# Patient Record
Sex: FEMALE | Race: WHITE | Employment: OTHER | URBAN - METROPOLITAN AREA
[De-identification: names, ages, dates, MRNs, and addresses within clinical notes are randomized per-mention and may not be internally consistent; named-entity substitution may affect disease eponyms.]

---

## 2018-10-12 ENCOUNTER — OFFICE VISIT (OUTPATIENT)
Dept: CARDIOLOGY CLINIC | Age: 68
End: 2018-10-12
Payer: MEDICARE

## 2018-10-12 VITALS
SYSTOLIC BLOOD PRESSURE: 126 MMHG | BODY MASS INDEX: 35.9 KG/M2 | WEIGHT: 202.6 LBS | HEART RATE: 68 BPM | HEIGHT: 63 IN | DIASTOLIC BLOOD PRESSURE: 78 MMHG

## 2018-10-12 DIAGNOSIS — Z95.2 S/P MITRAL VALVE REPLACEMENT: ICD-10-CM

## 2018-10-12 DIAGNOSIS — I73.9 PVD (PERIPHERAL VASCULAR DISEASE) (HCC): ICD-10-CM

## 2018-10-12 DIAGNOSIS — I05.0 RHEUMATIC MITRAL STENOSIS: ICD-10-CM

## 2018-10-12 DIAGNOSIS — E78.2 MIXED HYPERLIPIDEMIA: ICD-10-CM

## 2018-10-12 DIAGNOSIS — Z72.0 TOBACCO ABUSE: ICD-10-CM

## 2018-10-12 DIAGNOSIS — I10 ESSENTIAL HYPERTENSION: ICD-10-CM

## 2018-10-12 DIAGNOSIS — I25.119 CORONARY ARTERY DISEASE INVOLVING NATIVE CORONARY ARTERY OF NATIVE HEART WITH ANGINA PECTORIS (HCC): ICD-10-CM

## 2018-10-12 DIAGNOSIS — Z76.89 ENCOUNTER TO ESTABLISH CARE WITH NEW DOCTOR: Primary | ICD-10-CM

## 2018-10-12 DIAGNOSIS — Z95.5 S/P RIGHT CORONARY ARTERY (RCA) STENT PLACEMENT: ICD-10-CM

## 2018-10-12 DIAGNOSIS — I42.9 CARDIOMYOPATHY, PRIMARY (HCC): ICD-10-CM

## 2018-10-12 PROCEDURE — 93000 ELECTROCARDIOGRAM COMPLETE: CPT | Performed by: INTERNAL MEDICINE

## 2018-10-12 PROCEDURE — 99204 OFFICE O/P NEW MOD 45 MIN: CPT | Performed by: INTERNAL MEDICINE

## 2018-10-12 RX ORDER — PANTOPRAZOLE SODIUM 40 MG/1
40 TABLET, DELAYED RELEASE ORAL DAILY
Qty: 30 TABLET | Refills: 3 | Status: SHIPPED | OUTPATIENT
Start: 2018-10-12 | End: 2018-11-06 | Stop reason: SDUPTHER

## 2018-10-12 RX ORDER — VARENICLINE TARTRATE 25 MG
KIT ORAL
Qty: 1 EACH | Refills: 3 | Status: SHIPPED | OUTPATIENT
Start: 2018-10-12 | End: 2018-12-31 | Stop reason: SDUPTHER

## 2018-10-12 RX ORDER — WARFARIN SODIUM 5 MG/1
TABLET ORAL
Qty: 30 TABLET | Refills: 3 | Status: SHIPPED | OUTPATIENT
Start: 2018-10-12 | End: 2019-05-20 | Stop reason: SDUPTHER

## 2018-10-12 RX ORDER — LEVOTHYROXINE SODIUM 0.1 MG/1
50 TABLET ORAL DAILY
Qty: 30 TABLET | Refills: 3 | Status: SHIPPED | OUTPATIENT
Start: 2018-10-12 | End: 2018-10-23 | Stop reason: SDUPTHER

## 2018-10-12 RX ORDER — NITROGLYCERIN 0.4 MG/1
0.4 TABLET SUBLINGUAL EVERY 5 MIN PRN
Qty: 25 TABLET | Refills: 3 | Status: SHIPPED | OUTPATIENT
Start: 2018-10-12 | End: 2020-01-03 | Stop reason: SDUPTHER

## 2018-10-12 RX ORDER — FUROSEMIDE 20 MG/1
10 TABLET ORAL DAILY
Qty: 30 TABLET | Refills: 5 | Status: SHIPPED | OUTPATIENT
Start: 2018-10-12 | End: 2019-10-03 | Stop reason: SDUPTHER

## 2018-10-12 RX ORDER — ATORVASTATIN CALCIUM 20 MG/1
20 TABLET, FILM COATED ORAL DAILY
Qty: 30 TABLET | Refills: 5 | Status: SHIPPED | OUTPATIENT
Start: 2018-10-12 | End: 2018-11-06 | Stop reason: SDUPTHER

## 2018-10-12 RX ORDER — ATENOLOL 50 MG/1
50 TABLET ORAL DAILY
Qty: 30 TABLET | Refills: 5 | Status: SHIPPED | OUTPATIENT
Start: 2018-10-12 | End: 2018-11-06 | Stop reason: SDUPTHER

## 2018-10-12 RX ORDER — VITAMIN E 268 MG
400 CAPSULE ORAL DAILY
COMMUNITY
End: 2019-04-10

## 2018-10-12 RX ORDER — CILOSTAZOL 100 MG/1
100 TABLET ORAL 2 TIMES DAILY
Qty: 60 TABLET | Refills: 5 | Status: SHIPPED | OUTPATIENT
Start: 2018-10-12 | End: 2018-11-06 | Stop reason: SDUPTHER

## 2018-10-12 NOTE — PROGRESS NOTES
facility-administered medications for this visit. Allergies:   Patient has no known allergies. Patient History:  Past Medical History:   Diagnosis Date    CAD (coronary artery disease)     H/O echocardiogram 03/22/2017    EF 35-40%    Heart attack ([address] 1/2014    Hyperlipidemia     Hypertension     S/P right coronary artery (RCA) stent placement      Past Surgical History:   Procedure Laterality Date    CARDIAC SURGERY      CORONARY ANGIOPLASTY WITH STENT PLACEMENT      CORONARY ARTERY BYPASS GRAFT  06/21/2016    CABG X1 SVG to RCA with Mitral Valve Replacement 27mm Mosaic     MITRAL VALVE REPLACEMENT  06/21/2016    27mm mosaic     Family History   Problem Relation Age of Onset    Stroke Mother     Heart Attack Father     Coronary Art Dis Father     Pacemaker Father     Arrhythmia Father     Diabetes Sister     Rheum Arthritis Sister     Heart Attack Sister     Arrhythmia Sister      Social History   Substance Use Topics    Smoking status: Current Some Day Smoker     Types: Cigarettes     Last attempt to quit: 5/28/2016    Smokeless tobacco: Never Used    Alcohol use No        Review of Systems:   · Constitutional: No Fever or Weight Loss   · Eyes: No Decreased Vision  · ENT: No Headaches, Hearing Loss or Vertigo  · Cardiovascular: as per note above   · Respiratory: No cough or wheezing and as per note above.    · Gastrointestinal: No abdominal pain, appetite loss, blood in stools, constipation, diarrhea or heartburn  · Genitourinary: No dysuria, trouble voiding, or hematuria  · Musculoskeletal:  None  · Integumentary: No rash or pruritis  · Neurological: No TIA or stroke symptoms  · Psychiatric: No anxiety or depression  · Endocrine: No malaise, fatigue or temperature intolerance  · Hematologic/Lymphatic: No bleeding problems, blood clots or swollen lymph nodes  · Allergic/Immunologic: No nasal congestion or hives    Objective:      Physical Exam:  /78 (Site: Left Upper Arm,

## 2018-10-15 ENCOUNTER — TELEPHONE (OUTPATIENT)
Dept: CARDIOLOGY CLINIC | Age: 68
End: 2018-10-15

## 2018-10-23 ENCOUNTER — OFFICE VISIT (OUTPATIENT)
Dept: FAMILY MEDICINE CLINIC | Age: 68
End: 2018-10-23
Payer: MEDICARE

## 2018-10-23 VITALS
BODY MASS INDEX: 38.48 KG/M2 | SYSTOLIC BLOOD PRESSURE: 122 MMHG | DIASTOLIC BLOOD PRESSURE: 72 MMHG | OXYGEN SATURATION: 95 % | WEIGHT: 203.8 LBS | HEART RATE: 62 BPM | HEIGHT: 61 IN | TEMPERATURE: 97.5 F

## 2018-10-23 DIAGNOSIS — Z91.81 AT HIGH RISK FOR FALLS: ICD-10-CM

## 2018-10-23 DIAGNOSIS — Z12.31 ENCOUNTER FOR SCREENING MAMMOGRAM FOR BREAST CANCER: ICD-10-CM

## 2018-10-23 DIAGNOSIS — I25.10 CORONARY ARTERY DISEASE INVOLVING NATIVE CORONARY ARTERY OF NATIVE HEART WITHOUT ANGINA PECTORIS: ICD-10-CM

## 2018-10-23 DIAGNOSIS — R06.02 SHORTNESS OF BREATH: ICD-10-CM

## 2018-10-23 DIAGNOSIS — Z79.01 ANTICOAGULATED ON COUMADIN: ICD-10-CM

## 2018-10-23 DIAGNOSIS — Z72.0 TOBACCO ABUSE: Primary | ICD-10-CM

## 2018-10-23 DIAGNOSIS — E03.9 ACQUIRED HYPOTHYROIDISM: ICD-10-CM

## 2018-10-23 DIAGNOSIS — Z13.31 POSITIVE DEPRESSION SCREENING: ICD-10-CM

## 2018-10-23 DIAGNOSIS — Z99.81 ON HOME O2: ICD-10-CM

## 2018-10-23 DIAGNOSIS — E78.2 MIXED HYPERLIPIDEMIA: ICD-10-CM

## 2018-10-23 DIAGNOSIS — Z78.0 POST-MENOPAUSAL: ICD-10-CM

## 2018-10-23 DIAGNOSIS — I73.9 PAD (PERIPHERAL ARTERY DISEASE) (HCC): ICD-10-CM

## 2018-10-23 DIAGNOSIS — Z95.2 S/P MITRAL VALVE REPLACEMENT: ICD-10-CM

## 2018-10-23 DIAGNOSIS — Z76.89 ESTABLISHING CARE WITH NEW DOCTOR, ENCOUNTER FOR: ICD-10-CM

## 2018-10-23 DIAGNOSIS — I10 ESSENTIAL HYPERTENSION: ICD-10-CM

## 2018-10-23 LAB
A/G RATIO: 1.2 (ref 1.1–2.2)
ALBUMIN SERPL-MCNC: 3.8 G/DL (ref 3.4–5)
ALP BLD-CCNC: 88 U/L (ref 40–129)
ALT SERPL-CCNC: 18 U/L (ref 10–40)
ANION GAP SERPL CALCULATED.3IONS-SCNC: 15 MMOL/L (ref 3–16)
AST SERPL-CCNC: 16 U/L (ref 15–37)
BASOPHILS ABSOLUTE: 0.1 K/UL (ref 0–0.2)
BASOPHILS RELATIVE PERCENT: 0.8 %
BILIRUB SERPL-MCNC: 0.4 MG/DL (ref 0–1)
BUN BLDV-MCNC: 14 MG/DL (ref 7–20)
CALCIUM SERPL-MCNC: 9.4 MG/DL (ref 8.3–10.6)
CHLORIDE BLD-SCNC: 97 MMOL/L (ref 99–110)
CO2: 27 MMOL/L (ref 21–32)
CREAT SERPL-MCNC: 1.3 MG/DL (ref 0.6–1.2)
EOSINOPHILS ABSOLUTE: 0.2 K/UL (ref 0–0.6)
EOSINOPHILS RELATIVE PERCENT: 3 %
GFR AFRICAN AMERICAN: 49
GFR NON-AFRICAN AMERICAN: 41
GLOBULIN: 3.2 G/DL
GLUCOSE BLD-MCNC: 118 MG/DL (ref 70–99)
HCT VFR BLD CALC: 41.7 % (ref 36–48)
HEMOGLOBIN: 13.7 G/DL (ref 12–16)
INTERNATIONAL NORMALIZATION RATIO, POC: 3.1
LYMPHOCYTES ABSOLUTE: 1.7 K/UL (ref 1–5.1)
LYMPHOCYTES RELATIVE PERCENT: 21.4 %
MCH RBC QN AUTO: 31.7 PG (ref 26–34)
MCHC RBC AUTO-ENTMCNC: 32.8 G/DL (ref 31–36)
MCV RBC AUTO: 96.7 FL (ref 80–100)
MONOCYTES ABSOLUTE: 0.7 K/UL (ref 0–1.3)
MONOCYTES RELATIVE PERCENT: 8.8 %
NEUTROPHILS ABSOLUTE: 5.1 K/UL (ref 1.7–7.7)
NEUTROPHILS RELATIVE PERCENT: 66 %
PDW BLD-RTO: 14.1 % (ref 12.4–15.4)
PLATELET # BLD: 208 K/UL (ref 135–450)
PMV BLD AUTO: 9.8 FL (ref 5–10.5)
POTASSIUM SERPL-SCNC: 4.2 MMOL/L (ref 3.5–5.1)
PROTHROMBIN TIME, POC: NORMAL
RBC # BLD: 4.32 M/UL (ref 4–5.2)
SODIUM BLD-SCNC: 139 MMOL/L (ref 136–145)
TOTAL PROTEIN: 7 G/DL (ref 6.4–8.2)
WBC # BLD: 7.8 K/UL (ref 4–11)

## 2018-10-23 PROCEDURE — G0444 DEPRESSION SCREEN ANNUAL: HCPCS | Performed by: FAMILY MEDICINE

## 2018-10-23 PROCEDURE — 85610 PROTHROMBIN TIME: CPT | Performed by: FAMILY MEDICINE

## 2018-10-23 PROCEDURE — 99205 OFFICE O/P NEW HI 60 MIN: CPT | Performed by: FAMILY MEDICINE

## 2018-10-23 PROCEDURE — 36415 COLL VENOUS BLD VENIPUNCTURE: CPT | Performed by: FAMILY MEDICINE

## 2018-10-23 PROCEDURE — G8431 POS CLIN DEPRES SCRN F/U DOC: HCPCS | Performed by: FAMILY MEDICINE

## 2018-10-23 RX ORDER — LEVOTHYROXINE SODIUM 0.1 MG/1
100 TABLET ORAL DAILY
Qty: 30 TABLET | Refills: 0
Start: 2018-10-23 | End: 2018-11-06 | Stop reason: SDUPTHER

## 2018-10-23 ASSESSMENT — PATIENT HEALTH QUESTIONNAIRE - PHQ9
SUM OF ALL RESPONSES TO PHQ9 QUESTIONS 1 & 2: 3
2. FEELING DOWN, DEPRESSED OR HOPELESS: 1
1. LITTLE INTEREST OR PLEASURE IN DOING THINGS: 2
SUM OF ALL RESPONSES TO PHQ QUESTIONS 1-9: 12
SUM OF ALL RESPONSES TO PHQ QUESTIONS 1-9: 12
4. FEELING TIRED OR HAVING LITTLE ENERGY: 2
6. FEELING BAD ABOUT YOURSELF - OR THAT YOU ARE A FAILURE OR HAVE LET YOURSELF OR YOUR FAMILY DOWN: 2
5. POOR APPETITE OR OVEREATING: 2
9. THOUGHTS THAT YOU WOULD BE BETTER OFF DEAD, OR OF HURTING YOURSELF: 0
10. IF YOU CHECKED OFF ANY PROBLEMS, HOW DIFFICULT HAVE THESE PROBLEMS MADE IT FOR YOU TO DO YOUR WORK, TAKE CARE OF THINGS AT HOME, OR GET ALONG WITH OTHER PEOPLE: 2
8. MOVING OR SPEAKING SO SLOWLY THAT OTHER PEOPLE COULD HAVE NOTICED. OR THE OPPOSITE, BEING SO FIGETY OR RESTLESS THAT YOU HAVE BEEN MOVING AROUND A LOT MORE THAN USUAL: 0
3. TROUBLE FALLING OR STAYING ASLEEP: 2
7. TROUBLE CONCENTRATING ON THINGS, SUCH AS READING THE NEWSPAPER OR WATCHING TELEVISION: 1

## 2018-10-23 ASSESSMENT — ENCOUNTER SYMPTOMS
NAUSEA: 0
DIARRHEA: 0
SHORTNESS OF BREATH: 1
ABDOMINAL PAIN: 0
VOMITING: 0
CONSTIPATION: 0
COUGH: 0

## 2018-10-23 NOTE — PROGRESS NOTES
minutes as needed for Chest pain up to max of 3 total doses. If no relief after 1 dose, call 911., Disp: 25 tablet, Rfl: 3    furosemide (LASIX) 20 MG tablet, Take 0.5 tablets by mouth daily, Disp: 30 tablet, Rfl: 5    pantoprazole (PROTONIX) 40 MG tablet, Take 1 tablet by mouth daily, Disp: 30 tablet, Rfl: 3    warfarin (COUMADIN) 5 MG tablet, Take one and half tabs, Disp: 30 tablet, Rfl: 3    varenicline (CHANTIX STARTING MONTH PAK) 0.5 MG X 11 & 1 MG X 42 tablet, Take by mouth., Disp: 1 each, Rfl: 3    escitalopram (LEXAPRO) 20 MG tablet, Take 20 mg by mouth daily, Disp: , Rfl:     Ascorbic Acid (VITAMIN C) 1000 MG tablet, Take 1,000 mg by mouth daily, Disp: , Rfl:     Cholecalciferol (VITAMIN D-3) 1000 UNITS CAPS, Take 1 tablet by mouth daily, Disp: , Rfl:     Calcium Carb-Cholecalciferol (CALCIUM 600+D) 600-800 MG-UNIT TABS, Take 1 tablet by mouth daily, Disp: , Rfl:     aspirin 81 MG EC tablet, Take 1 tablet by mouth daily, Disp: 30 tablet, Rfl: 3    Multiple Vitamins-Minerals (THERAPEUTIC MULTIVITAMIN-MINERALS) tablet, Take 1 tablet by mouth daily, Disp: , Rfl:       Objective:   Physical Exam   Constitutional: She is oriented to person, place, and time. She appears well-developed and well-nourished. No distress. HENT:   Head: Normocephalic and atraumatic. Right Ear: External ear normal.   Left Ear: External ear normal.   Mouth/Throat: Oropharynx is clear and moist.   Eyes: Pupils are equal, round, and reactive to light. Conjunctivae and EOM are normal.   Neck: Neck supple. No thyromegaly present. Cardiovascular: Normal rate and regular rhythm. Murmur heard. Pulmonary/Chest: Effort normal. She has decreased breath sounds. She has wheezes. Abdominal: Soft. Bowel sounds are normal. She exhibits no mass. There is no tenderness. Musculoskeletal: She exhibits no edema. Lymphadenopathy:     She has no cervical adenopathy.    Neurological: She is alert and oriented to person, place, and

## 2018-11-02 RX ORDER — PANTOPRAZOLE SODIUM 40 MG/1
40 TABLET, DELAYED RELEASE ORAL DAILY
Qty: 30 TABLET | Refills: 3 | OUTPATIENT
Start: 2018-11-02

## 2018-11-02 RX ORDER — ESCITALOPRAM OXALATE 20 MG/1
20 TABLET ORAL DAILY
Qty: 30 TABLET | Refills: 5 | Status: SHIPPED | OUTPATIENT
Start: 2018-11-02 | End: 2019-05-31 | Stop reason: SDUPTHER

## 2018-11-06 RX ORDER — PANTOPRAZOLE SODIUM 40 MG/1
40 TABLET, DELAYED RELEASE ORAL DAILY
Qty: 90 TABLET | Refills: 3 | Status: SHIPPED | OUTPATIENT
Start: 2018-11-06 | End: 2020-01-03 | Stop reason: SDUPTHER

## 2018-11-06 RX ORDER — ATORVASTATIN CALCIUM 20 MG/1
20 TABLET, FILM COATED ORAL DAILY
Qty: 90 TABLET | Refills: 3 | Status: SHIPPED | OUTPATIENT
Start: 2018-11-06 | End: 2020-01-03 | Stop reason: SDUPTHER

## 2018-11-06 RX ORDER — LEVOTHYROXINE SODIUM 0.1 MG/1
100 TABLET ORAL DAILY
Qty: 30 TABLET | Refills: 5 | Status: SHIPPED | OUTPATIENT
Start: 2018-11-06 | End: 2019-03-26 | Stop reason: SDUPTHER

## 2018-11-06 RX ORDER — ATENOLOL 50 MG/1
50 TABLET ORAL DAILY
Qty: 90 TABLET | Refills: 3 | Status: SHIPPED | OUTPATIENT
Start: 2018-11-06 | End: 2020-01-24

## 2018-11-06 RX ORDER — CILOSTAZOL 100 MG/1
100 TABLET ORAL 2 TIMES DAILY
Qty: 180 TABLET | Refills: 3 | Status: SHIPPED | OUTPATIENT
Start: 2018-11-06 | End: 2018-12-14 | Stop reason: SDUPTHER

## 2018-12-14 RX ORDER — CILOSTAZOL 100 MG/1
100 TABLET ORAL 2 TIMES DAILY
Qty: 180 TABLET | Refills: 3 | Status: SHIPPED | OUTPATIENT
Start: 2018-12-14 | End: 2019-12-06 | Stop reason: SDUPTHER

## 2018-12-31 ENCOUNTER — OFFICE VISIT (OUTPATIENT)
Dept: FAMILY MEDICINE CLINIC | Age: 68
End: 2018-12-31
Payer: MEDICARE

## 2018-12-31 VITALS
BODY MASS INDEX: 39.19 KG/M2 | SYSTOLIC BLOOD PRESSURE: 146 MMHG | HEART RATE: 65 BPM | WEIGHT: 204 LBS | RESPIRATION RATE: 14 BRPM | OXYGEN SATURATION: 95 % | DIASTOLIC BLOOD PRESSURE: 82 MMHG

## 2018-12-31 DIAGNOSIS — R06.02 SHORTNESS OF BREATH: ICD-10-CM

## 2018-12-31 DIAGNOSIS — R32 URINARY INCONTINENCE, UNSPECIFIED TYPE: Primary | ICD-10-CM

## 2018-12-31 DIAGNOSIS — Z23 NEED FOR INFLUENZA VACCINATION: ICD-10-CM

## 2018-12-31 DIAGNOSIS — Z72.0 TOBACCO ABUSE: ICD-10-CM

## 2018-12-31 LAB
BILIRUBIN URINE: NEGATIVE
BLOOD, URINE: NEGATIVE
CLARITY: CLEAR
COLOR: YELLOW
EPITHELIAL CELLS, UA: 1 /HPF (ref 0–5)
GLUCOSE URINE: NEGATIVE MG/DL
HYALINE CASTS: 0 /LPF (ref 0–8)
KETONES, URINE: NEGATIVE MG/DL
LEUKOCYTE ESTERASE, URINE: NEGATIVE
MICROSCOPIC EXAMINATION: YES
NITRITE, URINE: NEGATIVE
PH UA: 6.5
PROTEIN UA: 30 MG/DL
RBC UA: 1 /HPF (ref 0–4)
SPECIFIC GRAVITY UA: 1.01
URINE REFLEX TO CULTURE: ABNORMAL
URINE TYPE: ABNORMAL
UROBILINOGEN, URINE: 0.2 E.U./DL
WBC UA: 1 /HPF (ref 0–5)

## 2018-12-31 PROCEDURE — 90662 IIV NO PRSV INCREASED AG IM: CPT | Performed by: FAMILY MEDICINE

## 2018-12-31 PROCEDURE — 99214 OFFICE O/P EST MOD 30 MIN: CPT | Performed by: FAMILY MEDICINE

## 2018-12-31 PROCEDURE — 81003 URINALYSIS AUTO W/O SCOPE: CPT | Performed by: FAMILY MEDICINE

## 2018-12-31 PROCEDURE — G0008 ADMIN INFLUENZA VIRUS VAC: HCPCS | Performed by: FAMILY MEDICINE

## 2018-12-31 RX ORDER — VARENICLINE TARTRATE 25 MG
KIT ORAL
Qty: 1 EACH | Refills: 3 | Status: SHIPPED | OUTPATIENT
Start: 2018-12-31 | End: 2019-02-04 | Stop reason: DRUGHIGH

## 2018-12-31 NOTE — PROGRESS NOTES
Subjective:      Patient ID: Charmaine Ee is a 76 y.o. female. Tere Pérez is here with urinary leakage. She wants to restart chantix, and meds for COPD. Urinary leakage   She reports this is been going on for 3 years. She has stress incontinence but also leaks with movements as well as leaking when just sitting watching TV. She reports leaking at night while she is sleeping. She wears undergarments to help with leakage. She denies ever seeing a urologist or having workup for this in the past.  She denies any medications for this. She is on a diuretic which contributes to her increased urination. His any pain or burning with urination. No urgency or residual.  No blood when she peas. A UA of 2016 did show some moderate blood and protein in her urine. No follow[facility]up [facility]. Smoking  She took Chantix for 1 month but never called for follow-up pack. She would like to restart Chantix. Breathing  She is asking for meds for COPD, she did not get her pulmonary function test done to evaluate her breathing. Since her records are from Dr. Joana Garcia and are not easily obtainable right now we'll repeat her PFTs as planned. Review of Systems   Respiratory: Positive for shortness of breath. Cardiovascular: Negative for chest pain. Genitourinary: Positive for frequency. Negative for dysuria, hematuria, pelvic pain, urgency and vaginal pain. Margarita Meyer Count includes the [facility]  Past Surgical History:   Procedure Laterality Date    CARDIAC SURGERY      CORONARY ANGIOPLASTY WITH STENT PLACEMENT      CORONARY ARTERY BYPASS GRAFT  06/21/2016    CABG X1 SVG to RCA with Mitral Valve Replacement 27mm Mosaic     MITRAL VALVE REPLACEMENT  06/21/2016    27mm mosaic     Social History     Social History    Marital status:      Spouse name: N/A    Number of children: N/A    Years of education: N/A     Occupational History    Not on file.      Social History Main Topics    Smoking status: Former Smoker     Types:

## 2019-01-02 ENCOUNTER — TELEPHONE (OUTPATIENT)
Dept: FAMILY MEDICINE CLINIC | Age: 69
End: 2019-01-02

## 2019-01-02 DIAGNOSIS — R32 URINARY INCONTINENCE, UNSPECIFIED TYPE: Primary | ICD-10-CM

## 2019-01-02 RX ORDER — OXYBUTYNIN CHLORIDE 10 MG/1
10 TABLET, EXTENDED RELEASE ORAL DAILY
Qty: 30 TABLET | Refills: 2 | Status: SHIPPED | OUTPATIENT
Start: 2019-01-02 | End: 2019-07-10 | Stop reason: SDUPTHER

## 2019-02-04 ENCOUNTER — TELEPHONE (OUTPATIENT)
Dept: FAMILY MEDICINE CLINIC | Age: 69
End: 2019-02-04

## 2019-02-04 RX ORDER — VARENICLINE TARTRATE 1 MG/1
1 TABLET, FILM COATED ORAL 2 TIMES DAILY
Qty: 60 TABLET | Refills: 1 | Status: SHIPPED | OUTPATIENT
Start: 2019-02-04 | End: 2019-04-10

## 2019-04-10 ENCOUNTER — OFFICE VISIT (OUTPATIENT)
Dept: FAMILY MEDICINE CLINIC | Age: 69
End: 2019-04-10
Payer: MEDICARE

## 2019-04-10 VITALS
BODY MASS INDEX: 40.03 KG/M2 | HEART RATE: 66 BPM | OXYGEN SATURATION: 98 % | WEIGHT: 208.4 LBS | TEMPERATURE: 96.6 F | SYSTOLIC BLOOD PRESSURE: 130 MMHG | DIASTOLIC BLOOD PRESSURE: 72 MMHG

## 2019-04-10 DIAGNOSIS — Z72.0 TOBACCO ABUSE: ICD-10-CM

## 2019-04-10 DIAGNOSIS — R09.81 NASAL CONGESTION: ICD-10-CM

## 2019-04-10 DIAGNOSIS — Z79.01 ANTICOAGULATED ON COUMADIN: Primary | ICD-10-CM

## 2019-04-10 LAB
INTERNATIONAL NORMALIZATION RATIO, POC: 6.4
PROTHROMBIN TIME, POC: ABNORMAL

## 2019-04-10 PROCEDURE — 99214 OFFICE O/P EST MOD 30 MIN: CPT | Performed by: FAMILY MEDICINE

## 2019-04-10 PROCEDURE — 85610 PROTHROMBIN TIME: CPT | Performed by: FAMILY MEDICINE

## 2019-04-10 ASSESSMENT — ENCOUNTER SYMPTOMS
SHORTNESS OF BREATH: 1
COUGH: 0

## 2019-04-10 ASSESSMENT — PATIENT HEALTH QUESTIONNAIRE - PHQ9
2. FEELING DOWN, DEPRESSED OR HOPELESS: 0
1. LITTLE INTEREST OR PLEASURE IN DOING THINGS: 0
SUM OF ALL RESPONSES TO PHQ9 QUESTIONS 1 & 2: 0
SUM OF ALL RESPONSES TO PHQ QUESTIONS 1-9: 0
SUM OF ALL RESPONSES TO PHQ QUESTIONS 1-9: 0

## 2019-04-10 NOTE — PROGRESS NOTES
Subjective:      Patient ID: Leon Yip is a 71 y.o. female. Tomeka Castellanos is here to follow up on smoking. Smoking  Currently she is smoking up to 1/2 pack per day. She stopped Chantix about 2 weeks ago. She stopped while she was taking it. She also has developed some allergy problems over the last couple months. Review of Systems   Respiratory: Positive for shortness of breath. Negative for cough. Cardiovascular: Negative for chest pain and palpitations.      Past Medical History:   Diagnosis Date    CAD (coronary artery disease)     [facility] [address]     H/O echocardiogram 2017    EF 35-40%    Heart attack ([facility] [address] 2014    Hyperlipidemia     Hypertension     [facility]     S/P right coronary artery (RCA) stent placement     T2 vertebral fracture ([facility] [address] 2017    TIA (transient ischemic attack)     Tobacco dependence      Past Surgical History:   Procedure Laterality Date    CARDIAC SURGERY      CORONARY ANGIOPLASTY WITH STENT PLACEMENT      CORONARY ARTERY BYPASS GRAFT  2016    CABG X1 SVG to RCA with Mitral Valve Replacement 27mm Mosaic     MITRAL VALVE REPLACEMENT  2016    27mm mosaic     Social History     Socioeconomic History    Marital status:      Spouse name: Not on file    Number of children: Not on file    Years of education: Not on file    Highest education level: Not on file   Occupational History    Not on file   Social Needs    Financial resource strain: Not on file    Food insecurity:     Worry: Not on file     Inability: Not on file    Transportation needs:     Medical: Not on file     Non-medical: Not on file   Tobacco Use    Smoking status: Former Smoker     Types: Cigarettes     Last attempt to quit: 10/18/2017     Years since quittin.4    Smokeless tobacco: Never Used   Substance and Sexual Activity    Alcohol use: No    Drug use: No    Sexual activity: Yes     Partners: Male   Lifestyle    Physical activity:     Days per week: Not on file     Minutes per session: Not on file    Stress: Not on file   Relationships    Social connections:     Talks on phone: Not on file     Gets together: Not on file     Attends Mandaeism service: Not on file     Active member of club or organization: Not on file     Attends meetings of clubs or organizations: Not on file     Relationship status: Not on file    Intimate partner violence:     Fear of current or ex partner: Not on file     Emotionally abused: Not on file     Physically abused: Not on file     Forced sexual activity: Not on file   Other Topics Concern    Not on file   Social History Narrative    ** Merged History Encounter **          Family History   Problem Relation Age of Onset    Stroke Mother     Heart Attack Father     Coronary Art Dis Father     Pacemaker Father     Arrhythmia Father     Diabetes Sister     Rheum Arthritis Sister     Heart Attack Sister     Arrhythmia Sister          Objective:   Physical Exam   Constitutional: She appears well-developed and well-nourished. No distress. HENT:   Head: Normocephalic and atraumatic. Right Ear: External ear normal.   Left Ear: External ear normal.   Mouth/Throat: Oropharynx is clear and moist and mucous membranes are normal. No oropharyngeal exudate or posterior oropharyngeal erythema. No tonsillar exudate. Nasal congestion but dry   Nursing note and vitals reviewed. Assessment:       Diagnosis Orders   1. Anticoagulated on Coumadin  POCT INR   2. Tobacco abuse     3. Nasal congestion             Plan:      1. INR in the office today was 6.4. We'll have her hold the next 2 Coumadin doses and repeat her INR on Friday. 2.  We'll have her use nicotine replacement lozenges to take place of her cigarettes. We discussed that the nicotine isn't a problem but what's in the cigarettes is.   We'll get her switched over to the lozenges and then try to titrate the dose down. She verbalized understanding. 3.  Nasal congestion is from dryness and swelling. Moisturize with Vaseline at least once a day and nasal saline as needed. She can take the over-the-counter antihistamine decongestant combination for next week to help with swelling as well. Follow-up one month: Smoking. Nurse visit on Friday for INR. Current Outpatient Medications:     Cyanocobalamin (VITAMIN B 12 PO), Take by mouth, Disp: , Rfl:     levothyroxine (SYNTHROID) 100 MCG tablet, TAKE 1 TABLET BY MOUTH ONCE DAILY, Disp: 90 tablet, Rfl: 0    oxybutynin (DITROPAN XL) 10 MG extended release tablet, Take 1 tablet by mouth daily, Disp: 30 tablet, Rfl: 2    cilostazol (PLETAL) 100 MG tablet, Take 1 tablet by mouth 2 times daily, Disp: 180 tablet, Rfl: 3    atorvastatin (LIPITOR) 20 MG tablet, Take 1 tablet by mouth daily, Disp: 90 tablet, Rfl: 3    atenolol (TENORMIN) 50 MG tablet, Take 1 tablet by mouth daily, Disp: 90 tablet, Rfl: 3    pantoprazole (PROTONIX) 40 MG tablet, Take 1 tablet by mouth daily, Disp: 90 tablet, Rfl: 3    escitalopram (LEXAPRO) 20 MG tablet, Take 1 tablet by mouth daily, Disp: 30 tablet, Rfl: 5    nitroGLYCERIN (NITROSTAT) 0.4 MG SL tablet, Place 1 tablet under the tongue every 5 minutes as needed for Chest pain up to max of 3 total doses.  If no relief after 1 dose, call 911., Disp: 25 tablet, Rfl: 3    furosemide (LASIX) 20 MG tablet, Take 0.5 tablets by mouth daily, Disp: 30 tablet, Rfl: 5    warfarin (COUMADIN) 5 MG tablet, Take one and half tabs, Disp: 30 tablet, Rfl: 3    Ascorbic Acid (VITAMIN C) 1000 MG tablet, Take 1,000 mg by mouth daily, Disp: , Rfl:     aspirin 81 MG EC tablet, Take 1 tablet by mouth daily, Disp: 30 tablet, Rfl: 3    Multiple Vitamins-Minerals (THERAPEUTIC MULTIVITAMIN-MINERALS) tablet, Take 1 tablet by mouth daily, Disp: , Rfl:     Calcium Carb-Cholecalciferol (CALCIUM 600+D) 600-800 MG-UNIT TABS, Take 1 tablet by mouth daily, Disp: , Rfl:           Bethany Matthews MD

## 2019-04-10 NOTE — PATIENT INSTRUCTIONS
Take the allergy medication as directed for 1 week. Then stop it. At least once a day, apply vaseline to the opening of your nose to help moisturize your nose. This will decrease your nasal congestion and allergy symptoms.

## 2019-04-12 ENCOUNTER — NURSE ONLY (OUTPATIENT)
Dept: FAMILY MEDICINE CLINIC | Age: 69
End: 2019-04-12
Payer: MEDICARE

## 2019-04-12 DIAGNOSIS — Z79.01 ANTICOAGULATED ON COUMADIN: Primary | ICD-10-CM

## 2019-04-12 LAB
INTERNATIONAL NORMALIZATION RATIO, POC: 3.3
PROTHROMBIN TIME, POC: ABNORMAL

## 2019-04-12 PROCEDURE — 85610 PROTHROMBIN TIME: CPT | Performed by: FAMILY MEDICINE

## 2019-05-02 ENCOUNTER — NURSE ONLY (OUTPATIENT)
Dept: FAMILY MEDICINE CLINIC | Age: 69
End: 2019-05-02
Payer: MEDICARE

## 2019-05-02 ENCOUNTER — TELEPHONE (OUTPATIENT)
Dept: FAMILY MEDICINE CLINIC | Age: 69
End: 2019-05-02

## 2019-05-02 DIAGNOSIS — Z79.01 ANTICOAGULATED ON COUMADIN: Primary | ICD-10-CM

## 2019-05-02 LAB
INTERNATIONAL NORMALIZATION RATIO, POC: 4.6
PROTHROMBIN TIME, POC: ABNORMAL

## 2019-05-02 PROCEDURE — 85610 PROTHROMBIN TIME: CPT | Performed by: FAMILY MEDICINE

## 2019-05-06 ENCOUNTER — NURSE ONLY (OUTPATIENT)
Dept: FAMILY MEDICINE CLINIC | Age: 69
End: 2019-05-06
Payer: MEDICARE

## 2019-05-06 DIAGNOSIS — Z79.01 ANTICOAGULATED ON COUMADIN: Primary | ICD-10-CM

## 2019-05-06 LAB
INTERNATIONAL NORMALIZATION RATIO, POC: 2
PROTHROMBIN TIME, POC: NORMAL

## 2019-05-06 PROCEDURE — 85610 PROTHROMBIN TIME: CPT | Performed by: FAMILY MEDICINE

## 2019-05-13 ENCOUNTER — NURSE ONLY (OUTPATIENT)
Dept: FAMILY MEDICINE CLINIC | Age: 69
End: 2019-05-13
Payer: MEDICARE

## 2019-05-13 DIAGNOSIS — Z79.01 ANTICOAGULATED ON COUMADIN: Primary | ICD-10-CM

## 2019-05-13 LAB
INTERNATIONAL NORMALIZATION RATIO, POC: 2.7
PROTHROMBIN TIME, POC: NORMAL

## 2019-05-13 PROCEDURE — 85610 PROTHROMBIN TIME: CPT | Performed by: FAMILY MEDICINE

## 2019-05-31 RX ORDER — ESCITALOPRAM OXALATE 20 MG/1
20 TABLET ORAL DAILY
Qty: 30 TABLET | Refills: 0 | Status: SHIPPED | OUTPATIENT
Start: 2019-05-31 | End: 2019-07-02 | Stop reason: SDUPTHER

## 2019-06-13 ENCOUNTER — TELEPHONE (OUTPATIENT)
Dept: CARDIOLOGY CLINIC | Age: 69
End: 2019-06-13

## 2019-06-13 NOTE — TELEPHONE ENCOUNTER
[facility] called for date of services and 10/2018 was the pt last visit and they needed elisabeth NPI number

## 2019-06-19 ENCOUNTER — TELEPHONE (OUTPATIENT)
Dept: CARDIOLOGY CLINIC | Age: 69
End: 2019-06-19

## 2019-06-25 ENCOUNTER — OFFICE VISIT (OUTPATIENT)
Dept: FAMILY MEDICINE CLINIC | Age: 69
End: 2019-06-25
Payer: MEDICARE

## 2019-06-25 VITALS
SYSTOLIC BLOOD PRESSURE: 122 MMHG | OXYGEN SATURATION: 97 % | WEIGHT: 205.8 LBS | TEMPERATURE: 96.9 F | HEART RATE: 86 BPM | BODY MASS INDEX: 39.53 KG/M2 | DIASTOLIC BLOOD PRESSURE: 80 MMHG

## 2019-06-25 DIAGNOSIS — Z79.01 ANTICOAGULATED ON COUMADIN: ICD-10-CM

## 2019-06-25 DIAGNOSIS — H11.31 SUBCONJUNCTIVAL HEMORRHAGE OF RIGHT EYE: Primary | ICD-10-CM

## 2019-06-25 PROCEDURE — 99213 OFFICE O/P EST LOW 20 MIN: CPT | Performed by: FAMILY MEDICINE

## 2019-06-25 ASSESSMENT — ENCOUNTER SYMPTOMS
EYE REDNESS: 1
EYE DISCHARGE: 0
EYE PAIN: 0
EYE ITCHING: 0
PHOTOPHOBIA: 0

## 2019-06-25 NOTE — PROGRESS NOTES
Subjective:      Patient ID: Betzaida Gupta is a 71 y.o. female. Anatoliy Pratt is here to follow up on her subconjunctival hemorrhage. Subconjunctival hemorrhage  Woke up  with it. No known injury. No pain. No vision changes. Seen at the ER - pressure was normal. No abrasions. INR therapeutic at 2.7. Review of Systems   Constitutional: Negative for fever. Eyes: Positive for redness. Negative for photophobia, pain, discharge, itching and visual disturbance.      Past Medical History:   Diagnosis Date    CAD (coronary artery disease)     [facility]) [facility] [address]     H/O echocardiogram 2017    EF 35-40%    Heart attack ([facility] [address] 2014    Hyperlipidemia     Hypertension     [facility]per[facility]     S/P right coronary artery (RCA) stent placement     T2 vertebral fracture ([facility] [address] 2017    TIA (transient ischemic attack)     Tobacco dependence      Past Surgical History:   Procedure Laterality Date    CARDIAC SURGERY      CORONARY ANGIOPLASTY WITH STENT PLACEMENT      CORONARY ARTERY BYPASS GRAFT  2016    CABG X1 SVG to RCA with Mitral Valve Replacement 27mm Mosaic     MITRAL VALVE REPLACEMENT  2016    27mm mosaic     Social History     Socioeconomic History    Marital status:      Spouse name: Not on file    Number of children: Not on file    Years of education: Not on file    Highest education level: Not on file   Occupational History    Not on file   Social Needs    Financial resource strain: Not on file    Food insecurity:     Worry: Not on file     Inability: Not on file    Transportation needs:     Medical: Not on file     Non-medical: Not on file   Tobacco Use    Smoking status: Former Smoker     Types: Cigarettes     Last attempt to quit: 10/18/2017     Years since quittin.6    Smokeless tobacco: Never Used   Substance and Sexual Activity    Alcohol use: No    Drug use: No    Sexual activity: Yes     Partners: Male   Lifestyle    Physical activity:     Days per week: Not on file     Minutes per session: Not on file    Stress: Not on file   Relationships    Social connections:     Talks on phone: Not on file     Gets together: Not on file     Attends Latter day service: Not on file     Active member of club or organization: Not on file     Attends meetings of clubs or organizations: Not on file     Relationship status: Not on file    Intimate partner violence:     Fear of current or ex partner: Not on file     Emotionally abused: Not on file     Physically abused: Not on file     Forced sexual activity: Not on file   Other Topics Concern    Not on file   Social History Narrative    ** Merged History Encounter **            Family History   Problem Relation Age of Onset    Stroke Mother     Heart Attack Father     Coronary Art Dis Father     Pacemaker Father     Arrhythmia Father     Diabetes Sister     Rheum Arthritis Sister     Heart Attack Sister     Arrhythmia Sister          Objective:   Physical Exam   Constitutional: She appears well-developed and well-nourished. No distress. HENT:   Head: Normocephalic and atraumatic. Right Ear: External ear normal.   Left Ear: External ear normal.   Mouth/Throat: Oropharynx is clear and moist.   Eyes: Right eye exhibits no exudate and no hordeolum. No foreign body present in the right eye. Right conjunctiva has a hemorrhage. Nursing note and vitals reviewed. Assessment:       Diagnosis Orders   1. Subconjunctival hemorrhage of right eye     2. Anticoagulated on Coumadin             Plan:      1. Allow time to resolve. Call if symptoms change/worsen. 2. Therapeutic - continue current coumadin dose and follow up as planned.      Follow up prn      Current Outpatient Medications:     escitalopram (LEXAPRO) 20 MG tablet, Take 1 tablet by mouth daily, Disp: 30 tablet, Rfl: 0    warfarin (COUMADIN) 5 MG tablet, TAKE 1  TABLETS BY MOUTH ONCE DAILY, Disp: 30

## 2019-07-01 ENCOUNTER — TELEPHONE (OUTPATIENT)
Dept: FAMILY MEDICINE CLINIC | Age: 69
End: 2019-07-01

## 2019-07-10 ENCOUNTER — OFFICE VISIT (OUTPATIENT)
Dept: FAMILY MEDICINE CLINIC | Age: 69
End: 2019-07-10
Payer: MEDICARE

## 2019-07-10 VITALS
SYSTOLIC BLOOD PRESSURE: 138 MMHG | BODY MASS INDEX: 39.88 KG/M2 | HEART RATE: 59 BPM | OXYGEN SATURATION: 95 % | WEIGHT: 207.6 LBS | DIASTOLIC BLOOD PRESSURE: 80 MMHG | TEMPERATURE: 97 F

## 2019-07-10 DIAGNOSIS — R32 URINARY INCONTINENCE, UNSPECIFIED TYPE: ICD-10-CM

## 2019-07-10 DIAGNOSIS — E03.9 ACQUIRED HYPOTHYROIDISM: Primary | ICD-10-CM

## 2019-07-10 DIAGNOSIS — F41.8 DEPRESSION WITH ANXIETY: ICD-10-CM

## 2019-07-10 LAB
T4 FREE: 1.8 NG/DL (ref 0.9–1.8)
TSH SERPL DL<=0.05 MIU/L-ACNC: 0.4 UIU/ML (ref 0.27–4.2)

## 2019-07-10 PROCEDURE — 99214 OFFICE O/P EST MOD 30 MIN: CPT | Performed by: FAMILY MEDICINE

## 2019-07-10 PROCEDURE — 36415 COLL VENOUS BLD VENIPUNCTURE: CPT | Performed by: FAMILY MEDICINE

## 2019-07-10 RX ORDER — LEVOTHYROXINE SODIUM 0.1 MG/1
100 TABLET ORAL DAILY
Qty: 90 TABLET | Refills: 3 | Status: SHIPPED | OUTPATIENT
Start: 2019-07-10 | End: 2020-07-24 | Stop reason: SDUPTHER

## 2019-07-10 RX ORDER — ESCITALOPRAM OXALATE 20 MG/1
20 TABLET ORAL DAILY
Qty: 90 TABLET | Refills: 1 | Status: SHIPPED | OUTPATIENT
Start: 2019-07-10 | End: 2020-02-04

## 2019-07-10 RX ORDER — OXYBUTYNIN CHLORIDE 10 MG/1
10 TABLET, EXTENDED RELEASE ORAL DAILY
Qty: 90 TABLET | Refills: 1 | Status: SHIPPED | OUTPATIENT
Start: 2019-07-10 | End: 2020-01-24 | Stop reason: SINTOL

## 2019-07-10 ASSESSMENT — ENCOUNTER SYMPTOMS: SHORTNESS OF BREATH: 0

## 2019-07-29 ENCOUNTER — HOSPITAL ENCOUNTER (OUTPATIENT)
Dept: WOMENS IMAGING | Age: 69
Discharge: HOME | End: 2019-07-29
Payer: MEDICARE

## 2019-07-29 DIAGNOSIS — Z78.0 POST-MENOPAUSAL: ICD-10-CM

## 2019-07-29 PROCEDURE — 77080 DXA BONE DENSITY AXIAL: CPT

## 2019-07-31 ENCOUNTER — OFFICE VISIT (OUTPATIENT)
Dept: FAMILY MEDICINE CLINIC | Age: 69
End: 2019-07-31
Payer: MEDICARE

## 2019-07-31 VITALS
SYSTOLIC BLOOD PRESSURE: 134 MMHG | DIASTOLIC BLOOD PRESSURE: 78 MMHG | OXYGEN SATURATION: 95 % | WEIGHT: 204.6 LBS | BODY MASS INDEX: 39.3 KG/M2 | TEMPERATURE: 97.4 F | HEART RATE: 73 BPM

## 2019-07-31 DIAGNOSIS — M81.0 AGE-RELATED OSTEOPOROSIS WITHOUT CURRENT PATHOLOGICAL FRACTURE: Primary | ICD-10-CM

## 2019-07-31 PROCEDURE — 99213 OFFICE O/P EST LOW 20 MIN: CPT | Performed by: FAMILY MEDICINE

## 2019-07-31 RX ORDER — ALENDRONATE SODIUM 70 MG/1
70 TABLET ORAL
Qty: 12 TABLET | Refills: 3 | Status: SHIPPED | OUTPATIENT
Start: 2019-07-31 | End: 2020-06-05

## 2019-07-31 ASSESSMENT — ENCOUNTER SYMPTOMS: SHORTNESS OF BREATH: 0

## 2019-07-31 NOTE — PROGRESS NOTES
Subjective:      Patient ID: Mitch Cabrera is a 71 y.o. female. Arnulfo Hines is here to discuss her DEXA scan. DEXA  Showed mild osteoporosis of only the left femoral neck. Her FRAX score was 4.1 for hip and 19.1 for major osteoporotic fracture. She has had multiple fractures after a fall a couple of years ago, and does fall /trip frequently. She has never been on meds for osteoporosis. Review of Systems   Respiratory: Negative for shortness of breath. Cardiovascular: Negative for chest pain.      Past Medical History:   Diagnosis Date    CAD (coronary artery disease)     [facility] [address]     H/O echocardiogram 2017    EF 35-40%    Heart attack ([facility] [address] 2014    Hyperlipidemia     Hypertension     [facility]     S/P right coronary artery (RCA) stent placement     T2 vertebral fracture ([facility] [address] 2017    TIA (transient ischemic attack)     Tobacco dependence      Past Surgical History:   Procedure Laterality Date    CARDIAC SURGERY      CORONARY ANGIOPLASTY WITH STENT PLACEMENT      CORONARY ARTERY BYPASS GRAFT  2016    CABG X1 SVG to RCA with Mitral Valve Replacement 27mm Mosaic     MITRAL VALVE REPLACEMENT  2016    27mm mosaic     Social History     Socioeconomic History    Marital status:      Spouse name: Not on file    Number of children: Not on file    Years of education: Not on file    Highest education level: Not on file   Occupational History    Not on file   Social Needs    Financial resource strain: Not on file    Food insecurity:     Worry: Not on file     Inability: Not on file    Transportation needs:     Medical: Not on file     Non-medical: Not on file   Tobacco Use    Smoking status: Former Smoker     Types: Cigarettes     Last attempt to quit: 10/18/2017     Years since quittin.7    Smokeless tobacco: Never Used   Substance and Sexual Activity    Alcohol use: No    Drug use: No    (FOSAMAX) 70 MG tablet, Take 1 tablet by mouth every 7 days, Disp: 12 tablet, Rfl: 3    escitalopram (LEXAPRO) 20 MG tablet, Take 1 tablet by mouth daily, Disp: 90 tablet, Rfl: 1    levothyroxine (SYNTHROID) 100 MCG tablet, Take 1 tablet by mouth Daily, Disp: 90 tablet, Rfl: 3    oxybutynin (DITROPAN XL) 10 MG extended release tablet, Take 1 tablet by mouth daily, Disp: 90 tablet, Rfl: 1    warfarin (COUMADIN) 5 MG tablet, TAKE 1  TABLETS BY MOUTH ONCE DAILY, Disp: 30 tablet, Rfl: 5    Cyanocobalamin (VITAMIN B 12 PO), Take by mouth, Disp: , Rfl:     cilostazol (PLETAL) 100 MG tablet, Take 1 tablet by mouth 2 times daily, Disp: 180 tablet, Rfl: 3    atorvastatin (LIPITOR) 20 MG tablet, Take 1 tablet by mouth daily, Disp: 90 tablet, Rfl: 3    atenolol (TENORMIN) 50 MG tablet, Take 1 tablet by mouth daily, Disp: 90 tablet, Rfl: 3    pantoprazole (PROTONIX) 40 MG tablet, Take 1 tablet by mouth daily, Disp: 90 tablet, Rfl: 3    nitroGLYCERIN (NITROSTAT) 0.4 MG SL tablet, Place 1 tablet under the tongue every 5 minutes as needed for Chest pain up to max of 3 total doses.  If no relief after 1 dose, call 911., Disp: 25 tablet, Rfl: 3    furosemide (LASIX) 20 MG tablet, Take 0.5 tablets by mouth daily, Disp: 30 tablet, Rfl: 5    Ascorbic Acid (VITAMIN C) 1000 MG tablet, Take 1,000 mg by mouth daily, Disp: , Rfl:     Calcium Carb-Cholecalciferol (CALCIUM 600+D) 600-800 MG-UNIT TABS, Take 1 tablet by mouth daily, Disp: , Rfl:     aspirin 81 MG EC tablet, Take 1 tablet by mouth daily, Disp: 30 tablet, Rfl: 3    Multiple Vitamins-Minerals (THERAPEUTIC MULTIVITAMIN-MINERALS) tablet, Take 1 tablet by mouth daily, Disp: , Rfl:           Dianne Hughes MD

## 2019-10-08 RX ORDER — FUROSEMIDE 20 MG/1
10 TABLET ORAL DAILY
Qty: 15 TABLET | Refills: 0 | Status: SHIPPED | OUTPATIENT
Start: 2019-10-08 | End: 2019-12-06 | Stop reason: SDUPTHER

## 2019-11-07 RX ORDER — ATORVASTATIN CALCIUM 20 MG/1
20 TABLET, FILM COATED ORAL DAILY
Qty: 30 TABLET | Refills: 5 | Status: SHIPPED | OUTPATIENT
Start: 2019-11-07 | End: 2020-01-24

## 2019-11-07 RX ORDER — PANTOPRAZOLE SODIUM 40 MG/1
40 TABLET, DELAYED RELEASE ORAL DAILY
Qty: 30 TABLET | Refills: 5 | Status: SHIPPED | OUTPATIENT
Start: 2019-11-07 | End: 2020-01-24

## 2019-11-07 RX ORDER — ATENOLOL 50 MG/1
50 TABLET ORAL DAILY
Qty: 30 TABLET | Refills: 5 | Status: SHIPPED | OUTPATIENT
Start: 2019-11-07 | End: 2019-12-06 | Stop reason: SDUPTHER

## 2019-12-06 ENCOUNTER — OFFICE VISIT (OUTPATIENT)
Dept: CARDIOLOGY CLINIC | Age: 69
End: 2019-12-06
Payer: MEDICARE

## 2019-12-06 VITALS
DIASTOLIC BLOOD PRESSURE: 66 MMHG | WEIGHT: 190 LBS | HEIGHT: 63 IN | HEART RATE: 68 BPM | BODY MASS INDEX: 33.66 KG/M2 | SYSTOLIC BLOOD PRESSURE: 124 MMHG

## 2019-12-06 DIAGNOSIS — Z72.0 TOBACCO ABUSE: ICD-10-CM

## 2019-12-06 DIAGNOSIS — J96.01 ACUTE RESPIRATORY FAILURE WITH HYPOXIA AND HYPERCAPNIA (HCC): ICD-10-CM

## 2019-12-06 DIAGNOSIS — E78.2 MIXED HYPERLIPIDEMIA: ICD-10-CM

## 2019-12-06 DIAGNOSIS — Z79.01 ANTICOAGULATED ON COUMADIN: ICD-10-CM

## 2019-12-06 DIAGNOSIS — Z95.5 S/P RIGHT CORONARY ARTERY (RCA) STENT PLACEMENT: ICD-10-CM

## 2019-12-06 DIAGNOSIS — I25.10 CORONARY ARTERY DISEASE INVOLVING NATIVE CORONARY ARTERY OF NATIVE HEART WITHOUT ANGINA PECTORIS: ICD-10-CM

## 2019-12-06 DIAGNOSIS — Z95.2 S/P MITRAL VALVE REPLACEMENT: ICD-10-CM

## 2019-12-06 DIAGNOSIS — I42.9 CARDIOMYOPATHY, PRIMARY (HCC): ICD-10-CM

## 2019-12-06 DIAGNOSIS — I10 ESSENTIAL HYPERTENSION: ICD-10-CM

## 2019-12-06 DIAGNOSIS — I05.0 RHEUMATIC MITRAL STENOSIS: ICD-10-CM

## 2019-12-06 DIAGNOSIS — I73.9 PAD (PERIPHERAL ARTERY DISEASE) (HCC): ICD-10-CM

## 2019-12-06 DIAGNOSIS — R00.2 PALPITATIONS: Primary | ICD-10-CM

## 2019-12-06 DIAGNOSIS — J96.02 ACUTE RESPIRATORY FAILURE WITH HYPOXIA AND HYPERCAPNIA (HCC): ICD-10-CM

## 2019-12-06 PROCEDURE — 99214 OFFICE O/P EST MOD 30 MIN: CPT | Performed by: INTERNAL MEDICINE

## 2019-12-06 PROCEDURE — 93000 ELECTROCARDIOGRAM COMPLETE: CPT | Performed by: INTERNAL MEDICINE

## 2019-12-06 RX ORDER — FUROSEMIDE 20 MG/1
10 TABLET ORAL DAILY
Qty: 15 TABLET | Refills: 0 | Status: SHIPPED | OUTPATIENT
Start: 2019-12-06 | End: 2019-12-06 | Stop reason: SDUPTHER

## 2019-12-06 RX ORDER — WARFARIN SODIUM 5 MG/1
TABLET ORAL
Qty: 90 TABLET | Refills: 3 | Status: SHIPPED | OUTPATIENT
Start: 2019-12-06 | End: 2020-08-13 | Stop reason: DRUGHIGH

## 2019-12-06 RX ORDER — FUROSEMIDE 20 MG/1
10 TABLET ORAL DAILY
Qty: 90 TABLET | Refills: 3 | Status: SHIPPED | OUTPATIENT
Start: 2019-12-06 | End: 2020-01-03 | Stop reason: SDUPTHER

## 2019-12-06 RX ORDER — CILOSTAZOL 100 MG/1
100 TABLET ORAL 2 TIMES DAILY
Qty: 180 TABLET | Refills: 3 | Status: SHIPPED | OUTPATIENT
Start: 2019-12-06 | End: 2021-01-15

## 2019-12-06 RX ORDER — ATENOLOL 50 MG/1
50 TABLET ORAL DAILY
Qty: 90 TABLET | Refills: 3 | Status: SHIPPED | OUTPATIENT
Start: 2019-12-06 | End: 2020-06-05 | Stop reason: ALTCHOICE

## 2019-12-30 RX ORDER — FUROSEMIDE 20 MG/1
10 TABLET ORAL DAILY
Qty: 90 TABLET | Refills: 3 | OUTPATIENT
Start: 2019-12-30

## 2019-12-30 RX ORDER — NITROGLYCERIN 0.4 MG/1
0.4 TABLET SUBLINGUAL EVERY 5 MIN PRN
Qty: 25 TABLET | Refills: 3 | OUTPATIENT
Start: 2019-12-30

## 2020-01-03 RX ORDER — ATORVASTATIN CALCIUM 20 MG/1
20 TABLET, FILM COATED ORAL DAILY
Qty: 90 TABLET | Refills: 3 | Status: SHIPPED | OUTPATIENT
Start: 2020-01-03 | End: 2020-11-16

## 2020-01-03 RX ORDER — PANTOPRAZOLE SODIUM 40 MG/1
40 TABLET, DELAYED RELEASE ORAL DAILY
Qty: 90 TABLET | Refills: 3 | Status: SHIPPED | OUTPATIENT
Start: 2020-01-03 | End: 2021-02-23

## 2020-01-03 RX ORDER — NITROGLYCERIN 0.4 MG/1
0.4 TABLET SUBLINGUAL EVERY 5 MIN PRN
Qty: 25 TABLET | Refills: 3 | Status: SHIPPED | OUTPATIENT
Start: 2020-01-03 | End: 2020-12-14 | Stop reason: SDUPTHER

## 2020-01-03 RX ORDER — FUROSEMIDE 20 MG/1
10 TABLET ORAL DAILY
Qty: 90 TABLET | Refills: 3 | Status: SHIPPED | OUTPATIENT
Start: 2020-01-03 | End: 2021-01-15

## 2020-01-24 ENCOUNTER — OFFICE VISIT (OUTPATIENT)
Dept: FAMILY MEDICINE CLINIC | Age: 70
End: 2020-01-24
Payer: MEDICARE

## 2020-01-24 VITALS
TEMPERATURE: 97.9 F | OXYGEN SATURATION: 96 % | BODY MASS INDEX: 33.73 KG/M2 | HEART RATE: 53 BPM | WEIGHT: 190.4 LBS | SYSTOLIC BLOOD PRESSURE: 128 MMHG | DIASTOLIC BLOOD PRESSURE: 84 MMHG

## 2020-01-24 PROCEDURE — G0008 ADMIN INFLUENZA VIRUS VAC: HCPCS | Performed by: FAMILY MEDICINE

## 2020-01-24 PROCEDURE — 99214 OFFICE O/P EST MOD 30 MIN: CPT | Performed by: FAMILY MEDICINE

## 2020-01-24 PROCEDURE — 90653 IIV ADJUVANT VACCINE IM: CPT | Performed by: FAMILY MEDICINE

## 2020-01-24 PROCEDURE — G0009 ADMIN PNEUMOCOCCAL VACCINE: HCPCS | Performed by: FAMILY MEDICINE

## 2020-01-24 PROCEDURE — 90732 PPSV23 VACC 2 YRS+ SUBQ/IM: CPT | Performed by: FAMILY MEDICINE

## 2020-01-24 ASSESSMENT — ENCOUNTER SYMPTOMS: SHORTNESS OF BREATH: 0

## 2020-01-24 NOTE — PROGRESS NOTES
ischemic attack)     Tobacco dependence      Past Surgical History:   Procedure Laterality Date    CARDIAC SURGERY      CORONARY ANGIOPLASTY WITH STENT PLACEMENT      CORONARY ARTERY BYPASS GRAFT  2016    CABG X1 SVG to RCA with Mitral Valve Replacement 27mm Mosaic     MITRAL VALVE REPLACEMENT  2016    27mm mosaic     Social History     Socioeconomic History    Marital status:      Spouse name: Not on file    Number of children: Not on file    Years of education: Not on file    Highest education level: Not on file   Occupational History    Not on file   Social Needs    Financial resource strain: Not on file    Food insecurity:     Worry: Not on file     Inability: Not on file    Transportation needs:     Medical: Not on file     Non-medical: Not on file   Tobacco Use    Smoking status: Current Some Day Smoker     Types: Cigarettes     Last attempt to quit: 10/18/2017     Years since quittin.2    Smokeless tobacco: Never Used   Substance and Sexual Activity    Alcohol use: No     Comment: 2 cups of coffee in the a.m.     Drug use: No    Sexual activity: Yes     Partners: Male   Lifestyle    Physical activity:     Days per week: Not on file     Minutes per session: Not on file    Stress: Not on file   Relationships    Social connections:     Talks on phone: Not on file     Gets together: Not on file     Attends Holiness service: Not on file     Active member of club or organization: Not on file     Attends meetings of clubs or organizations: Not on file     Relationship status: Not on file    Intimate partner violence:     Fear of current or ex partner: Not on file     Emotionally abused: Not on file     Physically abused: Not on file     Forced sexual activity: Not on file   Other Topics Concern    Not on file   Social History Narrative    ** Merged History Encounter **          Family History   Problem Relation Age of Onset    Stroke Mother     Heart Attack Father    Darío Lyleson lexapro, conitnue. 2. Conitnue fosamax. Check DEXA in 2 years. 3. & 4. Secondary prevention. Continue statins. Labs ordered by cardio.     5-7. Care per cardio. Continue meds. Stable. 8.Controlled on current meds. Continue. Labs per cardio. 9. Controlled on current meds. Labs in 6 months. 10. Encouraged smoking cessation. 11. Flu shot provided. 12. Pneumovax provided. Follow up 6 months: Chronic conditions      Current Outpatient Medications:     pantoprazole (PROTONIX) 40 MG tablet, Take 1 tablet by mouth daily, Disp: 90 tablet, Rfl: 3    atorvastatin (LIPITOR) 20 MG tablet, Take 1 tablet by mouth daily, Disp: 90 tablet, Rfl: 3    nitroGLYCERIN (NITROSTAT) 0.4 MG SL tablet, Place 1 tablet under the tongue every 5 minutes as needed for Chest pain up to max of 3 total doses.  If no relief after 1 dose, call 911., Disp: 25 tablet, Rfl: 3    furosemide (LASIX) 20 MG tablet, Take 0.5 tablets by mouth daily, Disp: 90 tablet, Rfl: 3    atenolol (TENORMIN) 50 MG tablet, Take 1 tablet by mouth daily, Disp: 90 tablet, Rfl: 3    warfarin (COUMADIN) 5 MG tablet, TAKE 1  TABLETS BY MOUTH ONCE DAILY, Disp: 90 tablet, Rfl: 3    cilostazol (PLETAL) 100 MG tablet, Take 1 tablet by mouth 2 times daily, Disp: 180 tablet, Rfl: 3    alendronate (FOSAMAX) 70 MG tablet, Take 1 tablet by mouth every 7 days, Disp: 12 tablet, Rfl: 3    escitalopram (LEXAPRO) 20 MG tablet, Take 1 tablet by mouth daily, Disp: 90 tablet, Rfl: 1    levothyroxine (SYNTHROID) 100 MCG tablet, Take 1 tablet by mouth Daily, Disp: 90 tablet, Rfl: 3    Cyanocobalamin (VITAMIN B 12 PO), Take by mouth, Disp: , Rfl:     Ascorbic Acid (VITAMIN C) 1000 MG tablet, Take 1,000 mg by mouth daily, Disp: , Rfl:     Calcium Carb-Cholecalciferol (CALCIUM 600+D) 600-800 MG-UNIT TABS, Take 1 tablet by mouth daily, Disp: , Rfl:     aspirin 81 MG EC tablet, Take 1 tablet by mouth daily, Disp: 30 tablet, Rfl: 3    Multiple Vitamins-Minerals (THERAPEUTIC MULTIVITAMIN-MINERALS) tablet, Take 1 tablet by mouth daily, Disp: , Rfl:          Marylynn Goltz, MD

## 2020-02-04 RX ORDER — ESCITALOPRAM OXALATE 20 MG/1
TABLET ORAL
Qty: 90 TABLET | Refills: 1 | OUTPATIENT
Start: 2020-02-04

## 2020-03-02 ENCOUNTER — TELEPHONE (OUTPATIENT)
Dept: FAMILY MEDICINE CLINIC | Age: 70
End: 2020-03-02

## 2020-04-08 ENCOUNTER — TELEPHONE (OUTPATIENT)
Dept: FAMILY MEDICINE CLINIC | Age: 70
End: 2020-04-08

## 2020-04-20 ENCOUNTER — TELEPHONE (OUTPATIENT)
Dept: CARDIOLOGY CLINIC | Age: 70
End: 2020-04-20

## 2020-05-08 ENCOUNTER — TELEPHONE (OUTPATIENT)
Dept: CARDIOLOGY CLINIC | Age: 70
End: 2020-05-08

## 2020-05-12 ENCOUNTER — TELEPHONE (OUTPATIENT)
Dept: CARDIOLOGY CLINIC | Age: 70
End: 2020-05-12

## 2020-05-15 ENCOUNTER — APPOINTMENT (OUTPATIENT)
Dept: CT IMAGING | Age: 70
End: 2020-05-15
Payer: MEDICARE

## 2020-05-15 ENCOUNTER — APPOINTMENT (OUTPATIENT)
Dept: GENERAL RADIOLOGY | Age: 70
End: 2020-05-15
Payer: MEDICARE

## 2020-05-15 ENCOUNTER — HOSPITAL ENCOUNTER (EMERGENCY)
Age: 70
Discharge: HOME | End: 2020-05-16
Attending: EMERGENCY MEDICINE
Payer: MEDICARE

## 2020-05-15 LAB
ANION GAP SERPL CALCULATED.3IONS-SCNC: 11 MMOL/L (ref 4–16)
BACTERIA: ABNORMAL /HPF
BASOPHILS ABSOLUTE: 0.1 K/CU MM
BASOPHILS RELATIVE PERCENT: 0.7 % (ref 0–1)
BILIRUBIN URINE: NEGATIVE MG/DL
BLOOD, URINE: NEGATIVE
BUN BLDV-MCNC: 15 MG/DL (ref 6–23)
CALCIUM SERPL-MCNC: 9.7 MG/DL (ref 8.3–10.6)
CHLORIDE BLD-SCNC: 98 MMOL/L (ref 99–110)
CLARITY: ABNORMAL
CO2: 29 MMOL/L (ref 21–32)
COLOR: YELLOW
CREAT SERPL-MCNC: 1.2 MG/DL (ref 0.6–1.1)
DIFFERENTIAL TYPE: ABNORMAL
EOSINOPHILS ABSOLUTE: 0.2 K/CU MM
EOSINOPHILS RELATIVE PERCENT: 3.1 % (ref 0–3)
GFR AFRICAN AMERICAN: 54 ML/MIN/1.73M2
GFR NON-AFRICAN AMERICAN: 44 ML/MIN/1.73M2
GLUCOSE BLD-MCNC: 116 MG/DL (ref 70–99)
GLUCOSE, URINE: NEGATIVE MG/DL
HCT VFR BLD CALC: 40.7 % (ref 37–47)
HEMOGLOBIN: 13.1 GM/DL (ref 12.5–16)
IMMATURE NEUTROPHIL %: 0.4 % (ref 0–0.43)
KETONES, URINE: NEGATIVE MG/DL
LEUKOCYTE ESTERASE, URINE: ABNORMAL
LYMPHOCYTES ABSOLUTE: 1.6 K/CU MM
LYMPHOCYTES RELATIVE PERCENT: 20.9 % (ref 24–44)
MCH RBC QN AUTO: 31.7 PG (ref 27–31)
MCHC RBC AUTO-ENTMCNC: 32.2 % (ref 32–36)
MCV RBC AUTO: 98.5 FL (ref 78–100)
MONOCYTES ABSOLUTE: 0.7 K/CU MM
MONOCYTES RELATIVE PERCENT: 9.4 % (ref 0–4)
NITRITE URINE, QUANTITATIVE: NEGATIVE
NUCLEATED RBC %: 0 %
PDW BLD-RTO: 13.4 % (ref 11.7–14.9)
PH, URINE: 7 (ref 5–8)
PLATELET # BLD: 177 K/CU MM (ref 140–440)
PMV BLD AUTO: 11.4 FL (ref 7.5–11.1)
POTASSIUM SERPL-SCNC: 3.4 MMOL/L (ref 3.5–5.1)
PROTEIN UA: NEGATIVE MG/DL
RBC # BLD: 4.13 M/CU MM (ref 4.2–5.4)
RBC URINE: ABNORMAL /HPF (ref 0–6)
SEGMENTED NEUTROPHILS ABSOLUTE COUNT: 4.9 K/CU MM
SEGMENTED NEUTROPHILS RELATIVE PERCENT: 65.5 % (ref 36–66)
SODIUM BLD-SCNC: 138 MMOL/L (ref 135–145)
SPECIFIC GRAVITY UA: 1.01 (ref 1–1.03)
SQUAMOUS EPITHELIAL: 6 /HPF
TOTAL IMMATURE NEUTOROPHIL: 0.03 K/CU MM
TOTAL NUCLEATED RBC: 0 K/CU MM
TRICHOMONAS: ABNORMAL /HPF
TROPONIN T: <0.01 NG/ML
UROBILINOGEN, URINE: NORMAL MG/DL (ref 0.2–1)
WBC # BLD: 7.5 K/CU MM (ref 4–10.5)
WBC UA: 2 /HPF (ref 0–5)

## 2020-05-15 PROCEDURE — 93005 ELECTROCARDIOGRAM TRACING: CPT | Performed by: EMERGENCY MEDICINE

## 2020-05-15 PROCEDURE — 84484 ASSAY OF TROPONIN QUANT: CPT

## 2020-05-15 PROCEDURE — 81001 URINALYSIS AUTO W/SCOPE: CPT

## 2020-05-15 PROCEDURE — 99284 EMERGENCY DEPT VISIT MOD MDM: CPT

## 2020-05-15 PROCEDURE — 80048 BASIC METABOLIC PNL TOTAL CA: CPT

## 2020-05-15 PROCEDURE — 83735 ASSAY OF MAGNESIUM: CPT

## 2020-05-15 PROCEDURE — 85025 COMPLETE CBC W/AUTO DIFF WBC: CPT

## 2020-05-15 PROCEDURE — 71046 X-RAY EXAM CHEST 2 VIEWS: CPT

## 2020-05-16 ENCOUNTER — APPOINTMENT (OUTPATIENT)
Dept: CT IMAGING | Age: 70
End: 2020-05-16
Payer: MEDICARE

## 2020-05-16 VITALS
BODY MASS INDEX: 31.58 KG/M2 | DIASTOLIC BLOOD PRESSURE: 88 MMHG | HEIGHT: 64 IN | WEIGHT: 185 LBS | HEART RATE: 72 BPM | OXYGEN SATURATION: 98 % | TEMPERATURE: 98.3 F | RESPIRATION RATE: 18 BRPM | SYSTOLIC BLOOD PRESSURE: 180 MMHG

## 2020-05-16 LAB — MAGNESIUM: 1.9 MG/DL (ref 1.8–2.4)

## 2020-05-16 PROCEDURE — 70450 CT HEAD/BRAIN W/O DYE: CPT

## 2020-05-16 PROCEDURE — 93010 ELECTROCARDIOGRAM REPORT: CPT | Performed by: INTERNAL MEDICINE

## 2020-05-16 ASSESSMENT — ENCOUNTER SYMPTOMS
COUGH: 0
NAUSEA: 0
RHINORRHEA: 0
ABDOMINAL PAIN: 0
EYE DISCHARGE: 0
BACK PAIN: 0
SORE THROAT: 0
EYE PAIN: 0
SHORTNESS OF BREATH: 0

## 2020-05-16 NOTE — ED PROVIDER NOTES
palpitations. Gastrointestinal: Negative for abdominal pain and nausea. Endocrine: Negative for polydipsia and polyuria. Genitourinary: Negative for dysuria and flank pain. Musculoskeletal: Negative for back pain and neck pain. Skin: Negative for pallor and wound. Neurological: Positive for headaches. Negative for dizziness, facial asymmetry, light-headedness and numbness. Psychiatric/Behavioral: Negative for confusion. Except as noted above the remainder of the review of systems was reviewed and negative. PAST MEDICAL HISTORY     Past Medical History:   Diagnosis Date    CAD (coronary artery disease)     [facility]ch[facility] obstructive [facility]) ([facility]     H/O echocardiogram 03/22/2017    EF 35-40%    Heart attack ([facility] 1/2014    Hyperlipidemia     Hypertension     [facility]     S/P right coronary artery (RCA) stent placement     T2 vertebral fracture ([facility] 12/2017    TIA (transient ischemic attack)     Tobacco dependence        Prior to Admission medications    Medication Sig Start Date End Date Taking? Authorizing Provider   escitalopram (LEXAPRO) 20 MG tablet TAKE 1 TABLET BY MOUTH ONCE DAILY 2/4/20   Duran Chaparro MD   pantoprazole (PROTONIX) 40 MG tablet Take 1 tablet by mouth daily 1/3/20   Karolina Marroquin MD   atorvastatin (LIPITOR) 20 MG tablet Take 1 tablet by mouth daily 1/3/20   Karolina Marroquin MD   nitroGLYCERIN (NITROSTAT) 0.4 MG SL tablet Place 1 tablet under the tongue every 5 minutes as needed for Chest pain up to max of 3 total doses.  If no relief after 1 dose, call 911. 1/3/20   Karolina Marroquin MD   furosemide (LASIX) 20 MG tablet Take 0.5 tablets by mouth daily 1/3/20   Karolina Marroquin MD   atenolol (TENORMIN) 50 MG tablet Take 1 tablet by mouth daily 12/6/19   Karolina Marroquin MD   warfarin (COUMADIN) 5 MG tablet TAKE 1  TABLETS BY MOUTH ONCE DAILY 12/6/19   Karolina Marroquin MD   cilostazol (PLETAL) 100 MG Pupils: Pupils are equal, round, and reactive to light. Neck:      Musculoskeletal: Neck supple. No muscular tenderness. Cardiovascular:      Rate and Rhythm: Normal rate. Heart sounds: No friction rub. No gallop. Pulmonary:      Comments: Respirations unlabored  No retractions, no increased work of breathing  Abdominal:      Palpations: Abdomen is soft. Tenderness: There is no abdominal tenderness. There is no guarding. Comments: Abdomen soft, non-tender, non-peritoneal  No guarding on abdominal exam   Musculoskeletal:         General: No signs of injury. Right lower leg: No edema. Left lower leg: No edema. Lymphadenopathy:      Cervical: No cervical adenopathy. Skin:     Capillary Refill: Capillary refill takes less than 2 seconds. Findings: No erythema, lesion or rash. Neurological:      General: No focal deficit present. Mental Status: She is alert.          DIAGNOSTIC RESULTS     Labs Reviewed   CBC WITH AUTO DIFFERENTIAL - Abnormal; Notable for the following components:       Result Value    RBC 4.13 (*)     MCH 31.7 (*)     MPV 11.4 (*)     Lymphocytes % 20.9 (*)     Monocytes % 9.4 (*)     Eosinophils % 3.1 (*)     All other components within normal limits   BASIC METABOLIC PANEL W/ REFLEX TO MG FOR LOW K - Abnormal; Notable for the following components:    Potassium 3.4 (*)     Chloride 98 (*)     CREATININE 1.2 (*)     Glucose 116 (*)     GFR Non- 44 (*)     GFR  54 (*)     All other components within normal limits   URINALYSIS - Abnormal; Notable for the following components:    Clarity, UA HAZY (*)     Leukocyte Esterase, Urine TRACE (*)     Bacteria, UA RARE (*)     All other components within normal limits   TROPONIN   MAGNESIUM          EKG: All EKG's are interpreted by the Emergency Department Physician who either signs or Co-signs this chart in the absence of a cardiologist.       EKG Interpretation    Interpreted by Ke Chavis 37  [phone]    Schedule an appointment as soon as possible for a visit in 2 days        DISCHARGE MEDICATIONS:  New Prescriptions    No medications on file       ED Provider Disposition Time  DISPOSITION Decision To Discharge 05/16/2020 01:39:36 AM      Appropriate personal protective equipment was worn during the patient's evaluation. These included surgical, eye protection, surgical mask or in 95 respirator and gloves. The patient was also placed in a surgical mask for the prevention of possible spread of respiratory viral illnesses. The Patient was instructed to read the package inserts with any medication that was prescribed. Major potential reactions and medication interactions were discussed. The Patient understands that there are numerous possible adverse reactions not covered. The patient was also instructed to arrange follow-up with his or her primary care provider for review of any pending labwork or incidental findings on any radiology results that were obtained. All efforts were made to discuss any incidental findings that require further monitoring. Controlled Substances Monitoring:     No flowsheet data found.     (Please note that portions of this note were completed with a voice recognition program.  Efforts were made to edit the dictations but occasionally words are mis-transcribed.)    Meg Chan MD (electronically signed)  Attending Emergency Physician           Meg Chan MD  05/16/20 4713

## 2020-05-16 NOTE — ED PROVIDER NOTES
Physician Triage Note        Location: [address] [place] [address] EMERGENCY DEPARTMENT  5/15/2020      I evaluated Korin Grubbs as the tele-triage physician to initiate their ED workup in an expeditious manner. Please see notes from other ED providers regarding comprehensive evaluation including full history, physical exam, interpretation of results, and medical decision making/disposition      Briefly, this is a 79 y.o. female here for HTN. Patient states her blood pressure is elevated today. She complained of some dizziness associated with this. She states earlier today she also had a brief episode of right-sided headache but that was transient and completely resolved. She is currently pain-free. She also specifically denies chest pain or shortness of breath. States due to the persistent dizziness and elevated blood pressure, her  got concerned and insisted that she come and get evaluated. She denies any recent medication changes. No recent diet changes either. Triage exam showed awake, alert female  No slurred speech  No facial droop  Unlabored breathing  Speak in full sentences      Tests ordered:  CBC, BMP, urinalysis, Trop, EKG and CXR      I did not personally review any of the results of these tests, which will be reviewed and interpreted later by ED providers at the time of the patient's more comprehensive evaluation.          Vicenta Crabtree MD  05/15/20 0296

## 2020-05-18 ENCOUNTER — TELEPHONE (OUTPATIENT)
Dept: OTHER | Facility: CLINIC | Age: 70
End: 2020-05-18

## 2020-05-18 NOTE — TELEPHONE ENCOUNTER
Patient contacted regarding recent visit for viral symptoms. This Villafuerte Gris contacted the patient by telephone to perform post discharge call. Verified name and  with patient as identifiers. Provided introduction to self, and reason for call due to viral symptoms of infection and/or exposure to COVID-19. Patient presented to emergency department/flu clinic with complaints of viral symptoms/exposure to COVID. Patient reports symptoms are improving. Due to no new or worsening symptoms the RN CTN/ACTATO was not notified for escalation. Discussed exposure protocols and quarantine with CDC Guidelines What To Do If You Are Sick    Patient was given an opportunity for questions and concerns. Stay home except to get medical care    Separate yourself from other people and animals in your home    Call ahead before visiting your doctor    Wear a facemask    Cover your coughs and sneezes    Clean your hands often    Avoid sharing personal household items    Clean all high-touch surfaces everyday    Monitor your symptoms  Seek prompt medical attention if your illness is worsening (e.g., difficulty breathing). Before seeking care, call your healthcare provider and tell them that you have, or are being evaluated for, COVID-19. Put on a facemask before you enter the facility. These steps will help the healthcare provider's office to keep other people in the office or waiting room from getting infected or exposed. Ask your healthcare provider to call the local or state health department. Persons who are placed under If you have a medical emergency and need to call 911, notify the dispatch personnel that you have, or are being evaluated for COVID-19. If possible, put on a facemask before emergency medical services arrive. The patient agrees to contact the [employer] exposure line [phone], local health department  and PCP office for questions related to their healthcare.  Author provided contact information for future reference. Patient/family/caregiver given information for [employer] and agrees to enroll yes  Patient's preferred e-mail: [email or web address]   Patient's preferred phone number: [phone]  Based on Loop alert triggers, patient will be contacted by nurse care manager for worsening symptoms.

## 2020-05-19 LAB
EKG ATRIAL RATE: 67 BPM
EKG DIAGNOSIS: NORMAL
EKG P-R INTERVAL: 152 MS
EKG Q-T INTERVAL: 470 MS
EKG QRS DURATION: 74 MS
EKG QTC CALCULATION (BAZETT): 496 MS
EKG R AXIS: 80 DEGREES
EKG T AXIS: 80 DEGREES
EKG VENTRICULAR RATE: 67 BPM

## 2020-05-29 ENCOUNTER — INITIAL CONSULT (OUTPATIENT)
Dept: PULMONOLOGY | Age: 70
End: 2020-05-29
Payer: MEDICARE

## 2020-05-29 VITALS
SYSTOLIC BLOOD PRESSURE: 126 MMHG | HEART RATE: 60 BPM | HEIGHT: 64 IN | BODY MASS INDEX: 31.76 KG/M2 | TEMPERATURE: 97.9 F | OXYGEN SATURATION: 89 % | DIASTOLIC BLOOD PRESSURE: 90 MMHG

## 2020-05-29 PROBLEM — G47.33 OSA (OBSTRUCTIVE SLEEP APNEA): Status: ACTIVE | Noted: 2020-05-29

## 2020-05-29 PROBLEM — R06.02 SOB (SHORTNESS OF BREATH) ON EXERTION: Status: ACTIVE | Noted: 2020-05-29

## 2020-05-29 PROBLEM — G47.10 HYPERSOMNIA: Status: ACTIVE | Noted: 2020-05-29

## 2020-05-29 PROCEDURE — 99204 OFFICE O/P NEW MOD 45 MIN: CPT | Performed by: INTERNAL MEDICINE

## 2020-05-29 RX ORDER — ALBUTEROL SULFATE 90 UG/1
2 AEROSOL, METERED RESPIRATORY (INHALATION) EVERY 6 HOURS PRN
Qty: 1 INHALER | Refills: 3 | Status: SHIPPED | OUTPATIENT
Start: 2020-05-29 | End: 2020-07-28

## 2020-05-29 ASSESSMENT — ENCOUNTER SYMPTOMS
EYE DISCHARGE: 0
BACK PAIN: 0
EYE ITCHING: 0
SHORTNESS OF BREATH: 0
COUGH: 0
ABDOMINAL PAIN: 0
ABDOMINAL DISTENTION: 0

## 2020-05-29 ASSESSMENT — SLEEP AND FATIGUE QUESTIONNAIRES
HOW LIKELY ARE YOU TO NOD OFF OR FALL ASLEEP WHILE WATCHING TV: 0
HOW LIKELY ARE YOU TO NOD OFF OR FALL ASLEEP WHILE SITTING AND READING: 0
HOW LIKELY ARE YOU TO NOD OFF OR FALL ASLEEP WHILE SITTING QUIETLY AFTER LUNCH WITHOUT ALCOHOL: 0
HOW LIKELY ARE YOU TO NOD OFF OR FALL ASLEEP WHEN YOU ARE A PASSENGER IN A CAR FOR AN HOUR WITHOUT A BREAK: 2
HOW LIKELY ARE YOU TO NOD OFF OR FALL ASLEEP WHILE SITTING AND TALKING TO SOMEONE: 1
HOW LIKELY ARE YOU TO NOD OFF OR FALL ASLEEP WHILE LYING DOWN TO REST IN THE AFTERNOON WHEN CIRCUMSTANCES PERMIT: 3
NECK CIRCUMFERENCE (INCHES): 15
ESS TOTAL SCORE: 6
HOW LIKELY ARE YOU TO NOD OFF OR FALL ASLEEP WHILE SITTING INACTIVE IN A PUBLIC PLACE: 0
HOW LIKELY ARE YOU TO NOD OFF OR FALL ASLEEP IN A CAR, WHILE STOPPED FOR A FEW MINUTES IN TRAFFIC: 0

## 2020-05-29 NOTE — PROGRESS NOTES
(e.g. a theatre or a meeting) 0   As a passenger in a car for an hour without a break 2   Lying down to rest in the afternoon when circumstances permit 3   Sitting and talking to someone 1   Sitting quietly after a lunch without alcohol 0   In a car, while stopped for a few minutes in traffic 0   Total score 6   Neck circumference 15     {MALLAMPATI:3    Physical Exam  Vitals signs reviewed. Constitutional:       Appearance: Normal appearance. Comments: Obesity   HENT:      Head: Normocephalic and atraumatic. Nose: Nose normal.      Mouth/Throat:      Mouth: Mucous membranes are moist.   Eyes:      Extraocular Movements: Extraocular movements intact. Pupils: Pupils are equal, round, and reactive to light. Neck:      Musculoskeletal: Normal range of motion. Cardiovascular:      Rate and Rhythm: Normal rate and regular rhythm. Pulses: Normal pulses. Heart sounds: Murmur present. Pulmonary:      Effort: Pulmonary effort is normal.      Breath sounds: Normal breath sounds. Abdominal:      General: Abdomen is flat. Palpations: Abdomen is soft. Musculoskeletal: Normal range of motion. Skin:     General: Skin is warm and dry. Neurological:      General: No focal deficit present. Mental Status: She is alert and oriented to person, place, and time.    Psychiatric:         Mood and Affect: Mood normal.         Behavior: Behavior normal.         Radiology: None    Assessment and Plan     Problem List        Pulmonary Problems    EDITA (obstructive sleep apnea)     She has all the symptoms that are consistent with EDITA  Advised to go for the sleep study  Loose weight         Relevant Orders    Baseline Diagnostic Sleep Study    SOB (shortness of breath) on exertion     It appears to be sec to the Systolic and diastolic dysfunction, Obesity, Possible COPD  I'll do the PFT  6 MWT           Relevant Orders    Full PFT Study With Bronchodilator    6 Minute Walk Test    CT LUNG

## 2020-06-05 ENCOUNTER — VIRTUAL VISIT (OUTPATIENT)
Dept: CARDIOLOGY CLINIC | Age: 70
End: 2020-06-05
Payer: MEDICARE

## 2020-06-05 VITALS
DIASTOLIC BLOOD PRESSURE: 89 MMHG | SYSTOLIC BLOOD PRESSURE: 188 MMHG | WEIGHT: 184 LBS | BODY MASS INDEX: 32.6 KG/M2 | HEIGHT: 63 IN

## 2020-06-05 PROCEDURE — 99443 PR PHYS/QHP TELEPHONE EVALUATION 21-30 MIN: CPT | Performed by: INTERNAL MEDICINE

## 2020-06-05 RX ORDER — LISINOPRIL 5 MG/1
5 TABLET ORAL DAILY
Qty: 30 TABLET | Refills: 0 | Status: SHIPPED | OUTPATIENT
Start: 2020-06-05 | End: 2020-07-07 | Stop reason: SDUPTHER

## 2020-06-05 RX ORDER — CARVEDILOL 12.5 MG/1
12.5 TABLET ORAL DAILY
Qty: 90 TABLET | Refills: 1 | Status: SHIPPED | OUTPATIENT
Start: 2020-06-05 | End: 2020-10-05 | Stop reason: SDUPTHER

## 2020-06-05 NOTE — PROGRESS NOTES
lungs every 6 hours as needed for Wheezing 1 Inhaler 3    escitalopram (LEXAPRO) 20 MG tablet TAKE 1 TABLET BY MOUTH ONCE DAILY 90 tablet 1    pantoprazole (PROTONIX) 40 MG tablet Take 1 tablet by mouth daily 90 tablet 3    atorvastatin (LIPITOR) 20 MG tablet Take 1 tablet by mouth daily 90 tablet 3    nitroGLYCERIN (NITROSTAT) 0.4 MG SL tablet Place 1 tablet under the tongue every 5 minutes as needed for Chest pain up to max of 3 total doses. If no relief after 1 dose, call 911. 25 tablet 3    furosemide (LASIX) 20 MG tablet Take 0.5 tablets by mouth daily 90 tablet 3    warfarin (COUMADIN) 5 MG tablet TAKE 1  TABLETS BY MOUTH ONCE DAILY 90 tablet 3    cilostazol (PLETAL) 100 MG tablet Take 1 tablet by mouth 2 times daily 180 tablet 3    levothyroxine (SYNTHROID) 100 MCG tablet Take 1 tablet by mouth Daily 90 tablet 3    Ascorbic Acid (VITAMIN C) 1000 MG tablet Take 1,000 mg by mouth daily      Calcium Carb-Cholecalciferol (CALCIUM 600+D) 600-800 MG-UNIT TABS Take 1 tablet by mouth daily      aspirin 81 MG EC tablet Take 1 tablet by mouth daily 30 tablet 3    Multiple Vitamins-Minerals (THERAPEUTIC MULTIVITAMIN-MINERALS) tablet Take 1 tablet by mouth daily       No current facility-administered medications for this visit. Allergies:   Patient has no known allergies.     Patient History:  Past Medical History:   Diagnosis Date    CAD (coronary artery disease)     [facility]     H/O echocardiogram 03/22/2017    EF 35-40%    Heart attack ([facility] 1/2014    Hyperlipidemia     Hypertension     [facility]     S/P right coronary artery (RCA) stent placement     [facility] 12/2017    TIA (transient ischemic attack)     Tobacco dependence      Past Surgical History:   Procedure Laterality Date    CARDIAC SURGERY      CORONARY ANGIOPLASTY WITH STENT PLACEMENT      CORONARY ARTERY BYPASS GRAFT  06/21/2016    CABG X1 SVG to RCA with Mitral Valve Replacement 27mm Mosaic     MITRAL VALVE REPLACEMENT  2016    27mm mosaic     Family History   Problem Relation Age of Onset    Stroke Mother     Heart Attack Father     Coronary Art Dis Father    Lesvan Salglenna Pacemaker Father    Lesvan Salglenna Arrhythmia Father     Diabetes Sister     Rheum Arthritis Sister     Heart Attack Sister     Arrhythmia Sister      Social History     Tobacco Use    Smoking status: Current Every Day Smoker     Packs/day: 0.25     Types: Cigarettes     Last attempt to quit: 10/18/2017     Years since quittin.6    Smokeless tobacco: Never Used   Substance Use Topics    Alcohol use: No     Comment: 2 cups of coffee in the a.m. Review of Systems:   · Constitutional: No Fever or Weight Loss   · Eyes: No Decreased Vision  · ENT: No Headaches, Hearing Loss or Vertigo  · Cardiovascular: as per note above   · Respiratory: No cough or wheezing and as per note above. · Gastrointestinal: No abdominal pain, appetite loss, blood in stools, constipation, diarrhea or heartburn  · Genitourinary: No dysuria, trouble voiding, or hematuria  · Musculoskeletal:  None  · Integumentary: No rash or pruritis  · Neurological: No TIA or stroke symptoms  · Psychiatric: No anxiety or depression  · Endocrine: No malaise, fatigue or temperature intolerance  · Hematologic/Lymphatic: No bleeding problems, blood clots or swollen lymph nodes  · Allergic/Immunologic: No nasal congestion or hives    Objective:      Physical Exam:  BP (!) 188/89   Ht 5' 3\" (1.6 m)   Wt 184 lb (83.5 kg)   BMI 32.59 kg/m²   Wt Readings from Last 3 Encounters:   20 184 lb (83.5 kg)   05/15/20 185 lb (83.9 kg)   20 190 lb 6.4 oz (86.4 kg)     Body mass index is 32.59 kg/m².   Vitals:    20 0943   BP: (!) 188/89        telephone call , no exam       Medical decision making and Data review:  DATA:  Lab Results   Component Value Date    TROPONINT <0.010 05/15/2020     BNP:    Lab Results Component Value Date    PROBNP 4,988 (H) 03/20/2017     PT/INR:  No results found for: [facility]  Lab Results   Component Value Date    LABA1C 5.0 06/18/2016     Lab Results   Component Value Date    CHOL 124 03/21/2017    TRIG 107 03/21/2017    HDL 36 (L) 03/21/2017    LDLDIRECT 70 03/21/2017     Lab Results   Component Value Date    ALT 18 10/23/2018    AST 16 10/23/2018     TSH:   Lab Results   Component Value Date    TSH 0.40 07/10/2019     Lab Results   Component Value Date    AST 16 10/23/2018    ALT 18 10/23/2018    BILITOT 0.4 10/23/2018    ALKPHOS 88 10/23/2018     Lab Results   Component Value Date    PROBNP 4,988 (H) 03/20/2017    PROBNP 1,458 (H) 06/12/2016     Lab Results   Component Value Date    LABA1C 5.0 06/18/2016     Lab Results   Component Value Date    WBC 7.5 05/15/2020    HGB 13.1 05/15/2020    HCT 40.7 05/15/2020     05/15/2020     Echo 3/20/19  Summary   Echocardiogram is s/p CABG and MVR 6/21/2016. LV systolic function is abnormal.   Visually estimated ejection fraction is 35-40 %. Grade III diastolic dysfunction. Mild left atrium enlargement. Bioprostethic mitral valve is seated well and functioning properly. No pericardial effusion. All labs, medications and tests reviewed by myself including data and history from outside source , patient and available family . Assessment & Plan:      1. Coronary artery disease involving native coronary artery of native heart without angina pectoris    2. Cardiomyopathy, primary ([address]    3. Mixed hyperlipidemia    4. Essential hypertension    5. PAD (peripheral artery disease) ([facility]    6. Rheumatic mitral stenosis    7. S/P mitral valve replacement    8. S/P right coronary artery (RCA) stent placement    9. SOB (shortness of breath) on exertion    10. Tobacco abuse    11.  Anticoagulated on Coumadin       Hypertension  Blood pressures running very high advised her to stop using atenolol start carvedilol 12.5 mg twice daily also start lisinopril 5 mg daily keep a log of blood pressure may need higher doses      Coronary artery disease   S/p  bypass surgery X 1 vessel to RCA at [place] eof her valve replacemcent. She denies any angina . Continue aspirin she denies any angina but once marlen while gest chest pressure when she is running after her her dogs     Cardiomyopathy, primary ([address]  EF of 35 to 40 % probably related to valve disease / ischemic? She had MI in 2014 . She used to see Dr Ty Jaimes will repeat echo     S/P mitral valve replacement  Mosaic Mitral valve valve replacement 27 mm  In June 2016  post heavily calcified annulus carefully debrided as needed and the posterior annulus reinforced and constructed with gail patch used as a buttress  A # 27 [employer] tissue. She has been on coumadin since before her surgery due to TIA? I am note sure why? But I have not stopped itwill refer to coumadin clinic and check INR . Denies any bleeding history or hospitalizations    Peripheral arterial disease   Post aortobifem bypass. She is on Pletal, she denies any leg pain or claudication no signs or complaints of heart failure no admissions for heart failure       Dyslipidemia :  All available lab work was reviewed. Patient was advised to repeat lab work before next visit      Counseled extensively and medication compliance urged. We discussed that for the  prevention of ASCVD our  goal is aggressive risk modification. Patient is encouraged to exercise even a brisk walk for 30 minutes  at least 3 to 4 times a week   Various goals were discussed and questions answered. Continue current medications. Appropriate prescriptions are addressed and refills ordered. Questions answered and patient verbalizes understanding. Call for any problems, questions, or concerns. Continue all other medications of all above medical condition listed as is. Return in about 1 month (around 7/5/2020).     Please note this report has been partially produced using speech

## 2020-06-17 ENCOUNTER — HOSPITAL ENCOUNTER (OUTPATIENT)
Age: 70
Discharge: HOME | End: 2020-06-17
Payer: MEDICARE

## 2020-06-17 LAB
INR BLD: 1.3 INDEX
PRO-BNP: 1075 PG/ML
PROTHROMBIN TIME: 15.8 SECONDS (ref 11.7–14.5)

## 2020-06-17 PROCEDURE — 85610 PROTHROMBIN TIME: CPT

## 2020-06-17 PROCEDURE — 36415 COLL VENOUS BLD VENIPUNCTURE: CPT

## 2020-06-17 PROCEDURE — 83880 ASSAY OF NATRIURETIC PEPTIDE: CPT

## 2020-06-19 ENCOUNTER — TELEPHONE (OUTPATIENT)
Dept: PHARMACY | Age: 70
End: 2020-06-19

## 2020-06-19 NOTE — TELEPHONE ENCOUNTER
New patient referral.   6/19 Called patient. Someone answered and hung up. Called back 6/25. Scheduled for 6/29. Patient states she has been n warfarin since around 2005 and used to see Dr. Usha Lantigua. Patient still seeing Dr. Sukh Ferreira, but he has not been monitoring her INR. Patient states Dr. Felton Nicole provided refill of warfarin. Provided directions and phone number and advised for patient to call when she arrives to [facility].      CLINICAL PHARMACY CONSULT: MED RECONCILIATION/REVIEW ADDENDUM    For Pharmacy Admin Tracking Only    PHSO: Yes  Total # of Interventions Recommended: 0  Total Interventions Accepted: 0  Time Spent (min): 10    Anna Cardoso, SarahD

## 2020-06-22 ENCOUNTER — TELEPHONE (OUTPATIENT)
Dept: PULMONOLOGY | Age: 70
End: 2020-06-22

## 2020-06-23 ENCOUNTER — TELEPHONE (OUTPATIENT)
Dept: PULMONOLOGY | Age: 70
End: 2020-06-23

## 2020-06-29 ENCOUNTER — ANTI-COAG VISIT (OUTPATIENT)
Dept: PHARMACY | Age: 70
End: 2020-06-29
Payer: MEDICARE

## 2020-06-29 VITALS — TEMPERATURE: 98.5 F

## 2020-06-29 LAB
INTERNATIONAL NORMALIZATION RATIO, POC: 1.4
POC INR: 1.4 INDEX
PROTHROMBIN TIME, POC: 17.2 SECONDS (ref 10–14.3)

## 2020-06-29 PROCEDURE — 99213 OFFICE O/P EST LOW 20 MIN: CPT

## 2020-06-29 PROCEDURE — 85610 PROTHROMBIN TIME: CPT

## 2020-06-29 PROCEDURE — 36416 COLLJ CAPILLARY BLOOD SPEC: CPT

## 2020-06-30 ENCOUNTER — HOSPITAL ENCOUNTER (OUTPATIENT)
Dept: SLEEP CENTER | Age: 70
Discharge: HOME | End: 2020-06-30
Payer: MEDICARE

## 2020-06-30 PROCEDURE — 95810 POLYSOM 6/> YRS 4/> PARAM: CPT | Performed by: INTERNAL MEDICINE

## 2020-06-30 PROCEDURE — 95810 POLYSOM 6/> YRS 4/> PARAM: CPT

## 2020-06-30 ASSESSMENT — SLEEP AND FATIGUE QUESTIONNAIRES
HOW LIKELY ARE YOU TO NOD OFF OR FALL ASLEEP WHILE SITTING AND TALKING TO SOMEONE: 0
HOW LIKELY ARE YOU TO NOD OFF OR FALL ASLEEP IN A CAR, WHILE STOPPED FOR A FEW MINUTES IN TRAFFIC: 0
HOW LIKELY ARE YOU TO NOD OFF OR FALL ASLEEP WHILE SITTING AND READING: 0
HOW LIKELY ARE YOU TO NOD OFF OR FALL ASLEEP WHILE WATCHING TV: 1
HOW LIKELY ARE YOU TO NOD OFF OR FALL ASLEEP WHILE SITTING QUIETLY AFTER LUNCH WITHOUT ALCOHOL: 0
HOW LIKELY ARE YOU TO NOD OFF OR FALL ASLEEP WHEN YOU ARE A PASSENGER IN A CAR FOR AN HOUR WITHOUT A BREAK: 3
ESS TOTAL SCORE: 6
HOW LIKELY ARE YOU TO NOD OFF OR FALL ASLEEP WHILE SITTING INACTIVE IN A PUBLIC PLACE: 0
NECK CIRCUMFERENCE (INCHES): 15
HOW LIKELY ARE YOU TO NOD OFF OR FALL ASLEEP WHILE LYING DOWN TO REST IN THE AFTERNOON WHEN CIRCUMSTANCES PERMIT: 2

## 2020-06-30 NOTE — PROGRESS NOTES
Pt contacted to inform them that COVID-19 testing must be completed prior to Pulmonary Function Testing appointment. Patient must be able to respond no to all the pre-testing questions for testing to be able to be completed. Patient response:    Have you had any known exposure to someone with positive COVID 19? no    Have you traveled out-of-the-state in the past 14 days? no    Do you have any signs or symptoms of a respiratory infections such as fever, cough, shortness of breath, or sore throat? no    Have you had a loss of smell or taste recently? no    Based on patient's answers to these questions we will continue with the planned testing on the basis that patient has completed COVID-19 testing. COVID-19 test has been ordered and faxed to diagnostic testing center and patient has been given instructions on how to get the required testing. Patient has also been made aware and expressed understanding that the testing must be completed 72-96 hours prior to scheduled appointment. Patient also made aware that if this testing is not completed, then the PFT testing will have to be cancelled and rescheduled until testing has been completed. Pt instructed to self-isolate after testing. Pt instructed no smoking, respiratory medications, or caffeine on day of testing.

## 2020-07-01 LAB — STATUS: NORMAL

## 2020-07-01 NOTE — PROGRESS NOTES
6/30/2020  sleep study  for Jayne Garza  1950 is complete. Results are pending physician review.     Electronically signed by Latoya Dos Santos on 6/30/2020 at 9:22 PM

## 2020-07-06 ENCOUNTER — HOSPITAL ENCOUNTER (OUTPATIENT)
Age: 70
Discharge: HOME | End: 2020-07-06
Payer: MEDICARE

## 2020-07-06 ENCOUNTER — ANTI-COAG VISIT (OUTPATIENT)
Dept: PHARMACY | Age: 70
End: 2020-07-06
Payer: MEDICARE

## 2020-07-06 VITALS — TEMPERATURE: 97.5 F

## 2020-07-06 LAB
INTERNATIONAL NORMALIZATION RATIO, POC: 2.1
POC INR: 2.1 INDEX
PROTHROMBIN TIME, POC: 24.8 SECONDS (ref 10–14.3)

## 2020-07-06 PROCEDURE — 99211 OFF/OP EST MAY X REQ PHY/QHP: CPT

## 2020-07-06 PROCEDURE — 36416 COLLJ CAPILLARY BLOOD SPEC: CPT

## 2020-07-06 PROCEDURE — U0002 COVID-19 LAB TEST NON-CDC: HCPCS

## 2020-07-07 LAB
SARS-COV-2: NOT DETECTED
SOURCE: NORMAL

## 2020-07-07 RX ORDER — LISINOPRIL 5 MG/1
5 TABLET ORAL DAILY
Qty: 30 TABLET | Refills: 0 | Status: SHIPPED | OUTPATIENT
Start: 2020-07-07 | End: 2020-07-21 | Stop reason: SDUPTHER

## 2020-07-07 NOTE — TELEPHONE ENCOUNTER
Patient is out of this medication as of today   Lisinopril 5 mg
Sent to Dr. Jeny Reynoso to sign the RX
coffee

## 2020-07-08 ENCOUNTER — TELEPHONE (OUTPATIENT)
Dept: PULMONOLOGY | Age: 70
End: 2020-07-08

## 2020-07-08 NOTE — TELEPHONE ENCOUNTER
I called pt to [facility] pt did not have Return in about 6 weeks (around 7/10/2020) for PFT, Sleep Study, 6 MWT, Low dose CT chest. Re-testing for O2 - Medicare required.  Pt [facility] until aug and pt also needs covid19 test

## 2020-07-09 ENCOUNTER — HOSPITAL ENCOUNTER (OUTPATIENT)
Dept: PULMONOLOGY | Age: 70
Discharge: HOME | End: 2020-07-09
Payer: MEDICARE

## 2020-07-10 ENCOUNTER — OFFICE VISIT (OUTPATIENT)
Dept: PULMONOLOGY | Age: 70
End: 2020-07-10
Payer: MEDICARE

## 2020-07-10 PROBLEM — E66.9 OBESITY (BMI 30-39.9): Status: ACTIVE | Noted: 2020-07-10

## 2020-07-10 PROCEDURE — 99213 OFFICE O/P EST LOW 20 MIN: CPT | Performed by: INTERNAL MEDICINE

## 2020-07-10 RX ORDER — BUDESONIDE AND FORMOTEROL FUMARATE DIHYDRATE 160; 4.5 UG/1; UG/1
2 AEROSOL RESPIRATORY (INHALATION) 2 TIMES DAILY
Qty: 1 INHALER | Refills: 5 | Status: SHIPPED | OUTPATIENT
Start: 2020-07-10 | End: 2020-10-09 | Stop reason: SDUPTHER

## 2020-07-10 RX ORDER — LISINOPRIL 5 MG/1
TABLET ORAL
Qty: 30 TABLET | Refills: 0 | OUTPATIENT
Start: 2020-07-10

## 2020-07-10 ASSESSMENT — ENCOUNTER SYMPTOMS
COUGH: 0
EYE ITCHING: 0
BACK PAIN: 0
EYE DISCHARGE: 0
ABDOMINAL DISTENTION: 0
ABDOMINAL PAIN: 0

## 2020-07-10 NOTE — PROGRESS NOTES
Gloria Ordoñez  1950  Referring Provider: Dr. Zenaida Bazzi:     Chief Complaint   Patient presents with    Shortness of Breath       HPI  Alex Jones is a 79 y.o. female has come back as a follow up. She has a 30 pk yr smoking and she is still smoking 1/2 pk per day. She has SOBOE requiring 2 L/min of oxygen. She is on Albuterol PRN but it helps for a short period of time. She had no PFT's or Low dose CT chest yet. She had a PSG done on 06/30/2020 which showed that she has an AHI of 7.6 and desat to 78%. She is still sleepy and tired during the day time. Current Outpatient Medications   Medication Sig Dispense Refill    budesonide-formoterol (SYMBICORT) 160-4.5 MCG/ACT AERO Inhale 2 puffs into the lungs 2 times daily 1 Inhaler 5    lisinopril (PRINIVIL;ZESTRIL) 5 MG tablet Take 1 tablet by mouth daily 30 tablet 0    carvedilol (COREG) 12.5 MG tablet Take 1 tablet by mouth daily 90 tablet 1    albuterol sulfate HFA (VENTOLIN HFA) 108 (90 Base) MCG/ACT inhaler Inhale 2 puffs into the lungs every 6 hours as needed for Wheezing 1 Inhaler 3    escitalopram (LEXAPRO) 20 MG tablet TAKE 1 TABLET BY MOUTH ONCE DAILY 90 tablet 1    pantoprazole (PROTONIX) 40 MG tablet Take 1 tablet by mouth daily 90 tablet 3    atorvastatin (LIPITOR) 20 MG tablet Take 1 tablet by mouth daily 90 tablet 3    nitroGLYCERIN (NITROSTAT) 0.4 MG SL tablet Place 1 tablet under the tongue every 5 minutes as needed for Chest pain up to max of 3 total doses.  If no relief after 1 dose, call 911. 25 tablet 3    furosemide (LASIX) 20 MG tablet Take 0.5 tablets by mouth daily 90 tablet 3    warfarin (COUMADIN) 5 MG tablet TAKE 1  TABLETS BY MOUTH ONCE DAILY 90 tablet 3    cilostazol (PLETAL) 100 MG tablet Take 1 tablet by mouth 2 times daily 180 tablet 3    levothyroxine (SYNTHROID) 100 MCG tablet Take 1 tablet by mouth Daily 90 tablet 3    Ascorbic Acid (VITAMIN C) 1000 MG tablet Take 1,000 mg by mouth daily      Calcium Carb-Cholecalciferol (CALCIUM 600+D) 600-800 MG-UNIT TABS Take 1 tablet by mouth daily      aspirin 81 MG EC tablet Take 1 tablet by mouth daily 30 tablet 3    Multiple Vitamins-Minerals (THERAPEUTIC MULTIVITAMIN-MINERALS) tablet Take 1 tablet by mouth daily       No current facility-administered medications for this visit. No Known Allergies    Past Medical History:   Diagnosis Date    CAD (coronary artery disease)     [facility]     H/O echocardiogram 2017    EF 35-40%    [facility]    Hyperlipidemia     Hypertension     [facility]     S/P right coronary artery (RCA) stent placement     [facility] 2017    TIA (transient ischemic attack)     Tobacco dependence        Past Surgical History:   Procedure Laterality Date    CARDIAC SURGERY      CORONARY ANGIOPLASTY WITH STENT PLACEMENT      CORONARY ARTERY BYPASS GRAFT  2016    CABG X1 SVG to RCA with Mitral Valve Replacement 27mm Mosaic     MITRAL VALVE REPLACEMENT  2016    27mm mosaic       Social History     Socioeconomic History    Marital status:      Spouse name: Not on file    Number of children: Not on file    Years of education: Not on file    Highest education level: Not on file   Occupational History    Not on file   Social Needs    Financial resource strain: Not on file    Food insecurity     Worry: Not on file     Inability: Not on file    Transportation needs     Medical: Not on file     Non-medical: Not on file   Tobacco Use    Smoking status: Current Every Day Smoker     Packs/day: 0.25     Types: Cigarettes     Last attempt to quit: 10/18/2017     Years since quittin.7    Smokeless tobacco: Never Used   Substance and Sexual Activity    Alcohol use: No     Comment: 2 cups of coffee in the a.m.     Drug use: No    Sexual activity: Yes     Partners: Male   Lifestyle    Physical activity Days per week: Not on file     Minutes per session: Not on file    Stress: Not on file   Relationships    Social connections     Talks on phone: Not on file     Gets together: Not on file     Attends Gnosticism service: Not on file     Active member of club or organization: Not on file     Attends meetings of clubs or organizations: Not on file     Relationship status: Not on file    Intimate partner violence     Fear of current or ex partner: Not on file     Emotionally abused: Not on file     Physically abused: Not on file     Forced sexual activity: Not on file   Other Topics Concern    Not on file   Social History Narrative    ** Merged History Encounter **            Review of Systems   Constitutional: Positive for fatigue. HENT: Negative for congestion and postnasal drip. Eyes: Negative for discharge and itching. Respiratory: Negative for cough. Cardiovascular: Negative for chest pain and leg swelling. Gastrointestinal: Negative for abdominal distention and abdominal pain. Endocrine: Negative for cold intolerance and heat intolerance. Genitourinary: Negative for dysuria and enuresis. Musculoskeletal: Negative for arthralgias and back pain. Allergic/Immunologic: Negative for environmental allergies and food allergies. Neurological: Negative for light-headedness and headaches. Hematological: Negative for adenopathy. Psychiatric/Behavioral: Negative for agitation and behavioral problems. Objective: There were no vitals taken for this visit. There is no height or weight on file to calculate BMI.   Sleep Medicine 6/30/2020 5/29/2020   Sitting and reading 0 0   Watching TV 1 0   Sitting, inactive in a public place (e.g. a theatre or a meeting) 0 0   As a passenger in a car for an hour without a break 3 2   Lying down to rest in the afternoon when circumstances permit 2 3   Sitting and talking to someone 0 1   Sitting quietly after a lunch without alcohol 0 0   In a car, while titration.      Progress notes sent to the referring Provider    Jeanie Flores MD  7/10/2020  12:09 PM

## 2020-07-13 ENCOUNTER — HOSPITAL ENCOUNTER (OUTPATIENT)
Age: 70
Discharge: HOME | End: 2020-07-13
Payer: MEDICARE

## 2020-07-13 ENCOUNTER — TELEPHONE (OUTPATIENT)
Dept: PHARMACY | Age: 70
End: 2020-07-13

## 2020-07-13 ENCOUNTER — ANTI-COAG VISIT (OUTPATIENT)
Dept: PHARMACY | Age: 70
End: 2020-07-13
Payer: MEDICARE

## 2020-07-13 LAB
INR BLD: 1.99 INDEX
PROTHROMBIN TIME: 24.3 SECONDS (ref 11.7–14.5)

## 2020-07-13 PROCEDURE — 36415 COLL VENOUS BLD VENIPUNCTURE: CPT

## 2020-07-13 PROCEDURE — 85610 PROTHROMBIN TIME: CPT

## 2020-07-13 PROCEDURE — 99211 OFF/OP EST MAY X REQ PHY/QHP: CPT

## 2020-07-13 NOTE — PROGRESS NOTES
Medication Management Service  [address] [phone]  Mary herman [phone]    Visit Date: 7/13/2020   Subjective: All appointments have been changed to telephone visits at this time due to COVID-19. Rajni Aaron is a 79 y.o. female who is having INR checked by [facility]. INR results were sent to the clinic for anticoagulation monitoring and adjustment. Patient results called in to clinic for warfarin management due to  Indication:   bio prosthetic valve replacement. INR goal: of 2.0-3.0. Duration of therapy: indefinite. Patient reports the following:   NOT adherent with regiment  Missed or extra doses:  Missed x3 days then took 7.5mg daily for 3 days  Bleeding or thromboembolic side effects:  None  Significant medication changes:  None  Significant dietary changes: None  Significant alcohol or tobacco changes: None  Significant recent illness, disease state changes, or hospitalization:  None  Upcoming surgeries or procedures:  None  Falls: None           Assessment and Plan     PT/INR performed by Lab. INR today is 1.99, therapeutic, despite missing warfarin for 3 days then taking 7.5mg daily for 3 days. However, patient slightly unsure of exact days of week        Plan: Will resume maintenance regimen of warfarin 5mg daily except 7.5mg on Mondays and Thursdays. Recheck INR in 1 week(s). Will call patient later to schedule time. Patient unable to schedule right now. Patient has standing lab orders for PT/INR. Patient verbalized understanding of dosing directions and information discussed. Progress note sent to referring office. Patient acknowledges working in consult agreement with pharmacist as referred by his/her physician. Patient accepted that for purposes of billing, this is a virtual visit with your provider for which we will submit a claim for reimbursement with your insurance company.  You may be responsible for any copays, coinsurance amounts or other amounts not covered by your insurance company.      Electronically signed by Sanchez Don, [facility] on 7/13/20 at 4:16 PM EDT    CLINICAL PHARMACY CONSULT: MED RECONCILIATION/REVIEW ADDENDUM    For Pharmacy Admin Tracking Only    PHSO: Yes  Total # of Interventions Recommended: 0    - Maintenance Safety Lab Monitoring #: 1  Total Interventions Accepted: 0  Time Spent (min): Candace Hansen, SarahD

## 2020-07-13 NOTE — TELEPHONE ENCOUNTER
Called to change in person visit to lab draw. Advised patient to check in before 2:30pm and I will call with results in afternoon. Recent Travel Screening and Travel History documentation:     Travel Screening     Question   Response    In the last month, have you been in contact with someone who was confirmed or suspected to have Coronavirus / COVID-19? No / Unsure    Do you have any of the following symptoms? None of these (laryngitis)    Have you traveled internationally in the last month? No      Travel History   Travel since 06/13/20     No documented travel since 06/13/20         Patient had COVID-19 screen last week for procedure that was negative. Patient reports \"some laryngitis\" today, but no other symptoms.    CLINICAL PHARMACY CONSULT: MED RECONCILIATION/REVIEW ADDENDUM    For Pharmacy Admin Tracking Only    PHSO: yes  Total # of Interventions Recommended: 0    Total Interventions Accepted: 0  Time Spent (min): 5    Rica Ta, SarahD

## 2020-07-20 ENCOUNTER — ANTI-COAG VISIT (OUTPATIENT)
Dept: PHARMACY | Age: 70
End: 2020-07-20
Payer: MEDICARE

## 2020-07-20 ENCOUNTER — TELEPHONE (OUTPATIENT)
Dept: PHARMACY | Age: 70
End: 2020-07-20

## 2020-07-20 LAB
INTERNATIONAL NORMALIZATION RATIO, POC: 2
POC INR: 2 INDEX
PROTHROMBIN TIME, POC: 24.6 SECONDS (ref 10–14.3)

## 2020-07-20 PROCEDURE — 85610 PROTHROMBIN TIME: CPT

## 2020-07-20 PROCEDURE — 36416 COLLJ CAPILLARY BLOOD SPEC: CPT

## 2020-07-20 PROCEDURE — 99211 OFF/OP EST MAY X REQ PHY/QHP: CPT

## 2020-07-20 NOTE — PROGRESS NOTES
Medication Management Service  [facility]  [phone]    Visit Date: 7/20/2020   Subjective:       Wm Garcia is a 79 y.o. female who presents to clinic today for anticoagulation monitoring and adjustment. Patient seen in clinic for warfarin management due to  Indication:   bio prosthetic valve replacement. INR goal: of 2.0-3.0. Duration of therapy: indefinite. Patient reports the following:   Adherent with regimen  Missed or extra doses:  None   Bleeding or thromboembolic side effects:  None  Significant medication changes:  None  Significant dietary changes: None  Significant alcohol or tobacco changes: None  Significant recent illness, disease state changes, or hospitalization:  None  Upcoming surgeries or procedures:  None  Falls: fall x1           Assessment and Plan     PT/INR done in office per protocol. INR today is 2.0, therapeutic. Reviewed patients upcoming appointments within [facility] with patient. Patient fell over some items on porch and reports back pain. Advised to contact PCP. Plan: Will continue current regimen of warfarin 5mg daily except 7.5mg on Mondays and Thursdays. Recheck INR in 2 week(s). Patient verbalized understanding of dosing directions and information discussed. Dosing schedule given to patient including phone number, appointment date, and time. Progress note sent to referring office. Patient acknowledges working in consult agreement with pharmacist as referred by his/her physician.       Electronically signed by Constance Montiel [address] [place] [address] on 7/20/20 at 3:13 PM EDT    CLINICAL PHARMACY CONSULT: MED RECONCILIATION/REVIEW ADDENDUM    For Pharmacy Admin Tracking Only    PHSO: Yes  Total # of Interventions Recommended: 0    - Maintenance Safety Lab Monitoring #: 1     Total Interventions Accepted: 0  Time Spent (min): 2021 Ben Xavier PharmD

## 2020-07-20 NOTE — TELEPHONE ENCOUNTER
Scheduled for today at 3:00pm.   Recent Travel Screening and Travel History documentation:     Travel Screening     Question   Response    In the last month, have you been in contact with someone who was confirmed or suspected to have Coronavirus / COVID-19? No / Unsure    Do you have any of the following symptoms? None of these    Have you traveled internationally in the last month?   No      Travel History   Travel since 06/20/20     No documented travel since 06/20/20           CLINICAL PHARMACY CONSULT: JAVIER Castanon Tracking Only    PHSO: Yes  Total # of Interventions Recommended: 0    Total Interventions Accepted: 0  Time Spent (min): 5    Carola Larson, PharmD

## 2020-07-21 RX ORDER — LISINOPRIL 5 MG/1
5 TABLET ORAL DAILY
Qty: 30 TABLET | Refills: 0 | Status: SHIPPED | OUTPATIENT
Start: 2020-07-21 | End: 2020-08-03 | Stop reason: SDUPTHER

## 2020-07-24 ENCOUNTER — VIRTUAL VISIT (OUTPATIENT)
Dept: FAMILY MEDICINE CLINIC | Age: 70
End: 2020-07-24
Payer: MEDICARE

## 2020-07-24 PROCEDURE — 99447 NTRPROF PH1/NTRNET/EHR 11-20: CPT | Performed by: FAMILY MEDICINE

## 2020-07-24 RX ORDER — ESCITALOPRAM OXALATE 20 MG/1
20 TABLET ORAL DAILY
Qty: 90 TABLET | Refills: 1 | Status: ON HOLD | OUTPATIENT
Start: 2020-07-24 | End: 2021-06-28

## 2020-07-24 RX ORDER — LEVOTHYROXINE SODIUM 0.1 MG/1
100 TABLET ORAL DAILY
Qty: 90 TABLET | Refills: 3 | Status: ON HOLD | OUTPATIENT
Start: 2020-07-24 | End: 2021-06-30 | Stop reason: SDUPTHER

## 2020-07-24 NOTE — PROGRESS NOTES
Jomar Montoya is a 79 y.o. female evaluated via telephone on 7/24/2020. Consent:  She and/or health care decision maker is aware that that she may receive a bill for this telephone service, depending on her insurance coverage, and has provided verbal consent to proceed: Yes      Documentation:  I communicated with the patient and/or health care decision maker about her chronic conditions. Details of this discussion including any medical advice provided:     HTN:  Patient here for follow-up of elevated blood pressure. She is not exercising. Blood pressure is monitored at home. Previous records of  blood pressure in the normal range. Cardiac symptoms: dyspnea. Patient denies: chest pain and lower extremity edema. Cardiovascular risk factors: advanced age (older than 54 for men, 72 for women), dyslipidemia and hypertension. Use of agents associated with hypertension: thyroid hormones. History of target organ damage: prior coronary revascularization. Current Medications for for blood pressure include: furosemide (Lasix), lisinopril (Prinivil) and Coreg    She reports compliant most of the time with taking her medications as directed to control blood pressure and reports no medication side effects noted but denies dry cough. LIPIDS:  No new myalgias or GI upset on atorvastatin (Lipitor). Medication compliance:  compliant most of the time  Diet compliance:  compliant most of the time  Current exercise: no regular exercise  Barriers: none      Lab Results   Component Value Date    ALT 18 10/23/2018    AST 16 10/23/2018     Lab Results   Component Value Date    CHOL 124 03/21/2017    TRIG 107 03/21/2017    HDL 36 (L) 03/21/2017    LDLDIRECT 70 03/21/2017        Hypothyroid  Has been on current dose of  levothyroxine (Synthroid) for several years. Pt reports symptoms have been stable. Reports residual symptoms to include none. Pt denies change in energy level and weight changes.     History of high blood pressure? yes  History of diabetes? no    Lab Results   Component Value Date    TSH 0.40 07/10/2019     Lab Results   Component Value Date    TSH 0.40 07/10/2019       No components found for: CHLPL  Lab Results   Component Value Date    TRIG 107 03/21/2017    TRIG 84 06/12/2016    TRIG 100 01/12/2014     Lab Results   Component Value Date    HDL 36 (L) 03/21/2017    HDL 43 06/12/2016    HDL 52 (L) 01/12/2014     No results found for: LDLCALC  No results found for: LABVLDL  CAD/Cardiopmyopathy  Chronic. Sees cardiology. On betablocker, plavix, statin, and coumadin. No bleeding. Tolerates meds well. D&A  Chronic. On Lexapro. Takes daily as prescribed. No side effects. Works well. EDITA  Diagnosed by pulm. Has not had her CPAP titration yet. She was also started on an inhaler. Taste is bad, but her breathing is better. ROS: No CP. + Shortness of breath, but at baseline.      Past Medical History:   Diagnosis Date    CAD (coronary artery disease)     [facility]     H/O echocardiogram 03/22/2017    EF 35-40%    Heart attack ([facility] 1/2014    Hyperlipidemia     Hypertension     [facility]     S/P right coronary artery (RCA) stent placement     T2 vertebral fracture ([facility] 12/2017    TIA (transient ischemic attack)     Tobacco dependence      Past Surgical History:   Procedure Laterality Date    CARDIAC SURGERY      CORONARY ANGIOPLASTY WITH STENT PLACEMENT      CORONARY ARTERY BYPASS GRAFT  06/21/2016    CABG X1 SVG to RCA with Mitral Valve Replacement 27mm Mosaic     MITRAL VALVE REPLACEMENT  06/21/2016    27mm mosaic     Social History     Socioeconomic History    Marital status:      Spouse name: Not on file    Number of children: Not on file    Years of education: Not on file    Highest education level: Not on file   Occupational History    Not on file   Social Needs    Financial resource strain: Not on file  Food insecurity     Worry: Not on file     Inability: Not on file    Transportation needs     Medical: Not on file     Non-medical: Not on file   Tobacco Use    Smoking status: Current Every Day Smoker     Packs/day: 0.25     Types: Cigarettes     Last attempt to quit: 10/18/2017     Years since quittin.7    Smokeless tobacco: Never Used   Substance and Sexual Activity    Alcohol use: No     Comment: 2 cups of coffee in the a.m.  Drug use: No    Sexual activity: Yes     Partners: Male   Lifestyle    Physical activity     Days per week: Not on file     Minutes per session: Not on file    Stress: Not on file   Relationships    Social connections     Talks on phone: Not on file     Gets together: Not on file     Attends Episcopalian service: Not on file     Active member of club or organization: Not on file     Attends meetings of clubs or organizations: Not on file     Relationship status: Not on file    Intimate partner violence     Fear of current or ex partner: Not on file     Emotionally abused: Not on file     Physically abused: Not on file     Forced sexual activity: Not on file   Other Topics Concern    Not on file   Social History Narrative    ** Merged History Encounter **          Family History   Problem Relation Age of Onset    Stroke Mother     Heart Attack Father     Coronary Art Dis Father     Pacemaker Father     Arrhythmia Father     Diabetes Sister     Rheum Arthritis Sister     Heart Attack Sister     Arrhythmia Sister       O:  Alert and Oriented x 3. Increased work of breathing noted, but pts baseline. A/P:  1. Hypothyroid  Controlled on current med. Labs ordered for stability. Continue care. 2. D&A  Controlled on Lexapro, continue. 3. CAD  4. Lipids  Continue secondary prevention with statin, betatblocker and anti coagulation therapy. Continue care with cardio. Labs ordered. 5. Cardiomyopathy  6. Anticoagulated  Continue care with cardio.  Continue meds. Stable. 7. HTN  Controlled with the addition of lisinopril. Continue care and meds. 8. EDITA  Care per pulm. Uncontrolled. Awaiting CPAP titration. Follow up 6 months: Chronic conditions. Current Outpatient Medications:     levothyroxine (SYNTHROID) 100 MCG tablet, Take 1 tablet by mouth Daily, Disp: 90 tablet, Rfl: 3    escitalopram (LEXAPRO) 20 MG tablet, Take 1 tablet by mouth daily, Disp: 90 tablet, Rfl: 1    lisinopril (PRINIVIL;ZESTRIL) 5 MG tablet, Take 1 tablet by mouth daily, Disp: 30 tablet, Rfl: 0    budesonide-formoterol (SYMBICORT) 160-4.5 MCG/ACT AERO, Inhale 2 puffs into the lungs 2 times daily, Disp: 1 Inhaler, Rfl: 5    carvedilol (COREG) 12.5 MG tablet, Take 1 tablet by mouth daily, Disp: 90 tablet, Rfl: 1    albuterol sulfate HFA (VENTOLIN HFA) 108 (90 Base) MCG/ACT inhaler, Inhale 2 puffs into the lungs every 6 hours as needed for Wheezing, Disp: 1 Inhaler, Rfl: 3    pantoprazole (PROTONIX) 40 MG tablet, Take 1 tablet by mouth daily, Disp: 90 tablet, Rfl: 3    atorvastatin (LIPITOR) 20 MG tablet, Take 1 tablet by mouth daily, Disp: 90 tablet, Rfl: 3    nitroGLYCERIN (NITROSTAT) 0.4 MG SL tablet, Place 1 tablet under the tongue every 5 minutes as needed for Chest pain up to max of 3 total doses.  If no relief after 1 dose, call 911., Disp: 25 tablet, Rfl: 3    furosemide (LASIX) 20 MG tablet, Take 0.5 tablets by mouth daily, Disp: 90 tablet, Rfl: 3    warfarin (COUMADIN) 5 MG tablet, TAKE 1  TABLETS BY MOUTH ONCE DAILY, Disp: 90 tablet, Rfl: 3    cilostazol (PLETAL) 100 MG tablet, Take 1 tablet by mouth 2 times daily, Disp: 180 tablet, Rfl: 3    Ascorbic Acid (VITAMIN C) 1000 MG tablet, Take 1,000 mg by mouth daily, Disp: , Rfl:     Calcium Carb-Cholecalciferol (CALCIUM 600+D) 600-800 MG-UNIT TABS, Take 1 tablet by mouth daily, Disp: , Rfl:     aspirin 81 MG EC tablet, Take 1 tablet by mouth daily, Disp: 30 tablet, Rfl: 3    Multiple Vitamins-Minerals

## 2020-07-28 ENCOUNTER — VIRTUAL VISIT (OUTPATIENT)
Dept: CARDIOLOGY CLINIC | Age: 70
End: 2020-07-28
Payer: MEDICARE

## 2020-07-28 PROCEDURE — 99442 PR PHYS/QHP TELEPHONE EVALUATION 11-20 MIN: CPT | Performed by: INTERNAL MEDICINE

## 2020-07-28 NOTE — PROGRESS NOTES
CARDIOLOGY NOTE         Daryl Renee is a 79 y.o. female evaluated via telephone on 7/28/2020. Documentation:  I communicated with the patient and/or health care decision maker about MVR, HTN   Details of this discussion including any medical advice provided: as below      I Confirm this is a Patient Initiated Episode with an Established Patient who has not had a related appointment within my department in the past 7 days or scheduled within the next 24 hours. The call was not tied to face to face office visit or procedure that has occurred in in prior 7 days. Based on our conversation /evaluation , a subsequent office visit for the patient's problem is not indicated for  24 hrs     Total Time: minutes: 21-30 minutes    Note: not billable if this call serves to triage the patient into an appointment for the relevant concern      Sonia Elena      Chief Complaint: Coronary artery disease, mitral valve disease    HPI:   Elvira Pastrana is a 79y.o. year old who has history as noted below. she says she does not check her blood pressure at home her  has been out of town. Denies any complaints since we changed her blood pressure medication about a month ago since then she seen pulmonology as well as Dr. Tricia Tilley she notices ankle swelling once in a while has some shortness of breath on exertion. She has history of mitral valve replacement with a bioprosthetic valve. She used to see Dr. Cathi Givens. She denies  She notices some chest pressure when she is running after  Her dog.  Her puppy is Estelita which keeps her busy , her DIL Denise Jaimes works in scheduling )      Current Outpatient Medications   Medication Sig Dispense Refill    levothyroxine (SYNTHROID) 100 MCG tablet Take 1 tablet by mouth Daily 90 tablet 3    escitalopram (LEXAPRO) 20 MG tablet Take 1 tablet by mouth daily 90 tablet 1    lisinopril (PRINIVIL;ZESTRIL) 5 MG tablet Take 1 tablet by mouth daily 30 tablet 0    budesonide-formoterol (SYMBICORT) 160-4.5 MCG/ACT AERO Inhale 2 puffs into the lungs 2 times daily 1 Inhaler 5    carvedilol (COREG) 12.5 MG tablet Take 1 tablet by mouth daily 90 tablet 1    pantoprazole (PROTONIX) 40 MG tablet Take 1 tablet by mouth daily 90 tablet 3    atorvastatin (LIPITOR) 20 MG tablet Take 1 tablet by mouth daily 90 tablet 3    nitroGLYCERIN (NITROSTAT) 0.4 MG SL tablet Place 1 tablet under the tongue every 5 minutes as needed for Chest pain up to max of 3 total doses. If no relief after 1 dose, call 911. 25 tablet 3    furosemide (LASIX) 20 MG tablet Take 0.5 tablets by mouth daily 90 tablet 3    warfarin (COUMADIN) 5 MG tablet TAKE 1  TABLETS BY MOUTH ONCE DAILY 90 tablet 3    cilostazol (PLETAL) 100 MG tablet Take 1 tablet by mouth 2 times daily 180 tablet 3    Calcium Carb-Cholecalciferol (CALCIUM 600+D) 600-800 MG-UNIT TABS Take 1 tablet by mouth daily      aspirin 81 MG EC tablet Take 1 tablet by mouth daily 30 tablet 3    Multiple Vitamins-Minerals (THERAPEUTIC MULTIVITAMIN-MINERALS) tablet Take 1 tablet by mouth daily       No current facility-administered medications for this visit. Allergies:   Patient has no known allergies.     Patient History:  Past Medical History:   Diagnosis Date    CAD (coronary artery disease)     COPD [facility]chronic obstructive pulmonary disease) [facility] [address]     H/O echocardiogram 03/22/2017    EF 35-40%    Heart attack ([facility] [address] 1/2014    Hyperlipidemia     Hypertension     [facility]     S/P right coronary artery (RCA) stent placement     T2 vertebral fracture ([facility] [address] 12/2017    TIA (transient ischemic attack)     Tobacco dependence      Past Surgical History:   Procedure Laterality Date    CARDIAC SURGERY      CORONARY ANGIOPLASTY WITH STENT PLACEMENT      CORONARY ARTERY BYPASS GRAFT  06/21/2016    CABG X1 SVG to RCA with Mitral Valve Replacement 27mm Mosaic     MITRAL VALVE REPLACEMENT  06/21/2016 27mm mosaic     Family History   Problem Relation Age of Onset   [facility] Stroke Mother     Heart Attack Father     Coronary Art Dis Father    [facility] Pacemaker Father    [facility] Arrhythmia Father     Diabetes Sister     Rheum Arthritis Sister     Heart Attack Sister     Arrhythmia Sister      Social History     Tobacco Use    Smoking status: Current Every Day Smoker     Packs/day: 0.50     Types: Cigarettes     Last attempt to quit: 10/18/2017     Years since quittin.7    Smokeless tobacco: Never Used   Substance Use Topics    Alcohol use: No     Comment: 2 cups of coffee in the a.m. Review of Systems:   · Constitutional: No Fever or Weight Loss   · Eyes: No Decreased Vision  · ENT: No Headaches, Hearing Loss or Vertigo  · Cardiovascular: as per note above   · Respiratory: No cough or wheezing and as per note above. · Gastrointestinal: No abdominal pain, appetite loss, blood in stools, constipation, diarrhea or heartburn  · Genitourinary: No dysuria, trouble voiding, or hematuria  · Musculoskeletal:  None  · Integumentary: No rash or pruritis  · Neurological: No TIA or stroke symptoms  · Psychiatric: No anxiety or depression  · Endocrine: No malaise, fatigue or temperature intolerance  · Hematologic/Lymphatic: No bleeding problems, blood clots or swollen lymph nodes  · Allergic/Immunologic: No nasal congestion or hives    Objective:      Physical Exam:  There were no vitals taken for this visit. Wt Readings from Last 3 Encounters:   20 184 lb (83.5 kg)   05/15/20 185 lb (83.9 kg)   20 190 lb 6.4 oz (86.4 kg)     There is no height or weight on file to calculate BMI. There were no vitals filed for this visit.      telephone call , no exam       Medical decision making and Data review:  DATA:  Lab Results   Component Value Date    TROPONINT <0.010 05/15/2020     BNP:    Lab Results   Component Value Date    PROBNP 1,075 (H) 2020     PT/INR:  No results found for: Elance  Lab Results   Component Value Date    LABA1C 5.0 06/18/2016     Lab Results   Component Value Date    CHOL 124 03/21/2017    TRIG 107 03/21/2017    HDL 36 (L) 03/21/2017    LDLDIRECT 70 03/21/2017     Lab Results   Component Value Date    ALT 18 10/23/2018    AST 16 10/23/2018     TSH:   Lab Results   Component Value Date    TSH 0.40 07/10/2019     Lab Results   Component Value Date    AST 16 10/23/2018    ALT 18 10/23/2018    BILITOT 0.4 10/23/2018    ALKPHOS 88 10/23/2018     Lab Results   Component Value Date    PROBNP 1,075 (H) 06/17/2020    PROBNP 4,988 (H) 03/20/2017    PROBNP 1,458 (H) 06/12/2016     Lab Results   Component Value Date    LABA1C 5.0 06/18/2016     Lab Results   Component Value Date    WBC 7.5 05/15/2020    HGB 13.1 05/15/2020    HCT 40.7 05/15/2020     05/15/2020     Echo 3/20/19  Summary   Echocardiogram is s/p CABG and MVR 6/21/2016. LV systolic function is abnormal.   Visually estimated ejection fraction is 35-40 %. Grade III diastolic dysfunction. Mild left atrium enlargement. Bioprostethic mitral valve is seated well and functioning properly. No pericardial effusion. All labs, medications and tests reviewed by myself including data and history from outside source , patient and available family . Assessment & Plan:      1. Essential hypertension    2. S/P right coronary artery (RCA) stent placement    3. PAD (peripheral artery disease) ([facility] [address]    4. S/P mitral valve replacement    5. Cardiomyopathy, primary ([facility] [address]    6. EDITA (obstructive sleep apnea)    7. SOB (shortness of breath) on exertion    8. Mixed hyperlipidemia       Hypertension  Blood pressures meds were adjusted about a month ago currently on carvedilol 12.5 mg twice daily and  lisinopril 5 mg daily keep a log of blood pressure may need higher doses    Coronary artery disease   S/p  bypass surgery X 1 vessel to RCA at time of her valve replacemcent. She denies any angina .  Continue aspirin she denies any angina but once marlen while gest chest pressure when she is running after her her dogs     Cardiomyopathy, primary ([address]  EF of 35 to 40 % probably related to valve disease / ischemic? She had MI in 2014 . She used to see Dr Cathi Givens. Her echo is still pending    S/P mitral valve replacement  Mosaic Mitral valve valve replacement 27 mm  In June 2016  heavily calcified annulus carefully debrided as needed and the posterior annulus reinforced and constructed with gail patch used as a buttress  A # 27 [employer] tissue. She has been on coumadin since before her surgery due to TIA? I am note sure why? But I have not stopped it. Patient cannot tell me why she was on it I cannot get records from Dr. Cathi Givens office and presuming she had A. Fib? denies any bleeding history or hospitalizations    Peripheral arterial disease   Post aortobifem bypass. She is on Pletal, she denies any leg pain or claudication no signs or complaints of heart failure no admissions for heart failure  No signs of heart failure for now continue       Dyslipidemia :  All available lab work was reviewed. Patient was advised to repeat lab work before next visit      Counseled extensively and medication compliance urged. We discussed that for the  prevention of ASCVD our  goal is aggressive risk modification. Patient is encouraged to exercise even a brisk walk for 30 minutes  at least 3 to 4 times a week   Various goals were discussed and questions answered. Continue current medications. Appropriate prescriptions are addressed and refills ordered. Questions answered and patient verbalizes understanding. Call for any problems, questions, or concerns. Continue all other medications of all above medical condition listed as is. Return in about 6 months (around 1/28/2021).     Please note this report has been partially produced using speech recognition software and may contain errors related to that system including errors in grammar, punctuation, and spelling, as well as

## 2020-07-29 ENCOUNTER — HOSPITAL ENCOUNTER (OUTPATIENT)
Age: 70
Discharge: HOME | End: 2020-07-29
Payer: MEDICARE

## 2020-07-29 PROCEDURE — U0002 COVID-19 LAB TEST NON-CDC: HCPCS

## 2020-07-29 PROCEDURE — 36415 COLL VENOUS BLD VENIPUNCTURE: CPT

## 2020-07-30 LAB
SARS-COV-2: NOT DETECTED
SOURCE: NORMAL

## 2020-08-03 ENCOUNTER — HOSPITAL ENCOUNTER (EMERGENCY)
Age: 70
Discharge: HOME | End: 2020-08-03
Payer: MEDICARE

## 2020-08-03 ENCOUNTER — HOSPITAL ENCOUNTER (OUTPATIENT)
Dept: PULMONOLOGY | Age: 70
Discharge: HOME | End: 2020-08-03
Payer: MEDICARE

## 2020-08-03 ENCOUNTER — TELEPHONE (OUTPATIENT)
Dept: PHARMACY | Age: 70
End: 2020-08-03

## 2020-08-03 VITALS
SYSTOLIC BLOOD PRESSURE: 106 MMHG | HEART RATE: 73 BPM | WEIGHT: 183 LBS | TEMPERATURE: 98.1 F | DIASTOLIC BLOOD PRESSURE: 95 MMHG | RESPIRATION RATE: 18 BRPM | BODY MASS INDEX: 31.24 KG/M2 | HEIGHT: 64 IN | OXYGEN SATURATION: 95 %

## 2020-08-03 LAB
ALBUMIN SERPL-MCNC: 4.3 GM/DL (ref 3.4–5)
ALP BLD-CCNC: 106 IU/L (ref 40–129)
ALT SERPL-CCNC: 15 U/L (ref 10–40)
ANION GAP SERPL CALCULATED.3IONS-SCNC: 10 MMOL/L (ref 4–16)
AST SERPL-CCNC: 19 IU/L (ref 15–37)
BASOPHILS ABSOLUTE: 0.1 K/CU MM
BASOPHILS RELATIVE PERCENT: 1 % (ref 0–1)
BILIRUB SERPL-MCNC: 0.5 MG/DL (ref 0–1)
BUN BLDV-MCNC: 13 MG/DL (ref 6–23)
CALCIUM SERPL-MCNC: 9.6 MG/DL (ref 8.3–10.6)
CHLORIDE BLD-SCNC: 97 MMOL/L (ref 99–110)
CO2: 29 MMOL/L (ref 21–32)
CREAT SERPL-MCNC: 1.2 MG/DL (ref 0.6–1.1)
DIFFERENTIAL TYPE: ABNORMAL
EOSINOPHILS ABSOLUTE: 0.4 K/CU MM
EOSINOPHILS RELATIVE PERCENT: 4.2 % (ref 0–3)
GFR AFRICAN AMERICAN: 54 ML/MIN/1.73M2
GFR NON-AFRICAN AMERICAN: 44 ML/MIN/1.73M2
GLUCOSE BLD-MCNC: 99 MG/DL (ref 70–99)
HCT VFR BLD CALC: 41.4 % (ref 37–47)
HEMOGLOBIN: 13.9 GM/DL (ref 12.5–16)
IMMATURE NEUTROPHIL %: 0.2 % (ref 0–0.43)
LYMPHOCYTES ABSOLUTE: 1.5 K/CU MM
LYMPHOCYTES RELATIVE PERCENT: 17.5 % (ref 24–44)
MCH RBC QN AUTO: 32.4 PG (ref 27–31)
MCHC RBC AUTO-ENTMCNC: 33.6 % (ref 32–36)
MCV RBC AUTO: 96.5 FL (ref 78–100)
MONOCYTES ABSOLUTE: 0.7 K/CU MM
MONOCYTES RELATIVE PERCENT: 7.5 % (ref 0–4)
NUCLEATED RBC %: 0 %
PDW BLD-RTO: 12.8 % (ref 11.7–14.9)
PLATELET # BLD: 209 K/CU MM (ref 140–440)
PMV BLD AUTO: 10.6 FL (ref 7.5–11.1)
POTASSIUM SERPL-SCNC: 4.3 MMOL/L (ref 3.5–5.1)
RBC # BLD: 4.29 M/CU MM (ref 4.2–5.4)
SEGMENTED NEUTROPHILS ABSOLUTE COUNT: 6 K/CU MM
SEGMENTED NEUTROPHILS RELATIVE PERCENT: 69.6 % (ref 36–66)
SODIUM BLD-SCNC: 136 MMOL/L (ref 135–145)
TOTAL IMMATURE NEUTOROPHIL: 0.02 K/CU MM
TOTAL NUCLEATED RBC: 0 K/CU MM
TOTAL PROTEIN: 7.7 GM/DL (ref 6.4–8.2)
WBC # BLD: 8.6 K/CU MM (ref 4–10.5)

## 2020-08-03 PROCEDURE — 85025 COMPLETE CBC W/AUTO DIFF WBC: CPT

## 2020-08-03 PROCEDURE — 99283 EMERGENCY DEPT VISIT LOW MDM: CPT

## 2020-08-03 PROCEDURE — 93010 ELECTROCARDIOGRAM REPORT: CPT | Performed by: INTERNAL MEDICINE

## 2020-08-03 PROCEDURE — 93005 ELECTROCARDIOGRAM TRACING: CPT | Performed by: PHYSICIAN ASSISTANT

## 2020-08-03 PROCEDURE — 80053 COMPREHEN METABOLIC PANEL: CPT

## 2020-08-03 PROCEDURE — 6370000000 HC RX 637 (ALT 250 FOR IP): Performed by: PHYSICIAN ASSISTANT

## 2020-08-03 RX ORDER — LISINOPRIL 10 MG/1
5 TABLET ORAL ONCE
Status: COMPLETED | OUTPATIENT
Start: 2020-08-03 | End: 2020-08-03

## 2020-08-03 RX ORDER — CARVEDILOL 12.5 MG/1
12.5 TABLET ORAL ONCE
Status: COMPLETED | OUTPATIENT
Start: 2020-08-03 | End: 2020-08-03

## 2020-08-03 RX ORDER — LISINOPRIL 5 MG/1
5 TABLET ORAL DAILY
Qty: 30 TABLET | Refills: 3 | Status: SHIPPED | OUTPATIENT
Start: 2020-08-03 | End: 2020-08-06

## 2020-08-03 RX ADMIN — LISINOPRIL 5 MG: 10 TABLET ORAL at 12:13

## 2020-08-03 RX ADMIN — CARVEDILOL 12.5 MG: 12.5 TABLET, FILM COATED ORAL at 12:29

## 2020-08-03 NOTE — PROGRESS NOTES
Unable to do PFT and 6 Minute Walk because pt's BP was too high. Took BP with two different sized cuffs, on both arms. Took pt to ER for evaluation.

## 2020-08-03 NOTE — ED PROVIDER NOTES
Date    CARDIAC SURGERY      CORONARY ANGIOPLASTY WITH STENT PLACEMENT      CORONARY ARTERY BYPASS GRAFT  06/21/2016    CABG X1 SVG to RCA with Mitral Valve Replacement 27mm Mosaic     MITRAL VALVE REPLACEMENT  06/21/2016    27mm mosaic       CURRENT MEDICATIONS    Current Outpatient Rx   Medication Sig Dispense Refill    lisinopril (PRINIVIL;ZESTRIL) 5 MG tablet Take 1 tablet by mouth daily 30 tablet 3    levothyroxine (SYNTHROID) 100 MCG tablet Take 1 tablet by mouth Daily 90 tablet 3    escitalopram (LEXAPRO) 20 MG tablet Take 1 tablet by mouth daily 90 tablet 1    budesonide-formoterol (SYMBICORT) 160-4.5 MCG/ACT AERO Inhale 2 puffs into the lungs 2 times daily 1 Inhaler 5    carvedilol (COREG) 12.5 MG tablet Take 1 tablet by mouth daily 90 tablet 1    pantoprazole (PROTONIX) 40 MG tablet Take 1 tablet by mouth daily 90 tablet 3    atorvastatin (LIPITOR) 20 MG tablet Take 1 tablet by mouth daily 90 tablet 3    nitroGLYCERIN (NITROSTAT) 0.4 MG SL tablet Place 1 tablet under the tongue every 5 minutes as needed for Chest pain up to max of 3 total doses.  If no relief after 1 dose, call 911. 25 tablet 3    furosemide (LASIX) 20 MG tablet Take 0.5 tablets by mouth daily 90 tablet 3    warfarin (COUMADIN) 5 MG tablet TAKE 1  TABLETS BY MOUTH ONCE DAILY 90 tablet 3    cilostazol (PLETAL) 100 MG tablet Take 1 tablet by mouth 2 times daily 180 tablet 3    Calcium Carb-Cholecalciferol (CALCIUM 600+D) 600-800 MG-UNIT TABS Take 1 tablet by mouth daily      aspirin 81 MG EC tablet Take 1 tablet by mouth daily 30 tablet 3    Multiple Vitamins-Minerals (THERAPEUTIC MULTIVITAMIN-MINERALS) tablet Take 1 tablet by mouth daily         ALLERGIES    No Known Allergies    SOCIAL AND FAMILY HISTORY    Social History     Socioeconomic History    Marital status:      Spouse name: None    Number of children: None    Years of education: None    Highest education level: None   Occupational History    None Social Needs    Financial resource strain: None    Food insecurity     Worry: None     Inability: None    Transportation needs     Medical: None     Non-medical: None   Tobacco Use    Smoking status: Current Every Day Smoker     Packs/day: 0.50     Types: Cigarettes     Last attempt to quit: 10/18/2017     Years since quittin.7    Smokeless tobacco: Never Used   Substance and Sexual Activity    Alcohol use: No     Comment: 2 cups of coffee in the a.m.  Drug use: No    Sexual activity: Yes     Partners: Male   Lifestyle    Physical activity     Days per week: None     Minutes per session: None    Stress: None   Relationships    Social connections     Talks on phone: None     Gets together: None     Attends Taoism service: None     Active member of club or organization: None     Attends meetings of clubs or organizations: None     Relationship status: None    Intimate partner violence     Fear of current or ex partner: None     Emotionally abused: None     Physically abused: None     Forced sexual activity: None   Other Topics Concern    None   Social History Narrative    ** Merged History Encounter **          Family History   Problem Relation Age of Onset    Stroke Mother     Heart Attack Father     Coronary Art Dis Father     Pacemaker Father     Arrhythmia Father     Diabetes Sister     Rheum Arthritis Sister     Heart Attack Sister     Arrhythmia Sister          PHYSICAL EXAM    VITAL SIGNS: BP (!) 106/95   Pulse 73   Temp 98.1 °F (36.7 °C) (Oral)   Resp 18   Ht 5' 4\" (1.626 m)   Wt 183 lb (83 kg)   SpO2 95%   BMI 31.41 kg/m²    Constitutional:  Well developed, Well nourished  HENT:  Normocephalic, Atraumatic, PERRL. EOMI. Sclera clear. Conjunctiva normal, No discharge. Neck/Lymphatics: supple, no JVD, no swollen nodes  Cardiovascular:  Normal heart rate, Normal rhythm, No murmurs  Respiratory:  Nonlabored breathing. Normal breath sounds, No wheezing  Abdomen:   Bowel sounds normal, Soft, No tenderness, no masses. Musculoskeletal: No edema, No tenderness, No cyanosis  Integument:  Warm, Dry  Neurologic: Alert & oriented , No focal deficits noted. Cranial nerves II through XII grossly intact. Normal gross motor coordination & motor strength bilateral upper and lower extremities  Sensation intact.   Psychiatric:  Affect normal, Mood normal.       Labs:  Results for orders placed or performed during the hospital encounter of 08/03/20   CBC auto diff   Result Value Ref Range    WBC 8.6 4.0 - 10.5 K/CU MM    RBC 4.29 4.2 - 5.4 M/CU MM    Hemoglobin 13.9 12.5 - 16.0 GM/DL    Hematocrit 41.4 37 - 47 %    MCV 96.5 78 - 100 FL    MCH 32.4 (H) 27 - 31 PG    MCHC 33.6 32.0 - 36.0 %    RDW 12.8 11.7 - 14.9 %    Platelets 061 022 - 967 K/CU MM    MPV 10.6 7.5 - 11.1 FL    Differential Type AUTOMATED DIFFERENTIAL     Segs Relative 69.6 (H) 36 - 66 %    Lymphocytes % 17.5 (L) 24 - 44 %    Monocytes % 7.5 (H) 0 - 4 %    Eosinophils % 4.2 (H) 0 - 3 %    Basophils % 1.0 0 - 1 %    Segs Absolute 6.0 K/CU MM    Lymphocytes Absolute 1.5 K/CU MM    Monocytes Absolute 0.7 K/CU MM    Eosinophils Absolute 0.4 K/CU MM    Basophils Absolute 0.1 K/CU MM    Nucleated RBC % 0.0 %    Total Nucleated RBC 0.0 K/CU MM    Total Immature Neutrophil 0.02 K/CU MM    Immature Neutrophil % 0.2 0 - 0.43 %   CMP   Result Value Ref Range    Sodium 136 135 - 145 MMOL/L    Potassium 4.3 3.5 - 5.1 MMOL/L    Chloride 97 (L) 99 - 110 mMol/L    CO2 29 21 - 32 MMOL/L    BUN 13 6 - 23 MG/DL    CREATININE 1.2 (H) 0.6 - 1.1 MG/DL    Glucose 99 70 - 99 MG/DL    Calcium 9.6 8.3 - 10.6 MG/DL    Alb 4.3 3.4 - 5.0 GM/DL    Total Protein 7.7 6.4 - 8.2 GM/DL    Total Bilirubin 0.5 0.0 - 1.0 MG/DL    ALT 15 10 - 40 U/L    AST 19 15 - 37 IU/L    Alkaline Phosphatase 106 40 - 129 IU/L    GFR Non- 44 (L) >60 mL/min/1.73m2    GFR  54 (L) >60 mL/min/1.73m2    Anion Gap 10 4 - 16   EKG 12 Lead   Result Value Ref Range    Ventricular Rate 71 BPM    Atrial Rate 71 BPM    P-R Interval 154 ms    QRS Duration 74 ms    Q-T Interval 446 ms    QTc Calculation (Bazett) 484 ms    P Axis 87 degrees    R Axis 85 degrees    T Axis 72 degrees    Diagnosis       Normal sinus rhythm  Normal ECG  When compared with ECG of 15-MAY-2020 22:21,  Nonspecific T wave abnormality no longer evident in Lateral leads           EKG Interpretation  Please see ED physician's note for EKG interpretation        ED COURSE & MEDICAL DECISION MAKING       Vital signs and nursing notes reviewed during ED course. I have independently evaluated this patient . Supervising MD present in the Emergency Department, available for consultation, throughout entirety of  patient care. Patient presents with back pain symptomatic hypertension. She was hypertensive on arrival with systolic blood pressure over 200. She is given her home blood pressure medications here in the ED. She is denying any symptoms including chest pain, shortness of breath, headache or visual changes. EKG is normal.  Labs without emergent changes. Creatinine of 1.2, at baseline for patient. Blood pressure lowers here in the emergency department following above medications. She remains asymptomatic. At this time, with that she is appropriate for further outpatient evaluation and treatment options of her hypertension. She does have an appointment scheduled with her cardiologist, Dr. Jimena Martinez, tomorrow. She and daughter are agreeable with returning with any onset of symptoms and are comfortable and requesting to be discharged. The patient and/or the family were informed of the results of any tests/labs/imaging, the treatment plan, and time was allotted to answer questions. Clinical  IMPRESSION    1.  Elevated blood pressure reading            Comment: Please note this report has been produced using speech recognition software and may contain errors related to that system including errors in grammar, punctuation, and spelling, as well as words and phrases that may be inappropriate. If there are any questions or concerns please feel free to contact the dictating provider for clarification.           SUNITA Calhoun  08/03/20 [id number]

## 2020-08-03 NOTE — ED NOTES
Discussed discharge instructions with patient. All questions answered. Patient verbalized understanding.           Marina Mohamud RN  08/03/20 1850

## 2020-08-03 NOTE — TELEPHONE ENCOUNTER
Patient was sent to ED while at PFT testing at [facility]. Called patient. Rescheduled for 8/13 due to other appointments.    Verified current dose of warfarin 5mg daily except 7.5mg on Mondays and Thursdays  CLINICAL PHARMACY CONSULT: MED RECONCILIATION/REVIEW ADDENDUM    For Pharmacy Admin Tracking Only    PHSO: Yes  Total # of Interventions Recommended: 0    Total Interventions Accepted: 0  Time Spent (min): 5    Jeannie Esquivel, SarahD

## 2020-08-03 NOTE — PROGRESS NOTES
[facility] Pulmonary Function Lab - Six Minute Walk  Test Performed on: Room Air_____ Oxygen at _____ lpm via N/C  Assist Device Used During Test:    None___ Cane___ Walker__  Shortness of Breath - Sriram's Scale  0 Nothing at all 5 Severe    0.5 Very very slight 6    1 Very slight 7 Very severe   2 Slight 8     3 Moderate 9 Very very severe   4 Somewhat severe 10 Maximal      Time SpO2 Heart Rate Resp. Rate  Srirams   Scale Symptoms        Baseline                     %       PRE                  1 minute                     %           ---------------                  2 minutes                     %           ---------------                      3 minutes                     %           ----------------                      4 minutes                     %           ----------------                      5 minutes                     %           ----------------                      6 minutes                     %           ---------------                   Recovery   x 1 Min                     %        POST                      Recovery   x 2 Min                        %           ---------------                     Number of Laps__ X 170 feet ___+ ___ additional feet = Total Distance__feet  Stopped or paused before 6 minutes?  No____ Yes _____   Pre Blood Pressure: _____ / ______    Post Blood Pressure:_____/_____  Interpretation:

## 2020-08-04 ENCOUNTER — PROCEDURE VISIT (OUTPATIENT)
Dept: CARDIOLOGY CLINIC | Age: 70
End: 2020-08-04
Payer: MEDICARE

## 2020-08-04 LAB
LV EF: 48 %
LVEF MODALITY: NORMAL

## 2020-08-04 PROCEDURE — 93306 TTE W/DOPPLER COMPLETE: CPT | Performed by: INTERNAL MEDICINE

## 2020-08-06 ENCOUNTER — OFFICE VISIT (OUTPATIENT)
Dept: CARDIOLOGY CLINIC | Age: 70
End: 2020-08-06
Payer: MEDICARE

## 2020-08-06 VITALS
DIASTOLIC BLOOD PRESSURE: 90 MMHG | WEIGHT: 182 LBS | HEIGHT: 63 IN | SYSTOLIC BLOOD PRESSURE: 130 MMHG | BODY MASS INDEX: 32.25 KG/M2 | HEART RATE: 78 BPM

## 2020-08-06 LAB
EKG ATRIAL RATE: 71 BPM
EKG DIAGNOSIS: NORMAL
EKG P AXIS: 87 DEGREES
EKG P-R INTERVAL: 154 MS
EKG Q-T INTERVAL: 446 MS
EKG QRS DURATION: 74 MS
EKG QTC CALCULATION (BAZETT): 484 MS
EKG R AXIS: 85 DEGREES
EKG T AXIS: 72 DEGREES
EKG VENTRICULAR RATE: 71 BPM

## 2020-08-06 PROCEDURE — 99214 OFFICE O/P EST MOD 30 MIN: CPT | Performed by: NURSE PRACTITIONER

## 2020-08-06 RX ORDER — LISINOPRIL 5 MG/1
7.5 TABLET ORAL DAILY
Qty: 45 TABLET | Refills: 3 | Status: SHIPPED | OUTPATIENT
Start: 2020-08-06 | End: 2020-09-15 | Stop reason: DRUGHIGH

## 2020-08-06 ASSESSMENT — ENCOUNTER SYMPTOMS
EYE PAIN: 0
BACK PAIN: 1
HOARSE VOICE: 0
CONSTIPATION: 1
BLOATING: 1
ABDOMINAL PAIN: 0
POOR WOUND HEALING: 0
COLOR CHANGE: 0
SHORTNESS OF BREATH: 0

## 2020-08-06 NOTE — LETTER
Rosa Maria Elkins  1950  [id number]    Have you had any Chest Pain - No      Have you had any Shortness of Breath - yes  If Yes - When at rest and on exertion    Have you had any dizziness - No    Have you had any palpitations - No      Is the patient on any of the following medications - none  If Yes DO EKG    Do you have any edema no    Do you have a surgery or procedure scheduled in the near future - No      Ask patient if [facility] if [facility] are not already signed [facility]    Check to see if we have an E-MAIL on file for the patient    Check medication list thoroughly!!!  BE SURE TO ASK PATIENT IF THEY NEED MEDICATION REFILLS

## 2020-08-06 NOTE — PROGRESS NOTES
[facility] [place]  [address], [address] [facility],Third Floor  Phone: [phone]    Fax [phone]                  Celestine Funez MD, Makayla Vaca MD, Master Barrios MD, MD Swati Fleming MD Suzi Abo, MD Trinna Fast, APRN               Michael Cuba, APRN    Va Valdovinos, APRN              Kg Morris, APRN            8/6/2020    RE: Nery Noonan  (1950)                               TO:  Dr. Nani Escalante MD  The primary cardiologist is Dr. Meaghan Brown     CC:   1. ASCVD (arteriosclerotic cardiovascular disease)    2. Essential hypertension    3. Mixed hyperlipidemia    4. PAD (peripheral artery disease) (HCC)         HPI:    Thank you for involving me in taking care of your patient Nery Noonan. Nery Noonan is a 79y.o. year old female with a history as listed above and is being seen in the office today. She was recently seen in the emergency department for hypertension. She did present scheduled for pulmonary function test however was noted to be hypertensive with systolic blood pressure in the 200s and she was sent to the emergency room. In emergency room blood pressure again was noted to be in the 200s. She was asymptomatic with a high blood pressure. Her blood pressure did trend down in the emergency department  She does note occasional shortness of breath. She does have supplemental oxygen on with her at this time. She denies any chest pain or palpitations. There is no dizziness reported. She has a history of having a mitral valve replacement. She is a previous patient for Dr. Rubina Alan.       Current Outpatient Medications   Medication Sig Dispense Refill    lisinopril (PRINIVIL;ZESTRIL) 5 MG tablet Take 1.5 tablets by mouth daily 45 tablet 3    levothyroxine (SYNTHROID) 100 MCG tablet Take 1 tablet by mouth Daily 90 tablet 3    escitalopram (LEXAPRO) 20 MG tablet Take 1 tablet by mouth daily 90 tablet 1    budesonide-formoterol (SYMBICORT) 160-4.5 MCG/ACT AERO Inhale 2 puffs into the lungs 2 times daily 1 Inhaler 5    carvedilol (COREG) 12.5 MG tablet Take 1 tablet by mouth daily 90 tablet 1    pantoprazole (PROTONIX) 40 MG tablet Take 1 tablet by mouth daily 90 tablet 3    atorvastatin (LIPITOR) 20 MG tablet Take 1 tablet by mouth daily 90 tablet 3    nitroGLYCERIN (NITROSTAT) 0.4 MG SL tablet Place 1 tablet under the tongue every 5 minutes as needed for Chest pain up to max of 3 total doses. If no relief after 1 dose, call 911. 25 tablet 3    furosemide (LASIX) 20 MG tablet Take 0.5 tablets by mouth daily 90 tablet 3    warfarin (COUMADIN) 5 MG tablet TAKE 1  TABLETS BY MOUTH ONCE DAILY 90 tablet 3    cilostazol (PLETAL) 100 MG tablet Take 1 tablet by mouth 2 times daily 180 tablet 3    Calcium Carb-Cholecalciferol (CALCIUM 600+D) 600-800 MG-UNIT TABS Take 1 tablet by mouth daily      aspirin 81 MG EC tablet Take 1 tablet by mouth daily 30 tablet 3    Multiple Vitamins-Minerals (THERAPEUTIC MULTIVITAMIN-MINERALS) tablet Take 1 tablet by mouth daily       No current facility-administered medications for this visit. Allergies: Patient has no known allergies.   Past Medical History:   Diagnosis Date    CAD (coronary artery disease)     [facility] obstructive [facility]) [facility]     H/O echocardiogram 03/22/2017    EF 35-40%    Heart attack ([facility] 1/2014    Hyperlipidemia     Hypertension     [facility]     S/P right coronary artery (RCA) stent placement     T2 vertebral fracture ([facility] 12/2017    TIA (transient ischemic attack)     Tobacco dependence      Past Surgical History:   Procedure Laterality Date    CARDIAC SURGERY      CORONARY ANGIOPLASTY WITH STENT PLACEMENT      CORONARY ARTERY BYPASS GRAFT  06/21/2016    CABG X1 SVG to RCA with Mitral Valve Replacement 27mm Mosaic     MITRAL VALVE REPLACEMENT  06/21/2016    27mm mosaic Family History   Problem Relation Age of Onset   [facility] Stroke Mother     Heart Attack Father     Coronary Art Dis Father    [facility] Pacemaker Father    [facility] Arrhythmia Father     Diabetes Sister     Rheum Arthritis Sister     Heart Attack Sister     Arrhythmia Sister      Social History     Tobacco Use    Smoking status: Current Every Day Smoker     Packs/day: 0.50     Types: Cigarettes     Last attempt to quit: 10/18/2017     Years since quittin.8    Smokeless tobacco: Never Used   Substance Use Topics    Alcohol use: No     Comment: 2 cups of coffee in the a.m. Review of Systems   Constitution: Negative for diaphoresis and malaise/fatigue. HENT: Negative for congestion and hoarse voice. Eyes: Negative for pain and visual disturbance. Cardiovascular: Positive for dyspnea on exertion. Negative for chest pain, irregular heartbeat, near-syncope, palpitations and paroxysmal nocturnal dyspnea. Respiratory: Negative for shortness of breath. Endocrine: Negative for cold intolerance and heat intolerance. Hematologic/Lymphatic: Negative for adenopathy and bleeding problem. Skin: Positive for rash. Negative for color change and poor wound healing. Musculoskeletal: Positive for arthritis and back pain. Negative for muscle weakness. Gastrointestinal: Positive for bloating and constipation. Negative for abdominal pain and melena. Genitourinary: Negative for flank pain and hematuria. Neurological: Negative for dizziness and light-headedness. Psychiatric/Behavioral: Negative for depression. The patient is not nervous/anxious. Objective:      Physical Exam:  BP (!) 130/90   Pulse 78   Ht 5' 3\" (1.6 m)   Wt 182 lb (82.6 kg)   BMI 32.24 kg/m²   Wt Readings from Last 3 Encounters:   20 182 lb (82.6 kg)   20 183 lb (83 kg)   20 184 lb (83.5 kg)     Body mass index is 32.24 kg/m². Physical Exam  Constitutional:       Appearance: Normal appearance.    HENT: Head: Normocephalic and atraumatic. Right Ear: External ear normal.      Left Ear: External ear normal.      Nose: Nose normal.      Mouth/Throat:      Mouth: Mucous membranes are moist.   Eyes:      Conjunctiva/sclera: Conjunctivae normal.      Pupils: Pupils are equal, round, and reactive to light. Neck:      Musculoskeletal: Normal range of motion and neck supple. Cardiovascular:      Rate and Rhythm: Normal rate and regular rhythm. Pulses: Normal pulses. Heart sounds: Murmur present. Pulmonary:      Effort: Pulmonary effort is normal.      Breath sounds: Decreased breath sounds present. No rales. Comments: Supplemental  oxygen   Chest:      Chest wall: No tenderness. Abdominal:      General: There is no distension. Palpations: Abdomen is soft. Tenderness: There is no abdominal tenderness. Musculoskeletal:         General: No tenderness. Right lower leg: No edema. Left lower leg: No edema. Skin:     General: Skin is warm and dry. Capillary Refill: Capillary refill takes less than 2 seconds. Neurological:      General: No focal deficit present. Mental Status: She is alert and oriented to person, place, and time.    Psychiatric:         Mood and Affect: Mood normal.         Behavior: Behavior normal.              DATA  BNP:   Lab Results   Component Value Date     (H) 01/11/2014    PROBNP 1,075 (H) 06/17/2020     CBC:   Lab Results   Component Value Date    WBC 8.6 08/03/2020    RBC 4.29 08/03/2020    HGB 13.9 08/03/2020    HCT 41.4 08/03/2020     08/03/2020     CMP:    Lab Results   Component Value Date     08/03/2020    K 4.3 08/03/2020    CL 97 08/03/2020    CO2 29 08/03/2020    BUN 13 08/03/2020    CREATININE 1.2 08/03/2020    GFRAA 54 08/03/2020    AGRATIO 1.2 10/23/2018    LABGLOM 44 08/03/2020    GLUCOSE 99 08/03/2020    CALCIUM 9.6 08/03/2020     Hepatic Function Panel:    Lab Results   Component Value Date    ALKPHOS 106 08/03/2020    ALT 15 08/03/2020    AST 19 08/03/2020    PROT 7.7 08/03/2020    PROT 7.9 01/01/2011    BILITOT 0.5 08/03/2020    LABALBU 4.3 08/03/2020     Magnesium:    Lab Results   Component Value Date    MG 1.9 05/15/2020     PT/INR:    Lab Results   Component Value Date    PROTIME 24.6 07/20/2020    INR 2.0 07/20/2020    INR 2.0 07/20/2020    INR 1.99 07/13/2020     Lipids:    Lab Results   Component Value Date    TRIG 107 03/21/2017    HDL 36 03/21/2017    LDLDIRECT 70 03/21/2017     Lab Results   Component Value Date    LABA1C 5.0 06/18/2016     TSH:    Lab Results   Component Value Date    TSH 0.40 07/10/2019         Assessment/ Plan:    Patient seen, interviewed and examined. Testing was reviewed. 1. ASCVD (arteriosclerotic cardiovascular disease)  History of coronary bypass x1 with RCA graft. Clinically she is stable she is asymptomatic from her CAD  Pressure medications  - BASIC METABOLIC PANEL; Future    2. Essential hypertension  Had episode of recent hypertension. Systolic blood pressure was in the 200s. I have increased her lisinopril to 7.5 mg once daily recommend having a BMP in 2 weeks. Advised patient to continue keeping a log of her blood pressure at home  - [address]; Future    3. Mixed hyperlipidemia  There are no recent lipids available will request copy from primary care physician  Continue statin  - [address]; Future    4. PAD (peripheral artery disease) ([address]  She denies any any claudication symptoms    - BASIC METABOLIC PANEL; Future    Cardiomyopathy. Previous EF 35 to 40%  Repeat echo cardiogram August 2020 shows EF 45 to 50% she does have severely dilated left atrium  Continue with BB and ACE     S/p mitral valve replacement  Echocardiogram August 2020  There is a porcine bioprosthetic valve in the mitral position which appears to be functioning normally- mean gradient of 3mmHg          Lifestyle and risk factor modificatons discussed.  Various goals are discussed and questions answered. Continue current medications. Appropriate prescriptions are addressed. Questions answered and patient verbalizes understanding. Call for any problems, questions, or concerns.   OV in 6 months

## 2020-08-10 ENCOUNTER — TELEPHONE (OUTPATIENT)
Dept: CARDIOLOGY CLINIC | Age: 70
End: 2020-08-10

## 2020-08-10 NOTE — TELEPHONE ENCOUNTER
Advised patient of results. Patient voiced understanding. Left ventricular function is low normal, EF is estimated at 45-50%. Severely dilated left atrium. Evidence of a porcine bio-prosthetic valve in the mitral position,   functionally normally with a mean gradient of 3mmHg. Sclerotic, but non-stenotic aortic valve. Mild tricuspid and pulmonic regurgitation noted. No evidence of pericardial effusion.

## 2020-08-11 ENCOUNTER — HOSPITAL ENCOUNTER (OUTPATIENT)
Dept: SLEEP CENTER | Age: 70
Discharge: HOME | End: 2020-08-11
Payer: MEDICARE

## 2020-08-11 PROCEDURE — 95811 POLYSOM 6/>YRS CPAP 4/> PARM: CPT

## 2020-08-12 LAB — STATUS: NORMAL

## 2020-08-12 PROCEDURE — 95811 POLYSOM 6/>YRS CPAP 4/> PARM: CPT | Performed by: INTERNAL MEDICINE

## 2020-08-12 NOTE — PROGRESS NOTES
8/11/2020  sleep study  for Daryl Renee  1950 is complete. Results are pending physician review.     Electronically signed by Slick Bajwa on 8/11/2020 at 8:52 PM

## 2020-08-13 ENCOUNTER — ANTI-COAG VISIT (OUTPATIENT)
Dept: PHARMACY | Age: 70
End: 2020-08-13
Payer: MEDICARE

## 2020-08-13 VITALS — TEMPERATURE: 97.3 F

## 2020-08-13 LAB
INTERNATIONAL NORMALIZATION RATIO, POC: 1.8
POC INR: 1.8 INDEX
PROTHROMBIN TIME, POC: 22 SECONDS (ref 10–14.3)

## 2020-08-13 PROCEDURE — 99211 OFF/OP EST MAY X REQ PHY/QHP: CPT

## 2020-08-13 PROCEDURE — 85610 PROTHROMBIN TIME: CPT

## 2020-08-13 PROCEDURE — 36416 COLLJ CAPILLARY BLOOD SPEC: CPT

## 2020-08-13 RX ORDER — WARFARIN SODIUM 5 MG/1
TABLET ORAL
Qty: 90 TABLET | Refills: 3 | Status: SHIPPED
Start: 2020-08-13 | End: 2020-09-21 | Stop reason: SDUPTHER

## 2020-08-13 NOTE — PROGRESS NOTES
Medication Management Service  [facility]  [phone]    Visit Date: 8/13/2020   Subjective:       Niraj Swan is a 79 y.o. female who presents to clinic today for anticoagulation monitoring and adjustment. Patient seen in clinic for warfarin management due to  Indication:   bio prosthetic valve replacement. INR goal: of 2.0-3.0. Duration of therapy: indefinite. Patient reports the following:   Adherent with regimen  Missed or extra doses:  None   Bleeding or thromboembolic side effects:  None  Significant medication changes:  None  Significant dietary changes: decreased appetite  Significant alcohol or tobacco changes: None  Significant recent illness, disease state changes, or hospitalization:  None  Upcoming surgeries or procedures:  None  Falls: None           Assessment and Plan     PT/INR done in office per protocol. INR today is 1.8, therapeutic, but below goal range. Patient reports decreased appeite- will monitor. Plan: Will continue current regimen of warfarin 5mg daily except 7.5mg on Mondays and Thursdays. Recheck INR in 2.5 week(s). Patient verbalized understanding of dosing directions and information discussed. Dosing schedule given to patient including phone number, appointment date, and time. Progress note sent to referring office. Patient acknowledges working in consult agreement with pharmacist as referred by his/her physician.       Electronically signed by Evaristo Nava, [facility] on 8/13/20 at 9:24 AM EDT    CLINICAL PHARMACY CONSULT: MED RECONCILIATION/REVIEW ADDENDUM    For Pharmacy Admin Tracking Only    PHSO: Yes  Total # of Interventions Recommended: 0    - Maintenance Safety Lab Monitoring #: 1    Total Interventions Accepted: 0  Time Spent (min): Amber Norris PharmD

## 2020-08-14 ENCOUNTER — HOSPITAL ENCOUNTER (OUTPATIENT)
Dept: CT IMAGING | Age: 70
Discharge: HOME | End: 2020-08-14
Payer: MEDICARE

## 2020-08-14 PROCEDURE — G0297 LDCT FOR LUNG CA SCREEN: HCPCS

## 2020-08-31 ENCOUNTER — ANTI-COAG VISIT (OUTPATIENT)
Dept: PHARMACY | Age: 70
End: 2020-08-31
Payer: MEDICARE

## 2020-08-31 VITALS — TEMPERATURE: 97.9 F

## 2020-08-31 LAB
INTERNATIONAL NORMALIZATION RATIO, POC: 1.6
POC INR: 1.6 INDEX
PROTHROMBIN TIME, POC: 19 SECONDS (ref 10–14.3)

## 2020-08-31 PROCEDURE — 36416 COLLJ CAPILLARY BLOOD SPEC: CPT

## 2020-08-31 PROCEDURE — 99212 OFFICE O/P EST SF 10 MIN: CPT

## 2020-08-31 PROCEDURE — 85610 PROTHROMBIN TIME: CPT

## 2020-09-14 ENCOUNTER — TELEPHONE (OUTPATIENT)
Dept: PHARMACY | Age: 70
End: 2020-09-14

## 2020-09-15 ENCOUNTER — TELEPHONE (OUTPATIENT)
Dept: CARDIOLOGY CLINIC | Age: 70
End: 2020-09-15

## 2020-09-15 RX ORDER — LISINOPRIL 20 MG/1
20 TABLET ORAL DAILY
Qty: 30 TABLET | Refills: 5 | Status: SHIPPED | OUTPATIENT
Start: 2020-09-15 | End: 2020-10-05 | Stop reason: SDUPTHER

## 2020-09-15 NOTE — TELEPHONE ENCOUNTER
Patient states she has had some high readings lately and would like to know if she can change her medication. She states her /98 and when she took two of her BP medications her BP dropped to 141/82. She would like to know what Dr. Luis Eduardo Thurston thinks about this.

## 2020-09-21 ENCOUNTER — ANTI-COAG VISIT (OUTPATIENT)
Dept: PHARMACY | Age: 70
End: 2020-09-21
Payer: MEDICARE

## 2020-09-21 LAB
INR BLD: 2.1
POC INR: 2.1 INDEX
PROTHROMBIN TIME, POC: 25.5 SECONDS (ref 10–14.3)
PROTIME: 25.5 SECONDS

## 2020-09-21 PROCEDURE — 85610 PROTHROMBIN TIME: CPT

## 2020-09-21 PROCEDURE — 99211 OFF/OP EST MAY X REQ PHY/QHP: CPT

## 2020-09-21 PROCEDURE — 36416 COLLJ CAPILLARY BLOOD SPEC: CPT

## 2020-09-21 RX ORDER — WARFARIN SODIUM 5 MG/1
TABLET ORAL
Qty: 90 TABLET | Refills: 1 | Status: SHIPPED | OUTPATIENT
Start: 2020-09-21 | End: 2021-02-08 | Stop reason: DRUGHIGH

## 2020-09-21 NOTE — PROGRESS NOTES
Medication Management Service  [facility]  [phone]    Visit Date: 9/21/2020   Subjective:       Clint Marie is a 79 y.o. female who presents to clinic today for anticoagulation monitoring and adjustment. Patient seen in clinic for warfarin management due to  Indication:   atrial fibrillation. INR goal: of 2.0-3.0. Duration of therapy: indefinite. Patient reports the following:   Adherent with regimen  Missed or extra doses:  None   Bleeding or thromboembolic side effects:  None  Significant medication changes:  None  Significant dietary changes: None  Significant alcohol or tobacco changes: None  Significant recent illness, disease state changes, or hospitalization:  None  Upcoming surgeries or procedures:  None  Falls: None           Assessment and Plan     PT/INR done in office per protocol. INR today is 2.1, therapeutic. Plan: Will continue current regimen of warfarin 5mg daily except 7.5mg on MWF. Recheck INR in 4 week(s). Patient verbalized understanding of dosing directions and information discussed. Dosing schedule given to patient including phone number, appointment date, and time. Progress note sent to referring office. Patient acknowledges working in consult agreement with pharmacist as referred by his/her physician.       Electronically signed by Darrick [facility] on 9/21/20 at 2:06 PM EDT    CLINICAL PHARMACY CONSULT: MED RECONCILIATION/REVIEW ADDENDUM    For Pharmacy Admin Tracking Only    PHSO: Yes  Total # of Interventions Recommended: 0  - Refills Provided #: 1  - Maintenance Safety Lab Monitoring #: 1    Total Interventions Accepted: 0  Time Spent (min): Kendra Almanza PharmD

## 2020-10-05 ENCOUNTER — OFFICE VISIT (OUTPATIENT)
Dept: PULMONOLOGY | Age: 70
End: 2020-10-05
Payer: MEDICARE

## 2020-10-05 ENCOUNTER — TELEPHONE (OUTPATIENT)
Dept: CARDIOLOGY CLINIC | Age: 70
End: 2020-10-05

## 2020-10-05 VITALS
BODY MASS INDEX: 32.25 KG/M2 | HEIGHT: 63 IN | DIASTOLIC BLOOD PRESSURE: 78 MMHG | HEART RATE: 90 BPM | WEIGHT: 182 LBS | SYSTOLIC BLOOD PRESSURE: 168 MMHG | OXYGEN SATURATION: 98 %

## 2020-10-05 PROCEDURE — 99213 OFFICE O/P EST LOW 20 MIN: CPT | Performed by: INTERNAL MEDICINE

## 2020-10-05 RX ORDER — CARVEDILOL 25 MG/1
25 TABLET ORAL 2 TIMES DAILY
Qty: 180 TABLET | Refills: 1 | Status: SHIPPED | OUTPATIENT
Start: 2020-10-05 | End: 2021-04-26 | Stop reason: SDUPTHER

## 2020-10-05 RX ORDER — LISINOPRIL 40 MG/1
40 TABLET ORAL DAILY
Qty: 90 TABLET | Refills: 1 | Status: SHIPPED | OUTPATIENT
Start: 2020-10-05 | End: 2021-04-26 | Stop reason: SDUPTHER

## 2020-10-05 ASSESSMENT — ENCOUNTER SYMPTOMS
EYE DISCHARGE: 0
COUGH: 0
SHORTNESS OF BREATH: 1
ABDOMINAL PAIN: 0
ABDOMINAL DISTENTION: 0
EYE ITCHING: 0
BACK PAIN: 0

## 2020-10-05 NOTE — PROGRESS NOTES
[facility]  1950  Referring Provider: Yessica Avelar    Subjective:     Chief Complaint   Patient presents with    Follow-up     dyspnea, EDITA       HPI  Sally White is a 79 y.o. female has come back as a follow up. She has mild EDITA. She had a CPAP titration study done on 08/11/2020 and it showed that she required a CPAP OF 7 CM H20. sHE IS STILL SLEEPY DURING THE DAY TIME. She has a 30 pk yr smoking and she is still smoking 1/2 pk per day. She as occasional cough, little phlegm, no hemoptysis, SOBOE. She is on 2 L.min of oxygen. She had no flu vaccine yet. She had a  Low dose CT chest done on 08/14/2020 showed that she has no nodules, mild emphysema    Current Outpatient Medications   Medication Sig Dispense Refill    warfarin (COUMADIN) 5 MG tablet TAKE 1  TABLETS BY MOUTH ONCE DAILY except 1 and 1/2 tablets on Mondays, Wednesdays, and Fridays or as directed 90 tablet 1    lisinopril (PRINIVIL;ZESTRIL) 20 MG tablet Take 1 tablet by mouth daily 30 tablet 5    levothyroxine (SYNTHROID) 100 MCG tablet Take 1 tablet by mouth Daily 90 tablet 3    escitalopram (LEXAPRO) 20 MG tablet Take 1 tablet by mouth daily 90 tablet 1    budesonide-formoterol (SYMBICORT) 160-4.5 MCG/ACT AERO Inhale 2 puffs into the lungs 2 times daily 1 Inhaler 5    carvedilol (COREG) 12.5 MG tablet Take 1 tablet by mouth daily 90 tablet 1    pantoprazole (PROTONIX) 40 MG tablet Take 1 tablet by mouth daily 90 tablet 3    atorvastatin (LIPITOR) 20 MG tablet Take 1 tablet by mouth daily 90 tablet 3    nitroGLYCERIN (NITROSTAT) 0.4 MG SL tablet Place 1 tablet under the tongue every 5 minutes as needed for Chest pain up to max of 3 total doses.  If no relief after 1 dose, call 911. 25 tablet 3    furosemide (LASIX) 20 MG tablet Take 0.5 tablets by mouth daily 90 tablet 3    cilostazol (PLETAL) 100 MG tablet Take 1 tablet by mouth 2 times daily 180 tablet 3    Calcium Carb-Cholecalciferol (CALCIUM 600+D) 600-800 MG-UNIT TABS Take 1 tablet by mouth daily      aspirin 81 MG EC tablet Take 1 tablet by mouth daily 30 tablet 3    Multiple Vitamins-Minerals (THERAPEUTIC MULTIVITAMIN-MINERALS) tablet Take 1 tablet by mouth daily       No current facility-administered medications for this visit. No Known Allergies    Past Medical History:   Diagnosis Date    CAD (coronary artery disease)     [facility]     H/O echocardiogram 2017    EF 35-40%    H/O echocardiogram 2020    EF 45-50, Mild TR & HI, Severely dilated left atrium. 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, functionally normally     [facility]    Hyperlipidemia     Hypertension     [facility]     S/P right coronary artery (RCA) stent placement     T2 vertebral fracture ([facility] 2017    TIA (transient ischemic attack)     Tobacco dependence        Past Surgical History:   Procedure Laterality Date    CARDIAC SURGERY      CORONARY ANGIOPLASTY WITH STENT PLACEMENT      CORONARY ARTERY BYPASS GRAFT  2016    CABG X1 SVG to RCA with Mitral Valve Replacement 27mm Mosaic     MITRAL VALVE REPLACEMENT  2016    27mm mosaic       Social History     Socioeconomic History    Marital status:      Spouse name: None    Number of children: None    Years of education: None    Highest education level: None   Occupational History    None   Social Needs    Financial resource strain: None    Food insecurity     Worry: None     Inability: None    Transportation needs     Medical: None     Non-medical: None   Tobacco Use    Smoking status: Current Every Day Smoker     Packs/day: 0.50     Types: Cigarettes     Last attempt to quit: 10/18/2017     Years since quittin.9    Smokeless tobacco: Never Used   Substance and Sexual Activity    Alcohol use: No     Comment: 2 cups of coffee in the a.m.     Drug use: No    Sexual activity: Yes     Partners: Male   Lifestyle  Physical activity     Days per week: None     Minutes per session: None    Stress: None   Relationships    Social connections     Talks on phone: None     Gets together: None     Attends Tenriism service: None     Active member of club or organization: None     Attends meetings of clubs or organizations: None     Relationship status: None    Intimate partner violence     Fear of current or ex partner: None     Emotionally abused: None     Physically abused: None     Forced sexual activity: None   Other Topics Concern    None   Social History Narrative    ** Merged History Encounter **            Review of Systems   Constitutional: Positive for fatigue. HENT: Negative for congestion and postnasal drip. Eyes: Negative for discharge and itching. Respiratory: Positive for shortness of breath. Negative for cough. Cardiovascular: Negative for chest pain and leg swelling. Gastrointestinal: Negative for abdominal distention and abdominal pain. Endocrine: Negative for cold intolerance and heat intolerance. Genitourinary: Negative for enuresis and frequency. Musculoskeletal: Negative for arthralgias and back pain. Allergic/Immunologic: Negative for environmental allergies and food allergies. Neurological: Negative for light-headedness and headaches. Hematological: Negative for adenopathy. Psychiatric/Behavioral: Negative for agitation and behavioral problems. Objective:   BP (!) 168/78   Pulse 90   Ht 5' 3\" (1.6 m)   Wt 182 lb (82.6 kg)   SpO2 98%   BMI 32.24 kg/m²   Body mass index is 32.24 kg/m².   Sleep Medicine 6/30/2020 5/29/2020   Sitting and reading 0 0   Watching TV 1 0   Sitting, inactive in a public place (e.g. a theatre or a meeting) 0 0   As a passenger in a car for an hour without a break 3 2   Lying down to rest in the afternoon when circumstances permit 2 3   Sitting and talking to someone 0 1   Sitting quietly after a lunch without alcohol 0 0   In a car, while stopped for a few minutes in traffic 0 0   Total score 6 6   Neck circumference 15 15     {MALLAMPATI:3    Physical Exam  Vitals signs reviewed. Constitutional:       Appearance: Normal appearance. Comments: Obesity   HENT:      Head: Normocephalic and atraumatic. Nose: Nose normal.      Mouth/Throat:      Mouth: Mucous membranes are moist.   Eyes:      Extraocular Movements: Extraocular movements intact. Pupils: Pupils are equal, round, and reactive to light. Neck:      Musculoskeletal: Normal range of motion and neck supple. Cardiovascular:      Rate and Rhythm: Normal rate and regular rhythm. Pulses: Normal pulses. Heart sounds: Normal heart sounds. Pulmonary:      Effort: Pulmonary effort is normal.      Breath sounds: Normal breath sounds. Abdominal:      General: Abdomen is flat. Palpations: Abdomen is soft. Musculoskeletal: Normal range of motion. Skin:     General: Skin is warm and dry. Neurological:      General: No focal deficit present. Mental Status: She is alert and oriented to person, place, and time. Psychiatric:         Mood and Affect: Mood normal.         Behavior: Behavior normal.         Radiology: None    Assessment and Plan     Problem List        Pulmonary Problems    EDITA (obstructive sleep apnea)     She has mild EDITA requiring a CPAP of 7 cmh20  Advised to be compliant with the CPAP  Loose weight  I need a 2 week download data         SOB (shortness of breath) on exertion - Primary    Relevant Orders    Full PFT Study With Bronchodilator       Other    Tobacco abuse     Advised to quit smoking         Hypersomnia     Advised to be compliant with the CPAP  Loose weight         Obesity (BMI 30-39. 9)     Advised to loose weight with diet and exercise                      Return in about 2 months (around 12/5/2020) for PFT, 2 week download data.      Progress notes sent to the referring Provider    Yair Suggs MD  10/5/2020  12:37 PM

## 2020-10-05 NOTE — TELEPHONE ENCOUNTER
Patient called stated that the change to her Lisinopril   Doesn't seem to be helping her blood pressure is   Still elevated .  Today @ Dr Chris Zavala it was 168/78

## 2020-10-05 NOTE — ASSESSMENT & PLAN NOTE
She has mild EDITA requiring a CPAP of 7 cmh20  Advised to be compliant with the CPAP  Loose weight  I need a 2 week download data

## 2020-10-09 RX ORDER — BUDESONIDE AND FORMOTEROL FUMARATE DIHYDRATE 160; 4.5 UG/1; UG/1
2 AEROSOL RESPIRATORY (INHALATION) 2 TIMES DAILY
Qty: 1 INHALER | Refills: 5 | Status: SHIPPED | OUTPATIENT
Start: 2020-10-09 | End: 2020-12-14 | Stop reason: SDUPTHER

## 2020-10-19 ENCOUNTER — TELEPHONE (OUTPATIENT)
Dept: CARDIOLOGY CLINIC | Age: 70
End: 2020-10-19

## 2020-10-19 ENCOUNTER — ANTI-COAG VISIT (OUTPATIENT)
Dept: PHARMACY | Age: 70
End: 2020-10-19
Payer: MEDICARE

## 2020-10-19 VITALS — TEMPERATURE: 97.1 F

## 2020-10-19 LAB
INTERNATIONAL NORMALIZATION RATIO, POC: 2.8
POC INR: 2.8 INDEX
PROTHROMBIN TIME, POC: 33.3 SECONDS (ref 10–14.3)

## 2020-10-19 PROCEDURE — 85610 PROTHROMBIN TIME: CPT

## 2020-10-19 PROCEDURE — 36416 COLLJ CAPILLARY BLOOD SPEC: CPT

## 2020-10-19 PROCEDURE — 99211 OFF/OP EST MAY X REQ PHY/QHP: CPT

## 2020-10-19 NOTE — TELEPHONE ENCOUNTER
Patient increased Coreg to 25mg BID and Lisinopril to 40mg on 10/5. Patient states she did not check her B/P til last [id number].  This morning 201/98 and later morning 179/99

## 2020-10-19 NOTE — PROGRESS NOTES
Medication Management Service  [facility]  [phone]    Visit Date: 10/19/2020   Subjective:       Najma Gilliland is a 79 y.o. female who presents to clinic today for anticoagulation monitoring and adjustment. Patient seen in clinic for warfarin management due to  Indication:   bio prosthetic valve replacement. INR goal: of 2.0-3.0. Duration of therapy: indefinite. Patient reports the following:   Adherent with regimen  Missed or extra doses:  None   Bleeding or thromboembolic side effects:  None  Significant medication changes:  None  Significant dietary changes: None  Significant alcohol or tobacco changes: None  Significant recent illness, disease state changes, or hospitalization:  None  Upcoming surgeries or procedures:  None  Falls: None           Assessment and Plan     PT/INR done in office per protocol. INR today is 2.8, therapeutic. Patient's BP has been elevated. Confirmed appointment with [facility] tomorrow with [facility] and patient. Plan: Will continue current regimen of warfarin 5mg daily except 7.5mg on MWF. Recheck INR in 4 week(s). Patient verbalized understanding of dosing directions and information discussed. Dosing schedule given to patient including phone number, appointment date, and time. Progress note sent to referring office. Patient acknowledges working in consult agreement with pharmacist as referred by his/her physician.       Electronically signed by Caden Castillo on 10/19/20 at 2:13 PM EDT    CLINICAL PHARMACY CONSULT: MED RECONCILIATION/REVIEW ADDENDUM    For Pharmacy Admin Tracking Only    PHSO: Yes  Total # of Interventions Recommended: 0    - Maintenance Safety Lab Monitoring #: 1  Total Interventions Accepted: 0  Time Spent (min): Dominique Trotter, SarahD

## 2020-10-19 NOTE — TELEPHONE ENCOUNTER
Patient called to let office know that her blood pressure   Is still elevated last night 180/101 today right now 201/98

## 2020-10-20 ENCOUNTER — NURSE ONLY (OUTPATIENT)
Dept: CARDIOLOGY CLINIC | Age: 70
End: 2020-10-20
Payer: MEDICARE

## 2020-10-20 VITALS
BODY MASS INDEX: 32.07 KG/M2 | DIASTOLIC BLOOD PRESSURE: 64 MMHG | WEIGHT: 181 LBS | SYSTOLIC BLOOD PRESSURE: 122 MMHG | HEIGHT: 63 IN | HEART RATE: 54 BPM

## 2020-10-20 PROCEDURE — 99211 OFF/OP EST MAY X REQ PHY/QHP: CPT | Performed by: NURSE PRACTITIONER

## 2020-10-20 NOTE — LETTER
CLINICAL STAFF DOCUMENTATION    Ibeth Jt  1950  [id number]    Have you had any Chest Pain - No    Have you had any Shortness of Breath - Yes  If Yes - When on exertion    Have you had any dizziness - No    Have you had any palpitations - No     Do you have any edema - swelling - No     Check Venous \"LEG PROBLEM Questionnaire\"    Do you have a surgery or procedure scheduled in the near future - No    Ask patient if they want to sign up for MyChart if they are not already signed up    Check to see if we have an E-MAIL on file for the patient    Check medication list thoroughly!!!  BE SURE TO ASK PATIENT IF THEY NEED MEDICATION REFILLS    At check out add to every patient's \"wrap up\" the following dot phrase AFTERHOURSEDUCATION and ensure we explain this to our patients

## 2020-10-20 NOTE — PROGRESS NOTES
[employer]  [facility] [address]  Phone: [phone]    Fax [phone]                  Omar Kraft MD, Jess Urrutia MD, [address] [facility], MD, MD Najma Medrano MD Alejo Box, MD Sammye Berkeley, SATURNINO Wilson, SATURNINO Rashid, SATURNINO Fajardo, SATURNINO      Aayush Peacokc is here for a BP check  She has noted that her home bp has been elevated    BP Readings from Last 3 Encounters:   10/20/20 122/64   10/05/20 (!) 168/78   08/06/20 (!) 130/90     Pulse Readings from Last 3 Encounters:   10/20/20 54   10/05/20 90   08/06/20 78     Wt Readings from Last 3 Encounters:   10/20/20 181 lb (82.1 kg)   10/05/20 182 lb (82.6 kg)   08/06/20 182 lb (82.6 kg)     Will give new RX for blood pressure kit  No change in medications       Plan:   Aayush Peacock is to keep apt

## 2020-11-16 ENCOUNTER — TELEPHONE (OUTPATIENT)
Dept: CARDIOLOGY CLINIC | Age: 70
End: 2020-11-16

## 2020-11-16 ENCOUNTER — HOSPITAL ENCOUNTER (OUTPATIENT)
Age: 70
Discharge: HOME | End: 2020-11-16
Payer: MEDICARE

## 2020-11-16 ENCOUNTER — OFFICE VISIT (OUTPATIENT)
Dept: CARDIOLOGY CLINIC | Age: 70
End: 2020-11-16
Payer: MEDICARE

## 2020-11-16 ENCOUNTER — ANTI-COAG VISIT (OUTPATIENT)
Dept: PHARMACY | Age: 70
End: 2020-11-16
Payer: MEDICARE

## 2020-11-16 VITALS
DIASTOLIC BLOOD PRESSURE: 74 MMHG | SYSTOLIC BLOOD PRESSURE: 150 MMHG | WEIGHT: 181.6 LBS | HEIGHT: 64 IN | HEART RATE: 60 BPM | BODY MASS INDEX: 31 KG/M2

## 2020-11-16 VITALS — TEMPERATURE: 96.9 F

## 2020-11-16 PROBLEM — J96.10 CHRONIC RESPIRATORY FAILURE (HCC): Status: ACTIVE | Noted: 2020-11-16

## 2020-11-16 LAB
CHOLESTEROL: 139 MG/DL
HDLC SERPL-MCNC: 52 MG/DL
INTERNATIONAL NORMALIZATION RATIO, POC: 2.3
LDL CHOLESTEROL DIRECT: 81 MG/DL
POC INR: 2.3 INDEX
PROTHROMBIN TIME, POC: 27.3 SECONDS (ref 10–14.3)
TRIGL SERPL-MCNC: 88 MG/DL

## 2020-11-16 PROCEDURE — 85610 PROTHROMBIN TIME: CPT

## 2020-11-16 PROCEDURE — 99214 OFFICE O/P EST MOD 30 MIN: CPT | Performed by: INTERNAL MEDICINE

## 2020-11-16 PROCEDURE — 36416 COLLJ CAPILLARY BLOOD SPEC: CPT

## 2020-11-16 PROCEDURE — 99211 OFF/OP EST MAY X REQ PHY/QHP: CPT

## 2020-11-16 PROCEDURE — 36415 COLL VENOUS BLD VENIPUNCTURE: CPT

## 2020-11-16 PROCEDURE — 80061 LIPID PANEL: CPT

## 2020-11-16 PROCEDURE — 83721 ASSAY OF BLOOD LIPOPROTEIN: CPT

## 2020-11-16 RX ORDER — VARENICLINE TARTRATE
KIT
Qty: 1 BOX | Refills: 0 | Status: SHIPPED | OUTPATIENT
Start: 2020-11-16 | End: 2021-04-16 | Stop reason: SDUPTHER

## 2020-11-16 RX ORDER — VARENICLINE TARTRATE 1 MG/1
1 TABLET, FILM COATED ORAL 2 TIMES DAILY
Qty: 180 TABLET | Refills: 3 | Status: SHIPPED | OUTPATIENT
Start: 2020-11-16 | End: 2021-04-16 | Stop reason: SDUPTHER

## 2020-11-16 RX ORDER — ATORVASTATIN CALCIUM 40 MG/1
40 TABLET, FILM COATED ORAL DAILY
Qty: 90 TABLET | Refills: 3 | Status: SHIPPED | OUTPATIENT
Start: 2020-11-16 | End: 2021-12-30

## 2020-11-16 RX ORDER — AMLODIPINE BESYLATE 2.5 MG/1
2.5 TABLET ORAL DAILY
Qty: 30 TABLET | Refills: 3 | Status: SHIPPED | OUTPATIENT
Start: 2020-11-16 | End: 2021-01-06 | Stop reason: SDUPTHER

## 2020-11-16 NOTE — LETTER
Maru Manzano\",Dr. Miki Foote  1950  [id number]    Have you had any Chest Pain - No    Have you had any Shortness of Breath - No  If Yes - When on exertion    Have you had any dizziness - No    Have you had any palpitations - Yes  If Yes DO EKG - Do you feel your heart racing not often  How long does it last - seconds     Is the patient on any of the following medications -     Do you have any edema - swelling in no        Check Venous \"LEG PROBLEM Questionnaire\"    Do you have a surgery or procedure scheduled in the near future - No    Ask patient if they want to sign up for MyChart if they are not already signed up    Check to see if we have an E-MAIL on file for the patient    Check medication list thoroughly!!!  BE SURE TO ASK PATIENT IF THEY NEED MEDICATION REFILLS    At check out add to every patient's \"wrap up\" the following dot phrase AFTERHOURSEDUCATION and ensure we explain this to our patients

## 2020-11-16 NOTE — PROGRESS NOTES
CARDIOLOGY NOTE      Chief Complaint: Coronary artery disease, mitral valve disease    HPI:   Torsten Menjivar is a 79y.o. year old who has history as noted below. She is on 3.5 L of oxygen around-the-clock unfortunately still smoking half a pack a day. Blood pressure has been running high we have titrated her meds over the last several months she did not take carvedilol this morning because she was feeling queasy. Blood pressure has been running up to 150s at home. Sees Dr. Meeta Hoang for pulmonology she notices ankle swelling once in a while has some shortness of breath on exertion. She has history of mitral valve replacement with a bioprosthetic valve. She used to see Dr. Ashu Huff. Torsten Menjivar has not had a stress test for several years now but she denies chest pressure or pain except when she is in a hurry chasing after her dogs   she notices some chest pressure when she is running after  Her dog. Her puppy is Estelita and she has 3 other chuwawa  which keeps her busy , her ARELY Kumar Jamal works in scheduling )      Current Outpatient Medications   Medication Sig Dispense Refill    amLODIPine (NORVASC) 2.5 MG tablet Take 1 tablet by mouth daily 30 tablet 3    varenicline (CHANTIX STARTING MONTH MARGIE) 0.5 MG X 11 & 1 MG X 42 tablet Take by mouth.  1 box 0    varenicline (CHANTIX) 1 MG tablet Take 1 tablet by mouth 2 times daily 180 tablet 3    atorvastatin (LIPITOR) 40 MG tablet Take 1 tablet by mouth daily 90 tablet 3    Blood Pressure Monitoring KIT 1 Units by Does not apply route 2 times daily 1 kit 0    budesonide-formoterol (SYMBICORT) 160-4.5 MCG/ACT AERO Inhale 2 puffs into the lungs 2 times daily 1 Inhaler 5    carvedilol (COREG) 25 MG tablet Take 1 tablet by mouth 2 times daily 180 tablet 1    lisinopril (PRINIVIL;ZESTRIL) 40 MG tablet Take 1 tablet by mouth daily 90 tablet 1    warfarin (COUMADIN) 5 MG tablet TAKE 1  TABLETS BY MOUTH ONCE DAILY except 1 and 1/2 tablets on Mondays, Wednesdays, and Fridays or as directed 90 tablet 1    levothyroxine (SYNTHROID) 100 MCG tablet Take 1 tablet by mouth Daily 90 tablet 3    escitalopram (LEXAPRO) 20 MG tablet Take 1 tablet by mouth daily 90 tablet 1    pantoprazole (PROTONIX) 40 MG tablet Take 1 tablet by mouth daily 90 tablet 3    nitroGLYCERIN (NITROSTAT) 0.4 MG SL tablet Place 1 tablet under the tongue every 5 minutes as needed for Chest pain up to max of 3 total doses. If no relief after 1 dose, call 911. 25 tablet 3    furosemide (LASIX) 20 MG tablet Take 0.5 tablets by mouth daily 90 tablet 3    cilostazol (PLETAL) 100 MG tablet Take 1 tablet by mouth 2 times daily 180 tablet 3    Calcium Carb-Cholecalciferol (CALCIUM 600+D) 600-800 MG-UNIT TABS Take 1 tablet by mouth daily      aspirin 81 MG EC tablet Take 1 tablet by mouth daily 30 tablet 3    Multiple Vitamins-Minerals (THERAPEUTIC MULTIVITAMIN-MINERALS) tablet Take 1 tablet by mouth daily       No current facility-administered medications for this visit. Allergies:   Patient has no known allergies. Patient History:  Past Medical History:   Diagnosis Date    CAD (coronary artery disease)     [facility]     H/O echocardiogram 03/22/2017    EF 35-40%    H/O echocardiogram 08/04/2020    EF 45-50, Mild TR & VT, Severely dilated left atrium.   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, functionally normally     Heart attack [facility]) 1/2014    Hyperlipidemia     Hypertension     PAD (peripheral artery disease) [facility]     S/P right coronary artery (RCA) stent placement     T2 vertebral fracture ([facility] [address] 12/2017    TIA (transient ischemic attack)     Tobacco dependence      Past Surgical History:   Procedure Laterality Date    CARDIAC SURGERY      CORONARY ANGIOPLASTY WITH STENT PLACEMENT      CORONARY ARTERY BYPASS GRAFT  06/21/2016    CABG X1 SVG to RCA with Mitral Valve Replacement 27mm Mosaic     MITRAL VALVE REPLACEMENT  06/21/2016    27mm mosaic     Family History   Problem Relation Age of Onset   [facility] Stroke Mother     Heart Attack Father     Coronary Art Dis Father    [facility] Pacemaker Father    [facility] Arrhythmia Father     Diabetes Sister     Rheum Arthritis Sister     Heart Attack Sister     Arrhythmia Sister      Social History     Tobacco Use    Smoking status: Current Every Day Smoker     Packs/day: 0.50     Types: Cigarettes     Last attempt to quit: 10/18/2017     Years since quitting: 3.0    Smokeless tobacco: Never Used   Substance Use Topics    Alcohol use: No     Comment: 2 cups of coffee in the a.m. Review of Systems:   · Constitutional: No Fever or Weight Loss   · Eyes: No Decreased Vision  · ENT: No Headaches, Hearing Loss or Vertigo  · Cardiovascular: as per note above   · Respiratory: No cough or wheezing and as per note above. · Gastrointestinal: No abdominal pain, appetite loss, blood in stools, constipation, diarrhea or heartburn  · Genitourinary: No dysuria, trouble voiding, or hematuria  · Musculoskeletal:  None  · Integumentary: No rash or pruritis  · Neurological: No TIA or stroke symptoms  · Psychiatric: No anxiety or depression  · Endocrine: No malaise, fatigue or temperature intolerance  · Hematologic/Lymphatic: No bleeding problems, blood clots or swollen lymph nodes  · Allergic/Immunologic: No nasal congestion or hives    Objective:      Physical Exam:  BP (!) 150/74   Pulse 60   Ht 5' 4\" (1.626 m)   Wt 181 lb 9.6 oz (82.4 kg)   BMI 31.17 kg/m²   Wt Readings from Last 3 Encounters:   11/16/20 181 lb 9.6 oz (82.4 kg)   10/20/20 181 lb (82.1 kg)   10/05/20 182 lb (82.6 kg)     Body mass index is 31.17 kg/m². Vitals:    11/16/20 0820   BP: (!) 150/74   Pulse:       General Appearance:  No distress, conversant  Constitutional:  Well developed, Well nourished, No acute distress, Non-toxic appearance.    HENT:  Normocephalic, Atraumatic, Bilateral external ears normal, Oropharynx moist, No oral exudates, Nose normal. Neck- Normal range of motion, No tenderness, Supple, No stridor,no apical-carotid delay  Eyes:  PERRL, EOMI, Conjunctiva normal, No discharge. Respiratory:  Normal breath sounds, No respiratory distress, No wheezing, No chest tenderness. ,no use of accessory muscles, NO crackles  Cardiovascular: (PMI) apex non displaced,no lifts no thrills,S1 and S2 audible, systolic murmur 2/6, No signs of ankle edema, or volume overload, No evidence of JVD, No crackles  GI:  Bowel sounds normal, Soft, No tenderness, No masses, No gross visceromegaly   :  No costovertebral angle tenderness   Musculoskeletal:  No edema, no tenderness, no deformities.  Back- no tenderness  Integument:  Well hydrated, no rash   Lymphatic:  No lymphadenopathy noted   Neurologic:  Alert & oriented x 3, CN 2-12 normal, normal motor function, normal sensory function, no focal deficits noted   Psychiatric:  Speech and behavior appropriate            Medical decision making and Data review:  DATA:  Lab Results   Component Value Date    TROPONINT <0.010 05/15/2020     BNP:    Lab Results   Component Value Date    PROBNP 1,075 (H) 06/17/2020     PT/INR:  No results found for: PTINR  Lab Results   Component Value Date    LABA1C 5.0 06/18/2016     Lab Results   Component Value Date    CHOL 124 03/21/2017    TRIG 107 03/21/2017    HDL 36 (L) 03/21/2017    LDLDIRECT 70 03/21/2017     Lab Results   Component Value Date    ALT 15 08/03/2020    AST 19 08/03/2020     TSH:   Lab Results   Component Value Date    TSH 0.40 07/10/2019     Lab Results   Component Value Date    AST 19 08/03/2020    ALT 15 08/03/2020    BILITOT 0.5 08/03/2020    ALKPHOS 106 08/03/2020     Lab Results   Component Value Date    PROBNP 1,075 (H) 06/17/2020    PROBNP 4,988 (H) 03/20/2017    PROBNP 1,458 (H) 06/12/2016     Lab Results   Component Value Date    LABA1C 5.0 06/18/2016     Lab Results   Component Value Date    WBC 8.6 08/03/2020    HGB 13.9 08/03/2020    HCT 41.4 08/03/2020     08/03/2020     Echo 3/20/19  Summary   Echocardiogram is s/p CABG and MVR 6/21/2016. LV systolic function is abnormal.   Visually estimated ejection fraction is 35-40 %. Grade III diastolic dysfunction. Mild left atrium enlargement. Bioprostethic mitral valve is seated well and functioning properly. No pericardial effusion. Echo 8/4/2020  Summary   Left ventricular function is low normal, EF is estimated at 45-50%. Severely dilated left atrium. Evidence of a porcine bio-prosthetic valve in the mitral position,   functionally normally with a mean gradient of 3mmHg. Sclerotic, but non-stenotic aortic valve. Mild tricuspid and pulmonic regurgitation noted. No evidence of pericardial effusion. All labs, medications and tests reviewed by myself including data and history from outside source , patient and available family . Assessment & Plan:      1. ASCVD (arteriosclerotic cardiovascular disease)    2. Cardiomyopathy, primary ([address]    3. Mixed hyperlipidemia    4. Essential hypertension    5. PAD (peripheral artery disease) ([address]    6. S/P mitral valve replacement    7. S/P right coronary artery (RCA) stent placement    8. Tobacco abuse    9. SOB (shortness of breath) on exertion    10. Chronic respiratory failure with hypoxia and hypercapnia (HCC)       Hypertension  Blood pressures meds were adjusted about a month ago currently on carvedilol 25mg twice daily and  lisinopril 40 mg daily keep a log of blood pressure, add Norvasc 2.5 mg daily    Coronary artery disease   S/p  bypass surgery X 1 vessel to RCA at time of her valve replacemcent. She denies any angina .  Continue aspirin she denies any angina but once marlen while gest chest pressure when she is running after her her dogs at some point we may get a Shauna Veliz she has not had one for few years now    Cardiomyopathy, primary ([address]  EF of  40 %- 45 % probably related to valve disease / ischemic? She had MI in 2014 . She used to see Dr Robert Quigley. Her echo shows EF of 45-50 % , is not appear to be volume overloaded uses Lasix few times a week    S/P mitral valve replacement  Mosaic Mitral valve valve replacement 27 mm  In June 2016  heavily calcified annulus carefully debrided as needed and the posterior annulus reinforced and constructed with gail patch used as a buttress. She knows and always takes dental prophylaxis  A # 27 [employer] tissue. She has been on coumadin since before her surgery due to TIA? I am note sure why? But I have not stopped it. Patient cannot tell me why she was on it I cannot get records from Dr. Robert Quigley office and presuming she had A. Fib? denies any bleeding history or hospitalizations    Peripheral arterial disease   Post aortobifem bypass. She is on Pletal, she denies any leg pain or claudication no signs or complaints of heart failure no admissions for heart failure  No signs of heart failure for now continue  Tobacco abuse  I have asked her to consider stopping we will add Chantix with a starter pack and then continuation dose. He is smoking in spite of being on 3.5 L a day     Dyslipidemia :  All available lab work was reviewed. Patient was advised to repeat lab work before next visit increase lisinopril to 40 mg daily      Counseled extensively and medication compliance urged. We discussed that for the  prevention of ASCVD our  goal is aggressive risk modification. Patient is encouraged to exercise even a brisk walk for 30 minutes  at least 3 to 4 times a week   Various goals were discussed and questions answered. Continue current medications. Appropriate prescriptions are addressed and refills ordered. Questions answered and patient verbalizes understanding. Call for any problems, questions, or concerns. Continue all other medications of all above medical condition listed as is. Return in about 3 months (around 2/16/2021).     Please note this report has been partially produced using speech recognition software and may contain errors related to that system including errors in grammar, punctuation, and spelling, as well as words and phrases that may be inappropriate.  If there are any questions or concerns please feel free to contact the dictating provider for clarification.

## 2020-11-30 ENCOUNTER — HOSPITAL ENCOUNTER (OUTPATIENT)
Dept: LAB | Age: 70
Discharge: HOME | End: 2020-11-30
Payer: MEDICARE

## 2020-11-30 PROCEDURE — U0002 COVID-19 LAB TEST NON-CDC: HCPCS

## 2020-11-30 PROCEDURE — C9803 HOPD COVID-19 SPEC COLLECT: HCPCS

## 2020-12-01 LAB
SARS-COV-2: NOT DETECTED
SOURCE: NORMAL

## 2020-12-04 ENCOUNTER — HOSPITAL ENCOUNTER (OUTPATIENT)
Dept: PULMONOLOGY | Age: 70
Discharge: HOME | End: 2020-12-04
Payer: MEDICARE

## 2020-12-04 LAB
DLCO %PRED: 40 %
DLCO PRED: NORMAL
DLCO/VA %PRED: NORMAL
DLCO/VA PRED: NORMAL
DLCO/VA: NORMAL
DLCO: NORMAL
EXPIRATORY TIME-POST: NORMAL
EXPIRATORY TIME: NORMAL
FEF 25-75% %CHNG: NORMAL
FEF 25-75% %PRED-POST: NORMAL
FEF 25-75% %PRED-PRE: NORMAL
FEF 25-75% PRED: NORMAL
FEF 25-75%-POST: NORMAL
FEF 25-75%-PRE: NORMAL
FEV1 %PRED-POST: 54 %
FEV1 %PRED-PRE: 52 %
FEV1 PRED: NORMAL
FEV1-POST: NORMAL
FEV1-PRE: NORMAL
FEV1/FVC %PRED-POST: NORMAL
FEV1/FVC %PRED-PRE: NORMAL
FEV1/FVC PRED: NORMAL
FEV1/FVC-POST: 68 %
FEV1/FVC-PRE: 69 %
FVC %PRED-POST: NORMAL
FVC %PRED-PRE: NORMAL
FVC PRED: NORMAL
FVC-POST: NORMAL
FVC-PRE: NORMAL
GAW %PRED: NORMAL
GAW PRED: NORMAL
GAW: NORMAL
IC %PRED: NORMAL
IC PRED: NORMAL
IC: NORMAL
MEP: NORMAL
MIP: NORMAL
MVV %PRED-PRE: NORMAL
MVV PRED: NORMAL
MVV-PRE: NORMAL
PEF %PRED-POST: NORMAL
PEF %PRED-PRE: NORMAL
PEF PRED: NORMAL
PEF%CHNG: NORMAL
PEF-POST: NORMAL
PEF-PRE: NORMAL
RAW %PRED: NORMAL
RAW PRED: NORMAL
RAW: NORMAL
RV %PRED: NORMAL
RV PRED: NORMAL
RV: NORMAL
SVC %PRED: NORMAL
SVC PRED: NORMAL
SVC: NORMAL
TLC %PRED: 89 %
TLC PRED: NORMAL
TLC: NORMAL
VA %PRED: NORMAL
VA PRED: NORMAL
VA: NORMAL
VTG %PRED: NORMAL
VTG PRED: NORMAL
VTG: NORMAL

## 2020-12-04 PROCEDURE — 94726 PLETHYSMOGRAPHY LUNG VOLUMES: CPT

## 2020-12-04 PROCEDURE — 94060 EVALUATION OF WHEEZING: CPT

## 2020-12-04 PROCEDURE — 94729 DIFFUSING CAPACITY: CPT

## 2020-12-04 ASSESSMENT — PULMONARY FUNCTION TESTS
FEV1/FVC_PRE: 69
FEV1_PERCENT_PREDICTED_POST: 54
FEV1_PERCENT_PREDICTED_PRE: 52
FEV1/FVC_POST: 68

## 2020-12-14 ENCOUNTER — VIRTUAL VISIT (OUTPATIENT)
Dept: PULMONOLOGY | Age: 70
End: 2020-12-14
Payer: MEDICARE

## 2020-12-14 PROBLEM — G47.11 IDIOPATHIC HYPERSOMNIA: Status: ACTIVE | Noted: 2020-12-14

## 2020-12-14 PROCEDURE — 99443 PR PHYS/QHP TELEPHONE EVALUATION 21-30 MIN: CPT | Performed by: INTERNAL MEDICINE

## 2020-12-14 RX ORDER — NITROGLYCERIN 0.4 MG/1
0.4 TABLET SUBLINGUAL EVERY 5 MIN PRN
Qty: 25 TABLET | Refills: 3 | Status: SHIPPED | OUTPATIENT
Start: 2020-12-14 | End: 2022-03-09

## 2020-12-14 RX ORDER — BUDESONIDE AND FORMOTEROL FUMARATE DIHYDRATE 160; 4.5 UG/1; UG/1
2 AEROSOL RESPIRATORY (INHALATION) 2 TIMES DAILY
Qty: 1 INHALER | Refills: 5 | Status: SHIPPED | OUTPATIENT
Start: 2020-12-14

## 2020-12-14 RX ORDER — MODAFINIL 200 MG/1
200 TABLET ORAL DAILY
Qty: 30 TABLET | Refills: 0 | Status: SHIPPED | OUTPATIENT
Start: 2020-12-14 | End: 2021-01-13

## 2020-12-14 ASSESSMENT — ENCOUNTER SYMPTOMS
EYE ITCHING: 0
ABDOMINAL DISTENTION: 0
COUGH: 0
SHORTNESS OF BREATH: 0
BACK PAIN: 0
EYE DISCHARGE: 0
ABDOMINAL PAIN: 0

## 2020-12-14 NOTE — PROGRESS NOTES
Rajat Tesfaye  1950  Referring Provider: SATURNINO Rayo    Subjective:     Chief Complaint   Patient presents with    1 Month Follow-Up     Patient is following up after PFTs. She is also needing a compliance visit for Ronny Runner is a 79 y.o. female is doing a telephone follow up visit. She has mild EDITA with EDS. She is on CPPA of 7 cmh20 which she is using it every night about 7 to 8 hours. She says that it is helping her. She is still sleepy and tired during the day time. She has no loss of weight. She has a FFM. Her 2 week download data showed that her residual AHI is 2.3 and leak is 9.5      She has 30 pk yr smoking which she quit about 1 week ago. She has no symptoms. She is on 2 l/min of oxygen off and on. She had no flu vaccine yet. She is using her inhalers regularly. Current Outpatient Medications   Medication Sig Dispense Refill    budesonide-formoterol (SYMBICORT) 160-4.5 MCG/ACT AERO Inhale 2 puffs into the lungs 2 times daily 1 Inhaler 5    modafinil (PROVIGIL) 200 MG tablet Take 1 tablet by mouth daily for 30 days. 30 tablet 0    amLODIPine (NORVASC) 2.5 MG tablet Take 1 tablet by mouth daily 30 tablet 3    varenicline (CHANTIX STARTING MONTH MARGIE) 0.5 MG X 11 & 1 MG X 42 tablet Take by mouth.  1 box 0    varenicline (CHANTIX) 1 MG tablet Take 1 tablet by mouth 2 times daily 180 tablet 3    atorvastatin (LIPITOR) 40 MG tablet Take 1 tablet by mouth daily 90 tablet 3    Blood Pressure Monitoring KIT 1 Units by Does not apply route 2 times daily 1 kit 0    carvedilol (COREG) 25 MG tablet Take 1 tablet by mouth 2 times daily 180 tablet 1    lisinopril (PRINIVIL;ZESTRIL) 40 MG tablet Take 1 tablet by mouth daily 90 tablet 1    warfarin (COUMADIN) 5 MG tablet TAKE 1  TABLETS BY MOUTH ONCE DAILY except 1 and 1/2 tablets on Mondays, Wednesdays, and Fridays or as directed 90 tablet 1    levothyroxine (SYNTHROID) 100 MCG tablet Take 1 tablet by mouth Daily 90 tablet 3  escitalopram (LEXAPRO) 20 MG tablet Take 1 tablet by mouth daily 90 tablet 1    pantoprazole (PROTONIX) 40 MG tablet Take 1 tablet by mouth daily 90 tablet 3    nitroGLYCERIN (NITROSTAT) 0.4 MG SL tablet Place 1 tablet under the tongue every 5 minutes as needed for Chest pain up to max of 3 total doses. If no relief after 1 dose, call 911. 25 tablet 3    furosemide (LASIX) 20 MG tablet Take 0.5 tablets by mouth daily 90 tablet 3    cilostazol (PLETAL) 100 MG tablet Take 1 tablet by mouth 2 times daily 180 tablet 3    Calcium Carb-Cholecalciferol (CALCIUM 600+D) 600-800 MG-UNIT TABS Take 1 tablet by mouth daily      aspirin 81 MG EC tablet Take 1 tablet by mouth daily 30 tablet 3    Multiple Vitamins-Minerals (THERAPEUTIC MULTIVITAMIN-MINERALS) tablet Take 1 tablet by mouth daily       No current facility-administered medications for this visit. No Known Allergies    Past Medical History:   Diagnosis Date    CAD (coronary artery disease)     [facility] [address]     H/O echocardiogram 03/22/2017    EF 35-40%    H/O echocardiogram 08/04/2020    EF 45-50, Mild TR & IA, Severely dilated left atrium.   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, functionally normally     Heart attack ([facility] [address] 1/2014    Hyperlipidemia     Hypertension     [facility]     S/P right coronary artery (RCA) stent placement     T2 vertebral fracture ([facility] [address] 12/2017    TIA (transient ischemic attack)     Tobacco dependence        Past Surgical History:   Procedure Laterality Date    CARDIAC SURGERY      CORONARY ANGIOPLASTY WITH STENT PLACEMENT      CORONARY ARTERY BYPASS GRAFT  06/21/2016    CABG X1 SVG to RCA with Mitral Valve Replacement 27mm Mosaic     MITRAL VALVE REPLACEMENT  06/21/2016    27mm mosaic       Social History     Socioeconomic History    Marital status:      Spouse name: Not on file    Number of children: Not on file  Years of education: Not on file    Highest education level: Not on file   Occupational History    Not on file   Social Needs    Financial resource strain: Not on file    Food insecurity     Worry: Not on file     Inability: Not on file    Transportation needs     Medical: Not on file     Non-medical: Not on file   Tobacco Use    Smoking status: Current Every Day Smoker     Packs/day: 0.50     Types: Cigarettes     Last attempt to quit: 10/18/2017     Years since quitting: 3.1    Smokeless tobacco: Never Used   Substance and Sexual Activity    Alcohol use: No     Comment: 2 cups of coffee in the a.m.  Drug use: No    Sexual activity: Yes     Partners: Male   Lifestyle    Physical activity     Days per week: Not on file     Minutes per session: Not on file    Stress: Not on file   Relationships    Social connections     Talks on phone: Not on file     Gets together: Not on file     Attends Voodoo service: Not on file     Active member of club or organization: Not on file     Attends meetings of clubs or organizations: Not on file     Relationship status: Not on file    Intimate partner violence     Fear of current or ex partner: Not on file     Emotionally abused: Not on file     Physically abused: Not on file     Forced sexual activity: Not on file   Other Topics Concern    Not on file   Social History Narrative    ** Merged History Encounter **            Review of Systems   Constitutional: Positive for fatigue. HENT: Negative for congestion and postnasal drip. Eyes: Negative for discharge and itching. Respiratory: Negative for cough and shortness of breath. Cardiovascular: Negative for chest pain and leg swelling. Gastrointestinal: Negative for abdominal distention and abdominal pain. Endocrine: Negative for cold intolerance and heat intolerance. Genitourinary: Negative for enuresis and frequency. Musculoskeletal: Negative for arthralgias and back pain. Allergic/Immunologic: Negative for environmental allergies and food allergies. Neurological: Negative for light-headedness and headaches. Hematological: Negative for adenopathy. Psychiatric/Behavioral: Negative for agitation and behavioral problems. Objective: There were no vitals taken for this visit. There is no height or weight on file to calculate BMI. Sleep Medicine 6/30/2020 5/29/2020   Sitting and reading 0 0   Watching TV 1 0   Sitting, inactive in a public place (e.g. a theatre or a meeting) 0 0   As a passenger in a car for an hour without a break 3 2   Lying down to rest in the afternoon when circumstances permit 2 3   Sitting and talking to someone 0 1   Sitting quietly after a lunch without alcohol 0 0   In a car, while stopped for a few minutes in traffic 0 0   Total score 6 6   Neck circumference 15 15             Radiology: None    Assessment and Plan     Problem List        Pulmonary Problems    EDITA (obstructive sleep apnea)     Advised to be compliant with the CPAP  Loose weight            Other    Tobacco abuse     Advised to quit smoking  PFT in 1 year  Low dose CT chest in 1 year         Hypersomnia     Advised to be compliant with the CPAP  Loose weight         Obesity (BMI 30-39. 9)     Advised to loose weight with diet and exercise           Relevant Medications    modafinil (PROVIGIL) 200 MG tablet    Idiopathic hypersomnia     Patient has been compliant with the CPAP but still sleepy and tired during the day time  I'll start her on Modafinil  F/u in 1 month         Relevant Medications    modafinil (PROVIGIL) 200 MG tablet               Return in about 4 weeks (around 1/11/2021) for Medication effect, Medication Effect.      Progress notes sent to the referring Provider    Ricardo Lewis MD  12/14/2020  10:37 AM Christy Mejia is a 79 y.o. female being evaluated by a Virtual Visit (video visit) encounter to address concerns as mentioned above. A caregiver was present when appropriate. Due to this being a TeleHealth encounter (During NTZBP-49 public health emergency), evaluation of the following organ systems was limited: Vitals/Constitutional/EENT/Resp/CV/GI//MS/Neuro/Skin/Heme-Lymph-Imm. Pursuant to the emergency declaration under the [address] [place] [address] and the [employer] and [employer] Act, this Virtual Visit was conducted with patient's (and/or legal guardian's) consent, to reduce the patient's risk of exposure to COVID-19 and provide necessary medical care. The patient (and/or legal guardian) has also been advised to contact this office for worsening conditions or problems, and seek emergency medical treatment and/or call 911 if deemed necessary. Patient identification was verified at the start of the visit: Yes    Total time spent for this encounter: 21 minutes    Services were provided through a video synchronous discussion virtually to substitute for in-person clinic visit. Patient and provider were located at their individual homes. --Cody Moy MD on 12/14/2020 at 10:37 AM    An electronic signature was used to authenticate this note.

## 2020-12-14 NOTE — ASSESSMENT & PLAN NOTE
Patient has been compliant with the CPAP but still sleepy and tired during the day time  I'll start her on Modafinil  F/u in 1 month No

## 2020-12-15 ENCOUNTER — TELEPHONE (OUTPATIENT)
Dept: PULMONOLOGY | Age: 70
End: 2020-12-15

## 2020-12-21 ENCOUNTER — ANTI-COAG VISIT (OUTPATIENT)
Dept: PHARMACY | Age: 70
End: 2020-12-21
Payer: MEDICARE

## 2020-12-21 VITALS — TEMPERATURE: 97.5 F

## 2020-12-21 LAB
INTERNATIONAL NORMALIZATION RATIO, POC: 2.9
POC INR: 2.9 INDEX
PROTHROMBIN TIME, POC: 35.1 SECONDS (ref 10–14.3)

## 2020-12-21 PROCEDURE — 36416 COLLJ CAPILLARY BLOOD SPEC: CPT

## 2020-12-21 PROCEDURE — 85610 PROTHROMBIN TIME: CPT

## 2020-12-21 PROCEDURE — 99211 OFF/OP EST MAY X REQ PHY/QHP: CPT

## 2020-12-21 NOTE — PROGRESS NOTES
Medication Management Service  [facility]  [phone]    Visit Date: 12/21/2020   Subjective:       Lianna Garcia is a 79 y.o. female who presents to clinic today for anticoagulation monitoring and adjustment. Patient seen in clinic for warfarin management due to  Indication:   bio prosthetic valve replacement. INR goal: of 2.0-3.0. Duration of therapy: indefinite. Patient reports the following:   Adherent with regimen  Missed or extra doses:  None   Bleeding or thromboembolic side effects:  None  Significant medication changes:  None  Significant dietary changes: None  Significant alcohol or tobacco changes: None  Significant recent illness, disease state changes, or hospitalization:  None  Upcoming surgeries or procedures:  None  Falls: None           Assessment and Plan     PT/INR done in office per protocol. INR today is 2.9, therapeutic. Plan: Will continue current regimen of warfarin 5mg daily except 7.5mg onMWF. Recheck INR in 4 week(s). Patient verbalized understanding of dosing directions and information discussed. Dosing schedule given to patient including phone number, appointment date, and time. Progress note sent to referring office. Patient acknowledges working in consult agreement with pharmacist as referred by his/her physician.       Electronically signed by ROSITA Ayala [facility] on 12/21/20 at 8:56 AM EST    CLINICAL PHARMACY CONSULT: MED RECONCILIATION/REVIEW ADDENDUM    For Pharmacy Admin Tracking Only    PHSO: Yes  Total # of Interventions Recommended: 0    - Maintenance Safety Lab Monitoring #: 1  Total Interventions Accepted: 0  Time Spent (min): Aj Britton, SarahD

## 2020-12-29 ENCOUNTER — TELEPHONE (OUTPATIENT)
Dept: CARDIOLOGY CLINIC | Age: 70
End: 2020-12-29

## 2020-12-29 NOTE — TELEPHONE ENCOUNTER
I talked ot Sera Ochoa and she said if BP is less then 110/ to hold her lisinopril. I tried to talk to the patient but phone drop the call.

## 2020-12-29 NOTE — TELEPHONE ENCOUNTER
Patient called complaining of an elevated blood pressure (188/98) and a thumping headache. Please call her back at ph# [phone].

## 2021-01-06 ENCOUNTER — TELEPHONE (OUTPATIENT)
Dept: CARDIOLOGY CLINIC | Age: 71
End: 2021-01-06

## 2021-01-06 RX ORDER — AMLODIPINE BESYLATE 5 MG/1
5 TABLET ORAL DAILY
Qty: 30 TABLET | Refills: 2 | Status: SHIPPED | OUTPATIENT
Start: 2021-01-06 | End: 2021-04-05 | Stop reason: SDUPTHER

## 2021-01-06 NOTE — TELEPHONE ENCOUNTER
Pt is calling due to Blood pressure being up. Pt called stating that 1/1/21 her bp was 161/63 in the am and 193/88 in the pm. 1/2/21 her bp was 139/77 in the am and 179/81 in the pm. 1/3/21 bp was 157/83 am and 160/68 pm. 1/4/21 bp was 176/79am and 200/91 pm. 1/5/21 bp was 157/82 am and 184/81 pm. Pt wants to know if she should adjust how she is taking her medications.

## 2021-01-06 NOTE — TELEPHONE ENCOUNTER
Increase norvasc to 5 mg daily. Have her come in next week for BP check. IF her SBP < 110 she needs to hold her norvasc.

## 2021-01-15 ENCOUNTER — NURSE ONLY (OUTPATIENT)
Dept: CARDIOLOGY CLINIC | Age: 71
End: 2021-01-15

## 2021-01-15 VITALS
HEART RATE: 68 BPM | WEIGHT: 181.6 LBS | HEIGHT: 63 IN | SYSTOLIC BLOOD PRESSURE: 120 MMHG | DIASTOLIC BLOOD PRESSURE: 80 MMHG | BODY MASS INDEX: 32.18 KG/M2

## 2021-01-15 DIAGNOSIS — I10 ESSENTIAL HYPERTENSION: Primary | ICD-10-CM

## 2021-01-15 PROCEDURE — 99999 PR OFFICE/OUTPT VISIT,PROCEDURE ONLY: CPT | Performed by: NURSE PRACTITIONER

## 2021-01-15 RX ORDER — CILOSTAZOL 100 MG/1
TABLET ORAL
Qty: 180 TABLET | Refills: 0 | Status: SHIPPED | OUTPATIENT
Start: 2021-01-15 | End: 2021-04-26 | Stop reason: SDUPTHER

## 2021-01-15 RX ORDER — FUROSEMIDE 20 MG/1
TABLET ORAL
Qty: 45 TABLET | Refills: 0 | Status: SHIPPED | OUTPATIENT
Start: 2021-01-15 | End: 2021-04-05 | Stop reason: SDUPTHER

## 2021-01-15 NOTE — PROGRESS NOTES
[address] [facility],[address]  Phone: [phone]    Fax [phone]                  Danilo Maria MD, Jazmin Jeronimo MD, Jonathon Ibarra MD, MD Gavin France MD Atanacio Capers, MD Dave Porta, SATURNINO Lynne, SATURNINO Guajardo, SATURNINO Zavala, SATURNINO    BP Check Visit    Pt is here for a BP check. She has been having high blood pressures at home. Patient has had her Norvasc increased to 5 mg daily. She did not bring her blood pressure cuff with her. Vitals:    01/15/21 0947   BP: 120/80   Pulse: 68       Plan for pt is to continue Norvasc 5 mg daily Coreg 12.5 mg twice daily and lisinopril 40 mg daily. Asked patient to come in to the office with blood pressure cuff for verification of accuracy of cuff. Follow up as scheduled or earlier if needed.     Pt is to report any dizziness or syncope to the office    Electronically signed by SATURNINO Rawls CNP on 1/15/2021 at 9:56 AM

## 2021-01-18 ENCOUNTER — TELEPHONE (OUTPATIENT)
Dept: PHARMACY | Age: 71
End: 2021-01-18

## 2021-01-18 NOTE — TELEPHONE ENCOUNTER
Rescheduled for 1/25.     CLINICAL PHARMACY CONSULT: MED RECONCILIATION/REVIEW ADDENDUM    For Pharmacy Admin Tracking Only    PHSO: YesTime Spent (min): 5    Eduardo Arreola PharmD

## 2021-01-25 ENCOUNTER — TELEPHONE (OUTPATIENT)
Dept: PHARMACY | Age: 71
End: 2021-01-25

## 2021-01-25 NOTE — TELEPHONE ENCOUNTER
Rescheduled for 2/1/21 due to building closure.    CLINICAL PHARMACY CONSULT: MED RECONCILIATION/REVIEW ADDENDUM    For Pharmacy Admin Tracking Only    PHSO: NoTime Spent (min): 5    Beatrice Frost PharmD

## 2021-01-27 DIAGNOSIS — F41.8 DEPRESSION WITH ANXIETY: ICD-10-CM

## 2021-01-27 RX ORDER — ESCITALOPRAM OXALATE 20 MG/1
20 TABLET ORAL DAILY
Qty: 90 TABLET | Refills: 1 | Status: CANCELLED | OUTPATIENT
Start: 2021-01-27

## 2021-01-28 ENCOUNTER — TELEPHONE (OUTPATIENT)
Dept: FAMILY MEDICINE CLINIC | Age: 71
End: 2021-01-28

## 2021-01-28 NOTE — TELEPHONE ENCOUNTER
Patient left message on Rx line requesting refill refill on escitalopram.  Called patient to advise her the NP is not at this office anymore and we would need to get her scheduled with a different provider. Patient was offered Alexandre cruz. Patient state she does not need an appointment she just needs a refill on her medication. Advised her she would need to see a provider in order to get her refill. Patient state she will just call Dr. Adela Guevara then.

## 2021-01-29 NOTE — TELEPHONE ENCOUNTER
I spoke with manager at the [place] [address] to let her know that Sreedhar Carin is no longer in [place] and she will notify her staff.

## 2021-02-01 ENCOUNTER — TELEPHONE (OUTPATIENT)
Dept: PHARMACY | Age: 71
End: 2021-02-01

## 2021-02-01 NOTE — TELEPHONE ENCOUNTER
Rescheduled for 2/8 due to weather.      CLINICAL PHARMACY CONSULT: MED RECONCILIATION/REVIEW ADDENDUM    For Pharmacy Admin Tracking Only    PHSO: Yes  Total # of Interventions Recommended: 0    Total Interventions Accepted: 0  Time Spent (min): 5    Sanjuanita Quintero, SarahD

## 2021-02-08 ENCOUNTER — ANTI-COAG VISIT (OUTPATIENT)
Dept: PHARMACY | Age: 71
End: 2021-02-08
Payer: MEDICARE

## 2021-02-08 VITALS — TEMPERATURE: 97.8 F

## 2021-02-08 DIAGNOSIS — Z95.2 S/P MITRAL VALVE REPLACEMENT: ICD-10-CM

## 2021-02-08 DIAGNOSIS — Z79.01 ANTICOAGULATED ON COUMADIN: ICD-10-CM

## 2021-02-08 LAB
INTERNATIONAL NORMALIZATION RATIO, POC: 3.6
POC INR: 3.6 INDEX
PROTHROMBIN TIME, POC: 43.4 SECONDS (ref 10–14.3)

## 2021-02-08 PROCEDURE — 36416 COLLJ CAPILLARY BLOOD SPEC: CPT

## 2021-02-08 PROCEDURE — 99212 OFFICE O/P EST SF 10 MIN: CPT

## 2021-02-08 PROCEDURE — 85610 PROTHROMBIN TIME: CPT

## 2021-02-08 RX ORDER — WARFARIN SODIUM 5 MG/1
5 TABLET ORAL DAILY
COMMUNITY
End: 2021-04-05 | Stop reason: SDUPTHER

## 2021-02-08 NOTE — PROGRESS NOTES
Medication Management Service  [facility]  [phone]    Visit Date: 2/8/2021   Subjective:       Basia Staples is a 79 y.o. female who presents to clinic today for anticoagulation monitoring and adjustment. Patient seen in clinic for warfarin management due to  Indication:   bio prosthetic valve replacement. INR goal: of 2.0-3.0. Duration of therapy: indefinite. Patient reports the following:   Adherent with regimen  Missed or extra doses:  None   Bleeding or thromboembolic side effects:  None  Significant medication changes:  None  Significant dietary changes: None  Significant alcohol or tobacco changes: None  Significant recent illness, disease state changes, or hospitalization:  None  Upcoming surgeries or procedures:  None  Falls: Fell into table about 3 weeks ago. Bruising from wrist to elbow  Assessment and Plan     PT/INR done in office per protocol. INR today is 3.6, supratherapeutic. Patient has extensive bruising from wrist to elbow after fall into table after pant bottom got caught on rocking chair about 3 weeks ago. Encouraged patient to have arm evaluated further as she reports she is waking up at night from the pain still. Plan: Take 2.5mg of warfarin today then decrease weekly dose ~12% to warfarin 5mg daily except 7.5mg on Wednesdays. Recheck INR in 2 week(s). Patient verbalized understanding of dosing directions and information discussed. Dosing schedule given to patient including phone number, appointment date, and time. Progress note sent to referring office. Patient acknowledges working in consult agreement with pharmacist as referred by his/her physician.       Electronically signed by ROSITA Byrd [facility] on 2/8/21 at 10:21 AM EST    CLINICAL PHARMACY CONSULT: MED RECONCILIATION/REVIEW ADDENDUM    For Pharmacy Admin Tracking Only    PHSO: Yes  Total # of Interventions Recommended: 1  - Decreased Dose #: 1  - Maintenance Safety Lab Monitoring #: 1  Total Interventions Accepted: 1  Time Spent (min): 30    Aliya Menchaca, SarahD

## 2021-02-12 ENCOUNTER — OFFICE VISIT (OUTPATIENT)
Dept: CARDIOLOGY CLINIC | Age: 71
End: 2021-02-12
Payer: MEDICARE

## 2021-02-12 VITALS
WEIGHT: 182 LBS | SYSTOLIC BLOOD PRESSURE: 126 MMHG | DIASTOLIC BLOOD PRESSURE: 72 MMHG | HEART RATE: 68 BPM | HEIGHT: 63 IN | BODY MASS INDEX: 32.25 KG/M2

## 2021-02-12 DIAGNOSIS — I73.9 PAD (PERIPHERAL ARTERY DISEASE) (HCC): ICD-10-CM

## 2021-02-12 DIAGNOSIS — I05.0 RHEUMATIC MITRAL STENOSIS: ICD-10-CM

## 2021-02-12 DIAGNOSIS — R06.02 SOB (SHORTNESS OF BREATH) ON EXERTION: ICD-10-CM

## 2021-02-12 DIAGNOSIS — Z95.2 S/P MITRAL VALVE REPLACEMENT: ICD-10-CM

## 2021-02-12 DIAGNOSIS — I42.9 CARDIOMYOPATHY, PRIMARY (HCC): Primary | ICD-10-CM

## 2021-02-12 DIAGNOSIS — J96.12 CHRONIC RESPIRATORY FAILURE WITH HYPOXIA AND HYPERCAPNIA (HCC): ICD-10-CM

## 2021-02-12 DIAGNOSIS — J96.11 CHRONIC RESPIRATORY FAILURE WITH HYPOXIA AND HYPERCAPNIA (HCC): ICD-10-CM

## 2021-02-12 DIAGNOSIS — Z95.5 S/P RIGHT CORONARY ARTERY (RCA) STENT PLACEMENT: ICD-10-CM

## 2021-02-12 DIAGNOSIS — I10 ESSENTIAL HYPERTENSION: ICD-10-CM

## 2021-02-12 PROCEDURE — 99214 OFFICE O/P EST MOD 30 MIN: CPT | Performed by: INTERNAL MEDICINE

## 2021-02-12 NOTE — PROGRESS NOTES
CARDIOLOGY NOTE      Chief Complaint: Coronary artery disease, mitral valve disease    HPI:   Stevie Mark is a 79y.o. year old who has history as noted below. She is not using oxygen today although she uses up to 3 L at home. She did fall down recently resulting in right hand laceration she thinks she tripped on a rocking chair. INR is being followed by Coumadin clinic. Denies any other falls  Sees Dr. Cristian Aleman for pulmonology she notices ankle swelling once in a while has some shortness of breath on exertion. She has history of mitral valve replacement with a bioprosthetic valve. She used to see Dr. Dong Donald. Stevie Mark is on Coumadin due to history of A. fib although I do not have any recent documentation of A. fib t she denies chest pressure or pain except when she is in a hurry chasing after her dogs   she notices some chest pressure when she is running after  Her dog. Her puppy is Ginger and she has 3 other chuwawa  which keeps her busy , her DIL Gurwinder Fernandez works in scheduling )      Current Outpatient Medications   Medication Sig Dispense Refill    warfarin (COUMADIN) 5 MG tablet Take 5 mg by mouth daily Except take 7.5mg (1 and 1/2 tablets) on Wednesdays      furosemide (LASIX) 20 MG tablet Take 1/2 (one-half) tablet by mouth once daily 45 tablet 0    cilostazol (PLETAL) 100 MG tablet Take 1 tablet by mouth twice daily 180 tablet 0    amLODIPine (NORVASC) 5 MG tablet Take 1 tablet by mouth daily 30 tablet 2    budesonide-formoterol (SYMBICORT) 160-4.5 MCG/ACT AERO Inhale 2 puffs into the lungs 2 times daily 1 Inhaler 5    nitroGLYCERIN (NITROSTAT) 0.4 MG SL tablet Place 1 tablet under the tongue every 5 minutes as needed for Chest pain up to max of 3 total doses.  If no relief after 1 dose, call 911. 25 tablet 3    atorvastatin (LIPITOR) 40 MG tablet Take 1 tablet by mouth daily 90 tablet 3    Blood Pressure Monitoring KIT 1 Units by Does not apply route 2 times daily 1 kit 0    carvedilol (COREG) 25 MG tablet Take 1 tablet by mouth 2 times daily 180 tablet 1    lisinopril (PRINIVIL;ZESTRIL) 40 MG tablet Take 1 tablet by mouth daily 90 tablet 1    levothyroxine (SYNTHROID) 100 MCG tablet Take 1 tablet by mouth Daily 90 tablet 3    escitalopram (LEXAPRO) 20 MG tablet Take 1 tablet by mouth daily 90 tablet 1    pantoprazole (PROTONIX) 40 MG tablet Take 1 tablet by mouth daily 90 tablet 3    Calcium Carb-Cholecalciferol (CALCIUM 600+D) 600-800 MG-UNIT TABS Take 1 tablet by mouth daily      aspirin 81 MG EC tablet Take 1 tablet by mouth daily 30 tablet 3    Multiple Vitamins-Minerals (THERAPEUTIC MULTIVITAMIN-MINERALS) tablet Take 1 tablet by mouth daily      varenicline (CHANTIX STARTING MONTH PAK) 0.5 MG X 11 & 1 MG X 42 tablet Take by mouth. (Patient not taking: Reported on 2/12/2021) 1 box 0    varenicline (CHANTIX) 1 MG tablet Take 1 tablet by mouth 2 times daily (Patient not taking: Reported on 2/12/2021) 180 tablet 3     No current facility-administered medications for this visit. Allergies:   Patient has no known allergies. Patient History:  Past Medical History:   Diagnosis Date    CAD (coronary artery disease)     [facility] obstructive [facility]) [facility] [facility]     H/O echocardiogram 03/22/2017    EF 35-40%    H/O echocardiogram 08/04/2020    EF 45-50, Mild TR & ME, Severely dilated left atrium.   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, functionally normally     [facility] attack [facility]) 1/2014    Hyperlipidemia     Hypertension     PAD (peripheral artery disease) ([facility] [facility]     S/P right coronary artery (RCA) stent placement     T2 vertebral fracture ([facility] [address] [facility] 12/2017    TIA (transient ischemic attack)     Tobacco dependence      Past Surgical History:   Procedure Laterality Date    CARDIAC SURGERY      CORONARY ANGIOPLASTY WITH STENT PLACEMENT      CORONARY ARTERY BYPASS GRAFT  06/21/2016    CABG X1 SVG to RCA with Mitral Valve Replacement 27mm Mosaic     MITRAL VALVE REPLACEMENT  06/21/2016    27mm mosaic     Family History   Problem Relation Age of Onset   Bethel Bars Stroke Mother     Heart Attack Father     Coronary Art Dis Father    Bethel Bars Pacemaker Father    Bethel Bars Arrhythmia Father     Diabetes Sister     Rheum Arthritis Sister     Heart Attack Sister     Arrhythmia Sister      Social History     Tobacco Use    Smoking status: Current Every Day Smoker     Packs/day: 0.50     Types: Cigarettes     Last attempt to quit: 10/18/2017     Years since quitting: 3.3    Smokeless tobacco: Never Used   Substance Use Topics    Alcohol use: No     Comment: 2 cups of coffee in the a.m. Review of Systems:   · Constitutional: No Fever or Weight Loss   · Eyes: No Decreased Vision  · ENT: No Headaches, Hearing Loss or Vertigo  · Cardiovascular: as per note above   · Respiratory: No cough or wheezing and as per note above. · Gastrointestinal: No abdominal pain, appetite loss, blood in stools, constipation, diarrhea or heartburn  · Genitourinary: No dysuria, trouble voiding, or hematuria  · Musculoskeletal:  None  · Integumentary: No rash or pruritis  · Neurological: No TIA or stroke symptoms  · Psychiatric: No anxiety or depression  · Endocrine: No malaise, fatigue or temperature intolerance  · Hematologic/Lymphatic: No bleeding problems, blood clots or swollen lymph nodes  · Allergic/Immunologic: No nasal congestion or hives    Objective:      Physical Exam:  /72   Pulse 68   Ht 5' 3\" (1.6 m)   Wt 182 lb (82.6 kg)   BMI 32.24 kg/m²   Wt Readings from Last 3 Encounters:   02/12/21 182 lb (82.6 kg)   01/15/21 181 lb 9.6 oz (82.4 kg)   11/16/20 181 lb 9.6 oz (82.4 kg)     Body mass index is 32.24 kg/m². Vitals:    02/12/21 0848   BP: 126/72   Pulse: 68      General Appearance:  No distress, conversant  Constitutional:  Well developed, Well nourished, No acute distress, Non-toxic appearance.    HENT:  Normocephalic, Atraumatic, Bilateral external ears normal, 13.9 08/03/2020    HCT 41.4 08/03/2020     08/03/2020     Echo 3/20/19  Summary   Echocardiogram is s/p CABG and MVR 6/21/2016. LV systolic function is abnormal.   Visually estimated ejection fraction is 35-40 %. Grade III diastolic dysfunction. Mild left atrium enlargement. Bioprostethic mitral valve is seated well and functioning properly. No pericardial effusion. Echo 8/4/2020  Summary   Left ventricular function is low normal, EF is estimated at 45-50%. Severely dilated left atrium. Evidence of a porcine bio-prosthetic valve in the mitral position,   functionally normally with a mean gradient of 3mmHg. Sclerotic, but non-stenotic aortic valve. Mild tricuspid and pulmonic regurgitation noted. No evidence of pericardial effusion. All labs, medications and tests reviewed by myself including data and history from outside source , patient and available family . Assessment & Plan:      1. Cardiomyopathy, primary ([facility] [address] [facility]    2. Chronic respiratory failure with hypoxia and hypercapnia (HCC)    3. PAD (peripheral artery disease) ([facility] [address] [facility]    4. Rheumatic mitral stenosis    5. S/P mitral valve replacement    6. S/P right coronary artery (RCA) stent placement    7. SOB (shortness of breath) on exertion    8. Essential hypertension       Hypertension  Blood pressures is well controlled on carvedilol 25mg twice daily and  lisinopril 40 mg daily keep a log of blood pressure,  Norvasc 2.5 mg daily    Coronary artery disease   S/p  bypass surgery X 1 vessel to RCA at time of her valve replacemcent. She denies any angina . Continue aspirin she denies any angina but once marlen while gest chest pressure when she is running after her her dogs at some point we may get a Cesilia [place] she has not had one for few years now    Cardiomyopathy, primary ([facility] [address]  EF of  40 %- 45 % probably related to valve disease / ischemic? She had MI in 2014 . She used to see Dr Naty Newberry.   Her echo shows EF of 45-50 % , is not appear to be volume overloaded uses Lasix few times a week    S/P mitral valve replacement  Mosaic Mitral valve valve replacement 27 mm  In June 2016  heavily calcified annulus with  posterior annulus reinforced and constructed with gail patch used as a buttress. She knows and always takes dental prophylaxis  A # 27 [employer] tissue. She has been on coumadin since before her surgery due to TIA? I am note sure why? But I have not stopped it. Patient cannot tell me why she was on it I cannot get records from Dr. Katheryn Colón office and presuming she had A. Fib? .  We debated about switching to NOACs or stopping anticoagulation when putting in a loop recorder? For surveillance and stopping anticoagulation  We will get 30-day monitor first to look for A. fib    Peripheral arterial disease   Post aortobifem bypass. She is on Pletal, she denies any leg pain or claudication no signs or complaints of heart failure no admissions for heart failure  No signs of heart failure for now continue    Tobacco abuse  I have asked her to to quit even try Chantix. He is smoking in spite of being on 3.5 L a day     Dyslipidemia :  All available lab work was reviewed. Patient was advised to repeat lab work before next visit       Counseled extensively and medication compliance urged. We discussed that for the  prevention of ASCVD our  goal is aggressive risk modification. Patient is encouraged to exercise even a brisk walk for 30 minutes  at least 3 to 4 times a week   Various goals were discussed and questions answered. Continue current medications. Appropriate prescriptions are addressed and refills ordered. Questions answered and patient verbalizes understanding. Call for any problems, questions, or concerns. Continue all other medications of all above medical condition listed as is. Return in about 3 months (around 5/12/2021).     Please note this report has been partially produced using speech recognition software and may contain errors related to that system including errors in grammar, punctuation, and spelling, as well as words and phrases that may be inappropriate.  If there are any questions or concerns please feel free to contact the dictating provider for clarification.

## 2021-02-19 ENCOUNTER — NURSE ONLY (OUTPATIENT)
Dept: CARDIOLOGY CLINIC | Age: 71
End: 2021-02-19
Payer: MEDICARE

## 2021-02-19 ENCOUNTER — OFFICE VISIT (OUTPATIENT)
Dept: CARDIOLOGY CLINIC | Age: 71
End: 2021-02-19

## 2021-02-19 DIAGNOSIS — I42.9 CARDIOMYOPATHY, PRIMARY (HCC): ICD-10-CM

## 2021-02-19 DIAGNOSIS — Z95.5 S/P RIGHT CORONARY ARTERY (RCA) STENT PLACEMENT: ICD-10-CM

## 2021-02-19 DIAGNOSIS — I48.91 ATRIAL FIBRILLATION, UNSPECIFIED TYPE (HCC): Primary | ICD-10-CM

## 2021-02-19 DIAGNOSIS — Z95.2 S/P MITRAL VALVE REPLACEMENT: ICD-10-CM

## 2021-02-19 DIAGNOSIS — I10 ESSENTIAL HYPERTENSION: ICD-10-CM

## 2021-02-19 DIAGNOSIS — I10 ESSENTIAL HYPERTENSION: Primary | ICD-10-CM

## 2021-02-19 DIAGNOSIS — I05.0 RHEUMATIC MITRAL STENOSIS: ICD-10-CM

## 2021-02-19 PROCEDURE — 93228 REMOTE 30 DAY ECG REV/REPORT: CPT | Performed by: INTERNAL MEDICINE

## 2021-02-19 PROCEDURE — 99999 PR OFFICE/OUTPT VISIT,PROCEDURE ONLY: CPT | Performed by: INTERNAL MEDICINE

## 2021-02-19 NOTE — PROGRESS NOTES
30 days e-cardio monitor placed.  # [id number]. Instructed patient on how to record the event and to call monitoring center at [phone] if any problems arise. Instructed patient to disconnect the lead wires from the electrodes before bathing or showering and reattach them afterwards. Instructed patient that the electrodes should be changed every 3 days or if they no longer adhere to the skin. Patient to mail package after the monitor has ended. Patient verbalized understanding.

## 2021-02-22 ENCOUNTER — ANTI-COAG VISIT (OUTPATIENT)
Dept: PHARMACY | Age: 71
End: 2021-02-22
Payer: MEDICARE

## 2021-02-22 VITALS — TEMPERATURE: 97.3 F

## 2021-02-22 DIAGNOSIS — Z95.2 S/P MITRAL VALVE REPLACEMENT: ICD-10-CM

## 2021-02-22 DIAGNOSIS — Z79.01 ANTICOAGULATED ON COUMADIN: ICD-10-CM

## 2021-02-22 LAB
INTERNATIONAL NORMALIZATION RATIO, POC: 2.6
POC INR: 2.6 INDEX
PROTHROMBIN TIME, POC: 31 SECONDS (ref 10–14.3)

## 2021-02-22 PROCEDURE — 36416 COLLJ CAPILLARY BLOOD SPEC: CPT

## 2021-02-22 PROCEDURE — 99211 OFF/OP EST MAY X REQ PHY/QHP: CPT

## 2021-02-22 PROCEDURE — 85610 PROTHROMBIN TIME: CPT

## 2021-02-22 NOTE — PROGRESS NOTES
Medication Management Service  [facility]  [phone]    Visit Date: 2/22/2021   Subjective:       Claudy Olivares is a 79 y.o. female who presents to clinic today for anticoagulation monitoring and adjustment. Patient seen in clinic for warfarin management due to  Indication:   bio prosthetic valve replacement. INR goal: of 2.0-3.0. Duration of therapy: indefinite. Patient reports the following:   Adherent with regimen  Missed or extra doses:  None   Bleeding or thromboembolic side effects:  None  Significant medication changes:  None  Significant dietary changes: None  Significant alcohol or tobacco changes: None  Significant recent illness, disease state changes, or hospitalization:  None  Upcoming surgeries or procedures:  None  Falls: None           Assessment and Plan     PT/INR done in office per protocol. INR today is 2.6, therapeutic. Patient currently wearing 30 day heart monitor. Patient states she dose not want to stop warfarin even if no evidence of atrial fibrillation due to past history of a TIA in 1996 and MI. Advised patient to discuss further with Dr. Kenji Draper. Explained that risks of a medication can sometimes outweigh a benefit and that a MI can still happen even if on warfarin. Plan: Will continue current regimen of warfarin 5mg daily except 7.5mg on Wednesdays. Recheck INR in 3 week(s). Patient verbalized understanding of dosing directions and information discussed. Dosing schedule given to patient including phone number, appointment date, and time. Progress note sent to referring office. Patient acknowledges working in consult agreement with pharmacist as referred by his/her physician.       Electronically signed by Eduardo Arreola, [address] [place] [address] on 2/22/21 at 10:52 AM EST    CLINICAL PHARMACY CONSULT: MED RECONCILIATION/REVIEW ADDENDUM    For Pharmacy Admin Tracking Only    PHSO: Yes  Total # of Interventions Recommended: 0    - Maintenance Safety Lab Monitoring #: 1  Total Interventions Accepted: 0  Time Spent (min): 15    Sarah HarkinsD

## 2021-02-23 RX ORDER — PANTOPRAZOLE SODIUM 40 MG/1
40 TABLET, DELAYED RELEASE ORAL DAILY
Qty: 90 TABLET | Refills: 3 | Status: SHIPPED | OUTPATIENT
Start: 2021-02-23 | End: 2022-02-16

## 2021-02-26 ENCOUNTER — TELEPHONE (OUTPATIENT)
Dept: CARDIOLOGY CLINIC | Age: 71
End: 2021-02-26

## 2021-02-26 NOTE — TELEPHONE ENCOUNTER
Spoke with patient,  had called about concern for PCP meds. Reminded patient that we had discussed walk in clinic for refills.  She will look into this

## 2021-03-15 ENCOUNTER — HOSPITAL ENCOUNTER (OUTPATIENT)
Age: 71
Discharge: HOME | End: 2021-03-15
Payer: MEDICARE

## 2021-03-15 ENCOUNTER — TELEPHONE (OUTPATIENT)
Dept: CARDIOLOGY CLINIC | Age: 71
End: 2021-03-15

## 2021-03-15 ENCOUNTER — ANTI-COAG VISIT (OUTPATIENT)
Dept: PHARMACY | Age: 71
End: 2021-03-15
Payer: MEDICARE

## 2021-03-15 VITALS — TEMPERATURE: 97.8 F

## 2021-03-15 DIAGNOSIS — E78.2 MIXED HYPERLIPIDEMIA: Primary | ICD-10-CM

## 2021-03-15 DIAGNOSIS — Z95.2 S/P MITRAL VALVE REPLACEMENT: ICD-10-CM

## 2021-03-15 DIAGNOSIS — Z79.01 ANTICOAGULATED ON COUMADIN: ICD-10-CM

## 2021-03-15 LAB
ALBUMIN SERPL-MCNC: 4.2 GM/DL (ref 3.4–5)
ALP BLD-CCNC: 77 IU/L (ref 40–128)
ALT SERPL-CCNC: 20 U/L (ref 10–40)
ANION GAP SERPL CALCULATED.3IONS-SCNC: 9 MMOL/L (ref 4–16)
AST SERPL-CCNC: 23 IU/L (ref 15–37)
BILIRUB SERPL-MCNC: 0.5 MG/DL (ref 0–1)
BUN BLDV-MCNC: 16 MG/DL (ref 6–23)
CALCIUM SERPL-MCNC: 9.4 MG/DL (ref 8.3–10.6)
CHLORIDE BLD-SCNC: 103 MMOL/L (ref 99–110)
CHOLESTEROL: 121 MG/DL
CO2: 29 MMOL/L (ref 21–32)
CREAT SERPL-MCNC: 1.3 MG/DL (ref 0.6–1.1)
GFR AFRICAN AMERICAN: 49 ML/MIN/1.73M2
GFR NON-AFRICAN AMERICAN: 40 ML/MIN/1.73M2
GLUCOSE BLD-MCNC: 105 MG/DL (ref 70–99)
HCT VFR BLD CALC: 42.9 % (ref 37–47)
HDLC SERPL-MCNC: 45 MG/DL
HEMOGLOBIN: 13.9 GM/DL (ref 12.5–16)
INTERNATIONAL NORMALIZATION RATIO, POC: 2.2
LDL CHOLESTEROL DIRECT: 64 MG/DL
MCH RBC QN AUTO: 32.2 PG (ref 27–31)
MCHC RBC AUTO-ENTMCNC: 32.4 % (ref 32–36)
MCV RBC AUTO: 99.3 FL (ref 78–100)
PDW BLD-RTO: 12.6 % (ref 11.7–14.9)
PLATELET # BLD: 195 K/CU MM (ref 140–440)
PMV BLD AUTO: 11.1 FL (ref 7.5–11.1)
POC INR: 2.2 INDEX
POTASSIUM SERPL-SCNC: 4.7 MMOL/L (ref 3.5–5.1)
PROTHROMBIN TIME, POC: 26.7 SECONDS (ref 10–14.3)
RBC # BLD: 4.32 M/CU MM (ref 4.2–5.4)
SODIUM BLD-SCNC: 141 MMOL/L (ref 135–145)
TOTAL PROTEIN: 7.1 GM/DL (ref 6.4–8.2)
TRIGL SERPL-MCNC: 86 MG/DL
WBC # BLD: 8 K/CU MM (ref 4–10.5)

## 2021-03-15 PROCEDURE — 83721 ASSAY OF BLOOD LIPOPROTEIN: CPT

## 2021-03-15 PROCEDURE — 80053 COMPREHEN METABOLIC PANEL: CPT

## 2021-03-15 PROCEDURE — 85027 COMPLETE CBC AUTOMATED: CPT

## 2021-03-15 PROCEDURE — 36416 COLLJ CAPILLARY BLOOD SPEC: CPT

## 2021-03-15 PROCEDURE — 85610 PROTHROMBIN TIME: CPT

## 2021-03-15 PROCEDURE — 36415 COLL VENOUS BLD VENIPUNCTURE: CPT

## 2021-03-15 PROCEDURE — 80061 LIPID PANEL: CPT

## 2021-03-15 PROCEDURE — 99211 OFF/OP EST MAY X REQ PHY/QHP: CPT

## 2021-03-15 NOTE — TELEPHONE ENCOUNTER
Lab called for a new code for Lipids  The patient was there today and the  Codes on her order would not pass

## 2021-04-05 ENCOUNTER — TELEPHONE (OUTPATIENT)
Dept: PHARMACY | Age: 71
End: 2021-04-05

## 2021-04-05 RX ORDER — FUROSEMIDE 20 MG/1
TABLET ORAL
Qty: 45 TABLET | Refills: 0 | Status: SHIPPED | OUTPATIENT
Start: 2021-04-05 | End: 2021-07-21

## 2021-04-05 RX ORDER — AMLODIPINE BESYLATE 5 MG/1
5 TABLET ORAL DAILY
Qty: 90 TABLET | Refills: 1 | Status: ON HOLD | OUTPATIENT
Start: 2021-04-05 | End: 2021-06-26 | Stop reason: SDUPTHER

## 2021-04-05 RX ORDER — WARFARIN SODIUM 5 MG/1
5 TABLET ORAL DAILY
Qty: 33 TABLET | Refills: 2 | Status: ON HOLD | OUTPATIENT
Start: 2021-04-05 | End: 2021-06-26 | Stop reason: HOSPADM

## 2021-04-05 NOTE — TELEPHONE ENCOUNTER
Patient left voicemail requesting refill. ERx sent to [facility].    CLINICAL PHARMACY CONSULT: MED RECONCILIATION/REVIEW ADDENDUM    For Pharmacy Admin Tracking Only    PHSO: Yes  Total # of Interventions Recommended: 0  - Refills Provided #: 1Total Interventions Accepted: 0  Time Spent (min): 5    Love Loza, PharmD

## 2021-04-09 ENCOUNTER — TELEPHONE (OUTPATIENT)
Dept: CARDIOLOGY CLINIC | Age: 71
End: 2021-04-09

## 2021-04-09 NOTE — TELEPHONE ENCOUNTER
Patient given results of event monitor and advised that she needs appt to discuss results. ----- Message from Candelaria Greenwood MD sent at 4/7/2021  6:14 PM EDT -----  30-day monitor worn from 2/19/2021 to 3/20/2021 there were for patient triggers 6 auto triggers. Lowest heart rate was 51 at 9:47 PM average heart rate was 75 highest heart rate 117 patient was primarily in sinus rhythm no episodes of A. fib or SVT noted.   One episode of wide-complex tachycardia noted with possible fusion beats at 10:33 PM on day 16 which could be PVCs or aberrant conduction uptitrate beta-blockers clinical correlation needed

## 2021-04-12 ENCOUNTER — ANTI-COAG VISIT (OUTPATIENT)
Dept: PHARMACY | Age: 71
End: 2021-04-12
Payer: MEDICARE

## 2021-04-12 DIAGNOSIS — Z79.01 ANTICOAGULATED ON COUMADIN: ICD-10-CM

## 2021-04-12 DIAGNOSIS — Z95.2 S/P MITRAL VALVE REPLACEMENT: ICD-10-CM

## 2021-04-12 PROCEDURE — 36416 COLLJ CAPILLARY BLOOD SPEC: CPT

## 2021-04-12 PROCEDURE — 85610 PROTHROMBIN TIME: CPT

## 2021-04-12 PROCEDURE — 99211 OFF/OP EST MAY X REQ PHY/QHP: CPT

## 2021-04-12 NOTE — PROGRESS NOTES
uzair  Medication Management Service  [place] [facility]  [phone]    Visit Date: 4/12/2021   Subjective:       Kurtis Opitz is a 70 y.o. female who presents to clinic today for anticoagulation monitoring and adjustment. Patient seen in clinic for warfarin management due to  Indication:   bio prosthetic valve replacement. INR goal: of 2.0-3.0. Duration of therapy: indefinite. Patient reports the following:   Adherent with regimen  Missed or extra doses:  None   Bleeding or thromboembolic side effects:  None  Significant medication changes:  None  Significant dietary changes: None  Significant alcohol or tobacco changes: None  Significant recent illness, disease state changes, or hospitalization:  None  Upcoming surgeries or procedures:  None  Falls: None           Assessment and Plan     PT/INR done in office per protocol. INR today is 2.1, therapeutic. Patient says she is to see Dr. Nader Georges this Friday for results of 30 day heart monitor. Plan: Will continue current regimen of warfarin 5mg daily except 7.5mg on Wednesdays. Recheck INR in 4 week(s). Patient verbalized understanding of dosing directions and information discussed. Dosing schedule given to patient including phone number, appointment date, and time. Progress note sent to referring office. Patient acknowledges working in consult agreement with pharmacist as referred by his/her physician.       Electronically signed by ROSITA Sanders [facility] on 4/12/21 at 8:32 AM EDT    CLINICAL PHARMACY CONSULT: MED RECONCILIATION/REVIEW ADDENDUM    For Pharmacy Admin Tracking Only    PHSO: Yes  Total # of Interventions Recommended: 0    - Maintenance Safety Lab Monitoring #: 1  Total Interventions Accepted: 0  Time Spent (min): Judge Nava, PharmD

## 2021-04-16 ENCOUNTER — OFFICE VISIT (OUTPATIENT)
Dept: CARDIOLOGY CLINIC | Age: 71
End: 2021-04-16
Payer: MEDICARE

## 2021-04-16 VITALS
SYSTOLIC BLOOD PRESSURE: 132 MMHG | BODY MASS INDEX: 31.57 KG/M2 | WEIGHT: 178.2 LBS | HEART RATE: 68 BPM | DIASTOLIC BLOOD PRESSURE: 70 MMHG | HEIGHT: 63 IN

## 2021-04-16 DIAGNOSIS — I10 ESSENTIAL HYPERTENSION: ICD-10-CM

## 2021-04-16 DIAGNOSIS — J96.12 CHRONIC RESPIRATORY FAILURE WITH HYPOXIA AND HYPERCAPNIA (HCC): ICD-10-CM

## 2021-04-16 DIAGNOSIS — J96.11 CHRONIC RESPIRATORY FAILURE WITH HYPOXIA AND HYPERCAPNIA (HCC): ICD-10-CM

## 2021-04-16 DIAGNOSIS — Z95.2 S/P MITRAL VALVE REPLACEMENT: ICD-10-CM

## 2021-04-16 DIAGNOSIS — I05.0 RHEUMATIC MITRAL STENOSIS: ICD-10-CM

## 2021-04-16 DIAGNOSIS — K43.9 HERNIA OF ANTERIOR ABDOMINAL WALL: Primary | ICD-10-CM

## 2021-04-16 DIAGNOSIS — G47.33 OSA (OBSTRUCTIVE SLEEP APNEA): ICD-10-CM

## 2021-04-16 DIAGNOSIS — I42.9 CARDIOMYOPATHY, PRIMARY (HCC): ICD-10-CM

## 2021-04-16 DIAGNOSIS — I25.10 ASCVD (ARTERIOSCLEROTIC CARDIOVASCULAR DISEASE): ICD-10-CM

## 2021-04-16 DIAGNOSIS — E78.2 MIXED HYPERLIPIDEMIA: ICD-10-CM

## 2021-04-16 DIAGNOSIS — G47.10 HYPERSOMNIA: ICD-10-CM

## 2021-04-16 DIAGNOSIS — R06.02 SOB (SHORTNESS OF BREATH) ON EXERTION: ICD-10-CM

## 2021-04-16 DIAGNOSIS — Z72.0 TOBACCO ABUSE: ICD-10-CM

## 2021-04-16 DIAGNOSIS — I25.10 CAD IN NATIVE ARTERY: ICD-10-CM

## 2021-04-16 DIAGNOSIS — I73.9 PAD (PERIPHERAL ARTERY DISEASE) (HCC): ICD-10-CM

## 2021-04-16 DIAGNOSIS — Z95.5 S/P RIGHT CORONARY ARTERY (RCA) STENT PLACEMENT: ICD-10-CM

## 2021-04-16 PROCEDURE — 99214 OFFICE O/P EST MOD 30 MIN: CPT | Performed by: INTERNAL MEDICINE

## 2021-04-16 PROCEDURE — 93000 ELECTROCARDIOGRAM COMPLETE: CPT | Performed by: INTERNAL MEDICINE

## 2021-04-16 RX ORDER — VARENICLINE TARTRATE 1 MG/1
1 TABLET, FILM COATED ORAL 2 TIMES DAILY
Qty: 180 TABLET | Refills: 3 | Status: SHIPPED | OUTPATIENT
Start: 2021-04-16 | End: 2021-06-18 | Stop reason: ALTCHOICE

## 2021-04-16 RX ORDER — VARENICLINE TARTRATE
KIT
Qty: 1 BOX | Refills: 0 | Status: SHIPPED | OUTPATIENT
Start: 2021-04-16 | End: 2021-06-18 | Stop reason: ALTCHOICE

## 2021-04-16 NOTE — PROGRESS NOTES
CARDIOLOGY NOTE      Chief Complaint: Coronary artery disease, mitral valve disease    HPI:   Rajeev Lantigua is a 70y.o. year old who has history as noted below. She is not using oxygen today although she uses up to 3 L at home. She did fall down recently , likely did not injure herself her 30-day event monitor did not show any A. fib but she tells me that in the past she has had strokes and has history of \"thick blood\" and she was put on Coumadin by Dr. Mio Alcantar. He lives with her  has been  for 50 years  She Is complaining of abdominal swelling  INR is being followed by Coumadin clinic. Sees Dr. Latonya Buitrago for pulmonology she notices ankle swelling once in a while has some shortness of breath on exertion. She has history of mitral valve replacement with a bioprosthetic valve. She used to see Dr. Mio Alcantar.   she denies chest pressure or pain except when she is in a hurry chasing after her dogs   she notices some chest pressure when she is running after  Her dog. Her puppy is Estelita and she has 3 other chuwawa  which keeps her busy , her DIL Lise Swenson works in scheduling )      Current Outpatient Medications   Medication Sig Dispense Refill    varenicline (CHANTIX STARTING MONTH PAK) 0.5 MG X 11 & 1 MG X 42 tablet Take by mouth.  1 box 0    varenicline (CHANTIX) 1 MG tablet Take 1 tablet by mouth 2 times daily 180 tablet 3    furosemide (LASIX) 20 MG tablet Take 1/2 (one-half) tablet by mouth once daily 45 tablet 0    amLODIPine (NORVASC) 5 MG tablet Take 1 tablet by mouth daily 90 tablet 1    warfarin (COUMADIN) 5 MG tablet Take 1 tablet by mouth daily Except take 7.5mg (1 and 1/2 tablets) on Wednesdays 33 tablet 2    pantoprazole (PROTONIX) 40 MG tablet Take 1 tablet by mouth daily 90 tablet 3    cilostazol (PLETAL) 100 MG tablet Take 1 tablet by mouth twice daily 180 tablet 0    budesonide-formoterol (SYMBICORT) 160-4.5 MCG/ACT AERO Inhale 2 puffs into the lungs 2 times daily 1 Inhaler 5    atorvastatin (LIPITOR) 40 MG tablet Take 1 tablet by mouth daily 90 tablet 3    carvedilol (COREG) 25 MG tablet Take 1 tablet by mouth 2 times daily 180 tablet 1    lisinopril (PRINIVIL;ZESTRIL) 40 MG tablet Take 1 tablet by mouth daily 90 tablet 1    levothyroxine (SYNTHROID) 100 MCG tablet Take 1 tablet by mouth Daily 90 tablet 3    Calcium Carb-Cholecalciferol (CALCIUM 600+D) 600-800 MG-UNIT TABS Take 1 tablet by mouth daily      aspirin 81 MG EC tablet Take 1 tablet by mouth daily 30 tablet 3    Multiple Vitamins-Minerals (THERAPEUTIC MULTIVITAMIN-MINERALS) tablet Take 1 tablet by mouth daily      nitroGLYCERIN (NITROSTAT) 0.4 MG SL tablet Place 1 tablet under the tongue every 5 minutes as needed for Chest pain up to max of 3 total doses. If no relief after 1 dose, call 911. (Patient not taking: Reported on 4/16/2021) 25 tablet 3    Blood Pressure Monitoring KIT 1 Units by Does not apply route 2 times daily 1 kit 0    escitalopram (LEXAPRO) 20 MG tablet Take 1 tablet by mouth daily (Patient not taking: Reported on 4/16/2021) 90 tablet 1     No current facility-administered medications for this visit. Allergies:   Patient has no known allergies. Patient History:  Past Medical History:   Diagnosis Date    CAD (coronary artery disease)     COPD (chronic obstructive pulmonary disease) ([facility]     H/O echocardiogram 03/22/2017    EF 35-40%    H/O echocardiogram 08/04/2020    EF 45-50, Mild TR & KS, Severely dilated left atrium.   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, functionally normally     Heart attack [facility]) 1/2014    Hyperlipidemia     Hypertension     [facility]per[facility]     S/P right coronary artery (RCA) stent placement     T[facility] 12/2017    TIA (transient ischemic attack)     Tobacco dependence      Past Surgical History:   Procedure Laterality Date    CARDIAC SURGERY      CORONARY ANGIOPLASTY WITH STENT PLACEMENT      CORONARY ARTERY BYPASS GRAFT  06/21/2016    CABG X1 SVG to RCA with Mitral Valve Replacement 27mm Mosaic     MITRAL VALVE REPLACEMENT  06/21/2016    27mm mosaic     Family History   Problem Relation Age of Onset    Stroke Mother     Heart Attack Father     Coronary Art Dis Father    Mariaelena Carson Pacemaker Father    Mariaelena Carson Arrhythmia Father     Diabetes Sister     Rheum Arthritis Sister     Heart Attack Sister     Arrhythmia Sister      Social History     Tobacco Use    Smoking status: Current Every Day Smoker     Packs/day: 0.50     Types: Cigarettes     Last attempt to quit: 10/18/2017     Years since quitting: 3.4    Smokeless tobacco: Never Used   Substance Use Topics    Alcohol use: No     Comment: 2 cups of coffee in the a.m. Review of Systems:   · Constitutional: No Fever or Weight Loss   · Eyes: No Decreased Vision  · ENT: No Headaches, Hearing Loss or Vertigo  · Cardiovascular: as per note above   · Respiratory: No cough or wheezing and as per note above. · Gastrointestinal: No abdominal pain, appetite loss, blood in stools, constipation, diarrhea or heartburn  · Genitourinary: No dysuria, trouble voiding, or hematuria  · Musculoskeletal:  None  · Integumentary: No rash or pruritis  · Neurological: No TIA or stroke symptoms  · Psychiatric: No anxiety or depression  · Endocrine: No malaise, fatigue or temperature intolerance  · Hematologic/Lymphatic: No bleeding problems, blood clots or swollen lymph nodes  · Allergic/Immunologic: No nasal congestion or hives    Objective:      Physical Exam:  /70 (Site: Left Upper Arm, Position: Sitting, Cuff Size: Large Adult)   Pulse 68   Ht 5' 3\" (1.6 m)   Wt 178 lb 3.2 oz (80.8 kg)   BMI 31.57 kg/m²   Wt Readings from Last 3 Encounters:   04/16/21 178 lb 3.2 oz (80.8 kg)   02/12/21 182 lb (82.6 kg)   01/15/21 181 lb 9.6 oz (82.4 kg)     Body mass index is 31.57 kg/m².   Vitals:    04/16/21 0942   BP: 132/70   Pulse: 68      General Appearance:  No distress, conversant  Constitutional:  Well developed, Well nourished, No acute distress, Non-toxic appearance. HENT:  Normocephalic, Atraumatic, Bilateral external ears normal, Oropharynx moist, No oral exudates, Nose normal. Neck- Normal range of motion, No tenderness, Supple, No stridor,no apical-carotid delay  Eyes:  PERRL, EOMI, Conjunctiva normal, No discharge. Respiratory:  Normal breath sounds, No respiratory distress, No wheezing, No chest tenderness. ,no use of accessory muscles, NO crackles  Cardiovascular: (PMI) apex non displaced,no lifts no thrills,S1 and S2 audible, systolic murmur 2/6, No signs of ankle edema, or volume overload, No evidence of JVD, No crackles  GI:  Bowel sounds normal, Soft, No tenderness, No masses, No gross visceromegaly   :  No costovertebral angle tenderness   Musculoskeletal:  No edema, no tenderness, no deformities.  Back- no tenderness  Integument:  Well hydrated, no rash   Lymphatic:  No lymphadenopathy noted   Neurologic:  Alert & oriented x 3, CN 2-12 normal, normal motor function, normal sensory function, no focal deficits noted   Psychiatric:  Speech and behavior appropriate            Medical decision making and Data review:  DATA:  Lab Results   Component Value Date    TROPONINT <0.010 05/15/2020     BNP:    Lab Results   Component Value Date    PROBNP 1,075 (H) 06/17/2020     PT/INR:  No results found for: [facility]  Lab Results   Component Value Date    LABA1C 5.0 06/18/2016     Lab Results   Component Value Date    CHOL 121 03/15/2021    TRIG 86 03/15/2021    HDL 45 03/15/2021    LDLDIRECT 64 03/15/2021     Lab Results   Component Value Date    ALT 20 03/15/2021    AST 23 03/15/2021     TSH:   Lab Results   Component Value Date    TSH 0.40 07/10/2019     Lab Results   Component Value Date    AST 23 03/15/2021    ALT 20 03/15/2021    BILITOT 0.5 03/15/2021    ALKPHOS 77 03/15/2021     Lab Results   Component Value Date    PROBNP 1,075 (H) 06/17/2020 PROBNP 4,988 (H) 03/20/2017    PROBNP 1,458 (H) 06/12/2016     Lab Results   Component Value Date    LABA1C 5.0 06/18/2016     Lab Results   Component Value Date    WBC 8.0 03/15/2021    HGB 13.9 03/15/2021    HCT 42.9 03/15/2021     03/15/2021     Echo 3/20/19  Summary   Echocardiogram is s/p CABG and MVR 6/21/2016. LV systolic function is abnormal.   Visually estimated ejection fraction is 35-40 %. Grade III diastolic dysfunction. Mild left atrium enlargement. Bioprostethic mitral valve is seated well and functioning properly. No pericardial effusion. Echo 8/4/2020  Summary   Left ventricular function is low normal, EF is estimated at 45-50%. Severely dilated left atrium. Evidence of a porcine bio-prosthetic valve in the mitral position,   functionally normally with a mean gradient of 3mmHg. Sclerotic, but non-stenotic aortic valve. Mild tricuspid and pulmonic regurgitation noted. No evidence of pericardial effusion. 30 day monitor 2/19/21    30-day monitor worn from 2/19/2021 to 3/20/2021 there were for patient triggers 6 auto triggers. [facility] heart rate was 51 at 9:47 PM average heart rate was 75 highest heart rate 117 patient was primarily in sinus rhythm no episodes of A. fib or SVT noted.  One episode of wide-complex tachycardia noted with possible fusion beats at 10:33 PM on day 16 which could be PVCs or aberrant conduction uptitrate beta-blockers clinical correlation needed      All labs, medications and tests reviewed by myself including data and history from outside source , patient and available family . Assessment & Plan:      1. Hernia of anterior abdominal wall    2. CAD in native artery    3. ASCVD (arteriosclerotic cardiovascular disease)    4. Cardiomyopathy, primary ([address]    5. Chronic respiratory failure with hypoxia and hypercapnia (HCC)    6. Mixed hyperlipidemia    7. Hypersomnia    8. Essential hypertension    9. EDITA (obstructive sleep apnea)    10.  PAD (peripheral artery disease) ([facility]    11. Rheumatic mitral stenosis    12. S/P mitral valve replacement    13. S/P right coronary artery (RCA) stent placement    14. SOB (shortness of breath) on exertion    15. Tobacco abuse       Hypertension  Blood pressures is well controlled on carvedilol 25mg twice daily and  lisinopril 40 mg daily keep a log of blood pressure,  Norvasc 2.5 mg daily    Coronary artery disease   S/p  bypass surgery X 1 vessel to RCA at time of her valve surgery. She may need abdominal surgery has not had any noninvasive studies done recently we will get the Lexiscan   Continue aspirin  once marlen while gest chest pressure when she is running after her her dogs at some point we may get a Herbert Felt she has not had one for few years now    Cardiomy[facility] ([facility]  EF of  40 %- 45 % probably related to valve disease / ischemic? She had MI in 2014 . She used to see Dr Perla Fatima. Her echo shows EF of 45-50 % , is not appear to be volume overloaded uses Lasix few times a week    S/P mitral valve replacement  Mosaic Mitral valve valve replacement 27 mm  In June 2016  heavily calcified annulus with  posterior annulus reinforced and constructed with gail patch used as a buttress. She knows and always takes dental prophylaxis  A # 27 [employer] tissue. She has been on coumadin since before her surgery due to TIA? I am note sure why? But I have not stopped it. Patient believes it was because of hypercoagulable status? .  We debated about switching to NOACs or stopping anticoagulation . If she has more falls we may have to switch her or stop anticoagulation      Peripheral arterial disease   Post aortobifem bypass. She is on Pletal, she denies any leg pain or claudication no signs or complaints of heart failure no admissions for heart failure  No signs of heart failure for now continue    Tobacco abuse  I have asked her to to quit even try Chantix.   sHe is smoking in spite of being on 3.5 L a day Dyslipidemia :  All available lab work was reviewed. Patient was advised to repeat lab work before next visit       Counseled extensively and medication compliance urged. We discussed that for the  prevention of ASCVD our  goal is aggressive risk modification. Patient is encouraged to exercise even a brisk walk for 30 minutes  at least 3 to 4 times a week   Various goals were discussed and questions answered. Continue current medications. Appropriate prescriptions are addressed and refills ordered. Questions answered and patient verbalizes understanding. Call for any problems, questions, or concerns. Continue all other medications of all above medical condition listed as is. Return in about 6 months (around 10/16/2021). Please note this report has been partially produced using speech recognition software and may contain errors related to that system including errors in grammar, punctuation, and spelling, as well as words and phrases that may be inappropriate.  If there are any questions or concerns please feel free to contact the dictating provider for clarification.

## 2021-04-16 NOTE — LETTER
Devota Frankel Dr. Tiny Can  1950  [id number]    Have you had any Chest Pain that is not new? - Yes  If Yes DO EKG - How does it feel - pinching   How long does the pain last - 30 seconds    How long have you been having the pain - Months   Did you take a NONE   And did it relieve the pain - it goes awayont it's own   DO EKG IF: Patient has a Heart Rate above 100 or below 40     CAD (Coronary Artery Disease) patient should have one on file every 6 months      Have you had any Shortness of Breath - Yes  If Yes - When at rest and on exertion    Have you had any dizziness - Yes  If Yes DO ORTHOSTATIC BP - when do you feel dizzy with position change   How long does it last .5  seconds      Sitting wait 5 minutes do supine (laying down) wait 5 minutes then do standing - log each in \"vitals\" area in Epic   Be sure to ask what symptoms they are having if they get dizzy while completing ortho stats such as: room spinning, nausea, etc.    Have you had any palpitations that are not new? - Yes  If Yes DO EKG - Do you feel your heart racing  How long does it last - .5  seconds     Is the patient on any of the following medications - NONE  If Yes DO EKG - Needs done every 6 months    Do you have any edema - swelling in No    If Yes - CHECK TO SEE IF THE EDEMA IS   How long have they been having edema -   If Yes - Have they worn compression stockings   If they have worn compression stockings     Vein \"LEG PROBLEM Questionnaire\"  1. Do you have prominent leg veins? 2. Do you have any skin discoloration? 3. Do you have any healed or active sores? 4. Do you have swelling of the legs? 5. Do you have a family history of varicose veins? 6. Does your profession involve pro-longed        standing or heavy lifting? 7. Have you been fighting overweight problems? 8. Do you have restless legs? 9. Do you have any night time cramps?     10. Do you have any of the following in your legs:        tiredness and aching     When did you have your last labs drawn 3/15/2021 in  Epic  Where did you have them done     What doctor ordered       If we do not have these labs you are retrieve these labs for these providers! Do you have a surgery or procedure scheduled in the near future - No  If Yes- DO EKG  If Yes - Who is the surgery with?    Phone number of physician   Fax number of physician   Type of surgery   GIVE THIS INFORMATION TO MIKI MCKINNEY     Ask patient if they want to sign [facility] for [email or web address] if they are not already signed up     Check to see if we have an E-MAIL on file for the patient     Check medication list thoroughly!!! AND RECONCILE OUTSIDE MEDICATIONS  If dose has changed change the entire order not just the [place] At check out add to every patient's \"wrap up\" the following dot phrase AFTERHOURSEDUCATION and ensure we explain this to our patients

## 2021-04-16 NOTE — PATIENT INSTRUCTIONS
**It is YOUR responsibilty to bring medication bottles and/or updated medication list to [address] [place] [address]. This will allow us to better serve you and all your healthcare needs**        Please be informed that if you contact our office outside of normal business hours the physician on call cannot help with any scheduling or rescheduling issues, procedure instruction questions or any type of medication issue. We advise you for any urgent/emergency that you go to the nearest emergency room!     PLEASE CALL OUR OFFICE DURING NORMAL BUSINESS HOURS    Monday - Friday   8 am to 5 pm    [place]: [address]: [phone]    [place]:  [phone]

## 2021-04-26 ENCOUNTER — PROCEDURE VISIT (OUTPATIENT)
Dept: CARDIOLOGY CLINIC | Age: 71
End: 2021-04-26
Payer: MEDICARE

## 2021-04-26 DIAGNOSIS — R06.02 SOB (SHORTNESS OF BREATH): Primary | ICD-10-CM

## 2021-04-26 DIAGNOSIS — I25.10 CAD IN NATIVE ARTERY: ICD-10-CM

## 2021-04-26 DIAGNOSIS — J96.11 CHRONIC RESPIRATORY FAILURE WITH HYPOXIA AND HYPERCAPNIA (HCC): ICD-10-CM

## 2021-04-26 DIAGNOSIS — J96.12 CHRONIC RESPIRATORY FAILURE WITH HYPOXIA AND HYPERCAPNIA (HCC): ICD-10-CM

## 2021-04-26 DIAGNOSIS — I25.10 ASCVD (ARTERIOSCLEROTIC CARDIOVASCULAR DISEASE): ICD-10-CM

## 2021-04-26 DIAGNOSIS — I42.9 CARDIOMYOPATHY, PRIMARY (HCC): ICD-10-CM

## 2021-04-26 LAB
LV EF: 63 %
LVEF MODALITY: NORMAL

## 2021-04-26 PROCEDURE — A9500 TC99M SESTAMIBI: HCPCS | Performed by: INTERNAL MEDICINE

## 2021-04-26 PROCEDURE — 78452 HT MUSCLE IMAGE SPECT MULT: CPT | Performed by: INTERNAL MEDICINE

## 2021-04-26 PROCEDURE — 93015 CV STRESS TEST SUPVJ I&R: CPT | Performed by: INTERNAL MEDICINE

## 2021-04-26 RX ORDER — CARVEDILOL 25 MG/1
25 TABLET ORAL 2 TIMES DAILY
Qty: 180 TABLET | Refills: 3 | Status: SHIPPED | OUTPATIENT
Start: 2021-04-26 | End: 2022-03-21

## 2021-04-26 RX ORDER — CILOSTAZOL 100 MG/1
TABLET ORAL
Qty: 180 TABLET | Refills: 3 | Status: SHIPPED | OUTPATIENT
Start: 2021-04-26 | End: 2022-02-07

## 2021-04-26 RX ORDER — LISINOPRIL 40 MG/1
40 TABLET ORAL DAILY
Qty: 90 TABLET | Refills: 3 | Status: SHIPPED | OUTPATIENT
Start: 2021-04-26 | End: 2022-03-21

## 2021-04-27 ENCOUNTER — TELEPHONE (OUTPATIENT)
Dept: CARDIOLOGY CLINIC | Age: 71
End: 2021-04-27

## 2021-05-03 ENCOUNTER — OFFICE VISIT (OUTPATIENT)
Dept: SURGERY | Age: 71
End: 2021-05-03
Payer: MEDICARE

## 2021-05-03 VITALS
DIASTOLIC BLOOD PRESSURE: 78 MMHG | OXYGEN SATURATION: 96 % | BODY MASS INDEX: 31.7 KG/M2 | SYSTOLIC BLOOD PRESSURE: 120 MMHG | HEART RATE: 57 BPM | HEIGHT: 63 IN | TEMPERATURE: 97.6 F | WEIGHT: 178.9 LBS

## 2021-05-03 DIAGNOSIS — K43.2 INCISIONAL HERNIA, WITHOUT OBSTRUCTION OR GANGRENE: Primary | ICD-10-CM

## 2021-05-03 DIAGNOSIS — Z01.818 PREOPERATIVE CLEARANCE: ICD-10-CM

## 2021-05-03 PROCEDURE — 99204 OFFICE O/P NEW MOD 45 MIN: CPT | Performed by: SURGERY

## 2021-05-03 ASSESSMENT — ENCOUNTER SYMPTOMS
EYE ITCHING: 0
STRIDOR: 0
ABDOMINAL PAIN: 1
RECTAL PAIN: 0
APNEA: 0
CHOKING: 0
CONSTIPATION: 0
COLOR CHANGE: 0
BACK PAIN: 0
ANAL BLEEDING: 0
SORE THROAT: 0
EYE REDNESS: 0
PHOTOPHOBIA: 0

## 2021-05-03 NOTE — PROGRESS NOTES
Socioeconomic History    Marital status:      Spouse name: Not on file    Number of children: Not on file    Years of education: Not on file    Highest education level: Not on file   Occupational History    Not on file   Social Needs    Financial resource strain: Not on file    Food insecurity     Worry: Not on file     Inability: Not on file    Transportation needs     Medical: Not on file     Non-medical: Not on file   Tobacco Use    Smoking status: Current Every Day Smoker     Packs/day: 0.50     Types: Cigarettes     Last attempt to quit: 10/18/2017     Years since quitting: 3.5    Smokeless tobacco: Former User     Quit date: 4/30/2021   Substance and Sexual Activity    Alcohol use: No     Comment: 2 cups of coffee in the a.m.     Drug use: No    Sexual activity: Yes     Partners: Male   Lifestyle    Physical activity     Days per week: Not on file     Minutes per session: Not on file    Stress: Not on file   Relationships    Social connections     Talks on phone: Not on file     Gets together: Not on file     Attends [employer] service: Not on file     Active member of club or organization: Not on file     Attends meetings of clubs or organizations: Not on file     Relationship status: Not on file    Intimate partner violence     Fear of current or ex partner: Not on file     Emotionally abused: Not on file     Physically abused: Not on file     Forced sexual activity: Not on file   Other Topics Concern    Not on file   Social History Narrative    ** Merged History Encounter **            Current Outpatient Medications   Medication Sig Dispense Refill    lisinopril (PRINIVIL;ZESTRIL) 40 MG tablet Take 1 tablet by mouth daily 90 tablet 3    cilostazol (PLETAL) 100 MG tablet Take 1 tablet by mouth twice daily 180 tablet 3    carvedilol (COREG) 25 MG tablet Take 1 tablet by mouth 2 times daily 180 tablet 3    varenicline (CHANTIX STARTING MONTH PAK) 0.5 MG X 11 & 1 MG X 42 tablet Take by mouth. 1 box 0    varenicline (CHANTIX) 1 MG tablet Take 1 tablet by mouth 2 times daily 180 tablet 3    furosemide (LASIX) 20 MG tablet Take 1/2 (one-half) tablet by mouth once daily 45 tablet 0    amLODIPine (NORVASC) 5 MG tablet Take 1 tablet by mouth daily 90 tablet 1    warfarin (COUMADIN) 5 MG tablet Take 1 tablet by mouth daily Except take 7.5mg (1 and 1/2 tablets) on Wednesdays 33 tablet 2    pantoprazole (PROTONIX) 40 MG tablet Take 1 tablet by mouth daily 90 tablet 3    budesonide-formoterol (SYMBICORT) 160-4.5 MCG/ACT AERO Inhale 2 puffs into the lungs 2 times daily 1 Inhaler 5    nitroGLYCERIN (NITROSTAT) 0.4 MG SL tablet Place 1 tablet under the tongue every 5 minutes as needed for Chest pain up to max of 3 total doses. If no relief after 1 dose, call 911. 25 tablet 3    atorvastatin (LIPITOR) 40 MG tablet Take 1 tablet by mouth daily 90 tablet 3    Blood Pressure Monitoring KIT 1 Units by Does not apply route 2 times daily 1 kit 0    levothyroxine (SYNTHROID) 100 MCG tablet Take 1 tablet by mouth Daily 90 tablet 3    escitalopram (LEXAPRO) 20 MG tablet Take 1 tablet by mouth daily 90 tablet 1    Calcium Carb-Cholecalciferol (CALCIUM 600+D) 600-800 MG-UNIT TABS Take 1 tablet by mouth daily      aspirin 81 MG EC tablet Take 1 tablet by mouth daily 30 tablet 3    Multiple Vitamins-Minerals (THERAPEUTIC MULTIVITAMIN-MINERALS) tablet Take 1 tablet by mouth daily       No current facility-administered medications for this visit. No Known Allergies    Review of Systems:         Review of Systems   Constitutional: Negative for chills and fever. HENT: Negative for ear pain, mouth sores, sore throat and tinnitus. Eyes: Negative for photophobia, redness and itching. Respiratory: Negative for apnea, choking and stridor. Cardiovascular: Negative for chest pain and palpitations. Gastrointestinal: Positive for abdominal pain.  Negative for anal bleeding, constipation and rectal pain.   Endocrine: Negative for polydipsia. Genitourinary: Negative for enuresis, flank pain and hematuria. Musculoskeletal: Negative for back pain, joint swelling and myalgias. Skin: Negative for color change and pallor. Allergic/Immunologic: Negative for environmental allergies. Neurological: Negative for syncope and speech difficulty. Psychiatric/Behavioral: Negative for confusion and hallucinations. OBJECTIVE:  Physical Exam:    /78   Pulse 57   Temp 97.6 °F (36.4 °C)   Ht 5' 3\" (1.6 m)   Wt 178 lb 14.4 oz (81.1 kg)   SpO2 96%   BMI 31.69 kg/m²      Physical Exam  Constitutional:       Appearance: She is well-developed. HENT:      Head: Normocephalic. Eyes:      Pupils: Pupils are equal, round, and reactive to light. Neck:      Musculoskeletal: Normal range of motion and neck supple. Cardiovascular:      Rate and Rhythm: Normal rate. Pulmonary:      Effort: Pulmonary effort is normal.   Abdominal:      General: There is no distension. Palpations: Abdomen is soft. There is no mass. Tenderness: There is abdominal tenderness. There is no guarding or rebound. Hernia: A hernia is present. Musculoskeletal: Normal range of motion. Skin:     General: Skin is warm. Neurological:      Mental Status: She is alert and oriented to person, place, and time. ASSESSMENT:  1. Incisional hernia, without obstruction or gangrene          PLAN:  Treatment: Will obtain ct abd/pel to further evaluate incisional hernia. Patient counseled on risks, benefits, and alternatives of treatment plan at length. Patient states anunderstanding and willingness to proceed with plan. No orders of the defined types were placed in this encounter. No orders of the defined types were placed in this encounter. Follow Up:  No follow-ups on file.       Poncho Mo MD

## 2021-05-10 ENCOUNTER — TELEPHONE (OUTPATIENT)
Dept: PHARMACY | Age: 71
End: 2021-05-10

## 2021-05-10 NOTE — TELEPHONE ENCOUNTER
Patient left voicemail to cancel appointment. Called patient- no answer and no voicemail. Called patient and scheduled for 5/17.        For Pharmacy Admin Tracking Only      Time Spent (min): 15

## 2021-05-11 ENCOUNTER — HOSPITAL ENCOUNTER (OUTPATIENT)
Dept: CT IMAGING | Age: 71
Discharge: HOME | End: 2021-05-11
Payer: MEDICARE

## 2021-05-11 DIAGNOSIS — K43.2 INCISIONAL HERNIA, WITHOUT OBSTRUCTION OR GANGRENE: ICD-10-CM

## 2021-05-11 PROCEDURE — 74176 CT ABD & PELVIS W/O CONTRAST: CPT

## 2021-05-17 ENCOUNTER — ANTI-COAG VISIT (OUTPATIENT)
Dept: PHARMACY | Age: 71
End: 2021-05-17
Payer: MEDICARE

## 2021-05-17 ENCOUNTER — OFFICE VISIT (OUTPATIENT)
Dept: SURGERY | Age: 71
End: 2021-05-17
Payer: MEDICARE

## 2021-05-17 VITALS
HEIGHT: 63 IN | DIASTOLIC BLOOD PRESSURE: 62 MMHG | HEART RATE: 56 BPM | SYSTOLIC BLOOD PRESSURE: 118 MMHG | WEIGHT: 181.8 LBS | OXYGEN SATURATION: 97 % | BODY MASS INDEX: 32.21 KG/M2 | TEMPERATURE: 97.5 F

## 2021-05-17 DIAGNOSIS — Z01.818 PREOPERATIVE CLEARANCE: ICD-10-CM

## 2021-05-17 DIAGNOSIS — K43.2 INCISIONAL HERNIA, WITHOUT OBSTRUCTION OR GANGRENE: Primary | ICD-10-CM

## 2021-05-17 DIAGNOSIS — Z95.2 S/P MITRAL VALVE REPLACEMENT: Primary | ICD-10-CM

## 2021-05-17 DIAGNOSIS — Z79.01 ANTICOAGULATED ON COUMADIN: ICD-10-CM

## 2021-05-17 LAB
INTERNATIONAL NORMALIZATION RATIO, POC: 2.3
POC INR: 2.3 INDEX
PROTHROMBIN TIME, POC: 28 SECONDS (ref 10–14.3)

## 2021-05-17 PROCEDURE — 99211 OFF/OP EST MAY X REQ PHY/QHP: CPT

## 2021-05-17 PROCEDURE — 99213 OFFICE O/P EST LOW 20 MIN: CPT | Performed by: PHYSICIAN ASSISTANT

## 2021-05-17 PROCEDURE — 85610 PROTHROMBIN TIME: CPT

## 2021-05-17 PROCEDURE — 36416 COLLJ CAPILLARY BLOOD SPEC: CPT

## 2021-05-27 ENCOUNTER — TELEPHONE (OUTPATIENT)
Dept: SURGERY | Age: 71
End: 2021-05-27

## 2021-05-27 NOTE — TELEPHONE ENCOUNTER
SPOKE TO / LEFT MESSAGE FOR Maxine Sires REGARDING SURGERY (inc hernia repair w/ tar) SCHEDULED @ [facility].  NOTIFIED OF DATES, TIMES AND LOCATION    PHONE ASSESSMENT /ROSALIND -  COVID -fully vacc- in media   SURGERY - 6/25/21 730  P/O - call after d/c     NPO AFTER MIDNIGHT    HOLD BLOOD THINNERS -hold coumadin 5 days

## 2021-06-07 ASSESSMENT — ENCOUNTER SYMPTOMS
PHOTOPHOBIA: 0
ANAL BLEEDING: 0
VOMITING: 0
NAUSEA: 0
COLOR CHANGE: 0
DIARRHEA: 0
EYE REDNESS: 0
CHOKING: 0
BACK PAIN: 0
RECTAL PAIN: 0
ABDOMINAL PAIN: 1
EYE ITCHING: 0
APNEA: 0
SORE THROAT: 0
CONSTIPATION: 0
STRIDOR: 0

## 2021-06-07 NOTE — PROGRESS NOTES
Chief Complaint   Patient presents with    Follow-up     f/u CT 5/11/21       SUBJECTIVE:  HPI: Patient presents for evaluation of ventral hernia. Symptoms were first noted 3 years ago. Pain is progressive and worsening . Lump is not reducible. Pt denies N/V or bowel changes. . Pt with previous history of abdominal surgery including abdominal aorto  Bi-fem bypass. CT reviewed with the pt: Impression   1. Increase in size of the transverse colon containing ventral hernia with no   evidence for obstruction. 2. No significant change adjacent fat containing ventral hernia. 3. Interval atrophy of the kidneys left greater than right. 4. Stable adrenal adenomas. 5. Diverticulosis without obvious inflammation. I have reviewed the patient's(pertinent information to this visit) medical history, family history(scanned in  theMedia tab under \"patient questioner\"), social history and review of systems with the patient today in the office. Past Surgical History:   Procedure Laterality Date    CARDIAC SURGERY      CORONARY ANGIOPLASTY WITH STENT PLACEMENT      CORONARY ARTERY BYPASS GRAFT  06/21/2016    CABG X1 SVG to RCA with Mitral Valve Replacement 27mm Mosaic     MITRAL VALVE REPLACEMENT  06/21/2016    27mm mosaic     Past Medical History:   Diagnosis Date    CAD (coronary artery disease)     COPD (chronic obstructive pulmonary disease) ([address]     H/O cardiovascular stress test 04/26/2021    normal study 63% EF no ischemia    H/O echocardiogram 03/22/2017    EF 35-40%    H/O echocardiogram 08/04/2020    EF 45-50, Mild TR & W[place], Severely dilated left atrium.   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, functionally normally     Heart attack ([address] 01/2014    Hyperlipidemia     Hypertension     PAD (peripheral artery disease) ([address]     S/P right coronary artery (RCA) stent placement     T2 vertebral fracture ([address] 12/2017    TIA (transient ischemic attack)     Tobacco dependence Family History   Problem Relation Age of Onset   [employer] Stroke Mother     Heart Attack Father     Coronary Art Dis Father     Pacemaker Father     Arrhythmia Father     Diabetes Sister     Rheum Arthritis Sister     Heart Attack Sister     Arrhythmia Sister      Social History     Socioeconomic History    Marital status:      Spouse name: Not on file    Number of children: Not on file    Years of education: Not on file    Highest education level: Not on file   Occupational History    Not on file   Tobacco Use    Smoking status: Current Every Day Smoker     Packs/day: 0.50     Types: Cigarettes     Last attempt to quit: 10/18/2017     Years since quitting: 3.6    Smokeless tobacco: Former User     Quit date: 4/30/2021   Vaping Use    Vaping Use: Never used   Substance and Sexual Activity    Alcohol use: No     Comment: 2 cups of coffee in the a.m.  Drug use: No    Sexual activity: Yes     Partners: Male   Other Topics Concern    Not on file   Social History Narrative    ** Merged History Encounter **          Social Determinants of Health     Financial Resource Strain:     Difficulty of Paying Living Expenses:    Food Insecurity:     Worried About Running Out of Food in the Last Year:     Ran Out of Food in the Last Year:    Transportation Needs:     Lack of Transportation (Medical):      Lack of Transportation (Non-Medical):    Physical Activity:     Days of Exercise per Week:     Minutes of Exercise per Session:    Stress:     Feeling of Stress :    Social Connections:     Frequency of Communication with Friends and Family:     Frequency of Social Gatherings with Friends and Family:     Attends [employer] Services:     Active Member of Clubs or Organizations:     Attends Club or Organization Meetings:     Marital Status:    Intimate Partner Violence:     Fear of Current or Ex-Partner:     Emotionally Abused:     Physically Abused:     Sexually Abused:        Current Outpatient Medications   Medication Sig Dispense Refill    lisinopril (PRINIVIL;ZESTRIL) 40 MG tablet Take 1 tablet by mouth daily 90 tablet 3    cilostazol (PLETAL) 100 MG tablet Take 1 tablet by mouth twice daily 180 tablet 3    carvedilol (COREG) 25 MG tablet Take 1 tablet by mouth 2 times daily 180 tablet 3    varenicline (CHANTIX STARTING MONTH PAK) 0.5 MG X 11 & 1 MG X 42 tablet Take by mouth. 1 box 0    varenicline (CHANTIX) 1 MG tablet Take 1 tablet by mouth 2 times daily 180 tablet 3    furosemide (LASIX) 20 MG tablet Take 1/2 (one-half) tablet by mouth once daily 45 tablet 0    amLODIPine (NORVASC) 5 MG tablet Take 1 tablet by mouth daily 90 tablet 1    warfarin (COUMADIN) 5 MG tablet Take 1 tablet by mouth daily Except take 7.5mg (1 and 1/2 tablets) on Wednesdays 33 tablet 2    pantoprazole (PROTONIX) 40 MG tablet Take 1 tablet by mouth daily 90 tablet 3    budesonide-formoterol (SYMBICORT) 160-4.5 MCG/ACT AERO Inhale 2 puffs into the lungs 2 times daily 1 Inhaler 5    nitroGLYCERIN (NITROSTAT) 0.4 MG SL tablet Place 1 tablet under the tongue every 5 minutes as needed for Chest pain up to max of 3 total doses. If no relief after 1 dose, call 911. 25 tablet 3    atorvastatin (LIPITOR) 40 MG tablet Take 1 tablet by mouth daily 90 tablet 3    Blood Pressure Monitoring KIT 1 Units by Does not apply route 2 times daily 1 kit 0    levothyroxine (SYNTHROID) 100 MCG tablet Take 1 tablet by mouth Daily 90 tablet 3    escitalopram (LEXAPRO) 20 MG tablet Take 1 tablet by mouth daily 90 tablet 1    Calcium Carb-Cholecalciferol (CALCIUM 600+D) 600-800 MG-UNIT TABS Take 1 tablet by mouth daily      aspirin 81 MG EC tablet Take 1 tablet by mouth daily 30 tablet 3    Multiple Vitamins-Minerals (THERAPEUTIC MULTIVITAMIN-MINERALS) tablet Take 1 tablet by mouth daily       No current facility-administered medications for this visit.       No Known Allergies    Review of Systems:         Review of Systems Constitutional: Negative for chills and fever. HENT: Negative for ear pain, mouth sores, sore throat and tinnitus. Eyes: Negative for photophobia, redness and itching. Respiratory: Negative for apnea, choking and stridor. Cardiovascular: Negative for chest pain and palpitations. Gastrointestinal: Positive for abdominal pain. Negative for anal bleeding, constipation, diarrhea, nausea, rectal pain and vomiting. Endocrine: Negative for polydipsia. Genitourinary: Negative for enuresis, flank pain and hematuria. Musculoskeletal: Negative for back pain, joint swelling and myalgias. Skin: Negative for color change and pallor. Allergic/Immunologic: Negative for environmental allergies. Neurological: Negative for syncope and speech difficulty. Psychiatric/Behavioral: Negative for confusion and hallucinations. OBJECTIVE:  Physical Exam:    /62   Pulse 56   Temp 97.5 °F (36.4 °C)   Ht 5' 3\" (1.6 m)   Wt 181 lb 12.8 oz (82.5 kg)   SpO2 97%   BMI 32.20 kg/m²      Physical Exam  Constitutional:       Appearance: She is well-developed. HENT:      Head: Normocephalic. Eyes:      Pupils: Pupils are equal, round, and reactive to light. Cardiovascular:      Rate and Rhythm: Normal rate. Pulmonary:      Effort: Pulmonary effort is normal.   Abdominal:      General: There is no distension. Palpations: Abdomen is soft. There is no mass. Tenderness: There is abdominal tenderness. There is no guarding or rebound. Hernia: A hernia is present. Musculoskeletal:         General: Normal range of motion. Cervical back: Normal range of motion and neck supple. Skin:     General: Skin is warm. Neurological:      Mental Status: She is alert and oriented to person, place, and time. ASSESSMENT:  1. Incisional hernia, without obstruction or gangrene    2.  Preoperative clearance          PLAN:  Treatment: Will proceed with robotic assisted incisional hernia repair with Possible TAR. Pt on coumadin - Will need cardiac clearance and to hold coumadin prior to surgery. Pt has not been vaccinated for COVID, so will obtain pre-op covid test. Patient counseled on risks, benefits, and alternatives of treatment plan at length. Patient states anunderstanding and willingness to proceed with plan. Orders Placed This Encounter   Procedures   Max Hammond MD, Cardiology, [facility]        No orders of the defined types were placed in this encounter. Follow Up:  No follow-ups on file.       Franck Lora PA-C

## 2021-06-14 ENCOUNTER — TELEPHONE (OUTPATIENT)
Dept: PHARMACY | Age: 71
End: 2021-06-14

## 2021-06-14 DIAGNOSIS — Z95.2 S/P MITRAL VALVE REPLACEMENT: Primary | ICD-10-CM

## 2021-06-14 DIAGNOSIS — Z79.01 ANTICOAGULATED ON COUMADIN: ICD-10-CM

## 2021-06-14 NOTE — TELEPHONE ENCOUNTER
Patient called to cancel today's appointment. Rescheduled for 7/5 due to surgery scheduled for 6/25. Patient was instructed by surgery to hold warfarin for 5 days. Advised patient to restart warfarin at direction of surgeon and take warfarin 7.5mg x1 on day restarting then resume maintenance dose of warfarin 5mg daily except 7.5mg on Wednesdays.      For Pharmacy Admin Tracking Only     Intervention Detail:    Total # of Interventions Recommended:    Total # of Interventions Accepted:    Time Spent (min): 10

## 2021-06-15 ENCOUNTER — TELEPHONE (OUTPATIENT)
Dept: SURGERY | Age: 71
End: 2021-06-15

## 2021-06-15 NOTE — TELEPHONE ENCOUNTER
Raymundo Judd [id number]    CPT [id number], [id number]    ICD K43.2    [facility] INPATIENT 6/25/21

## 2021-06-18 ENCOUNTER — HOSPITAL ENCOUNTER (OUTPATIENT)
Age: 71
Discharge: HOME | End: 2021-06-18
Payer: MEDICARE

## 2021-06-18 ENCOUNTER — HOSPITAL ENCOUNTER (OUTPATIENT)
Dept: GENERAL RADIOLOGY | Age: 71
Discharge: HOME | End: 2021-06-18
Payer: MEDICARE

## 2021-06-18 ENCOUNTER — HOSPITAL ENCOUNTER (OUTPATIENT)
Dept: PREADMISSION TESTING | Age: 71
Discharge: HOME | End: 2021-06-22
Payer: MEDICARE

## 2021-06-18 VITALS
OXYGEN SATURATION: 97 % | BODY MASS INDEX: 31.43 KG/M2 | HEART RATE: 79 BPM | WEIGHT: 177.4 LBS | RESPIRATION RATE: 16 BRPM | HEIGHT: 63 IN | TEMPERATURE: 98.3 F | DIASTOLIC BLOOD PRESSURE: 75 MMHG | SYSTOLIC BLOOD PRESSURE: 155 MMHG

## 2021-06-18 DIAGNOSIS — Z01.818 PREOPERATIVE TESTING: ICD-10-CM

## 2021-06-18 DIAGNOSIS — R93.89 ABNORMAL CHEST X-RAY: Primary | ICD-10-CM

## 2021-06-18 LAB
ANION GAP SERPL CALCULATED.3IONS-SCNC: 7 MMOL/L (ref 4–16)
BUN BLDV-MCNC: 17 MG/DL (ref 6–23)
CALCIUM SERPL-MCNC: 9.4 MG/DL (ref 8.3–10.6)
CHLORIDE BLD-SCNC: 101 MMOL/L (ref 99–110)
CO2: 29 MMOL/L (ref 21–32)
CREAT SERPL-MCNC: 1.2 MG/DL (ref 0.6–1.1)
EKG ATRIAL RATE: 66 BPM
EKG DIAGNOSIS: NORMAL
EKG P AXIS: 52 DEGREES
EKG P-R INTERVAL: 144 MS
EKG Q-T INTERVAL: 434 MS
EKG QRS DURATION: 84 MS
EKG QTC CALCULATION (BAZETT): 454 MS
EKG R AXIS: 83 DEGREES
EKG T AXIS: 72 DEGREES
EKG VENTRICULAR RATE: 66 BPM
GFR AFRICAN AMERICAN: 54 ML/MIN/1.73M2
GFR NON-AFRICAN AMERICAN: 44 ML/MIN/1.73M2
GLUCOSE BLD-MCNC: 100 MG/DL (ref 70–99)
HCT VFR BLD CALC: 41.1 % (ref 37–47)
HEMOGLOBIN: 13.7 GM/DL (ref 12.5–16)
MCH RBC QN AUTO: 32.9 PG (ref 27–31)
MCHC RBC AUTO-ENTMCNC: 33.3 % (ref 32–36)
MCV RBC AUTO: 98.6 FL (ref 78–100)
PDW BLD-RTO: 12.5 % (ref 11.7–14.9)
PLATELET # BLD: 187 K/CU MM (ref 140–440)
PMV BLD AUTO: 10.9 FL (ref 7.5–11.1)
POTASSIUM SERPL-SCNC: 4.3 MMOL/L (ref 3.5–5.1)
RBC # BLD: 4.17 M/CU MM (ref 4.2–5.4)
SODIUM BLD-SCNC: 137 MMOL/L (ref 135–145)
WBC # BLD: 8 K/CU MM (ref 4–10.5)

## 2021-06-18 PROCEDURE — 36415 COLL VENOUS BLD VENIPUNCTURE: CPT

## 2021-06-18 PROCEDURE — 80048 BASIC METABOLIC PNL TOTAL CA: CPT

## 2021-06-18 PROCEDURE — 93010 ELECTROCARDIOGRAM REPORT: CPT | Performed by: INTERNAL MEDICINE

## 2021-06-18 PROCEDURE — 71046 X-RAY EXAM CHEST 2 VIEWS: CPT

## 2021-06-18 PROCEDURE — 93005 ELECTROCARDIOGRAM TRACING: CPT | Performed by: ANESTHESIOLOGY

## 2021-06-18 PROCEDURE — 85027 COMPLETE CBC AUTOMATED: CPT

## 2021-06-21 NOTE — PROGRESS NOTES
Called results of abnormal chest and ekg done with pretesting 6/18/2021 to Dr Svetlana Martel- orders recieved

## 2021-06-24 ENCOUNTER — ANESTHESIA EVENT (OUTPATIENT)
Dept: OPERATING ROOM | Age: 71
DRG: 354 | End: 2021-06-24
Payer: MEDICARE

## 2021-06-24 ASSESSMENT — LIFESTYLE VARIABLES: SMOKING_STATUS: 1

## 2021-06-24 ASSESSMENT — ENCOUNTER SYMPTOMS: SHORTNESS OF BREATH: 1

## 2021-06-24 NOTE — H&P
General Surgery - H&P  Dr. Chloe Pierce, PA-C      SUBJECTIVE:  HPI: Patient presents with ventral hernia. Symptoms were first noted 3 years ago. Pain is progressive and worsening . Lump is not reducible. Pt denies N/V or bowel changes. . Pt with previous history of abdominal surgery including abdominal aorto Bi-fem bypass.     CT reviewed with the pt: Impression   1. Increase in size of the transverse colon containing ventral hernia with no   evidence for obstruction. 2. No significant change adjacent fat containing ventral hernia. 3. Interval atrophy of the kidneys left greater than right. 4. Stable adrenal adenomas. 5. Diverticulosis without obvious inflammation.            I have reviewed the patient's(pertinent information to this visit) medical history, family history(scanned in  theMedia tab under \"patient questioner\"), social history and review of systems with the patient today in the office.        Past Surgical History         Past Surgical History:   Procedure Laterality Date    CARDIAC SURGERY        CORONARY ANGIOPLASTY WITH STENT PLACEMENT        CORONARY ARTERY BYPASS GRAFT   06/21/2016     CABG X1 SVG to RCA with Mitral Valve Replacement 27mm Mosaic     MITRAL VALVE REPLACEMENT   06/21/2016     27mm mosaic         Past Medical History        Past Medical History:   Diagnosis Date    CAD (coronary artery disease)      COPD (chronic obstructive pulmonary disease) ([address]      H/O cardiovascular stress test 04/26/2021     normal study 63% EF no ischemia    H/O echocardiogram 03/22/2017     EF 35-40%    H/O echocardiogram 08/04/2020     EF 45-50, Mild TR & ND, Severely dilated left atrium.   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, functionally normally     Heart attack ([address] 01/2014    Hyperlipidemia      Hypertension      PAD (peripheral artery disease) ([address]      S/P right coronary artery (RCA) stent placement      T2 vertebral fracture ([address] 12/2017    TIA (transient ischemic attack)      Tobacco dependence              Family History         Family History   Problem Relation Age of Onset    Stroke Mother      Heart Attack Father      Coronary Art Dis Father      Pacemaker Father      Arrhythmia Father      Diabetes Sister      Rheum Arthritis Sister      Heart Attack Sister      Arrhythmia Sister           Social History               Socioeconomic History    Marital status:        Spouse name: Not on file    Number of children: Not on file    Years of education: Not on file    Highest education level: Not on file   Occupational History    Not on file   Tobacco Use    Smoking status: Current Every Day Smoker       Packs/day: 0.50       Types: Cigarettes       Last attempt to quit: 10/18/2017       Years since quitting: 3.6    Smokeless tobacco: Former User       Quit date: 4/30/2021   Vaping Use    Vaping Use: Never used   Substance and Sexual Activity    Alcohol use: No       Comment: 2 cups of coffee in the a.m.  Drug use: No    Sexual activity: Yes       Partners: Male   Other Topics Concern    Not on file   Social History Narrative     ** Merged History Encounter **            Social Determinants of Health          Financial Resource Strain:     Difficulty of Paying Living Expenses:    Food Insecurity:     Worried About Running Out of Food in the Last Year:     Ran Out of Food in the Last Year:    Transportation Needs:     Lack of Transportation (Medical):      Lack of Transportation (Non-Medical):    Physical Activity:     Days of Exercise per Week:     Minutes of Exercise per Session:    Stress:     Feeling of Stress :    Social Connections:     Frequency of Communication with Friends and Family:     Frequency of Social Gatherings with Friends and Family:     Attends [employer] Services:     Active Member of Clubs or Organizations:     Attends Club or Organization Meetings:     Marital Status:    Intimate Partner Violence:     Fear of Current or Ex-Partner:     Emotionally Abused:     Physically Abused:     Sexually Abused:             Current Facility-Administered Medications          Current Outpatient Medications   Medication Sig Dispense Refill    lisinopril (PRINIVIL;ZESTRIL) 40 MG tablet Take 1 tablet by mouth daily 90 tablet 3    cilostazol (PLETAL) 100 MG tablet Take 1 tablet by mouth twice daily 180 tablet 3    carvedilol (COREG) 25 MG tablet Take 1 tablet by mouth 2 times daily 180 tablet 3    varenicline (CHANTIX STARTING MONTH PAK) 0.5 MG X 11 & 1 MG X 42 tablet Take by mouth. 1 box 0    varenicline (CHANTIX) 1 MG tablet Take 1 tablet by mouth 2 times daily 180 tablet 3    furosemide (LASIX) 20 MG tablet Take 1/2 (one-half) tablet by mouth once daily 45 tablet 0    amLODIPine (NORVASC) 5 MG tablet Take 1 tablet by mouth daily 90 tablet 1    warfarin (COUMADIN) 5 MG tablet Take 1 tablet by mouth daily Except take 7.5mg (1 and 1/2 tablets) on Wednesdays 33 tablet 2    pantoprazole (PROTONIX) 40 MG tablet Take 1 tablet by mouth daily 90 tablet 3    budesonide-formoterol (SYMBICORT) 160-4.5 MCG/ACT AERO Inhale 2 puffs into the lungs 2 times daily 1 Inhaler 5    nitroGLYCERIN (NITROSTAT) 0.4 MG SL tablet Place 1 tablet under the tongue every 5 minutes as needed for Chest pain up to max of 3 total doses.  If no relief after 1 dose, call 911. 25 tablet 3    atorvastatin (LIPITOR) 40 MG tablet Take 1 tablet by mouth daily 90 tablet 3    Blood Pressure Monitoring KIT 1 Units by Does not apply route 2 times daily 1 kit 0    levothyroxine (SYNTHROID) 100 MCG tablet Take 1 tablet by mouth Daily 90 tablet 3    escitalopram (LEXAPRO) 20 MG tablet Take 1 tablet by mouth daily 90 tablet 1    Calcium Carb-Cholecalciferol (CALCIUM 600+D) 600-800 MG-UNIT TABS Take 1 tablet by mouth daily        aspirin 81 MG EC tablet Take 1 tablet by mouth daily 30 tablet 3    Multiple Vitamins-Minerals (THERAPEUTIC MULTIVITAMIN-MINERALS) tablet Take 1 tablet by mouth daily          No current facility-administered medications for this visit. No Known Allergies     Review of Systems:          Review of Systems   Constitutional: Negative for chills and fever. HENT: Negative for ear pain, mouth sores, sore throat and tinnitus. Eyes: Negative for photophobia, redness and itching. Respiratory: Negative for apnea, choking and stridor. Cardiovascular: Negative for chest pain and palpitations. Gastrointestinal: Positive for abdominal pain. Negative for anal bleeding, constipation, diarrhea, nausea, rectal pain and vomiting. Endocrine: Negative for polydipsia. Genitourinary: Negative for enuresis, flank pain and hematuria. Musculoskeletal: Negative for back pain, joint swelling and myalgias. Skin: Negative for color change and pallor. Allergic/Immunologic: Negative for environmental allergies. Neurological: Negative for syncope and speech difficulty. Psychiatric/Behavioral: Negative for confusion and hallucinations.            OBJECTIVE:  Physical Exam:    /62   Pulse 56   Temp 97.5 °F (36.4 °C)   Ht 5' 3\" (1.6 m)   Wt 181 lb 12.8 oz (82.5 kg)   SpO2 97%   BMI 32.20 kg/m²       Physical Exam  Constitutional:       Appearance: She is well-developed. HENT:      Head: Normocephalic. Eyes:      Pupils: Pupils are equal, round, and reactive to light. Cardiovascular:      Rate and Rhythm: Normal rate. Pulmonary:      Effort: Pulmonary effort is normal.   Abdominal:      General: There is no distension. Palpations: Abdomen is soft. There is no mass. Tenderness: There is abdominal tenderness. There is no guarding or rebound. Hernia: A hernia is present. Musculoskeletal:         General: Normal range of motion. Cervical back: Normal range of motion and neck supple. Skin:     General: Skin is warm.    Neurological:      Mental Status: She is alert and oriented to person, place, and time.             ASSESSMENT:  1. Incisional hernia, without obstruction or gangrene                 PLAN:  Treatment: Will proceed with robotic assisted incisional hernia repair with Possible TAR. Patient counseled on risks, benefits, and alternatives of treatment plan at length. Patient states anunderstanding and willingness to proceed with plan        The patient was counseled at length about the risks of paul Covid-19 during their perioperative period and any recovery window from their procedure. The patient was made aware that paul Covid-19  may worsen their prognosis for recovering from their procedure  and lend to a higher morbidity and/or mortality risk. All material risks, benefits, and reasonable alternatives including postponing the procedure were discussed. The patient does wish to proceed with the procedure at this time.       Deidra Kiran PA-C

## 2021-06-24 NOTE — ANESTHESIA PRE PROCEDURE
Department of Anesthesiology  Preprocedure Note       Name:  Demarcus Garza   Age:  70 y.o.  :  1950                                          MRN:  [id number]         Date:  2021      Surgeon: Ellen Ross):  Mario Neff MD    Procedure: Procedure(s):  ROBOTIC HERNIA INCISIONAL REPAIR LAPAROSCOPIC WITH COMPONENT SEPARATION    Medications prior to admission:   Prior to Admission medications    Medication Sig Start Date End Date Taking? Authorizing Provider   lisinopril (PRINIVIL;ZESTRIL) 40 MG tablet Take 1 tablet by mouth daily 21   SATURNINO Salazar CNP   cilostazol (PLETAL) 100 MG tablet Take 1 tablet by mouth twice daily 21   SATURNINO Salazar CNP   carvedilol (COREG) 25 MG tablet Take 1 tablet by mouth 2 times daily 21   SATURNINO Salazar CNP   furosemide (LASIX) 20 MG tablet Take 1/2 (one-half) tablet by mouth once daily 21   SATURNINO Salazar CNP   amLODIPine (NORVASC) 5 MG tablet Take 1 tablet by mouth daily 21   SATURNINO Salazar CNP   warfarin (COUMADIN) 5 MG tablet Take 1 tablet by mouth daily Except take 7.5mg (1 and 1/2 tablets) on    Mary Alice Weeks MD   pantoprazole (PROTONIX) 40 MG tablet Take 1 tablet by mouth daily 21   Mary Alice Weeks MD   budesonide-formoterol [facility]) 160-4.5 MCG/ACT AERO Inhale 2 puffs into the lungs 2 times daily 20   Florecita Begum MD   nitroGLYCERIN (NITROSTAT) 0.4 MG SL tablet Place 1 tablet under the tongue every 5 minutes as needed for Chest pain up to max of 3 total doses.  If no relief after 1 dose, call 911. 20   Mary Alice Weeks MD   atorvastatin (LIPITOR) 40 MG tablet Take 1 tablet by mouth daily 20   Mary Alice Weeks MD   Blood Pressure Monitoring KIT 1 Units by Does not apply route 2 times daily 10/20/20   SATURNINO Flanagan CNP   levothyroxine (SYNTHROID) 100 MCG tablet Take 1 tablet by mouth Daily 20   Tammi Thibodeaux MD escitalopram (LEXAPRO) 20 MG tablet Take 1 tablet by mouth daily 7/24/20   Brandon Dupont MD   Calcium Carb-Cholecalciferol (CALCIUM 600+D) 600-800 MG-UNIT TABS Take 1 tablet by mouth daily    Historical Provider, MD   aspirin 81 MG EC tablet Take 1 tablet by mouth daily 7/7/16   C Dong Underwood MD   Multiple Vitamins-Minerals (THERAPEUTIC MULTIVITAMIN-MINERALS) tablet Take 1 tablet by mouth daily    Historical Provider, MD       Current medications:    No current facility-administered medications for this encounter. Current Outpatient Medications   Medication Sig Dispense Refill    lisinopril (PRINIVIL;ZESTRIL) 40 MG tablet Take 1 tablet by mouth daily 90 tablet 3    cilostazol (PLETAL) 100 MG tablet Take 1 tablet by mouth twice daily 180 tablet 3    carvedilol (COREG) 25 MG tablet Take 1 tablet by mouth 2 times daily 180 tablet 3    furosemide (LASIX) 20 MG tablet Take 1/2 (one-half) tablet by mouth once daily 45 tablet 0    amLODIPine (NORVASC) 5 MG tablet Take 1 tablet by mouth daily 90 tablet 1    warfarin (COUMADIN) 5 MG tablet Take 1 tablet by mouth daily Except take 7.5mg (1 and 1/2 tablets) on Wednesdays 33 tablet 2    pantoprazole (PROTONIX) 40 MG tablet Take 1 tablet by mouth daily 90 tablet 3    budesonide-formoterol (SYMBICORT) 160-4.5 MCG/ACT AERO Inhale 2 puffs into the lungs 2 times daily 1 Inhaler 5    nitroGLYCERIN (NITROSTAT) 0.4 MG SL tablet Place 1 tablet under the tongue every 5 minutes as needed for Chest pain up to max of 3 total doses.  If no relief after 1 dose, call 911. 25 tablet 3    atorvastatin (LIPITOR) 40 MG tablet Take 1 tablet by mouth daily 90 tablet 3    Blood Pressure Monitoring KIT 1 Units by Does not apply route 2 times daily 1 kit 0    levothyroxine (SYNTHROID) 100 MCG tablet Take 1 tablet by mouth Daily 90 tablet 3    escitalopram (LEXAPRO) 20 MG tablet Take 1 tablet by mouth daily 90 tablet 1    Calcium Carb-Cholecalciferol (CALCIUM 600+D) 600-800 MG-UNIT TABS Take 1 tablet by mouth daily      aspirin 81 MG EC tablet Take 1 tablet by mouth daily 30 tablet 3    Multiple Vitamins-Minerals (THERAPEUTIC MULTIVITAMIN-MINERALS) tablet Take 1 tablet by mouth daily         Allergies:  No Known Allergies    Problem List:    Patient Active Problem List   Diagnosis Code    ASCVD (arteriosclerotic cardiovascular disease) I25.10    Hypertension I10    Hyperlipidemia E78.5    Hypothyroid E03.9    PAD (peripheral artery disease) ([facility]) I73.9    S/P right coronary artery (RCA) stent placement Z95.5    Acute respiratory failure with hypoxia and hypercapnia ([facility]) J96.01, J96.02    Tobacco abuse Z72.0    MI (myocardial infarction) ([facility] I21.9    CHF following cardiac surgery, postop I97.130    Gait disturbance R26.9    ST elevation myocardial infarction (STEMI) ([facility]) I21.3    Rheumatic mitral stenosis I05.0    S/P mitral valve replacement Z95.2    Cardiomyopathy, primary ([facility] I42.8    Anticoagulated on Coumadin Z79.01    On home O2 Z99.81    Depression with anxiety F41.8    Urinary incontinence R32    Age-related osteoporosis without current pathological fracture M81.0    EDITA (obstructive sleep apnea) G47.33    Hypersomnia G47.10    SOB (shortness of breath) on exertion R06.02    Obesity (BMI 30-39. 9) E66.9    Chronic respiratory failure ([facility]) J96.10    Idiopathic hypersomnia G47.11    Hernia of anterior abdominal wall K43.9       Past Medical History:        Diagnosis Date    CAD (coronary artery disease)     COPD (chronic obstructive pulmonary disease) ([facility])     GERD (gastroesophageal reflux disease)     H/O cardiovascular stress test 04/26/2021    normal study 63% EF no ischemia    H/O echocardiogram 03/22/2017    EF 35-40%    H/O echocardiogram 08/04/2020    EF 45-50, Mild TR & MO, Severely dilated left atrium.   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, functionally normally     Heart attack ([facility] 01/2014    Hyperlipidemia anticoagulation therapy, arthritis: OA., .                  ROS comment: On home O2 Abdominal:   (+) obese,         Vascular:   + PVD, aortic or cerebral, . ROS comment: PAD. Anesthesia Plan      general     ASA 4       Induction: intravenous. MIPS: Postoperative opioids intended and Prophylactic antiemetics administered. Anesthetic plan and risks discussed with patient. Plan discussed with CRNA. Attending anesthesiologist reviewed and agrees with Pre Eval content         Pre Anesthesia Assessment complete. Chart reviewed on 6/24/2021    SATURNINO Couch - CRNA   6/24/2021      Pre Anesthesia Evaluation complete. Anesthesia plan, risks, benefits, alternatives, and personnel discussed with patient and/or legal guardian. Patient and/or legal guardian verbalized an understanding and agreed to proceed. Anesthesia plan discussed with care team members and agreed upon.   SATURNINO Penn - CRNA  6/25/2021

## 2021-06-25 ENCOUNTER — ANESTHESIA (OUTPATIENT)
Dept: OPERATING ROOM | Age: 71
DRG: 354 | End: 2021-06-25
Payer: MEDICARE

## 2021-06-25 ENCOUNTER — HOSPITAL ENCOUNTER (INPATIENT)
Age: 71
LOS: 1 days | Discharge: HOME | DRG: 354 | End: 2021-06-26
Attending: SURGERY | Admitting: SURGERY
Payer: MEDICARE

## 2021-06-25 VITALS
TEMPERATURE: 96.3 F | SYSTOLIC BLOOD PRESSURE: 122 MMHG | OXYGEN SATURATION: 100 % | DIASTOLIC BLOOD PRESSURE: 59 MMHG | RESPIRATION RATE: 10 BRPM

## 2021-06-25 DIAGNOSIS — K43.2 INCISIONAL HERNIA OF ANTERIOR ABDOMINAL WALL WITHOUT OBSTRUCTION OR GANGRENE: Primary | ICD-10-CM

## 2021-06-25 PROCEDURE — 1200000000 HC SEMI PRIVATE

## 2021-06-25 PROCEDURE — 6370000000 HC RX 637 (ALT 250 FOR IP): Performed by: PHYSICIAN ASSISTANT

## 2021-06-25 PROCEDURE — 49654 PR LAP, INCISIONAL HERNIA REPAIR,REDUCIBLE: CPT | Performed by: SURGERY

## 2021-06-25 PROCEDURE — 0WUF4JZ SUPPLEMENT ABDOMINAL WALL WITH SYNTHETIC SUBSTITUTE, PERCUTANEOUS ENDOSCOPIC APPROACH: ICD-10-PCS | Performed by: SURGERY

## 2021-06-25 PROCEDURE — 6360000002 HC RX W HCPCS: Performed by: PHYSICIAN ASSISTANT

## 2021-06-25 PROCEDURE — 6360000002 HC RX W HCPCS: Performed by: NURSE ANESTHETIST, CERTIFIED REGISTERED

## 2021-06-25 PROCEDURE — 2580000003 HC RX 258: Performed by: PHYSICIAN ASSISTANT

## 2021-06-25 PROCEDURE — 3700000000 HC ANESTHESIA ATTENDED CARE: Performed by: SURGERY

## 2021-06-25 PROCEDURE — 3600000009 HC SURGERY ROBOT BASE: Performed by: SURGERY

## 2021-06-25 PROCEDURE — S2900 ROBOTIC SURGICAL SYSTEM: HCPCS | Performed by: SURGERY

## 2021-06-25 PROCEDURE — 2500000003 HC RX 250 WO HCPCS: Performed by: ANESTHESIOLOGY

## 2021-06-25 PROCEDURE — 3700000001 HC ADD 15 MINUTES (ANESTHESIA): Performed by: SURGERY

## 2021-06-25 PROCEDURE — 2580000003 HC RX 258: Performed by: NURSE ANESTHETIST, CERTIFIED REGISTERED

## 2021-06-25 PROCEDURE — 2500000003 HC RX 250 WO HCPCS: Performed by: NURSE ANESTHETIST, CERTIFIED REGISTERED

## 2021-06-25 PROCEDURE — 2500000003 HC RX 250 WO HCPCS: Performed by: SURGERY

## 2021-06-25 PROCEDURE — APPNB180 APP NON BILLABLE TIME > 60 MINS: Performed by: PHYSICIAN ASSISTANT

## 2021-06-25 PROCEDURE — 7100000001 HC PACU RECOVERY - ADDTL 15 MIN: Performed by: SURGERY

## 2021-06-25 PROCEDURE — 8E0W4CZ ROBOTIC ASSISTED PROCEDURE OF TRUNK REGION, PERCUTANEOUS ENDOSCOPIC APPROACH: ICD-10-PCS | Performed by: SURGERY

## 2021-06-25 PROCEDURE — 3600000019 HC SURGERY ROBOT ADDTL 15MIN: Performed by: SURGERY

## 2021-06-25 PROCEDURE — 49654 PR LAP, INCISIONAL HERNIA REPAIR,REDUCIBLE: CPT | Performed by: PHYSICIAN ASSISTANT

## 2021-06-25 PROCEDURE — 15734 MUSCLE-SKIN GRAFT TRUNK: CPT | Performed by: SURGERY

## 2021-06-25 PROCEDURE — 2580000003 HC RX 258: Performed by: ANESTHESIOLOGY

## 2021-06-25 PROCEDURE — 6360000002 HC RX W HCPCS: Performed by: ANESTHESIOLOGY

## 2021-06-25 PROCEDURE — 7100000000 HC PACU RECOVERY - FIRST 15 MIN: Performed by: SURGERY

## 2021-06-25 PROCEDURE — 15734 MUSCLE-SKIN GRAFT TRUNK: CPT | Performed by: PHYSICIAN ASSISTANT

## 2021-06-25 PROCEDURE — C1781 MESH (IMPLANTABLE): HCPCS | Performed by: SURGERY

## 2021-06-25 PROCEDURE — 2709999900 HC NON-CHARGEABLE SUPPLY: Performed by: SURGERY

## 2021-06-25 PROCEDURE — 94640 AIRWAY INHALATION TREATMENT: CPT

## 2021-06-25 DEVICE — MESH SURG W30XL30CM DIA5MM POLYPR SQ FLAT FOR SFT TISS REP: Type: IMPLANTABLE DEVICE | Site: ABDOMEN | Status: FUNCTIONAL

## 2021-06-25 RX ORDER — ONDANSETRON 2 MG/ML
4 INJECTION INTRAMUSCULAR; INTRAVENOUS
Status: DISCONTINUED | OUTPATIENT
Start: 2021-06-25 | End: 2021-06-25 | Stop reason: HOSPADM

## 2021-06-25 RX ORDER — SODIUM CHLORIDE 0.9 % (FLUSH) 0.9 %
10 SYRINGE (ML) INJECTION PRN
Status: DISCONTINUED | OUTPATIENT
Start: 2021-06-25 | End: 2021-06-26 | Stop reason: HOSPADM

## 2021-06-25 RX ORDER — ONDANSETRON 4 MG/1
4 TABLET, ORALLY DISINTEGRATING ORAL EVERY 8 HOURS PRN
Status: DISCONTINUED | OUTPATIENT
Start: 2021-06-25 | End: 2021-06-26 | Stop reason: HOSPADM

## 2021-06-25 RX ORDER — ESCITALOPRAM OXALATE 10 MG/1
20 TABLET ORAL DAILY
Status: DISCONTINUED | OUTPATIENT
Start: 2021-06-25 | End: 2021-06-26 | Stop reason: HOSPADM

## 2021-06-25 RX ORDER — LABETALOL HYDROCHLORIDE 5 MG/ML
5 INJECTION, SOLUTION INTRAVENOUS EVERY 10 MIN PRN
Status: DISCONTINUED | OUTPATIENT
Start: 2021-06-25 | End: 2021-06-25 | Stop reason: HOSPADM

## 2021-06-25 RX ORDER — FENTANYL CITRATE 50 UG/ML
INJECTION, SOLUTION INTRAMUSCULAR; INTRAVENOUS PRN
Status: DISCONTINUED | OUTPATIENT
Start: 2021-06-25 | End: 2021-06-25 | Stop reason: SDUPTHER

## 2021-06-25 RX ORDER — SODIUM CHLORIDE 9 MG/ML
INJECTION, SOLUTION INTRAVENOUS CONTINUOUS
Status: DISCONTINUED | OUTPATIENT
Start: 2021-06-25 | End: 2021-06-26 | Stop reason: HOSPADM

## 2021-06-25 RX ORDER — CARVEDILOL 25 MG/1
25 TABLET ORAL 2 TIMES DAILY
Status: DISCONTINUED | OUTPATIENT
Start: 2021-06-25 | End: 2021-06-26 | Stop reason: HOSPADM

## 2021-06-25 RX ORDER — BUDESONIDE AND FORMOTEROL FUMARATE DIHYDRATE 160; 4.5 UG/1; UG/1
2 AEROSOL RESPIRATORY (INHALATION) 2 TIMES DAILY
Status: DISCONTINUED | OUTPATIENT
Start: 2021-06-25 | End: 2021-06-26 | Stop reason: HOSPADM

## 2021-06-25 RX ORDER — FENTANYL CITRATE 50 UG/ML
50 INJECTION, SOLUTION INTRAMUSCULAR; INTRAVENOUS EVERY 5 MIN PRN
Status: DISCONTINUED | OUTPATIENT
Start: 2021-06-25 | End: 2021-06-25 | Stop reason: HOSPADM

## 2021-06-25 RX ORDER — FUROSEMIDE 20 MG/1
10 TABLET ORAL DAILY
Status: DISCONTINUED | OUTPATIENT
Start: 2021-06-25 | End: 2021-06-26 | Stop reason: HOSPADM

## 2021-06-25 RX ORDER — DEXAMETHASONE SODIUM PHOSPHATE 4 MG/ML
INJECTION, SOLUTION INTRA-ARTICULAR; INTRALESIONAL; INTRAMUSCULAR; INTRAVENOUS; SOFT TISSUE PRN
Status: DISCONTINUED | OUTPATIENT
Start: 2021-06-25 | End: 2021-06-25 | Stop reason: SDUPTHER

## 2021-06-25 RX ORDER — LIDOCAINE HYDROCHLORIDE 20 MG/ML
INJECTION, SOLUTION INTRAVENOUS PRN
Status: DISCONTINUED | OUTPATIENT
Start: 2021-06-25 | End: 2021-06-25 | Stop reason: SDUPTHER

## 2021-06-25 RX ORDER — PHENYLEPHRINE HCL IN 0.9% NACL 1 MG/10 ML
SYRINGE (ML) INTRAVENOUS PRN
Status: DISCONTINUED | OUTPATIENT
Start: 2021-06-25 | End: 2021-06-25 | Stop reason: SDUPTHER

## 2021-06-25 RX ORDER — SODIUM CHLORIDE, SODIUM LACTATE, POTASSIUM CHLORIDE, CALCIUM CHLORIDE 600; 310; 30; 20 MG/100ML; MG/100ML; MG/100ML; MG/100ML
INJECTION, SOLUTION INTRAVENOUS CONTINUOUS
Status: DISCONTINUED | OUTPATIENT
Start: 2021-06-25 | End: 2021-06-25

## 2021-06-25 RX ORDER — FENTANYL CITRATE 50 UG/ML
25 INJECTION, SOLUTION INTRAMUSCULAR; INTRAVENOUS EVERY 5 MIN PRN
Status: DISCONTINUED | OUTPATIENT
Start: 2021-06-25 | End: 2021-06-25 | Stop reason: HOSPADM

## 2021-06-25 RX ORDER — ROCURONIUM BROMIDE 10 MG/ML
INJECTION, SOLUTION INTRAVENOUS PRN
Status: DISCONTINUED | OUTPATIENT
Start: 2021-06-25 | End: 2021-06-25 | Stop reason: SDUPTHER

## 2021-06-25 RX ORDER — ONDANSETRON 2 MG/ML
4 INJECTION INTRAMUSCULAR; INTRAVENOUS EVERY 6 HOURS PRN
Status: DISCONTINUED | OUTPATIENT
Start: 2021-06-25 | End: 2021-06-26 | Stop reason: HOSPADM

## 2021-06-25 RX ORDER — SODIUM CHLORIDE 9 MG/ML
25 INJECTION, SOLUTION INTRAVENOUS PRN
Status: DISCONTINUED | OUTPATIENT
Start: 2021-06-25 | End: 2021-06-26 | Stop reason: HOSPADM

## 2021-06-25 RX ORDER — BUPIVACAINE HYDROCHLORIDE 5 MG/ML
INJECTION, SOLUTION EPIDURAL; INTRACAUDAL
Status: COMPLETED | OUTPATIENT
Start: 2021-06-25 | End: 2021-06-25

## 2021-06-25 RX ORDER — LEVOTHYROXINE SODIUM 0.1 MG/1
100 TABLET ORAL DAILY
Status: DISCONTINUED | OUTPATIENT
Start: 2021-06-25 | End: 2021-06-26 | Stop reason: HOSPADM

## 2021-06-25 RX ORDER — OXYCODONE HYDROCHLORIDE 5 MG/1
5 TABLET ORAL EVERY 4 HOURS PRN
Status: DISCONTINUED | OUTPATIENT
Start: 2021-06-25 | End: 2021-06-26 | Stop reason: HOSPADM

## 2021-06-25 RX ORDER — LISINOPRIL 40 MG/1
40 TABLET ORAL DAILY
Status: DISCONTINUED | OUTPATIENT
Start: 2021-06-25 | End: 2021-06-26 | Stop reason: HOSPADM

## 2021-06-25 RX ORDER — SODIUM CHLORIDE, SODIUM LACTATE, POTASSIUM CHLORIDE, CALCIUM CHLORIDE 600; 310; 30; 20 MG/100ML; MG/100ML; MG/100ML; MG/100ML
INJECTION, SOLUTION INTRAVENOUS CONTINUOUS PRN
Status: DISCONTINUED | OUTPATIENT
Start: 2021-06-25 | End: 2021-06-25 | Stop reason: SDUPTHER

## 2021-06-25 RX ORDER — PROPOFOL 10 MG/ML
INJECTION, EMULSION INTRAVENOUS PRN
Status: DISCONTINUED | OUTPATIENT
Start: 2021-06-25 | End: 2021-06-25 | Stop reason: SDUPTHER

## 2021-06-25 RX ORDER — AMLODIPINE BESYLATE 5 MG/1
5 TABLET ORAL DAILY
Status: DISCONTINUED | OUTPATIENT
Start: 2021-06-25 | End: 2021-06-26

## 2021-06-25 RX ORDER — MORPHINE SULFATE 2 MG/ML
2 INJECTION, SOLUTION INTRAMUSCULAR; INTRAVENOUS
Status: DISCONTINUED | OUTPATIENT
Start: 2021-06-25 | End: 2021-06-26 | Stop reason: HOSPADM

## 2021-06-25 RX ORDER — HYDRALAZINE HYDROCHLORIDE 20 MG/ML
5 INJECTION INTRAMUSCULAR; INTRAVENOUS EVERY 10 MIN PRN
Status: DISCONTINUED | OUTPATIENT
Start: 2021-06-25 | End: 2021-06-25 | Stop reason: HOSPADM

## 2021-06-25 RX ORDER — ONDANSETRON 2 MG/ML
INJECTION INTRAMUSCULAR; INTRAVENOUS PRN
Status: DISCONTINUED | OUTPATIENT
Start: 2021-06-25 | End: 2021-06-25 | Stop reason: SDUPTHER

## 2021-06-25 RX ORDER — SODIUM CHLORIDE 0.9 % (FLUSH) 0.9 %
10 SYRINGE (ML) INJECTION EVERY 12 HOURS SCHEDULED
Status: DISCONTINUED | OUTPATIENT
Start: 2021-06-25 | End: 2021-06-26 | Stop reason: HOSPADM

## 2021-06-25 RX ADMIN — SODIUM CHLORIDE, POTASSIUM CHLORIDE, SODIUM LACTATE AND CALCIUM CHLORIDE: 600; 310; 30; 20 INJECTION, SOLUTION INTRAVENOUS at 06:57

## 2021-06-25 RX ADMIN — SODIUM CHLORIDE, POTASSIUM CHLORIDE, SODIUM LACTATE AND CALCIUM CHLORIDE: 600; 310; 30; 20 INJECTION, SOLUTION INTRAVENOUS at 07:30

## 2021-06-25 RX ADMIN — ROCURONIUM BROMIDE 20 MG: 10 INJECTION INTRAVENOUS at 08:40

## 2021-06-25 RX ADMIN — PROPOFOL 100 MG: 10 INJECTION, EMULSION INTRAVENOUS at 07:37

## 2021-06-25 RX ADMIN — SODIUM CHLORIDE, POTASSIUM CHLORIDE, SODIUM LACTATE AND CALCIUM CHLORIDE: 600; 310; 30; 20 INJECTION, SOLUTION INTRAVENOUS at 09:40

## 2021-06-25 RX ADMIN — MORPHINE SULFATE 2 MG: 2 INJECTION, SOLUTION INTRAMUSCULAR; INTRAVENOUS at 12:23

## 2021-06-25 RX ADMIN — MORPHINE SULFATE 2 MG: 2 INJECTION, SOLUTION INTRAMUSCULAR; INTRAVENOUS at 15:23

## 2021-06-25 RX ADMIN — MORPHINE SULFATE 2 MG: 2 INJECTION, SOLUTION INTRAMUSCULAR; INTRAVENOUS at 18:32

## 2021-06-25 RX ADMIN — BUDESONIDE AND FORMOTEROL FUMARATE DIHYDRATE 2 PUFF: 160; 4.5 AEROSOL RESPIRATORY (INHALATION) at 20:57

## 2021-06-25 RX ADMIN — FENTANYL CITRATE 50 MCG: 50 INJECTION, SOLUTION INTRAMUSCULAR; INTRAVENOUS at 10:42

## 2021-06-25 RX ADMIN — AMLODIPINE BESYLATE 5 MG: 5 TABLET ORAL at 18:31

## 2021-06-25 RX ADMIN — SODIUM CHLORIDE: 9 INJECTION, SOLUTION INTRAVENOUS at 22:22

## 2021-06-25 RX ADMIN — LABETALOL HYDROCHLORIDE 5 MG: 5 INJECTION, SOLUTION INTRAVENOUS at 12:22

## 2021-06-25 RX ADMIN — ROCURONIUM BROMIDE 50 MG: 10 INJECTION INTRAVENOUS at 07:38

## 2021-06-25 RX ADMIN — FENTANYL CITRATE 50 MCG: 50 INJECTION, SOLUTION INTRAMUSCULAR; INTRAVENOUS at 11:23

## 2021-06-25 RX ADMIN — LABETALOL HYDROCHLORIDE 5 MG: 5 INJECTION, SOLUTION INTRAVENOUS at 11:39

## 2021-06-25 RX ADMIN — FENTANYL CITRATE 100 MCG: 50 INJECTION, SOLUTION INTRAMUSCULAR; INTRAVENOUS at 07:37

## 2021-06-25 RX ADMIN — SODIUM CHLORIDE: 9 INJECTION, SOLUTION INTRAVENOUS at 10:51

## 2021-06-25 RX ADMIN — DEXAMETHASONE SODIUM PHOSPHATE 8 MG: 4 INJECTION, SOLUTION INTRAMUSCULAR; INTRAVENOUS at 07:53

## 2021-06-25 RX ADMIN — Medication 100 MCG: at 07:52

## 2021-06-25 RX ADMIN — HYDRALAZINE HYDROCHLORIDE 5 MG: 20 INJECTION INTRAMUSCULAR; INTRAVENOUS at 13:20

## 2021-06-25 RX ADMIN — Medication 100 MCG: at 08:20

## 2021-06-25 RX ADMIN — LIDOCAINE HYDROCHLORIDE 50 MG: 20 INJECTION, SOLUTION INTRAVENOUS at 07:37

## 2021-06-25 RX ADMIN — LABETALOL HYDROCHLORIDE 5 MG: 5 INJECTION, SOLUTION INTRAVENOUS at 11:02

## 2021-06-25 RX ADMIN — LABETALOL HYDROCHLORIDE 5 MG: 5 INJECTION, SOLUTION INTRAVENOUS at 12:02

## 2021-06-25 RX ADMIN — ONDANSETRON 4 MG: 2 INJECTION INTRAMUSCULAR; INTRAVENOUS at 10:16

## 2021-06-25 RX ADMIN — CEFAZOLIN 2000 MG: 10 INJECTION, POWDER, FOR SOLUTION INTRAVENOUS at 07:45

## 2021-06-25 RX ADMIN — ROCURONIUM BROMIDE 10 MG: 10 INJECTION INTRAVENOUS at 09:41

## 2021-06-25 RX ADMIN — LISINOPRIL 40 MG: 40 TABLET ORAL at 18:31

## 2021-06-25 RX ADMIN — CARVEDILOL 25 MG: 25 TABLET, FILM COATED ORAL at 21:33

## 2021-06-25 RX ADMIN — SUGAMMADEX 200 MG: 100 INJECTION, SOLUTION INTRAVENOUS at 10:18

## 2021-06-25 ASSESSMENT — PULMONARY FUNCTION TESTS
PIF_VALUE: 28
PIF_VALUE: 0
PIF_VALUE: 30
PIF_VALUE: 23
PIF_VALUE: 27
PIF_VALUE: 27
PIF_VALUE: 20
PIF_VALUE: 31
PIF_VALUE: 27
PIF_VALUE: 27
PIF_VALUE: 21
PIF_VALUE: 28
PIF_VALUE: 30
PIF_VALUE: 26
PIF_VALUE: 27
PIF_VALUE: 28
PIF_VALUE: 7
PIF_VALUE: 29
PIF_VALUE: 27
PIF_VALUE: 27
PIF_VALUE: 18
PIF_VALUE: 23
PIF_VALUE: 22
PIF_VALUE: 20
PIF_VALUE: 23
PIF_VALUE: 27
PIF_VALUE: 28
PIF_VALUE: 27
PIF_VALUE: 30
PIF_VALUE: 21
PIF_VALUE: 30
PIF_VALUE: 27
PIF_VALUE: 27
PIF_VALUE: 11
PIF_VALUE: 27
PIF_VALUE: 27
PIF_VALUE: 19
PIF_VALUE: 27
PIF_VALUE: 27
PIF_VALUE: 30
PIF_VALUE: 21
PIF_VALUE: 28
PIF_VALUE: 27
PIF_VALUE: 23
PIF_VALUE: 0
PIF_VALUE: 23
PIF_VALUE: 19
PIF_VALUE: 27
PIF_VALUE: 1
PIF_VALUE: 28
PIF_VALUE: 30
PIF_VALUE: 19
PIF_VALUE: 27
PIF_VALUE: 27
PIF_VALUE: 20
PIF_VALUE: 26
PIF_VALUE: 27
PIF_VALUE: 27
PIF_VALUE: 29
PIF_VALUE: 30
PIF_VALUE: 28
PIF_VALUE: 24
PIF_VALUE: 19
PIF_VALUE: 14
PIF_VALUE: 27
PIF_VALUE: 22
PIF_VALUE: 27
PIF_VALUE: 23
PIF_VALUE: 3
PIF_VALUE: 23
PIF_VALUE: 30
PIF_VALUE: 27
PIF_VALUE: 27
PIF_VALUE: 29
PIF_VALUE: 20
PIF_VALUE: 19
PIF_VALUE: 3
PIF_VALUE: 27
PIF_VALUE: 0
PIF_VALUE: 22
PIF_VALUE: 23
PIF_VALUE: 28
PIF_VALUE: 27
PIF_VALUE: 28
PIF_VALUE: 21
PIF_VALUE: 0
PIF_VALUE: 30
PIF_VALUE: 4
PIF_VALUE: 19
PIF_VALUE: 33
PIF_VALUE: 28
PIF_VALUE: 27
PIF_VALUE: 28
PIF_VALUE: 28
PIF_VALUE: 27
PIF_VALUE: 23
PIF_VALUE: 28
PIF_VALUE: 21
PIF_VALUE: 23
PIF_VALUE: 28
PIF_VALUE: 20
PIF_VALUE: 27
PIF_VALUE: 23
PIF_VALUE: 27
PIF_VALUE: 23
PIF_VALUE: 27
PIF_VALUE: 30
PIF_VALUE: 27
PIF_VALUE: 28
PIF_VALUE: 20
PIF_VALUE: 23
PIF_VALUE: 27
PIF_VALUE: 1
PIF_VALUE: 27
PIF_VALUE: 19
PIF_VALUE: 29
PIF_VALUE: 20
PIF_VALUE: 20
PIF_VALUE: 27
PIF_VALUE: 1
PIF_VALUE: 19
PIF_VALUE: 19
PIF_VALUE: 1
PIF_VALUE: 23
PIF_VALUE: 27
PIF_VALUE: 28
PIF_VALUE: 28
PIF_VALUE: 29
PIF_VALUE: 28
PIF_VALUE: 28
PIF_VALUE: 11
PIF_VALUE: 23
PIF_VALUE: 27
PIF_VALUE: 0
PIF_VALUE: 27
PIF_VALUE: 20
PIF_VALUE: 22
PIF_VALUE: 27
PIF_VALUE: 28
PIF_VALUE: 23
PIF_VALUE: 28
PIF_VALUE: 0
PIF_VALUE: 28
PIF_VALUE: 28
PIF_VALUE: 27
PIF_VALUE: 23
PIF_VALUE: 27
PIF_VALUE: 28
PIF_VALUE: 19
PIF_VALUE: 0
PIF_VALUE: 14
PIF_VALUE: 30
PIF_VALUE: 19
PIF_VALUE: 29
PIF_VALUE: 27
PIF_VALUE: 28
PIF_VALUE: 31
PIF_VALUE: 27
PIF_VALUE: 30
PIF_VALUE: 23
PIF_VALUE: 29
PIF_VALUE: 3
PIF_VALUE: 0
PIF_VALUE: 28
PIF_VALUE: 29
PIF_VALUE: 34
PIF_VALUE: 29
PIF_VALUE: 28
PIF_VALUE: 27
PIF_VALUE: 21

## 2021-06-25 ASSESSMENT — PAIN SCALES - GENERAL
PAINLEVEL_OUTOF10: 7
PAINLEVEL_OUTOF10: 7
PAINLEVEL_OUTOF10: 8
PAINLEVEL_OUTOF10: 10
PAINLEVEL_OUTOF10: 0
PAINLEVEL_OUTOF10: 7
PAINLEVEL_OUTOF10: 5

## 2021-06-25 ASSESSMENT — PAIN DESCRIPTION - LOCATION
LOCATION: ABDOMEN

## 2021-06-25 ASSESSMENT — PAIN - FUNCTIONAL ASSESSMENT
PAIN_FUNCTIONAL_ASSESSMENT: PREVENTS OR INTERFERES SOME ACTIVE ACTIVITIES AND ADLS
PAIN_FUNCTIONAL_ASSESSMENT: PREVENTS OR INTERFERES SOME ACTIVE ACTIVITIES AND ADLS
PAIN_FUNCTIONAL_ASSESSMENT: 0-10

## 2021-06-25 ASSESSMENT — PAIN DESCRIPTION - PAIN TYPE
TYPE: SURGICAL PAIN

## 2021-06-25 ASSESSMENT — PAIN DESCRIPTION - PROGRESSION
CLINICAL_PROGRESSION: GRADUALLY IMPROVING

## 2021-06-25 NOTE — BRIEF OP NOTE
Brief Postoperative Note      Patient: Radha Hsu  YOB: 1950  MRN: [id number]    Date of Procedure: 6/25/2021    Pre-Op Diagnosis: INCISIONAL HERNIA    Post-Op Diagnosis: Same       Procedure(s):  ROBOTIC HERNIA INCISIONAL REPAIR LAPAROSCOPIC WITH COMPONENT SEPARATION    Surgeon(s):  Edita Black MD    Assistant:  Physician Assistant: Leander Neal PA-C    Anesthesia: General    Estimated Blood Loss (mL): Minimal    Complications: None    Specimens:   * No specimens in log *    Implants:  Implant Name Type Inv.  Item Serial No.  Lot No. LRB No. Used Action   MESH SURG [id number] DIA5MM POLYPR SQ FLAT FOR SFT TISS REP  MESH SURG [id number] DIA5MM POLYPR SQ FLAT FOR SFT TISS REP  [facility] [employer]- [id number] N/A 1 Implanted         Drains: * No LDAs found *    Findings: As Above    Electronically signed by Leander Neal PA-C on 6/25/2021 at 10:27 AM

## 2021-06-26 VITALS
TEMPERATURE: 98.4 F | RESPIRATION RATE: 18 BRPM | WEIGHT: 185 LBS | HEIGHT: 63 IN | HEART RATE: 80 BPM | OXYGEN SATURATION: 90 % | DIASTOLIC BLOOD PRESSURE: 67 MMHG | BODY MASS INDEX: 32.78 KG/M2 | SYSTOLIC BLOOD PRESSURE: 148 MMHG

## 2021-06-26 LAB
ALBUMIN SERPL-MCNC: 3.4 GM/DL (ref 3.4–5)
ALP BLD-CCNC: 61 IU/L (ref 40–128)
ALT SERPL-CCNC: 16 U/L (ref 10–40)
ANION GAP SERPL CALCULATED.3IONS-SCNC: 8 MMOL/L (ref 4–16)
AST SERPL-CCNC: 36 IU/L (ref 15–37)
BASOPHILS ABSOLUTE: 0 K/CU MM
BASOPHILS RELATIVE PERCENT: 0.2 % (ref 0–1)
BILIRUB SERPL-MCNC: 0.7 MG/DL (ref 0–1)
BUN BLDV-MCNC: 20 MG/DL (ref 6–23)
CALCIUM SERPL-MCNC: 8.7 MG/DL (ref 8.3–10.6)
CHLORIDE BLD-SCNC: 103 MMOL/L (ref 99–110)
CO2: 26 MMOL/L (ref 21–32)
CREAT SERPL-MCNC: 1.1 MG/DL (ref 0.6–1.1)
DIFFERENTIAL TYPE: ABNORMAL
EOSINOPHILS ABSOLUTE: 0 K/CU MM
EOSINOPHILS RELATIVE PERCENT: 0.1 % (ref 0–3)
GFR AFRICAN AMERICAN: 59 ML/MIN/1.73M2
GFR NON-AFRICAN AMERICAN: 49 ML/MIN/1.73M2
GLUCOSE BLD-MCNC: 113 MG/DL (ref 70–99)
HCT VFR BLD CALC: 35.6 % (ref 37–47)
HEMOGLOBIN: 12.1 GM/DL (ref 12.5–16)
IMMATURE NEUTROPHIL %: 0.4 % (ref 0–0.43)
INR BLD: 1.04 INDEX
LYMPHOCYTES ABSOLUTE: 1.2 K/CU MM
LYMPHOCYTES RELATIVE PERCENT: 9.7 % (ref 24–44)
MCH RBC QN AUTO: 33.2 PG (ref 27–31)
MCHC RBC AUTO-ENTMCNC: 34 % (ref 32–36)
MCV RBC AUTO: 97.5 FL (ref 78–100)
MONOCYTES ABSOLUTE: 1.1 K/CU MM
MONOCYTES RELATIVE PERCENT: 8.4 % (ref 0–4)
NUCLEATED RBC %: 0 %
PDW BLD-RTO: 12.7 % (ref 11.7–14.9)
PLATELET # BLD: 160 K/CU MM (ref 140–440)
PMV BLD AUTO: 10.2 FL (ref 7.5–11.1)
POTASSIUM SERPL-SCNC: 4.4 MMOL/L (ref 3.5–5.1)
PROTHROMBIN TIME: 12.6 SECONDS (ref 11.7–14.5)
RBC # BLD: 3.65 M/CU MM (ref 4.2–5.4)
SEGMENTED NEUTROPHILS ABSOLUTE COUNT: 10.2 K/CU MM
SEGMENTED NEUTROPHILS RELATIVE PERCENT: 81.2 % (ref 36–66)
SODIUM BLD-SCNC: 137 MMOL/L (ref 135–145)
TOTAL IMMATURE NEUTOROPHIL: 0.05 K/CU MM
TOTAL NUCLEATED RBC: 0 K/CU MM
TOTAL PROTEIN: 6 GM/DL (ref 6.4–8.2)
WBC # BLD: 12.6 K/CU MM (ref 4–10.5)

## 2021-06-26 PROCEDURE — 2580000003 HC RX 258: Performed by: SURGERY

## 2021-06-26 PROCEDURE — 6370000000 HC RX 637 (ALT 250 FOR IP): Performed by: PHYSICIAN ASSISTANT

## 2021-06-26 PROCEDURE — 99024 POSTOP FOLLOW-UP VISIT: CPT | Performed by: SURGERY

## 2021-06-26 PROCEDURE — 85610 PROTHROMBIN TIME: CPT

## 2021-06-26 PROCEDURE — 99222 1ST HOSP IP/OBS MODERATE 55: CPT | Performed by: INTERNAL MEDICINE

## 2021-06-26 PROCEDURE — 36415 COLL VENOUS BLD VENIPUNCTURE: CPT

## 2021-06-26 PROCEDURE — 94761 N-INVAS EAR/PLS OXIMETRY MLT: CPT

## 2021-06-26 PROCEDURE — 6370000000 HC RX 637 (ALT 250 FOR IP): Performed by: INTERNAL MEDICINE

## 2021-06-26 PROCEDURE — 6360000002 HC RX W HCPCS: Performed by: SURGERY

## 2021-06-26 PROCEDURE — 99024 POSTOP FOLLOW-UP VISIT: CPT | Performed by: NURSE PRACTITIONER

## 2021-06-26 PROCEDURE — 2700000000 HC OXYGEN THERAPY PER DAY

## 2021-06-26 PROCEDURE — 85025 COMPLETE CBC W/AUTO DIFF WBC: CPT

## 2021-06-26 PROCEDURE — 6360000002 HC RX W HCPCS: Performed by: PHYSICIAN ASSISTANT

## 2021-06-26 PROCEDURE — 2500000003 HC RX 250 WO HCPCS: Performed by: INTERNAL MEDICINE

## 2021-06-26 PROCEDURE — 80053 COMPREHEN METABOLIC PANEL: CPT

## 2021-06-26 PROCEDURE — 94640 AIRWAY INHALATION TREATMENT: CPT

## 2021-06-26 RX ORDER — AMLODIPINE BESYLATE 5 MG/1
10 TABLET ORAL DAILY
Qty: 90 TABLET | Refills: 1 | Status: SHIPPED | OUTPATIENT
Start: 2021-06-26 | End: 2021-10-05 | Stop reason: SDUPTHER

## 2021-06-26 RX ORDER — AMLODIPINE BESYLATE 10 MG/1
10 TABLET ORAL DAILY
Status: DISCONTINUED | OUTPATIENT
Start: 2021-06-27 | End: 2021-06-26 | Stop reason: HOSPADM

## 2021-06-26 RX ORDER — AMLODIPINE BESYLATE 5 MG/1
5 TABLET ORAL ONCE
Status: COMPLETED | OUTPATIENT
Start: 2021-06-26 | End: 2021-06-26

## 2021-06-26 RX ORDER — ONDANSETRON 4 MG/1
4 TABLET, ORALLY DISINTEGRATING ORAL EVERY 8 HOURS PRN
Qty: 20 TABLET | Refills: 1 | Status: SHIPPED | OUTPATIENT
Start: 2021-06-26

## 2021-06-26 RX ORDER — LABETALOL HYDROCHLORIDE 5 MG/ML
10 INJECTION, SOLUTION INTRAVENOUS EVERY 4 HOURS PRN
Status: DISCONTINUED | OUTPATIENT
Start: 2021-06-26 | End: 2021-06-26 | Stop reason: HOSPADM

## 2021-06-26 RX ORDER — SENNA PLUS 8.6 MG/1
1 TABLET ORAL 2 TIMES DAILY
Qty: 28 TABLET | Refills: 1 | Status: SHIPPED | OUTPATIENT
Start: 2021-06-26 | End: 2021-07-24

## 2021-06-26 RX ORDER — HYDROCODONE BITARTRATE AND ACETAMINOPHEN 5; 325 MG/1; MG/1
1 TABLET ORAL EVERY 4 HOURS PRN
Qty: 20 TABLET | Refills: 0 | Status: SHIPPED | OUTPATIENT
Start: 2021-06-26 | End: 2021-07-01

## 2021-06-26 RX ADMIN — AMLODIPINE BESYLATE 5 MG: 5 TABLET ORAL at 13:28

## 2021-06-26 RX ADMIN — ESCITALOPRAM OXALATE 20 MG: 10 TABLET ORAL at 09:29

## 2021-06-26 RX ADMIN — ENOXAPARIN SODIUM 40 MG: 40 INJECTION SUBCUTANEOUS at 09:30

## 2021-06-26 RX ADMIN — LEVOTHYROXINE SODIUM 100 MCG: 0.1 TABLET ORAL at 06:18

## 2021-06-26 RX ADMIN — LISINOPRIL 40 MG: 40 TABLET ORAL at 09:29

## 2021-06-26 RX ADMIN — HYDRALAZINE HYDROCHLORIDE 10 MG: 20 INJECTION, SOLUTION INTRAMUSCULAR; INTRAVENOUS at 05:15

## 2021-06-26 RX ADMIN — AMLODIPINE BESYLATE 5 MG: 5 TABLET ORAL at 09:29

## 2021-06-26 RX ADMIN — OXYCODONE HYDROCHLORIDE 5 MG: 5 TABLET ORAL at 14:43

## 2021-06-26 RX ADMIN — FUROSEMIDE 10 MG: 20 TABLET ORAL at 09:29

## 2021-06-26 RX ADMIN — BUDESONIDE AND FORMOTEROL FUMARATE DIHYDRATE 2 PUFF: 160; 4.5 AEROSOL RESPIRATORY (INHALATION) at 12:54

## 2021-06-26 RX ADMIN — LABETALOL HYDROCHLORIDE 10 MG: 5 INJECTION, SOLUTION INTRAVENOUS at 09:37

## 2021-06-26 RX ADMIN — CARVEDILOL 25 MG: 25 TABLET, FILM COATED ORAL at 09:29

## 2021-06-26 ASSESSMENT — PAIN DESCRIPTION - FREQUENCY: FREQUENCY: CONTINUOUS

## 2021-06-26 ASSESSMENT — PAIN SCALES - GENERAL
PAINLEVEL_OUTOF10: 0
PAINLEVEL_OUTOF10: 5
PAINLEVEL_OUTOF10: 0

## 2021-06-26 ASSESSMENT — PAIN DESCRIPTION - DESCRIPTORS: DESCRIPTORS: ACHING

## 2021-06-26 ASSESSMENT — PAIN DESCRIPTION - ORIENTATION: ORIENTATION: MID

## 2021-06-26 ASSESSMENT — PAIN DESCRIPTION - LOCATION: LOCATION: ABDOMEN

## 2021-06-26 ASSESSMENT — PAIN DESCRIPTION - PAIN TYPE: TYPE: ACUTE PAIN

## 2021-06-26 ASSESSMENT — PAIN DESCRIPTION - PROGRESSION: CLINICAL_PROGRESSION: GRADUALLY IMPROVING

## 2021-06-26 NOTE — PROGRESS NOTES
General Surgery-Dr. Jitendra Griffin Day: 2    Chief Complaint on Admission: elective hernia repair      Subjective:     No complaints. Tolerated PO. No N/V. Denies CP/SOB. Denies F/C. Did have some elevated BP secondary to pain. Asking about going home. ROS:  Review of Systems  Negative except as above. Allergies  Patient has no known allergies. Diagnosis Date    CAD (coronary artery disease)     [facility]) [facility])     GERD (gastroesophageal reflux disease)     H/O cardiovascular stress test 2021    normal study 63% EF no ischemia    H/O echocardiogram 2017    EF 35-40%    H/O echocardiogram 2020    EF 45-50, Mild TR & NE, Severely dilated left atrium. 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, functionally normally     Heart attack ([address] 2014    Hyperlipidemia     Hypertension     On home oxygen therapy     nightly    PAD (peripheral artery disease) ([address]     S/P right coronary artery (RCA) stent placement     Sleep apnea     uses CPAP    T2 vertebral fracture ([address] 2017    TIA (transient ischemic attack)     Tobacco dependence     Wears glasses     Wears partial dentures     Upper       Objective:     Vitals:    21 0915   BP: (!) 189/81   Pulse: 83   Resp: 18   Temp: 98.4 °F (36.9 °C)   SpO2: 91%       TEMPERATURE:  Current -Temp: 98.4 °F (36.9 °C); Max - Temp  Av.9 °F (36.1 °C)  Min: 96.3 °F (35.7 °C)  Max: 98.4 °F (36.9 °C)    I/O this shift:  In: -   Out: 400 [Urine:400]I/O last 3 completed shifts: In: 4776 [I.V.:2211; IV Piggyback:50]  Out: 1300 [Urine:1290; Blood:10]      Physical Exam:  Physical Exam  A&Ox3, NAD at rest.  AT. NC. Breathing unlabored. RRR. S, obese, ND, no PS, min and appropriately TTP. Incisions C/D/I. EDWARDS  Warm, dry.      Scheduled Meds:   amLODIPine  5 mg Oral Daily    budesonide-formoterol  2 puff Inhalation BID    carvedilol  25 mg Oral BID    escitalopram  20 mg Oral Daily    furosemide  10 mg Oral Daily    levothyroxine  100 mcg Oral Daily    lisinopril  40 mg Oral Daily    sodium chloride flush  10 mL Intravenous 2 times per day    enoxaparin  40 mg Subcutaneous Daily     ContinuousInfusions:   sodium chloride 75 mL/hr at 06/25/21 2222    sodium chloride       PRN Meds:labetalol, oxyCODONE, morphine, sodium chloride flush, sodium chloride, ondansetron **OR** ondansetron, magnesium hydroxide      Labs/Imaging Results:   Lab Results   Component Value Date    WBC 8.0 06/18/2021    HGB 13.7 06/18/2021    HCT 41.1 06/18/2021    MCV 98.6 06/18/2021     06/18/2021     Lab Results   Component Value Date     06/18/2021    K 4.3 06/18/2021     06/18/2021    CO2 29 06/18/2021    BUN 17 06/18/2021    CREATININE 1.2 (H) 06/18/2021    GLUCOSE 100 (H) 06/18/2021    CALCIUM 9.4 06/18/2021    PROT 7.1 03/15/2021    LABALBU 4.2 03/15/2021    BILITOT 0.5 03/15/2021    ALKPHOS 77 03/15/2021    AST 23 03/15/2021    ALT 20 03/15/2021    LABGLOM 44 (L) 06/18/2021    GFRAA 54 (L) 06/18/2021    AGRATIO 1.2 10/23/2018    GLOB 3.2 10/23/2018       Assessment:     69 y/o F POD 1 s/p robotic-assisted laparoscopic TAR     Plan:     -Regular diet. -pain, nausea control. -DVT prophylaxis.  -Planned d/c later today after lunch. F/u with Dr. Maru Lucas as scheduled. Call with any questions, concerns, or issues whatsoever.   -Pt agreeable to above plan and all of her questions were addressed.        Electronically signed by [address] [place] [address] STEPHEN Cerna MD on 6/26/2021 at 10:02 AM

## 2021-06-26 NOTE — CONSULTS
Consult Note (Hospitalist, Internal Medicine)  IDENTIFYING INFORMATION   PATIENT:  Yamil Timmons  MRN:  [id number]  ADMIT DATE: 6/25/2021  TIME OF EVALUATION: 6/26/2021 6:27 AM    REASON FOR CONSULT   Medical management    HISTORY OF PRESENT ILLNESS   Yamil Timmons is a 70 y.o. female with abdominal pain status post hernia surgery, has no acute complaints at this time. Pt otherwise has no complaints of CP, SOB, dizziness, N/V/C/D, dysuria, joint pains, rash/boils, or fevers. PAST MEDICAL, SURGICAL, FAMILY, and SOCIAL HISTORY     Past Medical History:   Diagnosis Date    CAD (coronary artery disease)     COPD (chronic obstructive pulmonary disease) ([address]     GERD (gastroesophageal reflux disease)     H/O cardiovascular stress test 04/26/2021    normal study 63% EF no ischemia    H/O echocardiogram 03/22/2017    EF 35-40%    H/O echocardiogram 08/04/2020    EF 45-50, Mild TR & MA, Severely dilated left atrium.   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, functionally normally     Heart attack ([address] 01/2014    Hyperlipidemia     Hypertension     On home oxygen therapy     nightly    PAD (peripheral artery disease) ([address]     S/P right coronary artery (RCA) stent placement     Sleep apnea     uses CPAP    T2 vertebral fracture ([address] 12/2017    TIA (transient ischemic attack) 1996    Tobacco dependence     Wears glasses     Wears partial dentures     Upper     Past Surgical History:   Procedure Laterality Date    APPENDECTOMY      age 6    CARDIAC SURGERY      CORONARY ANGIOPLASTY WITH STENT PLACEMENT      CORONARY ARTERY BYPASS GRAFT  06/21/2016    CABG X1 SVG to RCA with Mitral Valve Replacement 27mm Mosaic     MITRAL VALVE REPLACEMENT  06/21/2016    27mm mosaic     Family History   Problem Relation Age of Onset    Stroke Mother     Heart Attack Father     Coronary Art Dis Father     Pacemaker Father     Arrhythmia Father     Diabetes Sister     Rheum Arthritis Sister  Heart Attack Sister     Arrhythmia Sister      Social History     Socioeconomic History    Marital status:      Spouse name: None    Number of children: None    Years of education: None    Highest education level: None   Occupational History    None   Tobacco Use    Smoking status: Current Every Day Smoker     Packs/day: 0.50     Types: Cigarettes    Smokeless tobacco: Former User     Quit date: 4/30/2021   Vaping Use    Vaping Use: Never used   Substance and Sexual Activity    Alcohol use: No     Comment: 2 cups of coffee in the a.m.  Drug use: No    Sexual activity: Yes     Partners: Male   Other Topics Concern    None   Social History Narrative    ** Merged History Encounter **          Social Determinants of Health     Financial Resource Strain:     Difficulty of Paying Living Expenses:    Food Insecurity:     Worried About Running Out of Food in the Last Year:     Ran Out of Food in the Last Year:    Transportation Needs:     Lack of Transportation (Medical):      Lack of Transportation (Non-Medical):    Physical Activity:     Days of Exercise per Week:     Minutes of Exercise per Session:    Stress:     Feeling of Stress :    Social Connections:     Frequency of Communication with Friends and Family:     Frequency of Social Gatherings with Friends and Family:     Attends Jew Services:     Active Member of Clubs or Organizations:     Attends Club or Organization Meetings:     Marital Status:    Intimate Partner Violence:     Fear of Current or Ex-Partner:     Emotionally Abused:     Physically Abused:     Sexually Abused:        MEDICATIONS   Medications Prior to Admission  Medications Prior to Admission: lisinopril (PRINIVIL;ZESTRIL) 40 MG tablet, Take 1 tablet by mouth daily  cilostazol (PLETAL) 100 MG tablet, Take 1 tablet by mouth twice daily  carvedilol (COREG) 25 MG tablet, Take 1 tablet by mouth 2 times daily  furosemide (LASIX) 20 MG tablet, Take 1/2 (one-half) tablet by mouth once daily  amLODIPine (NORVASC) 5 MG tablet, Take 1 tablet by mouth daily  pantoprazole (PROTONIX) 40 MG tablet, Take 1 tablet by mouth daily  budesonide-formoterol (SYMBICORT) 160-4.5 MCG/ACT AERO, Inhale 2 puffs into the lungs 2 times daily  atorvastatin (LIPITOR) 40 MG tablet, Take 1 tablet by mouth daily  Blood Pressure Monitoring KIT, 1 Units by Does not apply route 2 times daily  levothyroxine (SYNTHROID) 100 MCG tablet, Take 1 tablet by mouth Daily  escitalopram (LEXAPRO) 20 MG tablet, Take 1 tablet by mouth daily  Calcium Carb-Cholecalciferol (CALCIUM 600+D) 600-800 MG-UNIT TABS, Take 1 tablet by mouth daily  aspirin 81 MG EC tablet, Take 1 tablet by mouth daily  Multiple Vitamins-Minerals (THERAPEUTIC MULTIVITAMIN-MINERALS) tablet, Take 1 tablet by mouth daily  warfarin (COUMADIN) 5 MG tablet, Take 1 tablet by mouth daily Except take 7.5mg (1 and 1/2 tablets) on Wednesdays  nitroGLYCERIN (NITROSTAT) 0.4 MG SL tablet, Place 1 tablet under the tongue every 5 minutes as needed for Chest pain up to max of 3 total doses. If no relief after 1 dose, call 911.     Current Medications  Current Facility-Administered Medications   Medication Dose Route Frequency Provider Last Rate Last Admin    labetalol (NORMODYNE;TRANDATE) injection 10 mg  10 mg Intravenous Q4H PRN Sai Baltazar Willams MD        amLODIPine (NORVASC) tablet 5 mg  5 mg Oral Daily Veronica De Oliveira PA-C   5 mg at 06/25/21 1831    budesonide-formoterol (SYMBICORT) 160-4.5 MCG/ACT inhaler 2 puff  2 puff Inhalation BID Sisto Flicker, PA-C   2 puff at 06/25/21 2057    carvedilol (COREG) tablet 25 mg  25 mg Oral BID Sisto Flicker, PA-C   25 mg at 06/25/21 2133    escitalopram (LEXAPRO) tablet 20 mg  20 mg Oral Daily Sisto Flicker, PA-C        furosemide (LASIX) tablet 10 mg  10 mg Oral Daily Sisto Flicker, PA-C        levothyroxine (SYNTHROID) tablet 100 mcg  100 mcg Oral Daily Sisto Flicker, PA-C   100 mcg at 06/26/21 0618    lisinopril (PRINIVIL;ZESTRIL) tablet 40 mg  40 mg Oral Daily Nanda Benton PA-C   40 mg at 06/25/21 1831    oxyCODONE (ROXICODONE) immediate release tablet 5 mg  5 mg Oral Q4H PRN Nanda Benton PA-C        morphine (PF) injection 2 mg  2 mg Intravenous Q3H PRN Nanda Benton PA-C   2 mg at 06/25/21 1832    0.9 % sodium chloride infusion   Intravenous Continuous Nanda Benton PA-C 75 mL/hr at 06/25/21 2222 New Bag at 06/25/21 2222    sodium chloride flush 0.9 % injection 10 mL  10 mL Intravenous 2 times per day Nanda Benton PA-C        sodium chloride flush 0.9 % injection 10 mL  10 mL Intravenous PRN Nanda Benton PA-C        0.9 % sodium chloride infusion  25 mL Intravenous PRN Nanda Benton PA-C        ondansetron (ZOFRAN-ODT) disintegrating tablet 4 mg  4 mg Oral Q8H PRN Nanda Benton PA-C        Or    ondansetron (ZOFRAN) injection 4 mg  4 mg Intravenous Q6H PRN Nanda Benton PA-C        magnesium hydroxide (MILK OF MAGNESIA) 400 MG/5ML suspension 30 mL  30 mL Oral Daily PRN Nanda Benton PA-C        enoxaparin (LOVENOX) injection 40 mg  40 mg Subcutaneous Daily Veronica De Oliveira PA-C             Allergies  No Known Allergies    REVIEW OF SYSTEMS     ROS  14 point review of systems reviewed. Pertinent positive or negative as per HPI or otherwise reviewed as negative    PHYSICAL EXAM     Wt Readings from Last 3 Encounters:   06/26/21 185 lb (83.9 kg)   06/18/21 177 lb 6.4 oz (80.5 kg)   05/17/21 181 lb 12.8 oz (82.5 kg)       Blood pressure (!) 159/74, pulse 86, temperature 98.1 °F (36.7 °C), temperature source Oral, resp. rate 16, height 5' 3\" (1.6 m), weight 185 lb (83.9 kg), SpO2 93 %. General - AAO x 3  Psych - Appropriate affect/speech. No agitation  Eyes - Eye lids intact. No scleral icterus  ENT - Lips wnl. External ear clear/dry/intact. No thyromegaly on inspection  Neuro - No gross peripheral or central neuro deficits on inspection  Heart - Sinus. RRR. S1 and S2 present. No added HS/murmurs appreciated.  No elevated JVD appreciated  Lung - Adequate air entry b/l, No crackes/wheezes appreciated  GI - Soft, nondistended no hepatosplenomegaly/ascites.  - No CVA/suprapubic tenderness or palpable bladder distension  Skin - Intact. No rash/petechiae/ecchymosis. Warm extremities  MSK - Joints with normal ROM.  No joint swellings      Lines/Drains/Airways/Wounds:  [unfilled]    LABS AND IMAGING   CBC  [unfilled]    Last 3 Hemoglobin  Lab Results   Component Value Date    HGB 13.7 06/18/2021    HGB 13.9 03/15/2021    HGB 13.9 08/03/2020     Last 3 WBC/ANC  Lab Results   Component Value Date    WBC 8.0 06/18/2021    WBC 8.0 03/15/2021    WBC 8.6 08/03/2020     No components found for: GRNLOCTYABS  Last 3 Platelets  No results found for: PLATELET  Chemistry  [unfilled]  [unfilled]  No results found for: LDH  Coagulation Studies  Lab Results   Component Value Date    INR 2.3 05/17/2021    INR 2.3 05/17/2021    INR 2.1 09/21/2020     Liver Function Studies  Lab Results   Component Value Date    ALT 20 03/15/2021    AST 23 03/15/2021    ALKPHOS 77 03/15/2021       Recent Imaging        Relevant labs and imaging reviewed    ASSESSMENT AND PLAN     Essential hypertension with uncontrolled blood pressure, possibly in setting of pain  Continue home medications, including lisinopril and carvedilol  As needed IV labetalol if systolic greater than 353 systolic  Cardiology consult to help with blood pressure management    Mitral valve regurg, denies any active shortness of breath or chest pain  On Coumadin, has been off of it for surgery, restart if cleared by primary surgical team  Cardiology consulted for recommendations of bridging and when to restart    Hernia of anterior abdominal wall s/p Sx  - mgmt per Sx team    CAD in native artery   S/P right coronary artery (RCA) stent placement     ASCVD (arteriosclerotic cardiovascular disease)   Cardiomyopathy, primary   Chronic respiratory failure with hypoxia and hypercapnia   Mixed hyperlipidemia   Hypersomnia     EDITA (obstructive sleep apnea), consider APAP if hypoxic overnight    PAD (peripheral artery disease)   Rheumatic mitral stenosis   S/P mitral valve replacement       [address], Internal Medicine  6/26/2021 at 6:27 AM

## 2021-06-26 NOTE — CONSULTS
INPATIENT CARDIOLOGY CONSULT NOTE       Reason for consultation:  HTN, AFIB, ? Anticoagulation     Referring physician:  Chase Mendoza MD     Dear Chase Mendoza MD Thank you for the consult    History of present Hannah Ma is a 70 y. o.year old who  presents with abdominal pain. Patient has ventral hernia status post hernia repair. Cardiology is consulted to evaluate patient for hypertensive urgency  Also consulted for need for anticoagulation in the context of mitral valve replacement    Patient is history of paroxysmal atrial fibrillation, a recent 30-day event monitor did not show any atrial fibrillation. She has been maintained on warfarin as outpatient. She also has history of bioprosthetic mitral valve replacement. Patient denies any chest pain shortness of breath or palpitation    Compliant with home medication    EKG and telemetry shows sinus rhythm        Past medical history:    has a past medical history of CAD (coronary artery disease), COPD (chronic obstructive pulmonary disease) ([address] GERD (gastroesophageal reflux disease), H/O cardiovascular stress test, H/O echocardiogram, H/O echocardiogram, Heart attack ([address] Hyperlipidemia, Hypertension, On home oxygen therapy, PAD (peripheral artery disease) ([address] S/P right coronary artery (RCA) stent placement, Sleep apnea, T2 vertebral fracture ([address] TIA (transient ischemic attack), Tobacco dependence, Wears glasses, and Wears partial dentures. Past surgical history:   has a past surgical history that includes Coronary angioplasty with stent; Coronary artery bypass graft (06/21/2016); Mitral valve replacement (06/21/2016); Cardiac surgery; and Appendectomy. Social History:   reports that she has been smoking cigarettes. She has been smoking about 0.50 packs per day. She quit smokeless tobacco use about 8 weeks ago. She reports that she does not drink alcohol and does not use drugs.   Family history:   no family history of CAD, STROKE of DM    No Known Allergies    labetalol (NORMODYNE;TRANDATE) injection 10 mg, Q4H PRN  [START ON 6/27/2021] amLODIPine (NORVASC) tablet 10 mg, Daily  amLODIPine (NORVASC) tablet 5 mg, Once  budesonide-formoterol (SYMBICORT) 160-4.5 MCG/ACT inhaler 2 puff, BID  carvedilol (COREG) tablet 25 mg, BID  escitalopram (LEXAPRO) tablet 20 mg, Daily  furosemide (LASIX) tablet 10 mg, Daily  levothyroxine (SYNTHROID) tablet 100 mcg, Daily  lisinopril (PRINIVIL;ZESTRIL) tablet 40 mg, Daily  oxyCODONE (ROXICODONE) immediate release tablet 5 mg, Q4H PRN  morphine (PF) injection 2 mg, Q3H PRN  0.9 % sodium chloride infusion, Continuous  sodium chloride flush 0.9 % injection 10 mL, 2 times per day  sodium chloride flush 0.9 % injection 10 mL, PRN  0.9 % sodium chloride infusion, PRN  ondansetron (ZOFRAN-ODT) disintegrating tablet 4 mg, Q8H PRN   Or  ondansetron (ZOFRAN) injection 4 mg, Q6H PRN  magnesium hydroxide (MILK OF MAGNESIA) 400 MG/5ML suspension 30 mL, Daily PRN  enoxaparin (LOVENOX) injection 40 mg, Daily      Current Facility-Administered Medications   Medication Dose Route Frequency Provider Last Rate Last Admin    labetalol (NORMODYNE;TRANDATE) injection 10 mg  10 mg Intravenous Q4H PRN Sai MAX MD   10 mg at 06/26/21 0937    [START ON 6/27/2021] amLODIPine (NORVASC) tablet 10 mg  10 mg Oral Daily Josefa Penaloza MD        amLODIPine (NORVASC) tablet 5 mg  5 mg Oral Once Jose Juan Quan MD        budesonide-formoterol (SYMBICORT) 160-4.5 MCG/ACT inhaler 2 puff  2 puff Inhalation BID Mary Mon PA-C   2 puff at 06/25/21 2057    carvedilol (COREG) tablet 25 mg  25 mg Oral BID Mary Mon PA-C   25 mg at 06/26/21 0929    escitalopram (LEXAPRO) tablet 20 mg  20 mg Oral Daily Mary Mon PA-C   20 mg at 06/26/21 6213    furosemide (LASIX) tablet 10 mg  10 mg Oral Daily Mary Mon PA-C   10 mg at 06/26/21 3889    levothyroxine (SYNTHROID) tablet 100 mcg  100 mcg Oral Daily Veronica De Oliveira PA-C   100 mcg at 06/26/21 0618    lisinopril (PRINIVIL;ZESTRIL) tablet 40 mg  40 mg Oral Daily Larinda Lundborg, PA-C   40 mg at 06/26/21 1490    oxyCODONE (ROXICODONE) immediate release tablet 5 mg  5 mg Oral Q4H PRN Larinda Lundborg, PA-C        morphine (PF) injection 2 mg  2 mg Intravenous Q3H PRN Larinda Lundborg, PA-C   2 mg at 06/25/21 1832    0.9 % sodium chloride infusion   Intravenous Continuous Larinda Lundborg, PA-C 75 mL/hr at 06/25/21 2222 New Bag at 06/25/21 2222    sodium chloride flush 0.9 % injection 10 mL  10 mL Intravenous 2 times per day Larinda Lundborg, PA-NIELS        sodium chloride flush 0.9 % injection 10 mL  10 mL Intravenous PRN Larinda Lundborg, PA-C        0.9 % sodium chloride infusion  25 mL Intravenous PRN Larinda Lundborg, PA-C        ondansetron (ZOFRAN-ODT) disintegrating tablet 4 mg  4 mg Oral Q8H PRN Larinda Lundborg, PA-C        Or    ondansetron (ZOFRAN) injection 4 mg  4 mg Intravenous Q6H PRN Larinda Lundborg, PA-C        magnesium hydroxide (MILK OF MAGNESIA) 400 MG/5ML suspension 30 mL  30 mL Oral Daily PRN Larinda Lundborg, PA-NIELS        enoxaparin (LOVENOX) injection 40 mg  40 mg Subcutaneous Daily Larinda Lundborg, PA-C   40 mg at 06/26/21 0930         Review of Systems:     · Constitutional: No Fever or Weight Loss   · Eyes: No Decreased Vision  · ENT: No Headaches, Hearing Loss or Vertigo  · Cardiovascular: No chest pain, dyspnea on exertion, palpitations or loss of consciousness  · Respiratory: No cough or wheezing    · Gastrointestinal: No abdominal pain, appetite loss, blood in stools, constipation, diarrhea or heartburn  · Genitourinary: No dysuria, trouble voiding, or hematuria  · Musculoskeletal:  No gait disturbance, weakness or joint complaints  · Integumentary: No rash or pruritis  · Neurological: No TIA or stroke symptoms  · Psychiatric: No anxiety or depression  · Endocrine: No malaise, fatigue or temperature intolerance  · Hematologic/Lymphatic: No bleeding problems, blood clots or swollen lymph nodes  · Allergic/Immunologic: No nasal congestion or hives    All other systems were reviewed and were negative otherwise. Physical Examination:      Vitals:    06/26/21 0915   BP: (!) 189/81   Pulse: 83   Resp: 18   Temp: 98.4 °F (36.9 °C)   SpO2: 91%      Wt Readings from Last 3 Encounters:   06/26/21 185 lb (83.9 kg)   06/18/21 177 lb 6.4 oz (80.5 kg)   05/17/21 181 lb 12.8 oz (82.5 kg)     Body mass index is 32.77 kg/m². General Appearance:  No distress, conversant  Constitutional:  Well developed, Well nourished  HEENT:  Normocephalic, Atraumatic, Oropharynx moist, No oral exudates,   Nose normal. Neck Supple Carotid: no carotid bruit  Eyes:  Conjunctiva normal, No discharge. Respiratory:    Normal breath sounds, No respiratory distress, No wheezing, no use of accessory muscles, diaphragm movement is normal  No chest Tenderness  Cardiovascular: S1-S2 No murmurs auscultated. No rubs, thrills or gallops. Normal  rhythm. Pedal pulses are normal. No pedal edema  GI:  Soft Non tender, non distended. :  No CVA tenderness. Musculoskeletal:   No tenderness, No cyanosis, No clubbing. Integument:  Warm, Dry, No erythema, No rash. Lymphatic:  No lymphadenopathy noted. Neurologic:  Alert & oriented x 3  No focal deficits noted. Psychiatric:  Affect normal, Judgment normal, Mood normal.       Lab Review     No results for input(s): WBC, HGB, HCT, PLT in the last 72 hours. No results for input(s): NA, K, CL, CO2, PHOS, BUN, CREATININE in the last 72 hours. Invalid input(s): CA  No results for input(s): AST, ALT, ALB, BILIDIR, BILITOT, ALKPHOS in the last 72 hours. No results for input(s): TROPONINI in the last 72 hours.   Lab Results   Component Value Date     (H) 01/11/2014     (H) 01/10/2014     Lab Results   Component Value Date    INR 2.3 05/17/2021    INR 2.3 05/17/2021    PROTIME 28.0 (H) 05/17/2021         All labs, images, EKGs were personally reviewed      Assessment: 70y.o.year old with PMH of  has a past medical history of CAD (coronary artery disease), COPD (chronic obstructive pulmonary disease) ([facility]), GERD (gastroesophageal reflux disease), H/O cardiovascular stress test, H/O echocardiogram, H/O echocardiogram, Heart attack ([facility] Hyperlipidemia, Hypertension, On home oxygen therapy, PAD (peripheral artery disease) ([facility] [address] [facility] S/P right coronary artery (RCA) stent placement, Sleep apnea, T2 vertebral fracture ([facility] [address] TIA (transient ischemic attack), Tobacco dependence, Wears glasses, and Wears partial dentures. Recommendations:      1. History of mitral valve replacement/bioprosthetic. 27 mm [employer] tissue mitral valve 2016. No oral anticoagulation required for valve, however patient is on anticoagulation for paroxysmal A. fib. 2. Paroxysmal atrial fibrillation, currently in sinus rhythm. Recent 30-day event monitor did not reveal any recurrent A. fib. Resume warfarin when okay with surgery, will need Lovenox bridging to achieve therapeutic INR of 2-3. Alternatively, patient can be started on NOAC such as Eliquis upon discharge, which does not require any bridging. 3. Hypertensive urgency: Labile blood pressure. Continue with Norvasc 10 mg, Coreg 25 twice daily, lisinopril 40 mg. Also continue with Lasix. 4. Ventral hernia repair. Surgery following. 5. Coronary disease s/p CABG x1. Stable. Continue home meds. 6. History of cardiomyopathy with EF of 45 to 50%. Continue with Coreg and lisinopril. Stable. No sign of congestive heart failure  7. History of PAD with aortobifem bypass. Stable.           Thank you for the consult    Dr. Jaspreet Guillaume  6/26/2021 10:32 AM

## 2021-06-26 NOTE — ANESTHESIA POSTPROCEDURE EVALUATION
Department of Anesthesiology  Postprocedure Note    Patient: Kurtis Opitz  MRN: [id number]  YOB: 1950  Date of evaluation: 6/26/2021  Time:  8:59 AM     Procedure Summary     Date: 06/25/21 Room / Location: [address] [place] [address]    Anesthesia Start: 0730 Anesthesia Stop: 9231    Procedure: ROBOTIC HERNIA INCISIONAL REPAIR LAPAROSCOPIC WITH COMPONENT SEPARATION (N/A Abdomen) Diagnosis: (INCISIONAL HERNIA)    Surgeons: Clyde Morse MD Responsible Provider: Paz Rincon MD    Anesthesia Type: general ASA Status: 4          Anesthesia Type: general    Jyothi Phase I: Jyothi Score: 8    Jyothi Phase II:      Last vitals: Reviewed and per EMR flowsheets.        Anesthesia Post Evaluation    Patient location during evaluation: PACU  Patient participation: complete - patient participated  Level of consciousness: sleepy but conscious  Pain score: 2  Airway patency: patent  Nausea & Vomiting: no nausea and no vomiting  Complications: no  Cardiovascular status: hemodynamically stable  Respiratory status: acceptable  Hydration status: euvolemic

## 2021-06-26 NOTE — PROGRESS NOTES
Hospitalist Progress Note      PCP: No primary care provider on file. Date of Admission: 6/25/2021    Hospital Course: \"76 y.o. female presents with ventral hernia for 3 years ago. Lump was not reducible. Patient had Robotic hernia incisional repair laparoscopic on 6/25/21. Subjective:   Pt states she feels warm. She didn't get much sleep last night. She is passing flatus      Medications:  Reviewed    Infusion Medications    sodium chloride 75 mL/hr at 06/25/21 2222    sodium chloride       Scheduled Medications    [START ON 6/27/2021] amLODIPine  10 mg Oral Daily    amLODIPine  5 mg Oral Once    budesonide-formoterol  2 puff Inhalation BID    carvedilol  25 mg Oral BID    escitalopram  20 mg Oral Daily    furosemide  10 mg Oral Daily    levothyroxine  100 mcg Oral Daily    lisinopril  40 mg Oral Daily    sodium chloride flush  10 mL Intravenous 2 times per day    enoxaparin  40 mg Subcutaneous Daily     PRN Meds: labetalol, oxyCODONE, morphine, sodium chloride flush, sodium chloride, ondansetron **OR** ondansetron, magnesium hydroxide      Intake/Output Summary (Last 24 hours) at 6/26/2021 1246  Last data filed at 6/26/2021 9636  Gross per 24 hour   Intake 811 ml   Output 1500 ml   Net -689 ml       Physical Exam Performed:    BP (!) 189/81   Pulse 83   Temp 98.4 °F (36.9 °C) (Oral)   Resp 18   Ht 5' 3\" (1.6 m)   Wt 185 lb (83.9 kg)   SpO2 91%   BMI 32.77 kg/m²     General appearance: No apparent distress, appears stated age and cooperative. on nasal 02  HEENT: Pupils equal, round, and reactive to light. Conjunctivae/corneas clear. Neck: Supple, with full range of motion. No jugular venous distention. Trachea midline. Respiratory:  Normal respiratory effort. Clear to auscultation, bilaterally without Rales/Wheezes/Rhonchi. Cardiovascular: Regular rate and rhythm with normal S1/S2,++ murmurs, rubs or gallops. Abdomen: Soft, non-tender, non-distended with hypoactive bowel sounds. Wound hold.     EDITA (obstructive sleep apnea)  -CPAP at night     Other medical issues:  PAD (peripheral artery disease)   Rheumatic mitral stenosis       DVT Prophylaxis: SCDs until ok for anticoagulation with Surgery  Diet: ADULT DIET;  Regular; Low Fat/Low Chol/High Fiber/BENY  Code Status: Full Code    PT/OT Eval Status: as needed    Dispo - Per Primary    Lenny Lester MD

## 2021-06-26 NOTE — DISCHARGE SUMMARY
[facility] - General Surgery    Patient ID:  Peru Acra  Date: 6/26/2021 11:14 AM   MRN#: [id number] GXG:[id number]   Admission Date:6/25/2021 Age/Sex:71 y.o. female       Discharge date and time: 06/26/21    Admitting Physician: Rachell Pena MD     Discharge Physician: same    Admission Diagnoses: Incisional hernia of anterior abdominal wall without obstruction or gangrene [K43.2]    Discharge Diagnoses: Active Problems:    Incisional hernia of anterior abdominal wall without obstruction or gangrene  Resolved Problems:    * No resolved hospital problems. *       Admission Condition: stable     Discharged Condition: stable    Indication for Admission: Active Problems:    Incisional hernia of anterior abdominal wall without obstruction or gangrene  Resolved Problems:    * No resolved hospital problems. *       Hospital Course:   Jaime Trujillo is a 70 y.o. female presented with clinical signs and symptoms consistent with ventral hernia. She underwent robotic incisional hernia repair with component separation. Postoperatively she well; progressed to tolerating a diet, had adequate pain control, and felt ready for discharge home.     Consults: Hospitalist - medical management; Cardiology - HTN, Afib, anticoagulation    Significant Diagnostic Studies: see chart    Treatments: surgery/procedure: robotic incisional hernia repair with component separation    Disposition: Home or Self Care    Patient Instructions:    Avila Lindsey Medication Instructions [facility]:[id number]    Printed on:06/26/21 1114   Medication Information                      amLODIPine (NORVASC) 5 MG tablet  Take 1 tablet by mouth daily             aspirin 81 MG EC tablet  Take 1 tablet by mouth daily             atorvastatin (LIPITOR) 40 MG tablet  Take 1 tablet by mouth daily             Blood Pressure Monitoring KIT  1 Units by Does not apply route 2 times daily             budesonide-formoterol (SYMBICORT) 160-4.5 MCG/ACT AERO  Inhale 2 puffs into the lungs 2 times daily             Calcium Carb-Cholecalciferol (CALCIUM 600+D) 600-800 MG-UNIT TABS  Take 1 tablet by mouth daily             carvedilol (COREG) 25 MG tablet  Take 1 tablet by mouth 2 times daily             cilostazol (PLETAL) 100 MG tablet  Take 1 tablet by mouth twice daily             escitalopram (LEXAPRO) 20 MG tablet  Take 1 tablet by mouth daily             furosemide (LASIX) 20 MG tablet  Take 1/2 (one-half) tablet by mouth once daily             HYDROcodone-acetaminophen (NORCO) 5-325 MG per tablet  Take 1 tablet by mouth every 4 hours as needed for Pain for up to 5 days. Intended supply: 7 days. Take lowest dose possible to manage pain             levothyroxine (SYNTHROID) 100 MCG tablet  Take 1 tablet by mouth Daily             lisinopril (PRINIVIL;ZESTRIL) 40 MG tablet  Take 1 tablet by mouth daily             Multiple Vitamins-Minerals (THERAPEUTIC MULTIVITAMIN-MINERALS) tablet  Take 1 tablet by mouth daily             nitroGLYCERIN (NITROSTAT) 0.4 MG SL tablet  Place 1 tablet under the tongue every 5 minutes as needed for Chest pain up to max of 3 total doses. If no relief after 1 dose, call 911. ondansetron (ZOFRAN-ODT) 4 MG disintegrating tablet  Take 1 tablet by mouth every 8 hours as needed for Nausea or Vomiting             pantoprazole (PROTONIX) 40 MG tablet  Take 1 tablet by mouth daily             senna (SENOKOT) 8.6 MG tablet  Take 1 tablet by mouth 2 times daily for 28 days             warfarin (COUMADIN) 5 MG tablet  Take 1 tablet by mouth daily Except take 7.5mg (1 and 1/2 tablets) on Wednesdays               No discharge procedures on file. No discharge procedures on file. For further information regarding this hospitalization, please refer to the patient's medical record.      Electronically signed: SATURNINO Rowland CNP 6/26/2021 11:14 AM

## 2021-06-28 ENCOUNTER — HOSPITAL ENCOUNTER (INPATIENT)
Age: 71
LOS: 2 days | Discharge: HOME | DRG: 291 | End: 2021-06-30
Attending: EMERGENCY MEDICINE | Admitting: INTERNAL MEDICINE
Payer: MEDICARE

## 2021-06-28 ENCOUNTER — APPOINTMENT (OUTPATIENT)
Dept: CT IMAGING | Age: 71
DRG: 291 | End: 2021-06-28
Payer: MEDICARE

## 2021-06-28 DIAGNOSIS — Z79.899 POLYPHARMACY: ICD-10-CM

## 2021-06-28 DIAGNOSIS — R40.1 STUPOR: Primary | ICD-10-CM

## 2021-06-28 DIAGNOSIS — I50.9 ACUTE ON CHRONIC CONGESTIVE HEART FAILURE, UNSPECIFIED HEART FAILURE TYPE (HCC): ICD-10-CM

## 2021-06-28 DIAGNOSIS — E03.9 ACQUIRED HYPOTHYROIDISM: ICD-10-CM

## 2021-06-28 PROBLEM — R41.82 ALTERED MENTAL STATUS: Status: ACTIVE | Noted: 2021-06-28

## 2021-06-28 LAB
ALBUMIN SERPL-MCNC: 3.7 GM/DL (ref 3.4–5)
ALP BLD-CCNC: 71 IU/L (ref 40–128)
ALT SERPL-CCNC: 18 U/L (ref 10–40)
ANION GAP SERPL CALCULATED.3IONS-SCNC: 9 MMOL/L (ref 4–16)
AST SERPL-CCNC: 36 IU/L (ref 15–37)
BASE EXCESS MIXED: 2.4 (ref 0–2.3)
BASOPHILS ABSOLUTE: 0 K/CU MM
BASOPHILS RELATIVE PERCENT: 0.1 % (ref 0–1)
BILIRUB SERPL-MCNC: 1.1 MG/DL (ref 0–1)
BUN BLDV-MCNC: 24 MG/DL (ref 6–23)
CALCIUM SERPL-MCNC: 9.9 MG/DL (ref 8.3–10.6)
CHLORIDE BLD-SCNC: 97 MMOL/L (ref 99–110)
CO2: 28 MMOL/L (ref 21–32)
COMMENT: ABNORMAL
CREAT SERPL-MCNC: 1 MG/DL (ref 0.6–1.1)
DIFFERENTIAL TYPE: ABNORMAL
EKG ATRIAL RATE: 76 BPM
EKG DIAGNOSIS: NORMAL
EKG P AXIS: 77 DEGREES
EKG P-R INTERVAL: 140 MS
EKG Q-T INTERVAL: 418 MS
EKG QRS DURATION: 84 MS
EKG QTC CALCULATION (BAZETT): 470 MS
EKG R AXIS: 81 DEGREES
EKG T AXIS: 77 DEGREES
EKG VENTRICULAR RATE: 76 BPM
EOSINOPHILS ABSOLUTE: 0 K/CU MM
EOSINOPHILS RELATIVE PERCENT: 0 % (ref 0–3)
GFR AFRICAN AMERICAN: >60 ML/MIN/1.73M2
GFR NON-AFRICAN AMERICAN: 55 ML/MIN/1.73M2
GLUCOSE BLD-MCNC: 165 MG/DL (ref 70–99)
GLUCOSE BLD-MCNC: 166 MG/DL (ref 70–99)
HCO3 VENOUS: 30.5 MMOL/L (ref 19–25)
HCT VFR BLD CALC: 39.3 % (ref 37–47)
HEMOGLOBIN: 13.2 GM/DL (ref 12.5–16)
HIGH SENSITIVE C-REACTIVE PROTEIN: 113 MG/L
IMMATURE NEUTROPHIL %: 0.7 % (ref 0–0.43)
INR BLD: 0.99 INDEX
LACTIC ACID, SEPSIS: 0.9 MMOL/L (ref 0.5–1.9)
LIPASE: 8 IU/L (ref 13–60)
LYMPHOCYTES ABSOLUTE: 0.5 K/CU MM
LYMPHOCYTES RELATIVE PERCENT: 4 % (ref 24–44)
MCH RBC QN AUTO: 33.4 PG (ref 27–31)
MCHC RBC AUTO-ENTMCNC: 33.6 % (ref 32–36)
MCV RBC AUTO: 99.5 FL (ref 78–100)
MONOCYTES ABSOLUTE: 0.6 K/CU MM
MONOCYTES RELATIVE PERCENT: 4.4 % (ref 0–4)
NUCLEATED RBC %: 0 %
O2 SAT, VEN: 90.1 % (ref 50–70)
PCO2, VEN: 62 MMHG (ref 38–52)
PDW BLD-RTO: 12.3 % (ref 11.7–14.9)
PH VENOUS: 7.3 (ref 7.32–7.42)
PLATELET # BLD: 167 K/CU MM (ref 140–440)
PMV BLD AUTO: 10.6 FL (ref 7.5–11.1)
PO2, VEN: 69 MMHG (ref 28–48)
POTASSIUM SERPL-SCNC: 4.6 MMOL/L (ref 3.5–5.1)
PRO-BNP: 7518 PG/ML
PROCALCITONIN: 0.19
PROTHROMBIN TIME: 12 SECONDS (ref 11.7–14.5)
RBC # BLD: 3.95 M/CU MM (ref 4.2–5.4)
SEGMENTED NEUTROPHILS ABSOLUTE COUNT: 11.4 K/CU MM
SEGMENTED NEUTROPHILS RELATIVE PERCENT: 90.8 % (ref 36–66)
SODIUM BLD-SCNC: 134 MMOL/L (ref 135–145)
TOTAL IMMATURE NEUTOROPHIL: 0.09 K/CU MM
TOTAL NUCLEATED RBC: 0 K/CU MM
TOTAL PROTEIN: 7 GM/DL (ref 6.4–8.2)
TROPONIN T: <0.01 NG/ML
TSH HIGH SENSITIVITY: 0.05 UIU/ML (ref 0.27–4.2)
WBC # BLD: 12.6 K/CU MM (ref 4–10.5)

## 2021-06-28 PROCEDURE — 6360000002 HC RX W HCPCS: Performed by: EMERGENCY MEDICINE

## 2021-06-28 PROCEDURE — 80053 COMPREHEN METABOLIC PANEL: CPT

## 2021-06-28 PROCEDURE — 86141 C-REACTIVE PROTEIN HS: CPT

## 2021-06-28 PROCEDURE — 84145 PROCALCITONIN (PCT): CPT

## 2021-06-28 PROCEDURE — 6370000000 HC RX 637 (ALT 250 FOR IP): Performed by: EMERGENCY MEDICINE

## 2021-06-28 PROCEDURE — 2060000000 HC ICU INTERMEDIATE R&B

## 2021-06-28 PROCEDURE — 6370000000 HC RX 637 (ALT 250 FOR IP): Performed by: NURSE PRACTITIONER

## 2021-06-28 PROCEDURE — 82962 GLUCOSE BLOOD TEST: CPT

## 2021-06-28 PROCEDURE — 94640 AIRWAY INHALATION TREATMENT: CPT

## 2021-06-28 PROCEDURE — 84443 ASSAY THYROID STIM HORMONE: CPT

## 2021-06-28 PROCEDURE — 85025 COMPLETE CBC W/AUTO DIFF WBC: CPT

## 2021-06-28 PROCEDURE — 99285 EMERGENCY DEPT VISIT HI MDM: CPT

## 2021-06-28 PROCEDURE — 93010 ELECTROCARDIOGRAM REPORT: CPT | Performed by: INTERNAL MEDICINE

## 2021-06-28 PROCEDURE — 6360000004 HC RX CONTRAST MEDICATION: Performed by: EMERGENCY MEDICINE

## 2021-06-28 PROCEDURE — 6360000002 HC RX W HCPCS: Performed by: INTERNAL MEDICINE

## 2021-06-28 PROCEDURE — 82805 BLOOD GASES W/O2 SATURATION: CPT

## 2021-06-28 PROCEDURE — 83880 ASSAY OF NATRIURETIC PEPTIDE: CPT

## 2021-06-28 PROCEDURE — 96374 THER/PROPH/DIAG INJ IV PUSH: CPT

## 2021-06-28 PROCEDURE — 83690 ASSAY OF LIPASE: CPT

## 2021-06-28 PROCEDURE — 2580000003 HC RX 258: Performed by: EMERGENCY MEDICINE

## 2021-06-28 PROCEDURE — 85610 PROTHROMBIN TIME: CPT

## 2021-06-28 PROCEDURE — 2700000000 HC OXYGEN THERAPY PER DAY

## 2021-06-28 PROCEDURE — 96361 HYDRATE IV INFUSION ADD-ON: CPT

## 2021-06-28 PROCEDURE — 70450 CT HEAD/BRAIN W/O DYE: CPT

## 2021-06-28 PROCEDURE — 6360000002 HC RX W HCPCS: Performed by: NURSE PRACTITIONER

## 2021-06-28 PROCEDURE — 2580000003 HC RX 258: Performed by: NURSE PRACTITIONER

## 2021-06-28 PROCEDURE — 93005 ELECTROCARDIOGRAM TRACING: CPT | Performed by: EMERGENCY MEDICINE

## 2021-06-28 PROCEDURE — 36415 COLL VENOUS BLD VENIPUNCTURE: CPT

## 2021-06-28 PROCEDURE — 74177 CT ABD & PELVIS W/CONTRAST: CPT

## 2021-06-28 PROCEDURE — 71275 CT ANGIOGRAPHY CHEST: CPT

## 2021-06-28 PROCEDURE — 94660 CPAP INITIATION&MGMT: CPT

## 2021-06-28 PROCEDURE — 83605 ASSAY OF LACTIC ACID: CPT

## 2021-06-28 PROCEDURE — 84484 ASSAY OF TROPONIN QUANT: CPT

## 2021-06-28 RX ORDER — NALOXONE HYDROCHLORIDE 1 MG/ML
2 INJECTION INTRAMUSCULAR; INTRAVENOUS; SUBCUTANEOUS ONCE
Status: COMPLETED | OUTPATIENT
Start: 2021-06-28 | End: 2021-06-28

## 2021-06-28 RX ORDER — IPRATROPIUM BROMIDE AND ALBUTEROL SULFATE 2.5; .5 MG/3ML; MG/3ML
1 SOLUTION RESPIRATORY (INHALATION)
Status: DISCONTINUED | OUTPATIENT
Start: 2021-06-28 | End: 2021-06-28

## 2021-06-28 RX ORDER — PANTOPRAZOLE SODIUM 40 MG/1
40 TABLET, DELAYED RELEASE ORAL DAILY
Status: DISCONTINUED | OUTPATIENT
Start: 2021-06-28 | End: 2021-06-30 | Stop reason: HOSPADM

## 2021-06-28 RX ORDER — FUROSEMIDE 10 MG/ML
40 INJECTION INTRAMUSCULAR; INTRAVENOUS 2 TIMES DAILY
Status: DISCONTINUED | OUTPATIENT
Start: 2021-06-28 | End: 2021-06-29

## 2021-06-28 RX ORDER — AMLODIPINE BESYLATE 10 MG/1
10 TABLET ORAL DAILY
Status: DISCONTINUED | OUTPATIENT
Start: 2021-06-29 | End: 2021-06-30 | Stop reason: HOSPADM

## 2021-06-28 RX ORDER — HYDROCODONE BITARTRATE AND ACETAMINOPHEN 5; 325 MG/1; MG/1
1 TABLET ORAL EVERY 4 HOURS PRN
Status: DISCONTINUED | OUTPATIENT
Start: 2021-06-28 | End: 2021-06-30 | Stop reason: HOSPADM

## 2021-06-28 RX ORDER — SODIUM CHLORIDE 9 MG/ML
25 INJECTION, SOLUTION INTRAVENOUS PRN
Status: DISCONTINUED | OUTPATIENT
Start: 2021-06-28 | End: 2021-06-30 | Stop reason: HOSPADM

## 2021-06-28 RX ORDER — CILOSTAZOL 100 MG/1
50 TABLET ORAL
Status: DISCONTINUED | OUTPATIENT
Start: 2021-06-28 | End: 2021-06-30 | Stop reason: HOSPADM

## 2021-06-28 RX ORDER — POLYETHYLENE GLYCOL 3350 17 G/17G
17 POWDER, FOR SOLUTION ORAL DAILY PRN
Status: DISCONTINUED | OUTPATIENT
Start: 2021-06-28 | End: 2021-06-30 | Stop reason: HOSPADM

## 2021-06-28 RX ORDER — SODIUM CHLORIDE, SODIUM LACTATE, POTASSIUM CHLORIDE, CALCIUM CHLORIDE 600; 310; 30; 20 MG/100ML; MG/100ML; MG/100ML; MG/100ML
1000 INJECTION, SOLUTION INTRAVENOUS ONCE
Status: COMPLETED | OUTPATIENT
Start: 2021-06-28 | End: 2021-06-28

## 2021-06-28 RX ORDER — SODIUM CHLORIDE 0.9 % (FLUSH) 0.9 %
5-40 SYRINGE (ML) INJECTION EVERY 12 HOURS SCHEDULED
Status: DISCONTINUED | OUTPATIENT
Start: 2021-06-28 | End: 2021-06-30 | Stop reason: HOSPADM

## 2021-06-28 RX ORDER — LEVOTHYROXINE SODIUM 0.1 MG/1
100 TABLET ORAL DAILY
Status: DISCONTINUED | OUTPATIENT
Start: 2021-06-28 | End: 2021-06-30 | Stop reason: HOSPADM

## 2021-06-28 RX ORDER — IPRATROPIUM BROMIDE AND ALBUTEROL SULFATE 2.5; .5 MG/3ML; MG/3ML
1 SOLUTION RESPIRATORY (INHALATION) ONCE
Status: COMPLETED | OUTPATIENT
Start: 2021-06-28 | End: 2021-06-28

## 2021-06-28 RX ORDER — M-VIT,TX,IRON,MINS/CALC/FOLIC 27MG-0.4MG
1 TABLET ORAL DAILY
Status: DISCONTINUED | OUTPATIENT
Start: 2021-06-28 | End: 2021-06-30 | Stop reason: HOSPADM

## 2021-06-28 RX ORDER — SODIUM CHLORIDE 0.9 % (FLUSH) 0.9 %
5-40 SYRINGE (ML) INJECTION 2 TIMES DAILY
Status: DISCONTINUED | OUTPATIENT
Start: 2021-06-28 | End: 2021-06-30 | Stop reason: HOSPADM

## 2021-06-28 RX ORDER — FUROSEMIDE 10 MG/ML
40 INJECTION INTRAMUSCULAR; INTRAVENOUS 2 TIMES DAILY
Status: DISCONTINUED | OUTPATIENT
Start: 2021-06-28 | End: 2021-06-28

## 2021-06-28 RX ORDER — AMLODIPINE BESYLATE 5 MG/1
5 TABLET ORAL DAILY
Status: DISCONTINUED | OUTPATIENT
Start: 2021-06-28 | End: 2021-06-28

## 2021-06-28 RX ORDER — LORAZEPAM 2 MG/ML
0.5 INJECTION INTRAMUSCULAR ONCE
Status: COMPLETED | OUTPATIENT
Start: 2021-06-28 | End: 2021-06-28

## 2021-06-28 RX ORDER — ONDANSETRON 4 MG/1
4 TABLET, ORALLY DISINTEGRATING ORAL EVERY 8 HOURS PRN
Status: DISCONTINUED | OUTPATIENT
Start: 2021-06-28 | End: 2021-06-30 | Stop reason: HOSPADM

## 2021-06-28 RX ORDER — SENNA PLUS 8.6 MG/1
1 TABLET ORAL 2 TIMES DAILY
Status: DISCONTINUED | OUTPATIENT
Start: 2021-06-28 | End: 2021-06-30 | Stop reason: HOSPADM

## 2021-06-28 RX ORDER — SODIUM CHLORIDE 0.9 % (FLUSH) 0.9 %
5-40 SYRINGE (ML) INJECTION PRN
Status: DISCONTINUED | OUTPATIENT
Start: 2021-06-28 | End: 2021-06-30 | Stop reason: HOSPADM

## 2021-06-28 RX ORDER — NITROGLYCERIN 0.4 MG/1
0.4 TABLET SUBLINGUAL 3 TIMES DAILY PRN
Status: DISCONTINUED | OUTPATIENT
Start: 2021-06-28 | End: 2021-06-30 | Stop reason: HOSPADM

## 2021-06-28 RX ORDER — ACETAMINOPHEN 325 MG/1
650 TABLET ORAL EVERY 6 HOURS PRN
Status: DISCONTINUED | OUTPATIENT
Start: 2021-06-28 | End: 2021-06-30 | Stop reason: HOSPADM

## 2021-06-28 RX ORDER — ACETAMINOPHEN 650 MG/1
650 SUPPOSITORY RECTAL EVERY 6 HOURS PRN
Status: DISCONTINUED | OUTPATIENT
Start: 2021-06-28 | End: 2021-06-30 | Stop reason: HOSPADM

## 2021-06-28 RX ORDER — ATORVASTATIN CALCIUM 40 MG/1
40 TABLET, FILM COATED ORAL NIGHTLY
Status: DISCONTINUED | OUTPATIENT
Start: 2021-06-28 | End: 2021-06-30 | Stop reason: HOSPADM

## 2021-06-28 RX ORDER — CARVEDILOL 25 MG/1
25 TABLET ORAL 2 TIMES DAILY
Status: DISCONTINUED | OUTPATIENT
Start: 2021-06-28 | End: 2021-06-30 | Stop reason: HOSPADM

## 2021-06-28 RX ORDER — NICOTINE 21 MG/24HR
1 PATCH, TRANSDERMAL 24 HOURS TRANSDERMAL DAILY
Status: DISCONTINUED | OUTPATIENT
Start: 2021-06-28 | End: 2021-06-30 | Stop reason: HOSPADM

## 2021-06-28 RX ORDER — SODIUM CHLORIDE 9 MG/ML
INJECTION, SOLUTION INTRAVENOUS CONTINUOUS
Status: DISCONTINUED | OUTPATIENT
Start: 2021-06-28 | End: 2021-06-28

## 2021-06-28 RX ORDER — IPRATROPIUM BROMIDE AND ALBUTEROL SULFATE 2.5; .5 MG/3ML; MG/3ML
SOLUTION RESPIRATORY (INHALATION)
Status: DISPENSED
Start: 2021-06-28 | End: 2021-06-28

## 2021-06-28 RX ORDER — ESCITALOPRAM OXALATE 10 MG/1
20 TABLET ORAL DAILY
Status: DISCONTINUED | OUTPATIENT
Start: 2021-06-28 | End: 2021-06-30 | Stop reason: HOSPADM

## 2021-06-28 RX ORDER — BUDESONIDE AND FORMOTEROL FUMARATE DIHYDRATE 160; 4.5 UG/1; UG/1
2 AEROSOL RESPIRATORY (INHALATION) 2 TIMES DAILY
Status: DISCONTINUED | OUTPATIENT
Start: 2021-06-28 | End: 2021-06-30 | Stop reason: HOSPADM

## 2021-06-28 RX ORDER — LISINOPRIL 40 MG/1
40 TABLET ORAL DAILY
Status: DISCONTINUED | OUTPATIENT
Start: 2021-06-28 | End: 2021-06-30 | Stop reason: HOSPADM

## 2021-06-28 RX ADMIN — Medication 1 TABLET: at 11:09

## 2021-06-28 RX ADMIN — FUROSEMIDE 40 MG: 10 INJECTION, SOLUTION INTRAVENOUS at 17:39

## 2021-06-28 RX ADMIN — IPRATROPIUM BROMIDE AND ALBUTEROL SULFATE 1 AMPULE: .5; 3 SOLUTION RESPIRATORY (INHALATION) at 03:49

## 2021-06-28 RX ADMIN — FUROSEMIDE 40 MG: 10 INJECTION, SOLUTION INTRAVENOUS at 11:13

## 2021-06-28 RX ADMIN — CARVEDILOL 25 MG: 25 TABLET, FILM COATED ORAL at 21:14

## 2021-06-28 RX ADMIN — APIXABAN 5 MG: 5 TABLET, FILM COATED ORAL at 21:17

## 2021-06-28 RX ADMIN — PANTOPRAZOLE SODIUM 40 MG: 40 TABLET, DELAYED RELEASE ORAL at 11:08

## 2021-06-28 RX ADMIN — CARVEDILOL 25 MG: 25 TABLET, FILM COATED ORAL at 11:08

## 2021-06-28 RX ADMIN — BUDESONIDE AND FORMOTEROL FUMARATE DIHYDRATE 2 PUFF: 160; 4.5 AEROSOL RESPIRATORY (INHALATION) at 21:12

## 2021-06-28 RX ADMIN — LORAZEPAM 0.5 MG: 2 INJECTION INTRAMUSCULAR; INTRAVENOUS at 21:13

## 2021-06-28 RX ADMIN — CILOSTAZOL 50 MG: 100 TABLET ORAL at 17:39

## 2021-06-28 RX ADMIN — STANDARDIZED SENNA CONCENTRATE 8.6 MG: 8.6 TABLET ORAL at 11:09

## 2021-06-28 RX ADMIN — SODIUM CHLORIDE, PRESERVATIVE FREE 10 ML: 5 INJECTION INTRAVENOUS at 21:14

## 2021-06-28 RX ADMIN — Medication 1 TABLET: at 11:08

## 2021-06-28 RX ADMIN — ATORVASTATIN CALCIUM 40 MG: 40 TABLET, FILM COATED ORAL at 21:14

## 2021-06-28 RX ADMIN — LEVOTHYROXINE SODIUM 100 MCG: 0.1 TABLET ORAL at 11:09

## 2021-06-28 RX ADMIN — SODIUM CHLORIDE, PRESERVATIVE FREE 10 ML: 5 INJECTION INTRAVENOUS at 11:10

## 2021-06-28 RX ADMIN — LISINOPRIL 40 MG: 40 TABLET ORAL at 11:08

## 2021-06-28 RX ADMIN — NALOXONE HYDROCHLORIDE 2 MG: 1 INJECTION PARENTERAL at 04:07

## 2021-06-28 RX ADMIN — ESCITALOPRAM OXALATE 20 MG: 10 TABLET ORAL at 11:08

## 2021-06-28 RX ADMIN — SODIUM CHLORIDE, PRESERVATIVE FREE 10 ML: 5 INJECTION INTRAVENOUS at 11:09

## 2021-06-28 RX ADMIN — SODIUM CHLORIDE, POTASSIUM CHLORIDE, SODIUM LACTATE AND CALCIUM CHLORIDE 1000 ML: 600; 310; 30; 20 INJECTION, SOLUTION INTRAVENOUS at 04:09

## 2021-06-28 RX ADMIN — APIXABAN 5 MG: 5 TABLET, FILM COATED ORAL at 11:08

## 2021-06-28 RX ADMIN — STANDARDIZED SENNA CONCENTRATE 8.6 MG: 8.6 TABLET ORAL at 21:14

## 2021-06-28 RX ADMIN — CILOSTAZOL 50 MG: 100 TABLET ORAL at 11:08

## 2021-06-28 RX ADMIN — IOPAMIDOL 75 ML: 755 INJECTION, SOLUTION INTRAVENOUS at 05:02

## 2021-06-28 ASSESSMENT — PAIN DESCRIPTION - ORIENTATION: ORIENTATION: MID

## 2021-06-28 ASSESSMENT — PAIN DESCRIPTION - PROGRESSION

## 2021-06-28 ASSESSMENT — ENCOUNTER SYMPTOMS
CONSTIPATION: 0
SINUS PAIN: 0
RHINORRHEA: 0
SINUS PRESSURE: 0
APNEA: 1
SHORTNESS OF BREATH: 1
SORE THROAT: 0
CHEST TIGHTNESS: 0
EYE PAIN: 0
COLOR CHANGE: 1
BACK PAIN: 0
DIFFICULTY BREATHING: 1
ABDOMINAL PAIN: 1
DIARRHEA: 0
COUGH: 0
NAUSEA: 0
EYES NEGATIVE: 1
VOMITING: 0
WHEEZING: 0
EYE REDNESS: 0
EYE DISCHARGE: 0
FACIAL SWELLING: 0

## 2021-06-28 ASSESSMENT — PAIN DESCRIPTION - PAIN TYPE
TYPE: SURGICAL PAIN
TYPE: SURGICAL PAIN

## 2021-06-28 ASSESSMENT — PAIN SCALES - GENERAL
PAINLEVEL_OUTOF10: 0
PAINLEVEL_OUTOF10: 1

## 2021-06-28 ASSESSMENT — PAIN DESCRIPTION - LOCATION: LOCATION: ABDOMEN

## 2021-06-28 NOTE — CARE COORDINATION
Attempted to meet with patient ; she appears asleep. No family present. Charity Marx RN     Chart reviewed. Patient is a readmission (discharged 6/26) She is from home with spouse; spouse assists with medication needs, etc. She has home O2. Patient has a PCP and insurance that assists with Rx when needed.  CM will follow for needs  Charity Marx RN

## 2021-06-28 NOTE — ED NOTES
Pt 85% on home level of O2 at 3-4L NC. Pt placed on NRB at 15L for SpO2 of 94%.   Pt to 300 John Enciso, RN  06/28/21 MARIANA Roberson  06/28/21 2008

## 2021-06-28 NOTE — ED PROVIDER NOTES
[address] ENCOUNTER      Pt Name: Grace Timmons  MRN: [id number]  [place] 1950  Date of evaluation: 6/28/2021  Provider: Eitan Bardales DO    CHIEF COMPLAINT       Chief Complaint   Patient presents with    Altered Mental Status     pt had surgery on Friday. Family called 911 with pt unresponsive. EMS state pt had central cyanosis and SpO2 of 70% upon arrival. pt placed on NRB and breathing tx administered. Neg stroke scale. Labile BP         HISTORY OF PRESENT ILLNESS      Grace Timmons is a 70 y.o. female who presents to the emergency department  for   Chief Complaint   Patient presents with    Altered Mental Status     pt had surgery on Friday. Family called 911 with pt unresponsive. EMS state pt had central cyanosis and SpO2 of 70% upon arrival. pt placed on NRB and breathing tx administered. Neg stroke scale. Labile BP       The history is provided by the EMS personnel and medical records. No  was used. Altered Mental Status  Presenting symptoms: confusion and unresponsiveness    Severity:  Severe  Most recent episode: Today  Episode history:  Single  Timing:  Rare  Progression:  Unchanged  Chronicity:  New  Context: recent illness    Context: not head injury and not recent infection    Associated symptoms: abdominal pain and difficulty breathing    Associated symptoms: no agitation, no fever, no headaches, no light-headedness, no nausea, no palpitations, no rash and no vomiting          Nursing Notes, Triage Notes & Vital Signs were reviewed. REVIEW OF SYSTEMS    (2-9 systems for level 4, 10 or more for level 5)     Review of Systems   Constitutional: Negative. Negative for chills, fatigue and fever. HENT: Negative. Negative for congestion, dental problem, facial swelling, nosebleeds, postnasal drip, rhinorrhea, sinus pressure, sinus pain and sore throat. Eyes: Negative. Negative for pain, discharge, redness and visual disturbance. Respiratory: Positive for apnea and shortness of breath. Negative for cough, chest tightness and wheezing. Cardiovascular: Negative. Negative for chest pain and palpitations. Gastrointestinal: Positive for abdominal pain. Negative for constipation, diarrhea, nausea and vomiting. Genitourinary: Negative. Negative for dysuria, flank pain, frequency, hematuria and urgency. Musculoskeletal: Negative. Negative for arthralgias, back pain, gait problem, myalgias, neck pain and neck stiffness. Skin: Positive for color change and pallor. Negative for rash. Neurological: Negative. Negative for dizziness, speech difficulty, light-headedness, numbness and headaches. Psychiatric/Behavioral: Positive for confusion and decreased concentration. Negative for agitation, self-injury, sleep disturbance and suicidal ideas. The patient is not nervous/anxious. All other systems reviewed and are negative. Except as noted above the remainder of the review of systems was reviewed and negative. PAST MEDICAL HISTORY     Past Medical History:   Diagnosis Date    CAD (coronary artery disease)     [facility] obstructive [facility] [facility]     GERD (gastroesophageal reflux disease)     H/O cardiovascular stress test 04/26/2021    normal study 63% EF no ischemia    H/O echocardiogram 03/22/2017    EF 35-40%    H/O echocardiogram 08/04/2020    EF 45-50, Mild TR & IA, Severely dilated left atrium.   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, functionally normally     Heart attack ([address] 01/2014    Hyperlipidemia     Hypertension     On home oxygen therapy     nightly    PAD (peripheral artery disease) ([facility] [address]     S/P right coronary artery (RCA) stent placement     Sleep apnea     uses CPAP    T2 vertebral fracture ([address] 12/2017    TIA (transient ischemic attack) 1996    Tobacco dependence     Wears glasses     Wears partial dentures     Upper       Prior to Admission medications    Medication Sig Start Date End Date Taking? Authorizing Provider   ondansetron (ZOFRAN-ODT) 4 MG disintegrating tablet Take 1 tablet by mouth every 8 hours as needed for Nausea or Vomiting 6/26/21   Murl Cogan, APRN - CNP   HYDROcodone-acetaminophen (NORCO) 5-325 MG per tablet Take 1 tablet by mouth every 4 hours as needed for Pain for up to 5 days. Intended supply: 7 days. Take lowest dose possible to manage pain 6/26/21 7/1/21  Murl Cogan, APRN - CNP   senna (SENOKOT) 8.6 MG tablet Take 1 tablet by mouth 2 times daily for 28 days 6/26/21 7/24/21  Murl Cogan, APRN - CNP   amLODIPine (NORVASC) 5 MG tablet Take 2 tablets by mouth daily 6/26/21   Murl Cogan, APRN - CNP   apixaban (ELIQUIS) 5 MG TABS tablet Take 1 tablet by mouth 2 times daily 6/26/21 7/26/21  Murl Cogan, APRN - CNP   lisinopril (PRINIVIL;ZESTRIL) 40 MG tablet Take 1 tablet by mouth daily 4/26/21   SATURNINO Atkins CNP   cilostazol (PLETAL) 100 MG tablet Take 1 tablet by mouth twice daily 4/26/21   SATURNINO Atkins CNP   carvedilol (COREG) 25 MG tablet Take 1 tablet by mouth 2 times daily 4/26/21   SATURNINO Atkins CNP   furosemide (LASIX) 20 MG tablet Take 1/2 (one-half) tablet by mouth once daily 4/5/21   SATURNINO Atkins CNP   pantoprazole (PROTONIX) 40 MG tablet Take 1 tablet by mouth daily 2/23/21   Leonardo Varghese MD   budesonide-formoterol [facility]) 160-4.5 MCG/ACT AERO Inhale 2 puffs into the lungs 2 times daily 12/14/20   Kadie Barbour MD   nitroGLYCERIN (NITROSTAT) 0.4 MG SL tablet Place 1 tablet under the tongue every 5 minutes as needed for Chest pain up to max of 3 total doses.  If no relief after 1 dose, call 911. 12/14/20   Leonardo Varghese MD   atorvastatin (LIPITOR) 40 MG tablet Take 1 tablet by mouth daily 11/16/20   Leonardo Varghese MD   Blood Pressure Monitoring KIT 1 Units by Does not apply route 2 times daily 10/20/20   Sarah Akers, APRN - CNP   levothyroxine (SYNTHROID) 100 MCG tablet Take 1 tablet by mouth Daily 7/24/20   Piper Olmedo MD   escitalopram (LEXAPRO) 20 MG tablet Take 1 tablet by mouth daily 7/24/20   Piper Olmedo MD   Calcium Carb-Cholecalciferol (CALCIUM 600+D) 600-800 MG-UNIT TABS Take 1 tablet by mouth daily    Historical Provider, MD   aspirin 81 MG EC tablet Take 1 tablet by mouth daily 7/7/16   C Vero Melara MD   Multiple Vitamins-Minerals (THERAPEUTIC MULTIVITAMIN-MINERALS) tablet Take 1 tablet by mouth daily    Historical Provider, MD        Patient Active Problem List   Diagnosis    ASCVD (arteriosclerotic cardiovascular disease)    Hypertension    Hyperlipidemia    Hypothyroid    [facility]) [facility] [facility]    S/P right coronary artery (RCA) stent placement    Acute respiratory failure with hypoxia and hypercapnia (HCC)    Tobacco abuse    MI (myocardial infarction) ([facility] [address] [facility]    CHF following cardiac surgery, postop    Gait disturbance    ST elevation myocardial infarction (STEMI) ([facility] [address] [facility]    Rheumatic mitral stenosis    S/P mitral valve replacement    Cardiomyopathy, primary ([address]    Anticoagulated on Coumadin    On home O2    Depression with anxiety    Urinary incontinence    Age-related osteoporosis without current pathological fracture    EDITA (obstructive sleep apnea)    Hypersomnia    SOB (shortness of breath) on exertion    Obesity (BMI 30-39. 9)    Chronic respiratory failure (HCC)    Idiopathic hypersomnia    Hernia of anterior abdominal wall    Incisional hernia of anterior abdominal wall without obstruction or gangrene    Altered mental status         SURGICAL HISTORY       Past Surgical History:   Procedure Laterality Date    APPENDECTOMY      age 6    CARDIAC SURGERY      CORONARY ANGIOPLASTY WITH STENT PLACEMENT      CORONARY ARTERY BYPASS GRAFT  06/21/2016    CABG X1 SVG to RCA with Mitral Valve Replacement 27mm Mosaic     HERNIA REPAIR N/A 6/25/2021    ROBOTIC HERNIA INCISIONAL REPAIR LAPAROSCOPIC WITH COMPONENT SEPARATION performed by Carlito Ramos MD at [place] [address] MITRAL VALVE REPLACEMENT  06/21/2016    27mm mosaic         CURRENT MEDICATIONS       Previous Medications    AMLODIPINE (NORVASC) 5 MG TABLET    Take 2 tablets by mouth daily    APIXABAN (ELIQUIS) 5 MG TABS TABLET    Take 1 tablet by mouth 2 times daily    ASPIRIN 81 MG EC TABLET    Take 1 tablet by mouth daily    ATORVASTATIN (LIPITOR) 40 MG TABLET    Take 1 tablet by mouth daily    BLOOD PRESSURE MONITORING KIT    1 Units by Does not apply route 2 times daily    BUDESONIDE-FORMOTEROL (SYMBICORT) 160-4.5 MCG/ACT AERO    Inhale 2 puffs into the lungs 2 times daily    CALCIUM CARB-CHOLECALCIFEROL (CALCIUM 600+D) 600-800 MG-UNIT TABS    Take 1 tablet by mouth daily    CARVEDILOL (COREG) 25 MG TABLET    Take 1 tablet by mouth 2 times daily    CILOSTAZOL (PLETAL) 100 MG TABLET    Take 1 tablet by mouth twice daily    ESCITALOPRAM (LEXAPRO) 20 MG TABLET    Take 1 tablet by mouth daily    FUROSEMIDE (LASIX) 20 MG TABLET    Take 1/2 (one-half) tablet by mouth once daily    HYDROCODONE-ACETAMINOPHEN (NORCO) 5-325 MG PER TABLET    Take 1 tablet by mouth every 4 hours as needed for Pain for up to 5 days. Intended supply: 7 days. Take lowest dose possible to manage pain    LEVOTHYROXINE (SYNTHROID) 100 MCG TABLET    Take 1 tablet by mouth Daily    LISINOPRIL (PRINIVIL;ZESTRIL) 40 MG TABLET    Take 1 tablet by mouth daily    MULTIPLE VITAMINS-MINERALS (THERAPEUTIC MULTIVITAMIN-MINERALS) TABLET    Take 1 tablet by mouth daily    NITROGLYCERIN (NITROSTAT) 0.4 MG SL TABLET    Place 1 tablet under the tongue every 5 minutes as needed for Chest pain up to max of 3 total doses. If no relief after 1 dose, call 911.     ONDANSETRON (ZOFRAN-ODT) 4 MG DISINTEGRATING TABLET    Take 1 tablet by mouth every 8 hours as needed for Nausea or Vomiting    PANTOPRAZOLE (PROTONIX) 40 MG Intimate Partner Violence:     Fear of Current or Ex-Partner:     Emotionally Abused:     Physically Abused:     Sexually Abused:        SCREENINGS    Karlene Coma Scale  Eye Opening: Spontaneous  Best Verbal Response: Oriented  Best Motor Response: Obeys commands  Karlene Coma Scale Score: 15          PHYSICAL EXAM    (up to 7 for level 4, 8 or more for level 5)     ED Triage Vitals   BP Temp Temp Source Pulse Resp SpO2 Height Weight   06/28/21 0400 06/28/21 0345 06/28/21 0345 06/28/21 0345 06/28/21 0345 06/28/21 0345 06/28/21 0345 06/28/21 0345   (!) 154/75 97.9 °F (36.6 °C) Oral 82 18 93 % 5' 3\" (1.6 m) 185 lb (83.9 kg)       Physical Exam  Vitals and nursing note reviewed. Constitutional:       General: She is in acute distress. Appearance: She is well-developed. She is ill-appearing. She is not diaphoretic. HENT:      Head: Normocephalic and atraumatic. Right Ear: External ear normal.      Left Ear: External ear normal.      Nose: Nose normal.      Mouth/Throat:      Pharynx: No oropharyngeal exudate. Eyes:      General: No scleral icterus. Right eye: No discharge. Left eye: No discharge. Pupils: Pupils are equal, round, and reactive to light. Neck:      Thyroid: No thyromegaly. Vascular: No JVD. Trachea: No tracheal deviation. Cardiovascular:      Rate and Rhythm: Regular rhythm. Tachycardia present. Heart sounds: Normal heart sounds. No murmur heard. No friction rub. No gallop. Pulmonary:      Effort: Respiratory distress present. Breath sounds: Normal breath sounds. No stridor. No wheezing or rales. Chest:      Chest wall: No tenderness. Abdominal:      General: Bowel sounds are normal. There is distension. Palpations: Abdomen is soft. There is no mass. Tenderness: There is abdominal tenderness. There is no guarding or rebound. Musculoskeletal:         General: No tenderness or deformity. Normal range of motion. Cervical back: Normal range of motion. Lymphadenopathy:      Cervical: No cervical adenopathy. Skin:     General: Skin is warm. Capillary Refill: Capillary refill takes 2 to 3 seconds. Coloration: Skin is cyanotic and pale. Findings: No erythema or rash. Neurological:      Mental Status: She is alert and oriented to person, place, and time. GCS: GCS eye subscore is 3. GCS verbal subscore is 3. GCS motor subscore is 4. Cranial Nerves: No cranial nerve deficit. Sensory: No sensory deficit. Motor: No abnormal muscle tone. Coordination: Coordination normal.      Deep Tendon Reflexes: Reflexes normal.   Psychiatric:         Thought Content: Thought content normal.         Cognition and Memory: Cognition is impaired. Memory is impaired.          Judgment: Judgment normal.         DIAGNOSTIC RESULTS     Labs Reviewed   CBC WITH AUTO DIFFERENTIAL - Abnormal; Notable for the following components:       Result Value    WBC 12.6 (*)     RBC 3.95 (*)     MCH 33.4 (*)     Segs Relative 90.8 (*)     Lymphocytes % 4.0 (*)     Monocytes % 4.4 (*)     Immature Neutrophil % 0.7 (*)     All other components within normal limits   COMPREHENSIVE METABOLIC PANEL W/ REFLEX TO MG FOR LOW K - Abnormal; Notable for the following components:    Sodium 134 (*)     Chloride 97 (*)     BUN 24 (*)     Glucose 166 (*)     Total Bilirubin 1.1 (*)     GFR Non- 55 (*)     All other components within normal limits   LIPASE - Abnormal; Notable for the following components:    Lipase 8 (*)     All other components within normal limits   BRAIN NATRIURETIC PEPTIDE - Abnormal; Notable for the following components:    Pro-BNP 7,518 (*)     All other components within normal limits   BLOOD GAS, VENOUS - Abnormal; Notable for the following components:    pH, Pasquale 7.30 (*)     pCO2, Pasquale 62 (*)     pO2, Pasquale 69 (*)     Base Exc, Mixed 2.4 (*)     HCO3, Venous 30.5 (*)     O2 Sat, Pasquale 90.1 (*)     All other components within normal limits   POCT GLUCOSE - Abnormal; Notable for the following components:    POC Glucose 165 (*)     All other components within normal limits   CULTURE, URINE   TROPONIN   LACTATE, SEPSIS   URINALYSIS          EKG: All EKG's are interpreted by the Emergency Department Physician who either signs or Co-signs this chart in the absence of a cardiologist.       EKG Interpretation    Interpreted by emergency department physician    EKG Interpretation    Interpreted by emergency department physician    Rhythm: normal sinus   Rate: normal  Axis: normal  Ectopy: none  Conduction: normal  ST Segments: no acute change  T Waves: no acute change  Q Waves: none    Clinical Impression: no acute changes    Karely Linda, DO      Karely Slack, DO     RADIOLOGY:     Non-plain film images such as CT, Ultrasound and MRI are read by the radiologist. Plain radiographic images are visualized and preliminarily interpreted by the emergency physician. Interpretation per the Radiologist below, if available at the time of this note:    CTA PULMONARY W CONTRAST   Preliminary Result   1. Evaluation is limited due to motion artifact. No large or central   pulmonary emboli are identified. Smaller more peripheral pulmonary emboli   cannot be entirely excluded due to motion artifact. 2. Cardiomegaly with diffuse septal thickening and small bilateral pleural   effusions with bibasilar atelectasis. Findings are likely related to mild   interstitial pulmonary edema. 3. Cutaneous of 7 is changes are seen along the left chest wall. CT Head WO Contrast   Final Result   No acute intracranial abnormality. Minimal right mastoid effusion. No significant change. CT ABDOMEN PELVIS W IV CONTRAST Additional Contrast? None   Preliminary Result   Postsurgical changes are seen from recent ventral hernia repair.  There has   been interval development of a 5.7 x 9.0 cm well-defined fluid collection within the subcutaneous tissues of the anterior abdominal wall at the   surgical site extending to the midline with collections of gas. Findings   could be related to a postoperative seroma or chronic hematoma. Infected   fluid collection cannot be excluded. ED BEDSIDE ULTRASOUND:   Performed by ED Physician Maite Krueger DO       LABS:  Labs Reviewed   CBC WITH AUTO DIFFERENTIAL - Abnormal; Notable for the following components:       Result Value    WBC 12.6 (*)     RBC 3.95 (*)     MCH 33.4 (*)     Segs Relative 90.8 (*)     Lymphocytes % 4.0 (*)     Monocytes % 4.4 (*)     Immature Neutrophil % 0.7 (*)     All other components within normal limits   COMPREHENSIVE METABOLIC PANEL W/ REFLEX TO MG FOR LOW K - Abnormal; Notable for the following components:    Sodium 134 (*)     Chloride 97 (*)     BUN 24 (*)     Glucose 166 (*)     Total Bilirubin 1.1 (*)     GFR Non- 55 (*)     All other components within normal limits   LIPASE - Abnormal; Notable for the following components:    Lipase 8 (*)     All other components within normal limits   BRAIN NATRIURETIC PEPTIDE - Abnormal; Notable for the following components:    Pro-BNP 7,518 (*)     All other components within normal limits   BLOOD GAS, VENOUS - Abnormal; Notable for the following components:    pH, Pasquale 7.30 (*)     pCO2, Pasquale 62 (*)     pO2, Pasquale 69 (*)     Base Exc, Mixed 2.4 (*)     HCO3, Venous 30.5 (*)     O2 Sat, Pasquale 90.1 (*)     All other components within normal limits   POCT GLUCOSE - Abnormal; Notable for the following components:    POC Glucose 165 (*)     All other components within normal limits   CULTURE, URINE   TROPONIN   LACTATE, SEPSIS   URINALYSIS       All other labs were within normal range or not returned as of this dictation.     EMERGENCY DEPARTMENT COURSE and DIFFERENTIAL DIAGNOSIS/MDM:   Vitals:    Vitals:    06/28/21 0600 06/28/21 0630 06/28/21 0645 06/28/21 0656   BP: (!) 164/92 (!) 168/106 Pulse: 92 97 94 87   Resp: 14 18 16 27   Temp:       TempSrc:       SpO2: 94% 92% 93% 95%   Weight:       Height:               MDM  Number of Diagnoses or Management Options  Diagnosis management comments: 41-year-old female presents emergency department via EMS after episode of altered mental status, cyanosis and apnea. Patient had a recent hernia repair. Family reports that patient was found unresponsive. Upon arrival, patient was given Narcan with some improvement of her mental status. Altered mental status work-up was otherwise negative. CT of the head, chest, abdomen, pelvis were negative for surgical infection, pulmonary embolism or pneumonia. Patient was initially placed on BiPAP. Will admit to hospitalist service for further observation and care. Patient's mental status has improved significantly at this time. Favor polypharmacy as cause of patient's symptoms. Patient also has mild bilateral lower extremity edema and BNP is significantly elevated. Amount and/or Complexity of Data Reviewed  Clinical lab tests: ordered and reviewed  Tests in the radiology section of CPT®: ordered and reviewed  Tests in the medicine section of CPT®: ordered and reviewed    Risk of Complications, Morbidity, and/or Mortality  Presenting problems: high  Diagnostic procedures: high  Management options: high    Critical Care  Total time providing critical care: 30-74 minutes    Patient Progress  Patient progress: improved      -  Patient seen and evaluated in the emergency department. -  Triage and nursing notes reviewed and incorporated. -  Old chart records reviewed and incorporated. -  Work-up included:  See above  -  Results discussed with patient. REASSESSMENT          CRITICAL CARE TIME     This excludes seperately billable procedures and family discussion time.  Critical care time provided for obtaining history, conducting a physical exam, performing and monitoring interventions, ordering, collecting and interpreting tests, and establishing medical decision-making. There was a potential for life/limb threatening pathology requiring close evaluation and intervention with concern for patient decompensation. CONSULTS:  IP CONSULT TO HOSPITALIST    PROCEDURES:  None performed unless otherwise noted below     Procedures        FINAL IMPRESSION      1. Stupor    2. Polypharmacy    3. Acute on chronic congestive heart failure, unspecified heart failure type [facility])          DISPOSITION/PLAN   DISPOSITION Admitted 06/28/2021 07:17:43 AM      PATIENT REFERRED TO:  No follow-up provider specified. DISCHARGE MEDICATIONS:  New Prescriptions    No medications on file       ED Provider Disposition Time  DISPOSITION Admitted 06/28/2021 07:17:43 AM      Appropriate personal protective equipment was worn during the patient's evaluation. These included surgical, eye protection, surgical mask or in 95 respirator and gloves. The patient was also placed in a surgical mask for the prevention of possible spread of respiratory viral illnesses. The Patient was instructed to read the package inserts with any medication that was prescribed. Major potential reactions and medication interactions were discussed. The Patient understands that there are numerous possible adverse reactions not covered. The patient was also instructed to arrange follow-up with his or her primary care provider for review of any pending labwork or incidental findings on any radiology results that were obtained. All efforts were made to discuss any incidental findings that require further monitoring. Controlled Substances Monitoring:     No flowsheet data found.     (Please note that portions of this note were completed with a voice recognition program.  Efforts were made to edit the dictations but occasionally words are mis-transcribed.)    Maite Krueger DO (electronically signed)  Attending Emergency Physician            Maite Krueger DO  06/28/21 1628

## 2021-06-28 NOTE — ED NOTES
Pt now off bipap and 3-4L NC, which is what she chronically wears at home.       Clair Funes RN  06/28/21 [id number]

## 2021-06-28 NOTE — ED NOTES
Bed: ED-28  Expected date:   Expected time:   Means of arrival:   Comments:  Mesfin/Ceferino Lassiter [facility]  06/28/21 [id number]

## 2021-06-28 NOTE — ED NOTES
Pt to bedpan. Unable to void. Periwick applied. Also trialed NC at 4Lpm, but pt SpO2 dropped to 78%. Pt returned to NRB at 10L.       Clinton Juarez RN  06/28/21 5892

## 2021-06-28 NOTE — ED NOTES
Pt's  at bedside. States pt woke up and he assisted her to BR and that she barely had enough energy to stand up off toilet. Pt was discharged from hospital 36 hours ago and has been declining over the last couple of hours.       Colten Valle RN  06/28/21 0942

## 2021-06-28 NOTE — OP NOTE
Operative Note    Patient ID:  Shoshana Razo  [id number]  70 y.o.  1950      Pre-operative Diagnosis: Ventral Hernia    Post-operative Diagnosis: same    Procedure: 1. Robotic/laparoscopic assisted Ventral hernia repair with mesh 30cm  x 30 cm with extensive adhesiolysis (65 min)    2. Bilateral posterior rectus sheath release (PSR)    3. Bilateral Transversus Abdominis Muscle Release (TAR)    Surgeon: Chase Mendoza MD    First Assistant: Ambika Reyes PA-C  The use of a first assistant was necessary for the proper positioning, prepping, and draping of the patient, intraoperative retraction, passing sutures and implants(like mesh), stapling bowel and vessels using  devises when necessary, and suction using laparoscopic instruments, exchanging [employer] robotic instruments, passing sutures and closure of skin and subcutaneous tissues. Findings:  same    Estimated Blood Loss:  Minimal           Total IV Fluids: 1000 ml            Complications:  None; patient tolerated the procedure well. Disposition: PACU - hemodynamically stable. Condition: stable    Indication:  HPI: Patient presents with large incisional hernia. Symptoms were first noted 3 years ago. Pain is progressive and worsening . Lump is not reducible. Pt denies N/V or bowel changes. . Pt with previous history of abdominal surgery including abdominal aorto Bi-fem bypass. Procedure Details: The patient was seen again in the Holding Room. The risks, benefits, complications, treatment options, and expected outcomes were discussed with the patient. The possibilities of reaction to medication, pulmonary aspiration, perforation of viscus, bleeding, recurrent infection, the need for additional procedures, and development of a complication requiring transfusion or further operation were discussed with the patient and/or family. There was concurrence with the proposed plan, and informed consent was obtained.   The site of surgery was properly noted/marked. The patient was taken to the Operating Room, identified as Ofelia Cortez, and the procedure verified. A Time Out was held and the above information confirmed. The patient was brought into the operating room and placed supine. The bilateral arm was tucked in, addressing pressure points. The abdomen was prepared and draped in the usual sterile fashion. For extra precaution, an ioban dressing was applied to the skin. Using a 5-mm optical trocar, the right upper quadrant was entered without incidence. CO2 insufflation was tolerated. Additional 8-mm trocar in the right lower quadrant and a 12-mm trocar in the midabdomen to the right on the most lateral part of the abdomen were placed. The robot patient cart was docked on the left side of the patient. Using robotic scissors and atraumatic graspers, adhesiolysis was performed. On general inspection, there were extensive adhesions in the abdominal cavity. The adhesiolysis took additional 65  min. There was no enterotomies. The hernia defect measured 13 cm. I started by mobilizing the posterior rectus sheath on the left side. The semilunaris and perforators were identified. Incision of PSR medial to linea semilunaris was made  to expose underlying transversus muscle. This was followed by the division of transversus muscle to expose underlying transversalis fascia. The lateral space was dissected caudal to arcuate line toward space of Retzius. This release was plenty to approximate the posterior fascial closure with minimal tension. At this point the contralateral ports were inserted and 30 x 30 cm prolene mesh was inserted and secured with interrupted vicryl sutures laterally to the transversus abdominus muscle. The robot cart was undocked and re-docked from the right side. The right side was also approached in similar manor to release the posterior rectus sheath and the Transversus Abdominis Muscle Release (TAR).  The posterior sheath was then closed with 2-0 barbed suture and the anterior rectus muscle was closed with 0 PDS barbed suture. The prolene was unrolled and secured in similar mannor. There was minimal bleeding. as closed using 0 vicryl under direct vision. Then the port sites were approximated using 4-0 Vicryl in subcuticular fashion. Steri-Strips and a Band-Aid were applied at the port sites. An abdominal binder was applied loosely. The patient remained stable for the entire operation and was transferred to the recovery room in a stable condition. Instrument and lap counts were correct at the end of the case.        Bolivar Carrasco MD

## 2021-06-28 NOTE — H&P
History and Physical      Name:  Misael Fuller /Age/Sex: 1950  (70 y.o. female)   MRN & CSN:  [id number] & [id number] Admission Date/Time: 2021  3:42 AM   Location:  ED28/ED-28 PCP: No primary care provider on file. Chief Complaint:  Altered Mental status    History Of Present Illness   Misael Fuller is a 70 y.o.  female  who presents with Altered Mental Status (pt had surgery on Friday. Family called 911 with pt unresponsive. EMS state pt had central cyanosis and SpO2 of 70% upon arrival. pt placed on NRB and breathing tx administered. Neg stroke scale. Labile BP)    who was with patient during evaluation provided additional information. According to him, patient has been on bed most of the time during discharge, not eating well ,not drinking well , has not been using CPAP at night. This may have affected her mental status. She has been taking her pain medication . She seem not to be taking her other medications prior to ED arrival.Patient was administered 2mg Narcan in ED,placed on BiPAP. Her oxygen saturation improved and patient was more responsive . Assessment and Plan:     # Altered Mental Status    This may be related to patient not using home CPAP     Taking pain medication,and breathing shallow   CT head W/O contrast  Impression:  No acute intracranial abnormality. Minimal right mastoid effusion. No significant change     CTA  Pulmonary W/contrast   No acute abnormality or PE observed    CT abdomen /pelvis with contrast   Impression:  Postsurgical changes are seen from recent ventral hernia repair. There has   been interval development of a 5.7 x 9.0 cm well-defined fluid collection   within the subcutaneous tissues of the anterior abdominal wall at the   surgical site extending to the midline with collections of gas. Findings   could be related to a postoperative seroma or chronic hematoma. Infected   fluid collection cannot be excluded.     Continue patient on BiPAP,monitor and wean as appropriate  Need to use home oxygen and CPAP and night when discharged. GI and DVT prophylaxis      # Hypertension    /83   Patient did not take her BP medication    yesterday . Restart Amlodipine 10mg PO    Lisinopril 40mg, and lasix,      #  Anticoagulation      Patient was on Coumadin prior to Surgery      This was stopped, Eliquis was recommended   and prescribed by cardiology   Patient did not take neither Coumadin or Eliquis   Following discharge. Check INR restart Eliquis      #   Incisional hernia of anterior abdominal wall. No bleeding,or sign of infection on the sites       CT abdomen/Pelvis  Did show postoperative seroma or chronic hematoma      Continue to keep site clean and dry. #    Hyponatremia        [id number] MMOL/L        May be related to patient reduced oral intake         Replace sodium  With  NS, also assist      In clearing contrast used during Imaging      # leucocytosis   [id number] K/CU MM   Patient denies fever, this may   Be post operative reactive  Check AM lab for trending  Currently not on any antibiotics. # Hypothyroid   Continue Synthroid 100mcg   Check TSH      # Elevated Pro BNP  Pro-BNP7,518High PG/ML  Patient was seen by cardiology  On 06/26/2021,  Has history ofcardiomyopthy  No sign of fluid overload   last EF 45-50%,cardiology recommends continue  Coreg and lisinopril stable.         Medications:   Medications:    ipratropium-albuterol        sodium chloride flush  5-40 mL Intravenous BID    LORazepam  0.5 mg Intravenous Once      Infusions:    sodium chloride       PRN Meds:      Current Facility-Administered Medications:     ipratropium-albuterol (DUONEB) 0.5-2.5 (3) MG/3ML nebulizer solution, , , ,     sodium chloride flush 0.9 % injection 5-40 mL, 5-40 mL, Intravenous, BID, Yoakum Givens, DO    0.9 % sodium chloride infusion, , Intravenous, Continuous, Ayaz Iverson APRN - CNP    LORazepam (ATIVAN) injection 0.5 mg, 0.5 mg, Intravenous, Once, Ayaz Iverson, APRN - CNP    Current Outpatient Medications:     ondansetron (ZOFRAN-ODT) 4 MG disintegrating tablet, Take 1 tablet by mouth every 8 hours as needed for Nausea or Vomiting, Disp: 20 tablet, Rfl: 1    HYDROcodone-acetaminophen (NORCO) 5-325 MG per tablet, Take 1 tablet by mouth every 4 hours as needed for Pain for up to 5 days. Intended supply: 7 days. Take lowest dose possible to manage pain, Disp: 20 tablet, Rfl: 0    senna (SENOKOT) 8.6 MG tablet, Take 1 tablet by mouth 2 times daily for 28 days, Disp: 28 tablet, Rfl: 1    amLODIPine (NORVASC) 5 MG tablet, Take 2 tablets by mouth daily, Disp: 90 tablet, Rfl: 1    apixaban (ELIQUIS) 5 MG TABS tablet, Take 1 tablet by mouth 2 times daily, Disp: 60 tablet, Rfl: 2    lisinopril (PRINIVIL;ZESTRIL) 40 MG tablet, Take 1 tablet by mouth daily, Disp: 90 tablet, Rfl: 3    cilostazol (PLETAL) 100 MG tablet, Take 1 tablet by mouth twice daily, Disp: 180 tablet, Rfl: 3    carvedilol (COREG) 25 MG tablet, Take 1 tablet by mouth 2 times daily, Disp: 180 tablet, Rfl: 3    furosemide (LASIX) 20 MG tablet, Take 1/2 (one-half) tablet by mouth once daily, Disp: 45 tablet, Rfl: 0    pantoprazole (PROTONIX) 40 MG tablet, Take 1 tablet by mouth daily, Disp: 90 tablet, Rfl: 3    budesonide-formoterol (SYMBICORT) 160-4.5 MCG/ACT AERO, Inhale 2 puffs into the lungs 2 times daily, Disp: 1 Inhaler, Rfl: 5    nitroGLYCERIN (NITROSTAT) 0.4 MG SL tablet, Place 1 tablet under the tongue every 5 minutes as needed for Chest pain up to max of 3 total doses.  If no relief after 1 dose, call 911., Disp: 25 tablet, Rfl: 3    atorvastatin (LIPITOR) 40 MG tablet, Take 1 tablet by mouth daily, Disp: 90 tablet, Rfl: 3    Blood Pressure Monitoring KIT, 1 Units by Does not apply route 2 times daily, Disp: 1 kit, Rfl: 0    levothyroxine (SYNTHROID) 100 MCG tablet, Take 1 tablet by mouth Daily, Disp: 90 tablet, Rfl: 3    escitalopram (LEXAPRO) 20 MG tablet, Take 1 tablet by mouth daily, Disp: 90 tablet, Rfl: 1    Calcium Carb-Cholecalciferol (CALCIUM 600+D) 600-800 MG-UNIT TABS, Take 1 tablet by mouth daily, Disp: , Rfl:     aspirin 81 MG EC tablet, Take 1 tablet by mouth daily, Disp: 30 tablet, Rfl: 3    Multiple Vitamins-Minerals (THERAPEUTIC MULTIVITAMIN-MINERALS) tablet, Take 1 tablet by mouth daily, Disp: , Rfl:      Review of Systems     GENERAL:  Denies fever, or changes in weight. EYES:  Denies recent visual changes. ENT:  Denies ear pain, hearing loss or tinnitus  RESP:  Denies any cough, or wheezing. CV:  Denies any chest pain with exertion or at rest,  syncope,   GI:  Denies any dysphagia, nausea, vomiting,  changes in bowel habit, MUSCULOSKELETAL:  Denies any joint swelling, joint pain, or loss of range of motion. NEURO:  Denies any headaches, tremors,  numbness or tingling. HEME/LYMPHATIC/IMMUNO:  Some, bruising, on abdomen on anticoagulant  ENDO:  Denies any heat or cold intolerance,      Objective:   No intake or output data in the 24 hours ending 06/28/21 0809   Vitals:   Vitals:    06/28/21 0656   BP:    Pulse: 87   Resp: 27   Temp:    SpO2: 95%     Physical Exam:     GEN: Awake female, sitting upright in Bed  in no  Acute  distress. EYES: Pupils are equally round. No scleral erythema, discharge,   HENT: Mucous membranes are moist. External ears and nose appear normal.   NECK: Supple, no apparent thyromegaly or masses. RESP:  Patient on BiPAP Symmetric chest movement   CARDIO/VASC: NSR on monitor,NO JVD,SOB, lower extremity edema appreciated    GI:Abdomen soft round, active bowl sound. Tender at incisional sites Rectal exam deferred. : Voids  Urine Shine catheter is not present. HEME/LYMPH:  No lymphadenopathy appreciated   MSK: No gross joint deformities. SKIN: Normal coloration, warm, dry. NEURO: Cranial nerves appear grossly intact, normal speech, no lateralizing weakness. PSYCH: Awake, alert, oriented x 4. Affect appropriate. Past Medical History:      Past Medical History:   Diagnosis Date    CAD (coronary artery disease)     [facility] obstructive [facility] disease) [facility] [address]     GERD (gastroesophageal reflux disease)     H/O cardiovascular stress test 04/26/2021    normal study 63% EF no ischemia    H/O echocardiogram 03/22/2017    EF 35-40%    H/O echocardiogram 08/04/2020    EF 45-50, Mild TR & CT, Severely dilated left atrium. 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, functionally normally     Heart attack ([facility] [address] 01/2014    Hyperlipidemia     Hypertension     On home oxygen therapy     nightly    PAD (peripheral artery disease) ([facility] [address]     S/P right coronary artery (RCA) stent placement     Sleep apnea     uses CPAP    T2 vertebral fracture ([facility] [address] 12/2017    TIA (transient ischemic attack) 1996    Tobacco dependence     Wears glasses     Wears partial dentures     Upper     PSHX:  has a past surgical history that includes Coronary angioplasty with stent; Coronary artery bypass graft (06/21/2016); Mitral valve replacement (06/21/2016); Cardiac surgery; Appendectomy; and hernia repair (N/A, 6/25/2021). Allergies: No Known Allergies    FAM HX: family history includes Arrhythmia in her father and sister; Coronary Art Dis in her father; Diabetes in her sister; Heart Attack in her father and sister; Pacemaker in her father; Rheum Arthritis in her sister; Stroke in her mother. Soc HX:   Social History     Socioeconomic History    Marital status:      Spouse name: Not on file    Number of children: Not on file    Years of education: Not on file    Highest education level: Not on file   Occupational History    Not on file   Tobacco Use    Smoking status: Current Every Day Smoker     Packs/day: 0.50     Types: Cigarettes    Smokeless tobacco: Former User     Quit date: 4/30/2021   Vaping Use    Vaping Use: Never used   Substance and Sexual Activity    Alcohol use:  No Comment: 2 cups of coffee in the a.m.  Drug use: No    Sexual activity: Yes     Partners: Male   Other Topics Concern    Not on file   Social History Narrative    ** Merged History Encounter **          Social Determinants of Health     Financial Resource Strain:     Difficulty of Paying Living Expenses:    Food Insecurity:     Worried About Running Out of Food in the Last Year:     Ran Out of Food in the Last Year:    Transportation Needs:     Lack of Transportation (Medical):      Lack of Transportation (Non-Medical):    Physical Activity:     Days of Exercise per Week:     Minutes of Exercise per Session:    Stress:     Feeling of Stress :    Social Connections:     Frequency of Communication with Friends and Family:     Frequency of Social Gatherings with Friends and Family:     Attends Denominational Services:     Active Member of Clubs or Organizations:     Attends Club or Organization Meetings:     Marital Status:    Intimate Partner Violence:     Fear of Current or Ex-Partner:     Emotionally Abused:     Physically Abused:     Sexually Abused:        Electronically signed by SATURNINO Worrell CNP on 6/28/2021 at 8:09 AM

## 2021-06-28 NOTE — PROGRESS NOTES
Medication History  [facility]    Patient Name: Ibeth Waters 1950     Medication history has been completed by: Aroldo Ann CPhT    Source(s) of information: patient's  supplied medications from home and insurance claims     Primary Care Physician: No primary care provider on file. Pharmacy: [facility]    Allergies as of 06/28/2021    (No Known Allergies)        Prior to Admission medications    Medication Sig Start Date End Date Taking? Authorizing Provider   budesonide-formoterol (SYMBICORT) 160-4.5 MCG/ACT AERO Inhale 2 puffs into the lungs 2 times daily 12/14/20  Yes Cj Cespedes MD   ondansetron (ZOFRAN-ODT) 4 MG disintegrating tablet Take 1 tablet by mouth every 8 hours as needed for Nausea or Vomiting 6/26/21   SATURNINO Regan CNP   HYDROcodone-acetaminophen (NORCO) 5-325 MG per tablet Take 1 tablet by mouth every 4 hours as needed for Pain for up to 5 days. Intended supply: 7 days.  Take lowest dose possible to manage pain 6/26/21 7/1/21  SATURNINO Regan CNP   senna (SENOKOT) 8.6 MG tablet Take 1 tablet by mouth 2 times daily for 28 days 6/26/21 7/24/21  SATURNINO Regan CNP   amLODIPine (NORVASC) 5 MG tablet Take 2 tablets by mouth daily 6/26/21   SATURNINO Regan CNP   apixaban (ELIQUIS) 5 MG TABS tablet Take 1 tablet by mouth 2 times daily 6/26/21 7/26/21  SATURNINO Regan CNP   lisinopril (PRINIVIL;ZESTRIL) 40 MG tablet Take 1 tablet by mouth daily 4/26/21   SATURNINO Mejia CNP   cilostazol (PLETAL) 100 MG tablet Take 1 tablet by mouth twice daily 4/26/21   SATURNINO Mejia CNP   carvedilol (COREG) 25 MG tablet Take 1 tablet by mouth 2 times daily 4/26/21   SATURNINO Mejia CNP   furosemide (LASIX) 20 MG tablet Take 1/2 (one-half) tablet by mouth once daily  Patient taking differently: Take 10 mg by mouth daily Take 1/2 (one-half) tablet by mouth once daily 4/5/21   Minda Li, APRN - CNP pantoprazole (PROTONIX) 40 MG tablet Take 1 tablet by mouth daily 2/23/21   Marita Lane MD   nitroGLYCERIN (NITROSTAT) 0.4 MG SL tablet Place 1 tablet under the tongue every 5 minutes as needed for Chest pain up to max of 3 total doses. If no relief after 1 dose, call 911. 12/14/20   Marita Lane MD   atorvastatin (LIPITOR) 40 MG tablet Take 1 tablet by mouth daily 11/16/20   Marita Lane MD   Blood Pressure Monitoring KIT 1 Units by Does not apply route 2 times daily 10/20/20   SATURNINO Patton - CNP   levothyroxine (SYNTHROID) 100 MCG tablet Take 1 tablet by mouth Daily 7/24/20   Ebony Gamez MD   Calcium Carb-Cholecalciferol (CALCIUM 600+D) 600-800 MG-UNIT TABS Take 1 tablet by mouth daily    Historical Provider, MD   aspirin 81 MG EC tablet Take 1 tablet by mouth daily 7/7/16   NIELS Hannon MD   Multiple Vitamins-Minerals (THERAPEUTIC MULTIVITAMIN-MINERALS) tablet Take 1 tablet by mouth daily    Historical Provider, MD     Medications added or changed (ex. new medication, dosage change, interval change, formulation change):  Melatonin 5 mg (added)  Vitamin C 1000 mg daily (ordered)  Amlodipine dosage clarified 10 mg daily (5 mg ordered)    Medications removed from list (include reason, ex. noncompliance, medication cost, therapy complete etc.):   Escitalopram medication not provided in medication bag provided    Medications requiring reconciliation with provider:   Melatonin 5 mg (added)  Vitamin C 1000 mg daily (ordered)  Amlodipine dosage clarified 10 mg daily (5 mg ordered)   Escitalopram medication not provided in medication bag provided (ordered)    Comments:  Medications reviewed with patient and insurance claims verified. Before hospital admission patient was taking warfarin, this changed to Elquis 5 mg BID. Per patient and  patient has not been taking either, states eliquis was not given to them per the pharmacy when he picked up her other medications. Admitting physician aware and labs ordered.     To my knowledge the above medication history is accurate as of 6/28/2021 8:52 AM.   Cheryl Steve CPhT   6/28/2021 8:52 AM

## 2021-06-28 NOTE — ED NOTES
Pt asleep. Does wake up to voice and answers questions appropriately.       Shaina Maxwell RN  06/28/21 3535

## 2021-06-28 NOTE — ED NOTES
Pt now awake and alert.  states he manages her pain medication at home after discharge from surgery.   Pt takes one narcotic pill every 4-6 hours when they are ordered every 4 hours prn.      Humza Harrison RN  06/28/21 4319

## 2021-06-29 ENCOUNTER — APPOINTMENT (OUTPATIENT)
Dept: ULTRASOUND IMAGING | Age: 71
DRG: 291 | End: 2021-06-29
Payer: MEDICARE

## 2021-06-29 LAB
ANION GAP SERPL CALCULATED.3IONS-SCNC: 7 MMOL/L (ref 4–16)
APTT: 46 SECONDS (ref 25.1–37.1)
BASOPHILS ABSOLUTE: 0 K/CU MM
BASOPHILS RELATIVE PERCENT: 0.3 % (ref 0–1)
BUN BLDV-MCNC: 28 MG/DL (ref 6–23)
CALCIUM SERPL-MCNC: 9.2 MG/DL (ref 8.3–10.6)
CHLORIDE BLD-SCNC: 99 MMOL/L (ref 99–110)
CO2: 32 MMOL/L (ref 21–32)
CREAT SERPL-MCNC: 1.3 MG/DL (ref 0.6–1.1)
DIFFERENTIAL TYPE: ABNORMAL
EOSINOPHILS ABSOLUTE: 0.2 K/CU MM
EOSINOPHILS RELATIVE PERCENT: 1.8 % (ref 0–3)
GFR AFRICAN AMERICAN: 49 ML/MIN/1.73M2
GFR NON-AFRICAN AMERICAN: 40 ML/MIN/1.73M2
GLUCOSE BLD-MCNC: 92 MG/DL (ref 70–99)
HCT VFR BLD CALC: 35.6 % (ref 37–47)
HEMOGLOBIN: 11.8 GM/DL (ref 12.5–16)
IMMATURE NEUTROPHIL %: 0.5 % (ref 0–0.43)
INR BLD: 1.51 INDEX
LV EF: 60 %
LVEF MODALITY: NORMAL
LYMPHOCYTES ABSOLUTE: 1.2 K/CU MM
LYMPHOCYTES RELATIVE PERCENT: 11.5 % (ref 24–44)
MCH RBC QN AUTO: 33.5 PG (ref 27–31)
MCHC RBC AUTO-ENTMCNC: 33.1 % (ref 32–36)
MCV RBC AUTO: 101.1 FL (ref 78–100)
MONOCYTES ABSOLUTE: 1.1 K/CU MM
MONOCYTES RELATIVE PERCENT: 10.7 % (ref 0–4)
NUCLEATED RBC %: 0 %
PDW BLD-RTO: 12.4 % (ref 11.7–14.9)
PLATELET # BLD: 149 K/CU MM (ref 140–440)
PMV BLD AUTO: 11.1 FL (ref 7.5–11.1)
POTASSIUM SERPL-SCNC: 3.7 MMOL/L (ref 3.5–5.1)
PROTHROMBIN TIME: 18.4 SECONDS (ref 11.7–14.5)
RBC # BLD: 3.52 M/CU MM (ref 4.2–5.4)
SEGMENTED NEUTROPHILS ABSOLUTE COUNT: 8 K/CU MM
SEGMENTED NEUTROPHILS RELATIVE PERCENT: 75.2 % (ref 36–66)
SODIUM BLD-SCNC: 138 MMOL/L (ref 135–145)
T4 FREE: 1.93 NG/DL (ref 0.9–1.8)
TOTAL IMMATURE NEUTOROPHIL: 0.05 K/CU MM
TOTAL NUCLEATED RBC: 0 K/CU MM
WBC # BLD: 10.6 K/CU MM (ref 4–10.5)

## 2021-06-29 PROCEDURE — 2700000000 HC OXYGEN THERAPY PER DAY

## 2021-06-29 PROCEDURE — 36415 COLL VENOUS BLD VENIPUNCTURE: CPT

## 2021-06-29 PROCEDURE — 2580000003 HC RX 258: Performed by: EMERGENCY MEDICINE

## 2021-06-29 PROCEDURE — 85730 THROMBOPLASTIN TIME PARTIAL: CPT

## 2021-06-29 PROCEDURE — 99024 POSTOP FOLLOW-UP VISIT: CPT | Performed by: SURGERY

## 2021-06-29 PROCEDURE — 80048 BASIC METABOLIC PNL TOTAL CA: CPT

## 2021-06-29 PROCEDURE — 2580000003 HC RX 258: Performed by: NURSE PRACTITIONER

## 2021-06-29 PROCEDURE — 84439 ASSAY OF FREE THYROXINE: CPT

## 2021-06-29 PROCEDURE — 94761 N-INVAS EAR/PLS OXIMETRY MLT: CPT

## 2021-06-29 PROCEDURE — 6370000000 HC RX 637 (ALT 250 FOR IP): Performed by: NURSE PRACTITIONER

## 2021-06-29 PROCEDURE — 2060000000 HC ICU INTERMEDIATE R&B

## 2021-06-29 PROCEDURE — 85025 COMPLETE CBC W/AUTO DIFF WBC: CPT

## 2021-06-29 PROCEDURE — 94640 AIRWAY INHALATION TREATMENT: CPT

## 2021-06-29 PROCEDURE — 85610 PROTHROMBIN TIME: CPT

## 2021-06-29 PROCEDURE — 94660 CPAP INITIATION&MGMT: CPT

## 2021-06-29 PROCEDURE — 93306 TTE W/DOPPLER COMPLETE: CPT

## 2021-06-29 PROCEDURE — 76536 US EXAM OF HEAD AND NECK: CPT

## 2021-06-29 RX ORDER — FUROSEMIDE 20 MG/1
10 TABLET ORAL DAILY
Status: DISCONTINUED | OUTPATIENT
Start: 2021-06-30 | End: 2021-06-30 | Stop reason: HOSPADM

## 2021-06-29 RX ORDER — FUROSEMIDE 10 MG/ML
20 INJECTION INTRAMUSCULAR; INTRAVENOUS 2 TIMES DAILY
Status: DISCONTINUED | OUTPATIENT
Start: 2021-06-29 | End: 2021-06-29

## 2021-06-29 RX ADMIN — PANTOPRAZOLE SODIUM 40 MG: 40 TABLET, DELAYED RELEASE ORAL at 05:57

## 2021-06-29 RX ADMIN — STANDARDIZED SENNA CONCENTRATE 8.6 MG: 8.6 TABLET ORAL at 22:11

## 2021-06-29 RX ADMIN — CARVEDILOL 25 MG: 25 TABLET, FILM COATED ORAL at 22:11

## 2021-06-29 RX ADMIN — SODIUM CHLORIDE, PRESERVATIVE FREE 10 ML: 5 INJECTION INTRAVENOUS at 10:19

## 2021-06-29 RX ADMIN — ESCITALOPRAM OXALATE 20 MG: 10 TABLET ORAL at 10:18

## 2021-06-29 RX ADMIN — STANDARDIZED SENNA CONCENTRATE 8.6 MG: 8.6 TABLET ORAL at 10:18

## 2021-06-29 RX ADMIN — AMLODIPINE BESYLATE 10 MG: 10 TABLET ORAL at 10:19

## 2021-06-29 RX ADMIN — Medication 1 TABLET: at 10:19

## 2021-06-29 RX ADMIN — LISINOPRIL 40 MG: 40 TABLET ORAL at 10:19

## 2021-06-29 RX ADMIN — CILOSTAZOL 50 MG: 100 TABLET ORAL at 05:57

## 2021-06-29 RX ADMIN — ATORVASTATIN CALCIUM 40 MG: 40 TABLET, FILM COATED ORAL at 22:11

## 2021-06-29 RX ADMIN — LEVOTHYROXINE SODIUM 100 MCG: 0.1 TABLET ORAL at 05:57

## 2021-06-29 RX ADMIN — APIXABAN 5 MG: 5 TABLET, FILM COATED ORAL at 11:14

## 2021-06-29 RX ADMIN — BUDESONIDE AND FORMOTEROL FUMARATE DIHYDRATE 2 PUFF: 160; 4.5 AEROSOL RESPIRATORY (INHALATION) at 20:24

## 2021-06-29 RX ADMIN — SODIUM CHLORIDE, PRESERVATIVE FREE 10 ML: 5 INJECTION INTRAVENOUS at 10:20

## 2021-06-29 RX ADMIN — CARVEDILOL 25 MG: 25 TABLET, FILM COATED ORAL at 10:18

## 2021-06-29 RX ADMIN — BUDESONIDE AND FORMOTEROL FUMARATE DIHYDRATE 2 PUFF: 160; 4.5 AEROSOL RESPIRATORY (INHALATION) at 07:15

## 2021-06-29 RX ADMIN — SODIUM CHLORIDE, PRESERVATIVE FREE 10 ML: 5 INJECTION INTRAVENOUS at 22:11

## 2021-06-29 ASSESSMENT — PAIN SCALES - GENERAL
PAINLEVEL_OUTOF10: 0

## 2021-06-29 ASSESSMENT — ENCOUNTER SYMPTOMS
EYE ITCHING: 0
SHORTNESS OF BREATH: 1
ANAL BLEEDING: 0
PHOTOPHOBIA: 0
APNEA: 0
ABDOMINAL PAIN: 1
RECTAL PAIN: 0
SORE THROAT: 0
EYE REDNESS: 0
STRIDOR: 0
COLOR CHANGE: 0
CONSTIPATION: 0
CHOKING: 0
BACK PAIN: 0

## 2021-06-29 NOTE — PROGRESS NOTES
Hospitalist Progress Note      Name:  [facility] /Age/Sex: 1950  (70 y.o. female)   MRN & CSN:  [id number] & [id number] Admission Date/Time: 2021  3:42 AM   Location:  -A PCP: No primary care provider on file. Hospital Day: 2    Assessment and Plan:   71 y/o F that presented with acute on chronic hypoxemic respiratory failure and altered mental status    Acute on Chronic Hypoxemic Respiratory Failure  Hx of combined systolic and diastolic Heart Failure with Exacerbation  SubQ Emphysematous Changes  - Off of Bipap this am and on 7L NC; improving; is on 3-4 L NC chronically  - Given 40 IV BID lasix yesterday and another dose this AM; will change to PO today  - SubQ Emphysematous changes noted on admission; likely related to Bipap; monitor  - Strict I/O  - Echo ordered; last in August of last year  - Coreg, Lisinopril  - Will monitor    Encephalopathy  - Likely anoxic given presentation; currently resolved  - Will monitor and wean oxygen as able  - UA and Culture pending    MARK on CKD?  - No recent renal US; b/l Cr around 1.1-1.3; currently 1.3  - Decrease Lasix as per above  - Repeat BMP in AM  - Will need f/u with PCP on d/c for routine following    Abdominal Fluid Collection  S/p Recent robotic incisional hernia repair on   - Incisions c/d/i  - CT revealed a fluid collection measuring 5.7 x 9.0cm; new; does show some collections of gas  - ? Seroma or possible hematoma; Hgb stable; will hold Eliquis for now; if spikes fevers will begin abx and obtain blood cultures  - General Surgery consulted, appreciate recs     Hx of CAD  S/p MV replacement in past  - s/p bypass surgery x1 to RCA at time of her valve surgery in 2016  - Continue Eliquis  - Not on anti-platelet therapy pre med review    Hx of PAD  - Continue Pletal     Hypertension  - Continue Amlodipine, Coreg     Hyponatremia  - Resolved     Leukocytosis  - Slightly elevated at 10.6; monitor daily  - No fevers and denies chills     Hx Hypothroidism  - TSH low and Free T4 elevated  - Will c/s Endocrine for ?synthroid adjustments, appreciate recs       Diet ADULT DIET; Regular; Low Fat/Low Chol/High Fiber/2 gm Na   DVT Prophylaxis [] Lovenox, []  Heparin, [] SCDs, [] Ambulation   GI Prophylaxis [] PPI,  [] H2 Blocker,  [] Carafate,  [] Diet/Tube Feeds   Code Status Full Code   Disposition Patient requires continued admission due to    MDM [] Low, [] Moderate,[]  High  Patient's risk as above due to      History of Present Illness:     Off of bipap this morning and on 7L NC; states her SOB is improved from yesterday; no chest pain or fevers or chills overnight    Objective: Intake/Output Summary (Last 24 hours) at 6/29/2021 1011  Last data filed at 6/28/2021 1833  Gross per 24 hour   Intake 60 ml   Output 1100 ml   Net -1040 ml      Vitals:   Vitals:    06/29/21 0849   BP: (!) 156/73   Pulse: 85   Resp: 17   Temp: 98.3 °F (36.8 °C)   SpO2: 97%     Physical Exam:   GEN Awake female, sitting upright in bed in no apparent distress. Appears given age. EYES Pupils are equally round. No scleral erythema, discharge, or conjunctivitis. NECK Supple, no apparent thyromegaly or masses. RESP Clear to auscultation, no wheezes, rales or rhonchi. Symmetric chest movement while on 7L NC  CARDIO/VASC S1/S2 auscultated. Regular rate with systolic ejection murmur noted   GI Abdomen is soft without significant tenderness, masses, or guarding, surgical incisions c/d/i    No costovertebral angle tenderness  HEME/LYMPH No petechiae or ecchymoses. MSK No gross joint deformities. SKIN Normal coloration, warm, dry. NEURO Cranial nerves appear grossly intact, normal speech  PSYCH Awake, alert, oriented x 4. Affect appropriate.     Medications:   Medications:    furosemide  20 mg Intravenous BID    sodium chloride flush  5-40 mL Intravenous BID    apixaban  5 mg Oral BID    atorvastatin  40 mg Oral Nightly    budesonide-formoterol 2 puff Inhalation BID    calcium-cholecalciferol  1 tablet Oral Daily    carvedilol  25 mg Oral BID    cilostazol  50 mg Oral BID AC    escitalopram  20 mg Oral Daily    levothyroxine  100 mcg Oral Daily    lisinopril  40 mg Oral Daily    therapeutic multivitamin-minerals  1 tablet Oral Daily    pantoprazole  40 mg Oral Daily    senna  1 tablet Oral BID    sodium chloride flush  5-40 mL Intravenous 2 times per day    nicotine  1 patch Transdermal Daily    amLODIPine  10 mg Oral Daily      Infusions:    sodium chloride       PRN Meds: HYDROcodone-acetaminophen, 1 tablet, Q4H PRN  nitroGLYCERIN, 0.4 mg, TID PRN  ondansetron, 4 mg, Q8H PRN  sodium chloride flush, 5-40 mL, PRN  sodium chloride, 25 mL, PRN  polyethylene glycol, 17 g, Daily PRN  acetaminophen, 650 mg, Q6H PRN   Or  acetaminophen, 650 mg, Q6H PRN          Electronically signed by Tae Yoon MD on 6/29/2021 at 10:11 AM

## 2021-06-29 NOTE — CARE COORDINATION
Attempted to meet with patient; she appears asleep. No family present. Will contact spouse as patient may be discharged soon. Sydnee Juarez RN     1030  Attempted to reach spouse Odalis Rebollar at [phone]; [facility] for return call. Sydnee Juarez RN     2040 Attempted to revisit; no family present.  Sydnee Juarez RN

## 2021-06-29 NOTE — PROGRESS NOTES
Physician Progress Note      PATIENT:               Dawna Oliver  CSN #:                  [id number]  :                       1950  ADMIT DATE:       2021 3:42 AM  DISCH DATE:  RESPONDING  PROVIDER #:        Roque Gustafson MD          QUERY TEXT:    Hospitalists,    Pt admitted with acute on chronic respiratory failure w/ hypoxia due to fluid   overload and has CHF documented. If possible, please document in progress   notes and discharge summary further specificity regarding the type and acuity   of CHF:    The medical record reflects the following:  Risk Factors: post op  Clinical Indicators: documentation of fluid overload & CHF, CT   Chest-interstitial pulmonary edema, BLE edema, O2 sat in 70's, BNP 7518  Treatment: labs, imaging, BIPAP, IV Lasic, Cardiology consult;  H&P documents   \"acute of chronic resp failure due to fluid overload\" AND \"Elevated Pro BNP   Pro-BNP7,518High PG/ML. Patient was seen by cardiology  on 2021, Has   history of cardiomyopathy, no sign of fluid overload  last EF 45-50%\"  Treatment: labs, imaging, Cardiology consult, IV lasix, Coreg, Lisinopril    Thank you,  Gabriela Ramires RN CDS  [phone]-  Options provided:  -- Acute on Chronic Systolic CHF/HFrEF  -- Acute on Chronic Diastolic CHF/HFpEF  -- Acute on Chronic Systolic and Diastolic CHF  -- Acute Systolic CHF/HFrEF  -- Acute Diastolic CHF/HFpEF  -- Acute Systolic and Diastolic CHF  -- Chronic Systolic CHF/HFrEF  -- Chronic Diastolic CHF/HFpEF  -- Chronic Systolic and Diastolic CHF  -- Other - I will add my own diagnosis  -- Disagree - Not applicable / Not valid  -- Disagree - Clinically unable to determine / Unknown  -- Refer to Clinical Documentation Reviewer    PROVIDER RESPONSE TEXT:    This patient is in acute on chronic diastolic CHF/HFpEF.     Query created by: Ariel Wood on 2021 6:40 PM      QUERY TEXT:    Hospitalists,    Pt admitted with acute on chronic respiratory failure due to fluid overload and AMS. If possible, please document in the progress notes and discharge   summary  further specificity regarding the AMS. The medical record reflects the following:  Risk Factors: noncompliance w/ CPAP, use of pain meds,  Clinical Indicators: AMS, confusion, decreased concentration documented, and   stupor documented, PCO2 62, HCO# 35, central cyanosis & O2 sat in 70's for   EMS, O2 sat % on 4-15 L O2 on hospital arrival. Family reports   unresponsiveness, hydrocodone prescribed  Treatment: labs imaging, O2, BIPAP, neuro checks    Thank you,  Kalyn Cabezas RN [facility]  [phone]  Options provided:  -- Anoxic encephalopathy  -- Drug-induced encephalopathy due to pain meds  -- Drug-induced encephalopathy due to, Please specify substance. -- Drug-induced encephalopathy, substance(s) unknown  -- Encephalopathy due to  ##please specify, please specify. -- Metabolic encephalopathy  -- Other - I will add my own diagnosis  -- Disagree - Not applicable / Not valid  -- Disagree - Clinically unable to determine / Unknown  -- Refer to Clinical Documentation Reviewer    PROVIDER RESPONSE TEXT:    This patient has anoxic encephalopathy.     Query created by: Omar Mosqueda on 6/28/2021 6:54 PM      Electronically signed by:  Albetr Hernandez MD 6/29/2021 10:11 AM

## 2021-06-29 NOTE — CONSULTS
Endocrinology   Consult Note  Dear Doctor    Thank You for the Consult     Pt. Was Admitted for :Altered mental state mental state      Reason for Consult: Hypothyroidism thyroxine dose  History Obtained From:  Patient/ EMR       HISTORY OF PRESENT ILLNESS:                The patient is a 70 y.o. female with significant past medical history of CAD, PTCA, CABG, mitral valve replacement surgical COPD, GERD, hypertension, hyperlipidemia I has had ventral hernia repair surgery last week with robotic technique. Patient went home but on day of admission patient was confused altered mental state, hypoxic O2 saturation around 70 history is admitted to the hospital.  He noted to have high level of serum free T4 and low TSH suggesting patient has been taking too much of Synthroid for hypothyroidism. I was  consulted for better elevation of her thyroid to    Peripheral arterial disease, sleep apnea    ROS:   Pt's ROS done in detail. Abnormal ROS are noted in Medical and Surgical History Section below: Other Medical History:        Diagnosis Date    CAD (coronary artery disease)     COPD (chronic obstructive pulmonary disease) (HCC)     GERD (gastroesophageal reflux disease)     H/O cardiovascular stress test 04/26/2021    normal study 63% EF no ischemia    H/O echocardiogram 03/22/2017    EF 35-40%    H/O echocardiogram 08/04/2020    EF 45-50, Mild TR & AL, Severely dilated left atrium.   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, functionally normally     Heart attack ([address] 01/2014    Hyperlipidemia     Hypertension     On home oxygen therapy     nightly    PAD (peripheral artery disease) ([address]     S/P right coronary artery (RCA) stent placement     Sleep apnea     uses CPAP    T2 vertebral fracture ([address] 12/2017    TIA (transient ischemic attack) 1996    Tobacco dependence     Wears glasses     Wears partial dentures     Upper     Surgical History:        Procedure Laterality Date    APPENDECTOMY      age 6    CARDIAC SURGERY      CORONARY ANGIOPLASTY WITH STENT PLACEMENT      CORONARY ARTERY BYPASS GRAFT  06/21/2016    CABG X1 SVG to RCA with Mitral Valve Replacement 27mm Mosaic     HERNIA REPAIR N/A 6/25/2021    ROBOTIC HERNIA INCISIONAL REPAIR LAPAROSCOPIC WITH COMPONENT SEPARATION performed by Robert Cervantes MD at [address] [place] [address]  06/21/2016    27mm mosaic       Allergies:  Patient has no known allergies. Family History:       Problem Relation Age of Onset    Stroke Mother     Heart Attack Father     Coronary Art Dis Father     Pacemaker Father     Arrhythmia Father     Diabetes Sister     Rheum Arthritis Sister     Heart Attack Sister     Arrhythmia Sister      REVIEW OF SYSTEMS:  Review of System Done as noted above     PHYSICAL EXAM:      Vitals:    BP (!) 145/68   Pulse 86   Temp 98.1 °F (36.7 °C) (Oral)   Resp 23   Ht 5' 3\" (1.6 m)   Wt 189 lb 14.4 oz (86.1 kg)   SpO2 95%   BMI 33.64 kg/m²     CONSTITUTIONAL:  awake, alert, cooperative, appears stated age   EYES:  vision intact Fundoscopic Exam not performed   ENT:Normal  NECK:  Supple, No JVD. Thyroid Exam:Normal   LUNGS:  Has Vesicular Breath Sounds,   CARDIOVASCULAR:  Normal apical impulse, regular rate and rhythm, normal S1 and S2, no S3 or S4, and has no  murmur   ABDOMEN: Punctured scars , normal bowel sounds, soft, non-distended, yes genia, no masses palpated, no hepatolienomegaly  Musculoskeletal: Normal  Extremities: Normal, peripheral pulses normal, , has no edema   NEUROLOGIC:  Awake, alert, oriented to name, place and time. Cranial nerves II-XII are grossly intact. Motor is  intact. Sensory is intact.  ,  and gait is normal.    DATA:    CBC:   Recent Labs     06/28/21  0400 06/29/21  0343   WBC 12.6* 10.6*   HGB 13.2 11.8*    149    CMP:  Recent Labs     06/28/21  0400 06/29/21  0343   * 138   K 4.6 3.7   CL 97* 99   CO2 28 32   BUN 24* 28*   CREATININE 1.0 1.3* CALCIUM 9.9 9.2   PROT 7.0  --    LABALBU 3.7  --    BILITOT 1.1*  --    ALKPHOS 71  --    AST 36  --    ALT 18  --      Lipids:   Lab Results   Component Value Date    CHOL 121 03/15/2021    HDL 45 03/15/2021    TRIG 86 03/15/2021     Glucose:   Recent Labs     06/28/21  0346   POCGLU 165*     Hemoglobin A1C:   Lab Results   Component Value Date    LABA1C 5.0 06/18/2016     Free T4:   Lab Results   Component Value Date    T4FREE 1.93 06/29/2021     Free T3: No results found for: FT3  TSH High Sensitivity:   Lab Results   Component Value Date    TSHHS 0.052 06/28/2021       Echocardiogram complete 2D with doppler with color    Result Date: 6/29/2021  Transthoracic Echocardiography Report (TTE)  Demographics   Patient Name       Jitendra Hardin  Date of Study       06/29/2021   Date of Birth      1950        Gender              Female   Age                70 year(s)        Race                   Patient Number     [id number]        Room Number         2027   Visit Number       [id number]   Corporate ID       [id number]   Accession Number   [id number]        Sonographer         Haleigh Mitchell, RVT   Ordering Physician Filemon Rothman MD Interpreting        [facility]                                       Physician           Letty Hopkins MD  Procedure Type of Study   TTE procedure:ECHOCARDIOGRAM COMPLETE 2D W DOPPLER W COLOR. Procedure Date Date: 06/29/2021 Start: 12:11 PM Study Location: Portable Technical Quality: Fair visualization Indications:Congestive heart failure. Patient Status: Routine Height: 63 inches Weight: 189 pounds BSA: 1.89 m2 BMI: 33.48 kg/m2 HR: 82 bpm BP: 156/73 mmHg  Conclusions   Summary  Left ventricular systolic function is hyperdynamic. Ejection fraction is visually estimated at 60%. Grade III diastolic dysfunction. Moderately dilated left atrium.   Moderate aortic stenosis; mean gradient: 24 mmHg, JENNA: 1.2 cm sq.  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 No evidence of any pericardial effusion. Signature   ------------------------------------------------------------------  Electronically signed by Cristina Srinivasan MD  (Interpreting physician) on 2021 at 02:48 PM        CT Head WO Contrast    Result Date: 2021    No acute intracranial abnormality. Minimal right mastoid effusion. No significant change. CT ABDOMEN PELVIS W IV CONTRAST Additional Contrast? None    Result Date: 2021  EXAMINATION: CT OF THE ABDOMEN AND PELVIS WITH CONTRAST 2021 4:35 am     Postsurgical changes are seen from recent ventral hernia repair. There has been interval development of a 5.7 x 9.0 cm well-defined fluid collection within the subcutaneous tissues of the anterior abdominal wall at the surgical site extending to the midline with collections of gas. Findings could be related to a postoperative seroma or chronic hematoma. Infected fluid collection cannot be excluded. CTA PULMONARY W CONTRAST    Result Date: 2021  EXAMINATION: CTA OF THE CHEST 2021 4:36 am   all. 1. Evaluation is limited due to motion artifact. No large or central pulmonary emboli are identified. Smaller more peripheral pulmonary emboli cannot be entirely excluded due to motion artifact. 2. Cardiomegaly with diffuse septal thickening and small bilateral pleural effusions with bibasilar atelectasis. Findings are likely related to mild interstitial pulmonary edema. 3. Subcutaneous emphysematous changes are seen along the left chest wall.        Scheduled Medicines   Medications:    [START ON 2021] furosemide  10 mg Oral Daily    sodium chloride flush  5-40 mL Intravenous BID    [Held by provider] apixaban  5 mg Oral BID    atorvastatin  40 mg Oral Nightly    budesonide-formoterol  2 puff Inhalation BID    calcium-cholecalciferol  1 tablet Oral Daily    carvedilol 25 mg Oral BID    [Held by provider] cilostazol  50 mg Oral BID AC    escitalopram  20 mg Oral Daily    [Held by provider] levothyroxine  100 mcg Oral Daily    lisinopril  40 mg Oral Daily    therapeutic multivitamin-minerals  1 tablet Oral Daily    pantoprazole  40 mg Oral Daily    senna  1 tablet Oral BID    sodium chloride flush  5-40 mL Intravenous 2 times per day    nicotine  1 patch Transdermal Daily    amLODIPine  10 mg Oral Daily      Infusions:    sodium chloride           IMPRESSION    Patient Active Problem List   Diagnosis    ASCVD (arteriosclerotic cardiovascular disease)    Hypertension    Hyperlipidemia    Hypothyroid    [facility]per[facility]) [facility]    S/P right coronary artery (RCA) stent placement    Acute respiratory failure with hypoxia and hypercapnia ([facility])    Tobacco abuse    [facility]) [facility]    CHF following cardiac surgery, postop    Gait disturbance    ST elevation myocardial infarction (STEMI) ([facility]    Rheumatic mitral stenosis    S/P mitral valve replacement    Cardiomyopathy, primary [facility]    Anticoagulated on Coumadin    On home O2    Depression with anxiety    Urinary incontinence    Age-related osteoporosis without current pathological fracture    EDITA (obstructive sleep apnea)    Hypersomnia    SOB (shortness of breath) on exertion    Obesity (BMI 30-39. 9)    Chronic respiratory failure (HCC)    Idiopathic hypersomnia    Hernia of anterior abdominal wall    Incisional hernia of anterior abdominal wall without obstruction or gangrene    Altered mental status       Hypothyroidism      RECOMMENDATIONS:      1. Reviewed POC blood glucose . Labs and X ray results   2. Reviewed Home and Current Medicines   3. Will hold Synthroid for couple of days   4. Modify  the dose of as needed        Will follow with you  Again thank you for sharing pt's care with me.      Truly yours,       Linsey Hicks MD

## 2021-06-30 VITALS
OXYGEN SATURATION: 9 % | TEMPERATURE: 98.2 F | HEART RATE: 78 BPM | RESPIRATION RATE: 16 BRPM | BODY MASS INDEX: 33.65 KG/M2 | DIASTOLIC BLOOD PRESSURE: 102 MMHG | WEIGHT: 189.9 LBS | HEIGHT: 63 IN | SYSTOLIC BLOOD PRESSURE: 134 MMHG

## 2021-06-30 PROBLEM — R41.82 ALTERED MENTAL STATUS: Status: RESOLVED | Noted: 2021-06-28 | Resolved: 2021-06-30

## 2021-06-30 LAB
ANION GAP SERPL CALCULATED.3IONS-SCNC: 7 MMOL/L (ref 4–16)
BASOPHILS ABSOLUTE: 0 K/CU MM
BASOPHILS RELATIVE PERCENT: 0.3 % (ref 0–1)
BUN BLDV-MCNC: 28 MG/DL (ref 6–23)
CALCIUM SERPL-MCNC: 9.3 MG/DL (ref 8.3–10.6)
CHLORIDE BLD-SCNC: 99 MMOL/L (ref 99–110)
CO2: 32 MMOL/L (ref 21–32)
CREAT SERPL-MCNC: 1.1 MG/DL (ref 0.6–1.1)
DIFFERENTIAL TYPE: ABNORMAL
EOSINOPHILS ABSOLUTE: 0.3 K/CU MM
EOSINOPHILS RELATIVE PERCENT: 2.8 % (ref 0–3)
GFR AFRICAN AMERICAN: 59 ML/MIN/1.73M2
GFR NON-AFRICAN AMERICAN: 49 ML/MIN/1.73M2
GLUCOSE BLD-MCNC: 104 MG/DL (ref 70–99)
HCT VFR BLD CALC: 36.3 % (ref 37–47)
HEMOGLOBIN: 12 GM/DL (ref 12.5–16)
IMMATURE NEUTROPHIL %: 0.5 % (ref 0–0.43)
INR BLD: 1.29 INDEX
LYMPHOCYTES ABSOLUTE: 1.1 K/CU MM
LYMPHOCYTES RELATIVE PERCENT: 11.1 % (ref 24–44)
MCH RBC QN AUTO: 32.3 PG (ref 27–31)
MCHC RBC AUTO-ENTMCNC: 33.1 % (ref 32–36)
MCV RBC AUTO: 97.6 FL (ref 78–100)
MONOCYTES ABSOLUTE: 1 K/CU MM
MONOCYTES RELATIVE PERCENT: 9.9 % (ref 0–4)
NUCLEATED RBC %: 0 %
PDW BLD-RTO: 12.2 % (ref 11.7–14.9)
PLATELET # BLD: 195 K/CU MM (ref 140–440)
PMV BLD AUTO: 11.1 FL (ref 7.5–11.1)
POTASSIUM SERPL-SCNC: 3.9 MMOL/L (ref 3.5–5.1)
PROTHROMBIN TIME: 15.6 SECONDS (ref 11.7–14.5)
RBC # BLD: 3.72 M/CU MM (ref 4.2–5.4)
SEGMENTED NEUTROPHILS ABSOLUTE COUNT: 7.3 K/CU MM
SEGMENTED NEUTROPHILS RELATIVE PERCENT: 75.4 % (ref 36–66)
SODIUM BLD-SCNC: 138 MMOL/L (ref 135–145)
TOTAL IMMATURE NEUTOROPHIL: 0.05 K/CU MM
TOTAL NUCLEATED RBC: 0 K/CU MM
WBC # BLD: 9.7 K/CU MM (ref 4–10.5)

## 2021-06-30 PROCEDURE — 86376 MICROSOMAL ANTIBODY EACH: CPT

## 2021-06-30 PROCEDURE — 85610 PROTHROMBIN TIME: CPT

## 2021-06-30 PROCEDURE — 2580000003 HC RX 258: Performed by: EMERGENCY MEDICINE

## 2021-06-30 PROCEDURE — 85025 COMPLETE CBC W/AUTO DIFF WBC: CPT

## 2021-06-30 PROCEDURE — 80048 BASIC METABOLIC PNL TOTAL CA: CPT

## 2021-06-30 PROCEDURE — 36415 COLL VENOUS BLD VENIPUNCTURE: CPT

## 2021-06-30 PROCEDURE — 86800 THYROGLOBULIN ANTIBODY: CPT

## 2021-06-30 PROCEDURE — 6370000000 HC RX 637 (ALT 250 FOR IP): Performed by: NURSE PRACTITIONER

## 2021-06-30 PROCEDURE — 6370000000 HC RX 637 (ALT 250 FOR IP): Performed by: INTERNAL MEDICINE

## 2021-06-30 PROCEDURE — 2580000003 HC RX 258: Performed by: NURSE PRACTITIONER

## 2021-06-30 RX ORDER — LEVOTHYROXINE SODIUM 0.1 MG/1
100 TABLET ORAL DAILY
Qty: 90 TABLET | Refills: 3
Start: 2021-07-03 | End: 2021-09-07 | Stop reason: DRUGHIGH

## 2021-06-30 RX ADMIN — CARVEDILOL 25 MG: 25 TABLET, FILM COATED ORAL at 09:11

## 2021-06-30 RX ADMIN — ESCITALOPRAM OXALATE 20 MG: 10 TABLET ORAL at 09:11

## 2021-06-30 RX ADMIN — PANTOPRAZOLE SODIUM 40 MG: 40 TABLET, DELAYED RELEASE ORAL at 14:59

## 2021-06-30 RX ADMIN — Medication 1 TABLET: at 09:11

## 2021-06-30 RX ADMIN — AMLODIPINE BESYLATE 10 MG: 10 TABLET ORAL at 09:12

## 2021-06-30 RX ADMIN — FUROSEMIDE 10 MG: 20 TABLET ORAL at 09:10

## 2021-06-30 RX ADMIN — APIXABAN 5 MG: 5 TABLET, FILM COATED ORAL at 09:11

## 2021-06-30 RX ADMIN — STANDARDIZED SENNA CONCENTRATE 8.6 MG: 8.6 TABLET ORAL at 09:11

## 2021-06-30 RX ADMIN — SODIUM CHLORIDE, PRESERVATIVE FREE 10 ML: 5 INJECTION INTRAVENOUS at 09:13

## 2021-06-30 RX ADMIN — LISINOPRIL 40 MG: 40 TABLET ORAL at 09:12

## 2021-06-30 ASSESSMENT — PAIN SCALES - GENERAL
PAINLEVEL_OUTOF10: 0

## 2021-06-30 NOTE — CARE COORDINATION
Patient spouse not in the room. Attempted to contact him at [phone]. Daughter Aide answered the phone; patient spouse not there to answer questions. Daughter stated that \" things are pretty good here\". Patient has home O2; no other DME. Per daughter- patient has a PCP but not sure of the name. Daughter stated that she would \"pass this along to my Dad\". Maki Jenkins, RW     5129 Patient's spouse returned call. He asks for multiple repeats of statements by this CM. He stated that patient has a PCP- unsure of name but assures this CM [place] [facility]i will be at all of her appointments\". Will bring in travel tank Home O2. Denies need for HHC. Plan is home - 7/1.  Maki Jenkins RN

## 2021-06-30 NOTE — PLAN OF CARE
Problem: Pain:  Description: Pain management should include both nonpharmacologic and pharmacologic interventions.   Goal: Pain level will decrease  Description: Pain level will decrease  6/30/2021 1549 by Astrid Howard RN  Outcome: Completed  6/30/2021 1549 by Astrid Howard RN  Outcome: Not Met This Shift  Goal: Control of acute pain  Description: Control of acute pain  6/30/2021 1549 by Astrid Howard RN  Outcome: Completed  6/30/2021 1549 by Astrid Howard RN  Outcome: Not Met This Shift  Goal: Control of chronic pain  Description: Control of chronic pain  6/30/2021 1549 by Astrid Howard RN  Outcome: Completed  6/30/2021 1549 by Astrid Howard RN  Outcome: Not Met This Shift     Problem: Falls - Risk of:  Goal: Will remain free from falls  Description: Will remain free from falls  6/30/2021 1549 by Astrid Howard RN  Outcome: Completed  6/30/2021 1549 by Astrid Howard RN  Outcome: Not Met This Shift  Goal: Absence of physical injury  Description: Absence of physical injury  6/30/2021 1549 by Astrid Howard RN  Outcome: Completed  6/30/2021 1549 by Astrid Howard RN  Outcome: Not Met This Shift

## 2021-06-30 NOTE — CONSULTS
Department of GeneralSurgery   Surgical Service Dr Marvin Koenig   Consult Note    Date of Consult: 6/29/21      Reason for Consult: Shortness of breath  Requesting Physician:  Pato Hernandez CNP    CHIEF COMPLAINT: Shortness of breath    History Obtained From:  patient    HISTORY OF PRESENT ILLNESS:                The patient is a 70 y.o. female who presents with recent abdominal surgery for repair of complex hernia. Patient was discharged postoperatively doing well. She was admitted for change in mental status and central cyanosis with SPO2 of 70% upon arrival.  Patient has responded well to BiPAP machine. She is currently in the stepdown unit on nasal cannula satting 98% on 2 L. Patient denies any abdominal pain. She has some fatigue and her incisions appear to be healing well. Past Medical History:    Past Medical History:   Diagnosis Date    CAD (coronary artery disease)     COPD (chronic obstructive pulmonary disease) ([address]     GERD (gastroesophageal reflux disease)     H/O cardiovascular stress test 04/26/2021    normal study 63% EF no ischemia    H/O echocardiogram 03/22/2017    EF 35-40%    H/O echocardiogram 08/04/2020    EF 45-50, Mild TR & TX, Severely dilated left atrium.   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, functionally normally     Heart attack ([address] 01/2014    Hyperlipidemia     Hypertension     On home oxygen therapy     nightly    PAD (peripheral artery disease) ([address]     S/P right coronary artery (RCA) stent placement     Sleep apnea     uses CPAP    T2 vertebral fracture ([address] 12/2017    TIA (transient ischemic attack) 1996    Tobacco dependence     Wears glasses     Wears partial dentures     Upper       Past Surgical History:    Past Surgical History:   Procedure Laterality Date    APPENDECTOMY      age 6    CARDIAC SURGERY      CORONARY ANGIOPLASTY WITH STENT PLACEMENT      CORONARY ARTERY BYPASS GRAFT  06/21/2016    CABG X1 SVG to RCA with Mitral Valve nitroGLYCERIN (NITROSTAT) SL tablet 0.4 mg  0.4 mg Sublingual TID PRN Ayaz Akaeze, APRN - CNP        ondansetron (ZOFRAN-ODT) disintegrating tablet 4 mg  4 mg Oral Q8H PRN Ayaz Akaeze, APRN - CNP        pantoprazole (PROTONIX) tablet 40 mg  40 mg Oral Daily Ayaz Akaeze, APRN - CNP   40 mg at 06/29/21 0557    senna (SENOKOT) tablet 8.6 mg  1 tablet Oral BID Ayaz Akaeze, APRN - CNP   8.6 mg at 06/29/21 2211    sodium chloride flush 0.9 % injection 5-40 mL  5-40 mL Intravenous 2 times per day Jesusita Muñiz APRN - CNP   10 mL at 06/29/21 2211    sodium chloride flush 0.9 % injection 5-40 mL  5-40 mL Intravenous PRN Ayaz Akelizaze, APRN - CNP        0.9 % sodium chloride infusion  25 mL Intravenous PRN Ayaz Akaeze, APRN - CNP        polyethylene glycol (GLYCOLAX) packet 17 g  17 g Oral Daily PRN Ayaz Akaeze, APRN - CNP        acetaminophen (TYLENOL) tablet 650 mg  650 mg Oral Q6H PRN Ayaz Akaeze, APRN - CNP        Or    acetaminophen (TYLENOL) suppository 650 mg  650 mg Rectal Q6H PRN Ayaz Akaeze, APRN - CNP        nicotine (NICODERM CQ) 14 MG/24HR 1 patch  1 patch Transdermal Daily Ayaz Akelizaze, APRN - CNP        amLODIPine (NORVASC) tablet 10 mg  10 mg Oral Daily Ayaz Akaeze, APRN - CNP   10 mg at 06/29/21 1019       Allergies:  Patient has no known allergies. Social History:   Social History     Socioeconomic History    Marital status:      Spouse name: Not on file    Number of children: Not on file    Years of education: Not on file    Highest education level: Not on file   Occupational History    Not on file   Tobacco Use    Smoking status: Current Every Day Smoker     Packs/day: 0.50     Types: Cigarettes    Smokeless tobacco: Former User     Quit date: 4/30/2021   Vaping Use    Vaping Use: Never used   Substance and Sexual Activity    Alcohol use: No     Comment: 2 cups of coffee in the a.m.     Drug use: No    Sexual activity: Yes     Partners: Male Other Topics Concern    Not on file   Social History Narrative    ** Merged History Encounter **          Social Determinants of Health     Financial Resource Strain:     Difficulty of Paying Living Expenses:    Food Insecurity:     Worried About Running Out of Food in the Last Year:     Ran Out of Food in the Last Year:    Transportation Needs:     Lack of Transportation (Medical):  Lack of Transportation (Non-Medical):    Physical Activity:     Days of Exercise per Week:     Minutes of Exercise per Session:    Stress:     Feeling of Stress :    Social Connections:     Frequency of Communication with Friends and Family:     Frequency of Social Gatherings with Friends and Family:     Attends Taoism Services:     Active Member of Clubs or Organizations:     Attends Club or Organization Meetings:     Marital Status:    Intimate Partner Violence:     Fear of Current or Ex-Partner:     Emotionally Abused:     Physically Abused:     Sexually Abused:        Family History:   Family History   Problem Relation Age of Onset    Stroke Mother     Heart Attack Father     Coronary Art Dis Father     Pacemaker Father     Arrhythmia Father     Diabetes Sister     Rheum Arthritis Sister     Heart Attack Sister     Arrhythmia Sister        REVIEW OFSYSTEMS:    Review of Systems   Constitutional: Negative for chills and fever. HENT: Negative for ear pain, mouth sores, sore throat and tinnitus. Eyes: Negative for photophobia, redness and itching. Respiratory: Positive for shortness of breath. Negative for apnea, choking and stridor. Cardiovascular: Negative for chest pain and palpitations. Gastrointestinal: Positive for abdominal pain. Negative for anal bleeding, constipation and rectal pain. Endocrine: Negative for polydipsia. Genitourinary: Negative for enuresis, flank pain and hematuria. Musculoskeletal: Negative for back pain, joint swelling and myalgias.    Skin: Negative for color change and pallor. Allergic/Immunologic: Negative for environmental allergies. Neurological: Negative for syncope and speech difficulty. Psychiatric/Behavioral: Negative for confusion and hallucinations. PHYSICAL EXAM:  Vitals:    06/29/21 1259 06/29/21 1757 06/29/21 2024 06/29/21 2209   BP: 98/77 (!) 145/68  139/75   Pulse: 88 86  80   Resp: 14 23 24 17   Temp: 98.2 °F (36.8 °C) 98.1 °F (36.7 °C)     TempSrc:  Oral  Oral   SpO2: 98% 95% 96%    Weight:       Height:           Physical Exam  Constitutional:       Appearance: She is well-developed. HENT:      Head: Normocephalic. Eyes:      Pupils: Pupils are equal, round, and reactive to light. Cardiovascular:      Rate and Rhythm: Normal rate. Pulmonary:      Effort: Pulmonary effort is normal.   Abdominal:      General: There is no distension. Palpations: Abdomen is soft. There is no mass. Tenderness: There is abdominal tenderness (incisions clean and dry). There is no guarding or rebound. Musculoskeletal:         General: Normal range of motion. Cervical back: Normal range of motion and neck supple. Skin:     General: Skin is warm. Neurological:      Mental Status: She is alert and oriented to person, place, and time.            DATA:    CBC with Differential:    Lab Results   Component Value Date    WBC 10.6 06/29/2021    RBC 3.52 06/29/2021    HGB 11.8 06/29/2021    HCT 35.6 06/29/2021     06/29/2021    .1 06/29/2021    MCH 33.5 06/29/2021    MCHC 33.1 06/29/2021    RDW 12.4 06/29/2021    SEGSPCT 75.2 06/29/2021    LYMPHOPCT 11.5 06/29/2021    MONOPCT 10.7 06/29/2021    EOSPCT 5.6 04/07/2011    BASOPCT 0.3 06/29/2021    MONOSABS 1.1 06/29/2021    LYMPHSABS 1.2 06/29/2021    EOSABS 0.2 06/29/2021    BASOSABS 0.0 06/29/2021    DIFFTYPE AUTOMATED DIFFERENTIAL 06/29/2021     CMP:    Lab Results   Component Value Date     06/29/2021    K 3.7 06/29/2021    CL 99 06/29/2021    CO2 32 06/29/2021    BUN 28 06/29/2021    CREATININE 1.3 06/29/2021    GFRAA 49 06/29/2021    AGRATIO 1.2 10/23/2018    LABGLOM 40 06/29/2021    GLUCOSE 92 06/29/2021    PROT 7.0 06/28/2021    PROT 7.9 01/01/2011    LABALBU 3.7 06/28/2021    CALCIUM 9.2 06/29/2021    BILITOT 1.1 06/28/2021    ALKPHOS 71 06/28/2021    AST 36 06/28/2021    ALT 18 06/28/2021       IMPRESSION:        Patient Active Problem List:     ASCVD (arteriosclerotic cardiovascular disease)     Hypertension     Hyperlipidemia     Hypothyroid     PAD (peripheral artery disease) ([facility])     S/P right coronary artery (RCA) stent placement     Acute respiratory failure with hypoxia and hypercapnia ([facility])     Tobacco abuse     MI (myocardial infarction) [facility]     CHF following cardiac surgery, postop     Gait disturbance     ST elevation myocardial infarction (STEMI) ([facility]     Rheumatic mitral stenosis     S/P mitral valve replacement     Cardiomyopathy, primary ([facility]     Anticoagulated on Coumadin     On home O2     Depression with anxiety     Urinary incontinence     Age-related osteoporosis without current pathological fracture     EDITA (obstructive sleep apnea)     Hypersomnia     SOB (shortness of breath) on exertion     Obesity (BMI 30-39. 9)     Chronic respiratory failure (HCC)     Idiopathic hypersomnia     Hernia of anterior abdominal wall     Incisional hernia of anterior abdominal wall without obstruction or gangrene     Altered mental status    PLAN:    Continue with the management of hypoxia which could be from not using her CPAP machine and taking her pain medication which could have caused apnea periods.         Juan Griggs MD

## 2021-06-30 NOTE — DISCHARGE SUMMARY
for three days on discharge and f/u with Endocrinology in clinic in one week for further adjustments     The patient expressed appropriate understanding of and agreement with the discharge recommendations, medications, and plan. Consults this admission:  IP CONSULT TO HOSPITALIST  IP CONSULT TO [address] TO ENDOCRINOLOGY    Discharge Instruction:   Follow up appointments: General Surgery, Endocrinology, Cardiology  Primary care physician:  within 2 weeks    Diet:  Low Salt  Activity: activity as tolerated  Disposition: Discharged to:   [x]Home, []C, []SNF, []Acute Rehab, []Hospice   Condition on discharge: Stable    Discharge Medications:      Chapito Canela   Home Medication Instructions BEO:[id number]    Printed on:06/30/21 5015   Medication Information                      amLODIPine (NORVASC) 5 MG tablet  Take 2 tablets by mouth daily             apixaban (ELIQUIS) 5 MG TABS tablet  Take 1 tablet by mouth 2 times daily             aspirin 81 MG EC tablet  Take 1 tablet by mouth daily             atorvastatin (LIPITOR) 40 MG tablet  Take 1 tablet by mouth daily             Blood Pressure Monitoring KIT  1 Units by Does not apply route 2 times daily             budesonide-formoterol (SYMBICORT) 160-4.5 MCG/ACT AERO  Inhale 2 puffs into the lungs 2 times daily             Calcium Carb-Cholecalciferol (CALCIUM 600+D) 600-800 MG-UNIT TABS  Take 1 tablet by mouth daily             carvedilol (COREG) 25 MG tablet  Take 1 tablet by mouth 2 times daily             cilostazol (PLETAL) 100 MG tablet  Take 1 tablet by mouth twice daily             furosemide (LASIX) 20 MG tablet  Take 1/2 (one-half) tablet by mouth once daily             HYDROcodone-acetaminophen (NORCO) 5-325 MG per tablet  Take 1 tablet by mouth every 4 hours as needed for Pain for up to 5 days. Intended supply: 7 days.  Take lowest dose possible to manage pain             levothyroxine (SYNTHROID) 100 MCG tablet  Take 1 tablet by mouth Daily             lisinopril (PRINIVIL;ZESTRIL) 40 MG tablet  Take 1 tablet by mouth daily             Multiple Vitamins-Minerals (THERAPEUTIC MULTIVITAMIN-MINERALS) tablet  Take 1 tablet by mouth daily             nitroGLYCERIN (NITROSTAT) 0.4 MG SL tablet  Place 1 tablet under the tongue every 5 minutes as needed for Chest pain up to max of 3 total doses. If no relief after 1 dose, call 911. ondansetron (ZOFRAN-ODT) 4 MG disintegrating tablet  Take 1 tablet by mouth every 8 hours as needed for Nausea or Vomiting             pantoprazole (PROTONIX) 40 MG tablet  Take 1 tablet by mouth daily             senna (SENOKOT) 8.6 MG tablet  Take 1 tablet by mouth 2 times daily for 28 days                 Objective Findings at Discharge:   BP (!) 134/102   Pulse 78   Temp 98.2 °F (36.8 °C) (Oral)   Resp 16   Ht 5' 3\" (1.6 m)   Wt 189 lb 14.4 oz (86.1 kg)   SpO2 95%   BMI 33.64 kg/m²            GEN    Awake female, sitting upright in bed in no apparent distress. Appears given age. EYES   Pupils are equally round. No scleral erythema, discharge, or conjunctivitis. NECK  Supple, no apparent thyromegaly or masses. RESP  Clear to auscultation, no wheezes, rales or rhonchi. Symmetric chest movement while on 7L NC  CARDIO/VASC           S1/S2 auscultated. Regular rate with systolic ejection murmur noted   GI        Abdomen is soft without significant tenderness, masses, or guarding, surgical incisions c/d/i          No costovertebral angle tenderness  HEME/LYMPH            No petechiae or ecchymoses. MSK    No gross joint deformities. SKIN    Normal coloration, warm, dry. NEURO           Cranial nerves appear grossly intact, normal speech  PSYCH            Awake, alert, oriented x 4. Affect appropriate.     BMP/CBC  Recent Labs     06/28/21  0400 06/29/21  0343 06/30/21  0557   * 138 138   K 4.6 3.7 3.9   CL 97* 99 99   CO2 28 32 32   BUN 24* 28* 28*   CREATININE 1.0 1.3* 1.1   WBC 12.6* 10.6* 9.7   HCT 39.3 35.6* 36.3*    149 195       IMAGING:  CT A/P:  Postsurgical changes are seen from recent ventral hernia repair. There has   been interval development of a 5.7 x 9.0 cm well-defined fluid collection   within the subcutaneous tissues of the anterior abdominal wall at the   surgical site extending to the midline with collections of gas. Findings   could be related to a postoperative seroma or chronic hematoma. Infected   fluid collection cannot be excluded. CT H:  No acute intracranial abnormality.       Minimal right mastoid effusion.  No significant change. CTA Pulmonary Arteries:  1. Evaluation is limited due to motion artifact.  No large or central   pulmonary emboli are identified.  Smaller more peripheral pulmonary emboli   cannot be entirely excluded due to motion artifact. 2. Cardiomegaly with diffuse septal thickening and small bilateral pleural   effusions with bibasilar atelectasis.  Findings are likely related to mild   interstitial pulmonary edema. 3. Subcutaneous emphysematous changes are seen along the left chest wall.        Discharge Time of 35 minutes    Electronically signed by Irena Schlatter, MD on 6/30/2021 at 3:25 PM

## 2021-07-01 ENCOUNTER — TELEPHONE (OUTPATIENT)
Dept: PHARMACY | Age: 71
End: 2021-07-01

## 2021-07-01 ENCOUNTER — CARE COORDINATION (OUTPATIENT)
Dept: CASE MANAGEMENT | Age: 71
End: 2021-07-01

## 2021-07-01 NOTE — CARE COORDINATION
[facility] Transitions Initial Follow Up Call    Call within 2 business days of discharge: Yes    Patient: Christy Mejia Patient : 1950   MRN: [id number]  Reason for Admission: A/C Resp Failure, CHF Exac, Encephalopathy, MARK, Abdominal Fluid Collection  S/p Recent robotic incisional hernia repair on   Discharge Date: 21 RARS: Readmission Risk Score: [facility]: [address]       Complaint Diagnosis Description Type Department Provider    21 Altered Mental Status Stupor . .. ED to Hosp-Admission (Discharged) (ADMITTED) Karolee Kayser, MD; Girma Najera. ..        1st attempt to reach for Care Transition discharge call unsuccessful. HIPAA compliant message left requesting call back.        Tracey Olivas RN

## 2021-07-02 ENCOUNTER — CARE COORDINATION (OUTPATIENT)
Dept: CASE MANAGEMENT | Age: 71
End: 2021-07-02

## 2021-07-02 LAB — ANTITHYROID MICORSOMAL: 1.3 IU/ML (ref 0–9)

## 2021-07-02 NOTE — CARE COORDINATION
[facility] Transitions Initial Follow Up Call    Call within 2 business days of discharge: Yes    Patient: Maurice Kanner Patient : 1950   MRN: [id number]  Reason for Admission: A/C Resp Failure, CHF Exac, Encephalopathy, MARK, Abdominal Fluid Collection s/p Recent robotic incisional hernia repair on   Discharge Date: 21 RARS: Readmission Risk Score: 16    Last Discharge [facility]       Complaint Diagnosis Description Type Department Provider    21 Altered Mental Status Stupor . .. ED to Hosp-Admission (Discharged) (ADMITTED) Summer Ramires MD; Chris Marie. ..        2nd unsuccessful attempt to reach for Care Transition discharge call. HIPAA compliant message left requesting call back. Episode closed per protocol, no further outreach scheduled.        Martell Larkin RN

## 2021-07-05 ENCOUNTER — TELEPHONE (OUTPATIENT)
Dept: PHARMACY | Age: 71
End: 2021-07-05

## 2021-07-05 NOTE — TELEPHONE ENCOUNTER
Patient's  called to see if patient had appointment. Patient in background. Advised no appointment today as it was cancelled last week.      For Pharmacy Admin Tracking Only     Intervention Detail:    Total # of Interventions Recommended:    Total # of Interventions Accepted:    Time Spent (min): 5

## 2021-07-06 LAB — ANTITHYROGLOBULIN AB: <0.9 IU/ML (ref 0–4)

## 2021-07-15 ENCOUNTER — OFFICE VISIT (OUTPATIENT)
Dept: SURGERY | Age: 71
End: 2021-07-15

## 2021-07-15 ENCOUNTER — TELEPHONE (OUTPATIENT)
Dept: PULMONOLOGY | Age: 71
End: 2021-07-15

## 2021-07-15 VITALS
WEIGHT: 189 LBS | SYSTOLIC BLOOD PRESSURE: 130 MMHG | DIASTOLIC BLOOD PRESSURE: 70 MMHG | HEART RATE: 88 BPM | BODY MASS INDEX: 33.49 KG/M2 | OXYGEN SATURATION: 100 % | HEIGHT: 63 IN

## 2021-07-15 DIAGNOSIS — K43.2 INCISIONAL HERNIA, WITHOUT OBSTRUCTION OR GANGRENE: Primary | ICD-10-CM

## 2021-07-15 PROCEDURE — 99024 POSTOP FOLLOW-UP VISIT: CPT | Performed by: PHYSICIAN ASSISTANT

## 2021-07-15 PROCEDURE — APPNB30 APP NON BILLABLE TIME 0-30 MINS: Performed by: PHYSICIAN ASSISTANT

## 2021-07-15 RX ORDER — CYCLOBENZAPRINE HCL 5 MG
5 TABLET ORAL 3 TIMES DAILY PRN
Qty: 30 TABLET | Refills: 0 | Status: SHIPPED | OUTPATIENT
Start: 2021-07-15 | End: 2021-07-25

## 2021-07-15 NOTE — TELEPHONE ENCOUNTER
Patient called left  that she was in the hospital and on a cpap and it had helped. Patient wanted to know if we are able to contact the company she returned the machine too and see if they can bring the machine back to her house.

## 2021-07-15 NOTE — PROGRESS NOTES
Chief Complaint   Patient presents with    Follow Up After Procedure     robot vent hernia repair w/ tar 6/25/21          SUBJECTIVE:  Patient presents today for her 1st post op visit following robotic assisted ventral hernia repair with component separation. Pt reports that pain is moderate to severe and constant. She has been taking OTC tylenol for her pain. Has not been wearing her abd binder. Has percocet at home that she has not taken in over a week. Pt is  tolerating a regular diet. BMs are WNL. Incisions: minbruising and no discharge. Some residual glue intact. Past Surgical History:   Procedure Laterality Date    APPENDECTOMY      age 6    CARDIAC SURGERY      CORONARY ANGIOPLASTY WITH STENT PLACEMENT      CORONARY ARTERY BYPASS GRAFT  06/21/2016    CABG X1 SVG to RCA with Mitral Valve Replacement 27mm Mosaic     HERNIA REPAIR N/A 6/25/2021    ROBOTIC HERNIA INCISIONAL REPAIR LAPAROSCOPIC WITH COMPONENT SEPARATION performed by Kiet Hendrix MD at [place] [address] MITRAL VALVE REPLACEMENT  06/21/2016    27mm mosaic     Past Medical History:   Diagnosis Date    CAD (coronary artery disease)     COPD (chronic obstructive pulmonary disease) ([address]     GERD (gastroesophageal reflux disease)     H/O cardiovascular stress test 04/26/2021    normal study 63% EF no ischemia    H/O echocardiogram 03/22/2017    EF 35-40%    H/O echocardiogram 08/04/2020    EF 45-50, Mild TR & [place], Severely dilated left atrium.   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, functionally normally     Heart attack ([address] 01/2014    Hyperlipidemia     Hypertension     On home oxygen therapy     nightly    PAD (peripheral artery disease) ([address]     S/P right coronary artery (RCA) stent placement     Sleep apnea     uses CPAP    T2 vertebral fracture ([address] 12/2017    TIA (transient ischemic attack) 1996    Tobacco dependence     Wears glasses     Wears partial dentures     Upper     Family History   Problem Normocephalic. Eyes:      Pupils: Pupils are equal, round, and reactive to light. Cardiovascular:      Rate and Rhythm: Normal rate. Pulmonary:      Effort: Pulmonary effort is normal.   Abdominal:      General: There is no distension. Palpations: Abdomen is soft. There is no mass. Tenderness: There is abdominal tenderness. There is no guarding or rebound. Comments: Lap incisions well healed with some glue intact. There is seroma present within the hernia space, as expected at this point post-op   Musculoskeletal:         General: Normal range of motion. Cervical back: Normal range of motion and neck supple. Skin:     General: Skin is warm. Neurological:      Mental Status: She is alert and oriented to person, place, and time. ASSESSMENT:  Patient doing well on this post operative check. Wounds well healed. Pt in pain as expected. Not taking pain medication as recommended. 1. Incisional hernia, without obstruction or gangrene        PLAN:  Discussed pain regimen. Needs to take pain medication to keep her symptoms at [facility]. I will give flexeril to help with muscle spasm. I recommend that she use her abd binder as well. Continue to limit activities. No orders of the defined types were placed in this encounter. Orders Placed This Encounter   Medications    cyclobenzaprine (FLEXERIL) 5 MG tablet     Sig: Take 1 tablet by mouth 3 times daily as needed for Muscle spasms     Dispense:  30 tablet     Refill:  0        Follow Up: Return in about 2 weeks (around 7/29/2021).     Heidy Hoskins PA-C

## 2021-07-20 ENCOUNTER — TELEPHONE (OUTPATIENT)
Dept: PHARMACY | Facility: CLINIC | Age: 71
End: 2021-07-20

## 2021-07-20 NOTE — TELEPHONE ENCOUNTER
CLINICAL PHARMACY NOTE  Post-Discharge Transitions of Care [facility])    Patient appears to have been discharged from [facility]   on 6/30/21. Patient not found in Outcomes MTM. Please follow-up with patient to review medications.       30 days since discharge = 7/30/21     Darlene   [phone] or [phone] (Option 7)

## 2021-07-21 RX ORDER — FUROSEMIDE 20 MG/1
TABLET ORAL
Qty: 45 TABLET | Refills: 3 | Status: SHIPPED | OUTPATIENT
Start: 2021-07-21 | End: 2022-05-13

## 2021-08-02 ENCOUNTER — OFFICE VISIT (OUTPATIENT)
Dept: SURGERY | Age: 71
End: 2021-08-02

## 2021-08-02 VITALS
SYSTOLIC BLOOD PRESSURE: 126 MMHG | HEART RATE: 96 BPM | WEIGHT: 189 LBS | OXYGEN SATURATION: 100 % | DIASTOLIC BLOOD PRESSURE: 82 MMHG | HEIGHT: 63 IN | BODY MASS INDEX: 33.49 KG/M2

## 2021-08-02 DIAGNOSIS — K43.2 INCISIONAL HERNIA, WITHOUT OBSTRUCTION OR GANGRENE: Primary | ICD-10-CM

## 2021-08-02 DIAGNOSIS — K59.00 CONSTIPATION, UNSPECIFIED CONSTIPATION TYPE: ICD-10-CM

## 2021-08-02 PROCEDURE — 99024 POSTOP FOLLOW-UP VISIT: CPT | Performed by: PHYSICIAN ASSISTANT

## 2021-08-02 PROCEDURE — APPNB30 APP NON BILLABLE TIME 0-30 MINS: Performed by: PHYSICIAN ASSISTANT

## 2021-08-02 RX ORDER — BISACODYL 10 MG
10 SUPPOSITORY, RECTAL RECTAL PRN
Qty: 5 SUPPOSITORY | Refills: 0 | Status: SHIPPED | OUTPATIENT
Start: 2021-08-02 | End: 2021-09-01

## 2021-08-02 NOTE — PROGRESS NOTES
Chief Complaint   Patient presents with    Follow Up After Procedure     2nd s/p vhr 6/25/21          SUBJECTIVE:  Patient presents today for her 2nd post op visit following robotic assisted incisional hernia repair with component separation. Pt reports that pain is intermittent. Pt is  tolerating a regular diet. BMs are Constipation. Incisions: well healed. Past Surgical History:   Procedure Laterality Date    APPENDECTOMY      age 6    CARDIAC SURGERY      CORONARY ANGIOPLASTY WITH STENT PLACEMENT      CORONARY ARTERY BYPASS GRAFT  06/21/2016    CABG X1 SVG to RCA with Mitral Valve Replacement 27mm Mosaic     HERNIA REPAIR N/A 6/25/2021    ROBOTIC HERNIA INCISIONAL REPAIR LAPAROSCOPIC WITH COMPONENT SEPARATION performed by Lynsey Velásquez MD at [place] [address] MITRAL VALVE REPLACEMENT  06/21/2016    27mm mosaic     Past Medical History:   Diagnosis Date    CAD (coronary artery disease)     COPD (chronic obstructive pulmonary disease) ([address]     GERD (gastroesophageal reflux disease)     H/O cardiovascular stress test 04/26/2021    normal study 63% EF no ischemia    H/O echocardiogram 03/22/2017    EF 35-40%    H/O echocardiogram 08/04/2020    EF 45-50, Mild TR & WY, Severely dilated left atrium.   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, functionally normally     Heart attack ([address] 01/2014    Hyperlipidemia     Hypertension     On home oxygen therapy     nightly    PAD (peripheral artery disease) ([address]     S/P right coronary artery (RCA) stent placement     Sleep apnea     uses CPAP    T2 vertebral fracture ([address] 12/2017    TIA (transient ischemic attack) 1996    Tobacco dependence     Wears glasses     Wears partial dentures     Upper     Family History   Problem Relation Age of Onset    Stroke Mother     Heart Attack Father     Coronary Art Dis Father     Pacemaker Father     Arrhythmia Father     Diabetes Sister     Rheum Arthritis Sister     Heart Attack Sister  Arrhythmia Sister      Social History     Socioeconomic History    Marital status:      Spouse name: Not on file    Number of children: Not on file    Years of education: Not on file    Highest education level: Not on file   Occupational History    Not on file   Tobacco Use    Smoking status: Current Every Day Smoker     Packs/day: 0.50     Types: Cigarettes    Smokeless tobacco: Former User     Quit date: 4/30/2021   Vaping Use    Vaping Use: Never used   Substance and Sexual Activity    Alcohol use: No     Comment: 2 cups of coffee in the a.m.  Drug use: No    Sexual activity: Yes     Partners: Male   Other Topics Concern    Not on file   Social History Narrative    ** Merged History Encounter **          Social Determinants of Health     Financial Resource Strain:     Difficulty of Paying Living Expenses:    Food Insecurity:     Worried About Running Out of Food in the Last Year:     Ran Out of Food in the Last Year:    Transportation Needs:     Lack of Transportation (Medical):  Lack of Transportation (Non-Medical):    Physical Activity:     Days of Exercise per Week:     Minutes of Exercise per Session:    Stress:     Feeling of Stress :    Social Connections:     Frequency of Communication with Friends and Family:     Frequency of Social Gatherings with Friends and Family:     Attends Buddhism Services:     Active Member of Clubs or Organizations:     Attends Club or Organization Meetings:     Marital Status:    Intimate Partner Violence:     Fear of Current or Ex-Partner:     Emotionally Abused:     Physically Abused:     Sexually Abused:        OBJECTIVE:  Physical Exam  Constitutional:       Appearance: She is well-developed. HENT:      Head: Normocephalic. Eyes:      Pupils: Pupils are equal, round, and reactive to light. Cardiovascular:      Rate and Rhythm: Normal rate.    Pulmonary:      Effort: Pulmonary effort is normal.   Abdominal:      General: There is no distension. Palpations: Abdomen is soft. There is no mass. Tenderness: There is abdominal tenderness. There is no guarding or rebound. Comments: Lap incisions well healed   Musculoskeletal:         General: Normal range of motion. Cervical back: Normal range of motion and neck supple. Skin:     General: Skin is warm. Neurological:      Mental Status: She is alert and oriented to person, place, and time. ASSESSMENT:  Patient doing well on this post operative check. Wounds well healed. Pt with constipation for 1 week. Has taken colace without relief. 1. Incisional hernia, without obstruction or gangrene    2. Constipation, unspecified constipation type        PLAN:  Will give dulcolax suppository. Ok to take Miralax PRN if constipation continues. Pt is to increase activities as tolerated. No orders of the defined types were placed in this encounter. Orders Placed This Encounter   Medications    bisacodyl (BISAC-EVAC) 10 MG suppository     Sig: Place 1 suppository rectally as needed for Constipation     Dispense:  5 suppository     Refill:  0        Follow Up: Return in about 4 weeks (around 8/30/2021).     Will Lira PA-C

## 2021-08-03 NOTE — TELEPHONE ENCOUNTER
[facility] HEALTH CLINICAL PHARMACY REVIEW: Post-Discharge Transitions of Care (JAYDON)    Attempted to reach patient to review medications. Spoke with patient - refused complete medication review, states no questions or concerns at this time. No further outreach.      Jennifer Car, PharmD, [address]  Direct: [phone]  Department, toll free: [phone], option 7     =======================================================     For Pharmacy Admin Tracking Only   Gap Closed?: No    Time Spent (min): 5

## 2021-08-06 ENCOUNTER — OFFICE VISIT (OUTPATIENT)
Dept: PULMONOLOGY | Age: 71
End: 2021-08-06
Payer: MEDICARE

## 2021-08-06 ENCOUNTER — OFFICE VISIT (OUTPATIENT)
Dept: CARDIOLOGY CLINIC | Age: 71
End: 2021-08-06
Payer: MEDICARE

## 2021-08-06 VITALS
HEIGHT: 63 IN | DIASTOLIC BLOOD PRESSURE: 62 MMHG | BODY MASS INDEX: 30.14 KG/M2 | WEIGHT: 170.1 LBS | HEART RATE: 72 BPM | SYSTOLIC BLOOD PRESSURE: 104 MMHG | OXYGEN SATURATION: 97 %

## 2021-08-06 VITALS
SYSTOLIC BLOOD PRESSURE: 136 MMHG | HEART RATE: 60 BPM | WEIGHT: 171 LBS | BODY MASS INDEX: 30.3 KG/M2 | HEIGHT: 63 IN | DIASTOLIC BLOOD PRESSURE: 72 MMHG

## 2021-08-06 DIAGNOSIS — I42.9 CARDIOMYOPATHY, PRIMARY (HCC): ICD-10-CM

## 2021-08-06 DIAGNOSIS — Z95.5 S/P RIGHT CORONARY ARTERY (RCA) STENT PLACEMENT: ICD-10-CM

## 2021-08-06 DIAGNOSIS — R06.02 SOB (SHORTNESS OF BREATH) ON EXERTION: ICD-10-CM

## 2021-08-06 DIAGNOSIS — Z87.891 PERSONAL HISTORY OF TOBACCO USE: Primary | ICD-10-CM

## 2021-08-06 DIAGNOSIS — I25.10 ASCVD (ARTERIOSCLEROTIC CARDIOVASCULAR DISEASE): Primary | ICD-10-CM

## 2021-08-06 DIAGNOSIS — J44.9 CHRONIC OBSTRUCTIVE PULMONARY DISEASE, UNSPECIFIED COPD TYPE (HCC): ICD-10-CM

## 2021-08-06 DIAGNOSIS — Z72.0 TOBACCO ABUSE: ICD-10-CM

## 2021-08-06 DIAGNOSIS — Z95.2 S/P MITRAL VALVE REPLACEMENT: ICD-10-CM

## 2021-08-06 DIAGNOSIS — I10 ESSENTIAL HYPERTENSION: ICD-10-CM

## 2021-08-06 DIAGNOSIS — G47.33 OSA (OBSTRUCTIVE SLEEP APNEA): ICD-10-CM

## 2021-08-06 DIAGNOSIS — G47.10 HYPERSOMNIA: ICD-10-CM

## 2021-08-06 DIAGNOSIS — Z79.01 ANTICOAGULATED ON COUMADIN: ICD-10-CM

## 2021-08-06 DIAGNOSIS — I05.0 RHEUMATIC MITRAL STENOSIS: ICD-10-CM

## 2021-08-06 DIAGNOSIS — E78.2 MIXED HYPERLIPIDEMIA: ICD-10-CM

## 2021-08-06 DIAGNOSIS — E66.9 OBESITY (BMI 30-39.9): ICD-10-CM

## 2021-08-06 DIAGNOSIS — I73.9 PAD (PERIPHERAL ARTERY DISEASE) (HCC): ICD-10-CM

## 2021-08-06 PROCEDURE — G0296 VISIT TO DETERM LDCT ELIG: HCPCS | Performed by: INTERNAL MEDICINE

## 2021-08-06 PROCEDURE — 99214 OFFICE O/P EST MOD 30 MIN: CPT | Performed by: INTERNAL MEDICINE

## 2021-08-06 PROCEDURE — 99213 OFFICE O/P EST LOW 20 MIN: CPT | Performed by: INTERNAL MEDICINE

## 2021-08-06 RX ORDER — MELATONIN 5 MG
TABLET,CHEWABLE ORAL
COMMUNITY

## 2021-08-06 RX ORDER — ASCORBIC ACID 1000 MG
TABLET, EXTENDED RELEASE ORAL
COMMUNITY

## 2021-08-06 ASSESSMENT — ENCOUNTER SYMPTOMS
ABDOMINAL DISTENTION: 0
EYE DISCHARGE: 0
ABDOMINAL PAIN: 0
BACK PAIN: 0
EYE ITCHING: 0
SHORTNESS OF BREATH: 1
COUGH: 0

## 2021-08-06 NOTE — LETTER
Emogene Second    Dr. Lindy Trevino  1950  [id number]    Have you had any Chest Pain that is not new? - No  If Yes DO EKG - How does it feel -    How long does the pain last -      How long have you been having the pain -    Did you take a    And did it relieve the pain -      DO EKG IF: Patient has a Heart Rate above 100 or below 40     CAD (Coronary Artery Disease) patient should have one on file every 6 months        Have you had any Shortness of Breath - No  If Yes - When     Have you had any dizziness - No  If Yes DO ORTHOSTATIC BP - when do you feel dizzy    How long does it last .        Sitting wait 5 minutes do supine (laying down) wait 5 minutes then do standing - log each in \"vitals\" area in Epic   Be sure to ask what symptoms they are having if they get dizzy while completing ortho stats such as: room spinning, nausea, etc.    Have you had any palpitations that are not new? - No  If Yes DO EKG - Do you feel your heart   How long does it last - . Is the patient on any of the following medications -   If Yes DO EKG - Needs done every 6 months    Do you have any edema - swelling in No    If Yes - CHECK TO SEE IF THE EDEMA IS PITTING  How long have they been having edema -   If Yes - Have they worn compression stockings   If they have worn compression stockings      Vein \"LEG PROBLEM Questionnaire\"  1. Do you have prominent leg veins? No   2. Do you have any skin discoloration? No  3. Do you have any healed or active sores? No  4. Do you have swelling of the legs? No  5. Do you have a family history of varicose veins? Yes  6. Does your profession involve pro-longed        standing or heavy lifting? No  7. Have you been fighting overweight problems? No  8. Do you have restless legs? No  9. Do you have any night time cramps? No  10.  Do you have any of the following in your legs:             When did you have your last labs drawn   Where did you have them done   What doctor ordered     If we do not have these labs you are retrieve these labs for these providers! Do you have a surgery or procedure scheduled in the near future - No  If Yes- DO EKG  If Yes - Who is the surgery with?    Phone number of physician   Fax number of physician   Type of surgery   GIVE THIS INFORMATION TO MIKI MCKINNEY     Ask patient if they want to sign up for MyChart if they are not already signed up     Check to see if we have an E-MAIL on file for the patient     Check medication list thoroughly!!! AND RECONCILE OUTSIDE MEDICATIONS  If dose has changed change the entire order not just the [place] At check out add to every patient's \"wrap up\" the following dot phrase AFTERHOURSEDUCATION and ensure we explain this to our patients  **

## 2021-08-06 NOTE — PROGRESS NOTES
CARDIOLOGY NOTE      Chief Complaint: Coronary artery disease, mitral valve disease    HPI:   Basia Fernandez is a 70y.o. year old who has history as noted below. She is not using oxygen at night  although she uses up to 3 L at home. She had hernia surgery and is still recuperating . She is on eliquis now    30-day event monitor did not show any A. fib but she tells me that in the past she has had strokes and has history of \"thick blood\" and she was put on Coumadin by Dr. Juliana Mehta. sHe lives with her  has been  for 50 years, she was swicthed to eliquis from coumadin in 2021  Sees Dr. Karla Miranda for pulmonology she notices ankle swelling once in a while has some shortness of breath on exertion. She has history of mitral valve replacement with a bioprosthetic valve. She used to see Dr. Juliana Mehta.   she denies chest pressure or pain except when she is in a hurry chasing after her dogs, stress test in 2021 was normal    she notices some chest pressure when she is running after  Her dog.  Her puppy is Estelita and she has 3 other chuwawa  which keeps her busy , her DIL Keith Nagel works in scheduling )  She does not have a pcp      Current Outpatient Medications   Medication Sig Dispense Refill    Melatonin 5 MG CHEW Take by mouth      Ascorbic Acid (VITAMIN C CR) 1000 MG TBCR Take by mouth      apixaban (ELIQUIS) 5 MG TABS tablet Take by mouth 2 times daily      furosemide (LASIX) 20 MG tablet Take 1/2 (one-half) tablet by mouth once daily 45 tablet 3    levothyroxine (SYNTHROID) 100 MCG tablet Take 1 tablet by mouth Daily 90 tablet 3    amLODIPine (NORVASC) 5 MG tablet Take 2 tablets by mouth daily 90 tablet 1    lisinopril (PRINIVIL;ZESTRIL) 40 MG tablet Take 1 tablet by mouth daily 90 tablet 3    cilostazol (PLETAL) 100 MG tablet Take 1 tablet by mouth twice daily 180 tablet 3    carvedilol (COREG) 25 MG tablet Take 1 tablet by mouth 2 times daily 180 tablet 3    coronary artery (RCA) stent placement     Sleep apnea     uses CPAP    T2 vertebral fracture ([address] 12/2017    TIA (transient ischemic attack) [address] Wears glasses     Wears partial dentures     Upper     Past Surgical History:   Procedure Laterality Date    APPENDECTOMY      age 6    CARDIAC SURGERY      CORONARY ANGIOPLASTY WITH STENT PLACEMENT      CORONARY ARTERY BYPASS GRAFT  06/21/2016    CABG X1 SVG to RCA with Mitral Valve Replacement 27mm Mosaic     HERNIA REPAIR N/A 6/25/2021    ROBOTIC HERNIA INCISIONAL REPAIR LAPAROSCOPIC WITH COMPONENT SEPARATION performed by Lynsey Velásquez MD at [address] [place] [address]  06/21/2016    27mm mosaic     Family History   Problem Relation Age of Onset    Stroke Mother     Heart Attack Father     Coronary Art Dis Father     Pacemaker Father     Arrhythmia Father     Diabetes Sister     Rheum Arthritis Sister     Heart Attack Sister     Arrhythmia Sister      Social History     Tobacco Use    Smoking status: Current Every Day Smoker     Packs/day: 0.50     Types: Cigarettes    Smokeless tobacco: Former User     Quit date: 4/30/2021   Substance Use Topics    Alcohol use: No     Comment: 2 cups of coffee in the a.m. Review of Systems:   · Constitutional: No Fever or Weight Loss   · Eyes: No Decreased Vision  · ENT: No Headaches, Hearing Loss or Vertigo  · Cardiovascular: as per note above   · Respiratory: No cough or wheezing and as per note above.    · Gastrointestinal: No abdominal pain, appetite loss, blood in stools, constipation, diarrhea or heartburn  · Genitourinary: No dysuria, trouble voiding, or hematuria  · Musculoskeletal:  None  · Integumentary: No rash or pruritis  · Neurological: No TIA or stroke symptoms  · Psychiatric: No anxiety or depression  · Endocrine: No malaise, fatigue or temperature intolerance  · Hematologic/Lymphatic: No bleeding problems, blood clots or swollen lymph nodes  · Allergic/Immunologic: No nasal congestion or hives    Objective:      Physical Exam:  /72   Pulse 60   Ht 5' 3\" (1.6 m)   Wt 171 lb (77.6 kg)   BMI 30.29 kg/m²   Wt Readings from Last 3 Encounters:   08/06/21 171 lb (77.6 kg)   08/02/21 189 lb (85.7 kg)   07/15/21 189 lb (85.7 kg)     Body mass index is 30.29 kg/m². Vitals:    08/06/21 0853   BP: 136/72   Pulse: 60      General Appearance:  No distress, conversant  Constitutional:  Well developed, Well nourished, No acute distress, Non-toxic appearance. HENT:  Normocephalic, Atraumatic, Bilateral external ears normal, Oropharynx moist, No oral exudates, Nose normal. Neck- Normal range of motion, No tenderness, Supple, No stridor,no apical-carotid delay  Eyes:  PERRL, EOMI, Conjunctiva normal, No discharge. Respiratory:  Normal breath sounds, No respiratory distress, No wheezing, No chest tenderness. ,no use of accessory muscles, NO crackles  Cardiovascular: (PMI) apex non displaced,no lifts no thrills,S1 and S2 audible, systolic murmur 2/6, No signs of ankle edema, or volume overload, No evidence of JVD, No crackles  GI:  Bowel sounds normal, Soft, No tenderness, No masses, No gross visceromegaly   :  No costovertebral angle tenderness   Musculoskeletal:  No edema, no tenderness, no deformities.  Back- no tenderness  Integument:  Well hydrated, no rash   Lymphatic:  No lymphadenopathy noted   Neurologic:  Alert & oriented x 3, CN 2-12 normal, normal motor function, normal sensory function, no focal deficits noted   Psychiatric:  Speech and behavior appropriate            Medical decision making and Data review:  DATA:  Lab Results   Component Value Date    TROPONINT <0.010 06/28/2021     BNP:    Lab Results   Component Value Date    PROBNP 7,518 (H) 06/28/2021     PT/INR:  No results found for: [employer]  Lab Results   Component Value Date    LABA1C 5.0 06/18/2016     Lab Results   Component Value Date    CHOL 121 03/15/2021    TRIG 86 03/15/2021 HDL 45 03/15/2021    LDLDIRECT 64 03/15/2021     Lab Results   Component Value Date    ALT 18 06/28/2021    AST 36 06/28/2021     TSH:   Lab Results   Component Value Date    TSH 0.40 07/10/2019     Lab Results   Component Value Date    AST 36 06/28/2021    ALT 18 06/28/2021    BILITOT 1.1 (H) 06/28/2021    ALKPHOS 71 06/28/2021     Lab Results   Component Value Date    PROBNP 7,518 (H) 06/28/2021    PROBNP 1,075 (H) 06/17/2020    PROBNP 4,988 (H) 03/20/2017     Lab Results   Component Value Date    LABA1C 5.0 06/18/2016     Lab Results   Component Value Date    WBC 9.7 06/30/2021    HGB 12.0 (L) 06/30/2021    HCT 36.3 (L) 06/30/2021     06/30/2021     Echo 3/20/19  Summary   Echocardiogram is s/p CABG and MVR 6/21/2016. LV systolic function is abnormal.   Visually estimated ejection fraction is 35-40 %. Grade III diastolic dysfunction. Mild left atrium enlargement. Bioprostethic mitral valve is seated well and functioning properly. No pericardial effusion. Echo 8/4/2020  Summary   Left ventricular function is low normal, EF is estimated at 45-50%. Severely dilated left atrium. Evidence of a porcine bio-prosthetic valve in the mitral position,   functionally normally with a mean gradient of 3mmHg. Sclerotic, but non-stenotic aortic valve. Mild tricuspid and pulmonic regurgitation noted. No evidence of pericardial effusion.      30 day monitor 2/19/21    30-day monitor worn from 2/19/2021 to 3/20/2021 there were for patient triggers 6 auto triggers.  Lowest heart rate was 51 at 9:47 PM average heart rate was 75 highest heart rate 117 patient was primarily in sinus rhythm no episodes of A. fib or SVT noted.  One episode of wide-complex tachycardia noted with possible fusion beats at 10:33 PM on day 16 which could be PVCs or aberrant conduction uptitrate beta-blockers clinical correlation needed    Stress test  4/26/21  Summary    Supervising physician Dr. Rachel Guerrero .   [facility] portion of stress test is negative for ischemia by diagnostic criteria.    No infarct or ischemia noted. Normal EF 63 % with normal ventricular    contractility.    Normal stress myocardial perfusion.    This is a normal study. Echo 21  Left ventricular systolic function is hyperdynamic. Ejection fraction is visually estimated at 60%. Grade III diastolic dysfunction. Moderately dilated left atrium. Moderate aortic stenosis; mean gradient: 24 mmHg, JENNA: 1.2 cm sq. 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 No evidence of any pericardial effusion. All labs, medications and tests reviewed by myself including data and history from outside source , patient and available family . Assessment & Plan:      1. ASCVD (arteriosclerotic cardiovascular disease)    2. Essential hypertension    3. Mixed hyperlipidemia    4. PAD (peripheral artery disease) ([address]    5. S/P right coronary artery (RCA) stent placement    6. Rheumatic mitral stenosis    7. S/P mitral valve replacement    8. Cardiomyopathy, primary ([address]    9. Anticoagulated on Coumadin    10. SOB (shortness of breath) on exertion       Hypertension  Blood pressures is well controlled on carvedilol 25mg twice daily and  lisinopril 40 mg daily keep a log of blood pressure,  Norvasc 10 mg daily    Coronary artery disease   S/p  bypass surgery X 1 vessel to RCA at time of her valve surgery. Stress test in  was normal   Continue aspirin  once marlen while gest chest pressure . Cardiomyopathy, primary ([address]  EF of  40 %- 45 % probably related to valve disease / ischemic? She had MI in  . She used to see Dr Monica Erickson.   Her echo shows EF of 45-50 % , is not appear to be volume overloaded uses Lasix few times a week    Aortic stenosis  Moderate stenosis continue to follow repeat echo in few months    S/P mitral valve replacement  Mosaic Mitral valve valve replacement 27 mm  In 2016  heavily calcified annulus with  posterior annulus reinforced and constructed with gail patch used as a buttress. She knows and always takes dental prophylaxis  A # 27 [employer] tissue. She has been on coumadin since before her surgery due to TIA? I am note sure why? She is on eliquis now       Peripheral arterial disease   Post aortobifem bypass. She is on Pletal, she denies any leg pain or claudication no signs or complaints of heart failure no admissions for heart failure  No signs of heart failure for now continue    Tobacco abuse  I have asked her to to quit even try Chantix. sHe is smoking in spite of being on 3.5 L a day     Dyslipidemia :  All available lab work was reviewed. Patient was advised to repeat lab work before next visit       Counseled extensively and medication compliance urged. We discussed that for the  prevention of ASCVD our  goal is aggressive risk modification. Patient is encouraged to exercise even a brisk walk for 30 minutes  at least 3 to 4 times a week   Various goals were discussed and questions answered. Continue current medications. Appropriate prescriptions are addressed and refills ordered. Questions answered and patient verbalizes understanding. Call for any problems, questions, or concerns. Continue all other medications of all above medical condition listed as is. Return in about 6 months (around 2/6/2022). Please note this report has been partially produced using speech recognition software and may contain errors related to that system including errors in grammar, punctuation, and spelling, as well as words and phrases that may be inappropriate.  If there are any questions or concerns please feel free to contact the dictating provider for clarification.

## 2021-08-06 NOTE — PROGRESS NOTES
Walker Osorio  1950  Referring Provider: no PCP    Subjective:     Chief Complaint   Patient presents with    Follow-up     discuss a new CPAP machine, had one that did not work well       HPI  Db Stone is a 70 y.o. female has come back as a follow up. She has mild EDITA with EDS. She was on a CPAP of 7 cm h20 which she stopped using it. She has returned the CPAP. She is not willing to go for the BIPAP. She 7 lb loss of weight. She is still sleepy and tired during the day time. She has a 30 pk yr smoking and she is still smoking 1/2 pk per day. She has no symptoms. She is on 2 L/min of oxygen at night. She is using her inhalers regularly. She had her flu vaccine and COVID vaccine. Her last PFT done on 12/04/2020 showed restrictive lung disease with severe small airways dysfunction and severe decrease in DLCO. Her last CTA done on 06/28/21 showed :  Evaluation is limited due to motion artifact.  No large or central   pulmonary emboli are identified.  Smaller more peripheral pulmonary emboli   cannot be entirely excluded due to motion artifact. 2. Cardiomegaly with diffuse septal thickening and small bilateral pleural   effusions with bibasilar atelectasis.  Findings are likely related to mild   interstitial pulmonary edema.    3. Subcutaneous emphysematous changes are seen along the left chest wall           Current Outpatient Medications   Medication Sig Dispense Refill    Melatonin 5 MG CHEW Take by mouth      Ascorbic Acid (VITAMIN C CR) 1000 MG TBCR Take by mouth      apixaban (ELIQUIS) 5 MG TABS tablet Take by mouth 2 times daily      tiotropium (SPIRIVA RESPIMAT) 2.5 MCG/ACT AERS inhaler Inhale 2 puffs into the lungs daily 1 Inhaler 3    bisacodyl (BISAC-EVAC) 10 MG suppository Place 1 suppository rectally as needed for Constipation 5 suppository 0    furosemide (LASIX) 20 MG tablet Take 1/2 (one-half) tablet by mouth once daily 45 tablet 3    levothyroxine (SYNTHROID) 100 MCG tablet Take 1 tablet by mouth Daily 90 tablet 3    ondansetron (ZOFRAN-ODT) 4 MG disintegrating tablet Take 1 tablet by mouth every 8 hours as needed for Nausea or Vomiting 20 tablet 1    amLODIPine (NORVASC) 5 MG tablet Take 2 tablets by mouth daily 90 tablet 1    lisinopril (PRINIVIL;ZESTRIL) 40 MG tablet Take 1 tablet by mouth daily 90 tablet 3    cilostazol (PLETAL) 100 MG tablet Take 1 tablet by mouth twice daily 180 tablet 3    carvedilol (COREG) 25 MG tablet Take 1 tablet by mouth 2 times daily 180 tablet 3    pantoprazole (PROTONIX) 40 MG tablet Take 1 tablet by mouth daily 90 tablet 3    budesonide-formoterol (SYMBICORT) 160-4.5 MCG/ACT AERO Inhale 2 puffs into the lungs 2 times daily 1 Inhaler 5    nitroGLYCERIN (NITROSTAT) 0.4 MG SL tablet Place 1 tablet under the tongue every 5 minutes as needed for Chest pain up to max of 3 total doses. If no relief after 1 dose, call 911. 25 tablet 3    atorvastatin (LIPITOR) 40 MG tablet Take 1 tablet by mouth daily 90 tablet 3    Blood Pressure Monitoring KIT 1 Units by Does not apply route 2 times daily 1 kit 0    Calcium Carb-Cholecalciferol (CALCIUM 600+D) 600-800 MG-UNIT TABS Take 1 tablet by mouth daily      aspirin 81 MG EC tablet Take 1 tablet by mouth daily 30 tablet 3    Multiple Vitamins-Minerals (THERAPEUTIC MULTIVITAMIN-MINERALS) tablet Take 1 tablet by mouth daily       No current facility-administered medications for this visit. No Known Allergies    Past Medical History:   Diagnosis Date    CAD (coronary artery disease)     [facility] pulmonary [facility]) [facility]     GERD (gastroesophageal reflux disease)     H/O cardiovascular stress test 04/26/2021    normal study 63% EF no ischemia    H/O echocardiogram 03/22/2017    EF 35-40%    H/O echocardiogram 08/04/2020    EF 45-50, Mild TR & UT, Severely dilated left atrium.   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, functionally normally     Heart attack ([facility] [address] 01/2014    Hyperlipidemia     Hypertension     On home oxygen therapy     nightly    PAD (peripheral artery disease) (HCC)     S/P right coronary artery (RCA) stent placement     Sleep apnea     uses CPAP    T2 vertebral fracture ([address] 12/2017    TIA (transient ischemic attack) 1996    Tobacco dependence     Wears glasses     Wears partial dentures     Upper       Past Surgical History:   Procedure Laterality Date    APPENDECTOMY      age 6    CARDIAC SURGERY      CORONARY ANGIOPLASTY WITH STENT PLACEMENT      CORONARY ARTERY BYPASS GRAFT  06/21/2016    CABG X1 SVG to RCA with Mitral Valve Replacement 27mm Mosaic     HERNIA REPAIR N/A 6/25/2021    ROBOTIC HERNIA INCISIONAL REPAIR LAPAROSCOPIC WITH COMPONENT SEPARATION performed by Carlito Ramos MD at [address] [place] [address]  06/21/2016    27mm mosaic       Social History     Socioeconomic History    Marital status:      Spouse name: None    Number of children: None    Years of education: None    Highest education level: None   Occupational History    None   Tobacco Use    Smoking status: Current Every Day Smoker     Packs/day: 0.50     Years: 56.00     Pack years: 28.00     Types: Cigarettes     Start date: 1965    Smokeless tobacco: Former User     Quit date: 4/30/2021   Vaping Use    Vaping Use: Never used   Substance and Sexual Activity    Alcohol use: No     Comment: 2 cups of coffee in the a.m.  Drug use: No    Sexual activity: Yes     Partners: Male   Other Topics Concern    None   Social History Narrative    ** Merged History Encounter **          Social Determinants of Health     Financial Resource Strain:     Difficulty of Paying Living Expenses:    Food Insecurity:     Worried About Running Out of Food in the Last Year:     Ran Out of Food in the Last Year:    Transportation Needs:     Lack of Transportation (Medical):      Lack of Transportation (Non-Medical):    Physical Activity:     Days of Exercise per Week:  Minutes of Exercise per Session:    Stress:     Feeling of Stress :    Social Connections:     Frequency of Communication with Friends and Family:     Frequency of Social Gatherings with Friends and Family:     Attends [employer] Services:     Active Member of Clubs or Organizations:     Attends Club or Organization Meetings:     Marital Status:    Intimate Partner Violence:     Fear of Current or Ex-Partner:     Emotionally Abused:     Physically Abused:     Sexually Abused:        Review of Systems   Constitutional: Positive for fatigue. HENT: Negative for congestion and postnasal drip. Eyes: Negative for discharge and itching. Respiratory: Positive for shortness of breath. Negative for cough. Cardiovascular: Negative for chest pain and leg swelling. Gastrointestinal: Negative for abdominal distention and abdominal pain. Endocrine: Negative for cold intolerance and heat intolerance. Genitourinary: Negative for enuresis and flank pain. Musculoskeletal: Negative for arthralgias and back pain. Allergic/Immunologic: Negative for environmental allergies and food allergies. Neurological: Negative for light-headedness and numbness. Hematological: Negative for adenopathy. Psychiatric/Behavioral: Negative for agitation and behavioral problems. Objective:   /62   Pulse 72   Ht 5' 3\" (1.6 m)   Wt 170 lb 1.6 oz (77.2 kg)   SpO2 97%   BMI 30.13 kg/m²   Body mass index is 30.13 kg/m².   Sleep Medicine 6/30/2020 5/29/2020   Sitting and reading 0 0   Watching TV 1 0   Sitting, inactive in a public place (e.g. a theatre or a meeting) 0 0   As a passenger in a car for an hour without a break 3 2   Lying down to rest in the afternoon when circumstances permit 2 3   Sitting and talking to someone 0 1   Sitting quietly after a lunch without alcohol 0 0   In a car, while stopped for a few minutes in traffic 0 0   Total score 6 6   Neck circumference 15 15 Orders    CT LUNG SCREENING    Full PFT Study With Bronchodilator    Hypersomnia      Advised to go for the BIPAP titration study  Loose weight         Obesity (BMI 30-39. 9)      Advised to loose weight with diet and exercise                      Return in about 4 weeks (around 9/3/2021) for CPAP/BIPAP titration. Progress notes sent to the referring Provider    Vinny Peres MD MD  8/6/2021  11:07 AM    Low Dose CT (LDCT) Lung Screening criteria met   Age 50-69   Pack year smoking >30   Still smoking or less than 15 year since quit   No sign or symptoms of lung cancer   > 11 months since last LDCT     Risks and benefits of lung cancer screening with LDCT scans discussed:    Significance of positive screen - False-positive LDCT results often occur. 95% of all positive results do not lead to a diagnosis of cancer. Usually further imaging can resolve most false-positive results; however, some patients may require invasive procedures. Over diagnosis risk - 10% to 12% of screen-detected lung cancer cases are over diagnosed--that is, the cancer would not have been detected in the patient's lifetime without the screening. Need for follow up screens annually to continue lung cancer screening effectiveness     Risks associated with radiation from annual LDCT- Radiation exposure is about the same as for a mammogram, which is about 1/3 of the annual background radiation exposure from everyday life. Starting screening at age 54 is not likely to increase cancer risk from radiation exposure. Patients with comorbidities resulting in life expectancy of < 10 years, or that would preclude treatment of an abnormality identified on CT, should not be screened due to lack of benefit.     To obtain maximal benefit from this screening, smoking cessation and long-term abstinence from smoking is critical

## 2021-08-27 ENCOUNTER — HOSPITAL ENCOUNTER (OUTPATIENT)
Dept: CT IMAGING | Age: 71
Discharge: HOME | End: 2021-08-27
Payer: MEDICARE

## 2021-08-27 DIAGNOSIS — Z87.891 PERSONAL HISTORY OF TOBACCO USE: ICD-10-CM

## 2021-08-27 PROCEDURE — 71271 CT THORAX LUNG CANCER SCR C-: CPT

## 2021-08-30 ENCOUNTER — TELEPHONE (OUTPATIENT)
Dept: CARDIOLOGY CLINIC | Age: 71
End: 2021-08-30

## 2021-08-30 RX ORDER — LEVOTHYROXINE SODIUM 0.1 MG/1
100 TABLET ORAL DAILY
Qty: 30 TABLET | Refills: 5 | Status: SHIPPED | OUTPATIENT
Start: 2021-08-30 | End: 2021-09-07 | Stop reason: SDUPTHER

## 2021-08-30 NOTE — TELEPHONE ENCOUNTER
Pt is requesting for Dr Jess Holder to take over Euthyrox 100 mcg 1 time a day. Call to [address] on [address] pt is out  Pt has to find a new family Dr. Everett Breeding give call to [address].  Also, to help find a new

## 2021-08-31 ENCOUNTER — TELEPHONE (OUTPATIENT)
Dept: PULMONOLOGY | Age: 71
End: 2021-08-31

## 2021-08-31 ENCOUNTER — TELEPHONE (OUTPATIENT)
Dept: CARDIOLOGY CLINIC | Age: 71
End: 2021-08-31

## 2021-08-31 NOTE — TELEPHONE ENCOUNTER
Pt called in stating she is using her inhaler, (Spiriva) but does not think it is helping at all. I asked how often she was taking it and she stated once a day. I let her know she is supposed to be using it 2 times once a day.

## 2021-09-03 ENCOUNTER — OFFICE VISIT (OUTPATIENT)
Dept: CARDIOLOGY CLINIC | Age: 71
End: 2021-09-03
Payer: MEDICARE

## 2021-09-03 ENCOUNTER — TELEPHONE (OUTPATIENT)
Dept: CARDIOLOGY CLINIC | Age: 71
End: 2021-09-03

## 2021-09-03 ENCOUNTER — OFFICE VISIT (OUTPATIENT)
Dept: FAMILY MEDICINE CLINIC | Age: 71
End: 2021-09-03
Payer: MEDICARE

## 2021-09-03 VITALS
SYSTOLIC BLOOD PRESSURE: 128 MMHG | BODY MASS INDEX: 29.73 KG/M2 | HEIGHT: 63 IN | HEART RATE: 76 BPM | WEIGHT: 167.8 LBS | DIASTOLIC BLOOD PRESSURE: 60 MMHG

## 2021-09-03 VITALS
WEIGHT: 167.4 LBS | OXYGEN SATURATION: 96 % | HEIGHT: 60 IN | DIASTOLIC BLOOD PRESSURE: 80 MMHG | TEMPERATURE: 98.1 F | HEART RATE: 70 BPM | BODY MASS INDEX: 32.86 KG/M2 | SYSTOLIC BLOOD PRESSURE: 132 MMHG

## 2021-09-03 DIAGNOSIS — I42.9 CARDIOMYOPATHY, PRIMARY (HCC): ICD-10-CM

## 2021-09-03 DIAGNOSIS — G47.33 OSA (OBSTRUCTIVE SLEEP APNEA): ICD-10-CM

## 2021-09-03 DIAGNOSIS — Z23 NEED FOR PROPHYLACTIC VACCINATION AGAINST DIPHTHERIA-TETANUS-PERTUSSIS (DTP): ICD-10-CM

## 2021-09-03 DIAGNOSIS — Z11.59 NEED FOR HEPATITIS C SCREENING TEST: ICD-10-CM

## 2021-09-03 DIAGNOSIS — Z12.11 SCREENING FOR COLORECTAL CANCER: ICD-10-CM

## 2021-09-03 DIAGNOSIS — J44.9 CHRONIC OBSTRUCTIVE PULMONARY DISEASE, UNSPECIFIED COPD TYPE (HCC): ICD-10-CM

## 2021-09-03 DIAGNOSIS — J96.11 CHRONIC RESPIRATORY FAILURE WITH HYPOXIA AND HYPERCAPNIA (HCC): ICD-10-CM

## 2021-09-03 DIAGNOSIS — Z12.31 ENCOUNTER FOR SCREENING MAMMOGRAM FOR BREAST CANCER: ICD-10-CM

## 2021-09-03 DIAGNOSIS — D64.9 ANEMIA, UNSPECIFIED TYPE: ICD-10-CM

## 2021-09-03 DIAGNOSIS — I10 ESSENTIAL HYPERTENSION: ICD-10-CM

## 2021-09-03 DIAGNOSIS — E78.2 MIXED HYPERLIPIDEMIA: ICD-10-CM

## 2021-09-03 DIAGNOSIS — J96.12 CHRONIC RESPIRATORY FAILURE WITH HYPOXIA AND HYPERCAPNIA (HCC): ICD-10-CM

## 2021-09-03 DIAGNOSIS — I73.9 PAD (PERIPHERAL ARTERY DISEASE) (HCC): ICD-10-CM

## 2021-09-03 DIAGNOSIS — I25.10 ASCVD (ARTERIOSCLEROTIC CARDIOVASCULAR DISEASE): ICD-10-CM

## 2021-09-03 DIAGNOSIS — I25.10 ASCVD (ARTERIOSCLEROTIC CARDIOVASCULAR DISEASE): Primary | ICD-10-CM

## 2021-09-03 DIAGNOSIS — Z95.2 S/P MITRAL VALVE REPLACEMENT: ICD-10-CM

## 2021-09-03 DIAGNOSIS — I05.0 RHEUMATIC MITRAL STENOSIS: ICD-10-CM

## 2021-09-03 DIAGNOSIS — Z12.12 SCREENING FOR COLORECTAL CANCER: ICD-10-CM

## 2021-09-03 DIAGNOSIS — Z12.31 ENCOUNTER FOR SCREENING MAMMOGRAM FOR MALIGNANT NEOPLASM OF BREAST: ICD-10-CM

## 2021-09-03 DIAGNOSIS — M81.0 AGE-RELATED OSTEOPOROSIS WITHOUT CURRENT PATHOLOGICAL FRACTURE: Primary | ICD-10-CM

## 2021-09-03 DIAGNOSIS — R63.4 WEIGHT LOSS: ICD-10-CM

## 2021-09-03 DIAGNOSIS — I21.11 ST ELEVATION MYOCARDIAL INFARCTION INVOLVING RIGHT CORONARY ARTERY (HCC): ICD-10-CM

## 2021-09-03 DIAGNOSIS — E03.9 ACQUIRED HYPOTHYROIDISM: ICD-10-CM

## 2021-09-03 DIAGNOSIS — Z00.00 ROUTINE GENERAL MEDICAL EXAMINATION AT A HEALTH CARE FACILITY: ICD-10-CM

## 2021-09-03 DIAGNOSIS — Z95.5 S/P RIGHT CORONARY ARTERY (RCA) STENT PLACEMENT: ICD-10-CM

## 2021-09-03 LAB
A/G RATIO: 1.3 (ref 1.1–2.2)
ALBUMIN SERPL-MCNC: 4 G/DL (ref 3.4–5)
ALP BLD-CCNC: 84 U/L (ref 40–129)
ALT SERPL-CCNC: 14 U/L (ref 10–40)
ANION GAP SERPL CALCULATED.3IONS-SCNC: 15 MMOL/L (ref 3–16)
AST SERPL-CCNC: 19 U/L (ref 15–37)
BASOPHILS ABSOLUTE: 0.1 K/UL (ref 0–0.2)
BASOPHILS RELATIVE PERCENT: 1 %
BILIRUB SERPL-MCNC: 0.4 MG/DL (ref 0–1)
BUN BLDV-MCNC: 17 MG/DL (ref 7–20)
CALCIUM SERPL-MCNC: 9.9 MG/DL (ref 8.3–10.6)
CHLORIDE BLD-SCNC: 100 MMOL/L (ref 99–110)
CHOLESTEROL, TOTAL: 127 MG/DL (ref 0–199)
CO2: 25 MMOL/L (ref 21–32)
CREAT SERPL-MCNC: 1.4 MG/DL (ref 0.6–1.2)
EOSINOPHILS ABSOLUTE: 0.3 K/UL (ref 0–0.6)
EOSINOPHILS RELATIVE PERCENT: 4.2 %
GFR AFRICAN AMERICAN: 45
GFR NON-AFRICAN AMERICAN: 37
GLOBULIN: 3.2 G/DL
GLUCOSE BLD-MCNC: 117 MG/DL (ref 70–99)
HCT VFR BLD CALC: 37.8 % (ref 36–48)
HDLC SERPL-MCNC: 45 MG/DL (ref 40–60)
HEMOGLOBIN: 13.1 G/DL (ref 12–16)
HEPATITIS C ANTIBODY INTERPRETATION: NORMAL
LDL CHOLESTEROL CALCULATED: 66 MG/DL
LYMPHOCYTES ABSOLUTE: 1.5 K/UL (ref 1–5.1)
LYMPHOCYTES RELATIVE PERCENT: 18.9 %
MCH RBC QN AUTO: 32.2 PG (ref 26–34)
MCHC RBC AUTO-ENTMCNC: 34.6 G/DL (ref 31–36)
MCV RBC AUTO: 93.3 FL (ref 80–100)
MONOCYTES ABSOLUTE: 0.6 K/UL (ref 0–1.3)
MONOCYTES RELATIVE PERCENT: 8.1 %
NEUTROPHILS ABSOLUTE: 5.2 K/UL (ref 1.7–7.7)
NEUTROPHILS RELATIVE PERCENT: 67.8 %
PDW BLD-RTO: 13.4 % (ref 12.4–15.4)
PLATELET # BLD: 208 K/UL (ref 135–450)
PMV BLD AUTO: 9.8 FL (ref 5–10.5)
POTASSIUM SERPL-SCNC: 4.3 MMOL/L (ref 3.5–5.1)
RBC # BLD: 4.06 M/UL (ref 4–5.2)
SODIUM BLD-SCNC: 140 MMOL/L (ref 136–145)
T4 FREE: 1.9 NG/DL (ref 0.9–1.8)
TOTAL PROTEIN: 7.2 G/DL (ref 6.4–8.2)
TRIGL SERPL-MCNC: 82 MG/DL (ref 0–150)
TSH REFLEX FT4: 0.24 UIU/ML (ref 0.27–4.2)
VLDLC SERPL CALC-MCNC: 16 MG/DL
WBC # BLD: 7.7 K/UL (ref 4–11)

## 2021-09-03 PROCEDURE — 99214 OFFICE O/P EST MOD 30 MIN: CPT | Performed by: INTERNAL MEDICINE

## 2021-09-03 PROCEDURE — 99215 OFFICE O/P EST HI 40 MIN: CPT | Performed by: FAMILY MEDICINE

## 2021-09-03 PROCEDURE — 36415 COLL VENOUS BLD VENIPUNCTURE: CPT | Performed by: FAMILY MEDICINE

## 2021-09-03 RX ORDER — ALENDRONATE SODIUM 70 MG/1
70 TABLET ORAL
Qty: 4 TABLET | Refills: 0 | Status: SHIPPED | OUTPATIENT
Start: 2021-09-03

## 2021-09-03 RX ORDER — LEVOTHYROXINE SODIUM 0.1 MG/1
100 TABLET ORAL DAILY
Qty: 90 TABLET | Refills: 3 | Status: CANCELLED | OUTPATIENT
Start: 2021-09-03 | End: 2022-10-07

## 2021-09-03 RX ORDER — ASPIRIN 81 MG/1
81 TABLET ORAL DAILY
Qty: 30 TABLET | Refills: 3 | Status: SHIPPED | OUTPATIENT
Start: 2021-09-03

## 2021-09-03 RX ORDER — VARENICLINE TARTRATE 0.5 MG/1
.5-1 TABLET, FILM COATED ORAL SEE ADMIN INSTRUCTIONS
Qty: 57 TABLET | Refills: 0 | Status: SHIPPED | OUTPATIENT
Start: 2021-09-03

## 2021-09-03 SDOH — ECONOMIC STABILITY: FOOD INSECURITY: WITHIN THE PAST 12 MONTHS, THE FOOD YOU BOUGHT JUST DIDN'T LAST AND YOU DIDN'T HAVE MONEY TO GET MORE.: NEVER TRUE

## 2021-09-03 SDOH — ECONOMIC STABILITY: FOOD INSECURITY: WITHIN THE PAST 12 MONTHS, YOU WORRIED THAT YOUR FOOD WOULD RUN OUT BEFORE YOU GOT MONEY TO BUY MORE.: NEVER TRUE

## 2021-09-03 ASSESSMENT — VISUAL ACUITY
OS_CC: 20/20
OD_CC: 20/25

## 2021-09-03 ASSESSMENT — ENCOUNTER SYMPTOMS
SHORTNESS OF BREATH: 0
ABDOMINAL PAIN: 0
NAUSEA: 0
COUGH: 0
DIARRHEA: 0
VOMITING: 0

## 2021-09-03 ASSESSMENT — PATIENT HEALTH QUESTIONNAIRE - PHQ9
SUM OF ALL RESPONSES TO PHQ QUESTIONS 1-9: 0
1. LITTLE INTEREST OR PLEASURE IN DOING THINGS: 0
2. FEELING DOWN, DEPRESSED OR HOPELESS: 0
SUM OF ALL RESPONSES TO PHQ9 QUESTIONS 1 & 2: 0

## 2021-09-03 ASSESSMENT — SOCIAL DETERMINANTS OF HEALTH (SDOH): HOW HARD IS IT FOR YOU TO PAY FOR THE VERY BASICS LIKE FOOD, HOUSING, MEDICAL CARE, AND HEATING?: NOT HARD AT ALL

## 2021-09-03 ASSESSMENT — LIFESTYLE VARIABLES: HOW OFTEN DO YOU HAVE A DRINK CONTAINING ALCOHOL: 0

## 2021-09-03 NOTE — PROGRESS NOTES
9/3/21    Feroz Cocker  1950    Devan Chavarria is a 70 y.o. 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 Hernia : recently had a surgery to treat an abdominal wall hernia. Hypothyroid : Needs refill. Has not missed any pills. Her primary thing that she wants to get done with the primary care provider is a refill of the thyroid medication. COPD: Currently no breathing trouble and sees pulmonologist.     Osteoporosis : Walking lis limited by PVD. Hx of a left shoulder fracture and cracked C2 vertebrea. She is not aware of the diagnosis of osteoporosis. I reminded her of a DEXA scan in 2019 that showed a T score of -2.8 confirming osteoporosis. She does take calcium and is not sure whether or not there is vitamin D in it. She is not real active due to limitations of peripheral vascular disease. PVD : s/p khadijah illiac bypass and legs hurt with walking. Weight loss : 40 # in about 2 yrs. Not trying and not concerned about cancer. 10# loss in past 2 months. She reports that she is not exactly trying to lose weight but she reports that she now only eats when she is hungry rather than just because food is available. She is not worried about the weight loss being assigned of cancer or other serious illness. Review of Systems   Constitutional: Positive for fatigue. Negative for fever. Respiratory: Negative for cough and shortness of breath. Cardiovascular: Negative for chest pain, palpitations and leg swelling. Gastrointestinal: Negative for abdominal pain, diarrhea, nausea and vomiting. Psychiatric/Behavioral: Negative for dysphoric mood. Fasting: No    Fam Hx : no broken bones.     No Known Allergies     OBJECTIVE    /80 (Site: Left Upper Arm, Position: Sitting, Cuff Size: Medium Adult)   Pulse 70   Temp 98.1 °F (36.7 °C) (Infrared)   Ht 5' (1.524 m)   Wt 167 lb 6.4 oz (75.9 kg)   SpO2 96%   BMI 32.69 kg/m²     Physical Exam  Constitutional:       General: She is not in acute distress. Appearance: Normal appearance. She is obese. She is not ill-appearing, toxic-appearing or diaphoretic. HENT:      Head: Normocephalic. Nose: No rhinorrhea. Eyes:      General: No scleral icterus. Conjunctiva/sclera: Conjunctivae normal.   Neck:      Thyroid: No thyroid mass, thyromegaly or thyroid tenderness. Cardiovascular:      Rate and Rhythm: Normal rate and regular rhythm. Pulses:           Posterior tibial pulses are 1+ on the right side and 1+ on the left side. Heart sounds: Normal heart sounds. No murmur heard. No friction rub. No gallop. Pulmonary:      Effort: Pulmonary effort is normal. No respiratory distress. Breath sounds: Normal breath sounds. No stridor. No wheezing, rhonchi or rales. Abdominal:      General: There is no distension. Palpations: Abdomen is soft. Tenderness: There is abdominal tenderness (mild s/p hernia surgery). There is no guarding. Musculoskeletal:         General: No deformity. Right lower leg: No edema. Left lower leg: No edema. Lymphadenopathy:      Cervical: No cervical adenopathy. Upper Body:      Right upper body: No supraclavicular or axillary adenopathy. Left upper body: No supraclavicular or axillary adenopathy. Skin:     General: Skin is warm. Coloration: Skin is not jaundiced. Neurological:      Mental Status: She is alert. Psychiatric:         Attention and Perception: Attention and perception normal.         Mood and Affect: Mood normal.         Speech: Speech normal.         Behavior: Behavior normal.         Thought Content: Thought content normal.        Reviewed Labs: TSH, T4, CBC, BMP done about 6/29/21 to 6/30/21 4/26/21 [facility] cardiac imaging shows EF of 63%  7/29/19 DEXA shows femoral neck T score of -2.8.     ASSESSMENT AND PLAN    1. Age-related osteoporosis without current pathological fracture  I talked to her in detail about the importance of doing weightbearing activities to keep bones strong in the legs. I demonstrated squat type exercise or standing from a chair exercise. I pointed out that this will both strength in her leg muscles as well as stimulate her body to make her bones stronger. I talked to her about taking alendronate on a weekly basis. She does have some dyspepsia and so I am not sure if she will be able to tolerate it. I explained that she should take it with a full glass of water on an empty stomach and then stay upright for about 45 minutes after taking the medication. She is willing to try it. - alendronate (FOSAMAX) 70 MG tablet; Take 1 tablet by mouth every 7 days Empty stomach, full glass water, stay upright 45 minutes  Dispense: 4 tablet; Refill: 0    2. Acquired hypothyroidism  I am ordering a TSH and plan to refill levothyroxine at his current dose if the TSH is in range.  - TSH WITH REFLEX TO FT4    3. Anemia, unspecified type  She was not aware of the diagnosis of anemia on her past CBC 6/30/2020. I will check a new CBC today. - CBC Auto Differential    4. ST elevation myocardial infarction involving right coronary artery ([facility]  I will check a lipid panel due to her coronary artery disease.  - Lipid Panel    5. Essential hypertension  I will check a CMP since she has hypertension.  - Comprehensive Metabolic Panel    6. EDITA (obstructive sleep apnea)  Stable    7. Mixed hyperlipidemia  She takes atorvastatin. 8. Chronic obstructive pulmonary disease, unspecified COPD type ([address]  Managed by pulmonologist.    9. Cardiomyopathy, primary ([facility]  Managed by cardiologist.    10. Need for hepatitis C screening test  Screening  - Hepatitis C Antibody    11. Encounter for screening mammogram for malignant neoplasm of breast  Mammogram ordered    12.  BMI 32.0-32.9,adult  She has been losing weight and she reports that she is developed the attitude that she does not eat unless if days a week or 22 minutes every day); and 2> do some strength training 2 or 3 times a week (It's great for depression and overall confidence when you are improving and lifting 2.5 pounds more now than a few days ago. Make sure you use good technique for lifting.). Social support - Keep socially involved. This will help with keeping your brain working well (avoiding dementia) as you get older and also help you to be happier. and Mentally activity - Keep trying to learn new things, reading, following sports/fashion/whatever you like, and other mental things to keep your brain working well and avoid dementia. Recommended smoking cessation. Weight loss ideas: 1> Pick one or two unhealthy foods and don't allow them in your home; 2> experiment with intermittent fasting or at least don't consume any calories at all when it is not a regular mealtime of breakfast, lunch, or suppertime; 3> eat only unprocessed foods (or foods that have a single ingredient when you buy them); 4> eliminate all sugar sweetened drinks (pop, juice, sweet tea, etc.); 5> if you consume alcoholic beverages you can reduce calories by reducing how much you drink. Return in 8 weeks (on 10/29/2021) for Recheck on osteoporosis and thyroid and weight  & Medicare Annual Wellness Visit in 1 year. Alley Garcia MD Advance Care Planning   Advanced Care Planning: Discussed the patients choices for care and treatment in case of a health event that adversely affects decision-making abilities. Also discussed the patients long-term treatment options. Reviewed with the patient the [address] [place] [address] Declaration forms  Reviewed the process of designating a competent adult as an Agent (or -in-fact) that could take make health care decisions for the patient if incompetent.  Patient was asked to complete the declaration forms, either acknowledge the forms by a public notary or an eligible witness and provide a signed copy to the practice office. Time spent (minutes): 1-2      Medicare Annual Wellness Visit  Name: Estephania Hernadez Date: 9/3/2021   MRN: [id number] Sex: Female   Age: 70 y.o. Ethnicity: Non- / Non    : 1950 Race: White (non-)      Huma Jenkins is here for Medicare AWV    Screenings for behavioral, psychosocial and functional/safety risks, and cognitive dysfunction are all negative except as indicated below. These results, as well as other patient data from the [address] form, are documented in Flowsheets linked to this Encounter. No Known Allergies      Prior to Visit Medications    Medication Sig Taking?  Authorizing Provider   varenicline (CHANTIX) 0.5 MG tablet Take 1-2 tablets by mouth See Admin Instructions 0.5mg DAILY for 3 days followed by 0.5mg TWICE DAILY for 4 days followed by 1mg TWICE DAILY Yes Tu New MD   aspirin 81 MG EC tablet Take 1 tablet by mouth daily Yes Naveed Acevedo MD   Tdap (ADACEL) 5-2-15.5 LF-MCG/0.5 injection Inject 0.5 mLs into the muscle once for 1 dose Yes Naveed Acevedo MD   alendronate (FOSAMAX) 70 MG tablet Take 1 tablet by mouth every 7 days Empty stomach, full glass water, stay upright 45 minutes Yes Naveed Acevedo MD   levothyroxine (SYNTHROID) 100 MCG tablet Take 1 tablet by mouth daily Yes Tu New MD   Melatonin 5 MG CHEW Take by mouth Yes Historical Provider, MD   Ascorbic Acid (VITAMIN C CR) 1000 MG TBCR Take by mouth Yes Historical Provider, MD   apixaban (ELIQUIS) 5 MG TABS tablet Take by mouth 2 times daily Yes Historical Provider, MD   tiotropium (SPIRIVA RESPIMAT) 2.5 MCG/ACT AERS inhaler Inhale 2 puffs into the lungs daily Yes Gilford Lime, MD   furosemide (LASIX) 20 MG tablet Take 1/2 (one-half) tablet by mouth once daily Yes Tu New MD   levothyroxine (SYNTHROID) 100 MCG tablet Take 1 tablet by mouth Daily Yes Alfonso Cohen MD   ondansetron (ZOFRAN-ODT) 4 MG disintegrating tablet Take 1 tablet by mouth every 8 hours as needed for Nausea or Vomiting Yes SATURNINO Dee CNP   amLODIPine (NORVASC) 5 MG tablet Take 2 tablets by mouth daily Yes SATURNINO Dee CNP   lisinopril (PRINIVIL;ZESTRIL) 40 MG tablet Take 1 tablet by mouth daily Yes March SATURNINO Banerjee CNP   cilostazol (PLETAL) 100 MG tablet Take 1 tablet by mouth twice daily Yes March SATURNINO Banerjee CNP   carvedilol (COREG) 25 MG tablet Take 1 tablet by mouth 2 times daily Yes March SATURNINO Banerjee CNP   pantoprazole (PROTONIX) 40 MG tablet Take 1 tablet by mouth daily Yes Darrell Johnston MD   budesonide-formoterol (SYMBICORT) 160-4.5 MCG/ACT AERO Inhale 2 puffs into the lungs 2 times daily Yes My Draper MD   nitroGLYCERIN (NITROSTAT) 0.4 MG SL tablet Place 1 tablet under the tongue every 5 minutes as needed for Chest pain up to max of 3 total doses. If no relief after 1 dose, call 911. Yes Darrell Johnston MD   atorvastatin (LIPITOR) 40 MG tablet Take 1 tablet by mouth daily Yes Darrell Johnston MD   Blood Pressure Monitoring KIT 1 Units by Does not apply route 2 times daily Yes SATURNINO Curry CNP   Calcium Carb-Cholecalciferol (CALCIUM 600+D) 600-800 MG-UNIT TABS Take 1 tablet by mouth daily Yes Historical Provider, MD   Multiple Vitamins-Minerals (THERAPEUTIC MULTIVITAMIN-MINERALS) tablet Take 1 tablet by mouth daily Yes Historical Provider, MD         Past Medical History:   Diagnosis Date    CAD (coronary artery disease)     [facility]     GERD (gastroesophageal reflux disease)     H/O cardiovascular stress test 04/26/2021    normal study 63% EF no ischemia    H/O echocardiogram 03/22/2017    EF 35-40%    H/O echocardiogram 08/04/2020    EF 45-50, Mild TR & AL, Severely dilated left atrium.   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, functionally normally     Heart attack ([facility] 01/2014    Hyperlipidemia  Hypertension     On home oxygen therapy     nightly    PAD (peripheral artery disease) (HCC)     S/P right coronary artery (RCA) stent placement     Sleep apnea     uses CPAP    T2 vertebral fracture ([address] 12/2017    TIA (transient ischemic attack) 32-36 [facility] Wears glasses     Wears partial dentures     Upper       Past Surgical History:   Procedure Laterality Date    APPENDECTOMY      age 6    CARDIAC SURGERY      CORONARY ANGIOPLASTY WITH STENT PLACEMENT      CORONARY ARTERY BYPASS GRAFT  06/21/2016    CABG X1 SVG to RCA with Mitral Valve Replacement 27mm Mosaic     HERNIA REPAIR N/A 6/25/2021    ROBOTIC HERNIA INCISIONAL REPAIR LAPAROSCOPIC WITH COMPONENT SEPARATION performed by Emily Nichols MD at [address] [facility]  06/21/2016    27mm mosaic         Family History   Problem Relation Age of Onset    Stroke Mother     Heart Attack Father     Coronary Art Dis Father     Pacemaker Father     Arrhythmia Father     Diabetes Sister     Rheum Arthritis Sister     Heart Attack Sister     Arrhythmia Sister        CareTeam (Including outside providers/suppliers regularly involved in providing care):   Patient Care Team:  Jaye Heck MD    [facility] Readings from Last 3 Encounters:   09/03/21 167 lb 6.4 oz (75.9 kg)   09/03/21 167 lb 12.8 oz (76.1 kg)   08/06/21 170 lb 1.6 oz (77.2 kg)     Vitals:    09/03/21 0959   BP: 132/80   Site: Left Upper Arm   Position: Sitting   Cuff Size: Medium Adult   Pulse: 70   Temp: 98.1 °F (36.7 °C)   TempSrc: Infrared   SpO2: 96%   Weight: 167 lb 6.4 oz (75.9 kg)   Height: 5' (1.524 m)     Body mass index is 32.69 kg/m². Based upon direct observation of the patient, evaluation of cognition reveals recent and remote memory intact. Patient's complete Health Risk Assessment and screening values have been reviewed and are found in Flowsheets.  The following problems were reviewed today and where indicated follow up appointments were made and/or referrals ordered. Positive Risk Factor Screenings with Interventions:     Fall Risk:  2 or more falls in past year?: (!) yes  Fall with injury in past year?: no  Fall Risk Interventions:    · Home exercises provided to promote strength and balance  · Starting alendronate as a trial.        Substance History:  Social History     Tobacco History     Smoking Status  Current Every Day Smoker Smoking Start Date  1/1/1965 Smoking Frequency  0.5 packs/day for 64 years (28 pk yrs) Smoking Tobacco Type  Cigarettes    Smokeless Tobacco Use  Former User Quit date  4/30/2021          Alcohol History     Alcohol Use Status  No Comment  2 cups of coffee in the a.m. Drug Use     Drug Use Status  No          Sexual Activity     Sexually Active  Yes Partners  Male               Alcohol Screening:       A score of 8 or more is associated with harmful or hazardous drinking. A score of 13 or more in women, and 15 or more in men, is likely to indicate alcohol dependence. Substance Abuse Interventions:  · Tobacco abuse:  Dr Jaimie Chase gave prescription today for a cessation medication. General Health and ACP:  General  In general, how would you say your health is?: Good  In the past 7 days, have you experienced any of the following? New or Increased Pain, New or Increased Fatigue, Loneliness, Social Isolation, Stress or Anger?: None of These  Do you get the social and emotional support that you need?: Yes  Do you have a Living Will?: (!) No  Advance Directives     Power of [address] [place] [address] Will ACP-Advance Directive ACP-Power of     Not on File Not on File Not on File Not on File      General Health Risk Interventions:  · Poor self-assessment of health status: [place] to woek closely to improve health.      Health Habits/Nutrition:  Health Habits/Nutrition  Do you exercise for at least 20 minutes 2-3 times per week?: (!) No  Have you lost any weight without trying in the past 3 months?: (!) Yes  Do you eat only one meal per day?: No  Have you seen the dentist within the past year?: (!) No  Body mass index: (!) 32.69  Health Habits/Nutrition Interventions:  · Inadequate physical activity:  Encouraged and demonstrated squat exercises. Hearing/Vision:   Hearing Screening    125Hz 250Hz 500Hz 1000Hz 2000Hz 3000Hz 4000Hz 6000Hz 8000Hz   Right ear:            Left ear:               Visual Acuity Screening    Right eye Left eye Both eyes   Without correction:      With correction: 20/25 20/20 20/20     Hearing/Vision  Do you or your family notice any trouble with your hearing that hasn't been managed with hearing aids?: No  Do you have difficulty driving, watching TV, or doing any of your daily activities because of your eyesight?: No  Have you had an eye exam within the past year?: Yes  Hearing/Vision Interventions:  · No concerns currently    Safety:  Safety  Do you have working smoke detectors?: Yes  Have all throw rugs been removed or fastened?: (!) No  Do you have non-slip mats or surfaces in all bathtubs/showers?: (!) No  Do all of your stairways have a railing or banister?: Yes  Are your doorways, halls and stairs free of clutter?: Yes  Do you always fasten your seatbelt when you are in a car?: Yes  Safety Interventions:  · Discussed risk with throw rugs and recommended handbars by bath and non-slip surface in bath.       Personalized Preventive Plan   Current Health Maintenance Status  Immunization History   Administered Date(s) Administered    COVID-19, Pfizer, PF, 30mcg/0.3mL 02/23/2021, 03/16/2021    Influenza, High Dose (Fluzone 65 yrs and older) 12/31/2018    Influenza, Quadv, IM, PF (6 mo and older Fluzone, Flulaval, Fluarix, and 3 yrs and older Afluria) 03/22/2017    Influenza, Triv, inactivated, subunit, adjuvanted, IM (Fluad 65 yrs and older) 01/24/2020    Pneumococcal Conjugate 13-valent ([id number]) 03/22/2017    Pneumococcal Polysaccharide ([id number]) 01/24/2020        Health Maintenance   Topic Date Due    Hepatitis C screen  Never done    DTaP/Tdap/Td vaccine (1 - Tdap) Never done    Colon cancer screen colonoscopy  Never done    Shingles Vaccine (1 of 2) Never done   [employer] Visit (AWV)  Never done    Breast cancer screen  07/24/2021    Flu vaccine (1) 09/01/2021    Lipid screen  03/15/2022    TSH testing  06/28/2022    Potassium monitoring  06/30/2022    Creatinine monitoring  06/30/2022    Low dose CT lung screening  08/27/2022    DEXA (modify frequency per FRAX score)  Completed    Pneumococcal 65+ years Vaccine  Completed    COVID-19 Vaccine  Completed    Hepatitis A vaccine  Aged Out    Hepatitis B vaccine  Aged Out    Hib vaccine  Aged Out    Meningococcal (ACWY) vaccine  Aged Out     Recommendations for [employer] Due: see orders and patient instructions/AVS.  . Recommended screening schedule for the next 5-10 years is provided to the patient in written form: see Patient Instructions/AVS.    Ross Payment was seen today for medicare awv. Diagnoses and all orders for this visit:    Age-related osteoporosis without current pathological fracture  -     alendronate (FOSAMAX) 70 MG tablet;  Take 1 tablet by mouth every 7 days Empty stomach, full glass water, stay upright 45 minutes    Acquired hypothyroidism  -     TSH WITH REFLEX TO FT4    Anemia, unspecified type  -     CBC Auto Differential    ST elevation myocardial infarction involving right coronary artery (HCC)  -     Lipid Panel    Essential hypertension  -     Comprehensive Metabolic Panel    EDITA (obstructive sleep apnea)    Mixed hyperlipidemia    Chronic obstructive pulmonary disease, unspecified COPD type (HCC)    Cardiomyopathy, primary ([facility]    Need for hepatitis C screening test  -     Hepatitis C Antibody    Encounter for screening mammogram for malignant neoplasm of breast    BMI 32.0-32.9,adult    Encounter for screening mammogram for breast cancer  -     ALCON Digital Screen Bilateral

## 2021-09-03 NOTE — PATIENT INSTRUCTIONS
Please be informed that if you contact our office outside of normal business hours the physician on call cannot help with any scheduling or rescheduling issues, procedure instruction questions or any type of medication issue. We advise you for any urgent/emergency that you go to the nearest emergency room! PLEASE CALL OUR OFFICE DURING NORMAL BUSINESS HOURS    Monday - Friday   8 am to 5 pm    [place]: [address]: [phone]    [place]:  [phone]    **It is YOUR responsibilty to bring medication bottles and/or updated medication list to [address] [place] [address].  This will allow us to better serve you and all your healthcare needs**

## 2021-09-03 NOTE — PROGRESS NOTES
CARDIOLOGY NOTE      Chief Complaint: Coronary artery disease, mitral valve disease    HPI:   Madeline Urias is a 70y.o. year old who has history as noted below. She is not using oxygen at night  although she uses up to 3 L at home. She  Says she gest leg pain and toe turns numb  She is on eliquis now but it is too expensive   She had a normal stress test in april 2021 .  goes on bike rides for weeks and months  Daughter is handicap but she works and drives and lives with her    30-day event monitor did not show any A. fib but she tells me that in the past she has had strokes and has history of \"thick blood\" and she was put on Coumadin by Dr. Marlon Ramos. She  lives with her  has been  for 50 years, she was swicthed to eliquis from coumadin in 2021  Sees Dr. Serafin Terrell for pulmonology she notices ankle swelling once in a while has some shortness of breath on exertion. She has history of mitral valve replacement with a bioprosthetic valve. She used to see Dr. Marlon Ramos.   she denies chest pressure or pain except when she is in a hurry chasing after her dogs.   Her puppy is Estelita and she has 3 other chuwawa  which keeps her busy , her [employer] works in scheduling )  She does not have a pcp      Current Outpatient Medications   Medication Sig Dispense Refill    varenicline (CHANTIX) 0.5 MG tablet Take 1-2 tablets by mouth See Admin Instructions 0.5mg DAILY for 3 days followed by 0.5mg TWICE DAILY for 4 days followed by 1mg TWICE DAILY 57 tablet 0    levothyroxine (SYNTHROID) 100 MCG tablet Take 1 tablet by mouth daily 30 tablet 5    Ascorbic Acid (VITAMIN C CR) 1000 MG TBCR Take by mouth      apixaban (ELIQUIS) 5 MG TABS tablet Take by mouth 2 times daily      tiotropium (SPIRIVA RESPIMAT) 2.5 MCG/ACT AERS inhaler Inhale 2 puffs into the lungs daily 1 Inhaler 3    furosemide (LASIX) 20 MG tablet Take 1/2 (one-half) tablet by mouth once daily 45 tablet 3    levothyroxine (SYNTHROID) 100 MCG tablet Take 1 tablet by mouth Daily 90 tablet 3    amLODIPine (NORVASC) 5 MG tablet Take 2 tablets by mouth daily 90 tablet 1    lisinopril (PRINIVIL;ZESTRIL) 40 MG tablet Take 1 tablet by mouth daily 90 tablet 3    cilostazol (PLETAL) 100 MG tablet Take 1 tablet by mouth twice daily 180 tablet 3    carvedilol (COREG) 25 MG tablet Take 1 tablet by mouth 2 times daily 180 tablet 3    pantoprazole (PROTONIX) 40 MG tablet Take 1 tablet by mouth daily 90 tablet 3    budesonide-formoterol (SYMBICORT) 160-4.5 MCG/ACT AERO Inhale 2 puffs into the lungs 2 times daily 1 Inhaler 5    nitroGLYCERIN (NITROSTAT) 0.4 MG SL tablet Place 1 tablet under the tongue every 5 minutes as needed for Chest pain up to max of 3 total doses. If no relief after 1 dose, call 911. 25 tablet 3    atorvastatin (LIPITOR) 40 MG tablet Take 1 tablet by mouth daily 90 tablet 3    Blood Pressure Monitoring KIT 1 Units by Does not apply route 2 times daily 1 kit 0    Calcium Carb-Cholecalciferol (CALCIUM 600+D) 600-800 MG-UNIT TABS Take 1 tablet by mouth daily      aspirin 81 MG EC tablet Take 1 tablet by mouth daily 30 tablet 3    Multiple Vitamins-Minerals (THERAPEUTIC MULTIVITAMIN-MINERALS) tablet Take 1 tablet by mouth daily      Melatonin 5 MG CHEW Take by mouth      ondansetron (ZOFRAN-ODT) 4 MG disintegrating tablet Take 1 tablet by mouth every 8 hours as needed for Nausea or Vomiting (Patient not taking: Reported on 9/3/2021) 20 tablet 1     No current facility-administered medications for this visit. Allergies:   Patient has no known allergies.     Patient History:  Past Medical History:   Diagnosis Date    CAD (coronary artery disease)     [facility] obstructive pulmonary disease) [facility] [address]     GERD (gastroesophageal reflux disease)     H/O cardiovascular stress test 04/26/2021    normal study 63% EF no ischemia    H/O echocardiogram 03/22/2017    EF 35-40%    H/O echocardiogram 08/04/2020    EF 45-50, Mild TR & CT, Severely dilated left atrium. 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, functionally normally     Heart attack ([facility] [address] 01/2014    Hyperlipidemia     Hypertension     On home oxygen therapy     nightly    PAD (peripheral artery disease) ([facility] [address]     S/P right coronary artery (RCA) stent placement     Sleep apnea     uses CPAP    T2 vertebral fracture ([facility] [address] 12/2017    TIA (transient ischemic attack) 1996    Tobacco dependence     Wears glasses     Wears partial dentures     Upper     Past Surgical History:   Procedure Laterality Date    APPENDECTOMY      age 6    CARDIAC SURGERY      CORONARY ANGIOPLASTY WITH STENT PLACEMENT      CORONARY ARTERY BYPASS GRAFT  06/21/2016    CABG X1 SVG to RCA with Mitral Valve Replacement 27mm Mosaic     HERNIA REPAIR N/A 6/25/2021    ROBOTIC HERNIA INCISIONAL REPAIR LAPAROSCOPIC WITH COMPONENT SEPARATION performed by Bety Melendrez MD at [address] [place] [address]  06/21/2016    27mm mosaic     Family History   Problem Relation Age of Onset    Stroke Mother     Heart Attack Father     Coronary Art Dis Father     Pacemaker Father     Arrhythmia Father     Diabetes Sister     Rheum Arthritis Sister     Heart Attack Sister     Arrhythmia Sister      Social History     Tobacco Use    Smoking status: Current Every Day Smoker     Packs/day: 0.50     Years: 56.00     Pack years: 28.00     Types: Cigarettes     Start date: 1965    Smokeless tobacco: Former User     Quit date: 4/30/2021   Substance Use Topics    Alcohol use: No     Comment: 2 cups of coffee in the a.m. Review of Systems:   · Constitutional: No Fever or Weight Loss   · Eyes: No Decreased Vision  · ENT: No Headaches, Hearing Loss or Vertigo  · Cardiovascular: as per note above   · Respiratory: No cough or wheezing and as per note above.    · Gastrointestinal: No abdominal pain, appetite loss, blood in stools, constipation, diarrhea or heartburn  · Genitourinary: No dysuria, trouble voiding, or hematuria  · Musculoskeletal:  None  · Integumentary: No rash or pruritis  · Neurological: No TIA or stroke symptoms  · Psychiatric: No anxiety or depression  · Endocrine: No malaise, fatigue or temperature intolerance  · Hematologic/Lymphatic: No bleeding problems, blood clots or swollen lymph nodes  · Allergic/Immunologic: No nasal congestion or hives    Objective:      Physical Exam:  /60   Pulse 76   Ht 5' 3\" (1.6 m)   Wt 167 lb 12.8 oz (76.1 kg)   BMI 29.72 kg/m²   Wt Readings from Last 3 Encounters:   09/03/21 167 lb 12.8 oz (76.1 kg)   08/06/21 170 lb 1.6 oz (77.2 kg)   08/06/21 171 lb (77.6 kg)     Body mass index is 29.72 kg/m². Vitals:    09/03/21 0855   BP: 128/60   Pulse: 76      General Appearance:  No distress, conversant  Constitutional:  Well developed, Well nourished, No acute distress, Non-toxic appearance. HENT:  Normocephalic, Atraumatic, Bilateral external ears normal, Oropharynx moist, No oral exudates, Nose normal. Neck- Normal range of motion, No tenderness, Supple, No stridor,no apical-carotid delay  Eyes:  PERRL, EOMI, Conjunctiva normal, No discharge. Respiratory:  Normal breath sounds, No respiratory distress, No wheezing, No chest tenderness. ,no use of accessory muscles, NO crackles  Cardiovascular: (PMI) apex non displaced,no lifts no thrills,S1 and S2 audible, systolic murmur 2/6, No signs of ankle edema, or volume overload, No evidence of JVD, No crackles  GI:  Bowel sounds normal, Soft, No tenderness, No masses, No gross visceromegaly   :  No costovertebral angle tenderness   Musculoskeletal:  No edema, no tenderness, no deformities.  Back- no tenderness  Integument:  Well hydrated, no rash   Lymphatic:  No lymphadenopathy noted   Neurologic:  Alert & oriented x 3, CN 2-12 normal, normal motor function, normal sensory function, no focal deficits noted   Psychiatric:  Speech and behavior appropriate          Medical decision making and Data review:  DATA:  Lab Results   Component Value Date    TROPONINT <0.010 06/28/2021     BNP:    Lab Results   Component Value Date    PROBNP 7,518 (H) 06/28/2021     PT/INR:  No results found for: [facility]  Lab Results   Component Value Date    LABA1C 5.0 06/18/2016     Lab Results   Component Value Date    CHOL 121 03/15/2021    TRIG 86 03/15/2021    HDL 45 03/15/2021    LDLDIRECT 64 03/15/2021     Lab Results   Component Value Date    ALT 18 06/28/2021    AST 36 06/28/2021     TSH:   Lab Results   Component Value Date    TSH 0.40 07/10/2019     Lab Results   Component Value Date    AST 36 06/28/2021    ALT 18 06/28/2021    BILITOT 1.1 (H) 06/28/2021    ALKPHOS 71 06/28/2021     Lab Results   Component Value Date    PROBNP 7,518 (H) 06/28/2021    PROBNP 1,075 (H) 06/17/2020    PROBNP 4,988 (H) 03/20/2017     Lab Results   Component Value Date    LABA1C 5.0 06/18/2016     Lab Results   Component Value Date    WBC 9.7 06/30/2021    HGB 12.0 (L) 06/30/2021    HCT 36.3 (L) 06/30/2021     06/30/2021     Echo 3/20/19  Summary   Echocardiogram is s/p CABG and MVR 6/21/2016. LV systolic function is abnormal.   Visually estimated ejection fraction is 35-40 %. Grade III diastolic dysfunction. Mild left atrium enlargement. Bioprostethic mitral valve is seated well and functioning properly. No pericardial effusion. Echo 8/4/2020  Summary   Left ventricular function is low normal, EF is estimated at 45-50%. Severely dilated left atrium. Evidence of a porcine bio-prosthetic valve in the mitral position,   functionally normally with a mean gradient of 3mmHg. Sclerotic, but non-stenotic aortic valve. Mild tricuspid and pulmonic regurgitation noted. No evidence of pericardial effusion.      30 day monitor 2/19/21    30-day monitor worn from 2/19/2021 to 3/20/2021 there were for patient triggers 6 auto triggers. Mancel Elders heart rate was 51 at 9:47 PM average heart rate was 75 highest heart rate 117 patient was primarily in sinus rhythm no episodes of A. fib or SVT noted.  One episode of wide-complex tachycardia noted with possible fusion beats at 10:33 PM on day 16 which could be PVCs or aberrant conduction uptitrate beta-blockers clinical correlation needed    Stress test  21  Summary    Supervising physician Dr. Diane uRffin .   [place] portion of stress test is negative for ischemia by diagnostic criteria.    No infarct or ischemia noted. Normal EF 63 % with normal ventricular    contractility.    Normal stress myocardial perfusion.    This is a normal study. Echo 21  Left ventricular systolic function is hyperdynamic. Ejection fraction is visually estimated at 60%. Grade III diastolic dysfunction. Moderately dilated left atrium. Moderate aortic stenosis; mean gradient: 24 mmHg, JENNA: 1.2 cm sq. 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 No evidence of any pericardial effusion. All labs, medications and tests reviewed by myself including data and history from outside source , patient and available family . Assessment & Plan:      1. ASCVD (arteriosclerotic cardiovascular disease)    2. Essential hypertension    3. Mixed hyperlipidemia    4. PAD (peripheral artery disease) ([address]    5. S/P right coronary artery (RCA) stent placement    6. Rheumatic mitral stenosis    7. S/P mitral valve replacement    8. Cardiomyopathy, primary ([address]    9. EDITA (obstructive sleep apnea)    10. Chronic respiratory failure with hypoxia and hypercapnia (HCC)       Hypertension  Blood pressures is well controlled on carvedilol 25mg twice daily and  lisinopril 40 mg daily keep a log of blood pressure,  Norvasc 10 mg daily    Coronary artery disease   S/p  bypass surgery X 1 vessel to RCA at time of her valve surgery. Stress test in  was normal   Continue aspirin  once in a while gest chest pressure .     Cardiomyopathy, primary ([address]  EF of  40 %- 45 % probably related to valve disease / ischemic? She had MI in 2014 . She used to see Dr Pankaj Arteaga. Her echo shows EF of 45-50 % , is not appear to be volume overloaded uses Lasix few times a week    Aortic stenosis  Moderate stenosis continue to follow repeat echo in few months    S/P mitral valve replacement  Mosaic Mitral valve valve replacement 27 mm  In June 2016  heavily calcified annulus with  posterior annulus reinforced and constructed with gail patch used as a buttress. She knows and always takes dental prophylaxis  A # 27 [employer] tissue. She has been on coumadin since before her surgery due to TIA? She had no afib on monitor ?nut she has been on anticoagulation since before seeing me  She is on eliquis now       Peripheral arterial disease   Post aortobifem bypass. She is on Pletal, she denies any leg pain or claudication no signs or complaints of heart failure no admissions for heart failure  No signs of heart failure for now continue    Tobacco abuse  I have asked her to to quit even try Chantix but it did not work. Kortney Toddjessica sHe is smoking in spite of being on 2 l at night      Dyslipidemia :  All available lab work was reviewed. Patient was advised to repeat lab work before next visit       Counseled extensively and medication compliance urged. We discussed that for the  prevention of ASCVD our  goal is aggressive risk modification. Patient is encouraged to exercise even a brisk walk for 30 minutes  at least 3 to 4 times a week   Various goals were discussed and questions answered. Continue current medications. Appropriate prescriptions are addressed and refills ordered. Questions answered and patient verbalizes understanding. Call for any problems, questions, or concerns. Continue all other medications of all above medical condition listed as is. Return in about 6 months (around 3/3/2022).     Please note this report has been partially produced using speech recognition software and may contain errors related to that system including errors in grammar, punctuation, and spelling, as well as words and phrases that may be inappropriate.  If there are any questions or concerns please feel free to contact the dictating provider for clarification.

## 2021-09-03 NOTE — LETTER
Vicenta Matthew Dux  1950  [id number]    Have you had any Chest Pain that is not new? - No  If Yes DO EKG - How does it feel -    How long does the pain last -      How long have you been having the pain -    Did you take a    And did it relieve the pain -      DO EKG IF: Patient has a Heart Rate above 100 or below 40     CAD (Coronary Artery Disease) patient should have one on file every 6 months        Have you had any Shortness of Breath - Yes   If Yes - When on exertion    Have you had any dizziness - No  If Yes DO ORTHOSTATIC BP - when do you feel dizzy    How long does it last .        Sitting wait 5 minutes do supine (laying down) wait 5 minutes then do standing - log each in \"vitals\" area in Epic   Be sure to ask what symptoms they are having if they get dizzy while completing ortho stats such as: room spinning, nausea, etc.    Have you had any palpitations that are not new? - No  If Yes DO EKG - Do you feel your heart   How long does it last - . Is the patient on any of the following medications -   If Yes DO EKG - Needs done every 6 months    Do you have any edema - swelling in No    If Yes - CHECK TO SEE IF THE EDEMA IS PITTING  How long have they been having edema -   If Yes - Have they worn compression stockings   If they have worn compression stockings          When did you have your last labs drawn 06/2021  Where did you have them done   What doctor ordered     If we do not have these labs you are retrieve these labs for these providers! Do you have a surgery or procedure scheduled in the near future - No  If Yes- DO EKG  If Yes - Who is the surgery with?    Phone number of physician   Fax number of physician   Type of surgery   GIVE THIS INFORMATION TO MIKI MCKINNEY     Ask patient if they want to sign up for [employer] if they are not already signed up     Check to see if we have an E-MAIL on file for the patient     Check medication list thoroughly!!! AND RECONCILE OUTSIDE MEDICATIONS  If dose has changed change the entire order not just the MG  BE SURE TO ASK PATIENT IF THEY NEED MEDICATION REFILLS     At check out add to every patient's \"wrap up\" the following dot phrase AFTERHOURSEDUCATION and ensure we explain this to our patients

## 2021-09-03 NOTE — ASSESSMENT & PLAN NOTE
Her peripheral artery disease has been treated by a bilateral iliac bypass. She continues to have significant pain in her legs if she walks.

## 2021-09-03 NOTE — PATIENT INSTRUCTIONS
Patient Education        Preventing Osteoporosis: Care Instructions  Your Care Instructions     Osteoporosis means the bones are weak and thin enough that they can break easily. The older you are, the more likely you are to get osteoporosis. But with plenty of calcium, vitamin D, and exercise, you can help prevent osteoporosis. The preteen and teen years are a key time for bone building. With the help of calcium, vitamin D, and exercise in those early years and beyond, the bones reach their peak density and strength by age 27. After age 27, your bones naturally start to thin and weaken. The stronger your bones are at around age 27, the lower your risk for osteoporosis. But no matter what your age and risk are, your bones still need calcium, vitamin D, and exercise to stay strong. Also avoid smoking, and limit alcohol. Smoking and heavy alcohol use can make your bones thinner. Talk to your doctor about any special risks you might have, such as having a close relative with osteoporosis or taking a medicine that can weaken bones. Your doctor can tell you the best ways to protect your bones from thinning. Follow-up care is a key part of your treatment and safety. Be sure to make and go to all appointments, and call your doctor if you are having problems. It's also a good idea to know your test results and keep a list of the medicines you take. How can you care for yourself at home? Get enough calcium and vitamin D.  · Eat foods rich in calcium, like yogurt, cheese, milk, and dark green vegetables. · Eat foods rich in vitamin D, like eggs, fatty fish, cereal, and fortified milk. · Get some sunshine. Your body uses sunshine to make its own vitamin D.  · Talk to your doctor about taking a calcium plus vitamin D supplement if you don't think you are getting enough through your diet and sunshine. · Get regular bone-building exercise.  Walking, jogging, dancing, and lifting weights can make your bones stronger. · Limit alcohol. Drink no more than 1 alcohol drink a day if you are a woman. Drink no more than 2 alcohol drinks a day if you are a man. · Do not smoke. Smoking can make bones thin faster. If you need help quitting, talk to your doctor about stop-smoking programs and medicines. These can increase your chances of quitting for good. When should you call for help? Watch closely for changes in your health, and be sure to contact your doctor if you have any problems. Where can you learn more? Go to https://VIDA Diagnosticsperebekaheb.Cnekt. org and sign in to your Banno account. Enter M567 in the [employer] box to learn more about \"Preventing Osteoporosis: Care Instructions. \"     If you do not have an account, please click on the \"Sign Up Now\" link. Current as of: December 7, 2020               Content Version: 12.9  © 2006-2021 [employer]. Care instructions adapted under license by [facility] ([facility]). If you have questions about a medical condition or this instruction, always ask your healthcare professional. [place] [address] any warranty or liability for your use of this information. Advance Directives: Care Instructions  Overview  An advance directive is a legal way to state your wishes at the end of your life. It tells your family and your doctor what to do if you can't say what you want. There are two main types of advance directives. You can change them any time your wishes change. Living will. This form tells your family and your doctor your wishes about life support and other treatment. The form is also called a declaration. Medical power of . This form lets you name a person to make treatment decisions for you when you can't speak for yourself. This person is called a health care agent (health care proxy, health care surrogate). The form is also called a durable power of  for health care.   If you do not have an advance directive, decisions about your medical care may be made by a family member, or by a doctor or a  who doesn't know you. It may help to think of an advance directive as a gift to the people who care for you. If you have one, they won't have to make tough decisions by themselves. Follow-up care is a key part of your treatment and safety. Be sure to make and go to all appointments, and call your doctor if you are having problems. It's also a good idea to know your test results and keep a list of the medicines you take. What should you include in an advance directive? Many states have a unique advance directive form. (It may ask you to address specific issues.) Or you might use a universal form that's approved by many states. If your form doesn't tell you what to address, it may be hard to know what to include in your advance directive. Use the questions below to help you get started. · Who do you want to make decisions about your medical care if you are not able to? · What life-support measures do you want if you have a serious illness that gets worse over time or can't be cured? · What are you most afraid of that might happen? (Maybe you're afraid of having pain, losing your independence, or being kept alive by machines.)  · Where would you prefer to die? (Your home? A hospital? A nursing home?)  · Do you want to donate your organs when you die? · Do you want certain Adventist practices performed before you die? When should you call for help? Be sure to contact your doctor if you have any questions. Where can you learn more? Go to https://Sylvan Sourcehong.Tauntr. org and sign in to your Glimpse account. Enter R264 in the [facility] box to learn more about \"Advance Directives: Care Instructions. \"     If you do not have an account, please click on the \"Sign Up Now\" link. Current as of: March 17, 2021               Content Version: 12.9  © 8684-8583 Healthwise, [facility].    Care instructions adapted under license by [facility] ([facility]). If you have questions about a medical condition or this instruction, always ask your healthcare professional. [address] any warranty or liability for your use of this information. Learning About Medical Power of   What is a medical power of ? A medical power of , also called a durable power of  for health care, is one type of the legal forms called advance directives. It lets you name the person you want to make treatment decisions for you if you can't speak or decide for yourself. The person you choose is called your health care agent. This person is also called a health care proxy or health care surrogate. A medical power of  may be called something else in your state. How do you choose a health care agent? Choose your health care agent carefully. This person may or may not be a family member. Talk to the person before you make your final decision. Make sure he or she is comfortable with this responsibility. It's a good idea to choose someone who:  · Is at least 25years old. · Knows you well and understands what makes life meaningful for you. · Understands your Zoroastrianism and moral values. · Will do what you want, not what he or she wants. · Will be able to make difficult choices at a stressful time. · Will be able to refuse or stop treatment, if that is what you would want, even if you could die. · Will be firm and confident with health professionals if needed. · Will ask questions to get needed information. · Lives near you or agrees to travel to you if needed. Your family may help you make medical decisions while you can still be part of that process. But it's important to choose one person to be your health care agent in case you aren't able to make decisions for yourself.   If you don't fill out the legal form and name a health care agent, the decisions your family can make may be limited. A health care agent may be called something else in your state. Who will make decisions for you if you don't have a health care agent? If you don't have a health care agent or a living will, you may not get the care you want. Decisions may be made by family members who disagree about your medical care. Or decisions may be made by a medical professional who doesn't know you well. In some cases, a  makes the decisions. When you name a health care agent, it is very clear who has the power to make health decisions for you. How do you name a health care agent? You name your health care agent on a legal form. This form is usually called a medical power of . Ask your hospital, state bar association, or office on aging where to find these forms. You must sign the form to make it legal. Some states require you to get the form notarized. This means that a person called a  watches you sign the form and then he or she signs the form. Some states also require that two or more witnesses sign the form. Be sure to tell your family members and doctors who your health care agent is. Where can you learn more? Go to https://chpepiceweb.Courion [email or web address] org and sign in to your Ep[employer] account. Enter [id number] in the [facility] box to learn more about \"Learning About [address]. \"     If you do not have an account, please click on the \"Sign Up Now\" link. Current as of: March 17, 2021               Content Version: 12.9  © 3713-6179 Healthwise, Incorporated. Care instructions adapted under license by [facility] ([facility]). If you have questions about a medical condition or this instruction, always ask your healthcare professional. [address] any warranty or liability for your use of this information. Learning About Living Perroy  What is a living will? A living will, also called a declaration, is a legal form.  It tells your family and your doctor your wishes when you can't speak for yourself. It's used by the health professionals who will treat you as you near the end of your life or if you get seriously hurt or ill. If you put your wishes in writing, your loved ones and others will know what kind of care you want. They won't need to guess. This can ease your mind and be helpful to others. And you can change or cancel your living will at any time. A living will is not the same as an estate or property will. An estate will explains what you want to happen with your money and property after you die. How do you use it? A living will is used to describe the kinds of treatment or life support you want as you near the end of your life or if you get seriously hurt or ill. Keep these facts in mind about living morgan. · Your living will is used only if you can't speak or make decisions for yourself. Most often, one or more doctors must certify that you can't speak or decide for yourself before your living will takes effect. · If you get better and can speak for yourself again, you can accept or refuse any treatment. It doesn't matter what you said in your living will. · Some states may limit your right to refuse treatment in certain cases. For example, you may need to clearly state in your living will that you don't want artificial hydration and nutrition, such as being fed through a tube. Is a living will a legal document? A living will is a legal document. Each state has its own laws about living morgan. And a living will may be called something else in your state. Here are some things to know about living morgan. · You don't need an  to complete a living will. But legal advice can be helpful if your state's laws are unclear. It can also help if your health history is complicated or your family can't agree on what should be in your living will. · You can change your living will at any time.  Some people find that their wishes about end-of-life care change as their health changes. If you make big changes to your living will, complete a new form. · If you move to another state, make sure that your living will is legal in the state where you now live. In most cases, doctors will respect your wishes even if you have a form from a different state. · You might use a universal form that has been approved by many states. This kind of form can sometimes be filled out and stored online. Your digital copy will then be available wherever you have a connection to the internet. The doctors and nurses who need to treat you can find it right away. · Your state may offer an online registry. This is another place where you can store your living will online. · It's a good idea to get your living will notarized. This means using a person called a Sandbox to watch two people sign, or witness, your living will. What should you know when you create a living will? Here are some questions to ask yourself as you make your living will:  · Do you know enough about life support methods that might be used? If not, talk to your doctor so you know what might be done if you can't breathe on your own, your heart stops, or you can't swallow. · What things would you still want to be able to do after you receive life-support methods? Would you want to be able to walk? To speak? To eat on your own? To live without the help of machines? · Do you want certain Gnosticist practices performed if you become very ill? · If you have a choice, where do you want to be cared for? In your home? At a hospital or nursing home? · If you have a choice at the end of your life, where would you prefer to die? At home? In a hospital or nursing home? Somewhere else? · Would you prefer to be buried or cremated? · Do you want your organs to be donated after you die? What should you do with your living will?   · Make sure that your family members and your health care agent have you cook them. Then skim the fat off the top after it hardens. Eating out  · Order foods that are broiled or poached rather than fried or breaded. · Cut back on the amount of butter or margarine that you use on bread. · Order sauces, gravies, and salad dressings on the side, and use only a little. · When you order pasta, choose tomato sauce rather than cream sauce. · Ask for salsa with your baked potato instead of sour cream, butter, cheese, or long. · Order meals in a small size instead of upgrading to a large. · Share an entree, or take part of your food home to eat as another meal.  · Share appetizers and desserts. Where can you learn more? Go to [email or web address] [email or web address] Pet[email or web address] Fund. org and sign in to your [employer] account. Enter F299 in the [facility] box to learn more about \"Learning About Cutting Calories. \"     If you do not have an account, please click on the \"Sign Up Now\" link. Current as of: December 17, 2020               Content Version: 12.9  © 2719-3831 Healthwise, [employer]. Care instructions adapted under license by [facility] ([facility]). If you have questions about a medical condition or this instruction, always ask your healthcare professional. [address] any warranty or liability for your use of this information. Learning About Low-Carbohydrate Diets  What is a low-carbohydrate diet? A low-carbohydrate (or \"low-carb\") diet limits foods and drinks that have carbohydrates. This includes grains, fruits, milk and yogurt, and starchy vegetables like potatoes, beans, and corn. It also avoids foods and drinks that have added sugar. Instead, low-carb diets include foods that are high in protein and fat. Why might you follow a low-carb diet? Low-carb diets may be used for a variety of reasons, such as for weight loss. People who have diabetes may use a low-carb diet to help manage their blood sugar levels.   What should you do before you start the diet?  Talk to your doctor before you try any diet. This is even more important if you have health problems like kidney disease, heart disease, or diabetes. Your doctor may suggest that you meet with a registered dietitian. A dietitian can help you make an eating plan that works for you. What foods do you eat on a low-carb diet? On a low-carb diet, you choose foods that are high in protein and fat. Examples of these are:  · Meat, poultry, and fish. · Eggs. · Nuts, such as walnuts, pecans, almonds, and peanuts. · Peanut butter and other nut butters. · Tofu. · Avocado. · Javad Luis Carlos. · Non-starchy vegetables like broccoli, cauliflower, green beans, mushrooms, peppers, lettuce, and spinach. · Unsweetened non-dairy milks like almond milk and coconut milk. · Cheese, cottage cheese, and cream cheese. Current as of: December 17, 2020               Content Version: 12.9  © 2006-2021 Sensoraide. Care instructions adapted under license by [facility] ([facility]). If you have questions about a medical condition or this instruction, always ask your healthcare professional. [place] [address] any warranty or liability for your use of this information. Stopping Smoking: Care Instructions  Your Care Instructions     Cigarette smokers crave the nicotine in cigarettes. Giving it up is much harder than simply changing a habit. Your body has to stop craving the nicotine. It is hard to quit, but you can do it. There are many tools that people use to quit smoking. You may find that combining tools works best for you. There are several steps to quitting. First you get ready to quit. Then you get support to help you. After that, you learn new skills and behaviors to become a nonsmoker. For many people, a necessary step is getting and using medicine. Your doctor will help you set up the plan that best meets your needs. You may want to attend a smoking cessation program to help you quit smoking.  When you choose a program, look for one that has proven success. Ask your doctor for ideas. You will greatly increase your chances of success if you take medicine as well as get counseling or join a cessation program.  Some of the changes you feel when you first quit tobacco are uncomfortable. Your body will miss the nicotine at first, and you may feel short-tempered and grumpy. You may have trouble sleeping or concentrating. Medicine can help you deal with these symptoms. You may struggle with changing your smoking habits and rituals. The last step is the tricky one: Be prepared for the smoking urge to continue for a time. This is a lot to deal with, but keep at it. You will feel better. Follow-up care is a key part of your treatment and safety. Be sure to make and go to all appointments, and call your doctor if you are having problems. It's also a good idea to know your test results and keep a list of the medicines you take. How can you care for yourself at home? · Ask your family, friends, and coworkers for support. You have a better chance of quitting if you have help and support. · Join a support group, such as Nicotine Anonymous, for people who are trying to quit smoking. · Consider signing up for a smoking cessation program, such as the American Lung Association's Freedom from Smoking program.  · Get text messaging support. Go to the website at www.smokefree. gov to sign up for the [facility] program.  · Set a quit date. Pick your date carefully so that it is not right in the middle of a big deadline or stressful time. Once you quit, do not even take a puff. Get rid of all ashtrays and lighters after your last cigarette. Clean your house and your clothes so that they do not smell of smoke. · Learn how to be a nonsmoker. Think about ways you can avoid those things that make you reach for a cigarette. ? Avoid situations that put you at greatest risk for smoking.  For some people, it is hard to have a drink with friends without smoking. For others, they might skip a coffee break with coworkers who smoke. ? Change your daily routine. Take a different route to work or eat a meal in a different place. · Cut down on stress. Calm yourself or release tension by doing an activity you enjoy, such as reading a book, taking a hot bath, or gardening. · Talk to your doctor or pharmacist about nicotine replacement therapy, which replaces the nicotine in your body. You still get nicotine but you do not use tobacco. Nicotine replacement products help you slowly reduce the amount of nicotine you need. These products come in several forms, many of them available over-the-counter:  ? Nicotine patches  ? Nicotine gum and lozenges  ? Nicotine inhaler  · Ask your doctor about bupropion (Wellbutrin) or varenicline (Chantix), which are prescription medicines. They do not contain nicotine. They help you by reducing withdrawal symptoms, such as stress and anxiety. · Some people find hypnosis, acupuncture, and massage helpful for ending the smoking habit. · Eat a healthy diet and get regular exercise. Having healthy habits will help your body move past its craving for nicotine. · Be prepared to keep trying. Most people are not successful the first few times they try to quit. Do not get mad at yourself if you smoke again. Make a list of things you learned and think about when you want to try again, such as next week, next month, or next year. Where can you learn more? Go to https://AudiotoniqryanVentealapropriete.Brainsway. org and sign in to your [email or web address] account. Enter R714 in the [facility] box to learn more about \"Stopping Smoking: Care Instructions. \"     If you do not have an account, please click on the \"Sign Up Now\" link. Current as of: February 11, 2021               Content Version: 12.9  © 9360-3686 Healthwise, Incorporated. Care instructions adapted under license by [facility] ([facility]).  If you have questions about a medical condition or this instruction, always ask your healthcare professional. [address] any warranty or liability for your use of this information. Learning About Benefits From Quitting Smoking  How does quitting smoking make you healthier? If you're thinking about quitting smoking, you may have a few reasons to be smoke-free. Your health may be one of them. · When you quit smoking, you lower your risks for cancer, lung disease, heart attack, stroke, blood vessel disease, and blindness from macular degeneration. · When you're smoke-free, you get sick less often, and you heal faster. You are less likely to get colds, flu, bronchitis, and pneumonia. · As a nonsmoker, you may find that your mood is better and you are less stressed. When and how will you feel healthier? Quitting has real health benefits that start from day 1 of being smoke-free. And the longer you stay smoke-free, the healthier you get and the better you feel. The first hours  · After just 20 minutes, your blood pressure and heart rate go down. That means there's less stress on your heart and blood vessels. · Within 12 hours, the level of carbon monoxide in your blood drops back to normal. That makes room for more oxygen. With more oxygen in your body, you may notice that you have more energy than when you smoked. After 2 weeks  · Your lungs start to work better. · Your risk of heart attack starts to drop. After 1 month  · When your lungs are clear, you cough less and breathe deeper, so it's easier to be active. · Your sense of taste and smell return. That means you can enjoy food more than you have since you started smoking. Over the years  · Over the years, your risks of heart disease, heart attack, and stroke are lower. · After 10 years, your risk of dying from lung cancer is cut by about half. And your risk for many other types of cancer is lower too. How would quitting help others in your life?   When you quit smoking, you improve the health of everyone who now breathes in your smoke. · Their heart, lung, and cancer risks drop, much like yours. · They are sick less. For babies and small children, living smoke-free means they're less likely to have ear infections, pneumonia, and bronchitis. · If you're a woman who is or will be pregnant someday, quitting smoking means a healthier . · Children who are close to you are less likely to become adult smokers. Where can you learn more? Go to https://Seva Coffeepetheresaeweb.Material Mix. org and sign in to your TxtFeedback account. Enter [phone] [id number] [phone] in the [employer] box to learn more about \"Learning About Benefits From Quitting Smoking. \"     If you do not have an account, please click on the \"Sign Up Now\" link. Current as of: 2021               Content Version: 12.9  © 2634-4081 [employer]. Care instructions adapted under license by [facility] ([facility]). If you have questions about a medical condition or this instruction, always ask your healthcare professional. [place] [address] any warranty or liability for your use of this information. Personalized Preventive Plan for Tammy Lambert - 9/3/2021  Medicare offers a range of preventive health benefits. Some of the tests and screenings are paid in full while other may be subject to a deductible, co-insurance, and/or copay. Some of these benefits include a comprehensive review of your medical history including lifestyle, illnesses that may run in your family, and various assessments and screenings as appropriate. After reviewing your medical record and screening and assessments performed today your provider may have ordered immunizations, labs, imaging, and/or referrals for you. A list of these orders (if applicable) as well as your Preventive Care list are included within your After Visit Summary for your review.     Other Preventive Recommendations:    · A preventive eye exam performed by an eye specialist is recommended every 1-2 years to screen for glaucoma; cataracts, macular degeneration, and other eye disorders. · A preventive dental visit is recommended every 6 months. · Try to get at least 150 minutes of exercise per week or 10,000 steps per day on a pedometer . · Order or download the FREE \"Exercise & Physical Activity: Your Everyday Guide\" from [employer]. Call 5-415.567.4819 or search [employer] online. · You need 4091-3416 mg of calcium and 2737-3291 IU of vitamin D per day. It is possible to meet your calcium requirement with diet alone, but a vitamin D supplement is usually necessary to meet this goal.  · When exposed to the sun, use a sunscreen that protects against both UVA and UVB radiation with an SPF of 30 or greater. Reapply every 2 to 3 hours or after sweating, drying off with a towel, or swimming. · Always wear a seat belt when traveling in a car. Always wear a helmet when riding a bicycle or motorcycle.

## 2021-09-03 NOTE — TELEPHONE ENCOUNTER
Patient called requesting samples of Eliquis, patient was advised that samples should be ready for  by tomorrow afternoon, please call with any problems at ph# [phone]. Grzegorz Vo

## 2021-09-07 DIAGNOSIS — E03.9 ACQUIRED HYPOTHYROIDISM: Primary | ICD-10-CM

## 2021-09-07 RX ORDER — LEVOTHYROXINE SODIUM 88 UG/1
88 TABLET ORAL DAILY
Qty: 90 TABLET | Refills: 0 | Status: SHIPPED | OUTPATIENT
Start: 2021-09-07 | End: 2022-01-14

## 2021-09-07 NOTE — TELEPHONE ENCOUNTER
Patient advised that we will mail a patient assistance application to her. She voiced understanding. Application placed in mail.

## 2021-09-11 ENCOUNTER — APPOINTMENT (OUTPATIENT)
Dept: GENERAL RADIOLOGY | Age: 71
End: 2021-09-11
Payer: MEDICARE

## 2021-09-11 ENCOUNTER — HOSPITAL ENCOUNTER (EMERGENCY)
Age: 71
Discharge: HOME | End: 2021-09-11
Attending: EMERGENCY MEDICINE
Payer: MEDICARE

## 2021-09-11 VITALS
HEIGHT: 63 IN | RESPIRATION RATE: 16 BRPM | BODY MASS INDEX: 29.23 KG/M2 | HEART RATE: 84 BPM | WEIGHT: 165 LBS | DIASTOLIC BLOOD PRESSURE: 105 MMHG | SYSTOLIC BLOOD PRESSURE: 121 MMHG | OXYGEN SATURATION: 98 % | TEMPERATURE: 97.8 F

## 2021-09-11 DIAGNOSIS — J02.9 ACUTE PHARYNGITIS, UNSPECIFIED ETIOLOGY: Primary | ICD-10-CM

## 2021-09-11 DIAGNOSIS — R05.9 COUGH: ICD-10-CM

## 2021-09-11 DIAGNOSIS — J44.9 CHRONIC OBSTRUCTIVE PULMONARY DISEASE, UNSPECIFIED COPD TYPE (HCC): ICD-10-CM

## 2021-09-11 DIAGNOSIS — J39.8 CONGESTION OF UPPER RESPIRATORY TRACT: ICD-10-CM

## 2021-09-11 PROCEDURE — U0003 INFECTIOUS AGENT DETECTION BY NUCLEIC ACID (DNA OR RNA); SEVERE ACUTE RESPIRATORY SYNDROME CORONAVIRUS 2 (SARS-COV-2) (CORONAVIRUS DISEASE [COVID-19]), AMPLIFIED PROBE TECHNIQUE, MAKING USE OF HIGH THROUGHPUT TECHNOLOGIES AS DESCRIBED BY CMS-2020-01-R: HCPCS

## 2021-09-11 PROCEDURE — 71046 X-RAY EXAM CHEST 2 VIEWS: CPT

## 2021-09-11 PROCEDURE — 99283 EMERGENCY DEPT VISIT LOW MDM: CPT

## 2021-09-11 PROCEDURE — U0005 INFEC AGEN DETEC AMPLI PROBE: HCPCS

## 2021-09-11 PROCEDURE — 87430 STREP A AG IA: CPT

## 2021-09-11 PROCEDURE — 87081 CULTURE SCREEN ONLY: CPT

## 2021-09-11 RX ORDER — FLUTICASONE PROPIONATE 50 MCG
2 SPRAY, SUSPENSION (ML) NASAL DAILY
Qty: 16 G | Refills: 0 | Status: SHIPPED | OUTPATIENT
Start: 2021-09-11

## 2021-09-11 ASSESSMENT — ENCOUNTER SYMPTOMS
COLOR CHANGE: 0
VOICE CHANGE: 0
VOMITING: 0
CHEST TIGHTNESS: 0
CONSTIPATION: 0
SHORTNESS OF BREATH: 0
SORE THROAT: 1
PHOTOPHOBIA: 0
WHEEZING: 0
RHINORRHEA: 1
DIARRHEA: 0
EYE PAIN: 0
APNEA: 0
BLOOD IN STOOL: 0
RECTAL PAIN: 0
STRIDOR: 0
COUGH: 1
EYE REDNESS: 0
SINUS PRESSURE: 0
ABDOMINAL DISTENTION: 0
BACK PAIN: 0
ABDOMINAL PAIN: 0
TROUBLE SWALLOWING: 0
NAUSEA: 0
EYE DISCHARGE: 0

## 2021-09-11 NOTE — ED PROVIDER NOTES
Carlo Lai is a 70year old female who presents to the ED with cough, sore throat and congestion x3 days. Her 16year old granddaughter who lives with her has had similar symptoms since last weekend, and tested positive for COVID-19 on Tuesday. Tiera Meadows has a history of COPD and is on home oxygen therapy at 4 liters nasal canula continuously. She denies increased oxygen requirement, fever, or increased dyspnea. She was immunized against COVID-19 in April 2021. BP (!) 121/105   Pulse 84   Temp 97.8 °F (36.6 °C) (Infrared)   Resp 16   Ht 5' 3\" (1.6 m)   Wt 165 lb (74.8 kg)   SpO2 98%   BMI 29.23 kg/m²     I have reviewed the following from the nursing documentation:      Prior to Admission medications    Medication Sig Start Date End Date Taking?  Authorizing Provider   levothyroxine (SYNTHROID) 88 MCG tablet Take 1 tablet by mouth daily 9/7/21   Bridget Kirk MD   varenicline (CHANTIX) 0.5 MG tablet Take 1-2 tablets by mouth See Admin Instructions 0.5mg DAILY for 3 days followed by 0.5mg TWICE DAILY for 4 days followed by 1mg TWICE DAILY 9/3/21   Deyvi Peres MD   aspirin 81 MG EC tablet Take 1 tablet by mouth daily 9/3/21   Bridget Kirk MD   alendronate (FOSAMAX) 70 MG tablet Take 1 tablet by mouth every 7 days Empty stomach, full glass water, stay upright 45 minutes 9/3/21   Bridget Kirk MD   apixaban (ELIQUIS) 5 MG TABS tablet Take 1 tablet by mouth 2 times daily 9/3/21   Deyvi Peres MD   Melatonin 5 MG CHEW Take by mouth    Historical Provider, MD   Ascorbic Acid (VITAMIN C CR) 1000 MG TBCR Take by mouth    Historical Provider, MD   tiotropium (SPIRIVA RESPIMAT) 2.5 MCG/ACT AERS inhaler Inhale 2 puffs into the lungs daily 8/6/21   Get Hogan MD   furosemide (LASIX) 20 MG tablet Take 1/2 (one-half) tablet by mouth once daily 7/21/21   Deyvi Peres MD   ondansetron (ZOFRAN-ODT) 4 MG disintegrating tablet Take 1 tablet by mouth every 8 hours as needed for Nausea or Vomiting 6/26/21   SATURNINO Barone CNP   amLODIPine (NORVASC) 5 MG tablet Take 2 tablets by mouth daily 6/26/21   SATURNINO Barone CNP   lisinopril (PRINIVIL;ZESTRIL) 40 MG tablet Take 1 tablet by mouth daily 4/26/21   SATURNINO Garcia CNP   cilostazol (PLETAL) 100 MG tablet Take 1 tablet by mouth twice daily 4/26/21   SATURNINO Garcia CNP   carvedilol (COREG) 25 MG tablet Take 1 tablet by mouth 2 times daily 4/26/21   SATURNINO Garcia CNP   pantoprazole (PROTONIX) 40 MG tablet Take 1 tablet by mouth daily 2/23/21   Mckenzie Johns MD   budesonide-formoterol [facility]) 160-4.5 MCG/ACT AERO Inhale 2 puffs into the lungs 2 times daily 12/14/20   Britany Aquino MD   nitroGLYCERIN (NITROSTAT) 0.4 MG SL tablet Place 1 tablet under the tongue every 5 minutes as needed for Chest pain up to max of 3 total doses. If no relief after 1 dose, call 911. 12/14/20   Mckenzie Johns MD   atorvastatin (LIPITOR) 40 MG tablet Take 1 tablet by mouth daily 11/16/20   Mckenzie Johns MD   Blood Pressure Monitoring KIT 1 Units by Does not apply route 2 times daily 10/20/20   SATURNINO Garcia CNP   Calcium Carb-Cholecalciferol (CALCIUM 600+D) 600-800 MG-UNIT TABS Take 1 tablet by mouth daily    Historical Provider, MD   Multiple Vitamins-Minerals (THERAPEUTIC MULTIVITAMIN-MINERALS) tablet Take 1 tablet by mouth daily    Historical Provider, MD       Allergies as of 09/11/2021    (No Known Allergies)       Past Medical History:   Diagnosis Date    CAD (coronary artery disease)     COPD [facility]chronic obstructive [facility] disease) [facility] [address]     GERD (gastroesophageal reflux disease)     H/O cardiovascular stress test 04/26/2021    normal study 63% EF no ischemia    H/O echocardiogram 03/22/2017    EF 35-40%    H/O echocardiogram 08/04/2020    EF 45-50, Mild TR & KY, Severely dilated left atrium.   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, functionally normally     Heart attack ([facility] [address] 01/2014    Hyperlipidemia     Hypertension     On home oxygen therapy     nightly    PAD (peripheral artery disease) (HCC)     S/P right coronary artery (RCA) stent placement     Sleep apnea     uses CPAP    T2 vertebral fracture ([facility] [address] 12/2017    TIA (transient ischemic attack) 1996    Tobacco dependence     Wears glasses     Wears partial dentures     Upper        Surgical History:   Past Surgical History:   Procedure Laterality Date    APPENDECTOMY      age 6    CARDIAC SURGERY      CORONARY ANGIOPLASTY WITH STENT PLACEMENT      CORONARY ARTERY BYPASS GRAFT  06/21/2016    CABG X1 SVG to RCA with Mitral Valve Replacement 27mm Mosaic     HERNIA REPAIR N/A 6/25/2021    ROBOTIC HERNIA INCISIONAL REPAIR LAPAROSCOPIC WITH COMPONENT SEPARATION performed by Susana Aguirre MD at [address] [place] [address]  06/21/2016    27mm mosaic        Family History:    Family History   Problem Relation Age of Onset    Stroke Mother     Heart Attack Father     Coronary Art Dis Father     Pacemaker Father     Arrhythmia Father     Diabetes Sister     Rheum Arthritis Sister     Heart Attack Sister     Arrhythmia Sister        Social History     Socioeconomic History    Marital status:      Spouse name: Not on file    Number of children: Not on file    Years of education: Not on file    Highest education level: Not on file   Occupational History    Not on file   Tobacco Use    Smoking status: Current Every Day Smoker     Packs/day: 0.50     Years: 56.00     Pack years: 28.00     Types: Cigarettes     Start date: 1965    Smokeless tobacco: Former User     Quit date: 4/30/2021   Vaping Use    Vaping Use: Never used   Substance and Sexual Activity    Alcohol use: No     Comment: 2 cups of coffee in the a.m.     Drug use: No    Sexual activity: Yes     Partners: Male   Other Topics Concern    Not on file   Social History Narrative    ** Merged History Encounter ** Social Determinants of Health     Financial Resource Strain: Low Risk     Difficulty of Paying Living Expenses: Not hard at all   Food Insecurity: No Food Insecurity    Worried About Running Out of Food in the Last Year: Never true    Edward of Food in the Last Year: Never true   Transportation Needs:     Lack of Transportation (Medical):  Lack of Transportation (Non-Medical):    Physical Activity:     Days of Exercise per Week:     Minutes of Exercise per Session:    Stress:     Feeling of Stress :    Social Connections:     Frequency of Communication with Friends and Family:     Frequency of Social Gatherings with Friends and Family:     Attends Shinto Services:     Active Member of Clubs or Organizations:     Attends Club or Organization Meetings:     Marital Status:    Intimate Partner Violence:     Fear of Current or Ex-Partner:     Emotionally Abused:     Physically Abused:     Sexually Abused:          Review of Systems   Constitutional: Negative for activity change, appetite change, chills, diaphoresis, fatigue, fever and unexpected weight change. HENT: Positive for congestion, rhinorrhea and sore throat. Negative for ear pain, mouth sores, sinus pressure, tinnitus, trouble swallowing and voice change. Eyes: Negative for photophobia, pain, discharge, redness and visual disturbance. Respiratory: Positive for cough. Negative for apnea, chest tightness, shortness of breath, wheezing and stridor. Cardiovascular: Negative for chest pain, palpitations and leg swelling. Gastrointestinal: Negative for abdominal distention, abdominal pain, blood in stool, constipation, diarrhea, nausea, rectal pain and vomiting. Genitourinary: Negative for difficulty urinating, dyspareunia, dysuria, flank pain, frequency, genital sores, menstrual problem, pelvic pain, urgency, vaginal bleeding, vaginal discharge and vaginal pain.    Musculoskeletal: Negative for arthralgias, back pain, joint swelling, neck pain and neck stiffness. Skin: Negative for color change and rash. Neurological: Negative for dizziness, tremors, seizures, syncope, facial asymmetry, speech difficulty, weakness, light-headedness, numbness and headaches. Hematological: Negative for adenopathy. Does not bruise/bleed easily. Psychiatric/Behavioral: Negative for agitation, confusion, dysphoric mood, hallucinations, self-injury, sleep disturbance and suicidal ideas. All other systems reviewed and are negative. Physical Exam  Constitutional:       General: She is not in acute distress. Appearance: She is well-developed. HENT:      Head: Normocephalic and atraumatic. Left Ear: External ear normal.      Mouth/Throat:      Pharynx: No oropharyngeal exudate. Eyes:      General:         Right eye: No discharge. Left eye: No discharge. Conjunctiva/sclera: Conjunctivae normal.      Pupils: Pupils are equal, round, and reactive to light. Neck:      Vascular: No JVD. Trachea: No tracheal deviation. Cardiovascular:      Rate and Rhythm: Normal rate and regular rhythm. Heart sounds: Normal heart sounds. No murmur heard. No friction rub. No gallop. Pulmonary:      Effort: Pulmonary effort is normal. No respiratory distress. Breath sounds: No stridor. No wheezing or rales. Rhonchi: both bases, clears with cough. Chest:      Chest wall: No tenderness. Abdominal:      General: Bowel sounds are normal. There is no distension. Palpations: Abdomen is soft. There is no mass. Tenderness: There is no abdominal tenderness. There is no guarding or rebound. Musculoskeletal:         General: No tenderness. Normal range of motion. Cervical back: Normal range of motion. Lymphadenopathy:      Cervical: No cervical adenopathy. Skin:     General: Skin is warm and dry. Capillary Refill: Capillary refill takes less than 2 seconds. Findings: No rash.    Neurological: General: No focal deficit present. Mental Status: She is alert and oriented to person, place, and time. Cranial Nerves: No cranial nerve deficit. Motor: No abnormal muscle tone. Coordination: Coordination normal.      Deep Tendon Reflexes: Reflexes normal.   Psychiatric:         Behavior: Behavior normal.         Thought Content: Thought content normal.         Judgment: Judgment normal.          Procedures     MDM   Results for orders placed or performed during the hospital encounter of 09/11/21   Strep Screen Group A Throat    Specimen: Throat   Result Value Ref Range    Specimen THROAT     Special Requests NONE     Strep A Direct Screen NEGATIVE        I estimate there is LOW risk for PULMONARY EMBOLISM, ACUTE CORONARY SYNDROME, OR THORACIC AORTIC DISSECTION, thus I consider the discharge disposition reasonable. Ju Jim and I have discussed the diagnosis and risks, and we agree with discharging home to follow-up with their primary doctor. We also discussed returning to the Emergency Department immediately if new or worsening symptoms occur. We have discussed the symptoms which are most concerning (e.g., bloody sputum, fever, worsening pain or shortness of breath, vomiting) that necessitate immediate return. FINAL Impression    1. Acute pharyngitis, unspecified etiology    2. Cough    3. Congestion of upper respiratory tract    4. Chronic obstructive pulmonary disease, unspecified COPD type ([facility])        Blood pressure (!) 121/105, pulse 84, temperature 97.8 °F (36.6 °C), temperature source Infrared, resp. rate 16, height 5' 3\" (1.6 m), weight 165 lb (74.8 kg), SpO2 98 %. Radiology  XR CHEST (2 VW)    Result Date: 9/11/2021  No acute abnormality.                 Cathleen Pizano MD  09/11/21 [id number]

## 2021-09-11 NOTE — ED TRIAGE NOTES
Was exposed to her grand daughter which tested positive for covid this week. Pt c/o sore throat and additional cough , has not has to increase her home O2.

## 2021-09-12 LAB
SARS-COV-2: NOT DETECTED
SOURCE: NORMAL

## 2021-09-13 LAB
CULTURE: NORMAL
Lab: NORMAL
SPECIMEN: NORMAL
STREP A DIRECT SCREEN: NEGATIVE

## 2021-09-14 ENCOUNTER — TELEPHONE (OUTPATIENT)
Dept: FAMILY MEDICINE CLINIC | Age: 71
End: 2021-09-14

## 2021-09-14 NOTE — TELEPHONE ENCOUNTER
Patient called for results of COVID Test that was order at [address] [place] [address] on 09/11/2021. Patient given results and expressed understanding.

## 2021-10-05 RX ORDER — AMLODIPINE BESYLATE 5 MG/1
5 TABLET ORAL DAILY
Qty: 90 TABLET | Refills: 3 | Status: SHIPPED | OUTPATIENT
Start: 2021-10-05 | End: 2022-09-09

## 2021-10-30 ENCOUNTER — APPOINTMENT (OUTPATIENT)
Dept: GENERAL RADIOLOGY | Age: 71
DRG: 177 | End: 2021-10-30
Payer: MEDICARE

## 2021-10-30 ENCOUNTER — HOSPITAL ENCOUNTER (EMERGENCY)
Age: 71
Discharge: HOME | DRG: 177 | End: 2021-11-01
Attending: EMERGENCY MEDICINE | Admitting: INTERNAL MEDICINE
Payer: MEDICARE

## 2021-10-30 DIAGNOSIS — U07.1 PNEUMONIA DUE TO COVID-19 VIRUS: Primary | ICD-10-CM

## 2021-10-30 DIAGNOSIS — J45.901 ACUTE EXACERBATION OF COPD WITH ASTHMA (HCC): ICD-10-CM

## 2021-10-30 DIAGNOSIS — J44.1 ACUTE EXACERBATION OF COPD WITH ASTHMA (HCC): ICD-10-CM

## 2021-10-30 DIAGNOSIS — J12.82 PNEUMONIA DUE TO COVID-19 VIRUS: Primary | ICD-10-CM

## 2021-10-30 LAB
ALBUMIN SERPL-MCNC: 3.4 GM/DL (ref 3.4–5)
ALP BLD-CCNC: 68 IU/L (ref 40–129)
ALT SERPL-CCNC: 12 U/L (ref 10–40)
ANION GAP SERPL CALCULATED.3IONS-SCNC: 16 MMOL/L (ref 4–16)
APTT: 47.2 SECONDS (ref 25.1–37.1)
AST SERPL-CCNC: 24 IU/L (ref 15–37)
BASE EXCESS MIXED: 1.5 (ref 0–2.3)
BASE EXCESS: ABNORMAL (ref 0–2.4)
BASOPHILS ABSOLUTE: 0 K/CU MM
BASOPHILS RELATIVE PERCENT: 0.2 % (ref 0–1)
BILIRUB SERPL-MCNC: 0.4 MG/DL (ref 0–1)
BUN BLDV-MCNC: 22 MG/DL (ref 6–23)
CALCIUM SERPL-MCNC: 9.5 MG/DL (ref 8.3–10.6)
CHLORIDE BLD-SCNC: 92 MMOL/L (ref 99–110)
CO2 CONTENT: 22.2 MMOL/L (ref 19–24)
CO2: 21 MMOL/L (ref 21–32)
CREAT SERPL-MCNC: 1.6 MG/DL (ref 0.6–1.1)
DIFFERENTIAL TYPE: ABNORMAL
EOSINOPHILS ABSOLUTE: 0 K/CU MM
EOSINOPHILS RELATIVE PERCENT: 0.2 % (ref 0–3)
GFR AFRICAN AMERICAN: 38 ML/MIN/1.73M2
GFR NON-AFRICAN AMERICAN: 32 ML/MIN/1.73M2
GLUCOSE BLD-MCNC: 114 MG/DL (ref 70–99)
HCO3 VENOUS: 21.3 MMOL/L (ref 19–25)
HCT VFR BLD CALC: 36.9 % (ref 37–47)
HEMOGLOBIN: 12.3 GM/DL (ref 12.5–16)
IMMATURE NEUTROPHIL %: 0.4 % (ref 0–0.43)
INR BLD: 1.36 INDEX
LACTATE: 0.8 MMOL/L (ref 0.4–2)
LYMPHOCYTES ABSOLUTE: 0.9 K/CU MM
LYMPHOCYTES RELATIVE PERCENT: 16.7 % (ref 24–44)
MCH RBC QN AUTO: 30.4 PG (ref 27–31)
MCHC RBC AUTO-ENTMCNC: 33.3 % (ref 32–36)
MCV RBC AUTO: 91.3 FL (ref 78–100)
MONOCYTES ABSOLUTE: 0.5 K/CU MM
MONOCYTES RELATIVE PERCENT: 9.2 % (ref 0–4)
O2 SAT, VEN: 91.2 % (ref 50–70)
PCO2, VEN: 29.9 MMHG (ref 38–52)
PDW BLD-RTO: 13.2 % (ref 11.7–14.9)
PH VENOUS: 7.46 (ref 7.32–7.42)
PLATELET # BLD: 132 K/CU MM (ref 140–440)
PMV BLD AUTO: 11.4 FL (ref 7.5–11.1)
PO2, VEN: 56.9 MMHG (ref 28–48)
POTASSIUM SERPL-SCNC: 4 MMOL/L (ref 3.5–5.1)
PRO-BNP: 2743 PG/ML
PROTHROMBIN TIME: 16.5 SECONDS (ref 11.7–14.5)
RBC # BLD: 4.04 M/CU MM (ref 4.2–5.4)
SARS-COV-2, NAAT: DETECTED
SEGMENTED NEUTROPHILS ABSOLUTE COUNT: 4.1 K/CU MM
SEGMENTED NEUTROPHILS RELATIVE PERCENT: 73.3 % (ref 36–66)
SODIUM BLD-SCNC: 129 MMOL/L (ref 135–145)
SOURCE, BLOOD GAS: ABNORMAL
SOURCE: ABNORMAL
TOTAL IMMATURE NEUTOROPHIL: 0.02 K/CU MM
TOTAL PROTEIN: 6.6 GM/DL (ref 6.4–8.2)
TROPONIN T: 0.01 NG/ML
WBC # BLD: 5.6 K/CU MM (ref 4–10.5)

## 2021-10-30 PROCEDURE — 96375 TX/PRO/DX INJ NEW DRUG ADDON: CPT

## 2021-10-30 PROCEDURE — 82805 BLOOD GASES W/O2 SATURATION: CPT

## 2021-10-30 PROCEDURE — 85730 THROMBOPLASTIN TIME PARTIAL: CPT

## 2021-10-30 PROCEDURE — 6370000000 HC RX 637 (ALT 250 FOR IP): Performed by: EMERGENCY MEDICINE

## 2021-10-30 PROCEDURE — 1200000000 HC SEMI PRIVATE

## 2021-10-30 PROCEDURE — 83880 ASSAY OF NATRIURETIC PEPTIDE: CPT

## 2021-10-30 PROCEDURE — 85025 COMPLETE CBC W/AUTO DIFF WBC: CPT

## 2021-10-30 PROCEDURE — 80053 COMPREHEN METABOLIC PANEL: CPT

## 2021-10-30 PROCEDURE — 99285 EMERGENCY DEPT VISIT HI MDM: CPT

## 2021-10-30 PROCEDURE — 85610 PROTHROMBIN TIME: CPT

## 2021-10-30 PROCEDURE — 71045 X-RAY EXAM CHEST 1 VIEW: CPT

## 2021-10-30 PROCEDURE — 84484 ASSAY OF TROPONIN QUANT: CPT

## 2021-10-30 PROCEDURE — 93005 ELECTROCARDIOGRAM TRACING: CPT | Performed by: EMERGENCY MEDICINE

## 2021-10-30 PROCEDURE — 2580000003 HC RX 258: Performed by: EMERGENCY MEDICINE

## 2021-10-30 PROCEDURE — 6360000002 HC RX W HCPCS: Performed by: EMERGENCY MEDICINE

## 2021-10-30 PROCEDURE — 83605 ASSAY OF LACTIC ACID: CPT

## 2021-10-30 PROCEDURE — 87635 SARS-COV-2 COVID-19 AMP PRB: CPT

## 2021-10-30 PROCEDURE — 96365 THER/PROPH/DIAG IV INF INIT: CPT

## 2021-10-30 RX ORDER — IPRATROPIUM BROMIDE AND ALBUTEROL SULFATE 2.5; .5 MG/3ML; MG/3ML
1 SOLUTION RESPIRATORY (INHALATION) ONCE
Status: COMPLETED | OUTPATIENT
Start: 2021-10-30 | End: 2021-10-30

## 2021-10-30 RX ORDER — ALBUTEROL SULFATE 90 UG/1
2 AEROSOL, METERED RESPIRATORY (INHALATION) ONCE
Status: DISCONTINUED | OUTPATIENT
Start: 2021-10-30 | End: 2021-10-30

## 2021-10-30 RX ORDER — DEXAMETHASONE SODIUM PHOSPHATE 4 MG/ML
4 INJECTION, SOLUTION INTRA-ARTICULAR; INTRALESIONAL; INTRAMUSCULAR; INTRAVENOUS; SOFT TISSUE ONCE
Status: COMPLETED | OUTPATIENT
Start: 2021-10-30 | End: 2021-10-30

## 2021-10-30 RX ADMIN — DEXAMETHASONE SODIUM PHOSPHATE 4 MG: 4 INJECTION, SOLUTION INTRAMUSCULAR; INTRAVENOUS at 20:52

## 2021-10-30 RX ADMIN — AZITHROMYCIN MONOHYDRATE 500 MG: 500 INJECTION, POWDER, LYOPHILIZED, FOR SOLUTION INTRAVENOUS at 23:50

## 2021-10-30 RX ADMIN — CEFTRIAXONE SODIUM 1000 MG: 1 INJECTION, POWDER, FOR SOLUTION INTRAMUSCULAR; INTRAVENOUS at 23:14

## 2021-10-30 RX ADMIN — IPRATROPIUM BROMIDE AND ALBUTEROL SULFATE 1 AMPULE: .5; 3 SOLUTION RESPIRATORY (INHALATION) at 20:53

## 2021-10-30 NOTE — Clinical Note
Patient Class: Inpatient [101]   REQUIRED: Diagnosis: Pneumonia due to COVID-19 virus [[id number]]   Estimated Length of Stay: Estimated stay of more than 2 midnights   Admitting Provider: Leslie Amador [[id number]]

## 2021-10-31 LAB
ANION GAP SERPL CALCULATED.3IONS-SCNC: 13 MMOL/L (ref 4–16)
BUN BLDV-MCNC: 22 MG/DL (ref 6–23)
CALCIUM SERPL-MCNC: 9.6 MG/DL (ref 8.3–10.6)
CHLORIDE BLD-SCNC: 94 MMOL/L (ref 99–110)
CO2: 26 MMOL/L (ref 21–32)
CREAT SERPL-MCNC: 1.5 MG/DL (ref 0.6–1.1)
GFR AFRICAN AMERICAN: 41 ML/MIN/1.73M2
GFR NON-AFRICAN AMERICAN: 34 ML/MIN/1.73M2
GLUCOSE BLD-MCNC: 115 MG/DL (ref 70–99)
POTASSIUM SERPL-SCNC: 4.4 MMOL/L (ref 3.5–5.1)
SODIUM BLD-SCNC: 133 MMOL/L (ref 135–145)
TROPONIN T: <0.01 NG/ML

## 2021-10-31 PROCEDURE — 2580000003 HC RX 258: Performed by: EMERGENCY MEDICINE

## 2021-10-31 PROCEDURE — 6360000002 HC RX W HCPCS: Performed by: EMERGENCY MEDICINE

## 2021-10-31 PROCEDURE — 80048 BASIC METABOLIC PNL TOTAL CA: CPT

## 2021-10-31 PROCEDURE — 6370000000 HC RX 637 (ALT 250 FOR IP): Performed by: EMERGENCY MEDICINE

## 2021-10-31 PROCEDURE — 84484 ASSAY OF TROPONIN QUANT: CPT

## 2021-10-31 PROCEDURE — 1200000000 HC SEMI PRIVATE

## 2021-10-31 RX ORDER — AZITHROMYCIN 250 MG/1
500 TABLET, FILM COATED ORAL ONCE
Status: COMPLETED | OUTPATIENT
Start: 2021-10-31 | End: 2021-10-31

## 2021-10-31 RX ORDER — LEVOTHYROXINE SODIUM 88 UG/1
88 TABLET ORAL DAILY
Status: DISCONTINUED | OUTPATIENT
Start: 2021-10-31 | End: 2021-11-01 | Stop reason: HOSPADM

## 2021-10-31 RX ORDER — BENZONATATE 100 MG/1
100 CAPSULE ORAL ONCE
Status: COMPLETED | OUTPATIENT
Start: 2021-10-31 | End: 2021-10-31

## 2021-10-31 RX ORDER — 0.9 % SODIUM CHLORIDE 0.9 %
500 INTRAVENOUS SOLUTION INTRAVENOUS ONCE
Status: COMPLETED | OUTPATIENT
Start: 2021-10-31 | End: 2021-10-31

## 2021-10-31 RX ORDER — SODIUM CHLORIDE 9 MG/ML
INJECTION, SOLUTION INTRAVENOUS CONTINUOUS
Status: DISCONTINUED | OUTPATIENT
Start: 2021-10-31 | End: 2021-10-31

## 2021-10-31 RX ORDER — CARVEDILOL 25 MG/1
25 TABLET ORAL 2 TIMES DAILY WITH MEALS
Status: DISCONTINUED | OUTPATIENT
Start: 2021-10-31 | End: 2021-11-01 | Stop reason: HOSPADM

## 2021-10-31 RX ORDER — DEXAMETHASONE SODIUM PHOSPHATE 4 MG/ML
6 INJECTION, SOLUTION INTRA-ARTICULAR; INTRALESIONAL; INTRAMUSCULAR; INTRAVENOUS; SOFT TISSUE EVERY 24 HOURS
Status: DISCONTINUED | OUTPATIENT
Start: 2021-10-31 | End: 2021-11-01 | Stop reason: HOSPADM

## 2021-10-31 RX ADMIN — SODIUM CHLORIDE 500 ML: 9 INJECTION, SOLUTION INTRAVENOUS at 07:24

## 2021-10-31 RX ADMIN — CEFTRIAXONE SODIUM 1000 MG: 1 INJECTION, POWDER, FOR SOLUTION INTRAMUSCULAR; INTRAVENOUS at 21:20

## 2021-10-31 RX ADMIN — AZITHROMYCIN 500 MG: 250 TABLET, FILM COATED ORAL at 22:44

## 2021-10-31 RX ADMIN — AZITHROMYCIN MONOHYDRATE 500 MG: 500 INJECTION, POWDER, LYOPHILIZED, FOR SOLUTION INTRAVENOUS at 21:58

## 2021-10-31 RX ADMIN — SODIUM CHLORIDE: 9 INJECTION, SOLUTION INTRAVENOUS at 07:23

## 2021-10-31 RX ADMIN — BENZONATATE 100 MG: 100 CAPSULE ORAL at 18:15

## 2021-10-31 RX ADMIN — DEXAMETHASONE SODIUM PHOSPHATE 6 MG: 4 INJECTION, SOLUTION INTRAMUSCULAR; INTRAVENOUS at 17:07

## 2021-10-31 RX ADMIN — APIXABAN 5 MG: 5 TABLET, FILM COATED ORAL at 08:58

## 2021-10-31 NOTE — ED PROVIDER NOTES
Patient awaiting transport, we attempted to order home meds but they are unavailable at this freestanding, patient has her home meds and has been able to take them throughout the course of today. Incentive spirometry initiated, steroids continued, still awaiting bed during the duration of my shift. Will be signed out to oncoming physician.     Electronically signed by:    MD Nitin Hill MD  10/31/21 [id number]

## 2021-10-31 NOTE — ED PROVIDER NOTES
Emergency Department Encounter    Patient: Ermelinda Mercedes  MRN: [id number]  : 1950  Date of Evaluation: 10/30/2021  ED Provider:  Delmy Suarez MD      Triage Chief Complaint:   Cough and Diarrhea      [place]it[place]:  Ermelinda Mercedes is a 70 y.o. female that presents to the emergency department with illness beginning about 3 days ago. Patient reports that her chronic cough has become much more frequent and productive of yellow phlegm. She is feeling increasingly short of breath. She has oxygen available as needed, up to 4.5 L. She has needed the oxygen more frequently. She does not know what her typical oxygen levels are at home on her home oxygen. She has developed diarrhea. She denies any blood or mucus in the stools. She denies any abdominal pain. She denies any chest pain. Patient is concerned that her daughter currently has COVID-19 and she may have been exposed. This patient was vaccinated about 5 months ago, but she has not had the booster shot. Patient reports history of COPD and congestive heart failure. She denies known venous thromboembolic disease. Patient reports no other particular provocative or alleviating factors. ROS - see HPI, below listed is current ROS at time of my eval:  CONSTITUTIONAL: No fevers, chills, or sweats. EYES: No vision change, redness, drainage, or discharge. HENT: No sore throat, runny nose, or earache. No dental pain. No painful swallowing. RESPIRATORY: As above. CARDIOVASCULAR: No anginal-type chest pain, orthopnea, or edema. GASTROINTESTINAL:   No hematochezia, melena, or hematemesis. GENITOURINARY: No frequency, urgency, or dysuria. No hematuria. MUSCULOSKELETAL: No recent injury. No neck, back, or extremity pain. NEUROLOGICAL: No focal weakness, numbness, or tingling. SKIN: No rashes or other lesions reported. No yellowing of the skin.     Medical history:  Past Medical History:   Diagnosis Date    CAD (coronary artery disease)     COPD (chronic obstructive pulmonary disease) (HCC)     GERD (gastroesophageal reflux disease)     H/O cardiovascular stress test 04/26/2021    normal study 63% EF no ischemia    H/O echocardiogram 03/22/2017    EF 35-40%    H/O echocardiogram 08/04/2020    EF 45-50, Mild TR & OH, Severely dilated left atrium.   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, functionally normally     Heart attack ([facility] [address] 01/2014    Hyperlipidemia     Hypertension     On home oxygen therapy     nightly    PAD (peripheral artery disease) ([facility] [address] [facility]     S/P right coronary artery (RCA) stent placement     Sleep apnea     uses CPAP    T2 vertebral fracture ([facility] [address] 12/2017    TIA (transient ischemic attack) 1996    Tobacco dependence     Wears glasses     Wears partial dentures     Upper     Past Surgical History:   Procedure Laterality Date    APPENDECTOMY      age 6    CARDIAC SURGERY      CORONARY ANGIOPLASTY WITH STENT PLACEMENT      CORONARY ARTERY BYPASS GRAFT  06/21/2016    CABG X1 SVG to RCA with Mitral Valve Replacement 27mm Mosaic     HERNIA REPAIR N/A 6/25/2021    ROBOTIC HERNIA INCISIONAL REPAIR LAPAROSCOPIC WITH COMPONENT SEPARATION performed by Lis Rodas MD at [address] [place] [address]  06/21/2016    27mm mosaic     Family History   Problem Relation Age of Onset    Stroke Mother     Heart Attack Father     Coronary Art Dis Father     Pacemaker Father     Arrhythmia Father     Diabetes Sister     Rheum Arthritis Sister     Heart Attack Sister     Arrhythmia Sister      Social History     Socioeconomic History    Marital status:      Spouse name: Not on file    Number of children: Not on file    Years of education: Not on file    Highest education level: Not on file   Occupational History    Not on file   Tobacco Use    Smoking status: Current Every Day Smoker     Packs/day: 0.50     Years: 56.00     Pack years: 28.00     Types: Cigarettes     Start date: 1965    Smokeless tobacco: Former User     Quit date: 4/30/2021   Vaping Use    Vaping Use: Never used   Substance and Sexual Activity    Alcohol use: No     Comment: 2 cups of coffee in the a.m.  Drug use: No    Sexual activity: Yes     Partners: Male   Other Topics Concern    Not on file   Social History Narrative    ** Merged History Encounter **          Social Determinants of Health     Financial Resource Strain: Low Risk     Difficulty of Paying Living Expenses: Not hard at all   Food Insecurity: No Food Insecurity    Worried About Running Out of Food in the Last Year: Never true    [address] in the Last Year: Never true   Transportation Needs:     Lack of Transportation (Medical):      Lack of Transportation (Non-Medical):    Physical Activity:     Days of Exercise per Week:     Minutes of Exercise per Session:    Stress:     Feeling of Stress :    Social Connections:     Frequency of Communication with Friends and Family:     Frequency of Social Gatherings with Friends and Family:     Attends Sikh Services:     Active Member of Clubs or Organizations:     Attends Club or Organization Meetings:     Marital Status:    Intimate Partner Violence:     Fear of Current or Ex-Partner:     Emotionally Abused:     Physically Abused:     Sexually Abused:      Current Facility-Administered Medications   Medication Dose Route Frequency Provider Last Rate Last Admin    cefTRIAXone (ROCEPHIN) 1000 mg IVPB in 50 mL D5W minibag  1,000 mg IntraVENous Once Ollie Molina  mL/hr at 10/30/21 2314 1,000 mg at 10/30/21 2314    azithromycin (ZITHROMAX) 500 mg in D5W 250ml addavial  500 mg IntraVENous Once Ollie Molina MD         Current Outpatient Medications   Medication Sig Dispense Refill    amLODIPine (NORVASC) 5 MG tablet Take 1 tablet by mouth daily 90 tablet 3    apixaban (ELIQUIS) 5 MG TABS tablet Take 1 tablet by mouth 2 times daily 60 tablet 5    fluticasone (FLONASE) 50 MCG/ACT nasal spray 2 sprays by Each Nostril route daily 16 g 0    levothyroxine (SYNTHROID) 88 MCG tablet Take 1 tablet by mouth daily 90 tablet 0    varenicline (CHANTIX) 0.5 MG tablet Take 1-2 tablets by mouth See Admin Instructions 0.5mg DAILY for 3 days followed by 0.5mg TWICE DAILY for 4 days followed by 1mg TWICE DAILY 57 tablet 0    aspirin 81 MG EC tablet Take 1 tablet by mouth daily 30 tablet 3    alendronate (FOSAMAX) 70 MG tablet Take 1 tablet by mouth every 7 days Empty stomach, full glass water, stay upright 45 minutes 4 tablet 0    Melatonin 5 MG CHEW Take by mouth      Ascorbic Acid (VITAMIN C CR) 1000 MG TBCR Take by mouth      tiotropium (SPIRIVA RESPIMAT) 2.5 MCG/ACT AERS inhaler Inhale 2 puffs into the lungs daily 1 Inhaler 3    furosemide (LASIX) 20 MG tablet Take 1/2 (one-half) tablet by mouth once daily 45 tablet 3    ondansetron (ZOFRAN-ODT) 4 MG disintegrating tablet Take 1 tablet by mouth every 8 hours as needed for Nausea or Vomiting 20 tablet 1    lisinopril (PRINIVIL;ZESTRIL) 40 MG tablet Take 1 tablet by mouth daily 90 tablet 3    cilostazol (PLETAL) 100 MG tablet Take 1 tablet by mouth twice daily 180 tablet 3    carvedilol (COREG) 25 MG tablet Take 1 tablet by mouth 2 times daily 180 tablet 3    pantoprazole (PROTONIX) 40 MG tablet Take 1 tablet by mouth daily 90 tablet 3    budesonide-formoterol (SYMBICORT) 160-4.5 MCG/ACT AERO Inhale 2 puffs into the lungs 2 times daily 1 Inhaler 5    nitroGLYCERIN (NITROSTAT) 0.4 MG SL tablet Place 1 tablet under the tongue every 5 minutes as needed for Chest pain up to max of 3 total doses.  If no relief after 1 dose, call 911. 25 tablet 3    atorvastatin (LIPITOR) 40 MG tablet Take 1 tablet by mouth daily 90 tablet 3    Blood Pressure Monitoring KIT 1 Units by Does not apply route 2 times daily 1 kit 0    Calcium Carb-Cholecalciferol (CALCIUM 600+D) 600-800 MG-UNIT TABS Take 1 tablet by mouth daily      Multiple Vitamins-Minerals (THERAPEUTIC or cerebellar deficits. BACK/MUSCULOSKELETAL: No asymmetric edema, [place]' sign, or cords. SKIN: Mildly pale without icterus. Normal tone for ethnicity. Normal turgor and brisk capillary refill peripherally. PSYCHIATRIC: Normal mood. Normal affect. Emergency department course. Patient is brought to bed 9 and assessed and reassessed by me. After initial evaluation, orders are placed for medical screening studies including CBC, metabolic panel, first lactate, and troponin among others. Rapid Covid is ordered. Dexamethasone 4 mg IV and ipratropium-albuterol MDI treatments are ordered for the apparent wheezing. Nursing indicates that patient improves to about 97% when she is switched over to wall oxygen. Consider malfunctioning home oxygen unit or an empty canister. Patient is agreeable to continuing plan. Upon most recent reevaluation, patient appears to be resting more comfortably. On continuous wall oxygen, saturations remain at about 97%. There has been no distress or cyanosis otherwise. She is COVID-19 positive. Lung sounds and chest x-ray are suspicious for an asymmetric pneumonia, as well. With the combination of factors, we have discussed options for disposition including home management. With her relative hypoxia and improvement on continuous oxygen, patient would likely benefit from hospital management. Patient is agreeable. Ceftriaxone 1 g and azithromycin 500 mg is ordered for the component of COPD and asymmetric pneumonia. With the elevation in troponin, she has requested that I consult her cardiology group. Dr. Zainab Ibrahim and Hospitalist Dr. Leigh Peoples [place] are paged at [address] to discuss admission. Initial verbal orders are discussed at 2346. Care is discussed with cardiologist Dr. Kolton Acosta at approximately [phone]. Physical transport to [facility] is pending. Any addenda be made as appropriate. Decision time to admit: 2228.     Patient seen during [facility] Pandemic, I did don appropriate PPE during my encounters with the patient, including n95 (when appropriate) mask and eye protection as appropriate.     I have reviewed and interpreted all of the currently available lab results from this visit (if applicable):  Results for orders placed or performed during the hospital encounter of 10/30/21   COVID-19, Rapid    Specimen: Nasopharyngeal   Result Value Ref Range    Source UNKNOWN     SARS-CoV-2, NAAT DETECTED (A) NOT DETECTED   CBC Auto Differential   Result Value Ref Range    WBC 5.6 4.0 - 10.5 K/CU MM    RBC 4.04 (L) 4.2 - 5.4 M/CU MM    Hemoglobin 12.3 (L) 12.5 - 16.0 GM/DL    Hematocrit 36.9 (L) 37 - 47 %    MCV 91.3 78 - 100 FL    MCH 30.4 27 - 31 PG    MCHC 33.3 32.0 - 36.0 %    RDW 13.2 11.7 - 14.9 %    Platelets 324 (L) 690 - 440 K/CU MM    MPV 11.4 (H) 7.5 - 11.1 FL    Differential Type AUTOMATED DIFFERENTIAL     Segs Relative 73.3 (H) 36 - 66 %    Lymphocytes % 16.7 (L) 24 - 44 %    Monocytes % 9.2 (H) 0 - 4 %    Eosinophils % 0.2 0 - 3 %    Basophils % 0.2 0 - 1 %    Segs Absolute 4.1 K/CU MM    Lymphocytes Absolute 0.9 K/CU MM    Monocytes Absolute 0.5 K/CU MM    Eosinophils Absolute 0.0 K/CU MM    Basophils Absolute 0.0 K/CU MM    Immature Neutrophil % 0.4 0 - 0.43 %    Total Immature Neutrophil 0.02 K/CU MM   Comprehensive Metabolic Panel w/ Reflex to MG   Result Value Ref Range    Sodium 129 (L) 135 - 145 MMOL/L    Potassium 4.0 3.5 - 5.1 MMOL/L    Chloride 92 (L) 99 - 110 mMol/L    CO2 21 21 - 32 MMOL/L    BUN 22 6 - 23 MG/DL    CREATININE 1.6 (H) 0.6 - 1.1 MG/DL    Glucose 114 (H) 70 - 99 MG/DL    Calcium 9.5 8.3 - 10.6 MG/DL    Albumin 3.4 3.4 - 5.0 GM/DL    Total Protein 6.6 6.4 - 8.2 GM/DL    Total Bilirubin 0.4 0.0 - 1.0 MG/DL    ALT 12 10 - 40 U/L    AST 24 15 - 37 IU/L    Alkaline Phosphatase 68 40 - 129 IU/L    GFR Non- 32 (L) >60 mL/min/1.73m2    GFR  38 (L) >60 mL/min/1.73m2    Anion Gap 16 4 - 16   Troponin   Result Value Ref Range Troponin T 0.011 (H) <0.01 NG/ML   Brain Natriuretic Peptide   Result Value Ref Range    Pro-BNP 2,743 (H) <300 PG/ML   Protime-INR   Result Value Ref Range    Protime 16.5 (H) 11.7 - 14.5 SECONDS    INR 1.36 INDEX   APTT   Result Value Ref Range    aPTT 47.2 (H) 25.1 - 37.1 SECONDS   Lactic Acid, Plasma   Result Value Ref Range    Lactate 0.8 0.4 - 2.0 mMOL/L   POCT Venous   Result Value Ref Range    pH, Pasquale 7.46 (H) 7.32 - 7.42    pCO2, Pasquale 29.9 (L) 38 - 52 mmHG    pO2, Pasquale 56.9 (H) 28 - 48 mmHG    Base Exc, Mixed 1.5 0 - 2.3    Base Excess MINUS 0 - 2.4    HCO3, Venous 21.3 19 - 25 MMOL/L    O2 Sat, Pasquale 91.2 (H) 50 - 70 %    CO2 Content 22.2 19 - 24 MMOL/L    Source: Venous    EKG 12 Lead   Result Value Ref Range    Ventricular Rate 92 BPM    Atrial Rate 92 BPM    P-R Interval 152 ms    QRS Duration 86 ms    Q-T Interval 416 ms    QTc Calculation (Bazett) 514 ms    P Axis 74 degrees    R Axis 85 degrees    T Axis 82 degrees    Diagnosis       ** Poor data quality, interpretation may be adversely affected  Sinus rhythm with premature atrial complexes  Nonspecific ST abnormality  Prolonged QT  Abnormal ECG  When compared with ECG of 28-JUN-2021 04:00,  premature atrial complexes are now present          Radiographs (if obtained):  Radiologist's Report Reviewed:  XR CHEST PORTABLE    Result Date: 10/30/2021  EXAMINATION: ONE XRAY VIEW OF THE CHEST 10/30/2021 8:40 pm COMPARISON: Chest 09/11/2021 and 06/18/2021 HISTORY: ORDERING SYSTEM PROVIDED HISTORY: Cough, short of breath, COVID exposure FINDINGS: The cardiac silhouette is enlarged. Calcifications involving the aorta reflect atherosclerosis. The mediastinal and hilar silhouettes appear unremarkable. Scattered pulmonary opacities bilateral mid and lower lungs. Chronic appearing coarse interstitial densities predominate perihilar regions and lung bases, typical of sequela from smoking or other previous infectious/inflammatory process.   Vascular engorgement and cephalization is demonstrated with bilateral peribronchial cuffing and perivascular haziness. No pneumothorax is seen. No acute osseous abnormality is identified. Sequela from open-heart surgery. 1. Nonspecific pulmonary infiltrates can be seen with atypical/viral pneumonia. Mild congestive heart failure could potentially give the appearance seen on this exam. 2.  Chronic appearing coarse interstitial densities predominate perihilar regions and lung bases, typical of sequela from smoking or other previous infectious/inflammatory process. 3. Calcific atherosclerosis aorta. 4. Cardiomegaly. EKG:  Twelve-lead EKG obtained at 2050 on 30 October 2021 and interpreted by me in the absence of a cardiologist.  There is no criteria ST elevation or reciprocal change. There are no hyperacute T wave changes. There is no sign of acute ischemia or infarction. This tracing shows a sinus rhythm with premature atrial complexes and nonspecific ST changes and baseline artifact. Rate and intervals are 86 beats per minute, MN interval 150 milliseconds, QRS duration 78 milliseconds, QTc interval 488 milliseconds, and R axis normal at 85 degrees. There is no acute change compared with the most recent EKG dated June 28, 2021. Medical decision making:  Patient presents to the emergency department with a constellation of signs and symptoms consistent with newly diagnosed COVID-19, pneumonia, and COPD exacerbation. Patient was mildly hypoxic on her demand oxygen. She has been saturating normally on continuous oxygen at 2 L most recently. Lung sounds and chest x-ray do show some asymmetric inflammation worrisome for a bacterial coinfection. Broad-spectrum antibiotics are started as a precaution against decompensation and sepsis. There is mild elevation in troponin, but EKG does not show detectable ischemia or infarction. Similarly, BNP is elevated, and chest x-ray does show some congestion.   There has been no indication of flash pulmonary edema. Abdomen has been nonsurgical.    Procedures: None. Consultations: Cardiology. Hospitalist service. Clinical Impression:  1. Pneumonia due to COVID-19 virus    2. Acute exacerbation of COPD with asthma ([facility] [address]    3. Hypoxia    4. Elevated troponin    5. Elevated brain natriuretic peptide (BNP) level    6. Hyponatremia      Disposition referral (if applicable):  No follow-up provider specified. Disposition medications (if applicable):  New Prescriptions    No medications on file     ED Provider Disposition Time  DISPOSITION Decision To Admit 10/30/2021 10:26:53 PM      Comment: Please note this report has been produced using speech recognition software and may contain errors related to that system including errors in grammar, punctuation, and spelling, as well as words and phrases that may be inappropriate. Efforts were made to edit the dictations.         Kristal Putnam MD  10/31/21 1989

## 2021-10-31 NOTE — ED NOTES
Pt had brought home medications - took scheduled home meds/hospital eliquis, physician aware. Provided with granola bar and orange juice, denies further needs at this time.      Hamlet Mosley RN  10/31/21 9957

## 2021-10-31 NOTE — ED NOTES
Reports cough, diarrhea 2 days. Reports daughter is positive for covid.      Cyrus Frazier, RN  10/30/21 2015

## 2021-10-31 NOTE — ED NOTES
Resting on cot. No acute distress. Denies needs at this time. Call light  In reach.      Berta Mitchell RN  10/31/21 9251

## 2021-11-01 ENCOUNTER — TELEPHONE (OUTPATIENT)
Dept: CARDIOLOGY CLINIC | Age: 71
End: 2021-11-01

## 2021-11-01 VITALS
HEIGHT: 63 IN | TEMPERATURE: 98.4 F | WEIGHT: 157 LBS | RESPIRATION RATE: 16 BRPM | OXYGEN SATURATION: 95 % | HEART RATE: 88 BPM | DIASTOLIC BLOOD PRESSURE: 85 MMHG | SYSTOLIC BLOOD PRESSURE: 186 MMHG | BODY MASS INDEX: 27.82 KG/M2

## 2021-11-01 LAB
EKG ATRIAL RATE: 86 BPM
EKG DIAGNOSIS: NORMAL
EKG P AXIS: 81 DEGREES
EKG P-R INTERVAL: 150 MS
EKG Q-T INTERVAL: 408 MS
EKG QRS DURATION: 78 MS
EKG QTC CALCULATION (BAZETT): 488 MS
EKG R AXIS: 85 DEGREES
EKG T AXIS: 84 DEGREES
EKG VENTRICULAR RATE: 86 BPM

## 2021-11-01 PROCEDURE — 93010 ELECTROCARDIOGRAM REPORT: CPT | Performed by: INTERNAL MEDICINE

## 2021-11-01 RX ORDER — DEXAMETHASONE 4 MG/1
4 TABLET ORAL 2 TIMES DAILY WITH MEALS
Qty: 10 TABLET | Refills: 0 | Status: SHIPPED | OUTPATIENT
Start: 2021-11-01 | End: 2021-11-06

## 2021-11-01 RX ORDER — BENZONATATE 100 MG/1
100 CAPSULE ORAL 3 TIMES DAILY PRN
Qty: 20 CAPSULE | Refills: 0 | Status: SHIPPED | OUTPATIENT
Start: 2021-11-01 | End: 2021-11-08

## 2021-11-01 RX ORDER — AZITHROMYCIN 250 MG/1
TABLET, FILM COATED ORAL
Qty: 1 PACKET | Refills: 0 | Status: SHIPPED | OUTPATIENT
Start: 2021-11-01 | End: 2021-11-05

## 2021-11-01 NOTE — ED NOTES
Repositioned pt for comfort, pt denies needs at this time, call light in reach.       Bakari Mckeon RN  11/01/21 5636

## 2021-11-01 NOTE — ED PROVIDER NOTES
eMERGENCY dEPARTMENT eNCOUnter    ATTENDING SIGN OUT NOTE    HPI/Physical Exam/Medical Decision Making  Time: 7:00 AM  I have received sign out of [facility]  Emergency Department care from Dr. Jann Shaikh. We discussed the history, physical exam, completed/pending test results (if obtained) and current treatment plan. Please refer to his/her chart for further details. In brief, the patient is a 70 y.o. female who presented to the ED with complaints of a productive cough and increased shortness of breath. The patient uses oxygen at home and was mildly hypoxic on her demand oxygen. She has been saturating normally on her continuous oxygen at 2 L. Work-up reveals that she is positive for Covid with pulmonary infiltrates on chest x-ray consistent with atypical or viral pneumonia. Labs are otherwise significant for mild hyponatremia that is improved, chronic renal insufficiency, and elevated BNP, a detectable troponin of 0.011 that was rechecked and was undetectable at less than 0.01. She has been treated with steroids, Rocephin, Zithromax, IV normal saline, duo nebs and her home medications. She states that she is feeling better. She she has been accepted for admission at [facility]. 10:00-10:15 am  The patient has been in the emergency department for 36 hours and continues to await an inpatient bed at [facility]. The patient states she is feeling much better and would like to go home. Her hypoxia has resolved. She states that she no longer wants to be admitted. We discussed the risks of going home versus being admitted and the patient continues to want to be discharged. She states she is oxygen at home that she can titrate. I feel this is reasonable at this time. She is to follow-up with her physician in the next 1 to 2 days. Return precautions are given.   The patient verbalized understanding, was agreeable with plan, and was discharged in stable condition. Diagnostics:  XR CHEST PORTABLE (Final result)  Result time 10/30/21 21:28:59  Final result by Vianey Ramírez MD (10/30/21 21:28:59)                Impression:    1. Nonspecific pulmonary infiltrates can be seen with atypical/viral   pneumonia.  Mild congestive heart failure could potentially give the   appearance seen on this exam.   2.  Chronic appearing coarse interstitial densities predominate perihilar   regions and lung bases, typical of sequela from smoking or other previous   infectious/inflammatory process. 3. Calcific atherosclerosis aorta. 4. Cardiomegaly. Narrative:    EXAMINATION:   ONE XRAY VIEW OF THE CHEST     10/30/2021 8:40 pm     COMPARISON:   Chest 09/11/2021 and 06/18/2021     HISTORY:   ORDERING SYSTEM PROVIDED HISTORY: Cough, short of breath, COVID exposure     FINDINGS:   The cardiac silhouette is enlarged.  Calcifications involving the aorta   reflect atherosclerosis. The mediastinal and hilar silhouettes appear   unremarkable. Scattered pulmonary opacities bilateral mid and lower lungs.  Chronic   appearing coarse interstitial densities predominate perihilar regions and   lung bases, typical of sequela from smoking or other previous   infectious/inflammatory process.  Vascular engorgement and cephalization is   demonstrated with bilateral peribronchial cuffing and perivascular haziness. No pneumothorax is seen. No acute osseous abnormality is identified.  Sequela from open-heart surgery.                Labs Reviewed   COVID-19, RAPID - Abnormal; Notable for the following components:       Result Value    SARS-CoV-2, NAAT DETECTED (*)     All other components within normal limits   CBC WITH AUTO DIFFERENTIAL - Abnormal; Notable for the following components:    RBC 4.04 (*)     Hemoglobin 12.3 (*)     Hematocrit 36.9 (*)     Platelets 118 (*)     MPV 11.4 (*)     Segs Relative 73.3 (*)     Lymphocytes % 16.7 (*)     Monocytes % 9.2 (*)     All other components within normal limits   COMPREHENSIVE METABOLIC PANEL W/ REFLEX TO MG FOR LOW K - Abnormal; Notable for the following components:    Sodium 129 (*)     Chloride 92 (*)     CREATININE 1.6 (*)     Glucose 114 (*)     GFR Non- 32 (*)     GFR  38 (*)     All other components within normal limits   TROPONIN - Abnormal; Notable for the following components:    Troponin T 0.011 (*)     All other components within normal limits   BRAIN NATRIURETIC PEPTIDE - Abnormal; Notable for the following components:    Pro-BNP 2,743 (*)     All other components within normal limits   PROTIME-INR - Abnormal; Notable for the following components:    Protime 16.5 (*)     All other components within normal limits   APTT - Abnormal; Notable for the following components:    aPTT 47.2 (*)     All other components within normal limits   BASIC METABOLIC PANEL - Abnormal; Notable for the following components:    Sodium 133 (*)     Chloride 94 (*)     CREATININE 1.5 (*)     Glucose 115 (*)     GFR Non- 34 (*)     GFR  41 (*)     All other components within normal limits   POCT VENOUS - Abnormal; Notable for the following components:    pH, Pasquale 7.46 (*)     pCO2, Pasquale 29.9 (*)     pO2, Pasquale 56.9 (*)     O2 Sat, Pasquale 91.2 (*)     All other components within normal limits   LACTIC ACID, PLASMA   TROPONIN       Clinical Impression:  1. Pneumonia due to COVID-19 virus    2.  Acute exacerbation of COPD with asthma ([facility] [address]        Disposition referral (if applicable):  Dustin Fang MD  [address] [address]  [phone]    Schedule an appointment as soon as possible for a visit in 1 day      [address]  [phone]  Go to   If symptoms worsen      Disposition medications (if applicable):  Discharge Medication List as of 11/1/2021 10:20 AM      START taking these medications    Details azithromycin (ZITHROMAX Z-MARGIE) 250 MG tablet Take 2 tablets (500 mg) on Day 1, and then take 1 tablet (250 mg) on days 2 through 5., Disp-1 packet, R-0Normal      dexamethasone (DECADRON) 4 MG tablet Take 1 tablet by mouth 2 times daily (with meals) for 5 days, Disp-10 tablet, R-0Normal      benzonatate (TESSALON PERLES) 100 MG capsule Take 1 capsule by mouth 3 times daily as needed for Cough, Disp-20 capsule, R-0Normal               Comment: Please note this report has been produced using speech recognition software and may contain errors related to that system including errors in grammar, punctuation, and spelling, as well as words and phrases that may be inappropriate. If there are any questions or concerns please feel free to contact the dictating provider for clarification.        Kenzie Zuleta MD  11/01/21 3134

## 2021-11-01 NOTE — ED NOTES
Pt requesting PO antibiotics. Pt states her arm is hurting. Upon examination no redness or swelling to IV site. Blood return noted and line flushes with ease.       David Merida RN  10/31/21 [id number]

## 2021-11-01 NOTE — TELEPHONE ENCOUNTER
called wife is admitted to [facility]  Positive with COVID  She is admitted and doesn't feel she is getting the best care ans wants transferred to [facility] if there is a bed available , She is asking for a call back

## 2021-11-01 NOTE — PROGRESS NOTES
Patient ambulated to [address] [place] [address] on RA saturation dropped to 87% patient placed back on oxygen and rebounded to 95%

## 2021-12-02 RX ORDER — TIOTROPIUM BROMIDE INHALATION SPRAY 3.12 UG/1
SPRAY, METERED RESPIRATORY (INHALATION)
Qty: 1 EACH | Refills: 5 | Status: SHIPPED | OUTPATIENT
Start: 2021-12-02 | End: 2022-04-11 | Stop reason: SDUPTHER

## 2021-12-05 ENCOUNTER — HOSPITAL ENCOUNTER (OUTPATIENT)
Dept: SLEEP CENTER | Age: 71
Discharge: HOME | End: 2021-12-05
Payer: MEDICARE

## 2021-12-05 DIAGNOSIS — G47.33 OSA (OBSTRUCTIVE SLEEP APNEA): ICD-10-CM

## 2021-12-05 PROCEDURE — 95811 POLYSOM 6/>YRS CPAP 4/> PARM: CPT

## 2021-12-05 ASSESSMENT — SLEEP AND FATIGUE QUESTIONNAIRES
HOW LIKELY ARE YOU TO NOD OFF OR FALL ASLEEP WHILE WATCHING TV: 0
HOW LIKELY ARE YOU TO NOD OFF OR FALL ASLEEP WHILE SITTING AND TALKING TO SOMEONE: 0
HOW LIKELY ARE YOU TO NOD OFF OR FALL ASLEEP WHILE SITTING INACTIVE IN A PUBLIC PLACE: 0
HOW LIKELY ARE YOU TO NOD OFF OR FALL ASLEEP WHILE LYING DOWN TO REST IN THE AFTERNOON WHEN CIRCUMSTANCES PERMIT: 2
HOW LIKELY ARE YOU TO NOD OFF OR FALL ASLEEP WHILE SITTING AND READING: 0
HOW LIKELY ARE YOU TO NOD OFF OR FALL ASLEEP IN A CAR, WHILE STOPPED FOR A FEW MINUTES IN TRAFFIC: 0
ESS TOTAL SCORE: 5
HOW LIKELY ARE YOU TO NOD OFF OR FALL ASLEEP WHEN YOU ARE A PASSENGER IN A CAR FOR AN HOUR WITHOUT A BREAK: 3
HOW LIKELY ARE YOU TO NOD OFF OR FALL ASLEEP WHILE SITTING QUIETLY AFTER LUNCH WITHOUT ALCOHOL: 0

## 2021-12-06 NOTE — PROGRESS NOTES
12/5/2021  sleep study  for Vick Keita  1950 is complete. Results are pending physician review.     Electronically signed by Governor James on 12/5/2021 at 8:52 PM

## 2021-12-08 LAB — STATUS: NORMAL

## 2021-12-08 PROCEDURE — 95811 POLYSOM 6/>YRS CPAP 4/> PARM: CPT | Performed by: INTERNAL MEDICINE

## 2022-02-16 RX ORDER — PANTOPRAZOLE SODIUM 40 MG/1
40 TABLET, DELAYED RELEASE ORAL DAILY
Qty: 90 TABLET | Refills: 3 | Status: SHIPPED | OUTPATIENT
Start: 2022-02-16

## 2022-03-09 RX ORDER — NITROGLYCERIN 0.4 MG/1
0.4 TABLET SUBLINGUAL EVERY 5 MIN PRN
Qty: 25 TABLET | Refills: 3 | Status: SHIPPED | OUTPATIENT
Start: 2022-03-09

## 2022-03-11 ENCOUNTER — NURSE ONLY (OUTPATIENT)
Dept: FAMILY MEDICINE CLINIC | Age: 72
End: 2022-03-11
Payer: MEDICARE

## 2022-03-11 DIAGNOSIS — E03.9 ACQUIRED HYPOTHYROIDISM: ICD-10-CM

## 2022-03-11 LAB
T4 FREE: 2.2 NG/DL (ref 0.9–1.8)
TSH SERPL DL<=0.05 MIU/L-ACNC: 1.2 UIU/ML (ref 0.27–4.2)

## 2022-03-11 PROCEDURE — 36415 COLL VENOUS BLD VENIPUNCTURE: CPT | Performed by: FAMILY MEDICINE

## 2022-03-14 RX ORDER — LEVOTHYROXINE SODIUM 0.07 MG/1
75 TABLET ORAL DAILY
Qty: 90 TABLET | Refills: 0 | Status: SHIPPED | OUTPATIENT
Start: 2022-03-14 | End: 2022-05-23 | Stop reason: SDUPTHER

## 2022-03-21 RX ORDER — CARVEDILOL 25 MG/1
25 TABLET ORAL 2 TIMES DAILY
Qty: 180 TABLET | Refills: 3 | Status: SHIPPED | OUTPATIENT
Start: 2022-03-21 | End: 2022-10-24 | Stop reason: SDUPTHER

## 2022-03-21 RX ORDER — LISINOPRIL 40 MG/1
40 TABLET ORAL DAILY
Qty: 90 TABLET | Refills: 3 | Status: SHIPPED | OUTPATIENT
Start: 2022-03-21 | End: 2022-10-24 | Stop reason: SDUPTHER

## 2022-04-08 ENCOUNTER — OFFICE VISIT (OUTPATIENT)
Dept: CARDIOLOGY CLINIC | Age: 72
End: 2022-04-08
Payer: MEDICARE

## 2022-04-08 VITALS
SYSTOLIC BLOOD PRESSURE: 136 MMHG | DIASTOLIC BLOOD PRESSURE: 64 MMHG | HEIGHT: 64 IN | HEART RATE: 60 BPM | BODY MASS INDEX: 26.98 KG/M2 | WEIGHT: 158 LBS

## 2022-04-08 DIAGNOSIS — J96.11 CHRONIC RESPIRATORY FAILURE WITH HYPOXIA AND HYPERCAPNIA (HCC): ICD-10-CM

## 2022-04-08 DIAGNOSIS — I25.10 ASCVD (ARTERIOSCLEROTIC CARDIOVASCULAR DISEASE): ICD-10-CM

## 2022-04-08 DIAGNOSIS — E78.2 MIXED HYPERLIPIDEMIA: ICD-10-CM

## 2022-04-08 DIAGNOSIS — Z95.5 S/P RIGHT CORONARY ARTERY (RCA) STENT PLACEMENT: ICD-10-CM

## 2022-04-08 DIAGNOSIS — Z95.2 S/P MITRAL VALVE REPLACEMENT: ICD-10-CM

## 2022-04-08 DIAGNOSIS — J96.12 CHRONIC RESPIRATORY FAILURE WITH HYPOXIA AND HYPERCAPNIA (HCC): ICD-10-CM

## 2022-04-08 DIAGNOSIS — J96.01 ACUTE RESPIRATORY FAILURE WITH HYPOXIA AND HYPERCAPNIA (HCC): Primary | ICD-10-CM

## 2022-04-08 DIAGNOSIS — Z72.0 TOBACCO ABUSE: ICD-10-CM

## 2022-04-08 DIAGNOSIS — E03.9 ACQUIRED HYPOTHYROIDISM: ICD-10-CM

## 2022-04-08 DIAGNOSIS — I73.9 PAD (PERIPHERAL ARTERY DISEASE) (HCC): ICD-10-CM

## 2022-04-08 DIAGNOSIS — R06.02 SOB (SHORTNESS OF BREATH) ON EXERTION: ICD-10-CM

## 2022-04-08 DIAGNOSIS — J96.02 ACUTE RESPIRATORY FAILURE WITH HYPOXIA AND HYPERCAPNIA (HCC): Primary | ICD-10-CM

## 2022-04-08 DIAGNOSIS — I42.9 CARDIOMYOPATHY, PRIMARY (HCC): ICD-10-CM

## 2022-04-08 PROCEDURE — 99214 OFFICE O/P EST MOD 30 MIN: CPT | Performed by: INTERNAL MEDICINE

## 2022-04-08 NOTE — PROGRESS NOTES
CARDIOLOGY NOTE      Chief Complaint: Coronary artery disease, mitral valve disease    HPI:   Amirah Garcia is a 67y.o. year old who has history as noted below. She has lost 20 lbs in last 6 mths , Thyroid was off but better now. She is not very active    She is  using oxygen at night  although she uses up to 3 L at home. She  Says she gest leg pain and toe turns numb  She is on eliquis now but it is too expensive   She had a normal stress test in april 2021 .  goes on bike rides for weeks and months  Daughter is handicap but she works and drives and lives with her    30-day event monitor did not show any A. fib but she tells me that in the past she has had strokes and has history of \"thick blood\" and she was put on Coumadin by Dr. Mariola Agarwal. She  lives with her  has been  for 50 years, she was swicthed to eliquis from coumadin in 2021  Sees Dr. Nico Turpin for pulmonology she notices ankle swelling once in a while has some shortness of breath on exertion. She has history of mitral valve replacement with a bioprosthetic valve. .   she denies chest pressure or pain except when she is in a hurry chasing after her dogs.   Her puppy is Ginger and she has 3 other chuwawa  which keeps her busy , her DIL Aster Boo works in scheduling )  She does not have a pcp      Current Outpatient Medications   Medication Sig Dispense Refill    apixaban (ELIQUIS) 5 MG TABS tablet Take 1 tablet by mouth 2 times daily 60 tablet 5    carvedilol (COREG) 25 MG tablet Take 1 tablet by mouth 2 times daily 180 tablet 3    lisinopril (PRINIVIL;ZESTRIL) 40 MG tablet Take 1 tablet by mouth daily 90 tablet 3    levothyroxine (EUTHYROX) 75 MCG tablet Take 1 tablet by mouth Daily 90 tablet 0    nitroGLYCERIN (NITROSTAT) 0.4 MG SL tablet Place 1 tablet under the tongue every 5 minutes as needed for Chest pain 25 tablet 3    pantoprazole (PROTONIX) 40 MG tablet Take 1 tablet by mouth daily 90 tablet 3  cilostazol (PLETAL) 100 MG tablet Take 1 tablet by mouth twice daily 180 tablet 3    atorvastatin (LIPITOR) 40 MG tablet Take 1 tablet by mouth once daily 90 tablet 3    SPIRIVA RESPIMAT 2.5 MCG/ACT AERS inhaler INHALE 2 SPRAY(S) BY MOUTH ONCE DAILY 1 each 5    amLODIPine (NORVASC) 5 MG tablet Take 1 tablet by mouth daily 90 tablet 3    fluticasone (FLONASE) 50 MCG/ACT nasal spray 2 sprays by Each Nostril route daily 16 g 0    varenicline (CHANTIX) 0.5 MG tablet Take 1-2 tablets by mouth See Admin Instructions 0.5mg DAILY for 3 days followed by 0.5mg TWICE DAILY for 4 days followed by 1mg TWICE DAILY 57 tablet 0    aspirin 81 MG EC tablet Take 1 tablet by mouth daily 30 tablet 3    alendronate (FOSAMAX) 70 MG tablet Take 1 tablet by mouth every 7 days Empty stomach, full glass water, stay upright 45 minutes 4 tablet 0    Melatonin 5 MG CHEW Take by mouth      Ascorbic Acid (VITAMIN C CR) 1000 MG TBCR Take by mouth      furosemide (LASIX) 20 MG tablet Take 1/2 (one-half) tablet by mouth once daily 45 tablet 3    ondansetron (ZOFRAN-ODT) 4 MG disintegrating tablet Take 1 tablet by mouth every 8 hours as needed for Nausea or Vomiting 20 tablet 1    budesonide-formoterol (SYMBICORT) 160-4.5 MCG/ACT AERO Inhale 2 puffs into the lungs 2 times daily 1 Inhaler 5    Blood Pressure Monitoring KIT 1 Units by Does not apply route 2 times daily 1 kit 0    Calcium Carb-Cholecalciferol (CALCIUM 600+D) 600-800 MG-UNIT TABS Take 1 tablet by mouth daily      Multiple Vitamins-Minerals (THERAPEUTIC MULTIVITAMIN-MINERALS) tablet Take 1 tablet by mouth daily       No current facility-administered medications for this visit. Allergies:   Patient has no known allergies.     Patient History:  Past Medical History:   Diagnosis Date    CAD (coronary artery disease)     COPD (chronic obstructive pulmonary disease) ([facility])     GERD (gastroesophageal reflux disease)     H/O cardiovascular stress test 04/26/2021 · Gastrointestinal: No abdominal pain, appetite loss, blood in stools, constipation, diarrhea or heartburn  · Genitourinary: No dysuria, trouble voiding, or hematuria  · Musculoskeletal:  None  · Integumentary: No rash or pruritis  · Neurological: No TIA or stroke symptoms  · Psychiatric: No anxiety or depression  · Endocrine: No malaise, fatigue or temperature intolerance  · Hematologic/Lymphatic: No bleeding problems, blood clots or swollen lymph nodes  · Allergic/Immunologic: No nasal congestion or hives    Objective:      Physical Exam:  /64   Pulse 60   Ht 5' 4\" (1.626 m)   Wt 158 lb (71.7 kg)   BMI 27.12 kg/m²   Wt Readings from Last 3 Encounters:   04/08/22 158 lb (71.7 kg)   10/30/21 157 lb (71.2 kg)   09/11/21 165 lb (74.8 kg)     Body mass index is 27.12 kg/m². Vitals:    04/08/22 1154   BP: 136/64   Pulse: 60      General Appearance:  No distress, conversant  Constitutional:  Well developed, Well nourished, No acute distress, Non-toxic appearance. HENT:  Normocephalic, Atraumatic, Bilateral external ears normal, Oropharynx moist, No oral exudates, Nose normal. Neck- Normal range of motion, No tenderness, Supple, No stridor,no apical-carotid delay  Eyes:  PERRL, EOMI, Conjunctiva normal, No discharge. Respiratory:  Normal breath sounds, No respiratory distress, No wheezing, No chest tenderness. ,no use of accessory muscles, NO crackles  Cardiovascular: (PMI) apex non displaced,no lifts no thrills,S1 and S2 audible, systolic murmur 2/6, No signs of ankle edema, or volume overload, No evidence of JVD, No crackles  GI:  Bowel sounds normal, Soft, No tenderness, No masses, No gross visceromegaly   :  No costovertebral angle tenderness   Musculoskeletal:  No edema, no tenderness, no deformities.  Back- no tenderness  Integument:  Well hydrated, no rash   Lymphatic:  No lymphadenopathy noted   Neurologic:  Alert & oriented x 3, CN 2-12 normal, normal motor function, normal sensory function, no focal deficits noted   Psychiatric:  Speech and behavior appropriate            Medical decision making and Data review:  DATA:  Lab Results   Component Value Date    TROPONINT <0.010 10/31/2021     BNP:    Lab Results   Component Value Date    PROBNP 2,743 (H) 10/30/2021     PT/INR:  No results found for: How do you roll?[facility]  Lab Results   Component Value Date    LABA1C 5.0 06/18/2016     Lab Results   Component Value Date    CHOL 127 09/03/2021    TRIG 82 09/03/2021    HDL 45 09/03/2021    LDLCALC 66 09/03/2021    LDLDIRECT 64 03/15/2021     Lab Results   Component Value Date    ALT 12 10/30/2021    AST 24 10/30/2021     TSH:   Lab Results   Component Value Date    TSH 1.20 03/11/2022     Lab Results   Component Value Date    AST 24 10/30/2021    ALT 12 10/30/2021    BILITOT 0.4 10/30/2021    ALKPHOS 68 10/30/2021     Lab Results   Component Value Date    PROBNP 2,743 (H) 10/30/2021    PROBNP 7,518 (H) 06/28/2021    PROBNP 1,075 (H) 06/17/2020     Lab Results   Component Value Date    LABA1C 5.0 06/18/2016     Lab Results   Component Value Date    WBC 5.6 10/30/2021    HGB 12.3 (L) 10/30/2021    HCT 36.9 (L) 10/30/2021     (L) 10/30/2021     Echo 3/20/19  Summary   Echocardiogram is s/p CABG and MVR 6/21/2016. LV systolic function is abnormal.   Visually estimated ejection fraction is 35-40 %. Grade III diastolic dysfunction. Mild left atrium enlargement. Bioprostethic mitral valve is seated well and functioning properly. No pericardial effusion. Echo 8/4/2020  Summary   Left ventricular function is low normal, EF is estimated at 45-50%. Severely dilated left atrium. Evidence of a porcine bio-prosthetic valve in the mitral position,   functionally normally with a mean gradient of 3mmHg. Sclerotic, but non-stenotic aortic valve. Mild tricuspid and pulmonic regurgitation noted. No evidence of pericardial effusion.      30 day monitor 2/19/21    30-day monitor worn from 2/19/2021 to 3/20/2021 there were for patient triggers 6 auto triggers.  Lowest heart rate was 51 at 9:47 PM average heart rate was 75 highest heart rate 117 patient was primarily in sinus rhythm no episodes of A. fib or SVT noted.  One episode of wide-complex tachycardia noted with possible fusion beats at 10:33 PM on day 16 which could be PVCs or aberrant conduction uptitrate beta-blockers clinical correlation needed    Stress test  21  Summary    Supervising physician Dr. Diane Ruffin .   [place] portion of stress test is negative for ischemia by diagnostic criteria.    No infarct or ischemia noted. Normal EF 63 % with normal ventricular    contractility.    Normal stress myocardial perfusion.    This is a normal study. Echo 21  Left ventricular systolic function is hyperdynamic. Ejection fraction is visually estimated at 60%. Grade III diastolic dysfunction. Moderately dilated left atrium. Moderate aortic stenosis; mean gradient: 24 mmHg, JENNA: 1.2 cm sq. 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 No evidence of any pericardial effusion. All labs, medications and tests reviewed by myself including data and history from outside source , patient and available family . Assessment & Plan:      1. Acute respiratory failure with hypoxia and hypercapnia (HCC)    2. ASCVD (arteriosclerotic cardiovascular disease)    3. Cardiomyopathy, primary ([address]    4. Mixed hyperlipidemia    5. PAD (peripheral artery disease) ([address]    6. S/P mitral valve replacement    7. S/P right coronary artery (RCA) stent placement    8. Tobacco abuse    9. SOB (shortness of breath) on exertion    10. Chronic respiratory failure with hypoxia and hypercapnia (HCC)    11.  Acquired hypothyroidism       Hypertension  Blood pressures is well controlled on carvedilol 25mg twice daily and  lisinopril 40 mg daily keep a log of blood pressure,  Norvasc 10 mg daily  Not sure     Coronary artery disease   S/p bypass surgery X 1 vessel to RCA at time of her valve surgery. Stress test in 2021 was normal   Continue aspirin  once in a while gest chest pressure . Cardiomyopathy, primary ([address]  EF of  40 %- 45 % probably related to valve disease / ischemic? She had MI in 2014 . She used to see Dr River Duenas. Her echo shows EF of 45-50 % , is not appear to be volume overloaded uses Lasix few times a week    Aortic stenosis  Moderate stenosis continue to follow repeat echo in few months    S/P mitral valve replacement  [place] Mitral valve valve replacement 27 mm  In June 2016  heavily calcified annulus with  posterior annulus reinforced and constructed with gail patch used as a buttress. She knows and always takes dental prophylaxis  A # 27 [employer] tissue. She has been on coumadin since before her surgery due to TIA? She had no afib on monitor ?nut she has been on anticoagulation since before seeing me  She is on eliquis now       Peripheral arterial disease   Post aortobifem bypass. She is on Pletal, she denies any leg pain or claudication no signs or complaints of heart failure no admissions for heart failure  No signs of heart failure for now continue    Tobacco abuse  I have asked her to to quit even try Chantix but it did not work. Yaz Gould sHe is smoking in spite of being on 2 l at night      Dyslipidemia :  All available lab work was reviewed. Patient was advised to repeat lab work before next visit       Counseled extensively and medication compliance urged. We discussed that for the  prevention of ASCVD our  goal is aggressive risk modification. Patient is encouraged to exercise even a brisk walk for 30 minutes  at least 3 to 4 times a week   Various goals were discussed and questions answered. Continue current medications. Appropriate prescriptions are addressed and refills ordered. Questions answered and patient verbalizes understanding. Call for any problems, questions, or concerns.     Continue all other medications of all above medical condition listed as is. No follow-ups on file. Please note this report has been partially produced using speech recognition software and may contain errors related to that system including errors in grammar, punctuation, and spelling, as well as words and phrases that may be inappropriate.  If there are any questions or concerns please feel free to contact the dictating provider for clarification.

## 2022-04-08 NOTE — PATIENT INSTRUCTIONS
Please be informed that if you contact our office outside of normal business hours the physician on call cannot help with any scheduling or rescheduling issues, procedure instruction questions or any type of medication issue. We advise you for any urgent/emergency that you go to the nearest emergency room! PLEASE CALL OUR OFFICE DURING NORMAL BUSINESS HOURS    Monday - Friday   8 am to 5 pm    [facility]: [phone]    [place]:  [phone]  **It is YOUR responsibilty to bring medication bottles and/or updated medication list to [address] [place] [address]. This will allow us to better serve you and all your healthcare needs**  [facility] Locations - No appointment necessary. Sites open Monday to Friday. Call your preferred location for test preparation, business hours and other information you need. [employer] accepts [employer]. [facility] [address] Kurt Caldwell, [address] [facility]  Phone: [phone] [facility] [address] [facility]  [phone].   [facility]by Dejesus, [address] [place] [address]  Phone: [phone]

## 2022-04-22 ENCOUNTER — PROCEDURE VISIT (OUTPATIENT)
Dept: CARDIOLOGY CLINIC | Age: 72
End: 2022-04-22
Payer: MEDICARE

## 2022-04-22 DIAGNOSIS — J96.01 ACUTE RESPIRATORY FAILURE WITH HYPOXIA AND HYPERCAPNIA (HCC): ICD-10-CM

## 2022-04-22 DIAGNOSIS — J96.02 ACUTE RESPIRATORY FAILURE WITH HYPOXIA AND HYPERCAPNIA (HCC): ICD-10-CM

## 2022-04-22 DIAGNOSIS — Z95.5 S/P RIGHT CORONARY ARTERY (RCA) STENT PLACEMENT: ICD-10-CM

## 2022-04-22 DIAGNOSIS — I73.9 PAD (PERIPHERAL ARTERY DISEASE) (HCC): ICD-10-CM

## 2022-04-22 DIAGNOSIS — Z95.2 S/P MITRAL VALVE REPLACEMENT: Primary | ICD-10-CM

## 2022-04-22 DIAGNOSIS — I25.10 ASCVD (ARTERIOSCLEROTIC CARDIOVASCULAR DISEASE): ICD-10-CM

## 2022-04-22 DIAGNOSIS — E78.2 MIXED HYPERLIPIDEMIA: ICD-10-CM

## 2022-04-22 DIAGNOSIS — I42.9 CARDIOMYOPATHY, PRIMARY (HCC): ICD-10-CM

## 2022-04-22 LAB
LV EF: 53 %
LVEF MODALITY: NORMAL

## 2022-04-22 PROCEDURE — 93306 TTE W/DOPPLER COMPLETE: CPT | Performed by: INTERNAL MEDICINE

## 2022-04-26 ENCOUNTER — TELEPHONE (OUTPATIENT)
Dept: CARDIOLOGY CLINIC | Age: 72
End: 2022-04-26

## 2022-04-26 NOTE — TELEPHONE ENCOUNTER
Summary   Left ventricular systolic function is normal with an ejection fraction of   50%-55%. The left atrium is mildly dilated. Moderate aortic stenosis with mean gradient of 31 mmHg. Increased gradient across bioprosthetic valve in Mitral position with a mean   pressure of 6 mmHg. Normal pulmonary artery pressure with a RVSP of 21 mmHg. No evidence of pericardial effusion. Called and notified patient that her ECHO results are within Normal limits. Patient verbally understood.

## 2022-04-28 ENCOUNTER — TELEPHONE (OUTPATIENT)
Dept: CARDIOLOGY CLINIC | Age: 72
End: 2022-04-28

## 2022-04-28 NOTE — TELEPHONE ENCOUNTER
Patient called her Eliquis has gone up to over $130 for 30 days she cant afford that asking what her options are for help w/ cost

## 2022-04-29 NOTE — TELEPHONE ENCOUNTER
Patient called she is having a problem paying for her Eliquis she was asking if she can  some samples today she only has one dose left , Flor to see what she can do to help

## 2022-05-13 RX ORDER — FUROSEMIDE 20 MG/1
10 TABLET ORAL DAILY
Qty: 45 TABLET | Refills: 1 | Status: SHIPPED | OUTPATIENT
Start: 2022-05-13

## 2022-05-22 DIAGNOSIS — E03.9 ACQUIRED HYPOTHYROIDISM: ICD-10-CM

## 2022-05-23 DIAGNOSIS — E03.9 ACQUIRED HYPOTHYROIDISM: ICD-10-CM

## 2022-05-23 RX ORDER — LEVOTHYROXINE SODIUM 0.07 MG/1
75 TABLET ORAL DAILY
Qty: 90 TABLET | Refills: 0 | Status: SHIPPED | OUTPATIENT
Start: 2022-05-23 | End: 2022-09-09

## 2022-05-23 RX ORDER — LEVOTHYROXINE SODIUM 0.07 MG/1
TABLET ORAL
Qty: 90 TABLET | Refills: 0 | OUTPATIENT
Start: 2022-05-23

## 2022-07-11 ENCOUNTER — SCHEDULED TELEPHONE ENCOUNTER (OUTPATIENT)
Dept: PULMONOLOGY | Age: 72
End: 2022-07-11
Payer: MEDICARE

## 2022-07-11 DIAGNOSIS — Z72.0 TOBACCO ABUSE: ICD-10-CM

## 2022-07-11 DIAGNOSIS — J44.9 CHRONIC OBSTRUCTIVE PULMONARY DISEASE, UNSPECIFIED COPD TYPE (HCC): ICD-10-CM

## 2022-07-11 DIAGNOSIS — E66.3 OVERWEIGHT (BMI 25.0-29.9): ICD-10-CM

## 2022-07-11 DIAGNOSIS — G47.33 OSA (OBSTRUCTIVE SLEEP APNEA): ICD-10-CM

## 2022-07-11 DIAGNOSIS — G47.10 HYPERSOMNIA: ICD-10-CM

## 2022-07-11 PROCEDURE — 99214 OFFICE O/P EST MOD 30 MIN: CPT | Performed by: INTERNAL MEDICINE

## 2022-07-11 ASSESSMENT — ENCOUNTER SYMPTOMS
COUGH: 0
EYE ITCHING: 0
EYE DISCHARGE: 0
ABDOMINAL PAIN: 0
BACK PAIN: 0
SHORTNESS OF BREATH: 0
ABDOMINAL DISTENTION: 0

## 2022-07-11 NOTE — PROGRESS NOTES
mouth once daily 90 tablet 3    amLODIPine (NORVASC) 5 MG tablet Take 1 tablet by mouth daily 90 tablet 3    fluticasone (FLONASE) 50 MCG/ACT nasal spray 2 sprays by Each Nostril route daily 16 g 0    aspirin 81 MG EC tablet Take 1 tablet by mouth daily 30 tablet 3    Melatonin 5 MG CHEW Take by mouth      Ascorbic Acid (VITAMIN C CR) 1000 MG TBCR Take by mouth      Blood Pressure Monitoring KIT 1 Units by Does not apply route 2 times daily 1 kit 0    Calcium Carb-Cholecalciferol (CALCIUM 600+D) 600-800 MG-UNIT TABS Take 1 tablet by mouth daily      Multiple Vitamins-Minerals (THERAPEUTIC MULTIVITAMIN-MINERALS) tablet Take 1 tablet by mouth daily      apixaban (ELIQUIS) 5 MG TABS tablet Take 1 tablet by mouth 2 times daily (Patient not taking: Reported on 7/11/2022) 42 tablet 0    varenicline (CHANTIX) 0.5 MG tablet Take 1-2 tablets by mouth See Admin Instructions 0.5mg DAILY for 3 days followed by 0.5mg TWICE DAILY for 4 days followed by 1mg TWICE DAILY (Patient not taking: Reported on 7/11/2022) 57 tablet 0    alendronate (FOSAMAX) 70 MG tablet Take 1 tablet by mouth every 7 days Empty stomach, full glass water, stay upright 45 minutes (Patient not taking: Reported on 7/11/2022) 4 tablet 0    ondansetron (ZOFRAN-ODT) 4 MG disintegrating tablet Take 1 tablet by mouth every 8 hours as needed for Nausea or Vomiting (Patient not taking: Reported on 7/11/2022) 20 tablet 1    budesonide-formoterol (SYMBICORT) 160-4.5 MCG/ACT AERO Inhale 2 puffs into the lungs 2 times daily (Patient not taking: Reported on 7/11/2022) 1 Inhaler 5     No current facility-administered medications for this visit.        No Known Allergies    Past Medical History:   Diagnosis Date    CAD (coronary artery disease)     [facility]     GERD (gastroesophageal reflux disease)     H/O cardiovascular stress test 04/26/2021    normal study 63% EF no ischemia    H/O echocardiogram 03/22/2017 EF 35-40%    H/O echocardiogram 08/04/2020    EF 45-50, Mild TR & AK, Severely dilated left atrium. 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, functionally normally     Heart attack ([facility] 01/2014    Hyperlipidemia     Hypertension     On home oxygen therapy     nightly    PAD (peripheral artery disease) ([facility]     S/P right coronary artery (RCA) stent placement     Sleep apnea     uses CPAP    T2 vertebral fracture ([facility] 12/2017    TIA (transient ischemic attack) 1996    Tobacco dependence     Wears glasses     Wears partial dentures     Upper       Past Surgical History:   Procedure Laterality Date    APPENDECTOMY      age 6    CARDIAC SURGERY      CORONARY ANGIOPLASTY WITH STENT PLACEMENT      CORONARY ARTERY BYPASS GRAFT  06/21/2016    CABG X1 SVG to RCA with Mitral Valve Replacement 27mm Mosaic     HERNIA REPAIR N/A 6/25/2021    ROBOTIC HERNIA INCISIONAL REPAIR LAPAROSCOPIC WITH COMPONENT SEPARATION performed by Anali Silva MD at [address] [place] [address]  06/21/2016    27mm mosaic       Social History     Socioeconomic History    Marital status:      Spouse name: Not on file    Number of children: Not on file    Years of education: Not on file    Highest education level: Not on file   Occupational History    Not on file   Tobacco Use    Smoking status: Current Every Day Smoker     Packs/day: 0.50     Years: 56.00     Pack years: 28.00     Types: Cigarettes     Start date: 1965    Smokeless tobacco: Former User     Quit date: 4/30/2021   Vaping Use    Vaping Use: Never used   Substance and Sexual Activity    Alcohol use: No     Comment: 2 cups of coffee in the a.m.     Drug use: No    Sexual activity: Yes     Partners: Male   Other Topics Concern    Not on file   Social History Narrative    ** Merged History Encounter **          Social Determinants of Health     Financial Resource Strain: Low Risk     Difficulty of Paying Living Expenses: Not hard at all   Food Insecurity: No Food Insecurity    Worried About Running Out of Food in the Last Year: Never true    Ran Out of Food in the Last Year: Never true   Transportation Needs:     Lack of Transportation (Medical): Not on file    Lack of Transportation (Non-Medical): Not on file   Physical Activity:     Days of Exercise per Week: Not on file    Minutes of Exercise per Session: Not on file   Stress:     Feeling of Stress : Not on file   Social Connections:     Frequency of Communication with Friends and Family: Not on file    Frequency of Social Gatherings with Friends and Family: Not on file    Attends Islam Services: Not on file    Active Member of [employer] or Organizations: Not on file    Attends Club or Organization Meetings: Not on file    Marital Status: Not on file   Intimate Partner Violence:     Fear of Current or Ex-Partner: Not on file    Emotionally Abused: Not on file    Physically Abused: Not on file    Sexually Abused: Not on file   Housing Stability:     Unable to Pay for Housing in the Last Year: Not on file    Number of [place] in the Last Year: Not on file    Unstable Housing in the Last Year: Not on file       Review of Systems   Constitutional: Negative for fatigue. HENT: Negative for congestion and postnasal drip. Eyes: Negative for discharge and itching. Respiratory: Negative for cough and shortness of breath. Cardiovascular: Negative for chest pain and leg swelling. Gastrointestinal: Negative for abdominal distention and abdominal pain. Endocrine: Negative for cold intolerance and heat intolerance. Genitourinary: Negative for enuresis and frequency. Musculoskeletal: Negative for arthralgias and back pain. Allergic/Immunologic: Negative for environmental allergies and food allergies. Neurological: Negative for light-headedness and headaches. Hematological: Negative for adenopathy.    Psychiatric/Behavioral: Negative for agitation and behavioral problems. Objective: There were no vitals taken for this visit. There is no height or weight on file to calculate BMI. Sleep Medicine 12/5/2021 6/30/2020 5/29/2020   Sitting and reading 0 0 0   Watching TV 0 1 0   Sitting, inactive in a public place (e.g. a theatre or a meeting) 0 0 0   As a passenger in a car for an hour without a break 3 3 2   Lying down to rest in the afternoon when circumstances permit 2 2 3   Sitting and talking to someone 0 0 1   Sitting quietly after a lunch without alcohol 0 0 0   In a car, while stopped for a few minutes in traffic 0 0 0   Total score 5 6 6   Neck circumference (Inches) - 15 15       Radiology: None    Assessment and Plan     Problem List        Respiratory    EDITA (obstructive sleep apnea)      Advised to be compliant with the BIPAP  Loose weight         COPD (chronic obstructive pulmonary disease) ([facility]      Advised to quit smoking  C/w Inhalers  Get yearly flu vaccine  PFT  Low dose CT chest         Relevant Medications    budesonide-formoterol (SYMBICORT) 160-4.5 MCG/ACT AERO    varenicline (CHANTIX) 0.5 MG tablet    fluticasone (FLONASE) 50 MCG/ACT nasal spray    tiotropium (SPIRIVA RESPIMAT) 2.5 MCG/ACT AERS inhaler    Other Relevant Orders    Full PFT Study With Bronchodilator       Other    Overweight (BMI 25.0-29. 9)      Advised to loose weight         Tobacco abuse      Advised to quit smoking         Relevant Orders    CT LUNG SCREENING    Full PFT Study With Bronchodilator    Hypersomnia      Advised to be compliant with the BIPAP  Loose weight                    Return in about 4 weeks (around 8/8/2022) for PFT, Low dose CT chest.  Follow-Up:    Return in about 4 weeks (around 8/8/2022) for PFT, Low dose CT chest.     Progress notes sent to the referring Provider    Ford Evans is a 67 y.o. female being evaluated by a Virtual Visit (video visit) encounter to address concerns as mentioned above. A caregiver was present when appropriate. Due to this being a TeleHealth encounter (During UBFQB-33 public health emergency), evaluation of the following organ systems was limited: Vitals/Constitutional/EENT/Resp/CV/GI//MS/Neuro/Skin/Heme-Lymph-Imm. Pursuant to the emergency declaration under the [address] [place] [address] and the [employer] and [employer] Act, this Virtual Visit was conducted with patient's (and/or legal guardian's) consent, to reduce the patient's risk of exposure to COVID-19 and provide necessary medical care. The patient (and/or legal guardian) has also been advised to contact this office for worsening conditions or problems, and seek emergency medical treatment and/or call 911 if deemed necessary. Patient identification was verified at the start of the visit: Yes    Total time spent for this encounter:     Services were provided through a video synchronous discussion virtually to substitute for in-person clinic visit. Patient and provider were located at their individual homes. --Petr Mena MD on 7/11/2022 at 11:42 AM    An electronic signature was used to authenticate this note.      Petr Mena MD MD  7/11/2022  11:42 AM

## 2022-08-01 ENCOUNTER — HOSPITAL ENCOUNTER (OUTPATIENT)
Dept: WOMENS IMAGING | Age: 72
Discharge: HOME | End: 2022-08-01
Payer: MEDICARE

## 2022-08-01 DIAGNOSIS — Z12.31 ENCOUNTER FOR SCREENING MAMMOGRAM FOR BREAST CANCER: ICD-10-CM

## 2022-08-01 PROCEDURE — 77067 SCR MAMMO BI INCL CAD: CPT

## 2022-08-11 ENCOUNTER — TELEPHONE (OUTPATIENT)
Dept: PULMONOLOGY | Age: 72
End: 2022-08-11

## 2022-08-11 NOTE — TELEPHONE ENCOUNTER
Called patient today to let her know that Dr. Soha Dobbs would not be in office during appt date and time. Also informed patient that she needed to complete her CT lung screening as well. Patient stated she would call and have that scheduled then call us back to reschedule her appt.

## 2022-08-12 ENCOUNTER — HOSPITAL ENCOUNTER (OUTPATIENT)
Dept: PULMONOLOGY | Age: 72
Discharge: HOME | End: 2022-08-12
Payer: MEDICARE

## 2022-08-12 DIAGNOSIS — Z72.0 TOBACCO ABUSE: ICD-10-CM

## 2022-08-12 DIAGNOSIS — J44.9 CHRONIC OBSTRUCTIVE PULMONARY DISEASE, UNSPECIFIED COPD TYPE (HCC): ICD-10-CM

## 2022-08-12 LAB
DLCO %PRED: 36 %
DLCO PRED: NORMAL
DLCO/VA %PRED: NORMAL
DLCO/VA PRED: NORMAL
DLCO/VA: NORMAL
DLCO: NORMAL
EXPIRATORY TIME-POST: NORMAL
EXPIRATORY TIME: NORMAL
FEF 25-75% %CHNG: NORMAL
FEF 25-75% %PRED-POST: NORMAL
FEF 25-75% %PRED-PRE: NORMAL
FEF 25-75% PRED: NORMAL
FEF 25-75%-POST: NORMAL
FEF 25-75%-PRE: NORMAL
FEV1 %PRED-POST: 54 %
FEV1 %PRED-PRE: 53 %
FEV1 PRED: NORMAL
FEV1-POST: NORMAL
FEV1-PRE: NORMAL
FEV1/FVC %PRED-POST: NORMAL
FEV1/FVC %PRED-PRE: NORMAL
FEV1/FVC PRED: NORMAL
FEV1/FVC-POST: 82 %
FEV1/FVC-PRE: 82 %
FVC %PRED-POST: NORMAL
FVC %PRED-PRE: NORMAL
FVC PRED: NORMAL
FVC-POST: NORMAL
FVC-PRE: NORMAL
GAW %PRED: NORMAL
GAW PRED: NORMAL
GAW: NORMAL
IC %PRED: NORMAL
IC PRED: NORMAL
IC: NORMAL
MEP: NORMAL
MIP: NORMAL
MVV %PRED-PRE: NORMAL
MVV PRED: NORMAL
MVV-PRE: NORMAL
PEF %PRED-POST: NORMAL
PEF %PRED-PRE: NORMAL
PEF PRED: NORMAL
PEF%CHNG: NORMAL
PEF-POST: NORMAL
PEF-PRE: NORMAL
RAW %PRED: NORMAL
RAW PRED: NORMAL
RAW: NORMAL
RV %PRED: NORMAL
RV PRED: NORMAL
RV: NORMAL
SVC %PRED: NORMAL
SVC PRED: NORMAL
SVC: NORMAL
TLC %PRED: 117 %
TLC PRED: NORMAL
TLC: NORMAL
VA %PRED: NORMAL
VA PRED: NORMAL
VA: NORMAL
VTG %PRED: NORMAL
VTG PRED: NORMAL
VTG: NORMAL

## 2022-08-12 PROCEDURE — 94727 GAS DIL/WSHOT DETER LNG VOL: CPT

## 2022-08-12 PROCEDURE — 94729 DIFFUSING CAPACITY: CPT

## 2022-08-12 PROCEDURE — 94060 EVALUATION OF WHEEZING: CPT

## 2022-08-12 ASSESSMENT — PULMONARY FUNCTION TESTS
FEV1_PERCENT_PREDICTED_PRE: 53
FEV1/FVC_POST: 82
FEV1_PERCENT_PREDICTED_POST: 54
FEV1/FVC_PRE: 82

## 2022-09-02 ENCOUNTER — HOSPITAL ENCOUNTER (OUTPATIENT)
Dept: CT IMAGING | Age: 72
Discharge: HOME | End: 2022-09-02
Payer: MEDICARE

## 2022-09-02 ENCOUNTER — COMMUNITY OUTREACH (OUTPATIENT)
Dept: FAMILY MEDICINE CLINIC | Age: 72
End: 2022-09-02

## 2022-09-02 DIAGNOSIS — Z72.0 TOBACCO ABUSE: ICD-10-CM

## 2022-09-02 PROCEDURE — 71271 CT THORAX LUNG CANCER SCR C-: CPT

## 2022-09-02 NOTE — PROGRESS NOTES
Patient's HM shows they are overdue for Colonoscopy. [employer] and  files searched  without success.

## 2022-09-09 DIAGNOSIS — E03.9 ACQUIRED HYPOTHYROIDISM: ICD-10-CM

## 2022-09-09 RX ORDER — LEVOTHYROXINE SODIUM 75 UG/1
TABLET ORAL
Qty: 90 TABLET | Refills: 0 | Status: SHIPPED | OUTPATIENT
Start: 2022-09-09

## 2022-09-10 RX ORDER — AMLODIPINE BESYLATE 5 MG/1
5 TABLET ORAL DAILY
Qty: 90 TABLET | Refills: 3 | Status: SHIPPED | OUTPATIENT
Start: 2022-09-10 | End: 2022-10-24 | Stop reason: SDUPTHER

## 2022-09-13 ENCOUNTER — SCHEDULED TELEPHONE ENCOUNTER (OUTPATIENT)
Dept: PULMONOLOGY | Age: 72
End: 2022-09-13
Payer: MEDICARE

## 2022-09-13 DIAGNOSIS — E66.3 OVERWEIGHT (BMI 25.0-29.9): ICD-10-CM

## 2022-09-13 DIAGNOSIS — G47.10 HYPERSOMNIA: ICD-10-CM

## 2022-09-13 DIAGNOSIS — G47.33 OSA (OBSTRUCTIVE SLEEP APNEA): ICD-10-CM

## 2022-09-13 DIAGNOSIS — Z72.0 TOBACCO ABUSE: ICD-10-CM

## 2022-09-13 DIAGNOSIS — J44.9 CHRONIC OBSTRUCTIVE PULMONARY DISEASE, UNSPECIFIED COPD TYPE (HCC): ICD-10-CM

## 2022-09-13 PROCEDURE — 99214 OFFICE O/P EST MOD 30 MIN: CPT | Performed by: INTERNAL MEDICINE

## 2022-09-13 ASSESSMENT — ENCOUNTER SYMPTOMS
COUGH: 0
EYE ITCHING: 0
EYE DISCHARGE: 0
ABDOMINAL PAIN: 0
SHORTNESS OF BREATH: 0
BACK PAIN: 0
ABDOMINAL DISTENTION: 0

## 2022-09-13 NOTE — ASSESSMENT & PLAN NOTE
Advised to quit smoking  C/w Inhalers  Get yearly flu vaccine  PFT  6 MWT and   Low Dose CT chest in 1 year

## 2022-09-13 NOTE — PROGRESS NOTES
Chadd De Leon  1950  Referring Provider: Gisselle Scruggs MD    Subjective:     Chief Complaint   Patient presents with    Follow-up       HPI  Beverly Pimentel is a 67 y.o. female is doing a telephone follow up visit. She has mild EDITA on a BIPAP 12/7 cm h20 which she is using it every night about 6 to 7 hours. She says that it is helping her. She has no loss of weight. She has a FFM. She is not sleepy or tired during the day time. She has a 31 pk yr smoking and still smoking 1/2 pk/day. She has no symptoms. She is on 3 L/min of oxygen in the night. She had a Low Dose CT chest done on 09/02/22 which showed:  Mild emphysematous changes no new or enlarging lung mass. Extensive coronary artery calcifications. Calcifications of the aortic valve which can be associated with aortic   valvular stenosis     She had a PFT done on 08/12/22 which showed that she has moderate obstructive airway disease and severe decrease in DLCO of 36%. She is on Spiriva and Symbicort and Albuterol prn. She had flu vaccine and COVID vaccine    Current Outpatient Medications   Medication Sig Dispense Refill    amLODIPine (NORVASC) 5 MG tablet Take 1 tablet by mouth daily 90 tablet 3    EUTHYROX 75 MCG tablet Take 1 tablet by mouth once daily 90 tablet 0    tiotropium (SPIRIVA RESPIMAT) 2.5 MCG/ACT AERS inhaler INHALE 2 SPRAY(S) BY MOUTH ONCE DAILY 1 each 5    furosemide (LASIX) 20 MG tablet Take 0.5 tablets by mouth daily Take 1/2 (one-half) tablet by mouth once daily 45 tablet 1    apixaban (ELIQUIS) 5 MG TABS tablet Take 1 tablet by mouth 2 times daily 60 tablet 5    carvedilol (COREG) 25 MG tablet Take 1 tablet by mouth 2 times daily 180 tablet 3    lisinopril (PRINIVIL;ZESTRIL) 40 MG tablet Take 1 tablet by mouth daily 90 tablet 3    nitroGLYCERIN (NITROSTAT) 0.4 MG SL tablet Place 1 tablet under the tongue every 5 minutes as needed for Chest pain 25 tablet 3    pantoprazole (PROTONIX) 40 MG tablet Take 1 tablet by mouth daily 90 tablet 3    cilostazol (PLETAL) 100 MG tablet Take 1 tablet by mouth twice daily 180 tablet 3    atorvastatin (LIPITOR) 40 MG tablet Take 1 tablet by mouth once daily 90 tablet 3    fluticasone (FLONASE) 50 MCG/ACT nasal spray 2 sprays by Each Nostril route daily 16 g 0    aspirin 81 MG EC tablet Take 1 tablet by mouth daily 30 tablet 3    Melatonin 5 MG CHEW Take by mouth      Ascorbic Acid (VITAMIN C CR) 1000 MG TBCR Take by mouth      Blood Pressure Monitoring KIT 1 Units by Does not apply route 2 times daily 1 kit 0    Calcium Carb-Cholecalciferol 600-800 MG-UNIT TABS Take 1 tablet by mouth daily      Multiple Vitamins-Minerals (THERAPEUTIC MULTIVITAMIN-MINERALS) tablet Take 1 tablet by mouth daily      apixaban (ELIQUIS) 5 MG TABS tablet Take 1 tablet by mouth 2 times daily (Patient not taking: No sig reported) 42 tablet 0    varenicline (CHANTIX) 0.5 MG tablet Take 1-2 tablets by mouth See Admin Instructions 0.5mg DAILY for 3 days followed by 0.5mg TWICE DAILY for 4 days followed by 1mg TWICE DAILY (Patient not taking: No sig reported) 57 tablet 0    alendronate (FOSAMAX) 70 MG tablet Take 1 tablet by mouth every 7 days Empty stomach, full glass water, stay upright 45 minutes (Patient not taking: No sig reported) 4 tablet 0    ondansetron (ZOFRAN-ODT) 4 MG disintegrating tablet Take 1 tablet by mouth every 8 hours as needed for Nausea or Vomiting (Patient not taking: No sig reported) 20 tablet 1    budesonide-formoterol (SYMBICORT) 160-4.5 MCG/ACT AERO Inhale 2 puffs into the lungs 2 times daily (Patient not taking: No sig reported) 1 Inhaler 5     No current facility-administered medications for this visit.        No Known Allergies    Past Medical History:   Diagnosis Date    CAD (coronary artery disease)     COPD (chronic obstructive pulmonary disease) ([facility])     GERD (gastroesophageal reflux disease)     H/O cardiovascular stress test 04/26/2021    normal study 63% EF no ischemia    H/O echocardiogram 03/22/2017    EF 35-40%    H/O echocardiogram 08/04/2020    EF 45-50, Mild TR & AK, Severely dilated left atrium. 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, functionally normally     Heart attack ([facility] 01/2014    Hyperlipidemia     Hypertension     On home oxygen therapy     nightly    PAD (peripheral artery disease) ([facility])     S/P right coronary artery (RCA) stent placement     Sleep apnea     uses CPAP    T2 vertebral fracture ([facility] 12/2017    TIA (transient ischemic attack) 1996    Tobacco dependence     Wears glasses     Wears partial dentures     Upper       Past Surgical History:   Procedure Laterality Date    APPENDECTOMY      age 6    [address]      CORONARY ARTERY BYPASS GRAFT  06/21/2016    CABG X1 SVG to RCA with Mitral Valve Replacement 27mm Mosaic     HERNIA REPAIR N/A 6/25/2021    ROBOTIC HERNIA INCISIONAL REPAIR LAPAROSCOPIC WITH COMPONENT SEPARATION performed by Areli Euceda MD at [address]  06/21/2016    27mm mosaic       Social History     Socioeconomic History    Marital status:    Tobacco Use    Smoking status: Every Day     Packs/day: 0.50     Years: 56.00     Pack years: 28.00     Types: Cigarettes     Start date: 1965    Smokeless tobacco: Former     Quit date: 4/30/2021   Vaping Use    Vaping Use: Never used   Substance and Sexual Activity    Alcohol use: No     Comment: 2 cups of coffee in the a.m. Drug use: No    Sexual activity: Yes     Partners: Male   Social History Narrative    ** Merged History Encounter **            Review of Systems   Constitutional:  Negative for fatigue. HENT:  Negative for congestion and postnasal drip. Eyes:  Negative for discharge and itching. Respiratory:  Negative for cough and shortness of breath. Cardiovascular:  Negative for chest pain and leg swelling. Gastrointestinal:  Negative for abdominal distention and abdominal pain.    Endocrine: Negative for cold intolerance and heat intolerance. Genitourinary:  Negative for enuresis and frequency. Musculoskeletal:  Negative for arthralgias and back pain. Allergic/Immunologic: Negative for environmental allergies and food allergies. Neurological:  Negative for light-headedness and headaches. Hematological:  Negative for adenopathy. Psychiatric/Behavioral:  Negative for agitation and behavioral problems. Objective: There were no vitals taken for this visit. There is no height or weight on file to calculate BMI. Sleep Medicine 12/5/2021 6/30/2020 5/29/2020   Sitting and reading 0 0 0   Watching TV 0 1 0   Sitting, inactive in a public place (e.g. a theatre or a meeting) 0 0 0   As a passenger in a car for an hour without a break 3 3 2   Lying down to rest in the afternoon when circumstances permit 2 2 3   Sitting and talking to someone 0 0 1   Sitting quietly after a lunch without alcohol 0 0 0   In a car, while stopped for a few minutes in traffic 0 0 0   [place] Sleepiness Score 5 6 6   Neck circumference (Inches) - 15 15       Radiology: Reviewed    Assessment and Plan     Problem List          Respiratory    EDITA (obstructive sleep apnea)      Advised to be compliant with the BIPAP  Loose weight         COPD (chronic obstructive pulmonary disease) ([facility] [address]      Advised to quit smoking  C/w Inhalers  Get yearly flu vaccine  PFT  6 MWT and   Low Dose CT chest in 1 year         Relevant Medications    budesonide-formoterol (SYMBICORT) 160-4.5 MCG/ACT AERO    varenicline (CHANTIX) 0.5 MG tablet    fluticasone (FLONASE) 50 MCG/ACT nasal spray    tiotropium (SPIRIVA RESPIMAT) 2.5 MCG/ACT AERS inhaler    Other Relevant Orders    CT LUNG SCREENING    Full PFT Study With Bronchodilator    6 Minute Walk Test       Other    Overweight (BMI 25.0-29. 9)      Advised to loose weight         Tobacco abuse      Advised to quit smoking         Relevant Orders    CT LUNG SCREENING    Full PFT Study With Bronchodilator    6 Minute Walk Test    Hypersomnia      Advised to be compliant with the BIPAP  Loose weight                   Return in about 1 year (around 9/13/2023) for PFT, 6 MWT, Low dose CT chest, 2 week download data. Follow-Up:    Return in about 1 year (around 9/13/2023) for PFT, 6 MWT, Low dose CT chest, 2 week download data. Progress notes sent to the referring Provider    Yara Ewing is a 67 y.o. female being evaluated by a Virtual Visit (video visit) encounter to address concerns as mentioned above. A caregiver was present when appropriate. Due to this being a TeleHealth encounter (During [place] [address] public [facility] emergency), evaluation of the following organ systems was limited: Vitals/Constitutional/EENT/Resp/CV/GI//MS/Neuro/Skin/Heme-Lymph-Imm. Pursuant to the emergency declaration under the [address] [place] [address], [address] [place] [address] and the [employer] and [employer] Act, this Virtual Visit was conducted with patient's (and/or legal guardian's) consent, to reduce the patient's risk of exposure to COVID-19 and provide necessary medical care. The patient (and/or legal guardian) has also been advised to contact this office for worsening conditions or problems, and seek emergency medical treatment and/or call 911 if deemed necessary. Patient identification was verified at the start of the visit: Yes    Total time spent for this encounter:     Services were provided through a video synchronous discussion virtually to substitute for in-person clinic visit. Patient and provider were located at their individual homes. --Horace Ramirez MD on 9/13/2022 at 10:20 AM    An electronic signature was used to authenticate this note.      Horace Ramirez MD MD  9/13/2022  10:20 AM

## 2022-09-14 ENCOUNTER — TELEPHONE (OUTPATIENT)
Dept: PULMONOLOGY | Age: 72
End: 2022-09-14

## 2022-09-14 RX ORDER — VARENICLINE TARTRATE 25 MG
KIT ORAL
Qty: 53 EACH | Refills: 0 | Status: SHIPPED | OUTPATIENT
Start: 2022-09-14

## 2022-09-14 NOTE — TELEPHONE ENCOUNTER
Patient states you had discussed calling in [facility] for her but it was not at the pharmacy.   Please advise

## 2022-09-26 ENCOUNTER — TELEPHONE (OUTPATIENT)
Dept: PULMONOLOGY | Age: 72
End: 2022-09-26

## 2022-09-26 NOTE — TELEPHONE ENCOUNTER
Patient called stating chantix medication is causing a lot of nausea. I asked the patient if she wanted something else and she stated she has already stopped smoking. Patient wanted Dr. Blake Rogers to be notified of this because she says the first time she took this medication she did not have this issue.

## 2022-09-30 ENCOUNTER — OFFICE VISIT (OUTPATIENT)
Dept: CARDIOLOGY CLINIC | Age: 72
End: 2022-09-30
Payer: MEDICARE

## 2022-09-30 VITALS
HEART RATE: 64 BPM | DIASTOLIC BLOOD PRESSURE: 78 MMHG | WEIGHT: 162 LBS | HEIGHT: 64 IN | SYSTOLIC BLOOD PRESSURE: 122 MMHG | BODY MASS INDEX: 27.66 KG/M2

## 2022-09-30 DIAGNOSIS — I25.10 ASCVD (ARTERIOSCLEROTIC CARDIOVASCULAR DISEASE): ICD-10-CM

## 2022-09-30 DIAGNOSIS — R06.02 SOB (SHORTNESS OF BREATH) ON EXERTION: Primary | ICD-10-CM

## 2022-09-30 DIAGNOSIS — Z95.2 S/P MITRAL VALVE REPLACEMENT: ICD-10-CM

## 2022-09-30 DIAGNOSIS — J96.12 CHRONIC RESPIRATORY FAILURE WITH HYPOXIA AND HYPERCAPNIA (HCC): ICD-10-CM

## 2022-09-30 DIAGNOSIS — I73.9 PAD (PERIPHERAL ARTERY DISEASE) (HCC): ICD-10-CM

## 2022-09-30 DIAGNOSIS — Z95.5 S/P RIGHT CORONARY ARTERY (RCA) STENT PLACEMENT: ICD-10-CM

## 2022-09-30 DIAGNOSIS — E03.9 ACQUIRED HYPOTHYROIDISM: ICD-10-CM

## 2022-09-30 DIAGNOSIS — J96.11 CHRONIC RESPIRATORY FAILURE WITH HYPOXIA AND HYPERCAPNIA (HCC): ICD-10-CM

## 2022-09-30 DIAGNOSIS — E78.2 MIXED HYPERLIPIDEMIA: ICD-10-CM

## 2022-09-30 DIAGNOSIS — I10 PRIMARY HYPERTENSION: ICD-10-CM

## 2022-09-30 DIAGNOSIS — I05.0 RHEUMATIC MITRAL STENOSIS: ICD-10-CM

## 2022-09-30 DIAGNOSIS — G47.33 OSA (OBSTRUCTIVE SLEEP APNEA): ICD-10-CM

## 2022-09-30 PROCEDURE — 1123F ACP DISCUSS/DSCN MKR DOCD: CPT | Performed by: INTERNAL MEDICINE

## 2022-09-30 PROCEDURE — 99214 OFFICE O/P EST MOD 30 MIN: CPT | Performed by: INTERNAL MEDICINE

## 2022-09-30 NOTE — PROGRESS NOTES
CARDIOLOGY NOTE      Chief Complaint: Coronary artery disease, mitral valve disease    HPI:   Justin Thomas is a 67y.o. year old who has history as noted below. She has lost 20 lbs in last 1 year and has weaned off O2 ut uses it at night with cpap,    She is  using oxygen at night  at 2 L   She is on eliquis now but it is too expensive   She had a normal stress test in april 2021 .  goes on bike rides for weeks and months  Daughter is handicap but she works and drives and lives with her    30-day event monitor did not show any A. fib but she tells me that in the past she has had strokes and has history of \"thick blood\" and she was put on Coumadin by Dr. Toi Kelly. She  lives with her  has been  for 50 years, she was swicthed to eliquis from coumadin in 2021  Sees Dr. Bekah Garcia for pulmonology she notices ankle swelling once in a while has some shortness of breath on exertion. She has history of mitral valve replacement with a bioprosthetic valve. .   she denies chest pressure or pain except when she is in a hurry chasing after her dogs. Her puppy is Estelita and she has 3 other chuwawa  which keeps her busy , her DIL Carola Sksaleem works in scheduling )        Current Outpatient Medications   Medication Sig Dispense Refill    tiotropium (SPIRIVA RESPIMAT) 2.5 MCG/ACT AERS inhaler INHALE 2 SPRAY(S) BY MOUTH ONCE DAILY 3 each 3    varenicline (CHANTIX MARGIE) 0.5 MG X 11 & 1 MG X 42 tablet Take by mouth.  53 each 0    amLODIPine (NORVASC) 5 MG tablet Take 1 tablet by mouth daily 90 tablet 3    EUTHYROX 75 MCG tablet Take 1 tablet by mouth once daily 90 tablet 0    furosemide (LASIX) 20 MG tablet Take 0.5 tablets by mouth daily Take 1/2 (one-half) tablet by mouth once daily 45 tablet 1    apixaban (ELIQUIS) 5 MG TABS tablet Take 1 tablet by mouth 2 times daily 60 tablet 5    carvedilol (COREG) 25 MG tablet Take 1 tablet by mouth 2 times daily 180 tablet 3    lisinopril (PRINIVIL;ZESTRIL) 40 MG tablet Take 1 tablet by mouth daily 90 tablet 3    nitroGLYCERIN (NITROSTAT) 0.4 MG SL tablet Place 1 tablet under the tongue every 5 minutes as needed for Chest pain 25 tablet 3    pantoprazole (PROTONIX) 40 MG tablet Take 1 tablet by mouth daily 90 tablet 3    cilostazol (PLETAL) 100 MG tablet Take 1 tablet by mouth twice daily 180 tablet 3    atorvastatin (LIPITOR) 40 MG tablet Take 1 tablet by mouth once daily 90 tablet 3    fluticasone (FLONASE) 50 MCG/ACT nasal spray 2 sprays by Each Nostril route daily 16 g 0    aspirin 81 MG EC tablet Take 1 tablet by mouth daily 30 tablet 3    Melatonin 5 MG CHEW Take by mouth      Ascorbic Acid (VITAMIN C CR) 1000 MG TBCR Take by mouth      Blood Pressure Monitoring KIT 1 Units by Does not apply route 2 times daily 1 kit 0    Calcium Carb-Cholecalciferol 600-800 MG-UNIT TABS Take 1 tablet by mouth daily      Multiple Vitamins-Minerals (THERAPEUTIC MULTIVITAMIN-MINERALS) tablet Take 1 tablet by mouth daily      apixaban (ELIQUIS) 5 MG TABS tablet Take 1 tablet by mouth 2 times daily (Patient not taking: No sig reported) 42 tablet 0    varenicline (CHANTIX) 0.5 MG tablet Take 1-2 tablets by mouth See Admin Instructions 0.5mg DAILY for 3 days followed by 0.5mg TWICE DAILY for 4 days followed by 1mg TWICE DAILY (Patient not taking: No sig reported) 57 tablet 0    alendronate (FOSAMAX) 70 MG tablet Take 1 tablet by mouth every 7 days Empty stomach, full glass water, stay upright 45 minutes (Patient not taking: No sig reported) 4 tablet 0    ondansetron (ZOFRAN-ODT) 4 MG disintegrating tablet Take 1 tablet by mouth every 8 hours as needed for Nausea or Vomiting (Patient not taking: No sig reported) 20 tablet 1    budesonide-formoterol (SYMBICORT) 160-4.5 MCG/ACT AERO Inhale 2 puffs into the lungs 2 times daily (Patient not taking: No sig reported) 1 Inhaler 5     No current facility-administered medications for this visit.        Allergies:   Patient has no known allergies. Patient History:  Past Medical History:   Diagnosis Date    CAD (coronary artery disease)     COPD (chronic obstructive pulmonary disease) ([facility] [address]     GERD (gastroesophageal reflux disease)     H/O cardiovascular stress test 04/26/2021    normal study 63% EF no ischemia    H/O echocardiogram 03/22/2017    EF 35-40%    H/O echocardiogram 08/04/2020    EF 45-50, Mild TR & KS, Severely dilated left atrium. 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, functionally normally     Heart attack ([facility] [address] 01/2014    Hyperlipidemia     Hypertension     On home oxygen therapy     nightly    PAD (peripheral artery disease) ([facility])     S/P right coronary artery (RCA) stent placement     Sleep apnea     uses CPAP    T2 vertebral fracture ([facility] [address] 12/2017    TIA (transient ischemic attack) 1996    Tobacco dependence     Wears glasses     Wears partial dentures     Upper     Past Surgical History:   Procedure Laterality Date    APPENDECTOMY      age 6    CARDIAC SURGERY      CORONARY ANGIOPLASTY WITH STENT PLACEMENT      CORONARY ARTERY BYPASS GRAFT  06/21/2016    CABG X1 SVG to RCA with Mitral Valve Replacement 27mm Mosaic     HERNIA REPAIR N/A 6/25/2021    ROBOTIC HERNIA INCISIONAL REPAIR LAPAROSCOPIC WITH COMPONENT SEPARATION performed by Megan Marin MD at [address]  06/21/2016    27mm mosaic     Family History   Problem Relation Age of Onset    Stroke Mother     Heart Attack Father     Coronary Art Dis Father     Pacemaker Father     Arrhythmia Father     Diabetes Sister     Rheum Arthritis Sister     Heart Attack Sister     Arrhythmia Sister     Breast Cancer Neg Hx      Social History     Tobacco Use    Smoking status: Every Day     Packs/day: 0.50     Years: 56.00     Pack years: 28.00     Types: Cigarettes     Start date: 1965    Smokeless tobacco: Former     Quit date: 4/30/2021   Substance Use Topics    Alcohol use: No     Comment: 2 cups of coffee in the a.m.         Review of Systems:   Constitutional: No Fever or Weight Loss   Eyes: No Decreased Vision  ENT: No Headaches, Hearing Loss or Vertigo  Cardiovascular: as per note above   Respiratory: No cough or wheezing and as per note above. Gastrointestinal: No abdominal pain, appetite loss, blood in stools, constipation, diarrhea or heartburn  Genitourinary: No dysuria, trouble voiding, or hematuria  Musculoskeletal:  None  Integumentary: No rash or pruritis  Neurological: No TIA or stroke symptoms  Psychiatric: No anxiety or depression  Endocrine: No malaise, fatigue or temperature intolerance  Hematologic/Lymphatic: No bleeding problems, blood clots or swollen lymph nodes  Allergic/Immunologic: No nasal congestion or hives    Objective:      Physical Exam:  /78   Pulse 64   Ht 5' 4\" (1.626 m)   Wt 162 lb (73.5 kg)   BMI 27.81 kg/m²   Wt Readings from Last 3 Encounters:   09/30/22 162 lb (73.5 kg)   04/08/22 158 lb (71.7 kg)   10/30/21 157 lb (71.2 kg)     Body mass index is 27.81 kg/m². Vitals:    09/30/22 0908   BP: 122/78   Pulse: 64      General Appearance:  No distress, conversant  Constitutional:  Well developed, Well nourished, No acute distress, Non-toxic appearance. HENT:  Normocephalic, Atraumatic, Bilateral external ears normal, Oropharynx moist, No oral exudates, Nose normal. Neck- Normal range of motion, No tenderness, Supple, No stridor,no apical-carotid delay  Eyes:  PERRL, EOMI, Conjunctiva normal, No discharge. Respiratory:  Normal breath sounds, No respiratory distress, No wheezing, No chest tenderness. ,no use of accessory muscles, NO crackles  Cardiovascular: (PMI) apex non displaced,no lifts no thrills,S1 and S2 audible, systolic murmur 2/6, No signs of ankle edema, or volume overload, No evidence of JVD, No crackles  GI:  Bowel sounds normal, Soft, No tenderness, No masses, No gross visceromegaly   :  No costovertebral angle tenderness   Musculoskeletal:  No edema, no tenderness, no deformities. Back- no tenderness  Integument:  Well hydrated, no rash   Lymphatic:  No lymphadenopathy noted   Neurologic:  Alert & oriented x 3, CN 2-12 normal, normal motor function, normal sensory function, no focal deficits noted   Psychiatric:  Speech and behavior appropriate            Medical decision making and Data review:  DATA:  Lab Results   Component Value Date    TROPONINT <0.010 10/31/2021     BNP:    Lab Results   Component Value Date    PROBNP 2,743 (H) 10/30/2021     PT/INR:  No results found for: PTINR  Lab Results   Component Value Date    LABA1C 5.0 06/18/2016     Lab Results   Component Value Date    CHOL 127 09/03/2021    TRIG 82 09/03/2021    HDL 45 09/03/2021    LDLCALC 66 09/03/2021    LDLDIRECT 64 03/15/2021     Lab Results   Component Value Date    ALT 12 10/30/2021    AST 24 10/30/2021     TSH:   Lab Results   Component Value Date    TSH 1.20 03/11/2022     Lab Results   Component Value Date    AST 24 10/30/2021    ALT 12 10/30/2021    BILITOT 0.4 10/30/2021    ALKPHOS 68 10/30/2021     Lab Results   Component Value Date    PROBNP 2,743 (H) 10/30/2021    PROBNP 7,518 (H) 06/28/2021    PROBNP 1,075 (H) 06/17/2020     Lab Results   Component Value Date    LABA1C 5.0 06/18/2016     Lab Results   Component Value Date    WBC 5.6 10/30/2021    HGB 12.3 (L) 10/30/2021    HCT 36.9 (L) 10/30/2021     (L) 10/30/2021     Echo 3/20/19  Summary   Echocardiogram is s/p CABG and MVR 6/21/2016. LV systolic function is abnormal.   Visually estimated ejection fraction is 35-40 %. Grade III diastolic dysfunction. Mild left atrium enlargement. Bioprostethic mitral valve is seated well and functioning properly. No pericardial effusion. Echo 8/4/2020  Summary   Left ventricular function is low normal, EF is estimated at 45-50%. Severely dilated left atrium. Evidence of a porcine bio-prosthetic valve in the mitral position,   functionally normally with a mean gradient of 3mmHg.    Sclerotic, but non-stenotic aortic valve. Mild tricuspid and pulmonic regurgitation noted. No evidence of pericardial effusion. 30 day monitor 21    30-day monitor worn from 2021 to 3/20/2021 there were for patient triggers 6 auto triggers. Lowest heart rate was 51 at 9:47 PM average heart rate was 75 highest heart rate 117 patient was primarily in sinus rhythm no episodes of A. fib or SVT noted. One episode of wide-complex tachycardia noted with possible fusion beats at 10:33 PM on day 16 which could be PVCs or aberrant conduction uptitrate beta-blockers clinical correlation needed    Stress test  21  Summary    Supervising physician Dr. Masoud Ferrera . ECG portion of stress test is negative for ischemia by diagnostic criteria. No infarct or ischemia noted. Normal EF 63 % with normal ventricular    contractility. Normal stress myocardial perfusion. This is a normal study. Echo 21  Left ventricular systolic function is hyperdynamic. Ejection fraction is visually estimated at 60%. Grade III diastolic dysfunction. Moderately dilated left atrium. Moderate aortic stenosis; mean gradient: 24 mmHg, JENNA: 1.2 cm sq. 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 No evidence of any pericardial effusion. Echo 2022  Left ventricular systolic function is normal with an ejection fraction of   50%-55%. The left atrium is mildly dilated. Moderate aortic stenosis with mean gradient of 31 mmHg. Increased gradient across bioprosthetic valve in Mitral position with a mean   pressure of 6 mmHg. Normal pulmonary artery pressure with a RVSP of 21 mmHg. No evidence of pericardial effusion. All labs, medications and tests reviewed by myself including data and history from outside source , patient and available family . Assessment & Plan:      1. SOB (shortness of breath) on exertion    2.  S/P right coronary artery (RCA) stent placement 3. S/P mitral valve replacement    4. Rheumatic mitral stenosis    5. PAD (peripheral artery disease) ([address]    6. EDITA (obstructive sleep apnea)    7. Primary hypertension    8. Mixed hyperlipidemia    9. ASCVD (arteriosclerotic cardiovascular disease)    10. Acquired hypothyroidism    11. Chronic respiratory failure with hypoxia and hypercapnia (HCC)         Hypertension  Blood pressures is well controlled on carvedilol 25mg twice daily and  lisinopril 40 mg daily keep a log of blood pressure,  Norvasc 10 mg daily  Not sure     Coronary artery disease   S/p  bypass surgery X 1 vessel to RCA at time of her valve surgery. Stress test in 2021 was normal   Continue aspirin  once in a while gest chest pressure . Cardiomyopathy, primary ([address]  EF has improved to normal from EF of  40 %- 45 % probably related to valve disease / ischemic? She had MI in 2014 . She used to see Dr Maria M White. Her echo shows EF of 45-50 % , is not appear to be volume overloaded uses Lasix few times a week    Aortic stenosis  Moderate stenosis continue to follow repeat echo in few months    S/P mitral valve replacement  Mosaic Mitral valve valve replacement 27 mm  In June 2016  heavily calcified annulus with  posterior annulus reinforced and constructed with gail patch used as a buttress. She knows and always takes dental prophylaxis  A # 27 [employer] tissue. She has been on coumadin since before her surgery due to TIA? She had no afib on monitor ?nut she has been on anticoagulation since before seeing me  She is on eliquis now       Peripheral arterial disease   Post aortobifem bypass. She is on Pletal, she denies any leg pain or claudication no signs or complaints of heart failure no admissions for heart failure  No signs of heart failure for now continue    Tobacco abuse  I have asked her to to quit even try Chantix but it did not work. .  she says she hasnot smoked for last 2 weeks uses 2 l at night      Dyslipidemia :  All available lab work was reviewed. Patient was advised to repeat lab work before next visit       Counseled extensively and medication compliance urged. We discussed that for the  prevention of ASCVD our  goal is aggressive risk modification. Patient is encouraged to exercise even a brisk walk for 30 minutes  at least 3 to 4 times a week   Various goals were discussed and questions answered. Continue current medications. Appropriate prescriptions are addressed and refills ordered. Questions answered and patient verbalizes understanding. Call for any problems, questions, or concerns. Continue all other medications of all above medical condition listed as is. Return in about 6 months (around 3/30/2023). Please note this report has been partially produced using speech recognition software and may contain errors related to that system including errors in grammar, punctuation, and spelling, as well as words and phrases that may be inappropriate. If there are any questions or concerns please feel free to contact the dictating provider for clarification.

## 2022-10-01 DIAGNOSIS — E03.9 ACQUIRED HYPOTHYROIDISM: ICD-10-CM

## 2022-10-03 RX ORDER — AMLODIPINE BESYLATE 5 MG/1
TABLET ORAL
Qty: 90 TABLET | Refills: 0 | OUTPATIENT
Start: 2022-10-03

## 2022-10-03 RX ORDER — LEVOTHYROXINE SODIUM 0.07 MG/1
TABLET ORAL
Qty: 90 TABLET | Refills: 0 | OUTPATIENT
Start: 2022-10-03

## 2022-10-24 RX ORDER — AMLODIPINE BESYLATE 5 MG/1
5 TABLET ORAL DAILY
Qty: 90 TABLET | Refills: 3 | Status: SHIPPED | OUTPATIENT
Start: 2022-10-24

## 2022-10-24 RX ORDER — ATORVASTATIN CALCIUM 40 MG/1
40 TABLET, FILM COATED ORAL DAILY
Qty: 90 TABLET | Refills: 3 | Status: SHIPPED | OUTPATIENT
Start: 2022-10-24

## 2022-10-24 RX ORDER — LISINOPRIL 40 MG/1
40 TABLET ORAL DAILY
Qty: 90 TABLET | Refills: 3 | Status: SHIPPED | OUTPATIENT
Start: 2022-10-24

## 2022-10-24 RX ORDER — CARVEDILOL 25 MG/1
25 TABLET ORAL 2 TIMES DAILY
Qty: 180 TABLET | Refills: 3 | Status: SHIPPED | OUTPATIENT
Start: 2022-10-24

## 2022-12-09 ENCOUNTER — SCHEDULED TELEPHONE ENCOUNTER (OUTPATIENT)
Dept: PULMONOLOGY | Age: 72
End: 2022-12-09
Payer: MEDICARE

## 2022-12-09 DIAGNOSIS — Z72.0 TOBACCO ABUSE: ICD-10-CM

## 2022-12-09 DIAGNOSIS — G47.10 HYPERSOMNIA: ICD-10-CM

## 2022-12-09 DIAGNOSIS — G47.33 OSA (OBSTRUCTIVE SLEEP APNEA): ICD-10-CM

## 2022-12-09 DIAGNOSIS — J44.9 CHRONIC OBSTRUCTIVE PULMONARY DISEASE, UNSPECIFIED COPD TYPE (HCC): ICD-10-CM

## 2022-12-09 DIAGNOSIS — E66.3 OVERWEIGHT (BMI 25.0-29.9): ICD-10-CM

## 2022-12-09 PROCEDURE — 99214 OFFICE O/P EST MOD 30 MIN: CPT | Performed by: INTERNAL MEDICINE

## 2022-12-09 ASSESSMENT — ENCOUNTER SYMPTOMS
BACK PAIN: 0
COUGH: 0
EYE ITCHING: 0
SHORTNESS OF BREATH: 0
ABDOMINAL PAIN: 0
EYE DISCHARGE: 0
ABDOMINAL DISTENTION: 0

## 2022-12-09 NOTE — ASSESSMENT & PLAN NOTE
Loose weight was nauseas earlier but tolerated tube feed 63 y/o male with hx of ERSD on HD. s/p decompression surgery. 65 yrs old M Pt with hx of ESRD on HD admitted to  on 06/05 for cord compression and spinal cord injury status post fall. HD,  B/L UE and Rt LE pink band in place, Patient's vital signs taken during transfusion of PRBC. Pt admitted for spinal cord compression as above pt with spinal cord compression 65 year old male with Dx of spinal cord compresion for spinal cord injury , and patient is on HD . Currently NPO and on bolus feeding . Patient has worsening stage 4 pressure ulcer at the sacrum .

## 2022-12-09 NOTE — PROGRESS NOTES
[place] Arnaud  1950  Referring Provider: Ghulam Miranda MD    Subjective:     Chief Complaint   Patient presents with    Follow-up     Pt. Has questions about inspire       HPI  Kerry Clarke is a 67 y.o. female is doing a telephone follow up. She has mild EDITA with EDS. She is on a BIPAP 12/7 cm h20 which she is using it every night about 7 hours. She says that it is helping her. She has a FFM. She is not sleepy during the day time. She has a 31 pk yr smoking which she quit on Sept'15th 2022. She has no symptoms. She has Moderate obstructive airway disease and severe decrease in DLCO. She is on Spiriva, Symbicort and Albuterol prn. She had no flu vaccine and COVID vaccine. She had a Low Dose CT chest done on 09/02/22 showed:  Mild emphysematous changes no new or enlarging lung mass. Extensive coronary artery calcifications. Calcifications of the aortic valve which can be associated with aortic   valvular stenosis         Current Outpatient Medications   Medication Sig Dispense Refill    amLODIPine (NORVASC) 5 MG tablet Take 1 tablet by mouth daily 90 tablet 3    apixaban (ELIQUIS) 5 MG TABS tablet Take 1 tablet by mouth 2 times daily 180 tablet 3    atorvastatin (LIPITOR) 40 MG tablet Take 1 tablet by mouth daily 90 tablet 3    carvedilol (COREG) 25 MG tablet Take 1 tablet by mouth 2 times daily 180 tablet 3    lisinopril (PRINIVIL;ZESTRIL) 40 MG tablet Take 1 tablet by mouth daily 90 tablet 3    tiotropium (SPIRIVA RESPIMAT) 2.5 MCG/ACT AERS inhaler INHALE 2 SPRAY(S) BY MOUTH ONCE DAILY 3 each 3    varenicline (CHANTIX MARGIE) 0.5 MG X 11 & 1 MG X 42 tablet Take by mouth.  53 each 0    EUTHYROX 75 MCG tablet Take 1 tablet by mouth once daily 90 tablet 0    furosemide (LASIX) 20 MG tablet Take 0.5 tablets by mouth daily Take 1/2 (one-half) tablet by mouth once daily 45 tablet 1    nitroGLYCERIN (NITROSTAT) 0.4 MG SL tablet Place 1 tablet under the tongue every 5 minutes as needed for Chest pain 25 tablet 3    pantoprazole (PROTONIX) 40 MG tablet Take 1 tablet by mouth daily 90 tablet 3    cilostazol (PLETAL) 100 MG tablet Take 1 tablet by mouth twice daily 180 tablet 3    fluticasone (FLONASE) 50 MCG/ACT nasal spray 2 sprays by Each Nostril route daily 16 g 0    aspirin 81 MG EC tablet Take 1 tablet by mouth daily 30 tablet 3    Melatonin 5 MG CHEW Take by mouth      Ascorbic Acid (VITAMIN C CR) 1000 MG TBCR Take by mouth      Blood Pressure Monitoring KIT 1 Units by Does not apply route 2 times daily 1 kit 0    Calcium Carb-Cholecalciferol 600-800 MG-UNIT TABS Take 1 tablet by mouth daily      Multiple Vitamins-Minerals (THERAPEUTIC MULTIVITAMIN-MINERALS) tablet Take 1 tablet by mouth daily      apixaban (ELIQUIS) 5 MG TABS tablet Take 1 tablet by mouth 2 times daily (Patient not taking: No sig reported) 42 tablet 0    varenicline (CHANTIX) 0.5 MG tablet Take 1-2 tablets by mouth See Admin Instructions 0.5mg DAILY for 3 days followed by 0.5mg TWICE DAILY for 4 days followed by 1mg TWICE DAILY (Patient not taking: No sig reported) 57 tablet 0    alendronate (FOSAMAX) 70 MG tablet Take 1 tablet by mouth every 7 days Empty stomach, full glass water, stay upright 45 minutes (Patient not taking: No sig reported) 4 tablet 0    ondansetron (ZOFRAN-ODT) 4 MG disintegrating tablet Take 1 tablet by mouth every 8 hours as needed for Nausea or Vomiting (Patient not taking: No sig reported) 20 tablet 1    budesonide-formoterol (SYMBICORT) 160-4.5 MCG/ACT AERO Inhale 2 puffs into the lungs 2 times daily (Patient not taking: No sig reported) 1 Inhaler 5     No current facility-administered medications for this visit.        No Known Allergies    Past Medical History:   Diagnosis Date    CAD (coronary artery disease)     COPD (chronic obstructive pulmonary disease) ([facility])     GERD (gastroesophageal reflux disease)     H/O cardiovascular stress test 04/26/2021    normal study 63% EF no ischemia    H/O echocardiogram 03/22/2017    EF 35-40%    H/O echocardiogram 08/04/2020    EF 45-50, Mild TR & CO, Severely dilated left atrium. 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, functionally normally     Heart attack ([facility] [address] 01/2014    Hyperlipidemia     Hypertension     On home oxygen therapy     nightly    PAD (peripheral artery disease) (HCC)     S/P right coronary artery (RCA) stent placement     Sleep apnea     uses CPAP    T2 vertebral fracture ([facility] [address] 12/2017    TIA (transient ischemic attack) 1996    Tobacco dependence     Wears glasses     Wears partial dentures     Upper       Past Surgical History:   Procedure Laterality Date    APPENDECTOMY      age 6    [address]      CORONARY ARTERY BYPASS GRAFT  06/21/2016    CABG X1 SVG to RCA with Mitral Valve Replacement 27mm Mosaic     HERNIA REPAIR N/A 6/25/2021    ROBOTIC HERNIA INCISIONAL REPAIR LAPAROSCOPIC WITH COMPONENT SEPARATION performed by Carolyn Vaca MD at [address]  06/21/2016    27mm mosaic       Social History     Socioeconomic History    Marital status:      Spouse name: None    Number of children: None    Years of education: None    Highest education level: None   Tobacco Use    Smoking status: Every Day     Packs/day: 0.50     Years: 56.00     Pack years: 28.00     Types: Cigarettes     Start date: 1965    Smokeless tobacco: Former     Quit date: 4/30/2021   Vaping Use    Vaping Use: Never used   Substance and Sexual Activity    Alcohol use: No     Comment: 2 cups of coffee in the a.m. Drug use: No    Sexual activity: Yes     Partners: Male   Social History Narrative    ** Merged History Encounter **            Review of Systems   Constitutional:  Negative for fatigue. HENT:  Negative for congestion and postnasal drip. Eyes:  Negative for discharge and itching. Respiratory:  Negative for cough and shortness of breath. Cardiovascular:  Negative for chest pain and leg swelling. Gastrointestinal:  Negative for abdominal distention and abdominal pain. Endocrine: Negative for cold intolerance and heat intolerance. Genitourinary:  Negative for enuresis and frequency. Musculoskeletal:  Negative for arthralgias and back pain. Allergic/Immunologic: Negative for environmental allergies and food allergies. Neurological:  Negative for light-headedness and headaches. Hematological:  Negative for adenopathy. Psychiatric/Behavioral:  Negative for agitation and behavioral problems. Objective: There were no vitals taken for this visit. There is no height or weight on file to calculate BMI. Sleep Medicine 12/5/2021 6/30/2020 5/29/2020   Sitting and reading 0 0 0   Watching TV 0 1 0   Sitting, inactive in a public place (e.g. a theatre or a meeting) 0 0 0   As a passenger in a car for an hour without a break 3 3 2   Lying down to rest in the afternoon when circumstances permit 2 2 3   Sitting and talking to someone 0 0 1   Sitting quietly after a lunch without alcohol 0 0 0   In a car, while stopped for a few minutes in traffic 0 0 0   B[place]olph Sleepiness Score 5 6 6   Neck circumference (Inches) - 15 15       Radiology: Reviewed    Assessment and Plan     Problem List          Respiratory    EDITA (obstructive sleep apnea)      Advised to be compliant with the BIPAP  Loose weight         COPD (chronic obstructive pulmonary disease) ([facility] [address]      Advised to c.w quitting smoking  C.w Inhalers  PFT in 1 year  Low Dose CT chest in 1 year         Relevant Medications    budesonide-formoterol (SYMBICORT) 160-4.5 MCG/ACT AERO    varenicline (CHANTIX) 0.5 MG tablet    fluticasone (FLONASE) 50 MCG/ACT nasal spray    varenicline (CHANTIX MARGIE) 0.5 MG X 11 & 1 MG X 42 tablet    tiotropium (SPIRIVA RESPIMAT) 2.5 MCG/ACT AERS inhaler    Other Relevant Orders    Full PFT Study With Bronchodilator       Other    Overweight (BMI 25.0-29. 9)      Loose weight         Tobacco abuse      Advised to quit smoking         Relevant Orders    CT LUNG SCREENING    Hypersomnia      Advised to be compliant with the BIPAP  Loose weight                   Return in about 1 year (around 12/9/2023) for Low dose CT chest, PFT, 2 week download data. Follow-Up:    Return in about 1 year (around 12/9/2023) for Low dose CT chest, PFT, 2 week download data. Progress notes sent to the referring Provider    Delmy Gaming is a 67 y.o. female being evaluated by a Virtual Visit (video visit) encounter to address concerns as mentioned above. A caregiver was present when appropriate. Due to this being a TeleHealth encounter (During ZMUJZ-95 public health emergency), evaluation of the following organ systems was limited: Vitals/Constitutional/EENT/Resp/CV/GI//MS/Neuro/Skin/Heme-Lymph-Imm. Pursuant to the emergency declaration under the [address] [place] [address], [address] [place] [address] authority and the [employer] Act, this Virtual Visit was conducted with patient's (and/or legal guardian's) consent, to reduce the patient's risk of exposure to COVID-19 and provide necessary medical care. The patient (and/or legal guardian) has also been advised to contact this office for worsening conditions or problems, and seek emergency medical treatment and/or call 911 if deemed necessary. Patient identification was verified at the start of the visit: Yes  Total time spent for this encounter:   Services were provided through a video synchronous discussion virtually to substitute for in-person clinic visit. Patient and provider were located at their individual homes. --Maxim Marcial MD on 12/9/2022 at 11:06 AM    An electronic signature was used to authenticate this note.      Maxim Marcial MD MD  12/9/2022  11:06 AM

## 2022-12-15 DIAGNOSIS — E03.9 ACQUIRED HYPOTHYROIDISM: ICD-10-CM

## 2022-12-15 RX ORDER — LEVOTHYROXINE SODIUM 0.07 MG/1
75 TABLET ORAL DAILY
Qty: 90 TABLET | Refills: 0 | Status: SHIPPED | OUTPATIENT
Start: 2022-12-15

## 2022-12-27 RX ORDER — FUROSEMIDE 20 MG/1
10 TABLET ORAL DAILY
Qty: 45 TABLET | Refills: 1 | Status: SHIPPED | OUTPATIENT
Start: 2022-12-27

## 2023-01-25 ENCOUNTER — TELEPHONE (OUTPATIENT)
Dept: CARDIOLOGY CLINIC | Age: 73
End: 2023-01-25

## 2023-01-25 RX ORDER — AMLODIPINE BESYLATE 5 MG/1
10 TABLET ORAL DAILY
Qty: 90 TABLET | Refills: 3 | Status: SHIPPED | OUTPATIENT
Start: 2023-01-25

## 2023-01-25 NOTE — TELEPHONE ENCOUNTER
Attempted to call patient back, mailbox full  Left message on provider line at pharmacy to increase quantity to 180

## 2023-01-25 NOTE — TELEPHONE ENCOUNTER
Last Tuesday B/P 178/90 and very dizzy. No further dizziness but B/P remained elevated.  Today few hours after meds 153/51 and currently 162/78 HR 66

## 2023-03-13 RX ORDER — NITROGLYCERIN 0.4 MG/1
TABLET SUBLINGUAL
Qty: 25 TABLET | Refills: 0 | OUTPATIENT
Start: 2023-03-13

## 2023-03-13 RX ORDER — NITROGLYCERIN 0.4 MG/1
0.4 TABLET SUBLINGUAL EVERY 5 MIN PRN
Qty: 25 TABLET | Refills: 3 | Status: SHIPPED | OUTPATIENT
Start: 2023-03-13

## 2023-03-16 DIAGNOSIS — E03.9 ACQUIRED HYPOTHYROIDISM: ICD-10-CM

## 2023-03-16 RX ORDER — LEVOTHYROXINE SODIUM 0.07 MG/1
TABLET ORAL
Qty: 90 TABLET | Refills: 0 | OUTPATIENT
Start: 2023-03-16

## 2023-03-24 ENCOUNTER — OFFICE VISIT (OUTPATIENT)
Dept: CARDIOLOGY CLINIC | Age: 73
End: 2023-03-24

## 2023-03-24 ENCOUNTER — OFFICE VISIT (OUTPATIENT)
Dept: FAMILY MEDICINE CLINIC | Age: 73
End: 2023-03-24

## 2023-03-24 VITALS
HEART RATE: 59 BPM | WEIGHT: 178 LBS | TEMPERATURE: 97.5 F | DIASTOLIC BLOOD PRESSURE: 70 MMHG | OXYGEN SATURATION: 96 % | SYSTOLIC BLOOD PRESSURE: 134 MMHG | BODY MASS INDEX: 30.55 KG/M2

## 2023-03-24 VITALS
WEIGHT: 178 LBS | HEART RATE: 53 BPM | SYSTOLIC BLOOD PRESSURE: 126 MMHG | HEIGHT: 64 IN | BODY MASS INDEX: 30.39 KG/M2 | DIASTOLIC BLOOD PRESSURE: 58 MMHG | OXYGEN SATURATION: 98 %

## 2023-03-24 DIAGNOSIS — N18.31 STAGE 3A CHRONIC KIDNEY DISEASE (HCC): ICD-10-CM

## 2023-03-24 DIAGNOSIS — I48.11 LONGSTANDING PERSISTENT ATRIAL FIBRILLATION (HCC): ICD-10-CM

## 2023-03-24 DIAGNOSIS — Z12.12 SCREENING FOR COLORECTAL CANCER: ICD-10-CM

## 2023-03-24 DIAGNOSIS — Z95.5 S/P RIGHT CORONARY ARTERY (RCA) STENT PLACEMENT: Primary | ICD-10-CM

## 2023-03-24 DIAGNOSIS — I20.9 ANGINA PECTORIS, UNSPECIFIED (HCC): ICD-10-CM

## 2023-03-24 DIAGNOSIS — Z79.01 ANTICOAGULATED ON COUMADIN: ICD-10-CM

## 2023-03-24 DIAGNOSIS — I42.9 CARDIOMYOPATHY, PRIMARY (HCC): ICD-10-CM

## 2023-03-24 DIAGNOSIS — I25.119 ATHEROSCLEROSIS OF NATIVE CORONARY ARTERY OF NATIVE HEART WITH ANGINA PECTORIS (HCC): ICD-10-CM

## 2023-03-24 DIAGNOSIS — Z00.00 INITIAL MEDICARE ANNUAL WELLNESS VISIT: Primary | ICD-10-CM

## 2023-03-24 DIAGNOSIS — I73.9 PAD (PERIPHERAL ARTERY DISEASE) (HCC): Chronic | ICD-10-CM

## 2023-03-24 DIAGNOSIS — I05.0 RHEUMATIC MITRAL STENOSIS: ICD-10-CM

## 2023-03-24 DIAGNOSIS — I10 PRIMARY HYPERTENSION: ICD-10-CM

## 2023-03-24 DIAGNOSIS — G47.33 OSA (OBSTRUCTIVE SLEEP APNEA): ICD-10-CM

## 2023-03-24 DIAGNOSIS — I50.22 CHRONIC SYSTOLIC (CONGESTIVE) HEART FAILURE (HCC): Chronic | ICD-10-CM

## 2023-03-24 DIAGNOSIS — K21.9 GASTROESOPHAGEAL REFLUX DISEASE WITHOUT ESOPHAGITIS: ICD-10-CM

## 2023-03-24 DIAGNOSIS — I35.0 NONRHEUMATIC AORTIC VALVE STENOSIS: ICD-10-CM

## 2023-03-24 DIAGNOSIS — H92.02 LEFT EAR PAIN: ICD-10-CM

## 2023-03-24 DIAGNOSIS — Z95.2 S/P MITRAL VALVE REPLACEMENT: ICD-10-CM

## 2023-03-24 DIAGNOSIS — E66.9 CLASS 1 OBESITY: ICD-10-CM

## 2023-03-24 DIAGNOSIS — E03.9 ACQUIRED HYPOTHYROIDISM: ICD-10-CM

## 2023-03-24 DIAGNOSIS — E78.2 MIXED HYPERLIPIDEMIA: Chronic | ICD-10-CM

## 2023-03-24 DIAGNOSIS — M81.0 AGE-RELATED OSTEOPOROSIS WITHOUT CURRENT PATHOLOGICAL FRACTURE: Chronic | ICD-10-CM

## 2023-03-24 DIAGNOSIS — I73.9 PAD (PERIPHERAL ARTERY DISEASE) (HCC): ICD-10-CM

## 2023-03-24 DIAGNOSIS — I25.10 ASCVD (ARTERIOSCLEROTIC CARDIOVASCULAR DISEASE): ICD-10-CM

## 2023-03-24 DIAGNOSIS — Z12.11 SCREENING FOR COLORECTAL CANCER: ICD-10-CM

## 2023-03-24 DIAGNOSIS — J44.9 CHRONIC OBSTRUCTIVE PULMONARY DISEASE, UNSPECIFIED COPD TYPE (HCC): ICD-10-CM

## 2023-03-24 DIAGNOSIS — I50.22 CHRONIC SYSTOLIC (CONGESTIVE) HEART FAILURE (HCC): ICD-10-CM

## 2023-03-24 DIAGNOSIS — E78.2 MIXED HYPERLIPIDEMIA: ICD-10-CM

## 2023-03-24 DIAGNOSIS — I25.10 ATHEROSCLEROSIS OF NATIVE CORONARY ARTERY OF NATIVE HEART WITHOUT ANGINA PECTORIS: Chronic | ICD-10-CM

## 2023-03-24 PROBLEM — K43.9 HERNIA OF ANTERIOR ABDOMINAL WALL: Status: RESOLVED | Noted: 2021-04-16 | Resolved: 2023-03-24

## 2023-03-24 PROBLEM — U07.1 PNEUMONIA DUE TO COVID-19 VIRUS: Status: RESOLVED | Noted: 2021-10-30 | Resolved: 2023-03-24

## 2023-03-24 PROBLEM — N18.30 CHRONIC RENAL DISEASE, STAGE III (HCC): Chronic | Status: ACTIVE | Noted: 2023-03-24

## 2023-03-24 PROBLEM — R06.02 SOB (SHORTNESS OF BREATH) ON EXERTION: Status: RESOLVED | Noted: 2020-05-29 | Resolved: 2023-03-24

## 2023-03-24 PROBLEM — J96.10 CHRONIC RESPIRATORY FAILURE (HCC): Status: RESOLVED | Noted: 2020-11-16 | Resolved: 2023-03-24

## 2023-03-24 PROBLEM — N18.30 CHRONIC RENAL DISEASE, STAGE III (HCC): Status: ACTIVE | Noted: 2023-03-24

## 2023-03-24 PROBLEM — J12.82 PNEUMONIA DUE TO COVID-19 VIRUS: Status: RESOLVED | Noted: 2021-10-30 | Resolved: 2023-03-24

## 2023-03-24 LAB
ALBUMIN SERPL-MCNC: 4.6 G/DL (ref 3.4–5)
ALBUMIN/GLOB SERPL: 1.6 {RATIO} (ref 1.1–2.2)
ALP SERPL-CCNC: 103 U/L (ref 40–129)
ALT SERPL-CCNC: 13 U/L (ref 10–40)
ANION GAP SERPL CALCULATED.3IONS-SCNC: 16 MMOL/L (ref 3–16)
AST SERPL-CCNC: 20 U/L (ref 15–37)
BASOPHILS # BLD: 0.1 K/UL (ref 0–0.2)
BASOPHILS NFR BLD: 0.7 %
BILIRUB SERPL-MCNC: 0.6 MG/DL (ref 0–1)
BUN SERPL-MCNC: 27 MG/DL (ref 7–20)
CALCIUM SERPL-MCNC: 9.8 MG/DL (ref 8.3–10.6)
CHLORIDE SERPL-SCNC: 96 MMOL/L (ref 99–110)
CHOLEST SERPL-MCNC: 136 MG/DL (ref 0–199)
CO2 SERPL-SCNC: 25 MMOL/L (ref 21–32)
CREAT SERPL-MCNC: 1.9 MG/DL (ref 0.6–1.2)
CREAT UR-MCNC: 30.9 MG/DL (ref 28–259)
DEPRECATED RDW RBC AUTO: 13.8 % (ref 12.4–15.4)
EOSINOPHIL # BLD: 0.2 K/UL (ref 0–0.6)
EOSINOPHIL NFR BLD: 2.7 %
GFR SERPLBLD CREATININE-BSD FMLA CKD-EPI: 28 ML/MIN/{1.73_M2}
GLUCOSE SERPL-MCNC: 99 MG/DL (ref 70–99)
HCT VFR BLD AUTO: 34.5 % (ref 36–48)
HDLC SERPL-MCNC: 58 MG/DL (ref 40–60)
HGB BLD-MCNC: 12 G/DL (ref 12–16)
LDLC SERPL CALC-MCNC: 64 MG/DL
LYMPHOCYTES # BLD: 1.2 K/UL (ref 1–5.1)
LYMPHOCYTES NFR BLD: 14.3 %
MCH RBC QN AUTO: 32.5 PG (ref 26–34)
MCHC RBC AUTO-ENTMCNC: 34.8 G/DL (ref 31–36)
MCV RBC AUTO: 93.3 FL (ref 80–100)
MICROALBUMIN UR DL<=1MG/L-MCNC: 4.7 MG/DL
MICROALBUMIN/CREAT UR: 152.1 MG/G (ref 0–30)
MONOCYTES # BLD: 0.7 K/UL (ref 0–1.3)
MONOCYTES NFR BLD: 8.1 %
NEUTROPHILS # BLD: 6 K/UL (ref 1.7–7.7)
NEUTROPHILS NFR BLD: 74.2 %
PLATELET # BLD AUTO: 158 K/UL (ref 135–450)
PMV BLD AUTO: 9.6 FL (ref 5–10.5)
POTASSIUM SERPL-SCNC: 4.6 MMOL/L (ref 3.5–5.1)
PROT SERPL-MCNC: 7.4 G/DL (ref 6.4–8.2)
RBC # BLD AUTO: 3.7 M/UL (ref 4–5.2)
SODIUM SERPL-SCNC: 137 MMOL/L (ref 136–145)
T4 FREE SERPL-MCNC: 2 NG/DL (ref 0.9–1.8)
TRIGL SERPL-MCNC: 70 MG/DL (ref 0–150)
TSH SERPL DL<=0.005 MIU/L-ACNC: 2.09 UIU/ML (ref 0.27–4.2)
VLDLC SERPL CALC-MCNC: 14 MG/DL
WBC # BLD AUTO: 8.1 K/UL (ref 4–11)

## 2023-03-24 RX ORDER — CYCLOBENZAPRINE HCL 5 MG
5 TABLET ORAL 2 TIMES DAILY PRN
Qty: 30 TABLET | Refills: 1 | Status: SHIPPED | OUTPATIENT
Start: 2023-03-24 | End: 2023-03-24

## 2023-03-24 RX ORDER — ALENDRONATE SODIUM 70 MG/1
70 TABLET ORAL
Qty: 4 TABLET | Refills: 0 | Status: SHIPPED | OUTPATIENT
Start: 2023-03-24

## 2023-03-24 RX ORDER — CILOSTAZOL 100 MG/1
100 TABLET ORAL 2 TIMES DAILY
Qty: 180 TABLET | Refills: 3 | Status: SHIPPED | OUTPATIENT
Start: 2023-03-24

## 2023-03-24 RX ORDER — ASPIRIN 81 MG/1
81 TABLET ORAL DAILY
Qty: 30 TABLET | Refills: 3 | Status: SHIPPED | OUTPATIENT
Start: 2023-03-24

## 2023-03-24 RX ORDER — BUDESONIDE AND FORMOTEROL FUMARATE DIHYDRATE 80; 4.5 UG/1; UG/1
2 AEROSOL RESPIRATORY (INHALATION) 2 TIMES DAILY
Qty: 10.2 G | Refills: 3 | Status: SHIPPED | OUTPATIENT
Start: 2023-03-24

## 2023-03-24 RX ORDER — PANTOPRAZOLE SODIUM 40 MG/1
40 TABLET, DELAYED RELEASE ORAL DAILY
Qty: 90 TABLET | Refills: 0 | Status: SHIPPED | OUTPATIENT
Start: 2023-03-24

## 2023-03-24 RX ORDER — AMLODIPINE BESYLATE 10 MG/1
10 TABLET ORAL DAILY
Qty: 90 TABLET | Refills: 3 | Status: SHIPPED | OUTPATIENT
Start: 2023-03-24

## 2023-03-24 ASSESSMENT — PATIENT HEALTH QUESTIONNAIRE - PHQ9
SUM OF ALL RESPONSES TO PHQ QUESTIONS 1-9: 0
5. POOR APPETITE OR OVEREATING: 0
SUM OF ALL RESPONSES TO PHQ QUESTIONS 1-9: 0
1. LITTLE INTEREST OR PLEASURE IN DOING THINGS: 0
10. IF YOU CHECKED OFF ANY PROBLEMS, HOW DIFFICULT HAVE THESE PROBLEMS MADE IT FOR YOU TO DO YOUR WORK, TAKE CARE OF THINGS AT HOME, OR GET ALONG WITH OTHER PEOPLE: 0
2. FEELING DOWN, DEPRESSED OR HOPELESS: 0
3. TROUBLE FALLING OR STAYING ASLEEP: 0
7. TROUBLE CONCENTRATING ON THINGS, SUCH AS READING THE NEWSPAPER OR WATCHING TELEVISION: 0
6. FEELING BAD ABOUT YOURSELF - OR THAT YOU ARE A FAILURE OR HAVE LET YOURSELF OR YOUR FAMILY DOWN: 0
SUM OF ALL RESPONSES TO PHQ QUESTIONS 1-9: 0
9. THOUGHTS THAT YOU WOULD BE BETTER OFF DEAD, OR OF HURTING YOURSELF: 0
SUM OF ALL RESPONSES TO PHQ QUESTIONS 1-9: 0
8. MOVING OR SPEAKING SO SLOWLY THAT OTHER PEOPLE COULD HAVE NOTICED. OR THE OPPOSITE, BEING SO FIGETY OR RESTLESS THAT YOU HAVE BEEN MOVING AROUND A LOT MORE THAN USUAL: 0
4. FEELING TIRED OR HAVING LITTLE ENERGY: 0
SUM OF ALL RESPONSES TO PHQ9 QUESTIONS 1 & 2: 0

## 2023-03-24 ASSESSMENT — LIFESTYLE VARIABLES
HOW OFTEN DO YOU HAVE A DRINK CONTAINING ALCOHOL: NEVER
HOW MANY STANDARD DRINKS CONTAINING ALCOHOL DO YOU HAVE ON A TYPICAL DAY: PATIENT DOES NOT DRINK

## 2023-03-24 ASSESSMENT — ENCOUNTER SYMPTOMS
SHORTNESS OF BREATH: 0
COUGH: 0

## 2023-03-24 NOTE — PROGRESS NOTES
SPRAY(S) BY MOUTH ONCE DAILY Yes Bernardo George MD   Melatonin 5 MG CHEW Take by mouth Yes Historical Provider, MD   Ascorbic Acid (VITAMIN C CR) 1000 MG TBCR Take by mouth Yes Historical Provider, MD   Calcium Carb-Cholecalciferol 600-800 MG-UNIT TABS Take 1 tablet by mouth daily Yes Historical Provider, MD   Multiple Vitamins-Minerals (THERAPEUTIC MULTIVITAMIN-MINERALS) tablet Take 1 tablet by mouth daily Yes Historical Provider, MD   ondansetron (ZOFRAN-ODT) 4 MG disintegrating tablet Take 1 tablet by mouth every 8 hours as needed for Nausea or Vomiting  Patient not taking: No sig reported  SATURNINO Cunningham CNP   Blood Pressure Monitoring KIT 1 Units by Does not apply route 2 times daily  SATURNINO Townsend CNP       CareTeam (Including outside providers/suppliers regularly involved in providing care):   Patient Care Team:  Deshaun Hernandez MD as PCP - General (Family Medicine)  Deshaun Hernandez MD as PCP - Empaneled Provider  Fausto Kenny MD     Reviewed and updated this visit:  Allergies  Meds  Problems  Med Hx  Surg Hx  Soc Hx  Fam Hx             Sulma Wilcox MD

## 2023-03-24 NOTE — ASSESSMENT & PLAN NOTE
She has a history of congestive heart failure. She was not interested in cardiac rehab at this time. Starting Jardiance 10 mg.  I encouraged her to stop it if she gets sick or has evidence of a urinary or genital area infection.

## 2023-03-24 NOTE — ASSESSMENT & PLAN NOTE
I encouraged her to get back on alendronate to treat osteoporosis and I encouraged her to walk more.

## 2023-03-24 NOTE — ASSESSMENT & PLAN NOTE
Peripheral artery disease is not adequately treated with exercise given that she does very little exercise. I urged her to do more exercise.

## 2023-03-24 NOTE — ASSESSMENT & PLAN NOTE
She has hypothyroidism and I will check a T4 and TSH today. She is uncertain that she likes the current dose and feels it may not be adequate. Acute osteomyelitis

## 2023-03-24 NOTE — PROGRESS NOTES
by mouth daily 90 tablet 3    carvedilol (COREG) 25 MG tablet Take 1 tablet by mouth 2 times daily 180 tablet 3    lisinopril (PRINIVIL;ZESTRIL) 40 MG tablet Take 1 tablet by mouth daily 90 tablet 3    tiotropium (SPIRIVA RESPIMAT) 2.5 MCG/ACT AERS inhaler INHALE 2 SPRAY(S) BY MOUTH ONCE DAILY 3 each 3    cilostazol (PLETAL) 100 MG tablet Take 1 tablet by mouth twice daily 180 tablet 3    aspirin 81 MG EC tablet Take 1 tablet by mouth daily 30 tablet 3    Melatonin 5 MG CHEW Take by mouth      Ascorbic Acid (VITAMIN C CR) 1000 MG TBCR Take by mouth      budesonide-formoterol (SYMBICORT) 160-4.5 MCG/ACT AERO Inhale 2 puffs into the lungs 2 times daily 1 Inhaler 5    Blood Pressure Monitoring KIT 1 Units by Does not apply route 2 times daily 1 kit 0    Calcium Carb-Cholecalciferol 600-800 MG-UNIT TABS Take 1 tablet by mouth daily      Multiple Vitamins-Minerals (THERAPEUTIC MULTIVITAMIN-MINERALS) tablet Take 1 tablet by mouth daily      varenicline (CHANTIX MARGIE) 0.5 MG X 11 & 1 MG X 42 tablet Take by mouth.  (Patient not taking: Reported on 3/24/2023) 53 each 0    apixaban (ELIQUIS) 5 MG TABS tablet Take 1 tablet by mouth 2 times daily (Patient not taking: Reported on 3/24/2023) 42 tablet 0    fluticasone (FLONASE) 50 MCG/ACT nasal spray 2 sprays by Each Nostril route daily (Patient not taking: Reported on 3/24/2023) 16 g 0    varenicline (CHANTIX) 0.5 MG tablet Take 1-2 tablets by mouth See Admin Instructions 0.5mg DAILY for 3 days followed by 0.5mg TWICE DAILY for 4 days followed by 1mg TWICE DAILY (Patient not taking: No sig reported) 57 tablet 0    alendronate (FOSAMAX) 70 MG tablet Take 1 tablet by mouth every 7 days Empty stomach, full glass water, stay upright 45 minutes (Patient not taking: No sig reported) 4 tablet 0    ondansetron (ZOFRAN-ODT) 4 MG disintegrating tablet Take 1 tablet by mouth every 8 hours as needed for Nausea or Vomiting (Patient not taking: No sig reported) 20 tablet 1     No current

## 2023-03-24 NOTE — PATIENT INSTRUCTIONS
Preventive Care list are included within your After Visit Summary for your review. Other Preventive Recommendations:    A preventive eye exam performed by an eye specialist is recommended every 1-2 years to screen for glaucoma; cataracts, macular degeneration, and other eye disorders. A preventive dental visit is recommended every 6 months. Try to get at least 150 minutes of exercise per week or 10,000 steps per day on a pedometer . Order or download the FREE \"Exercise & Physical Activity: Your Everyday Guide\" from [employer]. Call 3-749.675.1532 or search [employer] online. You need 9456-3270 mg of calcium and 8291-2082 IU of vitamin D per day. It is possible to meet your calcium requirement with diet alone, but a vitamin D supplement is usually necessary to meet this goal.  When exposed to the sun, use a sunscreen that protects against both UVA and UVB radiation with an SPF of 30 or greater. Reapply every 2 to 3 hours or after sweating, drying off with a towel, or swimming. Always wear a seat belt when traveling in a car. Always wear a helmet when riding a bicycle or motorcycle.

## 2023-03-27 ENCOUNTER — TELEPHONE (OUTPATIENT)
Dept: FAMILY MEDICINE CLINIC | Age: 73
End: 2023-03-27

## 2023-03-27 ENCOUNTER — CLINICAL DOCUMENTATION (OUTPATIENT)
Dept: SPIRITUAL SERVICES | Age: 73
End: 2023-03-27

## 2023-03-27 DIAGNOSIS — I42.9 CARDIOMYOPATHY, PRIMARY (HCC): ICD-10-CM

## 2023-03-27 DIAGNOSIS — N18.32 STAGE 3B CHRONIC KIDNEY DISEASE (HCC): ICD-10-CM

## 2023-03-27 NOTE — ACP (ADVANCE CARE PLANNING)
scheduled conversation  [] Outreach again in one week               [] Email / Mail ACP Info Sheets  [] Email / Mail Advance Directive   [x] Closing referral.  Routing closure to referring provider/staff and to ACP Specialist . [] Closure letter mailed to patient with invitation to contact ACP Specialist if / when ready. [] Other (Specify here):       Thank you for this referral.

## 2023-03-28 DIAGNOSIS — N18.32 STAGE 3B CHRONIC KIDNEY DISEASE (HCC): ICD-10-CM

## 2023-03-28 DIAGNOSIS — I42.9 CARDIOMYOPATHY, PRIMARY (HCC): ICD-10-CM

## 2023-04-11 ENCOUNTER — TELEPHONE (OUTPATIENT)
Dept: CARDIOLOGY CLINIC | Age: 73
End: 2023-04-11

## 2023-04-14 ENCOUNTER — TELEPHONE (OUTPATIENT)
Dept: FAMILY MEDICINE CLINIC | Age: 73
End: 2023-04-14

## 2023-04-18 ENCOUNTER — TELEPHONE (OUTPATIENT)
Dept: CARDIOLOGY CLINIC | Age: 73
End: 2023-04-18

## 2023-04-18 NOTE — TELEPHONE ENCOUNTER
----- Message from Rachel Augustine MD sent at 4/17/2023  5:39 PM EDT -----  See in office has severe aortic stenosis will need treatment plan     Spoke to pt regarding results and follow up appt.   Patient advised and voices understanding.'

## 2023-04-20 ENCOUNTER — TELEPHONE (OUTPATIENT)
Dept: CARDIOLOGY CLINIC | Age: 73
End: 2023-04-20

## 2023-04-28 ENCOUNTER — TELEPHONE (OUTPATIENT)
Dept: CARDIOLOGY CLINIC | Age: 73
End: 2023-04-28

## 2023-04-28 ENCOUNTER — TELEPHONE (OUTPATIENT)
Dept: PULMONOLOGY | Age: 73
End: 2023-04-28

## 2023-04-28 ENCOUNTER — OFFICE VISIT (OUTPATIENT)
Dept: CARDIOLOGY CLINIC | Age: 73
End: 2023-04-28

## 2023-04-28 VITALS
BODY MASS INDEX: 32.97 KG/M2 | SYSTOLIC BLOOD PRESSURE: 118 MMHG | WEIGHT: 179.2 LBS | HEIGHT: 62 IN | DIASTOLIC BLOOD PRESSURE: 62 MMHG | HEART RATE: 59 BPM

## 2023-04-28 DIAGNOSIS — I21.3 ST ELEVATION MYOCARDIAL INFARCTION (STEMI), UNSPECIFIED ARTERY (HCC): ICD-10-CM

## 2023-04-28 DIAGNOSIS — I25.10 ATHEROSCLEROSIS OF NATIVE CORONARY ARTERY OF NATIVE HEART WITHOUT ANGINA PECTORIS: Chronic | ICD-10-CM

## 2023-04-28 DIAGNOSIS — I35.0 AORTIC VALVE STENOSIS, NONRHEUMATIC: ICD-10-CM

## 2023-04-28 DIAGNOSIS — I10 PRIMARY HYPERTENSION: Chronic | ICD-10-CM

## 2023-04-28 DIAGNOSIS — G47.33 OSA (OBSTRUCTIVE SLEEP APNEA): ICD-10-CM

## 2023-04-28 DIAGNOSIS — I97.130 CHF FOLLOWING CARDIAC SURGERY, POSTOP: ICD-10-CM

## 2023-04-28 DIAGNOSIS — Z87.891 FORMER SMOKER: ICD-10-CM

## 2023-04-28 DIAGNOSIS — Z01.810 PRE-OPERATIVE CARDIOVASCULAR EXAMINATION: Primary | ICD-10-CM

## 2023-04-28 DIAGNOSIS — N18.31 STAGE 3A CHRONIC KIDNEY DISEASE (HCC): Chronic | ICD-10-CM

## 2023-04-28 DIAGNOSIS — Z95.2 S/P MITRAL VALVE REPLACEMENT: Primary | ICD-10-CM

## 2023-04-28 DIAGNOSIS — I73.9 PAD (PERIPHERAL ARTERY DISEASE) (HCC): Chronic | ICD-10-CM

## 2023-04-28 DIAGNOSIS — I42.9 CARDIOMYOPATHY, PRIMARY (HCC): Chronic | ICD-10-CM

## 2023-04-28 DIAGNOSIS — I48.11 LONGSTANDING PERSISTENT ATRIAL FIBRILLATION (HCC): Chronic | ICD-10-CM

## 2023-04-28 DIAGNOSIS — D68.69 SECONDARY HYPERCOAGULABLE STATE (HCC): ICD-10-CM

## 2023-04-28 DIAGNOSIS — J44.9 CHRONIC OBSTRUCTIVE PULMONARY DISEASE, UNSPECIFIED COPD TYPE (HCC): Chronic | ICD-10-CM

## 2023-04-28 DIAGNOSIS — I35.0 NONRHEUMATIC AORTIC VALVE STENOSIS: Chronic | ICD-10-CM

## 2023-04-28 PROBLEM — E11.9 TYPE 2 DIABETES MELLITUS (HCC): Status: ACTIVE | Noted: 2023-04-28

## 2023-04-28 NOTE — TELEPHONE ENCOUNTER
Patient given instructions over telephone on [address] for Dx: AVS/CAD. Procedure is scheduled for 5/3/23 @ 8am/10am, w/arrival @ 7am, @ [facility]. Pre-admission orders are in Epic for labwork and/or CXR, which are due 5/2/23 @ [facility]. Patient advised to review instructions given. Patient was notified that procedure date or time could be changed due to an emergency. Patient voiced understanding.

## 2023-04-28 NOTE — PROGRESS NOTES
CARDIOLOGY NOTE      Chief Complaint: Coronary artery disease, mitral valve disease    HPI:   Angy Lopez is a 68y.o. year old who has history as noted below. Angy Lopez had an echo showing severe aortic stenosis with a mean gradient of 46 and an aortic valve area of 0.59 cm²   she is on atc  O2 ut uses it at night with cpap,  She is  using oxygen at night  at 2 L   She is on eliquis now but it is too expensive but denies any bleeding concerns but occasionally her nose bleeds ( once a week)  She had a normal stress test in april 2021 .  goes on bike rides for weeks and months  Daughter is handicap but she works and drives and lives with her    30-day event monitor did not show any A. fib but she tells me that in the past she has had strokes and has history of \"thick blood\" and she was put on Coumadin by Dr. Ksenia Perez. She  lives with her  has been  for 50 years, she was swicthed to [facility] from coumadin in 2021  Sees Dr. Erick Andrews for pulmonology she notices ankle swelling once in a while has some shortness of breath on exertion. She has history of mitral valve replacement with a bioprosthetic valve. she denies chest pressure or pain except when she is in a hurry chasing after her dogs.   Her puppy is Estelita and she has 3 other chuwawa  which keeps her busy , her DIL Natalee Dalton works in scheduling )        Current Outpatient Medications   Medication Sig Dispense Refill    tiotropium (SPIRIVA RESPIMAT) 2.5 MCG/ACT AERS inhaler INHALE 2 SPRAY(S) BY MOUTH ONCE DAILY 3 each 3    empagliflozin (JARDIANCE) 10 MG tablet Take 1 tablet by mouth daily 90 tablet 1    apixaban (ELIQUIS) 5 MG TABS tablet Take 1 tablet by mouth 2 times daily 180 tablet 3    levothyroxine (EUTHYROX) 75 MCG tablet Take 1 tablet by mouth Daily 90 tablet 0    OXYGEN Inhale into the lungs      amLODIPine (NORVASC) 10 MG tablet Take 1 tablet by mouth daily 90 tablet 3    pantoprazole (PROTONIX) 40 MG tablet

## 2023-05-01 ENCOUNTER — HOSPITAL ENCOUNTER (OUTPATIENT)
Age: 73
Discharge: HOME | End: 2023-05-01
Payer: MEDICARE

## 2023-05-01 ENCOUNTER — HOSPITAL ENCOUNTER (OUTPATIENT)
Dept: GENERAL RADIOLOGY | Age: 73
End: 2023-05-01
Payer: MEDICARE

## 2023-05-01 ENCOUNTER — HOSPITAL ENCOUNTER (OUTPATIENT)
Dept: GENERAL RADIOLOGY | Age: 73
Discharge: HOME | End: 2023-05-01
Payer: MEDICARE

## 2023-05-01 DIAGNOSIS — Z01.810 PRE-OPERATIVE CARDIOVASCULAR EXAMINATION: ICD-10-CM

## 2023-05-01 LAB
ANION GAP SERPL CALCULATED.3IONS-SCNC: 11 MMOL/L (ref 4–16)
BUN SERPL-MCNC: 35 MG/DL (ref 6–23)
CALCIUM SERPL-MCNC: 9.7 MG/DL (ref 8.3–10.6)
CHLORIDE BLD-SCNC: 97 MMOL/L (ref 99–110)
CO2: 24 MMOL/L (ref 21–32)
CREAT SERPL-MCNC: 2.3 MG/DL (ref 0.6–1.1)
GFR SERPL CREATININE-BSD FRML MDRD: 22 ML/MIN/1.73M2
GLUCOSE SERPL-MCNC: 101 MG/DL (ref 70–99)
POTASSIUM SERPL-SCNC: 4.9 MMOL/L (ref 3.5–5.1)
SODIUM BLD-SCNC: 132 MMOL/L (ref 135–145)

## 2023-05-01 PROCEDURE — 86880 COOMBS TEST DIRECT: CPT

## 2023-05-01 PROCEDURE — 36415 COLL VENOUS BLD VENIPUNCTURE: CPT

## 2023-05-01 PROCEDURE — 86922 COMPATIBILITY TEST ANTIGLOB: CPT

## 2023-05-01 PROCEDURE — 86850 RBC ANTIBODY SCREEN: CPT

## 2023-05-01 PROCEDURE — 86900 BLOOD TYPING SEROLOGIC ABO: CPT

## 2023-05-01 PROCEDURE — 71046 X-RAY EXAM CHEST 2 VIEWS: CPT

## 2023-05-01 PROCEDURE — 86901 BLOOD TYPING SEROLOGIC RH(D): CPT

## 2023-05-01 PROCEDURE — 80048 BASIC METABOLIC PNL TOTAL CA: CPT

## 2023-05-02 ENCOUNTER — TELEPHONE (OUTPATIENT)
Dept: CARDIOLOGY CLINIC | Age: 73
End: 2023-05-02

## 2023-05-03 ENCOUNTER — HOSPITAL ENCOUNTER (OUTPATIENT)
Dept: NON INVASIVE DIAGNOSTICS | Age: 73
Discharge: HOME | End: 2023-05-03
Payer: MEDICARE

## 2023-05-03 ENCOUNTER — HOSPITAL ENCOUNTER (OUTPATIENT)
Dept: CARDIAC CATH/INVASIVE PROCEDURES | Age: 73
Setting detail: OBSERVATION
Discharge: HOME | End: 2023-05-04
Attending: INTERNAL MEDICINE | Admitting: INTERNAL MEDICINE
Payer: MEDICARE

## 2023-05-03 ENCOUNTER — APPOINTMENT (OUTPATIENT)
Dept: GENERAL RADIOLOGY | Age: 73
End: 2023-05-03
Attending: INTERNAL MEDICINE
Payer: MEDICARE

## 2023-05-03 DIAGNOSIS — Z95.0 PACEMAKER: Primary | ICD-10-CM

## 2023-05-03 PROBLEM — I49.5 SICK SINUS SYNDROME (HCC): Status: ACTIVE | Noted: 2023-05-03

## 2023-05-03 LAB
ANION GAP SERPL CALCULATED.3IONS-SCNC: 9 MMOL/L (ref 4–16)
BUN SERPL-MCNC: 31 MG/DL (ref 6–23)
CALCIUM SERPL-MCNC: 9.8 MG/DL (ref 8.3–10.6)
CHLORIDE BLD-SCNC: 97 MMOL/L (ref 99–110)
CO2: 26 MMOL/L (ref 21–32)
CREAT SERPL-MCNC: 1.9 MG/DL (ref 0.6–1.1)
GFR SERPL CREATININE-BSD FRML MDRD: 28 ML/MIN/1.73M2
GLUCOSE SERPL-MCNC: 101 MG/DL (ref 70–99)
POTASSIUM SERPL-SCNC: 4.3 MMOL/L (ref 3.5–5.1)
SODIUM BLD-SCNC: 132 MMOL/L (ref 135–145)

## 2023-05-03 PROCEDURE — C1785 PMKR, DUAL, RATE-RESP: HCPCS

## 2023-05-03 PROCEDURE — 2709999900 HC NON-CHARGEABLE SUPPLY

## 2023-05-03 PROCEDURE — 2580000003 HC RX 258

## 2023-05-03 PROCEDURE — 7100000001 HC PACU RECOVERY - ADDTL 15 MIN

## 2023-05-03 PROCEDURE — 93460 R&L HRT ART/VENTRICLE ANGIO: CPT | Performed by: INTERNAL MEDICINE

## 2023-05-03 PROCEDURE — C1760 CLOSURE DEV, VASC: HCPCS

## 2023-05-03 PROCEDURE — 2500000003 HC RX 250 WO HCPCS

## 2023-05-03 PROCEDURE — C1751 CATH, INF, PER/CENT/MIDLINE: HCPCS

## 2023-05-03 PROCEDURE — 94761 N-INVAS EAR/PLS OXIMETRY MLT: CPT

## 2023-05-03 PROCEDURE — 6360000002 HC RX W HCPCS

## 2023-05-03 PROCEDURE — 80048 BASIC METABOLIC PNL TOTAL CA: CPT

## 2023-05-03 PROCEDURE — C1769 GUIDE WIRE: HCPCS

## 2023-05-03 PROCEDURE — 93312 ECHO TRANSESOPHAGEAL: CPT

## 2023-05-03 PROCEDURE — C1894 INTRO/SHEATH, NON-LASER: HCPCS

## 2023-05-03 PROCEDURE — C1892 INTRO/SHEATH,FIXED,PEEL-AWAY: HCPCS

## 2023-05-03 PROCEDURE — 2580000003 HC RX 258: Performed by: NURSE PRACTITIONER

## 2023-05-03 PROCEDURE — 99223 1ST HOSP IP/OBS HIGH 75: CPT | Performed by: INTERNAL MEDICINE

## 2023-05-03 PROCEDURE — 33208 INSRT HEART PM ATRIAL & VENT: CPT

## 2023-05-03 PROCEDURE — 6370000000 HC RX 637 (ALT 250 FOR IP): Performed by: NURSE PRACTITIONER

## 2023-05-03 PROCEDURE — 33208 INSRT HEART PM ATRIAL & VENT: CPT | Performed by: INTERNAL MEDICINE

## 2023-05-03 PROCEDURE — 71045 X-RAY EXAM CHEST 1 VIEW: CPT

## 2023-05-03 PROCEDURE — 2700000000 HC OXYGEN THERAPY PER DAY

## 2023-05-03 PROCEDURE — 7100000000 HC PACU RECOVERY - FIRST 15 MIN

## 2023-05-03 PROCEDURE — 93312 ECHO TRANSESOPHAGEAL: CPT | Performed by: INTERNAL MEDICINE

## 2023-05-03 PROCEDURE — 93456 R HRT CORONARY ARTERY ANGIO: CPT

## 2023-05-03 PROCEDURE — C1898 LEAD, PMKR, OTHER THAN TRANS: HCPCS

## 2023-05-03 PROCEDURE — G0378 HOSPITAL OBSERVATION PER HR: HCPCS

## 2023-05-03 PROCEDURE — 6360000004 HC RX CONTRAST MEDICATION

## 2023-05-03 RX ORDER — CHOLECALCIFEROL (VITAMIN D3) 125 MCG
5 CAPSULE ORAL NIGHTLY
Status: DISCONTINUED | OUTPATIENT
Start: 2023-05-03 | End: 2023-05-04 | Stop reason: HOSPADM

## 2023-05-03 RX ORDER — ACETAMINOPHEN 325 MG/1
650 TABLET ORAL EVERY 4 HOURS PRN
Status: DISCONTINUED | OUTPATIENT
Start: 2023-05-03 | End: 2023-05-04 | Stop reason: HOSPADM

## 2023-05-03 RX ORDER — TRAMADOL HYDROCHLORIDE 50 MG/1
25 TABLET ORAL EVERY 6 HOURS PRN
Status: DISCONTINUED | OUTPATIENT
Start: 2023-05-03 | End: 2023-05-04 | Stop reason: HOSPADM

## 2023-05-03 RX ORDER — BUDESONIDE AND FORMOTEROL FUMARATE DIHYDRATE 80; 4.5 UG/1; UG/1
2 AEROSOL RESPIRATORY (INHALATION) 2 TIMES DAILY
Status: DISCONTINUED | OUTPATIENT
Start: 2023-05-03 | End: 2023-05-04 | Stop reason: HOSPADM

## 2023-05-03 RX ORDER — LISINOPRIL 20 MG/1
40 TABLET ORAL DAILY
Status: DISCONTINUED | OUTPATIENT
Start: 2023-05-03 | End: 2023-05-04 | Stop reason: HOSPADM

## 2023-05-03 RX ORDER — LEVOTHYROXINE SODIUM 0.07 MG/1
75 TABLET ORAL DAILY
Status: DISCONTINUED | OUTPATIENT
Start: 2023-05-04 | End: 2023-05-04 | Stop reason: HOSPADM

## 2023-05-03 RX ORDER — SODIUM CHLORIDE 9 MG/ML
INJECTION, SOLUTION INTRAVENOUS PRN
Status: DISCONTINUED | OUTPATIENT
Start: 2023-05-03 | End: 2023-05-04 | Stop reason: HOSPADM

## 2023-05-03 RX ORDER — SODIUM CHLORIDE 0.9 % (FLUSH) 0.9 %
5-40 SYRINGE (ML) INJECTION PRN
Status: DISCONTINUED | OUTPATIENT
Start: 2023-05-03 | End: 2023-05-04 | Stop reason: HOSPADM

## 2023-05-03 RX ORDER — PANTOPRAZOLE SODIUM 40 MG/1
40 TABLET, DELAYED RELEASE ORAL DAILY
Status: DISCONTINUED | OUTPATIENT
Start: 2023-05-03 | End: 2023-05-04 | Stop reason: HOSPADM

## 2023-05-03 RX ORDER — ASPIRIN 81 MG/1
81 TABLET ORAL DAILY
Status: DISCONTINUED | OUTPATIENT
Start: 2023-05-03 | End: 2023-05-03 | Stop reason: CLARIF

## 2023-05-03 RX ORDER — FUROSEMIDE 20 MG/1
10 TABLET ORAL DAILY
Status: DISCONTINUED | OUTPATIENT
Start: 2023-05-03 | End: 2023-05-04 | Stop reason: HOSPADM

## 2023-05-03 RX ORDER — ASPIRIN 81 MG/1
81 TABLET, CHEWABLE ORAL DAILY
Status: DISCONTINUED | OUTPATIENT
Start: 2023-05-03 | End: 2023-05-04 | Stop reason: HOSPADM

## 2023-05-03 RX ORDER — AMLODIPINE BESYLATE 10 MG/1
10 TABLET ORAL DAILY
Status: DISCONTINUED | OUTPATIENT
Start: 2023-05-03 | End: 2023-05-04 | Stop reason: HOSPADM

## 2023-05-03 RX ORDER — SODIUM CHLORIDE 9 MG/ML
INJECTION, SOLUTION INTRAVENOUS CONTINUOUS
Status: DISCONTINUED | OUTPATIENT
Start: 2023-05-03 | End: 2023-05-04 | Stop reason: HOSPADM

## 2023-05-03 RX ORDER — SODIUM CHLORIDE 0.9 % (FLUSH) 0.9 %
5-40 SYRINGE (ML) INJECTION EVERY 12 HOURS SCHEDULED
Status: DISCONTINUED | OUTPATIENT
Start: 2023-05-03 | End: 2023-05-04 | Stop reason: HOSPADM

## 2023-05-03 RX ORDER — DIAZEPAM 5 MG/1
5 TABLET ORAL ONCE
Status: DISCONTINUED | OUTPATIENT
Start: 2023-05-03 | End: 2023-05-03

## 2023-05-03 RX ORDER — CILOSTAZOL 100 MG/1
100 TABLET ORAL 2 TIMES DAILY
Status: DISCONTINUED | OUTPATIENT
Start: 2023-05-03 | End: 2023-05-04 | Stop reason: HOSPADM

## 2023-05-03 RX ORDER — DIPHENHYDRAMINE HCL 25 MG
25 TABLET ORAL ONCE
Status: DISCONTINUED | OUTPATIENT
Start: 2023-05-03 | End: 2023-05-03

## 2023-05-03 RX ORDER — CARVEDILOL 25 MG/1
25 TABLET ORAL 2 TIMES DAILY
Status: DISCONTINUED | OUTPATIENT
Start: 2023-05-03 | End: 2023-05-04 | Stop reason: HOSPADM

## 2023-05-03 RX ORDER — ATORVASTATIN CALCIUM 40 MG/1
40 TABLET, FILM COATED ORAL DAILY
Status: DISCONTINUED | OUTPATIENT
Start: 2023-05-03 | End: 2023-05-04 | Stop reason: HOSPADM

## 2023-05-03 RX ADMIN — FUROSEMIDE 10 MG: 20 TABLET ORAL at 20:26

## 2023-05-03 RX ADMIN — SODIUM CHLORIDE, PRESERVATIVE FREE 10 ML: 5 INJECTION INTRAVENOUS at 20:27

## 2023-05-03 RX ADMIN — ATORVASTATIN CALCIUM 40 MG: 40 TABLET, FILM COATED ORAL at 20:24

## 2023-05-03 RX ADMIN — CARVEDILOL 25 MG: 25 TABLET, FILM COATED ORAL at 20:26

## 2023-05-03 RX ADMIN — AMLODIPINE BESYLATE 10 MG: 10 TABLET ORAL at 20:26

## 2023-05-03 RX ADMIN — CILOSTAZOL 100 MG: 100 TABLET ORAL at 20:24

## 2023-05-03 RX ADMIN — EMPAGLIFLOZIN 10 MG: 10 TABLET, FILM COATED ORAL at 20:26

## 2023-05-03 RX ADMIN — LISINOPRIL 40 MG: 20 TABLET ORAL at 20:24

## 2023-05-03 RX ADMIN — ASPIRIN 81 MG: 81 TABLET, CHEWABLE ORAL at 21:42

## 2023-05-03 RX ADMIN — PANTOPRAZOLE SODIUM 40 MG: 40 TABLET, DELAYED RELEASE ORAL at 20:23

## 2023-05-03 RX ADMIN — Medication 5 MG: at 20:26

## 2023-05-03 ASSESSMENT — ENCOUNTER SYMPTOMS
CONSTIPATION: 0
SHORTNESS OF BREATH: 1
EYE PAIN: 0
ABDOMINAL PAIN: 0
VOMITING: 0
BLOOD IN STOOL: 0
NAUSEA: 0
CHEST TIGHTNESS: 0
BACK PAIN: 0
COUGH: 0
WHEEZING: 0
COLOR CHANGE: 0
PHOTOPHOBIA: 0
DIARRHEA: 0

## 2023-05-03 NOTE — OP NOTE
[facility]     Electrophysiology Procedure Note       Date of Procedure: 5/3/2023  Patient's Name: Awais Adkins  YOB: 1950   Medical Record Number: [id number]    Procedure Performed by: Noemy Webber MD    Procedures performed:  Insertion of right ventricular pacing lead under fluoroscopy. Insertion of right atrial lead under fluoroscopy  Insertion of a Dual chamber Pacemaker. Electronic analysis of lead and device. IV sedation. Selective venography of left upper extremity. Sedation : local anesthesia    Blood loss : 20 cc    Indication of the procedure:  Sick sinus syndrome    Brief History:      Awais Adkins is a 68 y.o. female with history of mitral valve replacement, PAF, EDITA, HTN, DM-2 severe AS presented [facility] for the pre TAVR assessment and patient noted to have pauses of 6 seconds at the time of discharge and sinus bradycardia at baseline here for urgent pacemaker      Details of procedure: The patient was brought to the electrophysiology laboratory in stable condition. The patient was in a fasting and non-sedated state. The risks, benefits and alternatives of the procedure were discussed with the patient. The risks including, but not limited to, the risks of vascular injury, bleeding, infection, device malfunction, lead dislodgement, radiation exposure, injury to cardiac and surrounding structures (including pneumothorax), stroke, myocardial infarction and death were discussed in detail. The patient opted to proceed with the device implantation. Written informed consent was signed and placed in the chart. Prophylactic antibiotic was given. The patient was prepped and draped in a sterile fashion. A timeout protocol was completed to identify the patient and the procedure being performed. Left upper extremity venogram was obtained to assess the patency of the subclavian vein and for access under fluoroscopic guidance.  An incision was

## 2023-05-03 NOTE — PROGRESS NOTES
Patient ambulated to  without any issues at this time per [facility]. Right groin sites free from any active bleeding, oozing, or hematoma noted. Awaiting on bed assignment for admission d/t pause per Dr. Tu Ramsey.

## 2023-05-03 NOTE — PROGRESS NOTES
Noted to have a 6 sec pause while on tele   Waiting to be discharged   Request EPS to evaluate for possible pacer

## 2023-05-03 NOTE — DISCHARGE INSTRUCTIONS
RESTART ELIQUIS morning of 5/5/2023  1. Avoid raising the arm above shoulder for 4 weeks  2. No driving for 10 days  3. No lifting more than 10 lbs with the left hand  4. Do not wet the area of surgery for 10 days  5. Follow up appointment with Doctor for wound check in 10 days  6. Notify Doctor if pain, fever, chills, swelling or bleeding in the surgical area  7. Please avoid sleeping on the surgical side. 8. Please do not allow anyone other than Dr Dudley Dose staff to remove or redress the surgical site. If the dressing were to fall off or fall partially off before the 10 day follow up appointment, please call the office ASAP. Discharge Home Instuctions  [address]:4/9/0736    Your instructions:    Shower only for the first 5 days, NO TUB BATHS. Wash procedure site with mild soap, rinse and pat dry. Do not rub over the procedure site for two (2) days. Remove dressing and replace with a band-aid in 2 days. Site observations-Observe the area for bleeding or hematoma (lump under the skin caused by bleeding.)      A. Painless bruising is normal.  B. If a firmness/swelling seems to be increasing or there is bright red bleeding from site       (hold pressure over procedure site) and  CALL 911 IMMEDIATELY. What to do after you leave the hospital:    Recommended activity: no lifting, Driving, or Strenuous exercise for 3 days. DO NOT lift more than 10lbs. For your procedure you may have been given a sedative to help you relax. This drug will make you sleepy. It is usually given in a vein (by IV). Don't do anything for 24 hours that requires attention to detail. It takes time for the medicine effects to completely wear off. Do not sign any legally binding contracts or documents over the next 24 hours. For your safety, you should not drive or operate any machinery that could be dangerous until the medicine wears off and you can think clearly and react easily.     Follow-up with

## 2023-05-03 NOTE — H&P
monitor ?nut she has been on anticoagulation since before seeing me  She is on eliquis now , we talked about Watchman device if she has more epistaxis but I dont have any documentation of afib . Can consider loop and stop eliquis also? She has moderate stenosis of bioprosthetic mitral breann mean gradient of 6 mmHg probably elevated in setting of severe aortic stenosis we will plan RAMIRO       Assessment:    Active Problems:    * No active hospital problems. *     ASA : 2 , Mallampati class II  Plan:   1. Chest Pain/ Possible Angina: Alternate and risks versus benefits were discussed in detail. We will plan cardiac catheterization. We  discussed the risk of but not limited to  potential kidney failure, emergent surgery,blood transfusion  transfusion, infection, potential even death.   Patient is in agreement and wants to proceed

## 2023-05-03 NOTE — PROGRESS NOTES
Received from cath lab. Monitor and alarms on. Call Light in reach. Bed in the lowest position. Family at bedside. Procedure site assessment completed per Kitty Hernandez RN and Luciano Cardenas RN. No hematoma or bleeding noted.

## 2023-05-03 NOTE — CONSULTS
Electrophysiology Consult Note      Reason for consultation:  Asystole    Chief complaint :  Shortness of breath    Referring physician: Shaka Mendez      Primary care physician: Chan Earl MD      History of Present Illness:      Phill Fletcher is a 68 y.o. female with history of mitral valve replacement, PAF, EDITA, HTN, DM-2 severe AS presented for [facility] for the pre TAVR assessment and patient noted to have pauses of 6 seconds at the time of discharge. Patient had shortness of breath with exertion and is deemed to be from aortic valve which is getting evaluated. She has off and on dizziness but never passed out. Patient was doing fine post procedure and noted to have suddenly her heart rate drop significantly low. Before she knew what was happening already she came too. Patient denies any chest pain. Patient denies palpitations. She has atrial fibrillation and she is on anticoagulation which is on hold for 2 days for this procedure. Patient denies fever or chills. Her HR is usually low but in atrial fibrillation she gets high and she gets symptomatic        Pastmedical history:   Past Medical History:   Diagnosis Date    CAD (coronary artery disease)     COPD (chronic obstructive pulmonary disease) (HCC)     GERD (gastroesophageal reflux disease)     H/O cardiovascular stress test 04/26/2021    normal study 63% EF no ischemia    H/O echocardiogram 03/22/2017    EF 35-40%    H/O echocardiogram 08/04/2020    EF 45-50, Mild TR & PA, Severely dilated left atrium.   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, functionally normally     Heart attack ([address] 01/2014    Hyperlipidemia     Hypertension     On home oxygen therapy     nightly    PAD (peripheral artery disease) ([address]     S/P right coronary artery (RCA) stent placement     Sleep apnea     uses CPAP    T2 vertebral fracture ([address] 12/2017    TIA (transient ischemic attack) 1996    Tobacco dependence

## 2023-05-03 NOTE — PROGRESS NOTES
4 Eyes Skin Assessment     NAME:  Shelli Shirley  YOB: 1950  MEDICAL RECORD NUMBER:  [id number]    The patient is being assessed for  Admission    I agree that at least one RN has performed a thorough Head to Toe Skin Assessment on the patient. ALL assessment sites listed below have been assessed. Areas assessed by both nurses:    Head, Face, Ears, Shoulders, Back, Chest, Arms, Elbows, Hands, Sacrum. Buttock, Coccyx, Ischium, Legs. Feet and Heels, and Under Medical Devices         Does the Patient have a Wound?  No noted wound(s)       Luc Prevention initiated by RN: No  Wound Care Orders initiated by RN: No    Pressure Injury (Stage 3,4, Unstageable, DTI, NWPT, and Complex wounds) if present, place Wound referral order by RN under : No    New Ostomies, if present place, Ostomy referral order under : No     Nurse 1 eSignature: Electronically signed by Ynes Morfin RN on 5/3/23 at 6:19 PM EDT    **SHARE this note so that the co-signing nurse can place an eSignature**    Nurse 2 eSignature: Electronically signed by Lupe Bob RN on 5/3/23 at 6:20 PM EDT

## 2023-05-03 NOTE — PROGRESS NOTES
Discussed with pt and family that I will schedule a TAVR clinic visit.  This visit will happen after the visit to renal inusfficiency

## 2023-05-03 NOTE — PROGRESS NOTES
Pharmacy Note - Renal dose adjustment made per P/T protocol    Original order:  Cefazolin 2,000 mg IV Q 8 hours x 2 doses for surgical prophylaxis    Estimated Creatinine Clearance: 26 mL/min (A) (based on SCr of 1.9 mg/dL (H)). Recent Labs     05/01/23  0930 05/03/23  0710   BUN 35* 31*   CREATININE 2.3* 1.9*       Renally adjusted order:  Cefazolin 2,000 mg IV Q 12 hours x 2 doses for surgical prophylaxis for CrCl 11-29 mL/min    Please call pharmacy with any questions.     Thank you,  Sonia Bland, [facility]  5/3/2023 5:53 PM

## 2023-05-04 VITALS
WEIGHT: 179 LBS | DIASTOLIC BLOOD PRESSURE: 62 MMHG | OXYGEN SATURATION: 93 % | HEART RATE: 77 BPM | RESPIRATION RATE: 16 BRPM | SYSTOLIC BLOOD PRESSURE: 115 MMHG | BODY MASS INDEX: 32.94 KG/M2 | TEMPERATURE: 98.1 F | HEIGHT: 62 IN

## 2023-05-04 PROCEDURE — 2700000000 HC OXYGEN THERAPY PER DAY

## 2023-05-04 PROCEDURE — 93280 PM DEVICE PROGR EVAL DUAL: CPT | Performed by: INTERNAL MEDICINE

## 2023-05-04 PROCEDURE — 6370000000 HC RX 637 (ALT 250 FOR IP): Performed by: NURSE PRACTITIONER

## 2023-05-04 PROCEDURE — 6360000002 HC RX W HCPCS: Performed by: NURSE PRACTITIONER

## 2023-05-04 PROCEDURE — G0378 HOSPITAL OBSERVATION PER HR: HCPCS

## 2023-05-04 PROCEDURE — 2580000003 HC RX 258: Performed by: NURSE PRACTITIONER

## 2023-05-04 PROCEDURE — 94640 AIRWAY INHALATION TREATMENT: CPT

## 2023-05-04 PROCEDURE — 94761 N-INVAS EAR/PLS OXIMETRY MLT: CPT

## 2023-05-04 RX ORDER — TRAMADOL HYDROCHLORIDE 50 MG/1
50 TABLET ORAL EVERY 6 HOURS PRN
Qty: 12 TABLET | Refills: 0 | Status: SHIPPED | OUTPATIENT
Start: 2023-05-04 | End: 2023-05-07

## 2023-05-04 RX ADMIN — CILOSTAZOL 100 MG: 100 TABLET ORAL at 09:10

## 2023-05-04 RX ADMIN — EMPAGLIFLOZIN 10 MG: 10 TABLET, FILM COATED ORAL at 09:09

## 2023-05-04 RX ADMIN — LISINOPRIL 40 MG: 20 TABLET ORAL at 09:09

## 2023-05-04 RX ADMIN — FUROSEMIDE 10 MG: 20 TABLET ORAL at 09:09

## 2023-05-04 RX ADMIN — ACETAMINOPHEN 650 MG: 325 TABLET ORAL at 09:08

## 2023-05-04 RX ADMIN — SODIUM CHLORIDE: 9 INJECTION, SOLUTION INTRAVENOUS at 03:20

## 2023-05-04 RX ADMIN — LEVOTHYROXINE SODIUM 75 MCG: 0.07 TABLET ORAL at 06:07

## 2023-05-04 RX ADMIN — AMLODIPINE BESYLATE 10 MG: 10 TABLET ORAL at 09:08

## 2023-05-04 RX ADMIN — ATORVASTATIN CALCIUM 40 MG: 40 TABLET, FILM COATED ORAL at 09:08

## 2023-05-04 RX ADMIN — TIOTROPIUM BROMIDE INHALATION SPRAY 2 PUFF: 3.12 SPRAY, METERED RESPIRATORY (INHALATION) at 08:02

## 2023-05-04 RX ADMIN — ASPIRIN 81 MG: 81 TABLET, CHEWABLE ORAL at 09:08

## 2023-05-04 RX ADMIN — PANTOPRAZOLE SODIUM 40 MG: 40 TABLET, DELAYED RELEASE ORAL at 09:08

## 2023-05-04 RX ADMIN — CARVEDILOL 25 MG: 25 TABLET, FILM COATED ORAL at 09:09

## 2023-05-04 RX ADMIN — SODIUM CHLORIDE, PRESERVATIVE FREE 10 ML: 5 INJECTION INTRAVENOUS at 09:10

## 2023-05-04 RX ADMIN — CEFAZOLIN 2000 MG: 2 INJECTION, POWDER, FOR SOLUTION INTRAMUSCULAR; INTRAVENOUS at 03:22

## 2023-05-04 ASSESSMENT — PAIN SCALES - GENERAL: PAINLEVEL_OUTOF10: 6

## 2023-05-04 ASSESSMENT — PAIN DESCRIPTION - ORIENTATION: ORIENTATION: LEFT

## 2023-05-04 ASSESSMENT — PAIN DESCRIPTION - DESCRIPTORS: DESCRIPTORS: ACHING

## 2023-05-04 ASSESSMENT — PAIN DESCRIPTION - LOCATION: LOCATION: ARM

## 2023-05-04 NOTE — DISCHARGE SUMMARY
needed for Nausea or Vomiting  Qty: 20 tablet, Refills: 1      Blood Pressure Monitoring KIT 1 Units by Does not apply route 2 times daily  Qty: 1 kit, Refills: 0    Associated Diagnoses: Essential hypertension      Calcium Carb-Cholecalciferol 600-800 MG-UNIT TABS Take 1 tablet by mouth daily      Multiple Vitamins-Minerals (THERAPEUTIC MULTIVITAMIN-MINERALS) tablet Take 1 tablet by mouth daily             Consults:  none    Discharge Exam:  /62   Pulse 77   Temp 98.1 °F (36.7 °C) (Oral)   Resp 16   Ht 5' 2\" (1.575 m)   Wt 179 lb (81.2 kg)   SpO2 93%   BMI 32.74 kg/m²   General appearance: alert, appears stated age, and cooperative  Head: Normocephalic, without obvious abnormality, atraumatic  Lungs: clear to auscultation bilaterally  Heart: regular rate and rhythm, S1, S2 normal, grade 3/6 systolic murmur, No click, rub or gallop  Extremities: extremities normal, atraumatic, no cyanosis or edema  Pulses: 2+ and symmetric  Skin: Skin color, texture, turgor normal. No rashes or lesions. Left upper chest dressing clean dry intact. Minimal bruising, no hematoma  Neurologic: Grossly normal    Disposition:   home    Signed:  SATURNINO Calixto - CNP  5/4/2023, 8:57 AM     Device Assessment:      The device is Medtronic pacemaker - Dual Chamber chamber      MRI Compatible : yes    Device interrogation was performed. Mode: AAIR --- DDDR     Sensing is normal. Impedence is normal.  Threshold is normal.     There has not been interval changes. Estimated battery life is New     The underlying rhythm is AS, VS.     Atrial Arrhythmia : No    Non sustained VT episodes : No    Sustained VT episodes : No    The patient is PARTIAL ATRIAL pacemaker dependent.           Interpreted by    Ga Guerra M.D

## 2023-05-04 NOTE — PROGRESS NOTES
Outpatient Pharmacy Progress Note for Meds-to-Beds    Total number of Prescriptions Filled: 1  The following medications were dispensed to the patient during the discharge process:  Tramadol    Additional Documentation:  Patient picked-up the medication(s) in the OP Pharmacy      Thank you for letting us serve your patients.   [address]    [address], [address] [facility]    Phone: [phone]    Fax: [phone]

## 2023-05-05 ENCOUNTER — CARE COORDINATION (OUTPATIENT)
Dept: CASE MANAGEMENT | Age: 73
End: 2023-05-05

## 2023-05-05 NOTE — CARE COORDINATION
Care Transitions Outreach Attempt-Sick sinus syndrome, s/p MVR, Pacemaker    Call within 2 business days of discharge: Yes   Attempted to reach patient for transitions of care follow up. Unable to reach patient. Patient: Ric Barajas Patient : 1950 MRN: <[id number]>    Last Discharge  Street       Date Complaint Diagnosis Description Type Department Provider    5/3/23  Pacemaker Admission (Discharged) from [facility] Fernie Aguirre MD              Was this an external facility discharge? No Discharge Facility: [facility]     Noted following upcoming appointments from discharge chart review:   [facility] follow up appointment(s):   Future Appointments   Date Time Provider Christian Sepulveda   2023  2:30 PM Joanne Caballero DO [facility] NEPH [facility]   2023 11:15 AM Kelli Cesar MD [facility]   2023  8:30 AM Bowen Alcala MD [address] [place] [address] Damon Damian [place] [facility]   2023 11:00 AM Gurinder Long MD [address] [place] [address] Damon Damian PULM [facility]   2023 10:00 AM Omer Love MD [facility]     1st attempt to contact patient for initial care transition follow up. Unable to reach patient. Unable to leave a message d/t mailbox being full. Will try again at a later time.       Lety Morrison RN  Care Transition Nurse

## 2023-05-06 LAB
ABO/RH: NORMAL
ANTIBODY SCREEN: NEGATIVE
COMMENT: NORMAL
COMMENT: NORMAL
COMPONENT: NORMAL
CROSSMATCH RESULT: NORMAL
DIRECT COOMBS: NEGATIVE
STATUS: NORMAL
TRANSFUSION STATUS: NORMAL
UNIT DIVISION: 0
UNIT NUMBER: NORMAL

## 2023-05-08 ENCOUNTER — CARE COORDINATION (OUTPATIENT)
Dept: CASE MANAGEMENT | Age: 73
End: 2023-05-08

## 2023-05-08 ENCOUNTER — TELEPHONE (OUTPATIENT)
Dept: CARDIOLOGY CLINIC | Age: 73
End: 2023-05-08

## 2023-05-08 DIAGNOSIS — Z95.0 PACEMAKER: Primary | ICD-10-CM

## 2023-05-08 PROCEDURE — 1111F DSCHRG MED/CURRENT MED MERGE: CPT | Performed by: FAMILY MEDICINE

## 2023-05-08 NOTE — CARE COORDINATION
and reviewed discharge summary and/or continuity of care documents  Education of patient/family/caregiver/guardian to support self-management-s/s and when to contact providers    Offered patient enrollment in the Remote Patient Monitoring (RPM) program for in-home monitoring:  Did not discuss during initial call . Care Transitions 24 Hour Call    Do you have a copy of your discharge instructions?: Yes  Do you have all of your prescriptions and are they filled?: Yes  Have you been contacted by a [address]?: No  Have you scheduled your follow up appointment?: Yes  How are you going to get to your appointment?: Car - family or friend to transport  Do you feel like you have everything you need to keep you well at home?: Yes  Care Transitions Interventions         Discussed follow-up appointments. If no appointment was previously scheduled, appointment scheduling offered: Yes. Is follow up appointment scheduled within 7 days of discharge? No.    Follow Up  Future Appointments   Date Time Provider Christian Sepulveda   5/15/2023  9:00 AM SCHEDULE, [facility] PACERS [facility]   5/16/2023  2:30 PM Ta Agarwal, DO AFL ADL NEPH [facility]   5/26/2023 11:15 AM Олег Gallagher MD [facility]   6/2/2023  2:00 PM Floyd Alaniz, APRN - CNP [facility]   6/23/2023  8:30 AM Isidro Woo MD [facility] [place] [facility]   9/19/2023 11:00 AM Hero Paz MD [address] PULM [facility]   9/29/2023 10:00 AM Shanique Pizarro MD [place] [facility]       Care Transition Nurse provided contact information. Plan for follow up in 7-10 days  based on severity of symptoms and risk factors.   Plan for next call: symptom management-s/s of infection, chest pain/discomfort, BP, lightheadedness, any new or worsening symptoms    Yovana Sequeira RN

## 2023-05-15 ENCOUNTER — NURSE ONLY (OUTPATIENT)
Dept: CARDIOLOGY CLINIC | Age: 73
End: 2023-05-15

## 2023-05-15 VITALS — TEMPERATURE: 98.2 F

## 2023-05-15 DIAGNOSIS — Z95.0 STATUS POST PLACEMENT OF CARDIAC PACEMAKER: Primary | ICD-10-CM

## 2023-05-15 PROCEDURE — 99024 POSTOP FOLLOW-UP VISIT: CPT | Performed by: INTERNAL MEDICINE

## 2023-05-15 NOTE — PROGRESS NOTES
Patient seen for site check post pacer implant. Dressing removed. No signs of inflammation or infection noted. Edges well approximated. Patient has no complaints of pain or discomfort. Single steri strip applied. Patient instructed to not lift arm higher than shoulder level. Instructions given on the use of Meddle monitor.

## 2023-05-17 ENCOUNTER — HOSPITAL ENCOUNTER (OUTPATIENT)
Age: 73
Discharge: HOME | End: 2023-05-17
Payer: MEDICARE

## 2023-05-17 ENCOUNTER — HOSPITAL ENCOUNTER (OUTPATIENT)
Dept: ULTRASOUND IMAGING | Age: 73
Discharge: HOME | End: 2023-05-17
Payer: MEDICARE

## 2023-05-17 ENCOUNTER — CARE COORDINATION (OUTPATIENT)
Dept: CASE MANAGEMENT | Age: 73
End: 2023-05-17

## 2023-05-17 DIAGNOSIS — N17.9 ACUTE RENAL FAILURE, UNSPECIFIED ACUTE RENAL FAILURE TYPE (HCC): ICD-10-CM

## 2023-05-17 LAB
ANION GAP SERPL CALCULATED.3IONS-SCNC: 7 MMOL/L (ref 4–16)
BILIRUBIN URINE: NEGATIVE MG/DL
BLOOD, URINE: NEGATIVE
BUN SERPL-MCNC: 28 MG/DL (ref 6–23)
CALCIUM SERPL-MCNC: 9.4 MG/DL (ref 8.3–10.6)
CHLORIDE BLD-SCNC: 93 MMOL/L (ref 99–110)
CLARITY: CLEAR
CO2: 26 MMOL/L (ref 21–32)
COLOR: YELLOW
COMMENT UA: ABNORMAL
CREAT SERPL-MCNC: 1.8 MG/DL (ref 0.6–1.1)
CREATININE URINE: 19.1 MG/DL (ref 28–217)
GFR SERPL CREATININE-BSD FRML MDRD: 29 ML/MIN/1.73M2
GLUCOSE SERPL-MCNC: 100 MG/DL (ref 70–99)
GLUCOSE, URINE: 100 MG/DL
KETONES, URINE: NEGATIVE MG/DL
LEUKOCYTE ESTERASE, URINE: NEGATIVE
NITRITE URINE, QUANTITATIVE: NEGATIVE
PH, URINE: 5.5 (ref 5–8)
POTASSIUM SERPL-SCNC: 4.8 MMOL/L (ref 3.5–5.1)
PROT/CREAT RATIO, UR: 0.4
PROTEIN UA: NEGATIVE MG/DL
SODIUM BLD-SCNC: 126 MMOL/L (ref 135–145)
SPECIFIC GRAVITY UA: <1.005 (ref 1–1.03)
URINE TOTAL PROTEIN: 8.1 MG/DL
UROBILINOGEN, URINE: 0.2 MG/DL (ref 0.2–1)

## 2023-05-17 PROCEDURE — 76775 US EXAM ABDO BACK WALL LIM: CPT

## 2023-05-17 PROCEDURE — 36415 COLL VENOUS BLD VENIPUNCTURE: CPT

## 2023-05-17 PROCEDURE — 82570 ASSAY OF URINE CREATININE: CPT

## 2023-05-17 PROCEDURE — 84156 ASSAY OF PROTEIN URINE: CPT

## 2023-05-17 PROCEDURE — 80048 BASIC METABOLIC PNL TOTAL CA: CPT

## 2023-05-17 PROCEDURE — 81003 URINALYSIS AUTO W/O SCOPE: CPT

## 2023-05-17 NOTE — RESULT ENCOUNTER NOTE
TELL PT:  -SODIUM LEVEL IS LOW , PLEASE LIMIT YOUR ORAL FLUIDS TO 60 TO 64OUNCES PER DAY  - I WILL SEND SOME SALT TABLETS TO YOUR PHARMACY ([facility]) SO START TAKING IT FOR FEW DAYS AND THEN WE WILL RECHECK A BLOOD TEST IN 2 WEEKS  THX

## 2023-05-18 ENCOUNTER — CARE COORDINATION (OUTPATIENT)
Dept: CASE MANAGEMENT | Age: 73
End: 2023-05-18

## 2023-05-18 NOTE — CARE COORDINATION
Care Transitions Outreach Attempt-Insertion of dual chamber pacemaker    Attempted to reach patient for transitions of care follow up. Unable to reach patient. Patient: Radha Tomlin Patient : 1950 MRN: [id number]    Last Discharge  Street       Date Complaint Diagnosis Description Type Department Provider    5/3/23  Pacemaker Admission (Discharged) from [address] Daniels MD            Noted following upcoming appointments from discharge chart review:   [facility] follow up appointment(s):   Future Appointments   Date Time Provider Christian Sepulveda   2023 11:15 AM Luciano Serrano MD [facility]   2023  2:00 PM Kadeem Chavira APRN - CNP [facility]   2023  2:00 PM Ta Agarwal,  AFL ADL NEPH [facility]   2023  8:30 AM Sharron Espinoza MD [facility] [place] [facility]   2023 11:00 AM Antonia Garcia MD [facility] [place] P[facility]   2023 10:00 AM Harlan Aguas Rollo Bumpers, MD [facility]     2nd attempt to contact patient for care transition follow up. Unable to reach patient. Unable to leave a message d/t mailbox being full. Episode to be resolved and CTN to sign off if return call is not received from the patient.       Deandre Matthews RN   Care Transition Nurse

## 2023-05-26 ENCOUNTER — OFFICE VISIT (OUTPATIENT)
Dept: CARDIOLOGY CLINIC | Age: 73
End: 2023-05-26
Payer: MEDICARE

## 2023-05-26 VITALS
BODY MASS INDEX: 32.02 KG/M2 | HEART RATE: 59 BPM | SYSTOLIC BLOOD PRESSURE: 136 MMHG | WEIGHT: 174 LBS | HEIGHT: 62 IN | DIASTOLIC BLOOD PRESSURE: 62 MMHG

## 2023-05-26 DIAGNOSIS — Z95.5 S/P RIGHT CORONARY ARTERY (RCA) STENT PLACEMENT: ICD-10-CM

## 2023-05-26 DIAGNOSIS — N18.31 STAGE 3A CHRONIC KIDNEY DISEASE (HCC): Chronic | ICD-10-CM

## 2023-05-26 DIAGNOSIS — I50.22 CHRONIC SYSTOLIC (CONGESTIVE) HEART FAILURE (HCC): Chronic | ICD-10-CM

## 2023-05-26 DIAGNOSIS — E11.22 TYPE 2 DIABETES MELLITUS WITH STAGE 3B CHRONIC KIDNEY DISEASE, WITH LONG-TERM CURRENT USE OF INSULIN (HCC): ICD-10-CM

## 2023-05-26 DIAGNOSIS — N18.32 TYPE 2 DIABETES MELLITUS WITH STAGE 3B CHRONIC KIDNEY DISEASE, WITH LONG-TERM CURRENT USE OF INSULIN (HCC): ICD-10-CM

## 2023-05-26 DIAGNOSIS — I25.10 ASCVD (ARTERIOSCLEROTIC CARDIOVASCULAR DISEASE): ICD-10-CM

## 2023-05-26 DIAGNOSIS — D68.69 SECONDARY HYPERCOAGULABLE STATE (HCC): ICD-10-CM

## 2023-05-26 DIAGNOSIS — I25.10 ATHEROSCLEROSIS OF NATIVE CORONARY ARTERY OF NATIVE HEART WITHOUT ANGINA PECTORIS: Chronic | ICD-10-CM

## 2023-05-26 DIAGNOSIS — Z79.4 TYPE 2 DIABETES MELLITUS WITH STAGE 3B CHRONIC KIDNEY DISEASE, WITH LONG-TERM CURRENT USE OF INSULIN (HCC): ICD-10-CM

## 2023-05-26 DIAGNOSIS — E11.9 TYPE 2 DIABETES MELLITUS WITHOUT COMPLICATION, WITHOUT LONG-TERM CURRENT USE OF INSULIN (HCC): Primary | ICD-10-CM

## 2023-05-26 DIAGNOSIS — Z95.2 S/P MVR (MITRAL VALVE REPLACEMENT): ICD-10-CM

## 2023-05-26 DIAGNOSIS — I35.0 NONRHEUMATIC AORTIC VALVE STENOSIS: Chronic | ICD-10-CM

## 2023-05-26 DIAGNOSIS — I05.0 RHEUMATIC MITRAL STENOSIS: Chronic | ICD-10-CM

## 2023-05-26 DIAGNOSIS — Z87.891 FORMER SMOKER: ICD-10-CM

## 2023-05-26 DIAGNOSIS — E78.2 MIXED HYPERLIPIDEMIA: Chronic | ICD-10-CM

## 2023-05-26 DIAGNOSIS — I73.9 PAD (PERIPHERAL ARTERY DISEASE) (HCC): Chronic | ICD-10-CM

## 2023-05-26 DIAGNOSIS — I42.9 CARDIOMYOPATHY, PRIMARY (HCC): Chronic | ICD-10-CM

## 2023-05-26 DIAGNOSIS — I48.11 LONGSTANDING PERSISTENT ATRIAL FIBRILLATION (HCC): Chronic | ICD-10-CM

## 2023-05-26 PROCEDURE — 3078F DIAST BP <80 MM HG: CPT | Performed by: INTERNAL MEDICINE

## 2023-05-26 PROCEDURE — 99214 OFFICE O/P EST MOD 30 MIN: CPT | Performed by: INTERNAL MEDICINE

## 2023-05-26 PROCEDURE — 3075F SYST BP GE 130 - 139MM HG: CPT | Performed by: INTERNAL MEDICINE

## 2023-05-26 PROCEDURE — 1123F ACP DISCUSS/DSCN MKR DOCD: CPT | Performed by: INTERNAL MEDICINE

## 2023-05-26 NOTE — PROGRESS NOTES
CARDIOLOGY NOTE      Chief Complaint: Coronary artery disease, mitral valve disease    HPI:   Bon Gonzalez is a 68y.o. year old who has history as noted below. Bon Gonzalez had an echo showing severe aortic stenosis with a mean gradient of 46 and an aortic valve area of 0.59 cm²  She was also having up to 6-second pauses postcardiac cath as part of work-up for TAVR on her monitor therefore she had  a pacemaker she has still not been seen in TAVR clinic as yet   she is on atc  O2 ut uses it at night with cpap,  She is  using oxygen at night  at 2 L   She is on eliquis now but it is too expensive but denies any bleeding concerns but occasionally her nose bleeds ( once a week)  She had a normal stress test in april 2021 .  goes on bike rides for weeks and months  Daughter is handicap but she works and drives and lives with her    30-day event monitor did not show any A. fib but she tells me that in the past she has had strokes and has history of \"thick blood\" and she was put on Coumadin by Dr. Rosario Lind. She  lives with her  has been  for 50 years, she was swicthed to eliquis from coumadin in 2021  Sees Dr. Katrina Hong for pulmonology she notices ankle swelling once in a while has some shortness of breath on exertion. She has history of mitral valve replacement with a bioprosthetic valve. she denies chest pressure or pain except when she is in a hurry chasing after her dogs.   Her puppy is Estelita and she has 3 other chuwawa  which keeps her busy , her DIL Robert Quan works in scheduling )        Current Outpatient Medications   Medication Sig Dispense Refill    tiotropium (SPIRIVA RESPIMAT) 2.5 MCG/ACT AERS inhaler INHALE 2 SPRAY(S) BY MOUTH ONCE DAILY 3 each 3    empagliflozin (JARDIANCE) 10 MG tablet Take 1 tablet by mouth daily 90 tablet 1    apixaban (ELIQUIS) 5 MG TABS tablet Take 1 tablet by mouth 2 times daily 180 tablet 3    levothyroxine (EUTHYROX) 75 MCG tablet Take 1

## 2023-06-02 ENCOUNTER — OFFICE VISIT (OUTPATIENT)
Dept: CARDIOLOGY CLINIC | Age: 73
End: 2023-06-02

## 2023-06-02 VITALS
BODY MASS INDEX: 31.83 KG/M2 | OXYGEN SATURATION: 95 % | DIASTOLIC BLOOD PRESSURE: 62 MMHG | WEIGHT: 173 LBS | SYSTOLIC BLOOD PRESSURE: 122 MMHG | HEART RATE: 60 BPM | HEIGHT: 62 IN

## 2023-06-02 DIAGNOSIS — Z95.0 PACEMAKER: Primary | ICD-10-CM

## 2023-06-02 PROCEDURE — 99024 POSTOP FOLLOW-UP VISIT: CPT | Performed by: NURSE PRACTITIONER

## 2023-06-02 NOTE — PROGRESS NOTES
Patient here today for follow-up status post implantation complication  Patient reports feeling well. Denies chest pain, palpitation, shortness of breath, lightheadedness, dizziness, edema or syncope  Left upper chest site is well approximated. No redness no swelling no hematoma  Device interrogated  Device Assessment:  The device is Medtronic pacemaker - Dual Chamber chamber      MRI Compatible : yes    Device interrogation was performed. Mode: AAIR --- DDDR     Sensing is normal. Impedence is normal.  Threshold is normal.     There has not been interval changes. Estimated battery life is 13.5 years     The underlying rhythm is AP,VS.  51.1 % atrial paced; <0.1 % ventricular paced. Atrial Arrhythmia : No    Non sustained VT episodes : No    Sustained VT episodes : No    Patient activity reported 0.0 hrs/day    The patient is pacemaker dependent.      Device is functioning appropriately at this time  We will continue to monitor transmissions every 3 months  Patient desires no restrictions  Patient to follow-up cardiology as

## 2023-06-02 NOTE — PATIENT INSTRUCTIONS
**It is YOUR responsibilty to bring medication bottles and/or updated medication list to [address] [place] [address]. This will allow us to better serve you and all your healthcare needs**    [facility] Locations - No appointment necessary. Sites open Monday to Friday. Call your preferred location for test preparation, business   hours and other information you need. [employer] accepts [employer]. [address] Kurt Cal[place], [address] [facility]  Phone: [phone] Andrea Fletcher  [address] [facility]  [address] Andrea Fletcher, [address] [facility]  Phone: [phone]     Please be informed that if you contact our office outside of normal business hours the physician on call cannot help with any scheduling or rescheduling issues, procedure instruction questions or any type of medication issue. We advise you for any urgent/emergency that you go to the nearest emergency room! PLEASE CALL OUR OFFICE DURING NORMAL BUSINESS HOURS    Monday - Friday   8 am to 5 pm    [facility] [address]: [phone]    [place]:  [phone]    Thank you for allowing us to care for you today! We want to ensure we can follow your treatment plan and we strive to give you the best outcomes and experience possible. If you ever have a life threatening emergency and call 911 - for an ambulance (EMS)   Our providers can only care for you at:   [facility] or [facility]. Even if you have someone take you or you drive yourself we can only care for you in a [facility] facility. Our providers are not setup at the other healthcare locations!

## 2023-06-08 ENCOUNTER — TELEPHONE (OUTPATIENT)
Dept: CARDIOLOGY CLINIC | Age: 73
End: 2023-06-08

## 2023-06-09 ENCOUNTER — TELEPHONE (OUTPATIENT)
Dept: CARDIOLOGY CLINIC | Age: 73
End: 2023-06-09

## 2023-06-22 ENCOUNTER — TELEPHONE (OUTPATIENT)
Dept: CARDIOLOGY CLINIC | Age: 73
End: 2023-06-22

## 2023-06-23 ENCOUNTER — OFFICE VISIT (OUTPATIENT)
Dept: FAMILY MEDICINE CLINIC | Age: 73
End: 2023-06-23
Payer: MEDICARE

## 2023-06-23 VITALS
SYSTOLIC BLOOD PRESSURE: 122 MMHG | OXYGEN SATURATION: 94 % | WEIGHT: 171.2 LBS | BODY MASS INDEX: 30.33 KG/M2 | DIASTOLIC BLOOD PRESSURE: 76 MMHG | HEART RATE: 76 BPM

## 2023-06-23 DIAGNOSIS — I42.9 CARDIOMYOPATHY, PRIMARY (HCC): Primary | Chronic | ICD-10-CM

## 2023-06-23 DIAGNOSIS — F51.04 PSYCHOPHYSIOLOGICAL INSOMNIA: ICD-10-CM

## 2023-06-23 DIAGNOSIS — K21.9 GASTROESOPHAGEAL REFLUX DISEASE WITHOUT ESOPHAGITIS: Chronic | ICD-10-CM

## 2023-06-23 DIAGNOSIS — G89.29 CHRONIC RIGHT-SIDED LOW BACK PAIN WITH RIGHT-SIDED SCIATICA: ICD-10-CM

## 2023-06-23 DIAGNOSIS — M54.41 CHRONIC RIGHT-SIDED LOW BACK PAIN WITH RIGHT-SIDED SCIATICA: ICD-10-CM

## 2023-06-23 DIAGNOSIS — E03.9 ACQUIRED HYPOTHYROIDISM: Chronic | ICD-10-CM

## 2023-06-23 DIAGNOSIS — N18.32 STAGE 3B CHRONIC KIDNEY DISEASE (HCC): ICD-10-CM

## 2023-06-23 DIAGNOSIS — M81.0 AGE-RELATED OSTEOPOROSIS WITHOUT CURRENT PATHOLOGICAL FRACTURE: Chronic | ICD-10-CM

## 2023-06-23 DIAGNOSIS — I73.9 PAD (PERIPHERAL ARTERY DISEASE) (HCC): Chronic | ICD-10-CM

## 2023-06-23 PROBLEM — E11.9 TYPE 2 DIABETES MELLITUS (HCC): Status: RESOLVED | Noted: 2023-04-28 | Resolved: 2023-06-23

## 2023-06-23 PROBLEM — E11.22 TYPE 2 DIABETES MELLITUS WITH STAGE 3B CHRONIC KIDNEY DISEASE, WITHOUT LONG-TERM CURRENT USE OF INSULIN (HCC): Status: RESOLVED | Noted: 2023-05-26 | Resolved: 2023-06-23

## 2023-06-23 LAB — HBA1C MFR BLD: 5.3 %

## 2023-06-23 PROCEDURE — 3074F SYST BP LT 130 MM HG: CPT | Performed by: FAMILY MEDICINE

## 2023-06-23 PROCEDURE — 3078F DIAST BP <80 MM HG: CPT | Performed by: FAMILY MEDICINE

## 2023-06-23 PROCEDURE — 83037 HB GLYCOSYLATED A1C HOME DEV: CPT | Performed by: FAMILY MEDICINE

## 2023-06-23 PROCEDURE — 1123F ACP DISCUSS/DSCN MKR DOCD: CPT | Performed by: FAMILY MEDICINE

## 2023-06-23 PROCEDURE — 99214 OFFICE O/P EST MOD 30 MIN: CPT | Performed by: FAMILY MEDICINE

## 2023-06-23 RX ORDER — PANTOPRAZOLE SODIUM 40 MG/1
40 TABLET, DELAYED RELEASE ORAL DAILY
Qty: 90 TABLET | Refills: 1 | Status: SHIPPED | OUTPATIENT
Start: 2023-06-23

## 2023-06-23 RX ORDER — DOXEPIN HYDROCHLORIDE 10 MG/1
10 CAPSULE ORAL NIGHTLY
Qty: 30 CAPSULE | Refills: 3 | Status: SHIPPED | OUTPATIENT
Start: 2023-06-23

## 2023-06-23 RX ORDER — FUROSEMIDE 20 MG/1
10 TABLET ORAL DAILY
Qty: 45 TABLET | Refills: 1 | Status: SHIPPED | OUTPATIENT
Start: 2023-06-23

## 2023-06-23 SDOH — ECONOMIC STABILITY: INCOME INSECURITY: HOW HARD IS IT FOR YOU TO PAY FOR THE VERY BASICS LIKE FOOD, HOUSING, MEDICAL CARE, AND HEATING?: NOT HARD AT ALL

## 2023-06-23 SDOH — ECONOMIC STABILITY: FOOD INSECURITY: WITHIN THE PAST 12 MONTHS, YOU WORRIED THAT YOUR FOOD WOULD RUN OUT BEFORE YOU GOT MONEY TO BUY MORE.: NEVER TRUE

## 2023-06-23 SDOH — ECONOMIC STABILITY: HOUSING INSECURITY
IN THE LAST 12 MONTHS, WAS THERE A TIME WHEN YOU DID NOT HAVE A STEADY PLACE TO SLEEP OR SLEPT IN A SHELTER (INCLUDING NOW)?: NO

## 2023-06-23 NOTE — ASSESSMENT & PLAN NOTE
SUBJECTIVE:   32 y.o. female for annual routine Pap and checkup. Patient's last menstrual period was 07/20/2022 (exact date).     Past Medical History:   Diagnosis Date    Allergic rhinitis     Depression     Generalized anxiety disorder 2014    Morbid obesity ([address]      Past Surgical History:   Procedure Laterality Date    WISDOM TOOTH EXTRACTION       Family History   Problem Relation Age of Onset    Asthma Mother     Cancer Maternal Grandmother     Hypertension Paternal Uncle     Diabetes Maternal Aunt      Social History     Socioeconomic History    Marital status:      Spouse name: Not on file    Number of children: 0    Years of education: Not on file    Highest education level: Not on file   Occupational History    Occupation:    Tobacco Use    Smoking status: Never    Smokeless tobacco: Never   Vaping Use    Vaping Use: Never used   Substance and Sexual Activity    Alcohol use: No    Drug use: No    Sexual activity: Yes     Partners: Male     Birth control/protection: OCP     Comment: BC   Other Topics Concern    Not on file   Social History Narrative    Not on file     Social Determinants of Health     Financial Resource Strain: Low Risk     Difficulty of Paying Living Expenses: Not hard at all   Food Insecurity: No Food Insecurity    Worried About Running Out of Food in the Last Year: Never true    Ran Out of Food in the Last Year: Never true   Transportation Needs: Not on file   Physical Activity: Not on file   Stress: Not on file   Social Connections: Not on file   Intimate Partner Violence: Not on file   Housing Stability: Not on file     Allergies   Allergen Reactions    Seasonal        Outpatient Medications Marked as Taking for the 8/10/22 encounter (Office Visit) with Cuauhtemoc Reed MD   Medication Sig Dispense Refill    sertraline (ZOLOFT) 50 MG tablet TAKE 1 TABLET BY MOUTH EVERY DAY 30 tablet 0    LOW-OGESTREL 0.3-30 MG-MCG per tablet TAKE 1 TABLET BY MOUTH EVERY DAY 84 She is on Jardiance. She sees nephrologist.  She asked that we get the BMP done today. tablet 1    Multiple Vitamins-Minerals (MULTIVITAMIN ADULT PO) Take by mouth          HPI:    ROS:  Feeling well. No dyspnea or chest pain on exertion. No abdominal pain, change in bowel habits, black or bloody stools. No urinary tract symptoms. GYN ROS: normal menses, no abnormal bleeding, pelvic pain or discharge, no breast pain or new or enlarging lumps on self exam.  Patient is  performing self breast exams. No neurological complaints. Health Maintenance   Topic Date Due    Depression Monitoring  Never done    COVID-19 Vaccine (2 - Booster for Jacklyn series) 06/01/2021    Pap smear  07/01/2022    Flu vaccine (1) 09/01/2022    DTaP/Tdap/Td vaccine (2 - Td or Tdap) 10/25/2028    Hepatitis A vaccine  Aged Out    Hepatitis B vaccine  Aged Out    Hib vaccine  Aged Out    Meningococcal (ACWY) vaccine  Aged Out    Pneumococcal 0-64 years Vaccine  Aged Out    Varicella vaccine  Discontinued    Hepatitis C screen  Discontinued    HIV screen  Discontinued       Any history of abnormal pap? No. No new partners      OBJECTIVE:   /84 (Site: Right Upper Arm, Position: Sitting, Cuff Size: Medium Adult)   Pulse 82   Ht 5' 1\" (1.549 m)   Wt 210 lb (95.3 kg)   LMP 07/20/2022 (Exact Date)   SpO2 98%   BMI 39.68 kg/m²   The patient appears well, alert, oriented x 3, in no distress. ENT: PERRLA, EOMI, MMM  Neck supple. No adenopathy or thyromegaly. Lungs are clear, good air entry, no wheezes, rhonchi or rales. CV: S1 and S2 normal, no murmurs, regular rate and rhythm. Abdomen soft without tenderness, guarding, mass or organomegaly. Extremities show no edema, normal peripheral pulses. Neurological is normal, no focal findings. BREAST EXAM: breasts appear normal, no suspicious masses, no skin or nipple changes or axillary nodes    PELVIC EXAM: normal external genitalia, vulva, vagina, cervix, uterus and adnexa      ASSESSMENT:   1. Well female exam with routine gynecological exam    2.  Generalized anxiety disorder        PLAN:   pap smear  counseled on family planning choices  return annually or prn  Updated all HM issues. Encourage regular exercise, balanced diet, monthly breast self examination.

## 2023-06-23 NOTE — ASSESSMENT & PLAN NOTE
Stable. Her stamina is poor but stable. She takes Jardiance for cardiac and renal reasons. She is on Jardiance.

## 2023-06-23 NOTE — PROGRESS NOTES
6/23/23    [employer]  1950    Gab Rueda is a 68 y.o. female who presents today for evaluation of:  Chief Complaint   Patient presents with    3 Month Follow-Up    Back Pain     Patient describes the pain as a hit and miss, all times in the day    Insomnia     Hard time falling and staying asleep. Ckd : Fariha Duggan is to help and sees Dr Jocelyne Dakins. No dysuria or trouble urinating a lot. Cardiomyopathy : Takes Jardiance. Joaquin Dunne is poor but gets around. Insomnia: Trouble staying asleep and getting to sleep. Wants a quick fix for sleep. Uses an OTC sleep med that helps some. Hypothyroid : med was decreased `100 to 75 in March 2023. She wants to wait until next visit to retest.    PAD : she does some walking. Osteoporosis : She takes Ca++ daily and reflux does nto want to take a chande of GERD gettign worse. Back pain : long hx back pain rt side with radiation to Rt leg. Worse standing. Avoids ibuprofen and Aleve. GERD: She gets GERD symptoms if she does not take pantoprazole. Review of Systems   Neurological:         Sometimes balance trouble. Declined Phys Therapy. No Known Allergies     OBJECTIVE    /76 (Site: Left Upper Arm, Position: Sitting, Cuff Size: Medium Adult)   Pulse 76   Wt 171 lb 3.2 oz (77.7 kg)   SpO2 94%   BMI 30.33 kg/m²     Physical Exam   Constitutional:       General: Not in acute distress. Appearance: Normal appearance. Not ill-appearing. Eyes:      General: No scleral icterus. Cardiovascular:      Rate and Rhythm: Normal rate and regular rhythm. Heart sounds: No murmur heard. No friction rub. No gallop. Pulmonary:      Effort: Pulmonary effort is normal. No respiratory distress. Breath sounds: No wheezing, rhonchi or rales. Abdominal:      Palpations: Abdomen is soft. There is no mass. Tenderness: There is no abdominal tenderness. Musculoskeletal:     Moves all extremities normally.     Skin:

## 2023-06-23 NOTE — ASSESSMENT & PLAN NOTE
Her biggest complaint today is insomnia. She wants a quick fix. I will prescribe doxepin 10 mg nightly.

## 2023-07-02 ENCOUNTER — HOSPITAL ENCOUNTER (INPATIENT)
Age: 73
LOS: 3 days | Discharge: HOME | DRG: 189 | End: 2023-07-05
Attending: EMERGENCY MEDICINE
Payer: MEDICARE

## 2023-07-02 ENCOUNTER — APPOINTMENT (OUTPATIENT)
Dept: GENERAL RADIOLOGY | Age: 73
DRG: 189 | End: 2023-07-02
Payer: MEDICARE

## 2023-07-02 DIAGNOSIS — J44.1 COPD EXACERBATION (HCC): ICD-10-CM

## 2023-07-02 DIAGNOSIS — J96.21 ACUTE ON CHRONIC RESPIRATORY FAILURE WITH HYPOXIA (HCC): Primary | ICD-10-CM

## 2023-07-02 DIAGNOSIS — I50.9 CONGESTIVE HEART FAILURE, UNSPECIFIED HF CHRONICITY, UNSPECIFIED HEART FAILURE TYPE (HCC): ICD-10-CM

## 2023-07-02 LAB
ALBUMIN SERPL-MCNC: 4.8 GM/DL (ref 3.4–5)
ALP BLD-CCNC: 112 IU/L (ref 40–129)
ALT SERPL-CCNC: 15 U/L (ref 10–40)
ANION GAP SERPL CALCULATED.3IONS-SCNC: 11 MMOL/L (ref 4–16)
AST SERPL-CCNC: 30 IU/L (ref 15–37)
BASE EXCESS MIXED: 1.2 (ref 0–2.3)
BASOPHILS ABSOLUTE: 0.2 K/CU MM
BASOPHILS RELATIVE PERCENT: 0.8 % (ref 0–1)
BILIRUB SERPL-MCNC: 0.9 MG/DL (ref 0–1)
BUN SERPL-MCNC: 22 MG/DL (ref 6–23)
CALCIUM SERPL-MCNC: 9.4 MG/DL (ref 8.3–10.6)
CARBON MONOXIDE, BLOOD: 2.6 % (ref 0–5)
CHLORIDE BLD-SCNC: 100 MMOL/L (ref 99–110)
CO2 CONTENT: 29.1 MMOL/L (ref 19–24)
CO2: 27 MMOL/L (ref 21–32)
COMMENT: ABNORMAL
CREAT SERPL-MCNC: 1.7 MG/DL (ref 0.6–1.1)
DIFFERENTIAL TYPE: ABNORMAL
EOSINOPHILS ABSOLUTE: 0.4 K/CU MM
EOSINOPHILS RELATIVE PERCENT: 1.7 % (ref 0–3)
GFR SERPL CREATININE-BSD FRML MDRD: 31 ML/MIN/1.73M2
GLUCOSE SERPL-MCNC: 205 MG/DL (ref 70–99)
HCO3 ARTERIAL: 27.6 MMOL/L (ref 18–23)
HCT VFR BLD CALC: 43.6 % (ref 37–47)
HEMOGLOBIN: 14.1 GM/DL (ref 12.5–16)
IMMATURE NEUTROPHIL %: 0.5 % (ref 0–0.43)
LYMPHOCYTES ABSOLUTE: 3.9 K/CU MM
LYMPHOCYTES RELATIVE PERCENT: 17.7 % (ref 24–44)
MCH RBC QN AUTO: 32.1 PG (ref 27–31)
MCHC RBC AUTO-ENTMCNC: 32.3 % (ref 32–36)
MCV RBC AUTO: 99.3 FL (ref 78–100)
METHEMOGLOBIN ARTERIAL: 1.3 %
MONOCYTES ABSOLUTE: 1 K/CU MM
MONOCYTES RELATIVE PERCENT: 4.5 % (ref 0–4)
NUCLEATED RBC %: 0 %
O2 SATURATION: 95 % (ref 96–97)
PCO2 ARTERIAL: 50 MMHG (ref 32–45)
PDW BLD-RTO: 12.5 % (ref 11.7–14.9)
PH BLOOD: 7.35 (ref 7.34–7.45)
PLATELET # BLD: 214 K/CU MM (ref 140–440)
PMV BLD AUTO: 11.2 FL (ref 7.5–11.1)
PO2 ARTERIAL: 98 MMHG (ref 75–100)
POTASSIUM SERPL-SCNC: 4.5 MMOL/L (ref 3.5–5.1)
PRO-BNP: 3073 PG/ML
RBC # BLD: 4.39 M/CU MM (ref 4.2–5.4)
SEGMENTED NEUTROPHILS ABSOLUTE COUNT: 16.5 K/CU MM
SEGMENTED NEUTROPHILS RELATIVE PERCENT: 74.8 % (ref 36–66)
SODIUM BLD-SCNC: 138 MMOL/L (ref 135–145)
TOTAL IMMATURE NEUTOROPHIL: 0.12 K/CU MM
TOTAL NUCLEATED RBC: 0 K/CU MM
TOTAL PROTEIN: 8 GM/DL (ref 6.4–8.2)
TROPONIN T: <0.01 NG/ML
WBC # BLD: 22.1 K/CU MM (ref 4–10.5)

## 2023-07-02 PROCEDURE — 80053 COMPREHEN METABOLIC PANEL: CPT

## 2023-07-02 PROCEDURE — 6360000002 HC RX W HCPCS: Performed by: EMERGENCY MEDICINE

## 2023-07-02 PROCEDURE — 6360000002 HC RX W HCPCS: Performed by: INTERNAL MEDICINE

## 2023-07-02 PROCEDURE — 6370000000 HC RX 637 (ALT 250 FOR IP): Performed by: INTERNAL MEDICINE

## 2023-07-02 PROCEDURE — 2700000000 HC OXYGEN THERAPY PER DAY

## 2023-07-02 PROCEDURE — 94664 DEMO&/EVAL PT USE INHALER: CPT

## 2023-07-02 PROCEDURE — 2580000003 HC RX 258: Performed by: INTERNAL MEDICINE

## 2023-07-02 PROCEDURE — 83880 ASSAY OF NATRIURETIC PEPTIDE: CPT

## 2023-07-02 PROCEDURE — 2140000000 HC CCU INTERMEDIATE R&B

## 2023-07-02 PROCEDURE — 84484 ASSAY OF TROPONIN QUANT: CPT

## 2023-07-02 PROCEDURE — 5A09357 ASSISTANCE WITH RESPIRATORY VENTILATION, LESS THAN 24 CONSECUTIVE HOURS, CONTINUOUS POSITIVE AIRWAY PRESSURE: ICD-10-PCS | Performed by: EMERGENCY MEDICINE

## 2023-07-02 PROCEDURE — 6370000000 HC RX 637 (ALT 250 FOR IP): Performed by: EMERGENCY MEDICINE

## 2023-07-02 PROCEDURE — 99222 1ST HOSP IP/OBS MODERATE 55: CPT | Performed by: INTERNAL MEDICINE

## 2023-07-02 PROCEDURE — 99285 EMERGENCY DEPT VISIT HI MDM: CPT

## 2023-07-02 PROCEDURE — 82803 BLOOD GASES ANY COMBINATION: CPT

## 2023-07-02 PROCEDURE — 85025 COMPLETE CBC W/AUTO DIFF WBC: CPT

## 2023-07-02 PROCEDURE — 96365 THER/PROPH/DIAG IV INF INIT: CPT

## 2023-07-02 PROCEDURE — 71045 X-RAY EXAM CHEST 1 VIEW: CPT

## 2023-07-02 PROCEDURE — 94660 CPAP INITIATION&MGMT: CPT

## 2023-07-02 PROCEDURE — 36600 WITHDRAWAL OF ARTERIAL BLOOD: CPT

## 2023-07-02 PROCEDURE — 94640 AIRWAY INHALATION TREATMENT: CPT

## 2023-07-02 PROCEDURE — 93005 ELECTROCARDIOGRAM TRACING: CPT | Performed by: EMERGENCY MEDICINE

## 2023-07-02 PROCEDURE — 96375 TX/PRO/DX INJ NEW DRUG ADDON: CPT

## 2023-07-02 RX ORDER — ACETAMINOPHEN 325 MG/1
650 TABLET ORAL EVERY 6 HOURS PRN
Status: DISCONTINUED | OUTPATIENT
Start: 2023-07-02 | End: 2023-07-05 | Stop reason: HOSPADM

## 2023-07-02 RX ORDER — PREDNISONE 20 MG/1
40 TABLET ORAL DAILY
Status: DISCONTINUED | OUTPATIENT
Start: 2023-07-04 | End: 2023-07-05 | Stop reason: HOSPADM

## 2023-07-02 RX ORDER — FUROSEMIDE 10 MG/ML
20 INJECTION INTRAMUSCULAR; INTRAVENOUS DAILY
Status: DISCONTINUED | OUTPATIENT
Start: 2023-07-02 | End: 2023-07-05

## 2023-07-02 RX ORDER — SODIUM CHLORIDE 9 MG/ML
INJECTION, SOLUTION INTRAVENOUS PRN
Status: DISCONTINUED | OUTPATIENT
Start: 2023-07-02 | End: 2023-07-05 | Stop reason: HOSPADM

## 2023-07-02 RX ORDER — AZITHROMYCIN 250 MG/1
500 TABLET, FILM COATED ORAL DAILY
Status: COMPLETED | OUTPATIENT
Start: 2023-07-02 | End: 2023-07-04

## 2023-07-02 RX ORDER — ONDANSETRON 2 MG/ML
4 INJECTION INTRAMUSCULAR; INTRAVENOUS EVERY 6 HOURS PRN
Status: DISCONTINUED | OUTPATIENT
Start: 2023-07-02 | End: 2023-07-05 | Stop reason: HOSPADM

## 2023-07-02 RX ORDER — IPRATROPIUM BROMIDE AND ALBUTEROL SULFATE 2.5; .5 MG/3ML; MG/3ML
2 SOLUTION RESPIRATORY (INHALATION) ONCE
Status: COMPLETED | OUTPATIENT
Start: 2023-07-02 | End: 2023-07-02

## 2023-07-02 RX ORDER — BUDESONIDE AND FORMOTEROL FUMARATE DIHYDRATE 80; 4.5 UG/1; UG/1
2 AEROSOL RESPIRATORY (INHALATION) 2 TIMES DAILY
Status: DISCONTINUED | OUTPATIENT
Start: 2023-07-02 | End: 2023-07-05 | Stop reason: HOSPADM

## 2023-07-02 RX ORDER — CARVEDILOL 25 MG/1
25 TABLET ORAL 2 TIMES DAILY
Status: DISCONTINUED | OUTPATIENT
Start: 2023-07-02 | End: 2023-07-05 | Stop reason: HOSPADM

## 2023-07-02 RX ORDER — ATORVASTATIN CALCIUM 40 MG/1
40 TABLET, FILM COATED ORAL DAILY
Status: DISCONTINUED | OUTPATIENT
Start: 2023-07-02 | End: 2023-07-05 | Stop reason: HOSPADM

## 2023-07-02 RX ORDER — SODIUM CHLORIDE 0.9 % (FLUSH) 0.9 %
5-40 SYRINGE (ML) INJECTION PRN
Status: DISCONTINUED | OUTPATIENT
Start: 2023-07-02 | End: 2023-07-05 | Stop reason: HOSPADM

## 2023-07-02 RX ORDER — ASPIRIN 81 MG/1
81 TABLET, CHEWABLE ORAL DAILY
Status: DISCONTINUED | OUTPATIENT
Start: 2023-07-02 | End: 2023-07-05 | Stop reason: HOSPADM

## 2023-07-02 RX ORDER — ACETAMINOPHEN 650 MG/1
650 SUPPOSITORY RECTAL EVERY 6 HOURS PRN
Status: DISCONTINUED | OUTPATIENT
Start: 2023-07-02 | End: 2023-07-05 | Stop reason: HOSPADM

## 2023-07-02 RX ORDER — MAGNESIUM SULFATE IN WATER 40 MG/ML
2000 INJECTION, SOLUTION INTRAVENOUS ONCE
Status: COMPLETED | OUTPATIENT
Start: 2023-07-02 | End: 2023-07-02

## 2023-07-02 RX ORDER — IPRATROPIUM BROMIDE AND ALBUTEROL SULFATE 2.5; .5 MG/3ML; MG/3ML
1 SOLUTION RESPIRATORY (INHALATION)
Status: DISCONTINUED | OUTPATIENT
Start: 2023-07-02 | End: 2023-07-03

## 2023-07-02 RX ORDER — AMLODIPINE BESYLATE 10 MG/1
10 TABLET ORAL DAILY
Status: DISCONTINUED | OUTPATIENT
Start: 2023-07-03 | End: 2023-07-05 | Stop reason: HOSPADM

## 2023-07-02 RX ORDER — PANTOPRAZOLE SODIUM 40 MG/1
40 TABLET, DELAYED RELEASE ORAL
Status: DISCONTINUED | OUTPATIENT
Start: 2023-07-03 | End: 2023-07-05 | Stop reason: HOSPADM

## 2023-07-02 RX ORDER — LEVOTHYROXINE SODIUM 0.07 MG/1
75 TABLET ORAL
Status: DISCONTINUED | OUTPATIENT
Start: 2023-07-03 | End: 2023-07-05 | Stop reason: HOSPADM

## 2023-07-02 RX ORDER — SODIUM CHLORIDE 0.9 % (FLUSH) 0.9 %
5-40 SYRINGE (ML) INJECTION EVERY 12 HOURS SCHEDULED
Status: DISCONTINUED | OUTPATIENT
Start: 2023-07-02 | End: 2023-07-05 | Stop reason: HOSPADM

## 2023-07-02 RX ORDER — ONDANSETRON 4 MG/1
4 TABLET, ORALLY DISINTEGRATING ORAL EVERY 8 HOURS PRN
Status: DISCONTINUED | OUTPATIENT
Start: 2023-07-02 | End: 2023-07-05 | Stop reason: HOSPADM

## 2023-07-02 RX ORDER — POLYETHYLENE GLYCOL 3350 17 G/17G
17 POWDER, FOR SOLUTION ORAL DAILY PRN
Status: DISCONTINUED | OUTPATIENT
Start: 2023-07-02 | End: 2023-07-05 | Stop reason: HOSPADM

## 2023-07-02 RX ORDER — DOXEPIN HYDROCHLORIDE 10 MG/1
10 CAPSULE ORAL NIGHTLY
Status: DISCONTINUED | OUTPATIENT
Start: 2023-07-02 | End: 2023-07-05 | Stop reason: HOSPADM

## 2023-07-02 RX ORDER — LISINOPRIL 20 MG/1
40 TABLET ORAL DAILY
Status: DISCONTINUED | OUTPATIENT
Start: 2023-07-02 | End: 2023-07-05

## 2023-07-02 RX ORDER — CILOSTAZOL 100 MG/1
100 TABLET ORAL 2 TIMES DAILY
Status: DISCONTINUED | OUTPATIENT
Start: 2023-07-02 | End: 2023-07-03

## 2023-07-02 RX ADMIN — METHYLPREDNISOLONE SODIUM SUCCINATE 40 MG: 40 INJECTION, POWDER, FOR SOLUTION INTRAMUSCULAR; INTRAVENOUS at 17:04

## 2023-07-02 RX ADMIN — IPRATROPIUM BROMIDE AND ALBUTEROL SULFATE 1 DOSE: .5; 2.5 SOLUTION RESPIRATORY (INHALATION) at 19:54

## 2023-07-02 RX ADMIN — IPRATROPIUM BROMIDE AND ALBUTEROL SULFATE 2 DOSE: .5; 2.5 SOLUTION RESPIRATORY (INHALATION) at 09:07

## 2023-07-02 RX ADMIN — CARVEDILOL 25 MG: 25 TABLET, FILM COATED ORAL at 22:09

## 2023-07-02 RX ADMIN — METHYLPREDNISOLONE SODIUM SUCCINATE 40 MG: 40 INJECTION, POWDER, FOR SOLUTION INTRAMUSCULAR; INTRAVENOUS at 22:10

## 2023-07-02 RX ADMIN — MAGNESIUM SULFATE HEPTAHYDRATE 2000 MG: 40 INJECTION, SOLUTION INTRAVENOUS at 09:02

## 2023-07-02 RX ADMIN — APIXABAN 5 MG: 5 TABLET, FILM COATED ORAL at 22:09

## 2023-07-02 RX ADMIN — APIXABAN 5 MG: 5 TABLET, FILM COATED ORAL at 13:37

## 2023-07-02 RX ADMIN — DOXEPIN HYDROCHLORIDE 10 MG: 10 CAPSULE ORAL at 22:10

## 2023-07-02 RX ADMIN — SODIUM CHLORIDE, PRESERVATIVE FREE 10 ML: 5 INJECTION INTRAVENOUS at 22:11

## 2023-07-02 RX ADMIN — CARVEDILOL 25 MG: 25 TABLET, FILM COATED ORAL at 13:37

## 2023-07-02 RX ADMIN — IPRATROPIUM BROMIDE AND ALBUTEROL SULFATE 1 DOSE: .5; 2.5 SOLUTION RESPIRATORY (INHALATION) at 14:26

## 2023-07-02 RX ADMIN — METHYLPREDNISOLONE SODIUM SUCCINATE 40 MG: 40 INJECTION, POWDER, FOR SOLUTION INTRAMUSCULAR; INTRAVENOUS at 09:25

## 2023-07-02 RX ADMIN — CILOSTAZOL 100 MG: 100 TABLET ORAL at 22:09

## 2023-07-02 RX ADMIN — BUDESONIDE AND FORMOTEROL FUMARATE DIHYDRATE 2 PUFF: 80; 4.5 AEROSOL RESPIRATORY (INHALATION) at 19:54

## 2023-07-02 RX ADMIN — ASPIRIN 81 MG: 81 TABLET, CHEWABLE ORAL at 13:37

## 2023-07-02 RX ADMIN — CEFTRIAXONE SODIUM 1000 MG: 1 INJECTION, POWDER, FOR SOLUTION INTRAMUSCULAR; INTRAVENOUS at 13:42

## 2023-07-02 RX ADMIN — ATORVASTATIN CALCIUM 40 MG: 40 TABLET, FILM COATED ORAL at 13:36

## 2023-07-02 RX ADMIN — AZITHROMYCIN MONOHYDRATE 500 MG: 250 TABLET ORAL at 13:36

## 2023-07-02 RX ADMIN — FUROSEMIDE 20 MG: 10 INJECTION, SOLUTION INTRAMUSCULAR; INTRAVENOUS at 13:37

## 2023-07-03 LAB
ANION GAP SERPL CALCULATED.3IONS-SCNC: 14 MMOL/L (ref 4–16)
BASOPHILS ABSOLUTE: 0 K/CU MM
BASOPHILS RELATIVE PERCENT: 0.1 % (ref 0–1)
BUN SERPL-MCNC: 34 MG/DL (ref 6–23)
CALCIUM SERPL-MCNC: 8.9 MG/DL (ref 8.3–10.6)
CHLORIDE BLD-SCNC: 99 MMOL/L (ref 99–110)
CO2: 22 MMOL/L (ref 21–32)
CREAT SERPL-MCNC: 1.8 MG/DL (ref 0.6–1.1)
DIFFERENTIAL TYPE: ABNORMAL
EKG ATRIAL RATE: 71 BPM
EKG DIAGNOSIS: NORMAL
EKG P-R INTERVAL: 102 MS
EKG Q-T INTERVAL: 400 MS
EKG QRS DURATION: 82 MS
EKG QTC CALCULATION (BAZETT): 434 MS
EKG R AXIS: 94 DEGREES
EKG T AXIS: 78 DEGREES
EKG VENTRICULAR RATE: 71 BPM
EOSINOPHILS ABSOLUTE: 0 K/CU MM
EOSINOPHILS RELATIVE PERCENT: 0 % (ref 0–3)
GFR SERPL CREATININE-BSD FRML MDRD: 29 ML/MIN/1.73M2
GLUCOSE SERPL-MCNC: 147 MG/DL (ref 70–99)
HCT VFR BLD CALC: 36.6 % (ref 37–47)
HEMOGLOBIN: 11.9 GM/DL (ref 12.5–16)
IMMATURE NEUTROPHIL %: 0.5 % (ref 0–0.43)
LYMPHOCYTES ABSOLUTE: 0.9 K/CU MM
LYMPHOCYTES RELATIVE PERCENT: 6.7 % (ref 24–44)
MAGNESIUM: 2.7 MG/DL (ref 1.8–2.4)
MCH RBC QN AUTO: 32.4 PG (ref 27–31)
MCHC RBC AUTO-ENTMCNC: 32.5 % (ref 32–36)
MCV RBC AUTO: 99.7 FL (ref 78–100)
MONOCYTES ABSOLUTE: 0.4 K/CU MM
MONOCYTES RELATIVE PERCENT: 2.8 % (ref 0–4)
NUCLEATED RBC %: 0 %
PDW BLD-RTO: 12.5 % (ref 11.7–14.9)
PHOSPHORUS: 3.6 MG/DL (ref 2.5–4.9)
PLATELET # BLD: 169 K/CU MM (ref 140–440)
PMV BLD AUTO: 11.1 FL (ref 7.5–11.1)
POTASSIUM SERPL-SCNC: 4.3 MMOL/L (ref 3.5–5.1)
RBC # BLD: 3.67 M/CU MM (ref 4.2–5.4)
SEGMENTED NEUTROPHILS ABSOLUTE COUNT: 11.5 K/CU MM
SEGMENTED NEUTROPHILS RELATIVE PERCENT: 89.9 % (ref 36–66)
SODIUM BLD-SCNC: 135 MMOL/L (ref 135–145)
TOTAL IMMATURE NEUTOROPHIL: 0.06 K/CU MM
TOTAL NUCLEATED RBC: 0 K/CU MM
WBC # BLD: 12.8 K/CU MM (ref 4–10.5)

## 2023-07-03 PROCEDURE — 2700000000 HC OXYGEN THERAPY PER DAY

## 2023-07-03 PROCEDURE — 99232 SBSQ HOSP IP/OBS MODERATE 35: CPT | Performed by: INTERNAL MEDICINE

## 2023-07-03 PROCEDURE — 94640 AIRWAY INHALATION TREATMENT: CPT

## 2023-07-03 PROCEDURE — 2140000000 HC CCU INTERMEDIATE R&B

## 2023-07-03 PROCEDURE — 6370000000 HC RX 637 (ALT 250 FOR IP): Performed by: INTERNAL MEDICINE

## 2023-07-03 PROCEDURE — 85025 COMPLETE CBC W/AUTO DIFF WBC: CPT

## 2023-07-03 PROCEDURE — 83735 ASSAY OF MAGNESIUM: CPT

## 2023-07-03 PROCEDURE — APPSS30 APP SPLIT SHARED TIME 16-30 MINUTES

## 2023-07-03 PROCEDURE — 36415 COLL VENOUS BLD VENIPUNCTURE: CPT

## 2023-07-03 PROCEDURE — 80048 BASIC METABOLIC PNL TOTAL CA: CPT

## 2023-07-03 PROCEDURE — 84100 ASSAY OF PHOSPHORUS: CPT

## 2023-07-03 PROCEDURE — 93010 ELECTROCARDIOGRAM REPORT: CPT | Performed by: INTERNAL MEDICINE

## 2023-07-03 PROCEDURE — 6360000002 HC RX W HCPCS: Performed by: INTERNAL MEDICINE

## 2023-07-03 PROCEDURE — 94664 DEMO&/EVAL PT USE INHALER: CPT

## 2023-07-03 PROCEDURE — 94761 N-INVAS EAR/PLS OXIMETRY MLT: CPT

## 2023-07-03 PROCEDURE — 2580000003 HC RX 258: Performed by: INTERNAL MEDICINE

## 2023-07-03 RX ORDER — ALBUTEROL SULFATE 90 UG/1
2 AEROSOL, METERED RESPIRATORY (INHALATION)
Status: DISCONTINUED | OUTPATIENT
Start: 2023-07-03 | End: 2023-07-04

## 2023-07-03 RX ORDER — IPRATROPIUM BROMIDE AND ALBUTEROL SULFATE 2.5; .5 MG/3ML; MG/3ML
1 SOLUTION RESPIRATORY (INHALATION) EVERY 4 HOURS PRN
Status: DISCONTINUED | OUTPATIENT
Start: 2023-07-03 | End: 2023-07-05 | Stop reason: HOSPADM

## 2023-07-03 RX ADMIN — AMLODIPINE BESYLATE 10 MG: 10 TABLET ORAL at 09:10

## 2023-07-03 RX ADMIN — PANTOPRAZOLE SODIUM 40 MG: 40 TABLET, DELAYED RELEASE ORAL at 05:57

## 2023-07-03 RX ADMIN — CARVEDILOL 25 MG: 25 TABLET, FILM COATED ORAL at 09:10

## 2023-07-03 RX ADMIN — SODIUM CHLORIDE, PRESERVATIVE FREE 10 ML: 5 INJECTION INTRAVENOUS at 20:39

## 2023-07-03 RX ADMIN — DOXEPIN HYDROCHLORIDE 10 MG: 10 CAPSULE ORAL at 20:39

## 2023-07-03 RX ADMIN — APIXABAN 5 MG: 5 TABLET, FILM COATED ORAL at 09:10

## 2023-07-03 RX ADMIN — LEVOTHYROXINE SODIUM 75 MCG: 0.07 TABLET ORAL at 05:57

## 2023-07-03 RX ADMIN — FUROSEMIDE 20 MG: 10 INJECTION, SOLUTION INTRAMUSCULAR; INTRAVENOUS at 09:17

## 2023-07-03 RX ADMIN — ASPIRIN 81 MG: 81 TABLET, CHEWABLE ORAL at 09:11

## 2023-07-03 RX ADMIN — CARVEDILOL 25 MG: 25 TABLET, FILM COATED ORAL at 20:39

## 2023-07-03 RX ADMIN — CEFTRIAXONE SODIUM 1000 MG: 1 INJECTION, POWDER, FOR SOLUTION INTRAMUSCULAR; INTRAVENOUS at 12:22

## 2023-07-03 RX ADMIN — METHYLPREDNISOLONE SODIUM SUCCINATE 40 MG: 40 INJECTION, POWDER, FOR SOLUTION INTRAMUSCULAR; INTRAVENOUS at 20:39

## 2023-07-03 RX ADMIN — ALBUTEROL SULFATE 2 PUFF: 90 AEROSOL, METERED RESPIRATORY (INHALATION) at 15:22

## 2023-07-03 RX ADMIN — APIXABAN 5 MG: 5 TABLET, FILM COATED ORAL at 20:39

## 2023-07-03 RX ADMIN — METHYLPREDNISOLONE SODIUM SUCCINATE 40 MG: 40 INJECTION, POWDER, FOR SOLUTION INTRAMUSCULAR; INTRAVENOUS at 15:12

## 2023-07-03 RX ADMIN — Medication 1 TABLET: at 09:10

## 2023-07-03 RX ADMIN — ATORVASTATIN CALCIUM 40 MG: 40 TABLET, FILM COATED ORAL at 09:10

## 2023-07-03 RX ADMIN — IPRATROPIUM BROMIDE 2 PUFF: 17 AEROSOL, METERED RESPIRATORY (INHALATION) at 21:23

## 2023-07-03 RX ADMIN — BUDESONIDE AND FORMOTEROL FUMARATE DIHYDRATE 2 PUFF: 80; 4.5 AEROSOL RESPIRATORY (INHALATION) at 21:22

## 2023-07-03 RX ADMIN — METHYLPREDNISOLONE SODIUM SUCCINATE 40 MG: 40 INJECTION, POWDER, FOR SOLUTION INTRAMUSCULAR; INTRAVENOUS at 04:39

## 2023-07-03 RX ADMIN — METHYLPREDNISOLONE SODIUM SUCCINATE 40 MG: 40 INJECTION, POWDER, FOR SOLUTION INTRAMUSCULAR; INTRAVENOUS at 09:19

## 2023-07-03 RX ADMIN — IPRATROPIUM BROMIDE 2 PUFF: 17 AEROSOL, METERED RESPIRATORY (INHALATION) at 11:29

## 2023-07-03 RX ADMIN — ALBUTEROL SULFATE 2 PUFF: 90 AEROSOL, METERED RESPIRATORY (INHALATION) at 21:21

## 2023-07-03 RX ADMIN — BUDESONIDE AND FORMOTEROL FUMARATE DIHYDRATE 2 PUFF: 80; 4.5 AEROSOL RESPIRATORY (INHALATION) at 07:06

## 2023-07-03 RX ADMIN — SODIUM CHLORIDE, PRESERVATIVE FREE 10 ML: 5 INJECTION INTRAVENOUS at 09:19

## 2023-07-03 RX ADMIN — ALBUTEROL SULFATE 2 PUFF: 90 AEROSOL, METERED RESPIRATORY (INHALATION) at 07:05

## 2023-07-03 RX ADMIN — IPRATROPIUM BROMIDE 2 PUFF: 17 AEROSOL, METERED RESPIRATORY (INHALATION) at 07:06

## 2023-07-03 RX ADMIN — AZITHROMYCIN MONOHYDRATE 500 MG: 250 TABLET ORAL at 09:10

## 2023-07-03 RX ADMIN — ALBUTEROL SULFATE 2 PUFF: 90 AEROSOL, METERED RESPIRATORY (INHALATION) at 11:29

## 2023-07-03 RX ADMIN — CILOSTAZOL 100 MG: 100 TABLET ORAL at 09:16

## 2023-07-03 RX ADMIN — EMPAGLIFLOZIN 10 MG: 10 TABLET, FILM COATED ORAL at 09:11

## 2023-07-03 RX ADMIN — IPRATROPIUM BROMIDE 2 PUFF: 17 AEROSOL, METERED RESPIRATORY (INHALATION) at 15:23

## 2023-07-03 NOTE — PROGRESS NOTES
4 Eyes Skin Assessment     NAME:  Sherrie Smith  YOB: 1950  MEDICAL RECORD NUMBER:  [id number]    The patient is being assessed for  Admission    I agree that at least one RN has performed a thorough Head to Toe Skin Assessment on the patient. ALL assessment sites listed below have been assessed. Areas assessed by both nurses:    Head, Face, Ears, Shoulders, Back, Chest, Arms, Elbows, Hands, Sacrum. Buttock, Coccyx, Ischium, Legs. Feet and Heels, and Under Medical Devices         Does the Patient have a Wound?  No noted wound(s)       Luc Prevention initiated by RN: Yes  Wound Care Orders initiated by RN: No    Pressure Injury (Stage 3,4, Unstageable, DTI, NWPT, and Complex wounds) if present, place Wound referral order by RN under : No    New Ostomies, if present place, Ostomy referral order under : No     Nurse 1 eSignature: Electronically signed by Randolph Morgan RN on 7/2/23 at 11:36 PM EDT    **SHARE this note so that the co-signing nurse can place an eSignature**    Nurse 2 eSignature: {Esignature:[id number]}

## 2023-07-03 NOTE — PROGRESS NOTES
V2.0  [facility] Hospitalist Progress Note      Name:  Comfort Sidhu /Age/Sex: 1950  (68 y.o. female)   MRN & CSN:  [id number] & [id number] Encounter Date/Time: 7/3/2023 8:54 AM EDT    Location:  [address] [place] [address] PCP: Komal Tian MD       Hospital Day: 2    Assessment and Plan:   Comfort Sidhu is a 68 y.o. female who presents with shortness of breath      Plan:  Acute on chronic hypoxic respiratory failure-due to COPD and CHF. Appears to be off BiPAP and on 2 L nasal cannula. On bronchodilators. CXR shows bilateral pulmonary congestion. May have been triggered by [facility]. Acute COPD exacerbation-on steroids, antibiotics, bronchodilators. Acute CHF exacerbation-on Lasix. Check daily weights and I's and O's. BNP 3K. Paroxysmal A-fib/sick sinus syndrome s/p PPM-on Eliquis and carvedilol  Valvular heart disease-bioprosthetic mitral valve. Being evaluated for TAVR by cardiology. EDITA-wears home BiPAP  HTN-amlodipine and carvedilol  CKD 3-creatinine 1.8. Diet ADULT DIET; Regular    DVT Prophylaxis [] Lovenox, [] Heparin, [x] Eliquis, [] Xarelto  [] SCDs       Code Status Full Code   Disposition From: Home  Expected Disposition: TBD  Estimated Date of Discharge:   Patient requires continued admission due to CHF   Home O2 2 to 3 L nasal cannula     Personally reviewed Lab Studies and Imaging       Subjective/Interval history:   Chief Complaint: Respiratory Distress (75% on nasal cannula at 5L - NRB upon arrival at 97%)     Patient states that two days ago she started having shortness of breath which she feels was triggered by the bad air from [address] [place] [address] Pycnos. No chest pain, no fevers, no wheezing, no swelling, no cough. She wears 2-3LNC at home at night usually but has been wearing more during the day with the change in air quality. Review of Systems:    Negative unless mentioned above    Objective:      Intake/Output Summary (Last 24 hours) at 7/3/2023 0854  Last data filed at

## 2023-07-03 NOTE — PROGRESS NOTES
Nam Spann switched to MDIs per protocol.  She uses MDIs at home and didn't have a Rapid COVID test. dr guthrie

## 2023-07-04 LAB
ANION GAP SERPL CALCULATED.3IONS-SCNC: 13 MMOL/L (ref 4–16)
BASOPHILS ABSOLUTE: 0 K/CU MM
BASOPHILS RELATIVE PERCENT: 0.1 % (ref 0–1)
BUN SERPL-MCNC: 43 MG/DL (ref 6–23)
CALCIUM SERPL-MCNC: 9 MG/DL (ref 8.3–10.6)
CHLORIDE BLD-SCNC: 98 MMOL/L (ref 99–110)
CO2: 24 MMOL/L (ref 21–32)
CREAT SERPL-MCNC: 1.8 MG/DL (ref 0.6–1.1)
DIFFERENTIAL TYPE: ABNORMAL
EOSINOPHILS ABSOLUTE: 0 K/CU MM
EOSINOPHILS RELATIVE PERCENT: 0 % (ref 0–3)
GFR SERPL CREATININE-BSD FRML MDRD: 29 ML/MIN/1.73M2
GLUCOSE SERPL-MCNC: 141 MG/DL (ref 70–99)
HCT VFR BLD CALC: 37.8 % (ref 37–47)
HEMOGLOBIN: 12.1 GM/DL (ref 12.5–16)
IMMATURE NEUTROPHIL %: 0.5 % (ref 0–0.43)
LYMPHOCYTES ABSOLUTE: 0.9 K/CU MM
LYMPHOCYTES RELATIVE PERCENT: 5.1 % (ref 24–44)
MAGNESIUM: 2.6 MG/DL (ref 1.8–2.4)
MCH RBC QN AUTO: 32 PG (ref 27–31)
MCHC RBC AUTO-ENTMCNC: 32 % (ref 32–36)
MCV RBC AUTO: 100 FL (ref 78–100)
MONOCYTES ABSOLUTE: 0.6 K/CU MM
MONOCYTES RELATIVE PERCENT: 3.2 % (ref 0–4)
NUCLEATED RBC %: 0 %
PDW BLD-RTO: 12.6 % (ref 11.7–14.9)
PHOSPHORUS: 3.7 MG/DL (ref 2.5–4.9)
PLATELET # BLD: 166 K/CU MM (ref 140–440)
PMV BLD AUTO: 10.8 FL (ref 7.5–11.1)
POTASSIUM SERPL-SCNC: 4.3 MMOL/L (ref 3.5–5.1)
RBC # BLD: 3.78 M/CU MM (ref 4.2–5.4)
SEGMENTED NEUTROPHILS ABSOLUTE COUNT: 16.5 K/CU MM
SEGMENTED NEUTROPHILS RELATIVE PERCENT: 91.1 % (ref 36–66)
SODIUM BLD-SCNC: 135 MMOL/L (ref 135–145)
TOTAL IMMATURE NEUTOROPHIL: 0.09 K/CU MM
TOTAL NUCLEATED RBC: 0 K/CU MM
WBC # BLD: 18.1 K/CU MM (ref 4–10.5)

## 2023-07-04 PROCEDURE — 2580000003 HC RX 258: Performed by: INTERNAL MEDICINE

## 2023-07-04 PROCEDURE — 84100 ASSAY OF PHOSPHORUS: CPT

## 2023-07-04 PROCEDURE — 6360000002 HC RX W HCPCS: Performed by: INTERNAL MEDICINE

## 2023-07-04 PROCEDURE — 94640 AIRWAY INHALATION TREATMENT: CPT

## 2023-07-04 PROCEDURE — 85025 COMPLETE CBC W/AUTO DIFF WBC: CPT

## 2023-07-04 PROCEDURE — 2140000000 HC CCU INTERMEDIATE R&B

## 2023-07-04 PROCEDURE — 83735 ASSAY OF MAGNESIUM: CPT

## 2023-07-04 PROCEDURE — 80048 BASIC METABOLIC PNL TOTAL CA: CPT

## 2023-07-04 PROCEDURE — 6370000000 HC RX 637 (ALT 250 FOR IP): Performed by: INTERNAL MEDICINE

## 2023-07-04 PROCEDURE — 2700000000 HC OXYGEN THERAPY PER DAY

## 2023-07-04 PROCEDURE — APPNB15 APP NON BILLABLE TIME 0-15 MINS: Performed by: NURSE PRACTITIONER

## 2023-07-04 PROCEDURE — 94761 N-INVAS EAR/PLS OXIMETRY MLT: CPT

## 2023-07-04 PROCEDURE — 99232 SBSQ HOSP IP/OBS MODERATE 35: CPT | Performed by: INTERNAL MEDICINE

## 2023-07-04 PROCEDURE — 36415 COLL VENOUS BLD VENIPUNCTURE: CPT

## 2023-07-04 RX ORDER — ALBUTEROL SULFATE 90 UG/1
2 AEROSOL, METERED RESPIRATORY (INHALATION) EVERY 4 HOURS PRN
Status: DISCONTINUED | OUTPATIENT
Start: 2023-07-04 | End: 2023-07-05 | Stop reason: HOSPADM

## 2023-07-04 RX ADMIN — BUDESONIDE AND FORMOTEROL FUMARATE DIHYDRATE 2 PUFF: 80; 4.5 AEROSOL RESPIRATORY (INHALATION) at 07:43

## 2023-07-04 RX ADMIN — PREDNISONE 40 MG: 20 TABLET ORAL at 09:31

## 2023-07-04 RX ADMIN — APIXABAN 5 MG: 5 TABLET, FILM COATED ORAL at 21:16

## 2023-07-04 RX ADMIN — CEFTRIAXONE SODIUM 1000 MG: 1 INJECTION, POWDER, FOR SOLUTION INTRAMUSCULAR; INTRAVENOUS at 12:46

## 2023-07-04 RX ADMIN — CARVEDILOL 25 MG: 25 TABLET, FILM COATED ORAL at 21:16

## 2023-07-04 RX ADMIN — Medication 1 TABLET: at 09:30

## 2023-07-04 RX ADMIN — PANTOPRAZOLE SODIUM 40 MG: 40 TABLET, DELAYED RELEASE ORAL at 06:16

## 2023-07-04 RX ADMIN — IPRATROPIUM BROMIDE 2 PUFF: 17 AEROSOL, METERED RESPIRATORY (INHALATION) at 07:42

## 2023-07-04 RX ADMIN — AZITHROMYCIN MONOHYDRATE 500 MG: 250 TABLET ORAL at 09:30

## 2023-07-04 RX ADMIN — LEVOTHYROXINE SODIUM 75 MCG: 0.07 TABLET ORAL at 06:16

## 2023-07-04 RX ADMIN — FUROSEMIDE 20 MG: 10 INJECTION, SOLUTION INTRAMUSCULAR; INTRAVENOUS at 09:31

## 2023-07-04 RX ADMIN — ALBUTEROL SULFATE 2 PUFF: 90 AEROSOL, METERED RESPIRATORY (INHALATION) at 07:41

## 2023-07-04 RX ADMIN — SODIUM CHLORIDE, PRESERVATIVE FREE 10 ML: 5 INJECTION INTRAVENOUS at 09:31

## 2023-07-04 RX ADMIN — SODIUM CHLORIDE, PRESERVATIVE FREE 10 ML: 5 INJECTION INTRAVENOUS at 21:49

## 2023-07-04 RX ADMIN — DOXEPIN HYDROCHLORIDE 10 MG: 10 CAPSULE ORAL at 21:16

## 2023-07-04 RX ADMIN — METHYLPREDNISOLONE SODIUM SUCCINATE 40 MG: 40 INJECTION, POWDER, FOR SOLUTION INTRAMUSCULAR; INTRAVENOUS at 04:32

## 2023-07-04 RX ADMIN — APIXABAN 5 MG: 5 TABLET, FILM COATED ORAL at 09:29

## 2023-07-04 RX ADMIN — AMLODIPINE BESYLATE 10 MG: 10 TABLET ORAL at 09:30

## 2023-07-04 RX ADMIN — ASPIRIN 81 MG: 81 TABLET, CHEWABLE ORAL at 09:30

## 2023-07-04 RX ADMIN — ALBUTEROL SULFATE 2 PUFF: 90 AEROSOL, METERED RESPIRATORY (INHALATION) at 12:33

## 2023-07-04 RX ADMIN — IPRATROPIUM BROMIDE 2 PUFF: 17 AEROSOL, METERED RESPIRATORY (INHALATION) at 12:32

## 2023-07-04 RX ADMIN — ATORVASTATIN CALCIUM 40 MG: 40 TABLET, FILM COATED ORAL at 09:30

## 2023-07-04 RX ADMIN — EMPAGLIFLOZIN 10 MG: 10 TABLET, FILM COATED ORAL at 09:30

## 2023-07-04 RX ADMIN — CARVEDILOL 25 MG: 25 TABLET, FILM COATED ORAL at 09:30

## 2023-07-04 NOTE — PROGRESS NOTES
V2.0  History and Physical      Name:  Joana Judd /Age/Sex: 1950  (68 y.o. female)   MRN & CSN:  [id number] & [id number] Encounter Date/Time: 2023 10:36 AM EDT   Location:  310/3104-A PCP: Disha Ramos MD       Hospital Day: 3    Assessment and Plan:   Joana Judd is a 68 y.o. female with a pmh of hypertension, paroxysmal A-fib, hyperlipidemia, COPD, CHF, who presents with Acute on chronic respiratory failure with hypoxia [facility]    Hospital Problems             Last Modified POA    * (Principal) Acute on chronic respiratory failure with hypoxia ([address]) 2023 Yes     Acute on chronic hypoxic respiratory failure, likely COPD exacerbation complicated by CHF exacerbation  Severe COPD on home oxygen 4 L by nasal cannula  Severe aortic stenosis, being prepared for TAVR  Presented with complaints of shortness of breath. EMS found her saturating at 75% on 5 L nasal cannula home oxygen. She was started on nonrebreather with saturation resolving to 97%. No venous cath was obtained at presentation and patient was started on BiPAP and the venous gas ordered BiPAP showed PCO2 at 50, pH at 7.35. Unclear if she was hypercapnic at presentation. Minimal bilateral expiratory wheezes could be appreciated on auscultation, also bibasilar crackles. . proBNP was also elevated at 3073, last proBNP on record was 2743 on . She does not have any pedal or dependent edema, but chest x-ray does show bilateral pulmonary congestion likely pulmonary edema. Patient is only on 10 Mg of Lasix daily at home.   -COPD treatment with ceftriaxone/azithromycin, DuoNeb nebulization, methylprednisolone followed by prednisone  -CHF treatment with IV Lasix 20 mg once daily for now  -Continue monitor renal function  -Wean off BiPAP as able but continue nightly home BiPAP  -Wean off oxygen as able    Paroxysmal A-fib/sick sinus syndrome s/p PPM  -Continue Eliquis 5 mg twice daily and carvedilol 25 Mg twice daily    Valvular

## 2023-07-05 VITALS
WEIGHT: 174.82 LBS | TEMPERATURE: 97.6 F | OXYGEN SATURATION: 96 % | DIASTOLIC BLOOD PRESSURE: 88 MMHG | SYSTOLIC BLOOD PRESSURE: 114 MMHG | HEIGHT: 63 IN | RESPIRATION RATE: 16 BRPM | HEART RATE: 80 BPM | BODY MASS INDEX: 30.98 KG/M2

## 2023-07-05 LAB
ANION GAP SERPL CALCULATED.3IONS-SCNC: 10 MMOL/L (ref 4–16)
BASOPHILS ABSOLUTE: 0 K/CU MM
BASOPHILS RELATIVE PERCENT: 0.1 % (ref 0–1)
BUN SERPL-MCNC: 47 MG/DL (ref 6–23)
CALCIUM SERPL-MCNC: 8.4 MG/DL (ref 8.3–10.6)
CHLORIDE BLD-SCNC: 98 MMOL/L (ref 99–110)
CO2: 27 MMOL/L (ref 21–32)
CREAT SERPL-MCNC: 1.6 MG/DL (ref 0.6–1.1)
DIFFERENTIAL TYPE: ABNORMAL
EOSINOPHILS ABSOLUTE: 0 K/CU MM
EOSINOPHILS RELATIVE PERCENT: 0 % (ref 0–3)
GFR SERPL CREATININE-BSD FRML MDRD: 34 ML/MIN/1.73M2
GLUCOSE SERPL-MCNC: 183 MG/DL (ref 70–99)
HCT VFR BLD CALC: 35 % (ref 37–47)
HEMOGLOBIN: 11.4 GM/DL (ref 12.5–16)
IMMATURE NEUTROPHIL %: 1.2 % (ref 0–0.43)
LYMPHOCYTES ABSOLUTE: 0.8 K/CU MM
LYMPHOCYTES RELATIVE PERCENT: 4.9 % (ref 24–44)
MAGNESIUM: 2.3 MG/DL (ref 1.8–2.4)
MCH RBC QN AUTO: 32.1 PG (ref 27–31)
MCHC RBC AUTO-ENTMCNC: 32.6 % (ref 32–36)
MCV RBC AUTO: 98.6 FL (ref 78–100)
MONOCYTES ABSOLUTE: 0.7 K/CU MM
MONOCYTES RELATIVE PERCENT: 4.2 % (ref 0–4)
NUCLEATED RBC %: 0 %
PDW BLD-RTO: 12.4 % (ref 11.7–14.9)
PHOSPHORUS: 2.7 MG/DL (ref 2.5–4.9)
PLATELET # BLD: 156 K/CU MM (ref 140–440)
PMV BLD AUTO: 10.6 FL (ref 7.5–11.1)
POTASSIUM SERPL-SCNC: 3.8 MMOL/L (ref 3.5–5.1)
RBC # BLD: 3.55 M/CU MM (ref 4.2–5.4)
SEGMENTED NEUTROPHILS ABSOLUTE COUNT: 14 K/CU MM
SEGMENTED NEUTROPHILS RELATIVE PERCENT: 89.6 % (ref 36–66)
SODIUM BLD-SCNC: 135 MMOL/L (ref 135–145)
TOTAL IMMATURE NEUTOROPHIL: 0.18 K/CU MM
TOTAL NUCLEATED RBC: 0 K/CU MM
WBC # BLD: 15.6 K/CU MM (ref 4–10.5)

## 2023-07-05 PROCEDURE — 83735 ASSAY OF MAGNESIUM: CPT

## 2023-07-05 PROCEDURE — 94762 N-INVAS EAR/PLS OXIMTRY CONT: CPT

## 2023-07-05 PROCEDURE — 94640 AIRWAY INHALATION TREATMENT: CPT

## 2023-07-05 PROCEDURE — 99232 SBSQ HOSP IP/OBS MODERATE 35: CPT | Performed by: INTERNAL MEDICINE

## 2023-07-05 PROCEDURE — APPSS30 APP SPLIT SHARED TIME 16-30 MINUTES

## 2023-07-05 PROCEDURE — 6370000000 HC RX 637 (ALT 250 FOR IP): Performed by: INTERNAL MEDICINE

## 2023-07-05 PROCEDURE — 94761 N-INVAS EAR/PLS OXIMETRY MLT: CPT

## 2023-07-05 PROCEDURE — 94664 DEMO&/EVAL PT USE INHALER: CPT

## 2023-07-05 PROCEDURE — 2700000000 HC OXYGEN THERAPY PER DAY

## 2023-07-05 PROCEDURE — 36415 COLL VENOUS BLD VENIPUNCTURE: CPT

## 2023-07-05 PROCEDURE — 84100 ASSAY OF PHOSPHORUS: CPT

## 2023-07-05 PROCEDURE — 2580000003 HC RX 258: Performed by: INTERNAL MEDICINE

## 2023-07-05 PROCEDURE — 80048 BASIC METABOLIC PNL TOTAL CA: CPT

## 2023-07-05 PROCEDURE — 6370000000 HC RX 637 (ALT 250 FOR IP)

## 2023-07-05 PROCEDURE — 85025 COMPLETE CBC W/AUTO DIFF WBC: CPT

## 2023-07-05 RX ORDER — LISINOPRIL 5 MG/1
10 TABLET ORAL DAILY
Status: DISCONTINUED | OUTPATIENT
Start: 2023-07-05 | End: 2023-07-05 | Stop reason: HOSPADM

## 2023-07-05 RX ORDER — POTASSIUM CHLORIDE 20 MEQ/1
40 TABLET, EXTENDED RELEASE ORAL ONCE
Status: DISCONTINUED | OUTPATIENT
Start: 2023-07-05 | End: 2023-07-05 | Stop reason: HOSPADM

## 2023-07-05 RX ORDER — PREDNISONE 20 MG/1
40 TABLET ORAL DAILY
Qty: 2 TABLET | Refills: 0 | Status: SHIPPED | OUTPATIENT
Start: 2023-07-06 | End: 2023-07-07

## 2023-07-05 RX ORDER — FUROSEMIDE 20 MG/1
20 TABLET ORAL DAILY
Status: DISCONTINUED | OUTPATIENT
Start: 2023-07-05 | End: 2023-07-05 | Stop reason: HOSPADM

## 2023-07-05 RX ORDER — LISINOPRIL 10 MG/1
10 TABLET ORAL DAILY
Qty: 30 TABLET | Refills: 0 | Status: SHIPPED | OUTPATIENT
Start: 2023-07-06

## 2023-07-05 RX ORDER — FUROSEMIDE 20 MG/1
20 TABLET ORAL DAILY
Qty: 60 TABLET | Refills: 0 | Status: SHIPPED | OUTPATIENT
Start: 2023-07-06

## 2023-07-05 RX ADMIN — PANTOPRAZOLE SODIUM 40 MG: 40 TABLET, DELAYED RELEASE ORAL at 05:44

## 2023-07-05 RX ADMIN — EMPAGLIFLOZIN 10 MG: 10 TABLET, FILM COATED ORAL at 09:32

## 2023-07-05 RX ADMIN — LEVOTHYROXINE SODIUM 75 MCG: 0.07 TABLET ORAL at 05:44

## 2023-07-05 RX ADMIN — PREDNISONE 40 MG: 20 TABLET ORAL at 09:33

## 2023-07-05 RX ADMIN — BUDESONIDE AND FORMOTEROL FUMARATE DIHYDRATE 2 PUFF: 80; 4.5 AEROSOL RESPIRATORY (INHALATION) at 08:16

## 2023-07-05 RX ADMIN — FUROSEMIDE 20 MG: 20 TABLET ORAL at 09:32

## 2023-07-05 RX ADMIN — ASPIRIN 81 MG: 81 TABLET, CHEWABLE ORAL at 09:31

## 2023-07-05 RX ADMIN — SODIUM CHLORIDE, PRESERVATIVE FREE 10 ML: 5 INJECTION INTRAVENOUS at 09:33

## 2023-07-05 RX ADMIN — AMLODIPINE BESYLATE 10 MG: 10 TABLET ORAL at 09:32

## 2023-07-05 RX ADMIN — CARVEDILOL 25 MG: 25 TABLET, FILM COATED ORAL at 09:32

## 2023-07-05 RX ADMIN — ATORVASTATIN CALCIUM 40 MG: 40 TABLET, FILM COATED ORAL at 09:32

## 2023-07-05 RX ADMIN — Medication 1 TABLET: at 09:32

## 2023-07-05 RX ADMIN — LISINOPRIL 10 MG: 5 TABLET ORAL at 09:32

## 2023-07-05 RX ADMIN — APIXABAN 5 MG: 5 TABLET, FILM COATED ORAL at 09:32

## 2023-07-05 NOTE — PROGRESS NOTES
Outpatient Pharmacy Progress Note for Meds-to-Beds    Total number of Prescriptions Filled: 2  The following medications were dispensed to the patient during the discharge process:  Prednisone  Lisinopril    Additional Documentation:  Patient picked-up the medication(s) in the OP Pharmacy  The following prescription(s) were refilled to soon per insurance (patient has some at home): Furosemide        Thank you for letting us serve your patients.   [address]    [address] [place] [address], [address] [place] [address]    Phone: [phone]    Fax: [phone]

## 2023-07-05 NOTE — PLAN OF CARE
Problem: Discharge Planning  Goal: Discharge to home or other facility with appropriate resources  Outcome: Completed     Problem: Safety - Adult  Goal: Free from fall injury  7/5/2023 1354 by Julia Madison RN  Outcome: Completed  7/5/2023 0307 by Armani Monsivais RN  Outcome: Progressing     Problem: Chronic Conditions and Co-morbidities  Goal: Patient's chronic conditions and co-morbidity symptoms are monitored and maintained or improved  7/5/2023 1354 by Julia Madison RN  Outcome: Completed  7/5/2023 0307 by Armani Monsivais RN  Outcome: Progressing

## 2023-07-06 ENCOUNTER — CARE COORDINATION (OUTPATIENT)
Dept: CASE MANAGEMENT | Age: 73
End: 2023-07-06

## 2023-07-06 ENCOUNTER — TELEPHONE (OUTPATIENT)
Dept: PULMONOLOGY | Age: 73
End: 2023-07-06

## 2023-07-06 DIAGNOSIS — I50.22 CHRONIC SYSTOLIC (CONGESTIVE) HEART FAILURE (HCC): Primary | Chronic | ICD-10-CM

## 2023-07-06 PROCEDURE — 1111F DSCHRG MED/CURRENT MED MERGE: CPT | Performed by: FAMILY MEDICINE

## 2023-07-06 NOTE — TELEPHONE ENCOUNTER
Pt called regarding script for portable O2 for vacation. I reached out to [facility] who informed me that the pt would have to pay a $125 rental fee for a 7 day period for an over the shoulder unit. She is not in the billing cycle for insurance to be billed for a POC. I did ask if she did not need it on the plane, could something be brought to where she is staying. [facility] did state she can have a concentrator brought out and small tanks for portable use, they just cannot bill for an over the shoulder unit. I did call the pt to relay this information.

## 2023-07-06 NOTE — CARE COORDINATION
[facility] Care Transitions Initial Follow Up Call    Call within 2 business days of discharge: Yes    Patient Current Location:  Home: [address] [place] [address]    Care Transition Nurse contacted the patient by telephone to perform post hospital discharge assessment. Verified name and  with patient as identifiers. Provided introduction to self, and explanation of the Care Transition Nurse role. Patient: Kar Shea Patient : 1950   MRN: [id number]  Reason for Admission: A/C CHF & COPD  Discharge Date: 23 RARS: Readmission Risk Score: 14.2      Last Discharge [address] [facility] [facility]       Date Complaint Diagnosis Description Type Department Provider    23 Respiratory Distress Acute on chronic respiratory failure with hypoxia ([address]) . .. ED to Hosp-Admission (Discharged) (ADMITTED) Trisha Bishop MD; Christy Gilmore... Challenges to be reviewed by the provider   Additional needs identified to be addressed with provider: Yes  Needs hfu appt w/in 7 days with PCP . DC 23 with A/C CHF & COPD. Thanks! Method of communication with provider: chart routing. Spoke with Edith Bowen, says she is feeling a lot better, yet is tired. Home O2 @ baseline 4L/NC, PAO2 94%. No shortness of breath @ present. Able to speak in clear complete sentences over the phone. BP this am- 124/72, HR 80. Has not weighed herself this morning, stressed the importance of doing so. B&B WNL, no N/V/D. Reviewed CHF management:   Daily weights in a.m. before breakfast, after voiding  Keep written log of weights for review  Notify MD immediately of weight gain/loss 3# or more in 1-7days  Take all rx as prescribed, keep scheduled MD appts  Low sodium diet- read labels; avoid/limit high sodium foods such as fast food, canned/boxed/processed foods, frozen meals, soups, cheeses, breads, chips, soda.   Do not add salt to food  Adhere to MD recommended fluid restriction  Notify MD immediately if

## 2023-07-09 NOTE — PROGRESS NOTES
Physician Progress Note      PATIENT:               Anastasia Orozco  CSN #:                  [id number]  :                       1950  ADMIT DATE:       2023 8:51 AM  [address] [place] DATE:        2023 1:00 PM  RESPONDING  PROVIDER #:        Von Easton MD          QUERY TEXT:    Pt admitted with acute respiratory failure and has CHF documented. If   possible, please document in progress notes and discharge summary further   specificity regarding the type and acuity of CHF:        The medical record reflects the following:  Risk Factors: CHF  Clinical Indicators: BNP 3,073,  H&P states \"chest x-ray does show bilateral   pulmonary congestion likely pulmonary edema. \" Cardiology note states \"Left   ventricular function and size is normal, EF is estimated at 55-60%. Mild left   ventricular hypertrophy. Diastolic dysfunction could not be evaluated due to MVR. \"  Treatment: IV Lasix, labs, Cardiology consult    Thank you,  Ingrid MENDOZA, RN, [facility] [phone]  Options provided:  -- Acute on Chronic Systolic CHF/HFrEF  -- Acute on Chronic Diastolic CHF/HFpEF  -- Acute on Chronic Systolic and Diastolic CHF  -- Other - I will add my own diagnosis  -- Disagree - Not applicable / Not valid  -- Disagree - Clinically unable to determine / Unknown  -- Refer to Clinical Documentation Reviewer    PROVIDER RESPONSE TEXT:    This patient is in acute on chronic diastolic CHF/HFpEF.     Query created by: Ingrid Arechiga on 7/3/2023 8:20 AM      Electronically signed by:  Von Easton MD 2023 7:58 PM

## 2023-07-12 ENCOUNTER — HOSPITAL ENCOUNTER (OUTPATIENT)
Dept: CARDIAC CATH/INVASIVE PROCEDURES | Age: 73
Discharge: HOME | End: 2023-07-12
Attending: INTERNAL MEDICINE | Admitting: INTERNAL MEDICINE
Payer: MEDICARE

## 2023-07-12 ENCOUNTER — HOSPITAL ENCOUNTER (OUTPATIENT)
Age: 73
Discharge: HOME | End: 2023-07-12
Payer: MEDICARE

## 2023-07-12 ENCOUNTER — APPOINTMENT (OUTPATIENT)
Dept: ULTRASOUND IMAGING | Age: 73
End: 2023-07-12
Attending: INTERNAL MEDICINE
Payer: MEDICARE

## 2023-07-12 ENCOUNTER — HOSPITAL ENCOUNTER (OUTPATIENT)
Dept: CT IMAGING | Age: 73
Discharge: HOME | End: 2023-07-12
Attending: INTERNAL MEDICINE
Payer: MEDICARE

## 2023-07-12 ENCOUNTER — HOSPITAL ENCOUNTER (OUTPATIENT)
Dept: NON INVASIVE DIAGNOSTICS | Age: 73
Discharge: HOME | End: 2023-07-12
Payer: MEDICARE

## 2023-07-12 ENCOUNTER — HOSPITAL ENCOUNTER (OUTPATIENT)
Dept: CT IMAGING | Age: 73
Discharge: HOME | End: 2023-07-12
Payer: MEDICARE

## 2023-07-12 ENCOUNTER — HOSPITAL ENCOUNTER (OUTPATIENT)
Dept: ULTRASOUND IMAGING | Age: 73
End: 2023-07-12
Payer: MEDICARE

## 2023-07-12 VITALS
SYSTOLIC BLOOD PRESSURE: 121 MMHG | DIASTOLIC BLOOD PRESSURE: 78 MMHG | HEIGHT: 63 IN | BODY MASS INDEX: 30.83 KG/M2 | RESPIRATION RATE: 14 BRPM | HEART RATE: 81 BPM | OXYGEN SATURATION: 94 % | WEIGHT: 174 LBS

## 2023-07-12 LAB
ALBUMIN SERPL-MCNC: 4 GM/DL (ref 3.4–5)
ALP BLD-CCNC: 78 IU/L (ref 40–128)
ALT SERPL-CCNC: 21 U/L (ref 10–40)
ANION GAP SERPL CALCULATED.3IONS-SCNC: 8 MMOL/L (ref 4–16)
APTT: 36 SECONDS (ref 25.1–37.1)
AST SERPL-CCNC: 34 IU/L (ref 15–37)
BILIRUB SERPL-MCNC: 1 MG/DL (ref 0–1)
BUN SERPL-MCNC: 29 MG/DL (ref 6–23)
CALCIUM SERPL-MCNC: 8.9 MG/DL (ref 8.3–10.6)
CHLORIDE BLD-SCNC: 98 MMOL/L (ref 99–110)
CO2: 28 MMOL/L (ref 21–32)
CREAT SERPL-MCNC: 1.9 MG/DL (ref 0.6–1.1)
GFR SERPL CREATININE-BSD FRML MDRD: 28 ML/MIN/1.73M2
GLUCOSE SERPL-MCNC: 141 MG/DL (ref 70–99)
HCT VFR BLD CALC: 34.3 % (ref 37–47)
HEMOGLOBIN: 10.9 GM/DL (ref 12.5–16)
INR BLD: 1.5 INDEX
MCH RBC QN AUTO: 32.4 PG (ref 27–31)
MCHC RBC AUTO-ENTMCNC: 31.8 % (ref 32–36)
MCV RBC AUTO: 102.1 FL (ref 78–100)
PDW BLD-RTO: 13.2 % (ref 11.7–14.9)
PLATELET # BLD: 143 K/CU MM (ref 140–440)
PMV BLD AUTO: 11.2 FL (ref 7.5–11.1)
POTASSIUM SERPL-SCNC: 4.2 MMOL/L (ref 3.5–5.1)
PRO-BNP: 4346 PG/ML
PROTHROMBIN TIME: 18 SECONDS (ref 11.7–14.5)
RBC # BLD: 3.36 M/CU MM (ref 4.2–5.4)
SODIUM BLD-SCNC: 134 MMOL/L (ref 135–145)
TOTAL PROTEIN: 6.1 GM/DL (ref 6.4–8.2)
WBC # BLD: 9.9 K/CU MM (ref 4–10.5)

## 2023-07-12 PROCEDURE — 83880 ASSAY OF NATRIURETIC PEPTIDE: CPT

## 2023-07-12 PROCEDURE — 36415 COLL VENOUS BLD VENIPUNCTURE: CPT

## 2023-07-12 PROCEDURE — 93880 EXTRACRANIAL BILAT STUDY: CPT

## 2023-07-12 PROCEDURE — 85730 THROMBOPLASTIN TIME PARTIAL: CPT

## 2023-07-12 PROCEDURE — 85610 PROTHROMBIN TIME: CPT

## 2023-07-12 PROCEDURE — 93005 ELECTROCARDIOGRAM TRACING: CPT | Performed by: INTERNAL MEDICINE

## 2023-07-12 PROCEDURE — 80053 COMPREHEN METABOLIC PANEL: CPT

## 2023-07-12 PROCEDURE — 85027 COMPLETE CBC AUTOMATED: CPT

## 2023-07-12 PROCEDURE — 74176 CT ABD & PELVIS W/O CONTRAST: CPT

## 2023-07-12 NOTE — PROGRESS NOTES
Pt here for scheduled RAMIRO, pt stated she ate waffles at 0600 this am. Dr. Marj Farrell and Blanca Crawford notified. RAMIRO cancelled for today, will reschedule. Pt to proceed with all other testing scheduled for today. Pt and spouse updated both agreeable with plan, all questions answered.       Electronically signed by Perla Robledo RN on 7/12/2023 at 10:30 AM

## 2023-07-12 NOTE — PROGRESS NOTES
7/12/2023    Magdalene Jimenez MD   [address] [facility] [address]    MD Stephanie Clements MD           Re: [facility] [place]      Dear Dr. Miguel Angel Pond,  Dear Dr. Omar Pratt,  Dear Dr. Shayy Valera,        I had the pleasure of seeing your patient Radha Osborn (76 y.o. female) today in office for evaluation of her aortic stenosis. She has multiple medical problems including COPD, GERD, atrial fibrillation s/p permanent pacemaker placement, diabetes mellitus, CKD, hypertension, hyperlipidemia, coronary artery disease, s/p CABG and bioprosthetic mitral valve replacement in 2016 and previous PTCA, a reduced LVEF and now severe aortic stenosis. She was admitted on 7/2/2023 for congestive heart failure and acute respiratory failure. She presents today for a surgical consultation for her aortic stenosis and a evaluation for a transcatheter aortic valve replacement. She is here with her daughter today. In terms of symptoms, she says she has severe shortness of breath with minimal exertion. However she was able to walk to our office from the parking lot with her daughter today. She had to make several stops. She denies any chest pressure or chest pain when she gets short winded. She had a few episodes of mild lightheadedness and dizziness, but no episodes of anat syncope. She says she never had any fluid on her lungs and her legs have not been swollen with her current Lasix dose of 20-day. PMHx:  Past Medical History:   Diagnosis Date    CAD (coronary artery disease)     COPD (chronic obstructive pulmonary disease) ([address])     GERD (gastroesophageal reflux disease)     H/O cardiovascular stress test 04/26/2021    normal study 63% EF no ischemia    H/O echocardiogram 03/22/2017    EF 35-40%    H/O echocardiogram 08/04/2020    EF 45-50, Mild TR & FL, Severely dilated left atrium.   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, functionally normally     H/O percutaneous left heart catheterization 05/02/2023

## 2023-07-13 ENCOUNTER — CARE COORDINATION (OUTPATIENT)
Dept: CASE MANAGEMENT | Age: 73
End: 2023-07-13

## 2023-07-13 LAB
EKG ATRIAL RATE: 63 BPM
EKG DIAGNOSIS: NORMAL
EKG P-R INTERVAL: 182 MS
EKG Q-T INTERVAL: 452 MS
EKG QRS DURATION: 80 MS
EKG QTC CALCULATION (BAZETT): 462 MS
EKG R AXIS: 76 DEGREES
EKG T AXIS: 53 DEGREES
EKG VENTRICULAR RATE: 63 BPM

## 2023-07-13 PROCEDURE — 93010 ELECTROCARDIOGRAM REPORT: CPT | Performed by: INTERNAL MEDICINE

## 2023-07-13 NOTE — CARE COORDINATION
Care Transitions Outreach Attempt- Unsuccessful Subsequent Attempt #1    Patient: Richie Alaniz Patient : 1950 MRN: [id number]    Attempted to reach patient for transitions of care follow up. Unable to reach patient. #1. CTN will try again.     Last Discharge [address] [facility] [facility]       Date Complaint Diagnosis Description Type Department Provider    23   Admission (Discharged) from [address] [place] MD Leonidas          Noted following upcoming appointments from discharge chart review:   [address] Deirdre Issa [facility],Luciano 250 follow up appointment(s):   Future Appointments   Date Time Provider 4600  [facility]   2023  8:40 AM Naila Aden MD [facility] CT SURG [facility]   2023  2:30 PM West Blum MD [facility] [place] [facility]   2023  8:00 AM SRMZ PFT SR[facility]Z PFT [place]   2023 12:00 PM [address] [facility] ECHO ROOM 2 [address] [facility] ECHO [place]   2023  8:30 AM Jose Bower MD [place] [facility]   2023 11:00 AM Isiah Sloan MD [facility] PULM [facility]   2023 10:45 AM Ta Bear DO AFL ADL NEPH [facility] &   2023 10:00 AM Jose Bower MD [place] [facility]     Non-BS follow up appointment(s): BRANDON Morris RN -[phone]

## 2023-07-14 ENCOUNTER — INITIAL CONSULT (OUTPATIENT)
Dept: CARDIOTHORACIC SURGERY | Age: 73
End: 2023-07-14
Payer: MEDICARE

## 2023-07-14 VITALS
WEIGHT: 171 LBS | OXYGEN SATURATION: 96 % | BODY MASS INDEX: 31.47 KG/M2 | RESPIRATION RATE: 18 BRPM | SYSTOLIC BLOOD PRESSURE: 140 MMHG | DIASTOLIC BLOOD PRESSURE: 62 MMHG | HEIGHT: 62 IN | HEART RATE: 71 BPM

## 2023-07-14 DIAGNOSIS — I35.0 NONRHEUMATIC AORTIC (VALVE) STENOSIS: Primary | ICD-10-CM

## 2023-07-14 PROBLEM — E11.9 TYPE 2 DIABETES MELLITUS (HCC): Status: ACTIVE | Noted: 2023-07-14

## 2023-07-14 PROCEDURE — 3078F DIAST BP <80 MM HG: CPT | Performed by: THORACIC SURGERY (CARDIOTHORACIC VASCULAR SURGERY)

## 2023-07-14 PROCEDURE — 3077F SYST BP >= 140 MM HG: CPT | Performed by: THORACIC SURGERY (CARDIOTHORACIC VASCULAR SURGERY)

## 2023-07-14 PROCEDURE — 1123F ACP DISCUSS/DSCN MKR DOCD: CPT | Performed by: THORACIC SURGERY (CARDIOTHORACIC VASCULAR SURGERY)

## 2023-07-14 PROCEDURE — 99205 OFFICE O/P NEW HI 60 MIN: CPT | Performed by: THORACIC SURGERY (CARDIOTHORACIC VASCULAR SURGERY)

## 2023-07-17 ENCOUNTER — CARE COORDINATION (OUTPATIENT)
Dept: CASE MANAGEMENT | Age: 73
End: 2023-07-17

## 2023-07-17 NOTE — CARE COORDINATION
related to your medications?: No  Do you currently have any active services?: No  Are you currently active with any services?: Other  Do you have any needs or concerns that I can assist you with?: No  Identified Barriers: Lack of Education  Care Transitions Interventions     Transportation Support: Declined      DME Assistance: Declined     Senior Services: Declined    Disease Specific Clinic: Completed      Disease Association: Completed      Other Interventions:             Care Transition Nurse provided contact information for future needs. Plan for follow-up call in 7-10 days based on severity of symptoms and risk factors. Plan for next call: symptom management-breathing, wheezing, dizziness, swelling, malaise  self management-wt. ?, BP? ACP docs?  follow-up appointment-results PFT/RAMIRO 7/19/23?     Kennedi Mace RN

## 2023-07-19 ENCOUNTER — HOSPITAL ENCOUNTER (OUTPATIENT)
Dept: PULMONOLOGY | Age: 73
Discharge: HOME | End: 2023-07-19
Payer: MEDICARE

## 2023-07-19 ENCOUNTER — HOSPITAL ENCOUNTER (OUTPATIENT)
Dept: NON INVASIVE DIAGNOSTICS | Age: 73
Discharge: HOME | End: 2023-07-19
Payer: MEDICARE

## 2023-07-19 VITALS
HEART RATE: 80 BPM | OXYGEN SATURATION: 92 % | DIASTOLIC BLOOD PRESSURE: 63 MMHG | RESPIRATION RATE: 19 BRPM | SYSTOLIC BLOOD PRESSURE: 138 MMHG

## 2023-07-19 PROBLEM — I38 VHD (VALVULAR HEART DISEASE): Status: ACTIVE | Noted: 2023-07-19

## 2023-07-19 PROCEDURE — 7100000000 HC PACU RECOVERY - FIRST 15 MIN

## 2023-07-19 PROCEDURE — 93312 ECHO TRANSESOPHAGEAL: CPT | Performed by: INTERNAL MEDICINE

## 2023-07-19 PROCEDURE — 7100000001 HC PACU RECOVERY - ADDTL 15 MIN

## 2023-07-19 PROCEDURE — 94010 BREATHING CAPACITY TEST: CPT

## 2023-07-19 PROCEDURE — 93312 ECHO TRANSESOPHAGEAL: CPT

## 2023-07-19 PROCEDURE — 99152 MOD SED SAME PHYS/QHP 5/>YRS: CPT | Performed by: INTERNAL MEDICINE

## 2023-07-19 NOTE — PROGRESS NOTES
Bedside Spirometry    FEV1               48      %Predicted  FVC                 55      %Predicted  FEF 25-75       33      %Predicted   FEV1/FVC 86       %Predicted

## 2023-07-19 NOTE — PROGRESS NOTES
Procedure     Conscious sedation for RAMIRO     ASA 2  Mallampati 2     After obtaining form consent patient was brought to the holding area and underwent conscious sedation with Versed, remained hemodynamically stable  Patient is hemodynamic status, neuro status was evaluated before conscious sedation  Patient's blood pressure, heart rate, pulse ox, neuro status was monitored for 30 minutes post procedure and she remained stable  The details of the conscious sedation is in the flowsheet in epic

## 2023-07-21 ENCOUNTER — CARE COORDINATION (OUTPATIENT)
Dept: CARE COORDINATION | Age: 73
End: 2023-07-21

## 2023-07-24 ENCOUNTER — HOSPITAL ENCOUNTER (OUTPATIENT)
Age: 73
Discharge: HOME | DRG: 267 | End: 2023-07-24
Payer: MEDICARE

## 2023-07-24 ENCOUNTER — HOSPITAL ENCOUNTER (OUTPATIENT)
Dept: GENERAL RADIOLOGY | Age: 73
Discharge: HOME | DRG: 267 | End: 2023-07-24
Payer: MEDICARE

## 2023-07-24 ENCOUNTER — CARE COORDINATION (OUTPATIENT)
Dept: CASE MANAGEMENT | Age: 73
End: 2023-07-24

## 2023-07-24 DIAGNOSIS — Z95.2 S/P TAVR (TRANSCATHETER AORTIC VALVE REPLACEMENT): ICD-10-CM

## 2023-07-24 LAB
ALBUMIN SERPL-MCNC: 4.2 GM/DL (ref 3.4–5)
ALP BLD-CCNC: 88 IU/L (ref 40–128)
ALT SERPL-CCNC: 17 U/L (ref 10–40)
ANION GAP SERPL CALCULATED.3IONS-SCNC: 11 MMOL/L (ref 4–16)
APTT: 36.5 SECONDS (ref 25.1–37.1)
AST SERPL-CCNC: 42 IU/L (ref 15–37)
BILIRUB SERPL-MCNC: 1 MG/DL (ref 0–1)
BUN SERPL-MCNC: 36 MG/DL (ref 6–23)
CALCIUM SERPL-MCNC: 9.2 MG/DL (ref 8.3–10.6)
CHLORIDE BLD-SCNC: 100 MMOL/L (ref 99–110)
CO2: 28 MMOL/L (ref 21–32)
CREAT SERPL-MCNC: 2.1 MG/DL (ref 0.6–1.1)
EKG ATRIAL RATE: 65 BPM
EKG DIAGNOSIS: NORMAL
EKG P-R INTERVAL: 200 MS
EKG Q-T INTERVAL: 434 MS
EKG QRS DURATION: 78 MS
EKG QTC CALCULATION (BAZETT): 451 MS
EKG R AXIS: 79 DEGREES
EKG T AXIS: 47 DEGREES
EKG VENTRICULAR RATE: 65 BPM
GFR SERPL CREATININE-BSD FRML MDRD: 24 ML/MIN/1.73M2
GLUCOSE SERPL-MCNC: 132 MG/DL (ref 70–99)
HCT VFR BLD CALC: 33 % (ref 37–47)
HEMOGLOBIN: 10.4 GM/DL (ref 12.5–16)
INR BLD: 1.3 INDEX
MCH RBC QN AUTO: 33.2 PG (ref 27–31)
MCHC RBC AUTO-ENTMCNC: 31.5 % (ref 32–36)
MCV RBC AUTO: 105.4 FL (ref 78–100)
PDW BLD-RTO: 14.6 % (ref 11.7–14.9)
PLATELET # BLD: 155 K/CU MM (ref 140–440)
PMV BLD AUTO: 11.2 FL (ref 7.5–11.1)
POTASSIUM SERPL-SCNC: 4.3 MMOL/L (ref 3.5–5.1)
PRO-BNP: 3296 PG/ML
PROTHROMBIN TIME: 15.9 SECONDS (ref 11.7–14.5)
RBC # BLD: 3.13 M/CU MM (ref 4.2–5.4)
SODIUM BLD-SCNC: 139 MMOL/L (ref 135–145)
TOTAL PROTEIN: 6.4 GM/DL (ref 6.4–8.2)
WBC # BLD: 5.9 K/CU MM (ref 4–10.5)

## 2023-07-24 PROCEDURE — 86922 COMPATIBILITY TEST ANTIGLOB: CPT

## 2023-07-24 PROCEDURE — 85027 COMPLETE CBC AUTOMATED: CPT

## 2023-07-24 PROCEDURE — 93010 ELECTROCARDIOGRAM REPORT: CPT | Performed by: INTERNAL MEDICINE

## 2023-07-24 PROCEDURE — 71045 X-RAY EXAM CHEST 1 VIEW: CPT

## 2023-07-24 PROCEDURE — 83880 ASSAY OF NATRIURETIC PEPTIDE: CPT

## 2023-07-24 PROCEDURE — 86900 BLOOD TYPING SEROLOGIC ABO: CPT

## 2023-07-24 PROCEDURE — 85610 PROTHROMBIN TIME: CPT

## 2023-07-24 PROCEDURE — 85730 THROMBOPLASTIN TIME PARTIAL: CPT

## 2023-07-24 PROCEDURE — 36415 COLL VENOUS BLD VENIPUNCTURE: CPT

## 2023-07-24 PROCEDURE — 86850 RBC ANTIBODY SCREEN: CPT

## 2023-07-24 PROCEDURE — 80053 COMPREHEN METABOLIC PANEL: CPT

## 2023-07-24 PROCEDURE — 86901 BLOOD TYPING SEROLOGIC RH(D): CPT

## 2023-07-24 PROCEDURE — 93005 ELECTROCARDIOGRAM TRACING: CPT | Performed by: INTERNAL MEDICINE

## 2023-07-24 NOTE — CARE COORDINATION
[facility] Care Transitions Follow Up Call    Patient Current Location:  Home: [address] [place] [address]    Care Transition Nurse contacted the patient by telephone to follow up after admission on 23. Verified name and  with patient as identifiers. Patient: Angie Shah  Patient : 1950   MRN: [id number]  Reason for Admission: sob; AS, CHf  Discharge Date: 23 RARS: Readmission Risk Score: 14.2      Needs to be reviewed by the provider   Additional needs identified to be addressed with provider: No  none             Method of communication with provider: none. CTN call to [place] today and she says she is feeling pretty good. Gets DYSPNEA ON EXERTION at times. Denies chest pain, inc. Sob, swelling, dizziness, wheezing, malaise, n/v/d, fever, chills. Has not received CHF zone tool, wt. BP logs in the mail yet. Not weighing daily. O2 4l/min cont nc  Had RAMIRO last week and is scheduled for TAVR 23. Her  will accompany her. No questions on pre-procedure instructions. Stopped Eliquis & Coreg. C/o being tired and hopes the TAVR will help with that. Eating, drinking & sleeping ok. Denies problems w/ urination/bowels. Says urinated frequently but on Lasix. No other concerns voiced at this time. Will continue to follow. Addressed changes since last contact:   TAVR 23  Discussed follow-up appointments. If no appointment was previously scheduled, appointment scheduling offered: Yes. Is follow up appointment scheduled within 7 days of discharge?  No.    Follow Up  Future Appointments   Date Time Provider [address]   2023  7:00 AM CATH LAB 03 [facility]   2023  7:00 AM [address] [facility]   2023  8:00 AM [address] [facility]   2023  8:00 AM [facility] ECHO ROOM 1 [facility]   2023 10:00 AM [address] [facility]   2023  8:30 AM Madelene Denver, MD Rebecca See

## 2023-07-26 ENCOUNTER — ANESTHESIA EVENT (OUTPATIENT)
Dept: CARDIAC CATH/INVASIVE PROCEDURES | Age: 73
DRG: 267 | End: 2023-07-26
Payer: MEDICARE

## 2023-07-26 ENCOUNTER — HOSPITAL ENCOUNTER (INPATIENT)
Dept: CARDIAC CATH/INVASIVE PROCEDURES | Age: 73
LOS: 1 days | Discharge: HOME | DRG: 267 | End: 2023-07-27
Attending: INTERNAL MEDICINE | Admitting: INTERNAL MEDICINE
Payer: MEDICARE

## 2023-07-26 ENCOUNTER — ANESTHESIA (OUTPATIENT)
Dept: CARDIAC CATH/INVASIVE PROCEDURES | Age: 73
DRG: 267 | End: 2023-07-26
Payer: MEDICARE

## 2023-07-26 DIAGNOSIS — I73.9 PAD (PERIPHERAL ARTERY DISEASE) (HCC): Chronic | ICD-10-CM

## 2023-07-26 DIAGNOSIS — D68.69 SECONDARY HYPERCOAGULABLE STATE (HCC): ICD-10-CM

## 2023-07-26 DIAGNOSIS — F41.8 DEPRESSION WITH ANXIETY: ICD-10-CM

## 2023-07-26 DIAGNOSIS — E78.2 MIXED HYPERLIPIDEMIA: Chronic | ICD-10-CM

## 2023-07-26 DIAGNOSIS — E11.9 TYPE 2 DIABETES MELLITUS WITHOUT COMPLICATION, WITHOUT LONG-TERM CURRENT USE OF INSULIN (HCC): ICD-10-CM

## 2023-07-26 DIAGNOSIS — E03.9 ACQUIRED HYPOTHYROIDISM: Chronic | ICD-10-CM

## 2023-07-26 DIAGNOSIS — I50.22 CHRONIC SYSTOLIC (CONGESTIVE) HEART FAILURE (HCC): Chronic | ICD-10-CM

## 2023-07-26 DIAGNOSIS — I35.0 NONRHEUMATIC AORTIC (VALVE) STENOSIS: ICD-10-CM

## 2023-07-26 DIAGNOSIS — I97.130 CHF FOLLOWING CARDIAC SURGERY, POSTOP: ICD-10-CM

## 2023-07-26 DIAGNOSIS — I05.0 RHEUMATIC MITRAL STENOSIS: Chronic | ICD-10-CM

## 2023-07-26 DIAGNOSIS — I25.10 ATHEROSCLEROSIS OF NATIVE CORONARY ARTERY OF NATIVE HEART WITHOUT ANGINA PECTORIS: Chronic | ICD-10-CM

## 2023-07-26 DIAGNOSIS — G47.33 OSA (OBSTRUCTIVE SLEEP APNEA): ICD-10-CM

## 2023-07-26 DIAGNOSIS — M54.41 CHRONIC RIGHT-SIDED LOW BACK PAIN WITH RIGHT-SIDED SCIATICA: ICD-10-CM

## 2023-07-26 DIAGNOSIS — Z95.2 S/P MVR (MITRAL VALVE REPLACEMENT): ICD-10-CM

## 2023-07-26 DIAGNOSIS — Z95.0 PACEMAKER: ICD-10-CM

## 2023-07-26 DIAGNOSIS — F51.04 PSYCHOPHYSIOLOGICAL INSOMNIA: ICD-10-CM

## 2023-07-26 DIAGNOSIS — M81.0 AGE-RELATED OSTEOPOROSIS WITHOUT CURRENT PATHOLOGICAL FRACTURE: Chronic | ICD-10-CM

## 2023-07-26 DIAGNOSIS — N18.32 STAGE 3B CHRONIC KIDNEY DISEASE (HCC): ICD-10-CM

## 2023-07-26 DIAGNOSIS — E66.9 CLASS 1 OBESITY: ICD-10-CM

## 2023-07-26 DIAGNOSIS — R32 URINARY INCONTINENCE, UNSPECIFIED TYPE: ICD-10-CM

## 2023-07-26 DIAGNOSIS — G47.10 HYPERSOMNIA: ICD-10-CM

## 2023-07-26 DIAGNOSIS — I21.3 ST ELEVATION MYOCARDIAL INFARCTION (STEMI), UNSPECIFIED ARTERY (HCC): ICD-10-CM

## 2023-07-26 DIAGNOSIS — Z95.5 S/P RIGHT CORONARY ARTERY (RCA) STENT PLACEMENT: ICD-10-CM

## 2023-07-26 DIAGNOSIS — I10 PRIMARY HYPERTENSION: Chronic | ICD-10-CM

## 2023-07-26 DIAGNOSIS — K43.2 INCISIONAL HERNIA OF ANTERIOR ABDOMINAL WALL WITHOUT OBSTRUCTION OR GANGRENE: ICD-10-CM

## 2023-07-26 DIAGNOSIS — I38 VHD (VALVULAR HEART DISEASE): ICD-10-CM

## 2023-07-26 DIAGNOSIS — Z99.81 ON HOME O2: ICD-10-CM

## 2023-07-26 DIAGNOSIS — R26.9 GAIT DISTURBANCE: ICD-10-CM

## 2023-07-26 DIAGNOSIS — I25.10 ASCVD (ARTERIOSCLEROTIC CARDIOVASCULAR DISEASE): ICD-10-CM

## 2023-07-26 DIAGNOSIS — J96.21 ACUTE ON CHRONIC RESPIRATORY FAILURE WITH HYPOXIA (HCC): ICD-10-CM

## 2023-07-26 DIAGNOSIS — J44.9 CHRONIC OBSTRUCTIVE PULMONARY DISEASE, UNSPECIFIED COPD TYPE (HCC): Chronic | ICD-10-CM

## 2023-07-26 DIAGNOSIS — Z87.891 FORMER SMOKER: ICD-10-CM

## 2023-07-26 DIAGNOSIS — G89.29 CHRONIC RIGHT-SIDED LOW BACK PAIN WITH RIGHT-SIDED SCIATICA: ICD-10-CM

## 2023-07-26 DIAGNOSIS — I35.0 NONRHEUMATIC AORTIC VALVE STENOSIS: Chronic | ICD-10-CM

## 2023-07-26 DIAGNOSIS — K21.9 GASTROESOPHAGEAL REFLUX DISEASE WITHOUT ESOPHAGITIS: Chronic | ICD-10-CM

## 2023-07-26 DIAGNOSIS — I42.9 CARDIOMYOPATHY, PRIMARY (HCC): Chronic | ICD-10-CM

## 2023-07-26 DIAGNOSIS — Z95.2 S/P TAVR (TRANSCATHETER AORTIC VALVE REPLACEMENT): Primary | ICD-10-CM

## 2023-07-26 DIAGNOSIS — I49.5 SICK SINUS SYNDROME (HCC): ICD-10-CM

## 2023-07-26 DIAGNOSIS — I48.11 LONGSTANDING PERSISTENT ATRIAL FIBRILLATION (HCC): Chronic | ICD-10-CM

## 2023-07-26 LAB
EKG ATRIAL RATE: 74 BPM
EKG DIAGNOSIS: NORMAL
EKG P AXIS: 87 DEGREES
EKG P-R INTERVAL: 168 MS
EKG Q-T INTERVAL: 484 MS
EKG QRS DURATION: 138 MS
EKG QTC CALCULATION (BAZETT): 537 MS
EKG R AXIS: 43 DEGREES
EKG T AXIS: 92 DEGREES
EKG VENTRICULAR RATE: 74 BPM
GLUCOSE BLD-MCNC: 115 MG/DL (ref 70–99)
GLUCOSE BLD-MCNC: 154 MG/DL (ref 70–99)
LV EF: 53 %
LVEF MODALITY: NORMAL

## 2023-07-26 PROCEDURE — 2100000000 HC CCU R&B

## 2023-07-26 PROCEDURE — 3700000000 HC ANESTHESIA ATTENDED CARE

## 2023-07-26 PROCEDURE — 33361 REPLACE AORTIC VALVE PERQ: CPT

## 2023-07-26 PROCEDURE — 2580000003 HC RX 258: Performed by: INTERNAL MEDICINE

## 2023-07-26 PROCEDURE — 33361 REPLACE AORTIC VALVE PERQ: CPT | Performed by: INTERNAL MEDICINE

## 2023-07-26 PROCEDURE — 94761 N-INVAS EAR/PLS OXIMETRY MLT: CPT

## 2023-07-26 PROCEDURE — 3600000018 HC SURGERY OHS ADDTL 15MIN: Performed by: THORACIC SURGERY (CARDIOTHORACIC VASCULAR SURGERY)

## 2023-07-26 PROCEDURE — 6360000002 HC RX W HCPCS

## 2023-07-26 PROCEDURE — 94640 AIRWAY INHALATION TREATMENT: CPT

## 2023-07-26 PROCEDURE — 2580000003 HC RX 258

## 2023-07-26 PROCEDURE — 86900 BLOOD TYPING SEROLOGIC ABO: CPT

## 2023-07-26 PROCEDURE — 82962 GLUCOSE BLOOD TEST: CPT

## 2023-07-26 PROCEDURE — C1894 INTRO/SHEATH, NON-LASER: HCPCS

## 2023-07-26 PROCEDURE — C1760 CLOSURE DEV, VASC: HCPCS

## 2023-07-26 PROCEDURE — 2709999900 HC NON-CHARGEABLE SUPPLY

## 2023-07-26 PROCEDURE — C1889 IMPLANT/INSERT DEVICE, NOC: HCPCS

## 2023-07-26 PROCEDURE — C1769 GUIDE WIRE: HCPCS

## 2023-07-26 PROCEDURE — 2700000000 HC OXYGEN THERAPY PER DAY

## 2023-07-26 PROCEDURE — 6360000004 HC RX CONTRAST MEDICATION

## 2023-07-26 PROCEDURE — 86901 BLOOD TYPING SEROLOGIC RH(D): CPT

## 2023-07-26 PROCEDURE — 93010 ELECTROCARDIOGRAM REPORT: CPT | Performed by: INTERNAL MEDICINE

## 2023-07-26 PROCEDURE — 2709999900 HC NON-CHARGEABLE SUPPLY: Performed by: THORACIC SURGERY (CARDIOTHORACIC VASCULAR SURGERY)

## 2023-07-26 PROCEDURE — 6370000000 HC RX 637 (ALT 250 FOR IP): Performed by: INTERNAL MEDICINE

## 2023-07-26 PROCEDURE — 6360000002 HC RX W HCPCS: Performed by: INTERNAL MEDICINE

## 2023-07-26 PROCEDURE — 93005 ELECTROCARDIOGRAM TRACING: CPT | Performed by: PHYSICIAN ASSISTANT

## 2023-07-26 PROCEDURE — 2500000003 HC RX 250 WO HCPCS

## 2023-07-26 PROCEDURE — 02RF38Z REPLACEMENT OF AORTIC VALVE WITH ZOOPLASTIC TISSUE, PERCUTANEOUS APPROACH: ICD-10-PCS | Performed by: INTERNAL MEDICINE

## 2023-07-26 PROCEDURE — 3700000001 HC ADD 15 MINUTES (ANESTHESIA)

## 2023-07-26 PROCEDURE — 93308 TTE F-UP OR LMTD: CPT

## 2023-07-26 PROCEDURE — 2720000010 HC SURG SUPPLY STERILE

## 2023-07-26 PROCEDURE — 86850 RBC ANTIBODY SCREEN: CPT

## 2023-07-26 PROCEDURE — 3600000008 HC SURGERY OHS BASE: Performed by: THORACIC SURGERY (CARDIOTHORACIC VASCULAR SURGERY)

## 2023-07-26 PROCEDURE — C9399 UNCLASSIFIED DRUGS OR BIOLOG: HCPCS

## 2023-07-26 PROCEDURE — P9016 RBC LEUKOCYTES REDUCED: HCPCS

## 2023-07-26 PROCEDURE — 2500000003 HC RX 250 WO HCPCS: Performed by: INTERNAL MEDICINE

## 2023-07-26 RX ORDER — ASPIRIN 81 MG/1
81 TABLET, CHEWABLE ORAL DAILY
Status: DISCONTINUED | OUTPATIENT
Start: 2023-07-27 | End: 2023-07-27 | Stop reason: HOSPADM

## 2023-07-26 RX ORDER — HYDRALAZINE HYDROCHLORIDE 20 MG/ML
10 INJECTION INTRAMUSCULAR; INTRAVENOUS EVERY 6 HOURS PRN
Status: DISCONTINUED | OUTPATIENT
Start: 2023-07-26 | End: 2023-07-27 | Stop reason: HOSPADM

## 2023-07-26 RX ORDER — SODIUM CHLORIDE 0.9 % (FLUSH) 0.9 %
5-40 SYRINGE (ML) INJECTION EVERY 12 HOURS SCHEDULED
Status: DISCONTINUED | OUTPATIENT
Start: 2023-07-26 | End: 2023-07-27 | Stop reason: HOSPADM

## 2023-07-26 RX ORDER — ASPIRIN 81 MG/1
81 TABLET ORAL DAILY
Status: DISCONTINUED | OUTPATIENT
Start: 2023-07-26 | End: 2023-07-26 | Stop reason: SDUPTHER

## 2023-07-26 RX ORDER — SODIUM CHLORIDE 0.9 % (FLUSH) 0.9 %
5-40 SYRINGE (ML) INJECTION PRN
Status: DISCONTINUED | OUTPATIENT
Start: 2023-07-26 | End: 2023-07-27 | Stop reason: HOSPADM

## 2023-07-26 RX ORDER — ROCURONIUM BROMIDE 10 MG/ML
INJECTION, SOLUTION INTRAVENOUS PRN
Status: DISCONTINUED | OUTPATIENT
Start: 2023-07-26 | End: 2023-07-26 | Stop reason: SDUPTHER

## 2023-07-26 RX ORDER — SODIUM CHLORIDE 9 MG/ML
INJECTION, SOLUTION INTRAVENOUS PRN
Status: COMPLETED | OUTPATIENT
Start: 2023-07-26 | End: 2023-07-26

## 2023-07-26 RX ORDER — ATROPINE SULFATE 0.1 MG/ML
INJECTION INTRAVENOUS
Status: DISCONTINUED
Start: 2023-07-26 | End: 2023-07-26 | Stop reason: WASHOUT

## 2023-07-26 RX ORDER — PANTOPRAZOLE SODIUM 40 MG/1
40 TABLET, DELAYED RELEASE ORAL DAILY
Status: DISCONTINUED | OUTPATIENT
Start: 2023-07-26 | End: 2023-07-27 | Stop reason: HOSPADM

## 2023-07-26 RX ORDER — DEXAMETHASONE SODIUM PHOSPHATE 4 MG/ML
INJECTION, SOLUTION INTRA-ARTICULAR; INTRALESIONAL; INTRAMUSCULAR; INTRAVENOUS; SOFT TISSUE PRN
Status: DISCONTINUED | OUTPATIENT
Start: 2023-07-26 | End: 2023-07-26 | Stop reason: SDUPTHER

## 2023-07-26 RX ORDER — M-VIT,TX,IRON,MINS/CALC/FOLIC 27MG-0.4MG
1 TABLET ORAL DAILY
Status: DISCONTINUED | OUTPATIENT
Start: 2023-07-26 | End: 2023-07-27 | Stop reason: HOSPADM

## 2023-07-26 RX ORDER — BUPIVACAINE HYDROCHLORIDE 2.5 MG/ML
30 INJECTION, SOLUTION EPIDURAL; INFILTRATION; INTRACAUDAL ONCE
Status: DISCONTINUED | OUTPATIENT
Start: 2023-07-26 | End: 2023-07-27 | Stop reason: HOSPADM

## 2023-07-26 RX ORDER — ONDANSETRON 2 MG/ML
INJECTION INTRAMUSCULAR; INTRAVENOUS PRN
Status: DISCONTINUED | OUTPATIENT
Start: 2023-07-26 | End: 2023-07-26 | Stop reason: SDUPTHER

## 2023-07-26 RX ORDER — HEPARIN SODIUM 1000 [USP'U]/ML
INJECTION, SOLUTION INTRAVENOUS; SUBCUTANEOUS PRN
Status: DISCONTINUED | OUTPATIENT
Start: 2023-07-26 | End: 2023-07-26 | Stop reason: SDUPTHER

## 2023-07-26 RX ORDER — PROTAMINE SULFATE 10 MG/ML
INJECTION, SOLUTION INTRAVENOUS PRN
Status: DISCONTINUED | OUTPATIENT
Start: 2023-07-26 | End: 2023-07-26 | Stop reason: SDUPTHER

## 2023-07-26 RX ORDER — ONDANSETRON 2 MG/ML
4 INJECTION INTRAMUSCULAR; INTRAVENOUS EVERY 6 HOURS PRN
Status: DISCONTINUED | OUTPATIENT
Start: 2023-07-26 | End: 2023-07-27 | Stop reason: HOSPADM

## 2023-07-26 RX ORDER — HYDRALAZINE HYDROCHLORIDE 20 MG/ML
INJECTION INTRAMUSCULAR; INTRAVENOUS
Status: COMPLETED
Start: 2023-07-26 | End: 2023-07-26

## 2023-07-26 RX ORDER — SODIUM CHLORIDE 9 MG/ML
INJECTION, SOLUTION INTRAVENOUS PRN
Status: DISCONTINUED | OUTPATIENT
Start: 2023-07-26 | End: 2023-07-27 | Stop reason: HOSPADM

## 2023-07-26 RX ORDER — DOXEPIN HYDROCHLORIDE 10 MG/1
10 CAPSULE ORAL NIGHTLY
Status: DISCONTINUED | OUTPATIENT
Start: 2023-07-26 | End: 2023-07-27 | Stop reason: HOSPADM

## 2023-07-26 RX ORDER — PROPOFOL 10 MG/ML
INJECTION, EMULSION INTRAVENOUS PRN
Status: DISCONTINUED | OUTPATIENT
Start: 2023-07-26 | End: 2023-07-26 | Stop reason: SDUPTHER

## 2023-07-26 RX ORDER — NITROGLYCERIN 0.4 MG/1
0.4 TABLET SUBLINGUAL EVERY 5 MIN PRN
Status: DISCONTINUED | OUTPATIENT
Start: 2023-07-26 | End: 2023-07-27 | Stop reason: HOSPADM

## 2023-07-26 RX ORDER — FENTANYL CITRATE 50 UG/ML
INJECTION, SOLUTION INTRAMUSCULAR; INTRAVENOUS PRN
Status: DISCONTINUED | OUTPATIENT
Start: 2023-07-26 | End: 2023-07-26 | Stop reason: SDUPTHER

## 2023-07-26 RX ORDER — AMLODIPINE BESYLATE 5 MG/1
10 TABLET ORAL DAILY
Status: DISCONTINUED | OUTPATIENT
Start: 2023-07-26 | End: 2023-07-27 | Stop reason: HOSPADM

## 2023-07-26 RX ORDER — LEVOTHYROXINE SODIUM 0.03 MG/1
75 TABLET ORAL DAILY
Status: DISCONTINUED | OUTPATIENT
Start: 2023-07-26 | End: 2023-07-27 | Stop reason: HOSPADM

## 2023-07-26 RX ORDER — FUROSEMIDE 20 MG/1
20 TABLET ORAL DAILY
Status: DISCONTINUED | OUTPATIENT
Start: 2023-07-26 | End: 2023-07-27 | Stop reason: HOSPADM

## 2023-07-26 RX ORDER — CARVEDILOL 25 MG/1
25 TABLET ORAL 2 TIMES DAILY
Status: DISCONTINUED | OUTPATIENT
Start: 2023-07-26 | End: 2023-07-27 | Stop reason: HOSPADM

## 2023-07-26 RX ORDER — ATORVASTATIN CALCIUM 40 MG/1
40 TABLET, FILM COATED ORAL NIGHTLY
Status: DISCONTINUED | OUTPATIENT
Start: 2023-07-26 | End: 2023-07-27 | Stop reason: HOSPADM

## 2023-07-26 RX ORDER — LISINOPRIL 20 MG/1
10 TABLET ORAL DAILY
Status: DISCONTINUED | OUTPATIENT
Start: 2023-07-26 | End: 2023-07-27 | Stop reason: HOSPADM

## 2023-07-26 RX ORDER — LIDOCAINE HYDROCHLORIDE 20 MG/ML
INJECTION, SOLUTION INFILTRATION; PERINEURAL PRN
Status: DISCONTINUED | OUTPATIENT
Start: 2023-07-26 | End: 2023-07-26 | Stop reason: SDUPTHER

## 2023-07-26 RX ORDER — BUDESONIDE AND FORMOTEROL FUMARATE DIHYDRATE 80; 4.5 UG/1; UG/1
2 AEROSOL RESPIRATORY (INHALATION) 2 TIMES DAILY
Status: DISCONTINUED | OUTPATIENT
Start: 2023-07-26 | End: 2023-07-27 | Stop reason: HOSPADM

## 2023-07-26 RX ORDER — ACETAMINOPHEN 325 MG/1
650 TABLET ORAL EVERY 4 HOURS PRN
Status: DISCONTINUED | OUTPATIENT
Start: 2023-07-26 | End: 2023-07-27 | Stop reason: HOSPADM

## 2023-07-26 RX ADMIN — ROCURONIUM BROMIDE 20 MG: 10 SOLUTION INTRAVENOUS at 08:44

## 2023-07-26 RX ADMIN — MULTIPLE VITAMINS W/ MINERALS TAB 1 TABLET: TAB at 15:19

## 2023-07-26 RX ADMIN — SUGAMMADEX 400 MG: 100 INJECTION, SOLUTION INTRAVENOUS at 10:20

## 2023-07-26 RX ADMIN — ONDANSETRON 4 MG: 2 INJECTION INTRAMUSCULAR; INTRAVENOUS at 10:07

## 2023-07-26 RX ADMIN — ATORVASTATIN CALCIUM 40 MG: 40 TABLET, FILM COATED ORAL at 21:20

## 2023-07-26 RX ADMIN — HYDRALAZINE HYDROCHLORIDE 10 MG: 20 INJECTION INTRAMUSCULAR; INTRAVENOUS at 12:00

## 2023-07-26 RX ADMIN — FUROSEMIDE 20 MG: 20 TABLET ORAL at 15:10

## 2023-07-26 RX ADMIN — LISINOPRIL 10 MG: 20 TABLET ORAL at 15:10

## 2023-07-26 RX ADMIN — PHENYLEPHRINE HYDROCHLORIDE 100 MCG: 10 INJECTION INTRAVENOUS at 09:58

## 2023-07-26 RX ADMIN — Medication 1 TABLET: at 15:18

## 2023-07-26 RX ADMIN — SODIUM CHLORIDE, PRESERVATIVE FREE 10 ML: 5 INJECTION INTRAVENOUS at 21:19

## 2023-07-26 RX ADMIN — PROPOFOL 60 MG: 10 INJECTION, EMULSION INTRAVENOUS at 07:59

## 2023-07-26 RX ADMIN — ROCURONIUM BROMIDE 20 MG: 10 SOLUTION INTRAVENOUS at 09:46

## 2023-07-26 RX ADMIN — ONDANSETRON 4 MG: 2 INJECTION INTRAMUSCULAR; INTRAVENOUS at 21:19

## 2023-07-26 RX ADMIN — HEPARIN SODIUM 7000 UNITS: 1000 INJECTION, SOLUTION INTRAVENOUS; SUBCUTANEOUS at 09:23

## 2023-07-26 RX ADMIN — PHENYLEPHRINE HYDROCHLORIDE 100 MCG: 10 INJECTION INTRAVENOUS at 08:53

## 2023-07-26 RX ADMIN — PANTOPRAZOLE SODIUM 40 MG: 40 TABLET, DELAYED RELEASE ORAL at 15:20

## 2023-07-26 RX ADMIN — ACETAMINOPHEN 650 MG: 325 TABLET ORAL at 21:20

## 2023-07-26 RX ADMIN — ROCURONIUM BROMIDE 50 MG: 10 SOLUTION INTRAVENOUS at 07:59

## 2023-07-26 RX ADMIN — EMPAGLIFLOZIN 10 MG: 10 TABLET, FILM COATED ORAL at 15:19

## 2023-07-26 RX ADMIN — HEPARIN SODIUM 2000 UNITS: 1000 INJECTION, SOLUTION INTRAVENOUS; SUBCUTANEOUS at 09:34

## 2023-07-26 RX ADMIN — BUDESONIDE AND FORMOTEROL FUMARATE DIHYDRATE 2 PUFF: 80; 4.5 AEROSOL RESPIRATORY (INHALATION) at 19:42

## 2023-07-26 RX ADMIN — SODIUM CHLORIDE: 900 INJECTION INTRAVENOUS at 07:27

## 2023-07-26 RX ADMIN — PROPOFOL 30 MG: 10 INJECTION, EMULSION INTRAVENOUS at 09:14

## 2023-07-26 RX ADMIN — LEVOTHYROXINE SODIUM 75 MCG: 25 TABLET ORAL at 15:20

## 2023-07-26 RX ADMIN — PROTAMINE SULFATE 25 MG: 10 INJECTION, SOLUTION INTRAVENOUS at 10:12

## 2023-07-26 RX ADMIN — CARVEDILOL 25 MG: 25 TABLET, FILM COATED ORAL at 15:10

## 2023-07-26 RX ADMIN — PHENYLEPHRINE HYDROCHLORIDE 25 MCG/MIN: 10 INJECTION INTRAVENOUS at 08:53

## 2023-07-26 RX ADMIN — DEXAMETHASONE SODIUM PHOSPHATE 4 MG: 4 INJECTION, SOLUTION INTRAMUSCULAR; INTRAVENOUS at 08:49

## 2023-07-26 RX ADMIN — FENTANYL CITRATE 50 MCG: 50 INJECTION, SOLUTION INTRAMUSCULAR; INTRAVENOUS at 07:58

## 2023-07-26 RX ADMIN — PHENYLEPHRINE HYDROCHLORIDE 100 MCG: 10 INJECTION INTRAVENOUS at 09:31

## 2023-07-26 RX ADMIN — AMLODIPINE BESYLATE 10 MG: 5 TABLET ORAL at 15:09

## 2023-07-26 RX ADMIN — CEFAZOLIN 1000 MG: 1 INJECTION, POWDER, FOR SOLUTION INTRAMUSCULAR; INTRAVENOUS; PARENTERAL at 08:06

## 2023-07-26 RX ADMIN — LIDOCAINE HYDROCHLORIDE 100 MG: 20 INJECTION, SOLUTION INFILTRATION; PERINEURAL at 07:59

## 2023-07-26 RX ADMIN — CARVEDILOL 25 MG: 25 TABLET, FILM COATED ORAL at 21:20

## 2023-07-26 RX ADMIN — SODIUM CHLORIDE 5 MG/HR: 9 INJECTION, SOLUTION INTRAVENOUS at 12:46

## 2023-07-26 RX ADMIN — FENTANYL CITRATE 50 MCG: 50 INJECTION, SOLUTION INTRAMUSCULAR; INTRAVENOUS at 07:32

## 2023-07-26 ASSESSMENT — PAIN DESCRIPTION - DESCRIPTORS: DESCRIPTORS: ACHING

## 2023-07-26 ASSESSMENT — PAIN - FUNCTIONAL ASSESSMENT: PAIN_FUNCTIONAL_ASSESSMENT: PREVENTS OR INTERFERES WITH MANY ACTIVE NOT PASSIVE ACTIVITIES

## 2023-07-26 ASSESSMENT — PAIN SCALES - GENERAL
PAINLEVEL_OUTOF10: 4
PAINLEVEL_OUTOF10: 5

## 2023-07-26 ASSESSMENT — PAIN DESCRIPTION - LOCATION: LOCATION: BACK;GENERALIZED;GROIN

## 2023-07-26 ASSESSMENT — PAIN DESCRIPTION - PAIN TYPE: TYPE: SURGICAL PAIN;CHRONIC PAIN

## 2023-07-26 ASSESSMENT — PAIN DESCRIPTION - ORIENTATION: ORIENTATION: RIGHT;LEFT

## 2023-07-26 ASSESSMENT — PAIN DESCRIPTION - ONSET: ONSET: ON-GOING

## 2023-07-26 ASSESSMENT — PAIN DESCRIPTION - FREQUENCY: FREQUENCY: CONTINUOUS

## 2023-07-26 NOTE — PROGRESS NOTES
. L femoral artery 6fr sheath pulled per Dr. Ge Atkins. Manual pressure held for 18 min. Site soft clean and dry with no sign of hematoma. R femoral site oozing small amount of blood. Dr. Ge Atkins aware of oozing and slightly larger hematoma on R side. Site still soft. Manual pressure applied and hematoma expelled some per Dr. Ge Atkins. Pressure dressing and sandbag applied. Will continue to monitor and update night shift RN. No other orders from Dr. Ge Atknis at this time.      Electronically signed by Rachel Parham RN on 7/26/2023 at 7:05 PM

## 2023-07-26 NOTE — OP NOTE
readvanced the root pigtail catheter into the LV apex and performed simultaneous pressure measurements. There are no pressure gradient that we could measure and the LV EDP was significantly lower than 20 in the mid teen range. Subsequently all the endovascular instruments were removed. Protamine was administered the right sided Perclose devices were cinched down. There was good hemostasis  The left femoral arterial sheath was then removed and and hemostasis was going to be accomplished later in the recovery area using manual compression. The right femoral venous sheath and pacemaker were also removed, and hemostasis was accomplished with manual compression. At this point a transthoracic echocardiogram was performed and showed no paravalvular leak and good valve function. A completion aortobiiliac bifemoral angiography was performed which showed no extravasation and a intact iliac system on the right side, there was a about 50% stenosis at right common femoral arterial access site. The patient was subsequently transferred to the cardio-thoracic ICU recovery area in stable condition. Intraoperative data:   The procedure was performed in the hybrid Cath Lab #3, OR time was 8.37 AM until 10.09 AM, fluoroscopy time 18.4 minutes, intra-arterial contrast used 120cc, IV fluids 1000cc, estimated blood loss less than 100 cc,  Valve data:  Medtronic evolute transcatheter aortic valve, model # [id number]-26, size 26 mm, Serial # [id number]    Electronically signed by Emil Raman MD on 7/26/2023 at 10:19 AM

## 2023-07-26 NOTE — PROCEDURES
Procedure : Cutaneous femoral access  aortic valve replacement ( TAVR)                      Using Medtronic evolute  26 mm core Valve    Indication : Severe Aortic stenosis with heart failure symptoms and class III angina    Operators : Dr Hussein Morales / Dr Radha Hollins                          Procedure description : Patient was brought to the operating Cath Lab hybrid room. Anesthesia performed anesthesia using moderate sedation   Thoracic echo was used to identify the aortic valve and findings noted. The patient was appropriately draped and prepped. Left femoral arterial access was obtained using ultrasound guidance using Seldinger technique. 6 [place] sheath was placed in the left femoral artery venous access was obtained in the left femoral vein using ultrasound guidance. We then advanced a 6 [place] temporary pacer wire into the right ventricle pacing was confirmed. We then obtained arterial access using Seldinger technique under ultrasound guidance of the right femoral arteries and a 6 [place] sheath was placed in right femoral artery . Right femoral site was prepared Perclose were placed. Right femoral 6 [place] sheath was removed. After serial dilatation sheath size was increased to 14 [place]. A 6 [place] pigtail was then advanced and positioned into the right noncoronary cusp for serial aortograms. AL-1 diagnostic catheter was used to advance a straight wire and aortic valve was crossed. We then exchanged for an exchange length J-wire and a pigtail was placed into the left ventricle simultaneous pressures were recorded gradient confirmed. Core valve was checked under fluoroscopy. At this point a second timeout was completed and patient was given 6000 units of heparin . We then used to confide a wire which was positioned into the left ventricle. Core valve delivery system was then advanced over the confida wire. Aortic valve was crossed in an JENELLE projection multiple angles were taken.   Using

## 2023-07-26 NOTE — PROGRESS NOTES
4 Eyes Skin Assessment     NAME:  Kaylynn Godoy  YOB: 1950  MEDICAL RECORD NUMBER:  [id number]    The patient is being assessed for  Admission    I agree that at least one RN has performed a thorough Head to Toe Skin Assessment on the patient. ALL assessment sites listed below have been assessed. Areas assessed by both nurses:    Head, Face, Ears, Shoulders, Back, Chest, Arms, Elbows, Hands, Sacrum. Buttock, Coccyx, Ischium, and Legs. Feet and Heels        Does the Patient have a Wound? Yes wound(s) were present on assessment.  LDA wound assessment was Initiated and completed by RN    Post-TAVR; femoral surgical incision (L + R)  Scattered ecchymosis       Luc Prevention initiated by RN: Yes  Wound Care Orders initiated by RN: Yes    Pressure Injury (Stage 3,4, Unstageable, DTI, NWPT, and Complex wounds) if present, place Wound referral order by RN under : No    New Ostomies, if present place, Ostomy referral order under : No     Nurse 1 eSignature: Electronically signed by Marita Breen RN on 7/26/23 at 4:49 PM EDT    **SHARE this note so that the co-signing nurse can place an eSignature**    Nurse 2 eSignature: {Esignature:[id number]}

## 2023-07-26 NOTE — ANESTHESIA PRE PROCEDURE
Department of Anesthesiology  Preprocedure Note       Name:  Sheila Colmenares   Age:  68 y.o.  :  1950                                          MRN:  [id number]         Date:  2023      Surgeon: * No surgeons listed *    Procedure: * No procedures listed *    Medications prior to admission:   Prior to Admission medications    Medication Sig Start Date End Date Taking?  Authorizing Provider   lisinopril (PRINIVIL;ZESTRIL) 10 MG tablet Take 1 tablet by mouth daily 23   Petty Feliciano MD   furosemide (LASIX) 20 MG tablet Take 1 tablet by mouth daily 23   Petty Feliciano MD   doxepin (SINEQUAN) 10 MG capsule Take 1 capsule by mouth nightly 23   Vishal Castanon MD   empagliflozin (JARDIANCE) 10 MG tablet Take 1 tablet by mouth daily 23   Vishal Castanon MD   pantoprazole (PROTONIX) 40 MG tablet Take 1 tablet by mouth daily 23   Vishal Castanon MD   levothyroxine (SYNTHROID) 75 MCG tablet Take 1 tablet by mouth once daily 23   Vishal Castanon MD   tiotropium (SPIRIVA RESPIMAT) 2.5 MCG/ACT AERS inhaler INHALE 2 SPRAY(S) BY MOUTH ONCE DAILY 23   Laura Dunne MD   apixaban (ELIQUIS) 5 MG TABS tablet Take 1 tablet by mouth 2 times daily 23   MD Asa   OXYGEN Inhale into the lungs    Historical Provider, MD   amLODIPine (NORVASC) 10 MG tablet Take 1 tablet by mouth daily 3/24/23   MD Asa   aspirin 81 MG EC tablet Take 1 tablet by mouth daily 3/24/23   Vishal Castanon MD   budesonide-formoterol [facility]) 80-4.5 MCG/ACT AERO Inhale 2 puffs into the lungs 2 times daily 3/24/23   Vishal Castanon MD   nitroGLYCERIN (NITROSTAT) 0.4 MG SL tablet Place 1 tablet under the tongue every 5 minutes as needed for Chest pain 3/13/23   MD Asa   atorvastatin (LIPITOR) 40 MG tablet Take 1 tablet by mouth daily 10/24/22   SATURNINO Amos CNP   carvedilol (COREG) 25 MG tablet Take 1 tablet by mouth 2 times daily 10/24/22

## 2023-07-26 NOTE — PROGRESS NOTES
Patient arrived to CVICU at  and connected to monitor. Report received from cath lab RN and anesthesia. No drips infusing. L femoral 6f arterial sheath intact and in place. Assessment completed, see documentation. Care plan ongoing. Vital signs stable. All needs met at this time.

## 2023-07-27 ENCOUNTER — CARE COORDINATION (OUTPATIENT)
Dept: CASE MANAGEMENT | Age: 73
End: 2023-07-27

## 2023-07-27 VITALS
SYSTOLIC BLOOD PRESSURE: 138 MMHG | RESPIRATION RATE: 20 BRPM | WEIGHT: 180.78 LBS | DIASTOLIC BLOOD PRESSURE: 52 MMHG | HEART RATE: 73 BPM | OXYGEN SATURATION: 100 % | BODY MASS INDEX: 33.06 KG/M2 | TEMPERATURE: 98.5 F

## 2023-07-27 PROBLEM — Z95.2 S/P TAVR (TRANSCATHETER AORTIC VALVE REPLACEMENT): Status: ACTIVE | Noted: 2023-07-27

## 2023-07-27 LAB
ABO/RH: NORMAL
ANION GAP SERPL CALCULATED.3IONS-SCNC: 10 MMOL/L (ref 4–16)
ANTIBODY SCREEN: NEGATIVE
BASOPHILS ABSOLUTE: 0 K/CU MM
BASOPHILS RELATIVE PERCENT: 0.4 % (ref 0–1)
BUN SERPL-MCNC: 40 MG/DL (ref 6–23)
CALCIUM SERPL-MCNC: 9.3 MG/DL (ref 8.3–10.6)
CHLORIDE BLD-SCNC: 98 MMOL/L (ref 99–110)
CO2: 28 MMOL/L (ref 21–32)
COMMENT: NORMAL
COMPONENT: NORMAL
COMPONENT: NORMAL
CREAT SERPL-MCNC: 2.4 MG/DL (ref 0.6–1.1)
CROSSMATCH RESULT: NORMAL
CROSSMATCH RESULT: NORMAL
DIFFERENTIAL TYPE: ABNORMAL
EOSINOPHILS ABSOLUTE: 0.1 K/CU MM
EOSINOPHILS RELATIVE PERCENT: 0.6 % (ref 0–3)
GFR SERPL CREATININE-BSD FRML MDRD: 21 ML/MIN/1.73M2
GLUCOSE BLD-MCNC: 98 MG/DL (ref 70–99)
GLUCOSE SERPL-MCNC: 119 MG/DL (ref 70–99)
HCT VFR BLD CALC: 26.5 % (ref 37–47)
HEMOGLOBIN: 8.3 GM/DL (ref 12.5–16)
IMMATURE NEUTROPHIL %: 0.3 % (ref 0–0.43)
LV EF: 58 %
LVEF MODALITY: NORMAL
LYMPHOCYTES ABSOLUTE: 1.2 K/CU MM
LYMPHOCYTES RELATIVE PERCENT: 13.2 % (ref 24–44)
MAGNESIUM: 2.5 MG/DL (ref 1.8–2.4)
MCH RBC QN AUTO: 33.2 PG (ref 27–31)
MCHC RBC AUTO-ENTMCNC: 31.3 % (ref 32–36)
MCV RBC AUTO: 106 FL (ref 78–100)
MONOCYTES ABSOLUTE: 1 K/CU MM
MONOCYTES RELATIVE PERCENT: 10.6 % (ref 0–4)
NUCLEATED RBC %: 0 %
PDW BLD-RTO: 14.6 % (ref 11.7–14.9)
PHOSPHORUS: 4.6 MG/DL (ref 2.5–4.9)
PLATELET # BLD: 156 K/CU MM (ref 140–440)
PMV BLD AUTO: 11.6 FL (ref 7.5–11.1)
POTASSIUM SERPL-SCNC: 5.1 MMOL/L (ref 3.5–5.1)
RBC # BLD: 2.5 M/CU MM (ref 4.2–5.4)
SEGMENTED NEUTROPHILS ABSOLUTE COUNT: 6.8 K/CU MM
SEGMENTED NEUTROPHILS RELATIVE PERCENT: 74.9 % (ref 36–66)
SODIUM BLD-SCNC: 136 MMOL/L (ref 135–145)
STATUS: NORMAL
STATUS: NORMAL
TOTAL IMMATURE NEUTOROPHIL: 0.03 K/CU MM
TOTAL NUCLEATED RBC: 0 K/CU MM
TRANSFUSION STATUS: NORMAL
TRANSFUSION STATUS: NORMAL
UNIT DIVISION: 0
UNIT DIVISION: 0
UNIT NUMBER: NORMAL
UNIT NUMBER: NORMAL
WBC # BLD: 9.1 K/CU MM (ref 4–10.5)

## 2023-07-27 PROCEDURE — 84100 ASSAY OF PHOSPHORUS: CPT

## 2023-07-27 PROCEDURE — 82962 GLUCOSE BLOOD TEST: CPT

## 2023-07-27 PROCEDURE — 93306 TTE W/DOPPLER COMPLETE: CPT

## 2023-07-27 PROCEDURE — 85027 COMPLETE CBC AUTOMATED: CPT

## 2023-07-27 PROCEDURE — 2700000000 HC OXYGEN THERAPY PER DAY

## 2023-07-27 PROCEDURE — 2580000003 HC RX 258: Performed by: INTERNAL MEDICINE

## 2023-07-27 PROCEDURE — 85025 COMPLETE CBC W/AUTO DIFF WBC: CPT

## 2023-07-27 PROCEDURE — 83735 ASSAY OF MAGNESIUM: CPT

## 2023-07-27 PROCEDURE — 80048 BASIC METABOLIC PNL TOTAL CA: CPT

## 2023-07-27 PROCEDURE — 99238 HOSP IP/OBS DSCHRG MGMT 30/<: CPT | Performed by: INTERNAL MEDICINE

## 2023-07-27 PROCEDURE — 6370000000 HC RX 637 (ALT 250 FOR IP): Performed by: INTERNAL MEDICINE

## 2023-07-27 PROCEDURE — 94761 N-INVAS EAR/PLS OXIMETRY MLT: CPT

## 2023-07-27 RX ADMIN — FUROSEMIDE 20 MG: 20 TABLET ORAL at 08:56

## 2023-07-27 RX ADMIN — DOXEPIN HYDROCHLORIDE 10 MG: 10 CAPSULE ORAL at 00:28

## 2023-07-27 RX ADMIN — EMPAGLIFLOZIN 10 MG: 10 TABLET, FILM COATED ORAL at 08:57

## 2023-07-27 RX ADMIN — ACETAMINOPHEN 650 MG: 325 TABLET ORAL at 01:13

## 2023-07-27 RX ADMIN — MULTIPLE VITAMINS W/ MINERALS TAB 1 TABLET: TAB at 08:56

## 2023-07-27 RX ADMIN — LEVOTHYROXINE SODIUM 75 MCG: 25 TABLET ORAL at 08:56

## 2023-07-27 RX ADMIN — AMLODIPINE BESYLATE 10 MG: 5 TABLET ORAL at 08:57

## 2023-07-27 RX ADMIN — ASPIRIN 81 MG: 81 TABLET, CHEWABLE ORAL at 08:57

## 2023-07-27 RX ADMIN — CARVEDILOL 25 MG: 25 TABLET, FILM COATED ORAL at 08:56

## 2023-07-27 RX ADMIN — LISINOPRIL 10 MG: 20 TABLET ORAL at 08:57

## 2023-07-27 RX ADMIN — PANTOPRAZOLE SODIUM 40 MG: 40 TABLET, DELAYED RELEASE ORAL at 08:57

## 2023-07-27 RX ADMIN — Medication 1 TABLET: at 08:57

## 2023-07-27 RX ADMIN — SODIUM CHLORIDE, PRESERVATIVE FREE 10 ML: 5 INJECTION INTRAVENOUS at 08:55

## 2023-07-27 ASSESSMENT — PAIN SCALES - GENERAL
PAINLEVEL_OUTOF10: 4
PAINLEVEL_OUTOF10: 4

## 2023-07-27 NOTE — PROGRESS NOTES
RSR/VVS, rt groin pressure dressing secure, sand bag I place, no increase of seepage, Log rolled side to side with pillow support  for comfort, reverse trendalbertoberg

## 2023-07-27 NOTE — PROGRESS NOTES
Cardiology Progress Note     Admit Date:  7/26/2023    Consult reason/ Seen today for :       Subjective and  Overnight Events :  POST TAVR , still has not been out of bed arterial line removed right groin small bruise but no hematoma all sheaths removed wires removed      Chief complain on admission : 68 y. o.year old who is admitted forNo chief complaint on file. Assessment / Plan:  Post TVAR , ehco today   Courage to ambulate  Uncontrolled hypertension much improved now after Coreg 25 twice daily amlodipine 10 mg daily lisinopril 10 mg daily we will increase her pill to 20 mg daily if blood pressure continues to be elevated  History of atrial fibrillation restart Eliquis tomorrow  P\selina discharg elater today  DVT Prophylaxis if no contraindication    Past medical history:    has a past medical history of CAD (coronary artery disease), COPD (chronic obstructive pulmonary disease) ([address]), GERD (gastroesophageal reflux disease), H/O cardiovascular stress test, H/O echocardiogram, H/O echocardiogram, H/O percutaneous left heart catheterization, H/O transesophageal echocardiography (RAMIRO) for monitoring, Heart attack ([address]), Hyperlipidemia, Hypertension, On home oxygen therapy, PAD (peripheral artery disease) ([address]), S/P right coronary artery (RCA) stent placement, Sleep apnea, T2 vertebral fracture ([address]), TIA (transient ischemic attack), Tobacco dependence, Wears glasses, and Wears partial dentures. Past surgical history:   has a past surgical history that includes Coronary angioplasty with stent; Coronary artery bypass graft (06/21/2016); Mitral valve replacement (06/21/2016); Cardiac surgery; Appendectomy; hernia repair (N/A, 06/25/2021); and Pacemaker insertion (Left, 05/03/2023). Social History:   reports that she quit smoking about 10 months ago. Her smoking use included cigarettes. She started smoking about 58 years ago.  She has a

## 2023-07-27 NOTE — PROGRESS NOTES
Pt meets all criteria for discharge at this time. Pt's  at bedside, reviewed in detail all discharge instructions, medications, and follow-up appointments. Stressed importance of compliance with medications. Phone numbers given to call for any questions. Pt and family asking appropriate questions and all questions were answered to their satisfaction. Verbalizes understanding and agreeable to discharge. PIVs removed without difficulty, incisions assessed and documented. All belongings taken with patient at discharge.

## 2023-07-27 NOTE — DISCHARGE SUMMARY
[facility]  Discharge Summary    Idalia Philip  :  1950  MRN:  [id number]    Admit date:  2023  Discharge date:      Admitting Physician:  Lazaro Hoff MD    Discharge Diagnoses:    Patient Active Problem List   Diagnosis    ASCVD (arteriosclerotic cardiovascular disease)    HTN, Primary hypertension    Hyperlipidemia    Hypothyroidism, Acquired     PAD (peripheral artery disease) ([facility])    S/P right coronary artery (RCA) stent placement    Former smoker    CHF following cardiac surgery, postop    Gait disturbance    ST elevation myocardial infarction (STEMI) ([address])    Rheumatic mitral stenosis    S/P MVR (mitral valve replacement)    Cardiomyopathy, primary ([address])    On home O2    Depression with anxiety    Urinary incontinence    Osteoporosis, Age-related, without current pathological fracture    EDITA (obstructive sleep apnea)    Hypersomnia    Incisional hernia of anterior abdominal wall without obstruction or gangrene    Class 1 obesity    A-fib, Longstanding persistent atrial fibrillation (HCC)    Stage 3b chronic kidney disease ([address])    Atherosclerosis of native coronary artery of native heart without angina pectoris    Chronic systolic (congestive) heart failure    Aortic valve stenosis, Nonrheumatic     Chronic obstructive pulmonary disease, unspecified    Gastroesophageal reflux disease without esophagitis    Secondary hypercoagulable state ([address])    Pacemaker    Sick sinus syndrome (HCC)    Insomnia , Psychophysiological insomnia    Chronic 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

## 2023-07-27 NOTE — CARE COORDINATION
Vera Almeida was due for [facility] call today. Call deferred as she was a scheduled admit to [facility] for TAVR 7/26/23. Per protocol, Care Transitions episode closed. CTN team will follow upon hospital discharge if eligible.         Sue De Leon RN [phone]

## 2023-07-27 NOTE — CARE COORDINATION
Chart reviewed. Patient is from home; appears independent. She has a PCP and insurance that assists with Rx when needed. No needs identified thru review. CM will remain available should needs arise.  Lakia Pedersen RN

## 2023-07-28 ENCOUNTER — CARE COORDINATION (OUTPATIENT)
Dept: CASE MANAGEMENT | Age: 73
End: 2023-07-28

## 2023-07-28 DIAGNOSIS — Z95.2 S/P TAVR (TRANSCATHETER AORTIC VALVE REPLACEMENT): Primary | ICD-10-CM

## 2023-07-28 DIAGNOSIS — J44.9 CHRONIC OBSTRUCTIVE PULMONARY DISEASE, UNSPECIFIED COPD TYPE (HCC): ICD-10-CM

## 2023-07-28 PROCEDURE — 1111F DSCHRG MED/CURRENT MED MERGE: CPT | Performed by: FAMILY MEDICINE

## 2023-07-28 RX ORDER — DILTIAZEM HYDROCHLORIDE 60 MG/1
TABLET, FILM COATED ORAL
Qty: 11 G | Refills: 5 | Status: SHIPPED | OUTPATIENT
Start: 2023-07-28

## 2023-07-28 NOTE — CARE COORDINATION
[facility] Care Transitions Initial Follow Up Call    Call within 2 business days of discharge: Yes    Patient Current Location:  Home: [address] [place] [address]    Care Transition Nurse contacted the patient by telephone to perform post hospital discharge assessment. Verified name and  with patient as identifiers. Provided introduction to self, and explanation of the Care Transition Nurse role. Patient: Radha Osborn Patient : 1950   MRN: [id number]  Reason for Admission: S/P TAVR (transcatheter aortic valve replacement)   Discharge Date: 23 RARS: Readmission Risk Score: 25.9      Last Discharge [address] [facility],[address]       Date Complaint Diagnosis Description Type Department Provider    23  S/P TAVR (transcatheter aortic valve replacement) . .. Admission (Discharged) from TAVR w/ Anesthesia Ciara Waggoner MD          Challenges to be reviewed by the provider   Additional needs identified to be addressed with provider: No  none               Method of communication with provider: none. Spoke with Lynette/, says she is feeling pretty good, feels pretty tired, but overall good. She was a scheduled admit for TAVR for sever Aortic Stenosis, & HF. Denies chest pain/ pressure palpitations. She does c/o +singleton but this is her baseline/PLOF. O2 is @ 4L/NC at all times. PaO2- 95%. No fever/chills. She does say she is hoping to start feeling better since she has had all of these procedures done. Says femoral incision site is c/d/I. She shares she  had some oozing post op in hospital but now clean. No bleeding/drainage/hematoma. Hasn't checked BP & weight yet, has the logs, stressed the importance of doing so. Mentioned RPM to [facility],  says \" it sounds like that would be good or me to do\", but doesn't want to make a decision now about it now, wants to wait until after this initial phase. CTN will check with her next week. B&B WNL, appetite is good, no N/V/D/C.       Educated [facility] on how to

## 2023-07-31 ENCOUNTER — HOSPITAL ENCOUNTER (OUTPATIENT)
Dept: CARDIAC CATH/INVASIVE PROCEDURES | Age: 73
Discharge: HOME | End: 2023-07-31
Payer: MEDICARE

## 2023-08-02 ENCOUNTER — HOSPITAL ENCOUNTER (OUTPATIENT)
Dept: CARDIAC CATH/INVASIVE PROCEDURES | Age: 73
Discharge: HOME | End: 2023-08-02

## 2023-08-04 ENCOUNTER — CARE COORDINATION (OUTPATIENT)
Dept: CASE MANAGEMENT | Age: 73
End: 2023-08-04

## 2023-08-04 ENCOUNTER — OFFICE VISIT (OUTPATIENT)
Dept: CARDIOLOGY CLINIC | Age: 73
End: 2023-08-04

## 2023-08-04 VITALS — DIASTOLIC BLOOD PRESSURE: 66 MMHG | SYSTOLIC BLOOD PRESSURE: 128 MMHG | HEART RATE: 94 BPM

## 2023-08-04 DIAGNOSIS — H54.7 LOSS OF VISION: ICD-10-CM

## 2023-08-04 DIAGNOSIS — E78.2 MIXED HYPERLIPIDEMIA: Chronic | ICD-10-CM

## 2023-08-04 DIAGNOSIS — Z09 HOSPITAL DISCHARGE FOLLOW-UP: ICD-10-CM

## 2023-08-04 DIAGNOSIS — I25.10 ASCVD (ARTERIOSCLEROTIC CARDIOVASCULAR DISEASE): ICD-10-CM

## 2023-08-04 DIAGNOSIS — I35.0 NONRHEUMATIC AORTIC VALVE STENOSIS: Chronic | ICD-10-CM

## 2023-08-04 DIAGNOSIS — I35.0 NONRHEUMATIC AORTIC (VALVE) STENOSIS: ICD-10-CM

## 2023-08-04 DIAGNOSIS — I48.11 LONGSTANDING PERSISTENT ATRIAL FIBRILLATION (HCC): Chronic | ICD-10-CM

## 2023-08-04 DIAGNOSIS — Z95.5 S/P RIGHT CORONARY ARTERY (RCA) STENT PLACEMENT: ICD-10-CM

## 2023-08-04 DIAGNOSIS — I21.3 ST ELEVATION MYOCARDIAL INFARCTION (STEMI), UNSPECIFIED ARTERY (HCC): ICD-10-CM

## 2023-08-04 DIAGNOSIS — Z95.2 S/P TAVR (TRANSCATHETER AORTIC VALVE REPLACEMENT): ICD-10-CM

## 2023-08-04 DIAGNOSIS — I42.9 CARDIOMYOPATHY, PRIMARY (HCC): Chronic | ICD-10-CM

## 2023-08-04 DIAGNOSIS — Z95.2 S/P MVR (MITRAL VALVE REPLACEMENT): ICD-10-CM

## 2023-08-04 DIAGNOSIS — I10 PRIMARY HYPERTENSION: Chronic | ICD-10-CM

## 2023-08-04 DIAGNOSIS — I73.9 PAD (PERIPHERAL ARTERY DISEASE) (HCC): Primary | Chronic | ICD-10-CM

## 2023-08-04 DIAGNOSIS — I38 VHD (VALVULAR HEART DISEASE): ICD-10-CM

## 2023-08-04 DIAGNOSIS — Z95.0 PACEMAKER: ICD-10-CM

## 2023-08-04 RX ORDER — CILOSTAZOL 100 MG/1
100 TABLET ORAL 2 TIMES DAILY
COMMUNITY

## 2023-08-04 RX ORDER — FUROSEMIDE 20 MG/1
10 TABLET ORAL DAILY
Qty: 45 TABLET | Refills: 1 | Status: SHIPPED | OUTPATIENT
Start: 2023-08-04

## 2023-08-04 RX ORDER — AMIODARONE HYDROCHLORIDE 200 MG/1
200 TABLET ORAL 2 TIMES DAILY
Qty: 60 TABLET | Refills: 2 | Status: SHIPPED | OUTPATIENT
Start: 2023-08-04

## 2023-08-04 NOTE — CARE COORDINATION
Care Transitions Outreach Attempt- unsuccessful subsequent #1    Patient: Idalia Philip Patient : 1950 MRN: [id number]    1st attempt to reach for  subsequent Care Transitions call  unsuccessful. HIPAA compliant message left requesting call back to [phone]    Last Discharge [address] [facility],[address]       Date Complaint Diagnosis Description Type Department Provider    23  S/P TAVR (transcatheter aortic valve replacement) . ..  Admission (Discharged) from TAVR w/ Anesthesia [facility] CVICU Lazaro Hoff MD          Noted following upcoming appointments from discharge chart review:   [facility] follow up appointment(s):   Future Appointments   Date Time Provider [address]   2023  8:00 AM [facility] ECHO ROOM 1 [facility]   2023 10:00 AM [address] [facility]   2023  8:30 AM Perez Damon MD [facility]   2023 11:00 AM Tara Young MD [place] PULM [facility]   2023 10:45 AM Ta Steinberg DO [facility]   2023 10:00 AM Lazaro Hoff MD [facility]     Non-[facility] follow up appointment(s): BRANDON Hameed RN -[phone]

## 2023-08-06 PROCEDURE — 93296 REM INTERROG EVL PM/IDS: CPT | Performed by: INTERNAL MEDICINE

## 2023-08-06 PROCEDURE — 93294 REM INTERROG EVL PM/LDLS PM: CPT | Performed by: INTERNAL MEDICINE

## 2023-08-07 ENCOUNTER — PROCEDURE VISIT (OUTPATIENT)
Dept: CARDIOLOGY CLINIC | Age: 73
End: 2023-08-07
Payer: MEDICARE

## 2023-08-07 DIAGNOSIS — Z95.0 CARDIAC PACEMAKER IN SITU: Primary | ICD-10-CM

## 2023-08-09 ENCOUNTER — CARE COORDINATION (OUTPATIENT)
Dept: CASE MANAGEMENT | Age: 73
End: 2023-08-09

## 2023-08-09 NOTE — CARE COORDINATION
Care Transitions Outreach Attempt    Patient: Hoa Settler Patient : 1950 MRN: [id number]     2nd attempt for  subsequent Care transitions call  unsuccessful. HIPAA compliant message left x2 requesting call back to [phone]. Per protocol, episode closed & no further outreach scheduled    Unable to reach letter done. Messaged  Support, [place]  to please mail out    Last Discharge [address] [facility],6[address] [facility]       Date Complaint Diagnosis Description Type Department Provider    23  S/P TAVR (transcatheter aortic valve replacement) . ..  Admission (Discharged) from TAVR w/ Anesthesia [facility] CVICU Bridgette Rendon MD          Noted following upcoming appointments from discharge chart review:   [facility] follow up appointment(s):   Future Appointments   Date Time Provider [address]   2023  8:00 AM [facility] ECHO ROOM 1 [facility]   2023 10:00 AM [address] [facility]   2023  8:30 AM Sharon Hilliard MD [facility]   2023 11:00 AM Christy Dominguez MD [place] [facility]   2023 10:45 AM Ta Thompson DO [facility]   2023 10:00 AM Bridgette Rendon MD [facility]     Non-[facility] follow up appointment(s): BRANDON Iraheta RN -[phone]

## 2023-08-14 ENCOUNTER — CARE COORDINATION (OUTPATIENT)
Dept: CARE COORDINATION | Age: 73
End: 2023-08-14

## 2023-08-17 RX ORDER — LISINOPRIL 10 MG/1
10 TABLET ORAL DAILY
Qty: 90 TABLET | Refills: 3 | Status: SHIPPED | OUTPATIENT
Start: 2023-08-17

## 2023-08-23 ENCOUNTER — HOSPITAL ENCOUNTER (OUTPATIENT)
Dept: CARDIAC CATH/INVASIVE PROCEDURES | Age: 73
Discharge: HOME | End: 2023-08-23
Attending: INTERNAL MEDICINE | Admitting: INTERNAL MEDICINE
Payer: MEDICARE

## 2023-08-23 ENCOUNTER — HOSPITAL ENCOUNTER (OUTPATIENT)
Age: 73
Discharge: HOME | End: 2023-08-23
Payer: MEDICARE

## 2023-08-23 ENCOUNTER — HOSPITAL ENCOUNTER (OUTPATIENT)
Dept: NON INVASIVE DIAGNOSTICS | Age: 73
Discharge: HOME | End: 2023-08-23
Payer: MEDICARE

## 2023-08-23 VITALS
RESPIRATION RATE: 20 BRPM | WEIGHT: 175 LBS | DIASTOLIC BLOOD PRESSURE: 94 MMHG | HEART RATE: 114 BPM | OXYGEN SATURATION: 95 % | SYSTOLIC BLOOD PRESSURE: 142 MMHG | BODY MASS INDEX: 32.2 KG/M2 | HEIGHT: 62 IN

## 2023-08-23 LAB
ALBUMIN SERPL-MCNC: 4.3 GM/DL (ref 3.4–5)
ALP BLD-CCNC: 88 IU/L (ref 40–128)
ALT SERPL-CCNC: 16 U/L (ref 10–40)
ANION GAP SERPL CALCULATED.3IONS-SCNC: 12 MMOL/L (ref 4–16)
APTT: 37.7 SECONDS (ref 25.1–37.1)
AST SERPL-CCNC: 29 IU/L (ref 15–37)
BILIRUB SERPL-MCNC: 0.8 MG/DL (ref 0–1)
BUN SERPL-MCNC: 25 MG/DL (ref 6–23)
CALCIUM SERPL-MCNC: 9.5 MG/DL (ref 8.3–10.6)
CHLORIDE BLD-SCNC: 98 MMOL/L (ref 99–110)
CO2: 28 MMOL/L (ref 21–32)
CREAT SERPL-MCNC: 2.1 MG/DL (ref 0.6–1.1)
GFR SERPL CREATININE-BSD FRML MDRD: 24 ML/MIN/1.73M2
GLUCOSE SERPL-MCNC: 104 MG/DL (ref 70–99)
HCT VFR BLD CALC: 32 % (ref 37–47)
HEMOGLOBIN: 9.9 GM/DL (ref 12.5–16)
INR BLD: 1.7 INDEX
LV EF: 45 %
LVEF MODALITY: NORMAL
MCH RBC QN AUTO: 33 PG (ref 27–31)
MCHC RBC AUTO-ENTMCNC: 30.9 % (ref 32–36)
MCV RBC AUTO: 106.7 FL (ref 78–100)
PDW BLD-RTO: 16 % (ref 11.7–14.9)
PLATELET # BLD: 166 K/CU MM (ref 140–440)
PMV BLD AUTO: 12 FL (ref 7.5–11.1)
POTASSIUM SERPL-SCNC: 4.2 MMOL/L (ref 3.5–5.1)
PRO-BNP: 4713 PG/ML
PROTHROMBIN TIME: 20.2 SECONDS (ref 11.7–14.5)
RBC # BLD: 3 M/CU MM (ref 4.2–5.4)
SODIUM BLD-SCNC: 138 MMOL/L (ref 135–145)
TOTAL PROTEIN: 6.4 GM/DL (ref 6.4–8.2)
WBC # BLD: 10.5 K/CU MM (ref 4–10.5)

## 2023-08-23 PROCEDURE — 93005 ELECTROCARDIOGRAM TRACING: CPT | Performed by: INTERNAL MEDICINE

## 2023-08-23 PROCEDURE — 83880 ASSAY OF NATRIURETIC PEPTIDE: CPT

## 2023-08-23 PROCEDURE — 85027 COMPLETE CBC AUTOMATED: CPT

## 2023-08-23 PROCEDURE — 80053 COMPREHEN METABOLIC PANEL: CPT

## 2023-08-23 PROCEDURE — 99213 OFFICE O/P EST LOW 20 MIN: CPT | Performed by: INTERNAL MEDICINE

## 2023-08-23 PROCEDURE — 85730 THROMBOPLASTIN TIME PARTIAL: CPT

## 2023-08-23 PROCEDURE — 85610 PROTHROMBIN TIME: CPT

## 2023-08-23 PROCEDURE — 93306 TTE W/DOPPLER COMPLETE: CPT

## 2023-08-24 LAB
ALBUMIN SERPL-MCNC: 4.2 GM/DL (ref 3.4–5)
ALP BLD-CCNC: 88 IU/L (ref 40–129)
ALT SERPL-CCNC: 16 U/L (ref 10–40)
ANION GAP SERPL CALCULATED.3IONS-SCNC: 10 MMOL/L (ref 4–16)
APTT: 36.2 SECONDS (ref 25.1–37.1)
AST SERPL-CCNC: 29 IU/L (ref 15–37)
BILIRUB SERPL-MCNC: 0.8 MG/DL (ref 0–1)
BUN SERPL-MCNC: 26 MG/DL (ref 6–23)
CALCIUM SERPL-MCNC: 9.4 MG/DL (ref 8.3–10.6)
CHLORIDE BLD-SCNC: 95 MMOL/L (ref 99–110)
CO2: 28 MMOL/L (ref 21–32)
CREAT SERPL-MCNC: 2 MG/DL (ref 0.6–1.1)
EKG ATRIAL RATE: 74 BPM
EKG DIAGNOSIS: NORMAL
EKG Q-T INTERVAL: 432 MS
EKG QRS DURATION: 152 MS
EKG QTC CALCULATION (BAZETT): 560 MS
EKG R AXIS: -55 DEGREES
EKG T AXIS: 70 DEGREES
EKG VENTRICULAR RATE: 101 BPM
GFR SERPL CREATININE-BSD FRML MDRD: 26 ML/MIN/1.73M2
GLUCOSE SERPL-MCNC: 103 MG/DL (ref 70–99)
HCT VFR BLD CALC: 32.3 % (ref 37–47)
HEMOGLOBIN: 9.9 GM/DL (ref 12.5–16)
INR BLD: 1.7 INDEX
MCH RBC QN AUTO: 33.3 PG (ref 27–31)
MCHC RBC AUTO-ENTMCNC: 30.7 % (ref 32–36)
MCV RBC AUTO: 108.8 FL (ref 78–100)
PDW BLD-RTO: 16.2 % (ref 11.7–14.9)
PLATELET # BLD: 164 K/CU MM (ref 140–440)
PMV BLD AUTO: 12.8 FL (ref 7.5–11.1)
POTASSIUM SERPL-SCNC: 4.1 MMOL/L (ref 3.5–5.1)
PRO-BNP: 4529 PG/ML
PROTHROMBIN TIME: 20.4 SECONDS (ref 11.7–14.5)
RBC # BLD: 2.97 M/CU MM (ref 4.2–5.4)
SODIUM BLD-SCNC: 133 MMOL/L (ref 135–145)
TOTAL PROTEIN: 6.4 GM/DL (ref 6.4–8.2)
WBC # BLD: 10.3 K/CU MM (ref 4–10.5)

## 2023-08-24 PROCEDURE — 93010 ELECTROCARDIOGRAM REPORT: CPT | Performed by: INTERNAL MEDICINE

## 2023-08-30 ENCOUNTER — HOSPITAL ENCOUNTER (OUTPATIENT)
Dept: MRI IMAGING | Age: 73
Discharge: HOME | End: 2023-08-30
Attending: INTERNAL MEDICINE
Payer: MEDICARE

## 2023-08-30 DIAGNOSIS — I73.9 PAD (PERIPHERAL ARTERY DISEASE) (HCC): Chronic | ICD-10-CM

## 2023-08-30 DIAGNOSIS — I35.0 NONRHEUMATIC AORTIC (VALVE) STENOSIS: ICD-10-CM

## 2023-08-30 DIAGNOSIS — Z95.5 S/P RIGHT CORONARY ARTERY (RCA) STENT PLACEMENT: ICD-10-CM

## 2023-08-30 DIAGNOSIS — I10 PRIMARY HYPERTENSION: Chronic | ICD-10-CM

## 2023-08-30 DIAGNOSIS — E78.2 MIXED HYPERLIPIDEMIA: Chronic | ICD-10-CM

## 2023-08-30 DIAGNOSIS — I48.11 LONGSTANDING PERSISTENT ATRIAL FIBRILLATION (HCC): Chronic | ICD-10-CM

## 2023-08-30 DIAGNOSIS — Z09 HOSPITAL DISCHARGE FOLLOW-UP: ICD-10-CM

## 2023-08-30 DIAGNOSIS — I35.0 NONRHEUMATIC AORTIC VALVE STENOSIS: Chronic | ICD-10-CM

## 2023-08-30 DIAGNOSIS — I25.10 ASCVD (ARTERIOSCLEROTIC CARDIOVASCULAR DISEASE): ICD-10-CM

## 2023-08-30 DIAGNOSIS — Z95.0 PACEMAKER: ICD-10-CM

## 2023-08-30 DIAGNOSIS — Z95.2 S/P TAVR (TRANSCATHETER AORTIC VALVE REPLACEMENT): ICD-10-CM

## 2023-08-30 DIAGNOSIS — I21.3 ST ELEVATION MYOCARDIAL INFARCTION (STEMI), UNSPECIFIED ARTERY (HCC): ICD-10-CM

## 2023-08-30 DIAGNOSIS — I38 VHD (VALVULAR HEART DISEASE): ICD-10-CM

## 2023-08-30 DIAGNOSIS — Z95.2 S/P MVR (MITRAL VALVE REPLACEMENT): ICD-10-CM

## 2023-08-30 PROCEDURE — 70551 MRI BRAIN STEM W/O DYE: CPT

## 2023-09-01 ENCOUNTER — OFFICE VISIT (OUTPATIENT)
Dept: CARDIOLOGY CLINIC | Age: 73
End: 2023-09-01
Payer: MEDICARE

## 2023-09-01 VITALS
SYSTOLIC BLOOD PRESSURE: 118 MMHG | DIASTOLIC BLOOD PRESSURE: 74 MMHG | WEIGHT: 166.6 LBS | HEART RATE: 108 BPM | HEIGHT: 62 IN | BODY MASS INDEX: 30.66 KG/M2

## 2023-09-01 DIAGNOSIS — I48.11 LONGSTANDING PERSISTENT ATRIAL FIBRILLATION (HCC): Chronic | ICD-10-CM

## 2023-09-01 DIAGNOSIS — I10 PRIMARY HYPERTENSION: Chronic | ICD-10-CM

## 2023-09-01 DIAGNOSIS — Z95.2 S/P MVR (MITRAL VALVE REPLACEMENT): ICD-10-CM

## 2023-09-01 DIAGNOSIS — M54.41 CHRONIC RIGHT-SIDED LOW BACK PAIN WITH RIGHT-SIDED SCIATICA: ICD-10-CM

## 2023-09-01 DIAGNOSIS — I25.10 ATHEROSCLEROSIS OF NATIVE CORONARY ARTERY OF NATIVE HEART WITHOUT ANGINA PECTORIS: Chronic | ICD-10-CM

## 2023-09-01 DIAGNOSIS — I35.0 NONRHEUMATIC AORTIC VALVE STENOSIS: Chronic | ICD-10-CM

## 2023-09-01 DIAGNOSIS — N18.32 STAGE 3B CHRONIC KIDNEY DISEASE (HCC): ICD-10-CM

## 2023-09-01 DIAGNOSIS — I42.9 CARDIOMYOPATHY, PRIMARY (HCC): Chronic | ICD-10-CM

## 2023-09-01 DIAGNOSIS — J96.21 ACUTE ON CHRONIC RESPIRATORY FAILURE WITH HYPOXIA (HCC): ICD-10-CM

## 2023-09-01 DIAGNOSIS — E78.2 MIXED HYPERLIPIDEMIA: Chronic | ICD-10-CM

## 2023-09-01 DIAGNOSIS — G89.29 CHRONIC RIGHT-SIDED LOW BACK PAIN WITH RIGHT-SIDED SCIATICA: ICD-10-CM

## 2023-09-01 DIAGNOSIS — Z95.0 CARDIAC PACEMAKER IN SITU: ICD-10-CM

## 2023-09-01 DIAGNOSIS — I73.9 PAD (PERIPHERAL ARTERY DISEASE) (HCC): Chronic | ICD-10-CM

## 2023-09-01 DIAGNOSIS — Z95.5 S/P RIGHT CORONARY ARTERY (RCA) STENT PLACEMENT: ICD-10-CM

## 2023-09-01 DIAGNOSIS — I50.22 CHRONIC SYSTOLIC (CONGESTIVE) HEART FAILURE (HCC): Chronic | ICD-10-CM

## 2023-09-01 DIAGNOSIS — I35.0 NONRHEUMATIC AORTIC (VALVE) STENOSIS: ICD-10-CM

## 2023-09-01 DIAGNOSIS — J44.9 CHRONIC OBSTRUCTIVE PULMONARY DISEASE, UNSPECIFIED COPD TYPE (HCC): Chronic | ICD-10-CM

## 2023-09-01 DIAGNOSIS — I25.10 ASCVD (ARTERIOSCLEROTIC CARDIOVASCULAR DISEASE): ICD-10-CM

## 2023-09-01 DIAGNOSIS — Z95.2 S/P TAVR (TRANSCATHETER AORTIC VALVE REPLACEMENT): ICD-10-CM

## 2023-09-01 DIAGNOSIS — I63.431 CEREBROVASCULAR ACCIDENT (CVA) DUE TO EMBOLISM OF RIGHT POSTERIOR CEREBRAL ARTERY (HCC): Primary | ICD-10-CM

## 2023-09-01 PROCEDURE — 3078F DIAST BP <80 MM HG: CPT | Performed by: INTERNAL MEDICINE

## 2023-09-01 PROCEDURE — 3074F SYST BP LT 130 MM HG: CPT | Performed by: INTERNAL MEDICINE

## 2023-09-01 PROCEDURE — 93000 ELECTROCARDIOGRAM COMPLETE: CPT | Performed by: INTERNAL MEDICINE

## 2023-09-01 PROCEDURE — 1123F ACP DISCUSS/DSCN MKR DOCD: CPT | Performed by: INTERNAL MEDICINE

## 2023-09-01 PROCEDURE — 99214 OFFICE O/P EST MOD 30 MIN: CPT | Performed by: INTERNAL MEDICINE

## 2023-09-01 RX ORDER — AMIODARONE HYDROCHLORIDE 200 MG/1
200 TABLET ORAL DAILY
Qty: 30 TABLET | Refills: 3 | Status: SHIPPED | OUTPATIENT
Start: 2023-09-01

## 2023-09-05 ENCOUNTER — TELEPHONE (OUTPATIENT)
Dept: CARDIOLOGY CLINIC | Age: 73
End: 2023-09-05

## 2023-09-05 NOTE — TELEPHONE ENCOUNTER
Patient given instructions over telephone on RAMIRO for Dx: CVA. Procedure is scheduled for 9/6/23 @ 1130 AM, w/arrival @ 1030 AM, @ [facility]. Patient advised to review instructions given. Patient was notified that procedure date or time could be changed due to an emergency. Patient voiced understanding.

## 2023-09-05 NOTE — TELEPHONE ENCOUNTER
[facility]     Dr. Bray     Transesophageal Echocardiogram    Patient Name: Oskar Yu   : 1950   MRN# [id number]     Date of Procedure: 23 Time: 1142 Arrival Time: 2012 AM   (Arrival time is scheduled for one (1) hour before procedure is scheduled.)     Hospital: [facility])       X   Please do not have anything by mouth after midnight prior to or 8 hours before         the procedure. X   You may take your medication with a sip of water unless advised otherwise below. X Please continue to take Eliquis (apixaban) as directed. X If you are taking Lasix (furosemide) please do not take it the morning before your procedure. Lab Results from 5/4/19 per patient request. Thank you.

## 2023-09-06 ENCOUNTER — HOSPITAL ENCOUNTER (INPATIENT)
Age: 73
LOS: 5 days | Discharge: HOME | DRG: 291 | End: 2023-09-11
Attending: EMERGENCY MEDICINE | Admitting: INTERNAL MEDICINE
Payer: MEDICARE

## 2023-09-06 ENCOUNTER — HOSPITAL ENCOUNTER (OUTPATIENT)
Dept: NON INVASIVE DIAGNOSTICS | Age: 73
Discharge: HOME | End: 2023-09-06
Payer: MEDICARE

## 2023-09-06 ENCOUNTER — TELEPHONE (OUTPATIENT)
Dept: CARDIOLOGY CLINIC | Age: 73
End: 2023-09-06

## 2023-09-06 ENCOUNTER — APPOINTMENT (OUTPATIENT)
Dept: GENERAL RADIOLOGY | Age: 73
DRG: 291 | End: 2023-09-06
Payer: MEDICARE

## 2023-09-06 DIAGNOSIS — I48.91 ATRIAL FIBRILLATION WITH RVR (HCC): ICD-10-CM

## 2023-09-06 DIAGNOSIS — J81.0 ACUTE PULMONARY EDEMA (HCC): ICD-10-CM

## 2023-09-06 DIAGNOSIS — J96.21 ACUTE ON CHRONIC RESPIRATORY FAILURE WITH HYPOXIA (HCC): Primary | ICD-10-CM

## 2023-09-06 LAB
ALBUMIN SERPL-MCNC: 4.5 GM/DL (ref 3.4–5)
ALP BLD-CCNC: 101 IU/L (ref 40–129)
ALT SERPL-CCNC: 22 U/L (ref 10–40)
ANION GAP SERPL CALCULATED.3IONS-SCNC: 13 MMOL/L (ref 4–16)
AST SERPL-CCNC: 36 IU/L (ref 15–37)
B PARAP IS1001 DNA NPH QL NAA+NON-PROBE: NOT DETECTED
B PERT.PT PRMT NPH QL NAA+NON-PROBE: NOT DETECTED
BASE EXCESS MIXED: 1.1 (ref 0–2.3)
BASE EXCESS MIXED: 1.4 (ref 0–2.3)
BASOPHILS ABSOLUTE: 0.2 K/CU MM
BASOPHILS RELATIVE PERCENT: 1.1 % (ref 0–1)
BILIRUB SERPL-MCNC: 1 MG/DL (ref 0–1)
BUN SERPL-MCNC: 25 MG/DL (ref 6–23)
C PNEUM DNA NPH QL NAA+NON-PROBE: NOT DETECTED
CALCIUM SERPL-MCNC: 9.8 MG/DL (ref 8.3–10.6)
CARBON MONOXIDE, BLOOD: 2.4 % (ref 0–5)
CHLORIDE BLD-SCNC: 97 MMOL/L (ref 99–110)
CO2 CONTENT: 29.7 MMOL/L (ref 19–24)
CO2: 26 MMOL/L (ref 21–32)
COMMENT: ABNORMAL
COMMENT: ABNORMAL
CREAT SERPL-MCNC: 2.1 MG/DL (ref 0.6–1.1)
DIFFERENTIAL TYPE: ABNORMAL
EOSINOPHILS ABSOLUTE: 0.4 K/CU MM
EOSINOPHILS RELATIVE PERCENT: 2.9 % (ref 0–3)
FLUAV H1 2009 PAN RNA NPH NAA+NON-PROBE: NOT DETECTED
FLUAV H1 RNA NPH QL NAA+NON-PROBE: NOT DETECTED
FLUAV H3 RNA NPH QL NAA+NON-PROBE: NOT DETECTED
FLUAV RNA NPH QL NAA+NON-PROBE: NOT DETECTED
FLUBV RNA NPH QL NAA+NON-PROBE: NOT DETECTED
GFR SERPL CREATININE-BSD FRML MDRD: 24 ML/MIN/1.73M2
GLUCOSE BLD-MCNC: 125 MG/DL (ref 70–99)
GLUCOSE BLD-MCNC: 163 MG/DL (ref 70–99)
GLUCOSE SERPL-MCNC: 260 MG/DL (ref 70–99)
HADV DNA NPH QL NAA+NON-PROBE: NOT DETECTED
HCO3 ARTERIAL: 28.1 MMOL/L (ref 18–23)
HCO3 VENOUS: 28.3 MMOL/L (ref 19–25)
HCOV 229E RNA NPH QL NAA+NON-PROBE: NOT DETECTED
HCOV HKU1 RNA NPH QL NAA+NON-PROBE: NOT DETECTED
HCOV NL63 RNA NPH QL NAA+NON-PROBE: NOT DETECTED
HCOV OC43 RNA NPH QL NAA+NON-PROBE: NOT DETECTED
HCT VFR BLD CALC: 38.3 % (ref 37–47)
HEMOGLOBIN: 12 GM/DL (ref 12.5–16)
HMPV RNA NPH QL NAA+NON-PROBE: NOT DETECTED
HPIV1 RNA NPH QL NAA+NON-PROBE: NOT DETECTED
HPIV2 RNA NPH QL NAA+NON-PROBE: NOT DETECTED
HPIV3 RNA NPH QL NAA+NON-PROBE: NOT DETECTED
HPIV4 RNA NPH QL NAA+NON-PROBE: NOT DETECTED
IMMATURE NEUTROPHIL %: 0.5 % (ref 0–0.43)
LACTIC ACID, SEPSIS: 1.7 MMOL/L (ref 0.5–1.9)
LACTIC ACID, SEPSIS: 2.4 MMOL/L (ref 0.5–1.9)
LYMPHOCYTES ABSOLUTE: 2.4 K/CU MM
LYMPHOCYTES RELATIVE PERCENT: 17.3 % (ref 24–44)
M PNEUMO DNA NPH QL NAA+NON-PROBE: NOT DETECTED
MAGNESIUM: 2.4 MG/DL (ref 1.8–2.4)
MCH RBC QN AUTO: 32.5 PG (ref 27–31)
MCHC RBC AUTO-ENTMCNC: 31.3 % (ref 32–36)
MCV RBC AUTO: 103.8 FL (ref 78–100)
METHEMOGLOBIN ARTERIAL: 1.2 %
MONOCYTES ABSOLUTE: 0.6 K/CU MM
MONOCYTES RELATIVE PERCENT: 4.4 % (ref 0–4)
NUCLEATED RBC %: 0 %
O2 SAT, VEN: 59.2 % (ref 50–70)
O2 SATURATION: 77.7 % (ref 96–97)
PCO2 ARTERIAL: 52 MMHG (ref 32–45)
PCO2, VEN: 55 MMHG (ref 38–52)
PDW BLD-RTO: 15 % (ref 11.7–14.9)
PH BLOOD: 7.34 (ref 7.34–7.45)
PH VENOUS: 7.32 (ref 7.32–7.42)
PLATELET # BLD: 248 K/CU MM (ref 140–440)
PMV BLD AUTO: 11.9 FL (ref 7.5–11.1)
PO2 ARTERIAL: 47 MMHG (ref 75–100)
PO2, VEN: 39 MMHG (ref 28–48)
POTASSIUM SERPL-SCNC: 4.4 MMOL/L (ref 3.5–5.1)
PRO-BNP: 4622 PG/ML
RBC # BLD: 3.69 M/CU MM (ref 4.2–5.4)
RSV RNA NPH QL NAA+NON-PROBE: NOT DETECTED
RV+EV RNA NPH QL NAA+NON-PROBE: NOT DETECTED
SARS-COV-2 RNA NPH QL NAA+NON-PROBE: NOT DETECTED
SEGMENTED NEUTROPHILS ABSOLUTE COUNT: 10.4 K/CU MM
SEGMENTED NEUTROPHILS RELATIVE PERCENT: 73.8 % (ref 36–66)
SODIUM BLD-SCNC: 136 MMOL/L (ref 135–145)
TOTAL IMMATURE NEUTOROPHIL: 0.07 K/CU MM
TOTAL NUCLEATED RBC: 0 K/CU MM
TOTAL PROTEIN: 7.5 GM/DL (ref 6.4–8.2)
TROPONIN T: 0.04 NG/ML
WBC # BLD: 14 K/CU MM (ref 4–10.5)

## 2023-09-06 PROCEDURE — 2580000003 HC RX 258: Performed by: EMERGENCY MEDICINE

## 2023-09-06 PROCEDURE — 6360000002 HC RX W HCPCS: Performed by: EMERGENCY MEDICINE

## 2023-09-06 PROCEDURE — 36415 COLL VENOUS BLD VENIPUNCTURE: CPT

## 2023-09-06 PROCEDURE — 83735 ASSAY OF MAGNESIUM: CPT

## 2023-09-06 PROCEDURE — 71045 X-RAY EXAM CHEST 1 VIEW: CPT

## 2023-09-06 PROCEDURE — 0202U NFCT DS 22 TRGT SARS-COV-2: CPT

## 2023-09-06 PROCEDURE — 83880 ASSAY OF NATRIURETIC PEPTIDE: CPT

## 2023-09-06 PROCEDURE — 6370000000 HC RX 637 (ALT 250 FOR IP): Performed by: INTERNAL MEDICINE

## 2023-09-06 PROCEDURE — 83605 ASSAY OF LACTIC ACID: CPT

## 2023-09-06 PROCEDURE — 80053 COMPREHEN METABOLIC PANEL: CPT

## 2023-09-06 PROCEDURE — 94660 CPAP INITIATION&MGMT: CPT

## 2023-09-06 PROCEDURE — 85025 COMPLETE CBC W/AUTO DIFF WBC: CPT

## 2023-09-06 PROCEDURE — 5A09457 ASSISTANCE WITH RESPIRATORY VENTILATION, 24-96 CONSECUTIVE HOURS, CONTINUOUS POSITIVE AIRWAY PRESSURE: ICD-10-PCS | Performed by: INTERNAL MEDICINE

## 2023-09-06 PROCEDURE — 6370000000 HC RX 637 (ALT 250 FOR IP): Performed by: EMERGENCY MEDICINE

## 2023-09-06 PROCEDURE — 94640 AIRWAY INHALATION TREATMENT: CPT

## 2023-09-06 PROCEDURE — 87040 BLOOD CULTURE FOR BACTERIA: CPT

## 2023-09-06 PROCEDURE — 2140000000 HC CCU INTERMEDIATE R&B

## 2023-09-06 PROCEDURE — 82962 GLUCOSE BLOOD TEST: CPT

## 2023-09-06 PROCEDURE — 96374 THER/PROPH/DIAG INJ IV PUSH: CPT

## 2023-09-06 PROCEDURE — 82805 BLOOD GASES W/O2 SATURATION: CPT

## 2023-09-06 PROCEDURE — 93005 ELECTROCARDIOGRAM TRACING: CPT | Performed by: EMERGENCY MEDICINE

## 2023-09-06 PROCEDURE — 2700000000 HC OXYGEN THERAPY PER DAY

## 2023-09-06 PROCEDURE — 36600 WITHDRAWAL OF ARTERIAL BLOOD: CPT

## 2023-09-06 PROCEDURE — 94761 N-INVAS EAR/PLS OXIMETRY MLT: CPT

## 2023-09-06 PROCEDURE — 84484 ASSAY OF TROPONIN QUANT: CPT

## 2023-09-06 PROCEDURE — 2500000003 HC RX 250 WO HCPCS: Performed by: EMERGENCY MEDICINE

## 2023-09-06 PROCEDURE — 93005 ELECTROCARDIOGRAM TRACING: CPT | Performed by: INTERNAL MEDICINE

## 2023-09-06 PROCEDURE — 82803 BLOOD GASES ANY COMBINATION: CPT

## 2023-09-06 PROCEDURE — 99285 EMERGENCY DEPT VISIT HI MDM: CPT

## 2023-09-06 RX ORDER — ATORVASTATIN CALCIUM 40 MG/1
40 TABLET, FILM COATED ORAL DAILY
Status: DISCONTINUED | OUTPATIENT
Start: 2023-09-06 | End: 2023-09-11 | Stop reason: HOSPADM

## 2023-09-06 RX ORDER — ACETAMINOPHEN 325 MG/1
650 TABLET ORAL EVERY 6 HOURS PRN
Status: DISCONTINUED | OUTPATIENT
Start: 2023-09-06 | End: 2023-09-11 | Stop reason: HOSPADM

## 2023-09-06 RX ORDER — IPRATROPIUM BROMIDE AND ALBUTEROL SULFATE 2.5; .5 MG/3ML; MG/3ML
3 SOLUTION RESPIRATORY (INHALATION) ONCE
Status: COMPLETED | OUTPATIENT
Start: 2023-09-06 | End: 2023-09-06

## 2023-09-06 RX ORDER — DILTIAZEM HYDROCHLORIDE 5 MG/ML
20 INJECTION INTRAVENOUS ONCE
Status: COMPLETED | OUTPATIENT
Start: 2023-09-06 | End: 2023-09-06

## 2023-09-06 RX ORDER — SODIUM CHLORIDE 0.9 % (FLUSH) 0.9 %
5-40 SYRINGE (ML) INJECTION EVERY 12 HOURS SCHEDULED
Status: DISCONTINUED | OUTPATIENT
Start: 2023-09-06 | End: 2023-09-11 | Stop reason: HOSPADM

## 2023-09-06 RX ORDER — SODIUM CHLORIDE 0.9 % (FLUSH) 0.9 %
5-40 SYRINGE (ML) INJECTION PRN
Status: DISCONTINUED | OUTPATIENT
Start: 2023-09-06 | End: 2023-09-11 | Stop reason: HOSPADM

## 2023-09-06 RX ORDER — FUROSEMIDE 10 MG/ML
40 INJECTION INTRAMUSCULAR; INTRAVENOUS ONCE
Status: COMPLETED | OUTPATIENT
Start: 2023-09-06 | End: 2023-09-06

## 2023-09-06 RX ORDER — BUDESONIDE AND FORMOTEROL FUMARATE DIHYDRATE 80; 4.5 UG/1; UG/1
2 AEROSOL RESPIRATORY (INHALATION) 2 TIMES DAILY
Status: DISCONTINUED | OUTPATIENT
Start: 2023-09-06 | End: 2023-09-11 | Stop reason: HOSPADM

## 2023-09-06 RX ORDER — INSULIN LISPRO 100 [IU]/ML
0-4 INJECTION, SOLUTION INTRAVENOUS; SUBCUTANEOUS
Status: DISCONTINUED | OUTPATIENT
Start: 2023-09-06 | End: 2023-09-07

## 2023-09-06 RX ORDER — FUROSEMIDE 10 MG/ML
40 INJECTION INTRAMUSCULAR; INTRAVENOUS DAILY
Status: DISCONTINUED | OUTPATIENT
Start: 2023-09-07 | End: 2023-09-08

## 2023-09-06 RX ORDER — CARVEDILOL 25 MG/1
25 TABLET ORAL 2 TIMES DAILY
Status: DISCONTINUED | OUTPATIENT
Start: 2023-09-06 | End: 2023-09-11 | Stop reason: HOSPADM

## 2023-09-06 RX ORDER — POLYETHYLENE GLYCOL 3350 17 G/17G
17 POWDER, FOR SOLUTION ORAL DAILY PRN
Status: DISCONTINUED | OUTPATIENT
Start: 2023-09-06 | End: 2023-09-11 | Stop reason: HOSPADM

## 2023-09-06 RX ORDER — GLUCAGON 1 MG/ML
1 KIT INJECTION PRN
Status: DISCONTINUED | OUTPATIENT
Start: 2023-09-06 | End: 2023-09-11 | Stop reason: HOSPADM

## 2023-09-06 RX ORDER — DEXTROSE MONOHYDRATE 100 MG/ML
INJECTION, SOLUTION INTRAVENOUS CONTINUOUS PRN
Status: DISCONTINUED | OUTPATIENT
Start: 2023-09-06 | End: 2023-09-11 | Stop reason: HOSPADM

## 2023-09-06 RX ORDER — DILTIAZEM HYDROCHLORIDE 5 MG/ML
0.35 INJECTION INTRAVENOUS ONCE
Status: DISCONTINUED | OUTPATIENT
Start: 2023-09-06 | End: 2023-09-07

## 2023-09-06 RX ORDER — INSULIN LISPRO 100 [IU]/ML
0-4 INJECTION, SOLUTION INTRAVENOUS; SUBCUTANEOUS NIGHTLY
Status: DISCONTINUED | OUTPATIENT
Start: 2023-09-06 | End: 2023-09-07

## 2023-09-06 RX ORDER — ASPIRIN 81 MG/1
81 TABLET, CHEWABLE ORAL DAILY
Status: DISCONTINUED | OUTPATIENT
Start: 2023-09-06 | End: 2023-09-11 | Stop reason: HOSPADM

## 2023-09-06 RX ORDER — ONDANSETRON 2 MG/ML
4 INJECTION INTRAMUSCULAR; INTRAVENOUS EVERY 6 HOURS PRN
Status: DISCONTINUED | OUTPATIENT
Start: 2023-09-06 | End: 2023-09-06

## 2023-09-06 RX ORDER — METHYLPREDNISOLONE SODIUM SUCCINATE 125 MG/2ML
125 INJECTION, POWDER, LYOPHILIZED, FOR SOLUTION INTRAMUSCULAR; INTRAVENOUS ONCE
Status: COMPLETED | OUTPATIENT
Start: 2023-09-06 | End: 2023-09-06

## 2023-09-06 RX ORDER — AMIODARONE HYDROCHLORIDE 200 MG/1
200 TABLET ORAL DAILY
Status: DISCONTINUED | OUTPATIENT
Start: 2023-09-06 | End: 2023-09-11 | Stop reason: HOSPADM

## 2023-09-06 RX ORDER — PANTOPRAZOLE SODIUM 40 MG/1
40 TABLET, DELAYED RELEASE ORAL DAILY
Status: DISCONTINUED | OUTPATIENT
Start: 2023-09-06 | End: 2023-09-11 | Stop reason: HOSPADM

## 2023-09-06 RX ORDER — LEVOTHYROXINE SODIUM 0.07 MG/1
75 TABLET ORAL DAILY
Status: DISCONTINUED | OUTPATIENT
Start: 2023-09-06 | End: 2023-09-11 | Stop reason: HOSPADM

## 2023-09-06 RX ORDER — MAGNESIUM SULFATE IN WATER 40 MG/ML
2000 INJECTION, SOLUTION INTRAVENOUS ONCE
Status: COMPLETED | OUTPATIENT
Start: 2023-09-06 | End: 2023-09-06

## 2023-09-06 RX ORDER — SODIUM CHLORIDE 9 MG/ML
INJECTION, SOLUTION INTRAVENOUS PRN
Status: DISCONTINUED | OUTPATIENT
Start: 2023-09-06 | End: 2023-09-11 | Stop reason: HOSPADM

## 2023-09-06 RX ORDER — CALCIUM CARBONATE-CHOLECALCIFEROL TAB 250 MG-125 UNIT 250-125 MG-UNIT
2 TAB ORAL DAILY
Status: DISCONTINUED | OUTPATIENT
Start: 2023-09-06 | End: 2023-09-11 | Stop reason: HOSPADM

## 2023-09-06 RX ORDER — ACETAMINOPHEN 650 MG/1
650 SUPPOSITORY RECTAL EVERY 6 HOURS PRN
Status: DISCONTINUED | OUTPATIENT
Start: 2023-09-06 | End: 2023-09-11 | Stop reason: HOSPADM

## 2023-09-06 RX ORDER — ONDANSETRON 4 MG/1
4 TABLET, ORALLY DISINTEGRATING ORAL EVERY 8 HOURS PRN
Status: DISCONTINUED | OUTPATIENT
Start: 2023-09-06 | End: 2023-09-06

## 2023-09-06 RX ORDER — DOXEPIN HYDROCHLORIDE 10 MG/1
10 CAPSULE ORAL NIGHTLY
Status: DISCONTINUED | OUTPATIENT
Start: 2023-09-06 | End: 2023-09-11 | Stop reason: HOSPADM

## 2023-09-06 RX ADMIN — ASPIRIN 81 MG CHEWABLE TABLET 81 MG: 81 TABLET CHEWABLE at 18:40

## 2023-09-06 RX ADMIN — ATORVASTATIN CALCIUM 40 MG: 40 TABLET, FILM COATED ORAL at 18:39

## 2023-09-06 RX ADMIN — DILTIAZEM HYDROCHLORIDE 20 MG: 5 INJECTION, SOLUTION INTRAVENOUS at 12:00

## 2023-09-06 RX ADMIN — MAGNESIUM SULFATE HEPTAHYDRATE 2000 MG: 40 INJECTION, SOLUTION INTRAVENOUS at 11:54

## 2023-09-06 RX ADMIN — Medication 2 TABLET: at 21:08

## 2023-09-06 RX ADMIN — CARVEDILOL 25 MG: 25 TABLET, FILM COATED ORAL at 21:08

## 2023-09-06 RX ADMIN — DOXEPIN HYDROCHLORIDE 10 MG: 10 CAPSULE ORAL at 21:08

## 2023-09-06 RX ADMIN — BUDESONIDE AND FORMOTEROL FUMARATE DIHYDRATE 2 PUFF: 80; 4.5 AEROSOL RESPIRATORY (INHALATION) at 22:46

## 2023-09-06 RX ADMIN — IPRATROPIUM BROMIDE AND ALBUTEROL SULFATE 3 DOSE: 2.5; .5 SOLUTION RESPIRATORY (INHALATION) at 12:30

## 2023-09-06 RX ADMIN — PANTOPRAZOLE SODIUM 40 MG: 40 TABLET, DELAYED RELEASE ORAL at 18:40

## 2023-09-06 RX ADMIN — FUROSEMIDE 40 MG: 10 INJECTION, SOLUTION INTRAMUSCULAR; INTRAVENOUS at 14:14

## 2023-09-06 RX ADMIN — SODIUM CHLORIDE 5 MG/HR: 900 INJECTION, SOLUTION INTRAVENOUS at 12:34

## 2023-09-06 RX ADMIN — METHYLPREDNISOLONE SODIUM SUCCINATE 125 MG: 125 INJECTION INTRAMUSCULAR; INTRAVENOUS at 11:45

## 2023-09-06 RX ADMIN — AMIODARONE HYDROCHLORIDE 200 MG: 200 TABLET ORAL at 18:40

## 2023-09-06 RX ADMIN — LEVOTHYROXINE SODIUM 75 MCG: 0.07 TABLET ORAL at 18:40

## 2023-09-06 RX ADMIN — APIXABAN 5 MG: 5 TABLET, FILM COATED ORAL at 21:08

## 2023-09-06 NOTE — TELEPHONE ENCOUNTER
Faxed referral, demographics, insurance card, and office note to Physical Therapy at at Fax# [phone]. # [phone].

## 2023-09-06 NOTE — ED PROVIDER NOTES
Emergency Department Encounter  Location: Count includes the [facility]    Patient: Negra Gonsales  MRN: [id number]  : 1950  Date of evaluation: 2023  ED Provider: Olga Montoya DO    Chief Complaint:    Shortness of Breath (PT was rapid response; waiting for cardiac stress test and ran out of O2 in tank. Unknown how long was out)    [place]:  Negra Gonsales is a 68 y.o. female that presents to the emergency department who presented from the outpatient cardiac department. She was scheduled for a RAMIRO with possible cardioversion today. Patient has a known history of COPD as well as A-fib with RVR. She apparently is on 3 L nasal cannula all the time and had been off her oxygen for an unknown period of time. Was found to be hypoxic, distressed per staff. Past Medical History:   Diagnosis Date    CAD (coronary artery disease)     COPD (chronic obstructive pulmonary disease) ([facility])     GERD (gastroesophageal reflux disease)     H/O cardiovascular stress test 2021    normal study 63% EF no ischemia    H/O echocardiogram 2017    EF 35-40%    H/O echocardiogram 2020    EF 45-50, Mild TR & NM, Severely dilated left atrium. 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, functionally normally     H/O percutaneous left heart catheterization 2023    RCA is occluded but SVG to RCA is patent,    H/O transesophageal echocardiography (RAMIRO) for monitoring 2023    Moderately dilated left atrium,  Heavily calcified aortic valve with severe aortic stenosis,  Mild mitral regurgitation.     Heart attack ([address]) 2014    Hyperlipidemia     Hypertension     On home oxygen therapy     nightly    PAD (peripheral artery disease) ([facility])     S/P right coronary artery (RCA) stent placement     Sleep apnea     uses CPAP    T2 vertebral fracture ([address]) 2017    TIA (transient ischemic attack)     Tobacco dependence     Wears glasses     Wears partial dentures     Upper     Past Surgical History:   Procedure

## 2023-09-06 NOTE — ED NOTES
Medication History  [facility]    Patient Name: Oskar Yu 1950     Medication history has been completed by: Denton Gonzalez CPhT    Source(s) of information: patient,  and insurance claims     Primary Care Physician: Shelli Soler MD     Pharmacy: [facility]    Allergies as of 09/06/2023    (No Known Allergies)        Prior to Admission medications    Medication Sig Start Date End Date Taking?  Authorizing Provider   amiodarone (CORDARONE) 200 MG tablet Take 1 tablet by mouth daily 9/1/23   Kenji Corley MD   Cyanocobalamin (VITAMIN B 12 PO) Take 1 tablet by mouth daily    Historical Provider, MD   lisinopril (PRINIVIL;ZESTRIL) 10 MG tablet Take 1 tablet by mouth daily 8/17/23   SATURNINO Kasper CNP   furosemide (LASIX) 20 MG tablet Take 0.5 tablets by mouth daily 8/4/23   Kenji Corley MD   tiotropium (SPIRIVA RESPIMAT) 2.5 MCG/ACT AERS inhaler INHALE 2 SPRAY(S) BY MOUTH ONCE DAILY 7/31/23   Ijeoma Fabian MD   SYMBICORT 80-4.5 MCG/ACT AERO Inhale 2 puffs by mouth twice daily 7/28/23   Vianey Salazar MD   doxepin [facility]) 10 MG capsule Take 1 capsule by mouth nightly 6/23/23   Vianey Salazar MD   empagliflozin (JARDIANCE) 10 MG tablet Take 1 tablet by mouth daily 6/23/23   Vianey Salazar MD   pantoprazole (PROTONIX) 40 MG tablet Take 1 tablet by mouth daily 6/23/23   Vianey Salazar MD   levothyroxine (SYNTHROID) 75 MCG tablet Take 1 tablet by mouth once daily 6/16/23   Vianey Salazar MD   apixaban (ELIQUIS) 5 MG TABS tablet Take 1 tablet by mouth 2 times daily 4/12/23   Kenji Corley MD   OXYGEN Inhale into the lungs    Historical Provider, MD   amLODIPine (NORVASC) 10 MG tablet Take 1 tablet by mouth daily 3/24/23   Kenji Corley MD   aspirin 81 MG EC tablet Take 1 tablet by mouth daily 3/24/23   Vianey Salazar MD   nitroGLYCERIN (NITROSTAT) 0.4 MG SL tablet Place 1 tablet under the tongue every 5 minutes as needed

## 2023-09-06 NOTE — ED NOTES
ED TO INPATIENT SBAR HANDOFF    Patient Name: Winifred Mckeon   :  1950  68 y.o. Preferred Name    Family/Caregiver Present yes   Restraints no   C-SSRS: Risk of Suicide: No Risk  Sitter no   Sepsis Risk Score Sepsis Risk Score: 10.28      Situation  Chief Complaint   Patient presents with    Shortness of Breath     PT was rapid response; waiting for cardiac stress test and ran out of O2 in tank. Unknown how long was out     Brief Description of Patient's Condition: presents to the emergency department who presented from a outpatient cardiac department. She was scheduled for a RAMIRO with possible cardioversion today. Patient has a known history of COPD as well as A-fib with RVR. She apparently is on 3 L nasal cannula all the time and had been off her oxygen for an unknown period of time. Was found to be hypoxic, distressed per staff. Mental Status: oriented  Arrived from: home    Imaging:   XR CHEST PORTABLE   Final Result   Fluid overload and/or acute CHF exacerbation suspected with moderate   pulmonary edema and trace bilateral pleural effusions. Multifocal or   viral/typical pneumonia cannot be excluded.            Abnormal labs:   Abnormal Labs Reviewed   TROPONIN - Abnormal; Notable for the following components:       Result Value    Troponin T 0.035 (*)     All other components within normal limits   BLOOD GAS, VENOUS - Abnormal; Notable for the following components:    pCO2, Pasquale 55 (*)     HCO3, Venous 28.3 (*)     All other components within normal limits   COMPREHENSIVE METABOLIC PANEL - Abnormal; Notable for the following components:    Chloride 97 (*)     BUN 25 (*)     Creatinine 2.1 (*)     Est, Glom Filt Rate 24 (*)     Glucose 260 (*)     All other components within normal limits   CBC WITH AUTO DIFFERENTIAL - Abnormal; Notable for the following components:    WBC 14.0 (*)     RBC 3.69 (*)     Hemoglobin 12.0 (*)     .8 (*)     MCH 32.5 (*)     MCHC 31.3 (*)     RDW 15.0 (*)

## 2023-09-06 NOTE — PROGRESS NOTES
RECEIVED A CALL FROM REGISTRATION THAT OUR OUTPATIENT STATES HER OXYGEN TANK HAD RUN OUT OF OXYGEN. WE IMMEDIATELY BROUGHT THE PT BACK TO THE STRESS LAB. SHE WAS AWAKE AND ORIENTED AND ASSISTED HERSELF ONTO THE CART. PT APPEARED SLIGHTLY SHORT OF BREATH AND WAS PLACED ON THE MONITOR. O2 SAT WAS FOUND TO BE 66% AND PT WAS PLACED ON A NONREBREATHER MASK. O2 SAT CAME UP TO 96%  AND PT WAS THEN PLACED BACK ON NASAL CANULA  AT 6/L.  EKG WAS OBTAINED AND PT WAS FOUND TO BE IN ATRIAL FIB WITH RVR AND RATE . PT THEN BEGAN TO DESAT AGAIN AND NONREBREATHER WAS AGAIN PLACED ON PT.  O2 SAT REMAINED IN THE HIGH 70'S AND A RAPID RESPONSE WAS CALLED. AN IV WAS STARTED IN THE LEFT ARM, RESPIRATORY THERAPY WAS PRESENT AND PT WAS TRANSFERRED TO THE ED.

## 2023-09-07 LAB
ALBUMIN SERPL-MCNC: 3.9 GM/DL (ref 3.4–5)
ALP BLD-CCNC: 84 IU/L (ref 40–128)
ALT SERPL-CCNC: 19 U/L (ref 10–40)
ANION GAP SERPL CALCULATED.3IONS-SCNC: 14 MMOL/L (ref 4–16)
AST SERPL-CCNC: 26 IU/L (ref 15–37)
B PARAP IS1001 DNA NPH QL NAA+NON-PROBE: NOT DETECTED
B PERT.PT PRMT NPH QL NAA+NON-PROBE: NOT DETECTED
BASOPHILS ABSOLUTE: 0 K/CU MM
BASOPHILS RELATIVE PERCENT: 0.1 % (ref 0–1)
BILIRUB SERPL-MCNC: 0.9 MG/DL (ref 0–1)
BUN SERPL-MCNC: 29 MG/DL (ref 6–23)
C PNEUM DNA NPH QL NAA+NON-PROBE: NOT DETECTED
CALCIUM SERPL-MCNC: 9.2 MG/DL (ref 8.3–10.6)
CHLORIDE BLD-SCNC: 96 MMOL/L (ref 99–110)
CO2: 24 MMOL/L (ref 21–32)
CREAT SERPL-MCNC: 2 MG/DL (ref 0.6–1.1)
DIFFERENTIAL TYPE: ABNORMAL
EKG ATRIAL RATE: 127 BPM
EKG ATRIAL RATE: 138 BPM
EKG DIAGNOSIS: NORMAL
EKG DIAGNOSIS: NORMAL
EKG Q-T INTERVAL: 380 MS
EKG Q-T INTERVAL: 388 MS
EKG QRS DURATION: 152 MS
EKG QRS DURATION: 154 MS
EKG QTC CALCULATION (BAZETT): 541 MS
EKG QTC CALCULATION (BAZETT): 583 MS
EKG R AXIS: 140 DEGREES
EKG R AXIS: 60 DEGREES
EKG T AXIS: 44 DEGREES
EKG T AXIS: 64 DEGREES
EKG VENTRICULAR RATE: 122 BPM
EKG VENTRICULAR RATE: 136 BPM
EOSINOPHILS ABSOLUTE: 0 K/CU MM
EOSINOPHILS RELATIVE PERCENT: 0 % (ref 0–3)
FLUAV H1 2009 PAN RNA NPH NAA+NON-PROBE: NOT DETECTED
FLUAV H1 RNA NPH QL NAA+NON-PROBE: NOT DETECTED
FLUAV H3 RNA NPH QL NAA+NON-PROBE: NOT DETECTED
FLUAV RNA NPH QL NAA+NON-PROBE: NOT DETECTED
FLUBV RNA NPH QL NAA+NON-PROBE: NOT DETECTED
GFR SERPL CREATININE-BSD FRML MDRD: 26 ML/MIN/1.73M2
GLUCOSE BLD-MCNC: 136 MG/DL (ref 70–99)
GLUCOSE SERPL-MCNC: 137 MG/DL (ref 70–99)
HADV DNA NPH QL NAA+NON-PROBE: NOT DETECTED
HCOV 229E RNA NPH QL NAA+NON-PROBE: NOT DETECTED
HCOV HKU1 RNA NPH QL NAA+NON-PROBE: NOT DETECTED
HCOV NL63 RNA NPH QL NAA+NON-PROBE: NOT DETECTED
HCOV OC43 RNA NPH QL NAA+NON-PROBE: NOT DETECTED
HCT VFR BLD CALC: 31.3 % (ref 37–47)
HEMOGLOBIN: 10 GM/DL (ref 12.5–16)
HMPV RNA NPH QL NAA+NON-PROBE: NOT DETECTED
HPIV1 RNA NPH QL NAA+NON-PROBE: NOT DETECTED
HPIV2 RNA NPH QL NAA+NON-PROBE: NOT DETECTED
HPIV3 RNA NPH QL NAA+NON-PROBE: NOT DETECTED
HPIV4 RNA NPH QL NAA+NON-PROBE: NOT DETECTED
IMMATURE NEUTROPHIL %: 0.3 % (ref 0–0.43)
LYMPHOCYTES ABSOLUTE: 0.8 K/CU MM
LYMPHOCYTES RELATIVE PERCENT: 11.2 % (ref 24–44)
M PNEUMO DNA NPH QL NAA+NON-PROBE: NOT DETECTED
MCH RBC QN AUTO: 32.1 PG (ref 27–31)
MCHC RBC AUTO-ENTMCNC: 31.9 % (ref 32–36)
MCV RBC AUTO: 100.3 FL (ref 78–100)
MONOCYTES ABSOLUTE: 0.2 K/CU MM
MONOCYTES RELATIVE PERCENT: 2.7 % (ref 0–4)
NUCLEATED RBC %: 0 %
PDW BLD-RTO: 15 % (ref 11.7–14.9)
PLATELET # BLD: 191 K/CU MM (ref 140–440)
PMV BLD AUTO: 11.7 FL (ref 7.5–11.1)
POTASSIUM SERPL-SCNC: 4 MMOL/L (ref 3.5–5.1)
RBC # BLD: 3.12 M/CU MM (ref 4.2–5.4)
RSV RNA NPH QL NAA+NON-PROBE: NOT DETECTED
RV+EV RNA NPH QL NAA+NON-PROBE: NOT DETECTED
SARS-COV-2 RNA NPH QL NAA+NON-PROBE: NOT DETECTED
SEGMENTED NEUTROPHILS ABSOLUTE COUNT: 6 K/CU MM
SEGMENTED NEUTROPHILS RELATIVE PERCENT: 85.7 % (ref 36–66)
SODIUM BLD-SCNC: 134 MMOL/L (ref 135–145)
TOTAL IMMATURE NEUTOROPHIL: 0.02 K/CU MM
TOTAL NUCLEATED RBC: 0 K/CU MM
TOTAL PROTEIN: 6.3 GM/DL (ref 6.4–8.2)
TROPONIN T: 0.03 NG/ML
WBC # BLD: 7 K/CU MM (ref 4–10.5)

## 2023-09-07 PROCEDURE — 85025 COMPLETE CBC W/AUTO DIFF WBC: CPT

## 2023-09-07 PROCEDURE — 36415 COLL VENOUS BLD VENIPUNCTURE: CPT

## 2023-09-07 PROCEDURE — 80053 COMPREHEN METABOLIC PANEL: CPT

## 2023-09-07 PROCEDURE — 2580000003 HC RX 258: Performed by: INTERNAL MEDICINE

## 2023-09-07 PROCEDURE — 6370000000 HC RX 637 (ALT 250 FOR IP): Performed by: INTERNAL MEDICINE

## 2023-09-07 PROCEDURE — 6370000000 HC RX 637 (ALT 250 FOR IP)

## 2023-09-07 PROCEDURE — APPSS30 APP SPLIT SHARED TIME 16-30 MINUTES

## 2023-09-07 PROCEDURE — 6370000000 HC RX 637 (ALT 250 FOR IP): Performed by: NURSE PRACTITIONER

## 2023-09-07 PROCEDURE — 2140000000 HC CCU INTERMEDIATE R&B

## 2023-09-07 PROCEDURE — 0202U NFCT DS 22 TRGT SARS-COV-2: CPT

## 2023-09-07 PROCEDURE — 99223 1ST HOSP IP/OBS HIGH 75: CPT | Performed by: INTERNAL MEDICINE

## 2023-09-07 PROCEDURE — 94761 N-INVAS EAR/PLS OXIMETRY MLT: CPT

## 2023-09-07 PROCEDURE — 6360000002 HC RX W HCPCS: Performed by: INTERNAL MEDICINE

## 2023-09-07 PROCEDURE — 94660 CPAP INITIATION&MGMT: CPT

## 2023-09-07 PROCEDURE — 84484 ASSAY OF TROPONIN QUANT: CPT

## 2023-09-07 PROCEDURE — 2700000000 HC OXYGEN THERAPY PER DAY

## 2023-09-07 PROCEDURE — 82962 GLUCOSE BLOOD TEST: CPT

## 2023-09-07 PROCEDURE — 93010 ELECTROCARDIOGRAM REPORT: CPT | Performed by: INTERNAL MEDICINE

## 2023-09-07 PROCEDURE — 94640 AIRWAY INHALATION TREATMENT: CPT

## 2023-09-07 RX ORDER — ALBUTEROL SULFATE 90 UG/1
2 AEROSOL, METERED RESPIRATORY (INHALATION) EVERY 6 HOURS PRN
Status: DISCONTINUED | OUTPATIENT
Start: 2023-09-07 | End: 2023-09-11 | Stop reason: HOSPADM

## 2023-09-07 RX ORDER — LISINOPRIL 5 MG/1
10 TABLET ORAL DAILY
Status: DISCONTINUED | OUTPATIENT
Start: 2023-09-07 | End: 2023-09-11 | Stop reason: HOSPADM

## 2023-09-07 RX ORDER — IPRATROPIUM BROMIDE AND ALBUTEROL SULFATE 2.5; .5 MG/3ML; MG/3ML
1 SOLUTION RESPIRATORY (INHALATION)
Status: DISCONTINUED | OUTPATIENT
Start: 2023-09-08 | End: 2023-09-11 | Stop reason: HOSPADM

## 2023-09-07 RX ADMIN — ALBUTEROL SULFATE 2 PUFF: 90 AEROSOL, METERED RESPIRATORY (INHALATION) at 21:58

## 2023-09-07 RX ADMIN — APIXABAN 5 MG: 5 TABLET, FILM COATED ORAL at 21:08

## 2023-09-07 RX ADMIN — SODIUM CHLORIDE, PRESERVATIVE FREE 10 ML: 5 INJECTION INTRAVENOUS at 21:08

## 2023-09-07 RX ADMIN — CARVEDILOL 25 MG: 25 TABLET, FILM COATED ORAL at 21:08

## 2023-09-07 RX ADMIN — BUDESONIDE AND FORMOTEROL FUMARATE DIHYDRATE 2 PUFF: 80; 4.5 AEROSOL RESPIRATORY (INHALATION) at 07:16

## 2023-09-07 RX ADMIN — SODIUM CHLORIDE, PRESERVATIVE FREE 10 ML: 5 INJECTION INTRAVENOUS at 09:26

## 2023-09-07 RX ADMIN — AMIODARONE HYDROCHLORIDE 200 MG: 200 TABLET ORAL at 09:25

## 2023-09-07 RX ADMIN — APIXABAN 5 MG: 5 TABLET, FILM COATED ORAL at 09:25

## 2023-09-07 RX ADMIN — DOXEPIN HYDROCHLORIDE 10 MG: 10 CAPSULE ORAL at 21:08

## 2023-09-07 RX ADMIN — ATORVASTATIN CALCIUM 40 MG: 40 TABLET, FILM COATED ORAL at 09:25

## 2023-09-07 RX ADMIN — FUROSEMIDE 40 MG: 10 INJECTION, SOLUTION INTRAMUSCULAR; INTRAVENOUS at 09:25

## 2023-09-07 RX ADMIN — ASPIRIN 81 MG CHEWABLE TABLET 81 MG: 81 TABLET CHEWABLE at 09:25

## 2023-09-07 RX ADMIN — Medication 2 TABLET: at 10:32

## 2023-09-07 RX ADMIN — BUDESONIDE AND FORMOTEROL FUMARATE DIHYDRATE 2 PUFF: 80; 4.5 AEROSOL RESPIRATORY (INHALATION) at 20:58

## 2023-09-07 RX ADMIN — PANTOPRAZOLE SODIUM 40 MG: 40 TABLET, DELAYED RELEASE ORAL at 09:25

## 2023-09-07 RX ADMIN — TIOTROPIUM BROMIDE INHALATION SPRAY 2 PUFF: 3.12 SPRAY, METERED RESPIRATORY (INHALATION) at 07:16

## 2023-09-07 RX ADMIN — LEVOTHYROXINE SODIUM 75 MCG: 0.07 TABLET ORAL at 09:25

## 2023-09-07 RX ADMIN — CARVEDILOL 25 MG: 25 TABLET, FILM COATED ORAL at 09:25

## 2023-09-07 RX ADMIN — LISINOPRIL 10 MG: 5 TABLET ORAL at 16:15

## 2023-09-07 ASSESSMENT — ENCOUNTER SYMPTOMS
SHORTNESS OF BREATH: 1
CHEST TIGHTNESS: 0
VOMITING: 0
COUGH: 1
DIARRHEA: 0
ABDOMINAL PAIN: 0

## 2023-09-07 NOTE — PLAN OF CARE
Problem: Discharge Planning  Goal: Discharge to home or other facility with appropriate resources  Outcome: Progressing  Flowsheets (Taken 9/6/2023 1710 by Kim Webber RN)  Discharge to home or other facility with appropriate resources: Arrange for needed discharge resources and transportation as appropriate     Problem: Chronic Conditions and Co-morbidities  Goal: Patient's chronic conditions and co-morbidity symptoms are monitored and maintained or improved  Outcome: Progressing     Problem: ABCDS Injury Assessment  Goal: Absence of physical injury  Outcome: Progressing     Problem: Safety - Adult  Goal: Free from fall injury  Outcome: Progressing

## 2023-09-07 NOTE — CONSULTS
out).  Chief Complaint   Patient presents with    Shortness of Breath     PT was rapid response; waiting for cardiac stress test and ran out of O2 in tank. Unknown how long was out     Please review addendum/changes made to note above   Interval history: Patient feels little better. She continues to remain short of breath and has palpitations. Physical Exam:  General:  Awake, alert, NAD  Head:normal  Eye:normal  Neck:  No JVD   Chest: Decreased breath sounds in bases cardiovascular: Regular pulse   Abdomen:   nontender  Extremities: No edema  Pulses; palpable  Neuro: grossly normal        I agree with the plan, which was planned by myself and discussed with advanced level provider. My documented MDM is a substantive portion of the supervisory note. I have seen ,spoken to  and examined this patient personally, independently of the advanced level provider. I have spent substantiate  portion of this encounter independently myself in examining patient and developing the medical management plan . I have reviewed the hospital care given to date and reviewed all pertinent labs and imaging. The plan was developed mutually at the time of the visit with the patient,  NP /PA  and myself. I have spoken with patient, nursing staff and provided written and verbal instructions . The above note has been reviewed and I agree with the assessment, diagnosis, and treatment plan with changes made by me as follows .     Sammie Santos MD

## 2023-09-07 NOTE — CARE COORDINATION
.CM met with pt for d/c planning. Introduced self and updated white board. Pt lives with spouse and receives assistance with bathing and dressing. D/c plan is home with spouse, denies any d/c needs. Notify CM if any d/c needs arise. TE     09/07/23 2011   Service Assessment   Patient Orientation Alert and Oriented   Cognition Alert   History Provided By Patient   Primary Caregiver Self   Support Systems Spouse/Significant Other;Children   Patient's Healthcare Decision Maker is:   (SELF)   PCP Verified by CM Yes   Last Visit to PCP Within last 3 months   Prior Functional Level Assistance with the following:;Bathing;Dressing;Housework;Cooking; Shopping   Current Functional Level Assistance with the following:;Bathing;Dressing;Cooking;Housework; Shopping   Can patient return to prior living arrangement Yes   Ability to make needs known: Good   Family able to assist with home care needs: Yes   Would you like for me to discuss the discharge plan with any other family members/significant others, and if so, who? No   Financial Resources Medicare   Community Resources None   Social/Functional History   Lives With Spouse   Type of [address] [place] [address]  One level   Bathroom Shower/Tub Walk-in shower   [facility] Grab bars in shower; Shower chair   Home Equipment Cane;Oxygen  (ON 4L 24/7)   Receives Help From Family   Active  No   Patient's  Info SPOUSE   Mode of Transportation Car   Discharge Planning   Type of Residence [employer]   Living Arrangements Spouse/Significant Other   Current Services Prior To Admission Oxygen Therapy   Potential Assistance Needed N/A   DME Ordered?  No   Potential Assistance Purchasing Medications No   Type of Home Care Services None   Patient expects to be discharged to: [facility]   Transition of Care Consult (CM Consult) N/A   Services At/After Discharge None

## 2023-09-08 ENCOUNTER — APPOINTMENT (OUTPATIENT)
Dept: GENERAL RADIOLOGY | Age: 73
DRG: 291 | End: 2023-09-08
Payer: MEDICARE

## 2023-09-08 LAB
ALBUMIN SERPL-MCNC: 3.8 GM/DL (ref 3.4–5)
ALP BLD-CCNC: 75 IU/L (ref 40–128)
ALT SERPL-CCNC: 21 U/L (ref 10–40)
ANION GAP SERPL CALCULATED.3IONS-SCNC: 16 MMOL/L (ref 4–16)
AST SERPL-CCNC: 27 IU/L (ref 15–37)
BASOPHILS ABSOLUTE: 0 K/CU MM
BASOPHILS RELATIVE PERCENT: 0.1 % (ref 0–1)
BILIRUB SERPL-MCNC: 1 MG/DL (ref 0–1)
BUN SERPL-MCNC: 37 MG/DL (ref 6–23)
CALCIUM SERPL-MCNC: 9.2 MG/DL (ref 8.3–10.6)
CHLORIDE BLD-SCNC: 97 MMOL/L (ref 99–110)
CO2: 21 MMOL/L (ref 21–32)
CREAT SERPL-MCNC: 2.2 MG/DL (ref 0.6–1.1)
DIFFERENTIAL TYPE: ABNORMAL
EKG ATRIAL RATE: 288 BPM
EKG DIAGNOSIS: NORMAL
EKG P-R INTERVAL: 236 MS
EKG Q-T INTERVAL: 560 MS
EKG QRS DURATION: 164 MS
EKG QTC CALCULATION (BAZETT): 568 MS
EKG R AXIS: 135 DEGREES
EKG T AXIS: 94 DEGREES
EKG VENTRICULAR RATE: 62 BPM
EOSINOPHILS ABSOLUTE: 0 K/CU MM
EOSINOPHILS RELATIVE PERCENT: 0.1 % (ref 0–3)
GFR SERPL CREATININE-BSD FRML MDRD: 23 ML/MIN/1.73M2
GLUCOSE SERPL-MCNC: 99 MG/DL (ref 70–99)
HCT VFR BLD CALC: 31.4 % (ref 37–47)
HEMOGLOBIN: 9.2 GM/DL (ref 12.5–16)
IMMATURE NEUTROPHIL %: 0.4 % (ref 0–0.43)
LYMPHOCYTES ABSOLUTE: 1.3 K/CU MM
LYMPHOCYTES RELATIVE PERCENT: 9.4 % (ref 24–44)
MCH RBC QN AUTO: 31.9 PG (ref 27–31)
MCHC RBC AUTO-ENTMCNC: 29.3 % (ref 32–36)
MCV RBC AUTO: 109 FL (ref 78–100)
MONOCYTES ABSOLUTE: 0.8 K/CU MM
MONOCYTES RELATIVE PERCENT: 5.6 % (ref 0–4)
NUCLEATED RBC %: 0 %
PDW BLD-RTO: 15.2 % (ref 11.7–14.9)
PLATELET # BLD: 159 K/CU MM (ref 140–440)
PMV BLD AUTO: 11.9 FL (ref 7.5–11.1)
POTASSIUM SERPL-SCNC: 4 MMOL/L (ref 3.5–5.1)
PRO-BNP: ABNORMAL PG/ML
RBC # BLD: 2.88 M/CU MM (ref 4.2–5.4)
SEGMENTED NEUTROPHILS ABSOLUTE COUNT: 11.3 K/CU MM
SEGMENTED NEUTROPHILS RELATIVE PERCENT: 84.4 % (ref 36–66)
SODIUM BLD-SCNC: 134 MMOL/L (ref 135–145)
TOTAL IMMATURE NEUTOROPHIL: 0.05 K/CU MM
TOTAL NUCLEATED RBC: 0 K/CU MM
TOTAL PROTEIN: 5.9 GM/DL (ref 6.4–8.2)
WBC # BLD: 13.3 K/CU MM (ref 4–10.5)

## 2023-09-08 PROCEDURE — 92960 CARDIOVERSION ELECTRIC EXT: CPT

## 2023-09-08 PROCEDURE — 93010 ELECTROCARDIOGRAM REPORT: CPT | Performed by: INTERNAL MEDICINE

## 2023-09-08 PROCEDURE — 94761 N-INVAS EAR/PLS OXIMETRY MLT: CPT

## 2023-09-08 PROCEDURE — 83880 ASSAY OF NATRIURETIC PEPTIDE: CPT

## 2023-09-08 PROCEDURE — 93312 ECHO TRANSESOPHAGEAL: CPT | Performed by: INTERNAL MEDICINE

## 2023-09-08 PROCEDURE — 6370000000 HC RX 637 (ALT 250 FOR IP): Performed by: INTERNAL MEDICINE

## 2023-09-08 PROCEDURE — 85025 COMPLETE CBC W/AUTO DIFF WBC: CPT

## 2023-09-08 PROCEDURE — 2580000003 HC RX 258: Performed by: INTERNAL MEDICINE

## 2023-09-08 PROCEDURE — 93312 ECHO TRANSESOPHAGEAL: CPT

## 2023-09-08 PROCEDURE — 94010 BREATHING CAPACITY TEST: CPT

## 2023-09-08 PROCEDURE — 7100000001 HC PACU RECOVERY - ADDTL 15 MIN

## 2023-09-08 PROCEDURE — B24BZZ4 ULTRASONOGRAPHY OF HEART WITH AORTA, TRANSESOPHAGEAL: ICD-10-PCS | Performed by: INTERNAL MEDICINE

## 2023-09-08 PROCEDURE — 81003 URINALYSIS AUTO W/O SCOPE: CPT

## 2023-09-08 PROCEDURE — 51798 US URINE CAPACITY MEASURE: CPT

## 2023-09-08 PROCEDURE — 93005 ELECTROCARDIOGRAM TRACING: CPT | Performed by: INTERNAL MEDICINE

## 2023-09-08 PROCEDURE — 51701 INSERT BLADDER CATHETER: CPT

## 2023-09-08 PROCEDURE — 2700000000 HC OXYGEN THERAPY PER DAY

## 2023-09-08 PROCEDURE — 80053 COMPREHEN METABOLIC PANEL: CPT

## 2023-09-08 PROCEDURE — 6370000000 HC RX 637 (ALT 250 FOR IP)

## 2023-09-08 PROCEDURE — 92960 CARDIOVERSION ELECTRIC EXT: CPT | Performed by: INTERNAL MEDICINE

## 2023-09-08 PROCEDURE — 5A2204Z RESTORATION OF CARDIAC RHYTHM, SINGLE: ICD-10-PCS | Performed by: INTERNAL MEDICINE

## 2023-09-08 PROCEDURE — 2140000000 HC CCU INTERMEDIATE R&B

## 2023-09-08 PROCEDURE — 71045 X-RAY EXAM CHEST 1 VIEW: CPT

## 2023-09-08 PROCEDURE — 6370000000 HC RX 637 (ALT 250 FOR IP): Performed by: NURSE PRACTITIONER

## 2023-09-08 PROCEDURE — 36415 COLL VENOUS BLD VENIPUNCTURE: CPT

## 2023-09-08 PROCEDURE — 94660 CPAP INITIATION&MGMT: CPT

## 2023-09-08 PROCEDURE — 6360000002 HC RX W HCPCS: Performed by: INTERNAL MEDICINE

## 2023-09-08 PROCEDURE — 94640 AIRWAY INHALATION TREATMENT: CPT

## 2023-09-08 PROCEDURE — 99233 SBSQ HOSP IP/OBS HIGH 50: CPT | Performed by: INTERNAL MEDICINE

## 2023-09-08 PROCEDURE — 7100000000 HC PACU RECOVERY - FIRST 15 MIN

## 2023-09-08 PROCEDURE — 51702 INSERT TEMP BLADDER CATH: CPT

## 2023-09-08 RX ORDER — FUROSEMIDE 10 MG/ML
40 INJECTION INTRAMUSCULAR; INTRAVENOUS 2 TIMES DAILY
Status: DISCONTINUED | OUTPATIENT
Start: 2023-09-08 | End: 2023-09-09

## 2023-09-08 RX ORDER — NALOXONE HYDROCHLORIDE 0.4 MG/ML
0.4 INJECTION, SOLUTION INTRAMUSCULAR; INTRAVENOUS; SUBCUTANEOUS PRN
Status: DISCONTINUED | OUTPATIENT
Start: 2023-09-08 | End: 2023-09-11 | Stop reason: HOSPADM

## 2023-09-08 RX ORDER — LORAZEPAM 2 MG/ML
0.5 INJECTION INTRAMUSCULAR ONCE
Status: COMPLETED | OUTPATIENT
Start: 2023-09-08 | End: 2023-09-08

## 2023-09-08 RX ADMIN — IPRATROPIUM BROMIDE AND ALBUTEROL SULFATE 1 DOSE: .5; 2.5 SOLUTION RESPIRATORY (INHALATION) at 23:06

## 2023-09-08 RX ADMIN — APIXABAN 5 MG: 5 TABLET, FILM COATED ORAL at 20:12

## 2023-09-08 RX ADMIN — CARVEDILOL 25 MG: 25 TABLET, FILM COATED ORAL at 20:12

## 2023-09-08 RX ADMIN — NALOXONE HYDROCHLORIDE 0.4 MG: 0.4 INJECTION, SOLUTION INTRAMUSCULAR; INTRAVENOUS; SUBCUTANEOUS at 10:12

## 2023-09-08 RX ADMIN — FUROSEMIDE 40 MG: 10 INJECTION, SOLUTION INTRAMUSCULAR; INTRAVENOUS at 17:53

## 2023-09-08 RX ADMIN — BUDESONIDE AND FORMOTEROL FUMARATE DIHYDRATE 2 PUFF: 80; 4.5 AEROSOL RESPIRATORY (INHALATION) at 07:24

## 2023-09-08 RX ADMIN — BUDESONIDE AND FORMOTEROL FUMARATE DIHYDRATE 2 PUFF: 80; 4.5 AEROSOL RESPIRATORY (INHALATION) at 21:09

## 2023-09-08 RX ADMIN — LORAZEPAM 0.5 MG: 2 INJECTION INTRAMUSCULAR; INTRAVENOUS at 17:57

## 2023-09-08 RX ADMIN — IPRATROPIUM BROMIDE AND ALBUTEROL SULFATE 1 DOSE: .5; 2.5 SOLUTION RESPIRATORY (INHALATION) at 14:56

## 2023-09-08 RX ADMIN — IPRATROPIUM BROMIDE AND ALBUTEROL SULFATE 1 DOSE: .5; 2.5 SOLUTION RESPIRATORY (INHALATION) at 07:24

## 2023-09-08 RX ADMIN — DOXEPIN HYDROCHLORIDE 10 MG: 10 CAPSULE ORAL at 20:12

## 2023-09-08 RX ADMIN — IPRATROPIUM BROMIDE AND ALBUTEROL SULFATE 1 DOSE: .5; 2.5 SOLUTION RESPIRATORY (INHALATION) at 10:52

## 2023-09-08 RX ADMIN — SODIUM CHLORIDE, PRESERVATIVE FREE 10 ML: 5 INJECTION INTRAVENOUS at 20:24

## 2023-09-08 ASSESSMENT — COPD QUESTIONNAIRES
TOTAL_EXACERBATIONS_PASTYEAR: 2
QUESTION6_LEAVINGHOUSE: 0
QUESTION2_CHESTPHLEGM: 2
QUESTION8_ENERGYLEVEL: 4
QUESTION3_CHESTTIGHTNESS: 0
QUESTION2_CHESTPHLEGM: 3
GOLD_GROUP: GROUP E
QUESTION7_SLEEPQUALITY: 4
QUESTION1_COUGHFREQUENCY: 3
TOTAL_EXACERBATIONS_PASTYEAR: 2
QUESTION4_WALKINCLINE: 5
QUESTION5_HOMEACTIVITIES: 4
QUESTION4_WALKINCLINE: 5
QUESTION8_ENERGYLEVEL: 4
QUESTION6_LEAVINGHOUSE: 2
QUESTION4_WALKINCLINE: 5
QUESTION1_COUGHFREQUENCY: 1
QUESTION7_SLEEPQUALITY: 4
GOLD_GROUP: GROUP E
QUESTION2_CHESTPHLEGM: 3
TOTAL_EXACERBATIONS_PASTYEAR: 2
QUESTION3_CHESTTIGHTNESS: 0
CAT_TOTALSCORE: 20
QUESTION3_CHESTTIGHTNESS: 0
QUESTION6_LEAVINGHOUSE: 0
CAT_TOTALSCORE: 20
QUESTION5_HOMEACTIVITIES: 3
QUESTION5_HOMEACTIVITIES: 3
QUESTION7_SLEEPQUALITY: 4
QUESTION1_COUGHFREQUENCY: 1
GOLD_GROUP: GROUP E
CAT_TOTALSCORE: 24
QUESTION8_ENERGYLEVEL: 4

## 2023-09-08 ASSESSMENT — PULMONARY FUNCTION TESTS
FEV1 (%PREDICTED): 78
PIF_VALUE: 70
FEV1 (%PREDICTED): 52
PIF_VALUE: 70
POST BRONCHODILATOR FEV1/FVC: 80
POST BRONCHODILATOR FEV1/FVC: 90
FEV1 (%PREDICTED): 52
PIF_VALUE: 70
POST BRONCHODILATOR FEV1/FVC: 90

## 2023-09-08 NOTE — PLAN OF CARE
Problem: Discharge Planning  Goal: Discharge to home or other facility with appropriate resources  9/8/2023 1139 by Mindy Casas RN  Outcome: Progressing  9/7/2023 2349 by Ele Ledbetter RN  Outcome: Progressing     Problem: Chronic Conditions and Co-morbidities  Goal: Patient's chronic conditions and co-morbidity symptoms are monitored and maintained or improved  9/8/2023 1139 by Mindy Casas RN  Outcome: Progressing  9/7/2023 2349 by Ele Ledbetter RN  Outcome: Progressing     Problem: ABCDS Injury Assessment  Goal: Absence of physical injury  9/8/2023 1139 by Mindy Casas RN  Outcome: Progressing  9/7/2023 2349 by Ele Ledbetter RN  Outcome: Progressing     Problem: Safety - Adult  Goal: Free from fall injury  9/8/2023 1139 by Mindy Casas RN  Outcome: Progressing  9/7/2023 2349 by Ele Ledbetter, RN  Outcome: Progressing

## 2023-09-08 NOTE — PLAN OF CARE
Problem: Discharge Planning  Goal: Discharge to home or other facility with appropriate resources  9/7/2023 2349 by Dequan Sanon RN  Outcome: Progressing  9/7/2023 1202 by Rehana Wing RN  Outcome: Progressing  9/7/2023 1200 by Rehana Wing RN  Outcome: Progressing     Problem: Chronic Conditions and Co-morbidities  Goal: Patient's chronic conditions and co-morbidity symptoms are monitored and maintained or improved  9/7/2023 2349 by Dequan Sanon RN  Outcome: Progressing  9/7/2023 1202 by Rehana Wing RN  Outcome: Progressing  9/7/2023 1200 by Rehana Wing RN  Outcome: Progressing     Problem: ABCDS Injury Assessment  Goal: Absence of physical injury  9/7/2023 2349 by Dequan Sanon RN  Outcome: Progressing  9/7/2023 1202 by Rehana Wing RN  Outcome: Progressing  9/7/2023 1200 by Rehana Wing RN  Outcome: Progressing     Problem: Safety - Adult  Goal: Free from fall injury  9/7/2023 2349 by Dequan Sanon RN  Outcome: Progressing  9/7/2023 1202 by Rehana Wing RN  Outcome: Progressing  9/7/2023 1200 by Rehana Wing RN  Outcome: Progressing

## 2023-09-08 NOTE — OP NOTE
Operative Note      Patient: Richie Alaniz  YOB: 1950  MRN: [id number]    Date of Procedure: 09/08/23      Procedure: Transesophageal echocardiogram with cardioversion    Indication: Persistent atrial fibrillation    Complications: None    Sedation: Conscious sedation was administered under my direct supervision using Versed and fentanyl. Please see chart for details. Procedure details:    After informed consent patient brought to the procedure room. Precardioversion transesophageal echocardiogram was performed. No intracardiac thrombus was noted. Please see separate RAMIRO report on Simi. A 200 J synchronized shock was then delivered and patient converted to sinus rhythm. No complications were noted. Conclusion: Successful cardioversion from atrial fibrillation to AV sequential paced rhythm.     Gigi German MD  [facility]

## 2023-09-09 DIAGNOSIS — E03.9 ACQUIRED HYPOTHYROIDISM: ICD-10-CM

## 2023-09-09 LAB
ALBUMIN SERPL-MCNC: 3.7 GM/DL (ref 3.4–5)
ALP BLD-CCNC: 76 IU/L (ref 40–128)
ALT SERPL-CCNC: 20 U/L (ref 10–40)
ANION GAP SERPL CALCULATED.3IONS-SCNC: 15 MMOL/L (ref 4–16)
AST SERPL-CCNC: 23 IU/L (ref 15–37)
BASOPHILS ABSOLUTE: 0 K/CU MM
BASOPHILS RELATIVE PERCENT: 0.2 % (ref 0–1)
BILIRUB SERPL-MCNC: 1.4 MG/DL (ref 0–1)
BILIRUBIN URINE: NEGATIVE MG/DL
BLOOD, URINE: NEGATIVE
BUN SERPL-MCNC: 37 MG/DL (ref 6–23)
CALCIUM SERPL-MCNC: 9 MG/DL (ref 8.3–10.6)
CHLORIDE BLD-SCNC: 100 MMOL/L (ref 99–110)
CLARITY: CLEAR
CO2: 25 MMOL/L (ref 21–32)
COLOR: YELLOW
COMMENT UA: ABNORMAL
CREAT SERPL-MCNC: 2 MG/DL (ref 0.6–1.1)
DIFFERENTIAL TYPE: ABNORMAL
EOSINOPHILS ABSOLUTE: 0.1 K/CU MM
EOSINOPHILS RELATIVE PERCENT: 1.2 % (ref 0–3)
GFR SERPL CREATININE-BSD FRML MDRD: 26 ML/MIN/1.73M2
GLUCOSE SERPL-MCNC: 109 MG/DL (ref 70–99)
GLUCOSE, URINE: 250 MG/DL
HCT VFR BLD CALC: 31.7 % (ref 37–47)
HEMOGLOBIN: 9.9 GM/DL (ref 12.5–16)
IMMATURE NEUTROPHIL %: 0.4 % (ref 0–0.43)
KETONES, URINE: NEGATIVE MG/DL
LEUKOCYTE ESTERASE, URINE: NEGATIVE
LYMPHOCYTES ABSOLUTE: 1.2 K/CU MM
LYMPHOCYTES RELATIVE PERCENT: 11.4 % (ref 24–44)
MCH RBC QN AUTO: 32.4 PG (ref 27–31)
MCHC RBC AUTO-ENTMCNC: 31.2 % (ref 32–36)
MCV RBC AUTO: 103.6 FL (ref 78–100)
MONOCYTES ABSOLUTE: 0.7 K/CU MM
MONOCYTES RELATIVE PERCENT: 6.8 % (ref 0–4)
NITRITE URINE, QUANTITATIVE: NEGATIVE
NUCLEATED RBC %: 0 %
PDW BLD-RTO: 15.1 % (ref 11.7–14.9)
PH, URINE: 5.5 (ref 5–8)
PLATELET # BLD: 165 K/CU MM (ref 140–440)
PMV BLD AUTO: 11.4 FL (ref 7.5–11.1)
POTASSIUM SERPL-SCNC: 3.8 MMOL/L (ref 3.5–5.1)
PROTEIN UA: NEGATIVE MG/DL
RBC # BLD: 3.06 M/CU MM (ref 4.2–5.4)
SEGMENTED NEUTROPHILS ABSOLUTE COUNT: 8.5 K/CU MM
SEGMENTED NEUTROPHILS RELATIVE PERCENT: 80 % (ref 36–66)
SODIUM BLD-SCNC: 140 MMOL/L (ref 135–145)
SPECIFIC GRAVITY UA: 1.01 (ref 1–1.03)
TOTAL IMMATURE NEUTOROPHIL: 0.04 K/CU MM
TOTAL NUCLEATED RBC: 0 K/CU MM
TOTAL PROTEIN: 6 GM/DL (ref 6.4–8.2)
UROBILINOGEN, URINE: 0.2 MG/DL (ref 0.2–1)
WBC # BLD: 10.7 K/CU MM (ref 4–10.5)

## 2023-09-09 PROCEDURE — 6370000000 HC RX 637 (ALT 250 FOR IP): Performed by: INTERNAL MEDICINE

## 2023-09-09 PROCEDURE — 6370000000 HC RX 637 (ALT 250 FOR IP)

## 2023-09-09 PROCEDURE — 94660 CPAP INITIATION&MGMT: CPT

## 2023-09-09 PROCEDURE — 36415 COLL VENOUS BLD VENIPUNCTURE: CPT

## 2023-09-09 PROCEDURE — 2580000003 HC RX 258: Performed by: INTERNAL MEDICINE

## 2023-09-09 PROCEDURE — 94761 N-INVAS EAR/PLS OXIMETRY MLT: CPT

## 2023-09-09 PROCEDURE — 94640 AIRWAY INHALATION TREATMENT: CPT

## 2023-09-09 PROCEDURE — 6370000000 HC RX 637 (ALT 250 FOR IP): Performed by: NURSE PRACTITIONER

## 2023-09-09 PROCEDURE — 85025 COMPLETE CBC W/AUTO DIFF WBC: CPT

## 2023-09-09 PROCEDURE — 80053 COMPREHEN METABOLIC PANEL: CPT

## 2023-09-09 PROCEDURE — APPNB60 APP NON BILLABLE TIME 46-60 MINS: Performed by: NURSE PRACTITIONER

## 2023-09-09 PROCEDURE — 2140000000 HC CCU INTERMEDIATE R&B

## 2023-09-09 PROCEDURE — 99233 SBSQ HOSP IP/OBS HIGH 50: CPT | Performed by: INTERNAL MEDICINE

## 2023-09-09 PROCEDURE — 6360000002 HC RX W HCPCS: Performed by: INTERNAL MEDICINE

## 2023-09-09 PROCEDURE — 6360000002 HC RX W HCPCS: Performed by: NURSE PRACTITIONER

## 2023-09-09 PROCEDURE — 2700000000 HC OXYGEN THERAPY PER DAY

## 2023-09-09 RX ORDER — ISOSORBIDE MONONITRATE 30 MG/1
30 TABLET, EXTENDED RELEASE ORAL DAILY
Status: DISCONTINUED | OUTPATIENT
Start: 2023-09-09 | End: 2023-09-11 | Stop reason: HOSPADM

## 2023-09-09 RX ORDER — HYDRALAZINE HYDROCHLORIDE 25 MG/1
25 TABLET, FILM COATED ORAL EVERY 8 HOURS SCHEDULED
Status: DISCONTINUED | OUTPATIENT
Start: 2023-09-09 | End: 2023-09-11 | Stop reason: HOSPADM

## 2023-09-09 RX ORDER — FUROSEMIDE 10 MG/ML
60 INJECTION INTRAMUSCULAR; INTRAVENOUS 2 TIMES DAILY
Status: DISCONTINUED | OUTPATIENT
Start: 2023-09-09 | End: 2023-09-11 | Stop reason: HOSPADM

## 2023-09-09 RX ADMIN — APIXABAN 5 MG: 5 TABLET, FILM COATED ORAL at 20:29

## 2023-09-09 RX ADMIN — Medication 2 TABLET: at 09:29

## 2023-09-09 RX ADMIN — ATORVASTATIN CALCIUM 40 MG: 40 TABLET, FILM COATED ORAL at 09:24

## 2023-09-09 RX ADMIN — HYDRALAZINE HYDROCHLORIDE 25 MG: 25 TABLET, FILM COATED ORAL at 22:02

## 2023-09-09 RX ADMIN — AMIODARONE HYDROCHLORIDE 200 MG: 200 TABLET ORAL at 09:24

## 2023-09-09 RX ADMIN — IPRATROPIUM BROMIDE AND ALBUTEROL SULFATE 1 DOSE: .5; 2.5 SOLUTION RESPIRATORY (INHALATION) at 08:34

## 2023-09-09 RX ADMIN — FUROSEMIDE 60 MG: 10 INJECTION, SOLUTION INTRAMUSCULAR; INTRAVENOUS at 18:06

## 2023-09-09 RX ADMIN — BUDESONIDE AND FORMOTEROL FUMARATE DIHYDRATE 2 PUFF: 80; 4.5 AEROSOL RESPIRATORY (INHALATION) at 08:34

## 2023-09-09 RX ADMIN — CARVEDILOL 25 MG: 25 TABLET, FILM COATED ORAL at 20:28

## 2023-09-09 RX ADMIN — BUDESONIDE AND FORMOTEROL FUMARATE DIHYDRATE 2 PUFF: 80; 4.5 AEROSOL RESPIRATORY (INHALATION) at 19:50

## 2023-09-09 RX ADMIN — ISOSORBIDE MONONITRATE 30 MG: 30 TABLET, EXTENDED RELEASE ORAL at 14:49

## 2023-09-09 RX ADMIN — PANTOPRAZOLE SODIUM 40 MG: 40 TABLET, DELAYED RELEASE ORAL at 09:23

## 2023-09-09 RX ADMIN — ASPIRIN 81 MG CHEWABLE TABLET 81 MG: 81 TABLET CHEWABLE at 09:24

## 2023-09-09 RX ADMIN — LEVOTHYROXINE SODIUM 75 MCG: 0.07 TABLET ORAL at 09:24

## 2023-09-09 RX ADMIN — IPRATROPIUM BROMIDE AND ALBUTEROL SULFATE 1 DOSE: .5; 2.5 SOLUTION RESPIRATORY (INHALATION) at 19:50

## 2023-09-09 RX ADMIN — DOXEPIN HYDROCHLORIDE 10 MG: 10 CAPSULE ORAL at 20:29

## 2023-09-09 RX ADMIN — SODIUM CHLORIDE, PRESERVATIVE FREE 10 ML: 5 INJECTION INTRAVENOUS at 20:32

## 2023-09-09 RX ADMIN — APIXABAN 5 MG: 5 TABLET, FILM COATED ORAL at 09:24

## 2023-09-09 RX ADMIN — IPRATROPIUM BROMIDE AND ALBUTEROL SULFATE 1 DOSE: .5; 2.5 SOLUTION RESPIRATORY (INHALATION) at 12:28

## 2023-09-09 RX ADMIN — SODIUM CHLORIDE, PRESERVATIVE FREE 10 ML: 5 INJECTION INTRAVENOUS at 09:31

## 2023-09-09 RX ADMIN — FUROSEMIDE 40 MG: 10 INJECTION, SOLUTION INTRAMUSCULAR; INTRAVENOUS at 09:24

## 2023-09-09 RX ADMIN — HYDRALAZINE HYDROCHLORIDE 25 MG: 25 TABLET, FILM COATED ORAL at 14:49

## 2023-09-09 RX ADMIN — CARVEDILOL 25 MG: 25 TABLET, FILM COATED ORAL at 09:24

## 2023-09-09 RX ADMIN — IPRATROPIUM BROMIDE AND ALBUTEROL SULFATE 1 DOSE: .5; 2.5 SOLUTION RESPIRATORY (INHALATION) at 16:27

## 2023-09-10 LAB
ANION GAP SERPL CALCULATED.3IONS-SCNC: 15 MMOL/L (ref 4–16)
BUN SERPL-MCNC: 35 MG/DL (ref 6–23)
CALCIUM SERPL-MCNC: 8.7 MG/DL (ref 8.3–10.6)
CHLORIDE BLD-SCNC: 97 MMOL/L (ref 99–110)
CO2: 25 MMOL/L (ref 21–32)
CREAT SERPL-MCNC: 1.9 MG/DL (ref 0.6–1.1)
GFR SERPL CREATININE-BSD FRML MDRD: 28 ML/MIN/1.73M2
GLUCOSE SERPL-MCNC: 108 MG/DL (ref 70–99)
MAGNESIUM: 2.1 MG/DL (ref 1.8–2.4)
POTASSIUM SERPL-SCNC: 3.3 MMOL/L (ref 3.5–5.1)
SODIUM BLD-SCNC: 137 MMOL/L (ref 135–145)

## 2023-09-10 PROCEDURE — 80048 BASIC METABOLIC PNL TOTAL CA: CPT

## 2023-09-10 PROCEDURE — 6370000000 HC RX 637 (ALT 250 FOR IP): Performed by: INTERNAL MEDICINE

## 2023-09-10 PROCEDURE — 2700000000 HC OXYGEN THERAPY PER DAY

## 2023-09-10 PROCEDURE — 6370000000 HC RX 637 (ALT 250 FOR IP): Performed by: NURSE PRACTITIONER

## 2023-09-10 PROCEDURE — 6370000000 HC RX 637 (ALT 250 FOR IP)

## 2023-09-10 PROCEDURE — 2140000000 HC CCU INTERMEDIATE R&B

## 2023-09-10 PROCEDURE — 6360000002 HC RX W HCPCS: Performed by: NURSE PRACTITIONER

## 2023-09-10 PROCEDURE — APPNB60 APP NON BILLABLE TIME 46-60 MINS: Performed by: NURSE PRACTITIONER

## 2023-09-10 PROCEDURE — 2580000003 HC RX 258: Performed by: INTERNAL MEDICINE

## 2023-09-10 PROCEDURE — 36415 COLL VENOUS BLD VENIPUNCTURE: CPT

## 2023-09-10 PROCEDURE — 99233 SBSQ HOSP IP/OBS HIGH 50: CPT | Performed by: INTERNAL MEDICINE

## 2023-09-10 PROCEDURE — 83735 ASSAY OF MAGNESIUM: CPT

## 2023-09-10 PROCEDURE — 94640 AIRWAY INHALATION TREATMENT: CPT

## 2023-09-10 PROCEDURE — 94761 N-INVAS EAR/PLS OXIMETRY MLT: CPT

## 2023-09-10 RX ORDER — POTASSIUM CHLORIDE 20 MEQ/1
40 TABLET, EXTENDED RELEASE ORAL PRN
Status: ACTIVE | OUTPATIENT
Start: 2023-09-10 | End: 2023-09-10

## 2023-09-10 RX ORDER — MAGNESIUM SULFATE 1 G/100ML
1000 INJECTION INTRAVENOUS ONCE
Status: COMPLETED | OUTPATIENT
Start: 2023-09-10 | End: 2023-09-10

## 2023-09-10 RX ORDER — PREDNISONE 20 MG/1
40 TABLET ORAL
Status: DISCONTINUED | OUTPATIENT
Start: 2023-09-10 | End: 2023-09-11 | Stop reason: HOSPADM

## 2023-09-10 RX ORDER — POTASSIUM CHLORIDE 7.45 MG/ML
10 INJECTION INTRAVENOUS PRN
Status: ACTIVE | OUTPATIENT
Start: 2023-09-10 | End: 2023-09-10

## 2023-09-10 RX ORDER — POTASSIUM CHLORIDE 1.5 G/1.58G
40 POWDER, FOR SOLUTION ORAL PRN
Status: ACTIVE | OUTPATIENT
Start: 2023-09-10 | End: 2023-09-10

## 2023-09-10 RX ADMIN — FUROSEMIDE 60 MG: 10 INJECTION, SOLUTION INTRAMUSCULAR; INTRAVENOUS at 17:49

## 2023-09-10 RX ADMIN — CARVEDILOL 25 MG: 25 TABLET, FILM COATED ORAL at 08:38

## 2023-09-10 RX ADMIN — APIXABAN 5 MG: 5 TABLET, FILM COATED ORAL at 20:34

## 2023-09-10 RX ADMIN — AMIODARONE HYDROCHLORIDE 200 MG: 200 TABLET ORAL at 08:38

## 2023-09-10 RX ADMIN — BUDESONIDE AND FORMOTEROL FUMARATE DIHYDRATE 2 PUFF: 80; 4.5 AEROSOL RESPIRATORY (INHALATION) at 07:36

## 2023-09-10 RX ADMIN — SODIUM CHLORIDE, PRESERVATIVE FREE 10 ML: 5 INJECTION INTRAVENOUS at 20:33

## 2023-09-10 RX ADMIN — MAGNESIUM SULFATE HEPTAHYDRATE 1000 MG: 1 INJECTION, SOLUTION INTRAVENOUS at 13:20

## 2023-09-10 RX ADMIN — HYDRALAZINE HYDROCHLORIDE 25 MG: 25 TABLET, FILM COATED ORAL at 20:34

## 2023-09-10 RX ADMIN — PREDNISONE 40 MG: 20 TABLET ORAL at 15:13

## 2023-09-10 RX ADMIN — CARVEDILOL 25 MG: 25 TABLET, FILM COATED ORAL at 20:33

## 2023-09-10 RX ADMIN — IPRATROPIUM BROMIDE AND ALBUTEROL SULFATE 1 DOSE: .5; 2.5 SOLUTION RESPIRATORY (INHALATION) at 11:12

## 2023-09-10 RX ADMIN — IPRATROPIUM BROMIDE AND ALBUTEROL SULFATE 1 DOSE: .5; 2.5 SOLUTION RESPIRATORY (INHALATION) at 15:55

## 2023-09-10 RX ADMIN — IPRATROPIUM BROMIDE AND ALBUTEROL SULFATE 1 DOSE: .5; 2.5 SOLUTION RESPIRATORY (INHALATION) at 07:34

## 2023-09-10 RX ADMIN — Medication 2 TABLET: at 08:43

## 2023-09-10 RX ADMIN — LEVOTHYROXINE SODIUM 75 MCG: 0.07 TABLET ORAL at 08:38

## 2023-09-10 RX ADMIN — ISOSORBIDE MONONITRATE 30 MG: 30 TABLET, EXTENDED RELEASE ORAL at 08:38

## 2023-09-10 RX ADMIN — FUROSEMIDE 60 MG: 10 INJECTION, SOLUTION INTRAMUSCULAR; INTRAVENOUS at 08:34

## 2023-09-10 RX ADMIN — IPRATROPIUM BROMIDE AND ALBUTEROL SULFATE 1 DOSE: .5; 2.5 SOLUTION RESPIRATORY (INHALATION) at 21:47

## 2023-09-10 RX ADMIN — APIXABAN 5 MG: 5 TABLET, FILM COATED ORAL at 08:38

## 2023-09-10 RX ADMIN — ATORVASTATIN CALCIUM 40 MG: 40 TABLET, FILM COATED ORAL at 08:38

## 2023-09-10 RX ADMIN — SODIUM CHLORIDE, PRESERVATIVE FREE 10 ML: 5 INJECTION INTRAVENOUS at 08:41

## 2023-09-10 RX ADMIN — HYDRALAZINE HYDROCHLORIDE 25 MG: 25 TABLET, FILM COATED ORAL at 06:20

## 2023-09-10 RX ADMIN — PANTOPRAZOLE SODIUM 40 MG: 40 TABLET, DELAYED RELEASE ORAL at 08:38

## 2023-09-10 RX ADMIN — BUDESONIDE AND FORMOTEROL FUMARATE DIHYDRATE 2 PUFF: 80; 4.5 AEROSOL RESPIRATORY (INHALATION) at 21:48

## 2023-09-10 RX ADMIN — HYDRALAZINE HYDROCHLORIDE 25 MG: 25 TABLET, FILM COATED ORAL at 13:23

## 2023-09-10 RX ADMIN — ASPIRIN 81 MG CHEWABLE TABLET 81 MG: 81 TABLET CHEWABLE at 08:38

## 2023-09-10 RX ADMIN — POTASSIUM BICARBONATE 40 MEQ: 782 TABLET, EFFERVESCENT ORAL at 15:13

## 2023-09-10 RX ADMIN — DOXEPIN HYDROCHLORIDE 10 MG: 10 CAPSULE ORAL at 20:36

## 2023-09-11 VITALS
WEIGHT: 157.5 LBS | HEART RATE: 74 BPM | RESPIRATION RATE: 16 BRPM | HEIGHT: 63 IN | TEMPERATURE: 97.9 F | BODY MASS INDEX: 27.91 KG/M2 | SYSTOLIC BLOOD PRESSURE: 117 MMHG | OXYGEN SATURATION: 92 % | DIASTOLIC BLOOD PRESSURE: 71 MMHG

## 2023-09-11 LAB
ANION GAP SERPL CALCULATED.3IONS-SCNC: 14 MMOL/L (ref 4–16)
BUN SERPL-MCNC: 39 MG/DL (ref 6–23)
CALCIUM SERPL-MCNC: 9 MG/DL (ref 8.3–10.6)
CHLORIDE BLD-SCNC: 97 MMOL/L (ref 99–110)
CO2: 26 MMOL/L (ref 21–32)
CREAT SERPL-MCNC: 2.1 MG/DL (ref 0.6–1.1)
CULTURE: NORMAL
CULTURE: NORMAL
GFR SERPL CREATININE-BSD FRML MDRD: 24 ML/MIN/1.73M2
GLUCOSE SERPL-MCNC: 168 MG/DL (ref 70–99)
Lab: NORMAL
Lab: NORMAL
POTASSIUM SERPL-SCNC: 3.7 MMOL/L (ref 3.5–5.1)
SODIUM BLD-SCNC: 137 MMOL/L (ref 135–145)
SPECIMEN: NORMAL
SPECIMEN: NORMAL

## 2023-09-11 PROCEDURE — 36415 COLL VENOUS BLD VENIPUNCTURE: CPT

## 2023-09-11 PROCEDURE — 6370000000 HC RX 637 (ALT 250 FOR IP): Performed by: NURSE PRACTITIONER

## 2023-09-11 PROCEDURE — 6370000000 HC RX 637 (ALT 250 FOR IP)

## 2023-09-11 PROCEDURE — 80048 BASIC METABOLIC PNL TOTAL CA: CPT

## 2023-09-11 PROCEDURE — 94640 AIRWAY INHALATION TREATMENT: CPT

## 2023-09-11 PROCEDURE — 6360000002 HC RX W HCPCS: Performed by: NURSE PRACTITIONER

## 2023-09-11 PROCEDURE — 6370000000 HC RX 637 (ALT 250 FOR IP): Performed by: INTERNAL MEDICINE

## 2023-09-11 PROCEDURE — 94761 N-INVAS EAR/PLS OXIMETRY MLT: CPT

## 2023-09-11 PROCEDURE — 2580000003 HC RX 258: Performed by: INTERNAL MEDICINE

## 2023-09-11 PROCEDURE — 2700000000 HC OXYGEN THERAPY PER DAY

## 2023-09-11 PROCEDURE — APPSS60 APP SPLIT SHARED TIME 46-60 MINUTES: Performed by: NURSE PRACTITIONER

## 2023-09-11 RX ORDER — LEVOTHYROXINE SODIUM 0.07 MG/1
TABLET ORAL
Qty: 90 TABLET | Refills: 0 | Status: SHIPPED | OUTPATIENT
Start: 2023-09-11

## 2023-09-11 RX ORDER — HYDRALAZINE HYDROCHLORIDE 25 MG/1
25 TABLET, FILM COATED ORAL EVERY 8 HOURS SCHEDULED
Qty: 90 TABLET | Refills: 3 | Status: SHIPPED | OUTPATIENT
Start: 2023-09-11

## 2023-09-11 RX ORDER — PREDNISONE 20 MG/1
40 TABLET ORAL
Qty: 8 TABLET | Refills: 0 | Status: SHIPPED | OUTPATIENT
Start: 2023-09-11 | End: 2023-09-15

## 2023-09-11 RX ORDER — ISOSORBIDE MONONITRATE 30 MG/1
30 TABLET, EXTENDED RELEASE ORAL DAILY
Qty: 30 TABLET | Refills: 3 | Status: SHIPPED | OUTPATIENT
Start: 2023-09-12

## 2023-09-11 RX ORDER — FUROSEMIDE 20 MG/1
40 TABLET ORAL DAILY
Qty: 90 TABLET | Refills: 1 | Status: SHIPPED | OUTPATIENT
Start: 2023-09-11

## 2023-09-11 RX ORDER — ALBUTEROL SULFATE 90 UG/1
2 AEROSOL, METERED RESPIRATORY (INHALATION) EVERY 6 HOURS PRN
Qty: 18 G | Refills: 3 | Status: SHIPPED | OUTPATIENT
Start: 2023-09-11

## 2023-09-11 RX ADMIN — LEVOTHYROXINE SODIUM 75 MCG: 0.07 TABLET ORAL at 10:38

## 2023-09-11 RX ADMIN — PREDNISONE 40 MG: 20 TABLET ORAL at 13:18

## 2023-09-11 RX ADMIN — CARVEDILOL 25 MG: 25 TABLET, FILM COATED ORAL at 10:38

## 2023-09-11 RX ADMIN — ASPIRIN 81 MG CHEWABLE TABLET 81 MG: 81 TABLET CHEWABLE at 10:37

## 2023-09-11 RX ADMIN — HYDRALAZINE HYDROCHLORIDE 25 MG: 25 TABLET, FILM COATED ORAL at 05:11

## 2023-09-11 RX ADMIN — AMIODARONE HYDROCHLORIDE 200 MG: 200 TABLET ORAL at 10:38

## 2023-09-11 RX ADMIN — APIXABAN 5 MG: 5 TABLET, FILM COATED ORAL at 10:37

## 2023-09-11 RX ADMIN — SODIUM CHLORIDE, PRESERVATIVE FREE 10 ML: 5 INJECTION INTRAVENOUS at 10:39

## 2023-09-11 RX ADMIN — ATORVASTATIN CALCIUM 40 MG: 40 TABLET, FILM COATED ORAL at 10:38

## 2023-09-11 RX ADMIN — BUDESONIDE AND FORMOTEROL FUMARATE DIHYDRATE 2 PUFF: 80; 4.5 AEROSOL RESPIRATORY (INHALATION) at 07:57

## 2023-09-11 RX ADMIN — Medication 2 TABLET: at 10:39

## 2023-09-11 RX ADMIN — FUROSEMIDE 60 MG: 10 INJECTION, SOLUTION INTRAMUSCULAR; INTRAVENOUS at 10:37

## 2023-09-11 RX ADMIN — PANTOPRAZOLE SODIUM 40 MG: 40 TABLET, DELAYED RELEASE ORAL at 10:38

## 2023-09-11 RX ADMIN — IPRATROPIUM BROMIDE AND ALBUTEROL SULFATE 1 DOSE: .5; 2.5 SOLUTION RESPIRATORY (INHALATION) at 11:31

## 2023-09-11 RX ADMIN — ISOSORBIDE MONONITRATE 30 MG: 30 TABLET, EXTENDED RELEASE ORAL at 10:38

## 2023-09-11 NOTE — PROGRESS NOTES
09/07/23 2345   NIV Type   $NIV $Daily Charge   NIV Started/Stopped On   Equipment Type v60   Mask Type Full face mask   Mask Size Small   Bonnet size Small   Breath Sounds   Breath Sounds Bilateral Diminished   Right Upper Lobe Diminished   Right Middle Lobe Diminished   Right Lower Lobe Diminished   Left Upper Lobe Diminished   Left Lower Lobe Diminished   Settings/Measurements   IPAP 15 cmH20   CPAP/EPAP 5 cmH2O   Vt (Measured) 613 mL   Rate Ordered 12   FiO2  60 %   I Time/ I Time % 1 s   Minute Volume (L/min) 9.8 Liters   Alarm Settings   Alarms On Y   Low Pressure (cmH2O) 3 cmH2O   High Pressure (cmH2O) 20 cmH2O   Delay Alarm 20 sec(s)   Apnea (secs) 20 secs   RR Low (bpm) 12   RR High (bpm) 40 br/min
09/08/23 1603   Spirometry Assessment   FEV1 (%PRED) 78   Post Bronchodilator FEV/FVC 80   COPD Exacerbations in last year 2   PIF 70 L/min   COPD Assessment (CAT Score)   Cough Assessment 1   Phlegm Assessment 3   Chest tightness 0   Walking on an incline 5   Home Activities 3   Confident Leaving The Home 0   Sleeping Soundly 4   Have Energy 4   Assessment Score 20   $RT COPD Assessment Yes   GOLD Staging   Group Group E
09/09/23 2300   NIV Type   NIV Started/Stopped On   Equipment Type v60   Mode Bilevel   Mask Type Full face mask   Mask Size Small   Bonnet size Small   Assessment   Pulse 63   Respirations 25   SpO2 99 %   Comfort Level Good   Using Accessory Muscles No   Mask Compliance Good   Skin Protection for O2 Device N/A   Settings/Measurements   PIP Observed 14 cm H20   IPAP 14 cmH20   CPAP/EPAP 7 cmH2O   Vt (Measured) 470 mL   Rate Ordered 12   FiO2  80 %   I Time/ I Time % 1 s   Minute Volume (L/min) 8.3 Liters   Mask Leak (lpm) 0 lpm   Patient's Home Machine No   Alarm Settings   Alarms On Y   Low Pressure (cmH2O) 3 cmH2O   High Pressure (cmH2O) 20 cmH2O   Delay Alarm 20 sec(s)   Apnea (secs) 20 secs   RR Low (bpm) 12   RR High (bpm) 45 br/min   Patient Observation   Observations Pt is sleeping in bed
4 Eyes Skin Assessment     NAME:  Gilford Bearded  YOB: 1950  MEDICAL RECORD NUMBER:  [id number]    The patient is being assessed for  Admission    I agree that at least one RN has performed a thorough Head to Toe Skin Assessment on the patient. ALL assessment sites listed below have been assessed. Areas assessed by both nurses:    Head, Face, Ears, Shoulders, Back, Chest, Arms, Elbows, Hands, Sacrum. Buttock, Coccyx, Ischium, Legs. Feet and Heels, and Under Medical Devices         Does the Patient have a Wound?  No noted wound(s)       Luc Prevention initiated by RN: Yes  Wound Care Orders initiated by RN: No    Pressure Injury (Stage 3,4, Unstageable, DTI, NWPT, and Complex wounds) if present, place Wound referral order by RN under : No    New Ostomies, if present place, Ostomy referral order under : No     Nurse 1 eSignature: Electronically signed by Eryn Hutchinson RN on 9/6/23 at 5:18 PM EDT    **SHARE this note so that the co-signing nurse can place an eSignature**    Nurse 2 eSignature: {Esignature:[id number]}
CLINICAL PHARMACY NOTE: MEDS TO BEDS    Total # of Prescriptions Filled: 4   The following medications were delivered to the patient:  Hydralazine 25mg  Prednisone 20mg  Isosorbide mn ER 30mg  Albuterol inhaler     Additional Documentation:  Patient/caregiver picked up.
Cardiology Progress Note     Today's Plan: BMP Mag     Admit Date:  9/6/2023    Consult reason/ Seen today for: atrial fibrillation     Subjective and  Overnight Events:  off bipap this am - on high flow NC- she repots her shortness of breath is improved. Telemetry: atrial paced   Had  16 beat of VT last PM     Assessment / Plan:     VT- had 16 beat of VT last pm- check Mag and potassium - replete if warranted   PAF with RVR-s/p cardioversion -maintaining sinus: continue eliquis 5 mg bid and amiodarone 200 mg daily  Acute HFrEF: EF 20-25% NYHA class III:shortness of breath improving : now on high flow oxygen: continue lasix to 60 mg bid : has crackles posteriorly : continue BB: holding ACE for MARK: tolerating hydralazine and Imdur: had good urine output last pm : strict I/O   Elevated troponin : peak 0.035- trending down : she denies cp:LHC 05/2023 patent bypass  VHD: s/p TAVR 07/2023:Echo 09/08- shows stable TVR : s/p MVR with moderate regurgitation         History of Presenting Illness:    Chief complain on admission : 68 y. o.year old who is admitted for  Chief Complaint   Patient presents with    Shortness of Breath     PT was rapid response; waiting for cardiac stress test and ran out of O2 in tank.  Unknown how long was out        Past medical history:    has a past medical history of CAD (coronary artery disease), COPD (chronic obstructive pulmonary disease) ([address]), GERD (gastroesophageal reflux disease), H/O cardiovascular stress test, H/O echocardiogram, H/O echocardiogram, H/O percutaneous left heart catheterization, H/O transesophageal echocardiography (RAMIRO) for monitoring, Heart attack ([address]), Hyperlipidemia, Hypertension, On home oxygen therapy, PAD (peripheral artery disease) ([address]), S/P right coronary artery (RCA) stent placement, Sleep apnea, T2 vertebral fracture ([address]), TIA (transient ischemic attack), Tobacco
Cardiology Progress Note     Today's Plan: CPM    Admit Date:  9/6/2023    Consult reason/ Seen today for: atrial fibrillation     Subjective and  Overnight Events:  off bipap this am - reports SOB has improved. Assessment / Plan:     VT- had 16 beat of VT last pm- K3.3 resolved- replete if warranted. No more arrhythmias overnight. PAF with RVR-s/p cardioversion -maintaining sinus: continue eliquis 5 mg bid and amiodarone 200 mg daily  Acute HFrEF: EF 20-25% NYHA class III:shortness of breath improving : now on high flow oxygen: Back to baseline Lasix changed back to 40 mg BID, holding ACE for MARK: tolerating hydralazine and Imdur: had good urine output last pm : strict I/O   Elevated troponin : peak 0.035- trending down : she denies cp:LHC 05/2023 patent bypass  VHD: s/p TAVR 07/2023:Echo 09/08- shows stable TVR : s/p MVR with moderate regurgitation         History of Presenting Illness:    Chief complain on admission : 68 y. o.year old who is admitted for  Chief Complaint   Patient presents with    Shortness of Breath     PT was rapid response; waiting for cardiac stress test and ran out of O2 in tank. Unknown how long was out        Past medical history:    has a past medical history of CAD (coronary artery disease), COPD (chronic obstructive pulmonary disease) ([address]), GERD (gastroesophageal reflux disease), H/O cardiovascular stress test, H/O echocardiogram, H/O echocardiogram, H/O percutaneous left heart catheterization, H/O transesophageal echocardiography (RAMIRO) for monitoring, Heart attack ([address]), Hyperlipidemia, Hypertension, On home oxygen therapy, PAD (peripheral artery disease) ([address]), S/P right coronary artery (RCA) stent placement, Sleep apnea, T2 vertebral fracture ([address]), TIA (transient ischemic attack), Tobacco dependence, Wears glasses, and Wears partial dentures.   Past surgical history:   has a past surgical history that includes Coronary
Current GOLD classification for Sheila Colmenares      GOLD Stage:    Group: Group E  Recorded domestic exacerbations past 12 months: 2  Current recorded COPD Assessment Tool (CAT) score of 20  Current eosinophil count: 0     Inhaler Device   Acceptable for Use   Respimat 70 Not Breath Actuated Yes   MDI 70 Not Breath Actuated Yes           DPI  Observed PIF   using  In-Check Meter   Optimal PIF   Acceptable for Use   HANDIHALER 70 >30    Pressair 70 >45    NEOHALER 70 >50    Diskus 70 >60    ELLIPTA 70 >60      Records show Sheila Colmenares was not using a bronchodilator before admission.         LONG-ACTING (LABA)   Arformoterol (Brovana) NEBULIZER   Indacaterol (Arcapta) NEOHALER   Olodaterol (Striverdi) Respimat   Salmeterol (Serevent) MDI, DISKUS   LONG-ACTING (LAMA)   Aclidinium bromide (Tudorza) PRESSAIR   Glycopyrronium bromide Sheilda Spring) NEOHALER   Tiotropium (Spiriva) Respimat, HANDIHALER   Umeclidinium (Incruse) ELLIPTA   (LABA/LAMA)   Formoterol/glycopyrronium (Bevespi) MDI   Indacaterol/glycopyrronium (Utibron) NEOHALER   Vilanterol/umeclidinium (Anoro) ELLIPTA   Olodaterol/tiotropium (Stiolto) Respimat   (LABA/ICS)   Formoterol/budesomide (Symbicort) MDI   Formoterol/mometasone (Dulera) MDI   Salmeterol/fluticasone (Advair) MDI, DISKUS   Vilanterol/fluticasone (Breo) ELLIPTA   (LABA/LAMA/ICS)   Fluticasone/umeclidinium/vilanterol (Trelegy) ELLIPTA   Budesonide/glycopyrrolate/formoterol fumarate (Beztri) aerosphere     Electronically signed by Kaveh Limon RCP on 9/8/23 at 4:32 PM EDT    09/08/23 0366   Spirometry Assessment   FEV1 (%PRED) 52   Post Bronchodilator FEV/FVC 90   COPD Exacerbations in last year 2   PIF 70 L/min   COPD Assessment (CAT Score)   Cough Assessment 1   Phlegm Assessment 3   Chest tightness 0   Walking on an incline 5   Home Activities 3   Confident Leaving The Home 0   Sleeping Soundly 4   Have Energy 4   Assessment Score 20   $RT COPD Assessment Yes   GOLD Staging   Group
Discharge complete. This nurse went over discharge instructions with pt. Pt voiced understanding. pt wheeled by volunteers to outpatient pharm to  medication then to front entrance for pickup via . all belongings verified and with pt.no complaints
Pt continuing to desat down to 85%, bumped pt up top 15L of high flow oxygen.
Pt still desating at 85% on 15L, put patient back onto BiPap with FiO2 at 60. Pt saturation returns to 95%.
RR-RN asked to straight cath patient after 1300ml found on bladder scan. Straight cath procedure completed without difficulty and 800ml of clear yellow urine returned. Primary RN made aware and will obtain post void residual bladder scan. Please call if any further needs arise.     Joselyn Duran RN   [facility] [phone]
RRT checked on pt. Pt is not wearing the Bipap. Pt is SaO2 97% on 5lpm and RRT decreased 4lpm NC. Pt tolererating well.
This nurse went into room pt O2 85% on tele. This nurse turned high flow oxygen up to 13L pt oxygen went up to 89/90% while eating, pt appears in no distress. . Let pt nurse know. Will continue to monitor pt.
This nurse went into the patients room to round on her and she was having increased work of breathing, and respirations were 50-60. Pt o2 95% on FiO2 of 90%. Pt appears restless and is grabbing for the nurse saying \"I cannot breathe\". Pts lungs CTA. Messaged Dr. Bevin Najjar and obtained one time order for Ativan. Ativan 0.5 mg given via IV. Within 5 minutes patients resp down to 20-30's. Pt now resting quietly with call light in reach. Will continue to monitor patient.
V2.0  [facility] [place]      Name:  Mary Messina /Age/Sex: 1950  (68 y.o. female)   MRN & CSN:  [id number] & [id number] Encounter Date/Time: 2023 12:53 PM EDT    Location:  [address] [place], [place] [id number] PCP: Norman Sanchez MD       Hospital Day: 4    Assessment and Plan:   Mary Messina is a 68 y.o. female with pmh of HFrEF, aortic stenosis s/p TAVR, s/p pacemaker in situ, CVA, hypertension, CKD, A-fib who presents with Atrial fibrillation with RVR ([address])      Plan:    Acute on chronic respiratory failure with hypoxia  Wears 3 to 4 L oxygen at home  Likely due to acute exacerbation of HFrEF  Bilateral congestion on CXR  Last echocardiogram 2023 with EF 40 to 45%  proBNP on presentation 4622  Clinically improved after the IV diuresis and BiPAP. Received 40 Mg IV Lasix once in the ED  Continue BiPAP as needed  Hold home BP meds, lisinopril and amlodipine due to blood pressure being on the lower side. Lasix was increased from 40 daily to 40 twice daily on 2023, which is increased to 60 twice daily on 2023  Patient wore BiPAP all night. Tried to switch her to baseline oxygen this a.m., she desaturated to low 80s. Oxygen increased to 15 L with desaturation barely came up to 90%. Continue to wean down oxygen. Utilize BiPAP as needed. A-fib RVR  Patient presented for outpatient cardioversion  Sent to ED due to respiratory distress after rapid response was called  Started on Cardizem drip in the ED, discontinued on 2023  Heart rate currently controlled  On Eliquis at home, continue  On amiodarone at home, resumed  On Coreg at home, resumed with holding parameters  Cardiology on board, appreciate recs  Successful RAMIRO cardioversion on 2023.   Currently has paced rhythm     Elevated troponin  Troponin on presentation 0.035  Cycle  Patient denying any symptoms of chest pain     History of CAD s/p PCI  Continue aspirin and Lipitor     History of aortic stenosis  S/p TAVR in July
V2.0  [facility] [place]      Name:  Neo Tolentino /Age/Sex: 1950  (68 y.o. female)   MRN & CSN:  [id number] & [id number] Encounter Date/Time: 2023 12:53 PM EDT    [facility]:  [facility] PCP: Robles Herrera MD       Hospital Day: 3    Assessment and Plan:   Neo Tolentino is a 68 y.o. female with pmh of HFrEF, aortic stenosis s/p TAVR, s/p pacemaker in situ, CVA, hypertension, CKD, A-fib who presents with Atrial fibrillation with RVR ([address])      Plan:    Acute respiratory failure with hypoxia  Likely due to acute exacerbation of HFrEF  Bilateral congestion on CXR  Last echocardiogram 2023 with EF 40 to 45%  proBNP on presentation 4622  Clinically improved after the IV diuresis and BiPAP. Received 40 Mg IV Lasix once in the ED  Continue BiPAP as needed  Hold home BP meds, lisinopril and amlodipine due to blood pressure being on the lower side. Increased IV Lasix to 40 mg twice daily from once daily. proBNP has increased. A-fib RVR  Patient presented for outpatient cardioversion  Sent to ED due to respiratory distress after rapid response was called  Started on Cardizem drip in the ED, discontinued on 2023  Heart rate currently controlled  On Eliquis at home, continue  On amiodarone at home, resumed  On Coreg at home, resumed with holding parameters  Cardiology on board, appreciate recs  Successful RAMIRO cardioversion on 2023. Elevated troponin  Troponin on presentation 0.035  Cycle  Patient denying any symptoms of chest pain     History of CAD s/p PCI  Continue aspirin and Lipitor     History of aortic stenosis  S/p TAVR in 2023     Leukocytosis  Likely reactive  No symptoms of any infection  WBC improved. History of type II DM  Hold oral hypoglycemics  Placed on low-dose sliding scale     History of hypothyroidism  On Synthroid, continue     History of COPD on Spiriva and Symbicort, continued    Diet ADULT DIET;  Regular   DVT Prophylaxis [] Lovenox, []
V2.0  [facility] [place] Hospitalist Progress Note      Name:  Mauricio Coulter /Age/Sex: 1950  (68 y.o. female)   MRN & CSN:  [id number] & [id number] Encounter Date/Time: 9/10/2023 12:53 PM EDT    Location:  5894/7297-B PCP: Lalo Arellano MD       Hospital Day: 5    Assessment and Plan:   Mauricio Coulter is a 68 y.o. female with pmh of HFrEF, aortic stenosis s/p TAVR, s/p pacemaker in situ, CVA, hypertension, CKD, A-fib who presents with Atrial fibrillation with RVR ([address])      Plan:    Acute on chronic respiratory failure with hypoxia  Wears 3 to 4 L oxygen at home  Likely due to acute exacerbation of HFrEF and underlying COPD  Bilateral congestion on CXR  Last echocardiogram 2023 with EF 40 to 45%  proBNP on presentation 4622  Clinically improved after the IV diuresis and BiPAP. Received 40 Mg IV Lasix once in the ED  Continue BiPAP as needed  Hold home BP meds, lisinopril and amlodipine due to blood pressure being on the lower side. Lasix was increased from 40 daily to 40 twice daily on 2023, which is increased to 60 twice daily on 2023  As of this morning, she was requiring 6-8 L oxygen  Waiting for oxygen requirement to go down at least up to 4 L prior to considering discharge. A-fib RVR  Patient presented for outpatient cardioversion  Sent to ED due to respiratory distress after rapid response was called  Started on Cardizem drip in the ED, discontinued on 2023  Heart rate currently controlled  On Eliquis at home, continue  On amiodarone at home, resumed  On Coreg at home, resumed with holding parameters  Cardiology on board, appreciate recs  Successful RAMIRO cardioversion on 2023. Currently has paced rhythm    Nonsustained atrial tachycardia  Had about 10 beats of ectopy  Keep potassium above 4 and magnesium above 2  K low this a.m., replete.      Elevated troponin  Troponin on presentation 0.035  Cycle  Patient denying any symptoms of chest pain     History of CAD s/p PCI  Continue
V2.0  [facility] [place]      Name:  Mary Messina /Age/Sex: 1950  (68 y.o. female)   MRN & CSN:  [id number] & [id number] Encounter Date/Time: 2023 12:53 PM EDT    Location:  [address] [place] [address] PCP: Norman Sanchez MD       Hospital Day: 2    Assessment and Plan:   Mary Messina is a 68 y.o. female with pmh of HFrEF, aortic stenosis s/p TAVR, s/p pacemaker in situ, CVA, hypertension, CKD, A-fib who presents with Atrial fibrillation with RVR ([address])      Plan:    Acute respiratory failure with hypoxia  Likely due to acute exacerbation of HFrEF  Bilateral congestion on CXR  Last echocardiogram 2023 with EF 40 to 45%  proBNP on presentation 4622  Received 40 Mg IV Lasix once in the ED  Placed on BiPAP  Continue BiPAP as needed  Continue IV Lasix 40 mg daily  Hold home BP meds, lisinopril and amlodipine due to blood pressure being on the lower side. A-fib RVR  Patient presented for outpatient cardioversion  Sent to ED due to respiratory distress after rapid response was called  Started on Cardizem drip in the ED, discontinued on 2023  Heart rate currently controlled  On Eliquis at home, continue  On amiodarone at home, resumed  On Coreg at home, resumed with holding parameters  Cardiology on board, appreciate recs  Plan for RAMIRO cardioversion on 2023. Elevated troponin  Troponin on presentation 0.035  Cycle  Patient denying any symptoms of chest pain     History of CAD s/p PCI  Continue aspirin and Lipitor     History of aortic stenosis  S/p TAVR in 2023     Leukocytosis  Likely reactive  No symptoms of any infection  WBC improved. History of type II DM  Hold oral hypoglycemics  Placed on low-dose sliding scale     History of hypothyroidism  On Synthroid, continue     History of COPD on Spiriva and Symbicort, continued    Diet ADULT DIET; Regular;  No Added Salt (3-4 gm)  Diet NPO Exceptions are: Sips of Water with Meds, Ice Chips   DVT Prophylaxis [] Lovenox, []
Daily    atorvastatin  40 mg Oral Daily    calcium carb-cholecalciferol  2 tablet Oral Daily    carvedilol  25 mg Oral BID    doxepin  10 mg Oral Nightly    levothyroxine  75 mcg Oral Daily    pantoprazole  40 mg Oral Daily    budesonide-formoterol  2 puff Inhalation BID    sodium chloride flush  5-40 mL IntraVENous 2 times per day      sodium chloride      dextrose       naloxone, albuterol sulfate HFA, sodium chloride flush, sodium chloride, polyethylene glycol, acetaminophen **OR** acetaminophen, glucose, dextrose bolus **OR** dextrose bolus, glucagon (rDNA), dextrose    Lab Data:  CBC:   Recent Labs     09/07/23  0342 09/08/23 0453 09/09/23  0600   WBC 7.0 13.3* 10.7*   HGB 10.0* 9.2* 9.9*   HCT 31.3* 31.4* 31.7*   .3* 109.0* 103.6*    159 165     BMP:   Recent Labs     09/07/23  0342 09/08/23 0453 09/09/23  0600   * 134* 140   K 4.0 4.0 3.8   CL 96* 97* 100   CO2 24 21 25   BUN 29* 37* 37*   CREATININE 2.0* 2.2* 2.0*     PT/INR: No results for input(s): \"PROTIME\", \"INR\" in the last 72 hours. BNP:    Recent Labs     09/08/23 0453   PROBNP 13,746*     TROPONIN:   Recent Labs     09/07/23  1621   TROPONINT 0.028*              Impression:  Principal Problem:    Atrial fibrillation with RVR (HCC)  Resolved Problems:    * No resolved hospital problems. *       All labs, medications and tests reviewed by myself, continue all other medications of all above medical condition listed as is except for changes mentioned above. Thank you   Please call with questions.     Electronically signed by SATURNINO Jim CNP on 9/9/2023 at 12:09 PM     [facility]:    A-fib with rapid ventricular rate  Acute respiratory status with prior history of COPD  Bioprosthetic mitral valve  Severe aortic stenosis post TAVR on 7/23/2023  Chronic kidney disease  Acute on chronic HFrEF  Elevated troponin  Moderate severe mitral regurgitation     As above she remains in
Interval history: S/p RAMIRO guided cardioversion today. Physical Exam:  General:  Awake, alert, NAD  Head:normal  Eye:normal  Neck:  No JVD   Chest:  Clear to auscultation, respiration easy  Cardiovascular: Regular pulse   Abdomen:   nontender  Extremities: No edema  Pulses; palpable  Neuro: grossly normal        I agree with the plan, which was planned by myself and discussed with advanced level provider. My documented MDM is a substantive portion of the supervisory note. I have seen ,spoken to  and examined this patient personally, independently of the advanced level provider. I have spent substantiate  portion of this encounter independently myself in examining patient and developing the medical management plan . I have reviewed the hospital care given to date and reviewed all pertinent labs and imaging. The plan was developed mutually at the time of the visit with the patient,  NP /PA  and myself. I have spoken with patient, nursing staff and provided written and verbal instructions . The above note has been reviewed and I agree with the assessment, diagnosis, and treatment plan with changes made by me as follows .     Marlon Phillip MD

## 2023-09-11 NOTE — DISCHARGE SUMMARY
SPRAY(S) BY MOUTH ONCE DAILY     VITAMIN B 12 PO     Vitamin C CR 1000 MG Tbcr            STOP taking these medications      amLODIPine 10 MG tablet  Commonly known as: NORVASC               Where to Get Your Medications        These medications were sent to [facility] [address]  [facility], [facility]      Phone: [phone]   albuterol sulfate  (90 Base) MCG/ACT inhaler  furosemide 20 MG tablet  hydrALAZINE 25 MG tablet  isosorbide mononitrate 30 MG extended release tablet  predniSONE 20 MG tablet       These medications were sent to [address], [address] [phone]  [address] [place] [address], [address] [place] [id number]      Phone: [phone]   levothyroxine 75 MCG tablet         Objective Findings at Discharge:       BMP/CBC  Recent Labs     09/09/23  0600 09/10/23  1004 09/11/23  0939    137 137   K 3.8 3.3* 3.7    97* 97*   CO2 25 25 26   BUN 37* 35* 39*   CREATININE 2.0* 1.9* 2.1*   WBC 10.7*  --   --    HCT 31.7*  --   --      --   --        IMAGING:      Additional Information: Patient seen and examined day of discharge.  For more information regarding patient's care please contact [address] [place] [address] [facility] records [phone]    Discharge Time of 35 minutes    Electronically signed by Orvil Homans, MD on 9/11/2023 at 11:28 AM

## 2023-09-12 ENCOUNTER — CARE COORDINATION (OUTPATIENT)
Dept: CASE MANAGEMENT | Age: 73
End: 2023-09-12

## 2023-09-12 ENCOUNTER — ENROLLMENT (OUTPATIENT)
Dept: CASE MANAGEMENT | Age: 73
End: 2023-09-12

## 2023-09-12 DIAGNOSIS — R53.81 PHYSICAL DECONDITIONING: ICD-10-CM

## 2023-09-12 DIAGNOSIS — I48.11 LONGSTANDING PERSISTENT ATRIAL FIBRILLATION (HCC): Primary | Chronic | ICD-10-CM

## 2023-09-12 DIAGNOSIS — R53.1 WEAK: Primary | ICD-10-CM

## 2023-09-12 PROCEDURE — 1111F DSCHRG MED/CURRENT MED MERGE: CPT | Performed by: FAMILY MEDICINE

## 2023-09-12 NOTE — CARE COORDINATION
[facility] Care Transitions Initial Follow Up Call    Call within 2 business days of discharge: Yes    Patient Current Location:  Home: [address] [place] [address]    Care Transition Nurse contacted the patient by telephone to perform post hospital discharge assessment. Verified name and  with patient as identifiers. Provided introduction to self, and explanation of the Care Transition Nurse role. Patient: Richie Alaniz Patient : 1950   MRN: [id number]  Reason for Admission: Afib, Resp Distress  Discharge Date: 23 RARS: Readmission Risk Score: 27.2      Last Discharge [address] [facility]       Date Complaint Diagnosis Description Type Department Provider    23 Shortness of Breath Acute on chronic respiratory failure with hypoxia ([address]) . .. ED to Hosp-Admission (Discharged) (ADMITTED) Nato Macedo MD; Charley Becerra. .. Discharge Facility: [address] [place] [address] to be reviewed by the provider   Additional needs identified to be addressed with provider: Yes. Needs hospital f/up appt with in 7 days. DC  with Afib, Resp distress. Method of communication with provider: chart routing. Spoke with Jerilyn Mcmullen, says she is feeling fairly well. She denies chest pain, no pressure, denies palpitations. She is using home O2 4L/NC same as she was prior to hospitalization. She says her resp status is @ baseline-gets +singleton (when more active) No increase in swelling or congestion. Reviewed AVS, DC meds & follow up appts with Jerilyn Mcmullen. She is taking all meds as prescribed. New meds are Hydralazine, Prednisone, Imdur, & Albuterol inhaler. Emphasized importance of HFU appt needed within 7 days. Will send message to PCP office to schedule appt. Has appts scheduled with Pulm 23 & . Denies need for transportation resources. Care Transition Nurse reviewed discharge instructions, medical action plan, and red flags with patient who verbalized understanding.

## 2023-09-18 ENCOUNTER — TELEPHONE (OUTPATIENT)
Dept: FAMILY MEDICINE CLINIC | Age: 73
End: 2023-09-18

## 2023-09-18 DIAGNOSIS — N18.32 STAGE 3B CHRONIC KIDNEY DISEASE (HCC): ICD-10-CM

## 2023-09-18 DIAGNOSIS — I42.9 CARDIOMYOPATHY, PRIMARY (HCC): Chronic | ICD-10-CM

## 2023-09-19 ENCOUNTER — OFFICE VISIT (OUTPATIENT)
Dept: PULMONOLOGY | Age: 73
End: 2023-09-19
Payer: MEDICARE

## 2023-09-19 VITALS
SYSTOLIC BLOOD PRESSURE: 134 MMHG | OXYGEN SATURATION: 98 % | DIASTOLIC BLOOD PRESSURE: 60 MMHG | HEIGHT: 63 IN | WEIGHT: 168.25 LBS | HEART RATE: 62 BPM | BODY MASS INDEX: 29.81 KG/M2

## 2023-09-19 DIAGNOSIS — G47.10 HYPERSOMNIA: ICD-10-CM

## 2023-09-19 DIAGNOSIS — J44.9 CHRONIC OBSTRUCTIVE PULMONARY DISEASE, UNSPECIFIED COPD TYPE (HCC): Chronic | ICD-10-CM

## 2023-09-19 DIAGNOSIS — E66.9 OBESITY (BMI 30-39.9): ICD-10-CM

## 2023-09-19 DIAGNOSIS — G47.33 OSA (OBSTRUCTIVE SLEEP APNEA): ICD-10-CM

## 2023-09-19 DIAGNOSIS — Z87.891 PERSONAL HISTORY OF TOBACCO USE: Primary | ICD-10-CM

## 2023-09-19 DIAGNOSIS — Z87.891 FORMER SMOKER: ICD-10-CM

## 2023-09-19 DIAGNOSIS — R09.02 HYPOXIA: ICD-10-CM

## 2023-09-19 PROCEDURE — 3078F DIAST BP <80 MM HG: CPT | Performed by: INTERNAL MEDICINE

## 2023-09-19 PROCEDURE — G0296 VISIT TO DETERM LDCT ELIG: HCPCS | Performed by: INTERNAL MEDICINE

## 2023-09-19 PROCEDURE — 99215 OFFICE O/P EST HI 40 MIN: CPT | Performed by: INTERNAL MEDICINE

## 2023-09-19 PROCEDURE — 3075F SYST BP GE 130 - 139MM HG: CPT | Performed by: INTERNAL MEDICINE

## 2023-09-19 PROCEDURE — 1123F ACP DISCUSS/DSCN MKR DOCD: CPT | Performed by: INTERNAL MEDICINE

## 2023-09-19 ASSESSMENT — ENCOUNTER SYMPTOMS
ABDOMINAL DISTENTION: 0
ABDOMINAL PAIN: 0
BACK PAIN: 0
EYE DISCHARGE: 0
EYE ITCHING: 0
COUGH: 1
SHORTNESS OF BREATH: 1

## 2023-09-19 NOTE — ASSESSMENT & PLAN NOTE
C/w quitting smoking  C/w Inhalers  PFT  6 MWT  Low Dose CT chest  Get yearly flu vaccine  C.w oxygen

## 2023-09-19 NOTE — PROGRESS NOTES
a lunch without alcohol 0 0 0   In a car, while stopped for a few minutes in traffic 0 0 0   [place] Sleepiness Score 5 6 6   Neck circumference (Inches)  15 15     Mallampati 3    Physical Exam  Vitals reviewed. Constitutional:       Appearance: Normal appearance. HENT:      Head: Normocephalic and atraumatic. Nose: Nose normal.      Mouth/Throat:      Mouth: Mucous membranes are moist.   Eyes:      Extraocular Movements: Extraocular movements intact. Pupils: Pupils are equal, round, and reactive to light. Cardiovascular:      Rate and Rhythm: Normal rate and regular rhythm. Pulses: Normal pulses. Heart sounds: Normal heart sounds. Pulmonary:      Effort: Pulmonary effort is normal.      Breath sounds: Normal breath sounds. Abdominal:      General: Abdomen is flat. Palpations: Abdomen is soft. Musculoskeletal:         General: Normal range of motion. Cervical back: Normal range of motion and neck supple. Skin:     General: Skin is warm and dry. Neurological:      General: No focal deficit present. Mental Status: She is alert and oriented to person, place, and time.    Psychiatric:         Mood and Affect: Mood normal.         Behavior: Behavior normal.         Radiology: None    Assessment and Plan     Problem List          Respiratory    Chronic obstructive pulmonary disease, unspecified (Chronic)      C/w quitting smoking  C/w Inhalers  PFT  6 MWT  Low Dose CT chest  Get yearly flu vaccine  C.w oxygen         Relevant Medications    SYMBICORT 80-4.5 MCG/ACT AERO    tiotropium (SPIRIVA RESPIMAT) 2.5 MCG/ACT AERS inhaler    albuterol sulfate HFA (PROVENTIL;VENTOLIN;PROAIR) 108 (90 Base) MCG/ACT inhaler    Other Relevant Orders    Full PFT Study With Bronchodilator    6 Minute Walk Test    EDITA (obstructive sleep apnea)      Advised to be compliant with the BIPAP  But she wants to get more pressure  I'll increase BIPAP to 14/8 cm h20  Loose weight  Need 2 week

## 2023-09-19 NOTE — ASSESSMENT & PLAN NOTE
Advised to be compliant with the BIPAP  But she wants to get more pressure  I'll increase BIPAP to 14/8 cm h20  Loose weight  Need 2 week download data

## 2023-09-20 ENCOUNTER — CARE COORDINATION (OUTPATIENT)
Dept: CASE MANAGEMENT | Age: 73
End: 2023-09-20

## 2023-09-20 NOTE — CARE COORDINATION
[facility] Care Transitions Follow Up Call    Patient Current Location:  Home: [address] [place] [address]    Care Transition Nurse contacted the patient by telephone to follow up after admission on 23. Verified name and  with patient as identifiers. Patient: Mary Messina  Patient : 1950   MRN: [id number]  Reason for Admission: A/C Resp failure. AFib  Discharge Date: 23 RARS: Readmission Risk Score: 27.2      Needs to be reviewed by the provider   Additional needs identified to be addressed with provider: No  none             Method of communication with provider: none. Spoke with Nate [place], says she is doing fairly well. Denies chest pain/palpitations/pressure. Feels tired a lot. Ronald Luz c/o feeling winded, no wheezing, occas np cough. No wt gain, no increased swelling. No O2 use, using CPAP @ night- stressed importance of being compliant. Had Pulm appt yesterday-she says Dr Edita Barrios changing settings, increasing the pressure. No med questions, no  issues to report @ this time. Reminded Nate [place] of upcoming appt wNephrology 23. Has a ride,  takes her to appts. Instructed on importance of monitoring s/s & contacting provider for worsening s/s. Agreeable to follow up calls. Reviewed CHF management:   Daily weights in a.m. before breakfast, after voiding  Keep written log of weights for review  Notify MD immediately of weight gain/loss 3# or more in 1-7days  Take all rx as prescribed, keep scheduled MD appts  Low sodium diet- read labels; avoid/limit high sodium foods such as fast food, canned/boxed/processed foods, frozen meals, soups, cheeses, breads, chips, soda. Do not add salt to food  Adhere to MD recommended fluid restriction  Notify MD immediately if experiencing increased sob, orthopnea, edema, weight gain, feeling of uneasiness, chest pain, increased cough, confusion, wheezing or chest tightness.  Call 911 if noting acute distress     Addressed changes since

## 2023-09-25 ENCOUNTER — HOSPITAL ENCOUNTER (OUTPATIENT)
Dept: PHYSICAL THERAPY | Age: 73
Setting detail: THERAPIES SERIES
Discharge: HOME | End: 2023-09-25
Payer: MEDICARE

## 2023-09-25 PROCEDURE — 97162 PT EVAL MOD COMPLEX 30 MIN: CPT

## 2023-09-25 PROCEDURE — 97110 THERAPEUTIC EXERCISES: CPT

## 2023-09-25 NOTE — PROGRESS NOTES
Physical Therapy: Initial Evaluation    Patient: Carlos Alberto Shine (43 y.o. female)   Examination Date:   Plan of Care Certification CPWVFP: to        :  1950 ;    Confirmed: Y MRN: [id number]  CSN: [id number]   Insurance: Payor: Andrae Post / Plan: Marcelo Glassing ESSENTIAL/PLUS / Product Type: *No Product type* /   Insurance ID: [id number] - (Medicare Managed) Secondary Insurance (if applicable):    Referring Physician: MD Broderick Cancino MD   PCP: Chidi Whitehead MD Visits to Date/Visits Approved:   /      No Show/Cancelled Appts:   /       Medical Diagnosis: No admission diagnoses are documented for this encounter. Weak. Physical deconditioning. Treatment Diagnosis: impaired balance, impaired gait, BLE weakness, impaired endurance     PERTINENT MEDICAL HISTORY   Patient Assessed for Rehabilitation Services: Yes       Medical History: Chart Reviewed: Yes    Past Medical History:   Diagnosis Date    CAD (coronary artery disease)     COPD (chronic obstructive pulmonary disease) ([facility])     GERD (gastroesophageal reflux disease)     H/O cardiovascular stress test 2021    normal study 63% EF no ischemia    H/O echocardiogram 2017    EF 35-40%    H/O echocardiogram 2020    EF 45-50, Mild TR & AZ, Severely dilated left atrium. 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, functionally normally     H/O percutaneous left heart catheterization 2023    RCA is occluded but SVG to RCA is patent,    H/O transesophageal echocardiography (RAMIRO) for monitoring 2023    Moderately dilated left atrium,  Heavily calcified aortic valve with severe aortic stenosis,  Mild mitral regurgitation. Heart attack ([address]) 2014    History of transesophageal echocardiography (RAMIRO) 2023    EF 20-25%, S/P MVR with Mod Regurg.     Hyperlipidemia     Hypertension     On home oxygen therapy     nightly    PAD (peripheral artery disease) ([facility])     S/P right

## 2023-09-25 NOTE — FLOWSHEET NOTE
Outpatient Physical Therapy  [place]           [x] Phone: [phone]   Fax: [phone]  [place]           [] Phone: [phone]   Fax: [phone]        Physical Therapy Daily Treatment Note  Date:  2023    Patient Name:  Mary Messina    :  1950  MRN: [id number]  Restrictions/Precautions: pacemaker - no estim  Diagnosis:    Diagnosis: Weak. Physical deconditioning. Date of Injury/Surgery:   Treatment Diagnosis:  impaired balance, impaired gait, BLE weakness, impaired endurance  Insurance/Certification information: [facility] - $30 copay  Referring Physician:   Genesis Farr MD   Next Doctor Visit:    Plan of care signed (Y/N): sent    Outcome Measure: HINES/56  Visit# / total visits:   1/10  Pain level: 0/10   Goals:     Patient goals: improve mobility  Short term goals  Time Frame for Short term goals: Refer to LT                 Long Term Goals  Time Frame for Long Term Goals: 10 visits  Pt will improve 5x STS to 15 seconds or less to show improved transfer independence  Pt will improve 6' walk to being able to tolerate 4' with SpO2 not dropping below 90%  Pt will improve HINES to 41 or more to show low fall risk and improved balance  Pt will improve gross BL hip strength to 4/5 to aide in gait and ADLs         Summary of Evaluation:  Assessment: Pt is a 77-year-old female who presents to therapy with difficulty during activity throughout the day secondary to PMH of CVA and cardiac involvement. She is on supplemental O2 at baseline and uses it most of the day and with sleeping. Upon assessment, pt does present with impaired balance, impaired gait, BLE weakness, reduced endurance and overall reduced independence with ADL and IADL activity. Pt would benefit from skilled therapy interventions to address listed impairments, progress toward goal completion and improve ADL/IADL status. PT also warranted to reduce risk for further injury or decline.         Subjective:  See bronwyn

## 2023-09-25 NOTE — PLAN OF CARE
Outpatient Physical Therapy           [place]           [x] Phone: [phone]   Fax: [phone]  [facility]           [] Phone: [phone]   Fax: [phone]     To:  Mandy Solano MD   From: Mert English, PT, DPT     Patient: Silvia Salgado       : 1950  Diagnosis:  Diagnosis: Weak. Physical deconditioning. Treatment Diagnosis: impaired balance, impaired gait, BLE weakness, impaired endurance  Date: 2023    Physical Therapy Certification/Re-Certification Form  Dear Dr. Jon Jon,  The following patient has been evaluated for physical therapy services and for therapy to continue, insurance requires physician review of the treatment plan initially and every 90 days. Please review the attached evaluation and/or summary of the patient's plan of care, and verify that you agree therapy should continue by signing the attached document and sending it back to our office. Assessment:    Assessment: Pt is a 66-year-old female who presents to therapy with difficulty during activity throughout the day secondary to PMH of CVA and cardiac involvement. She is on supplemental O2 at baseline and uses it most of the day and with sleeping. Upon assessment, pt does present with impaired balance, impaired gait, BLE weakness, reduced endurance and overall reduced independence with ADL and IADL activity. Pt would benefit from skilled therapy interventions to address listed impairments, progress toward goal completion and improve ADL/IADL status. PT also warranted to reduce risk for further injury or decline. Patient agrees with established plan of care and assisted in the development of their short term and long term goals. Patient had no adverse reaction with initial treatment and there are no barriers to learning. Learning preferences include demonstration, practice, and handouts.   Patient expressed understanding of HEP and appears to be motivated to participate in an active PT program including compliance

## 2023-09-26 NOTE — PRE-CERTIFICATION NOTE
PT APPROVED FOR 12 VISITS  FROM 9/25/23-12/23/23      [id number] [phone] ONLY    $60 929 [facility] [facility]

## 2023-09-29 ENCOUNTER — CARE COORDINATION (OUTPATIENT)
Dept: CASE MANAGEMENT | Age: 73
End: 2023-09-29

## 2023-09-29 ENCOUNTER — HOSPITAL ENCOUNTER (OUTPATIENT)
Dept: PHYSICAL THERAPY | Age: 73
Setting detail: THERAPIES SERIES
Discharge: HOME | End: 2023-09-29
Payer: MEDICARE

## 2023-09-29 PROCEDURE — 97112 NEUROMUSCULAR REEDUCATION: CPT

## 2023-09-29 PROCEDURE — 97110 THERAPEUTIC EXERCISES: CPT

## 2023-09-29 NOTE — FLOWSHEET NOTE
Outpatient Physical Therapy  [place]           [x] Phone: [phone]   Fax: [phone]  [facility]           [] Phone: [phone]   Fax: [phone]        Physical Therapy Daily Treatment Note  Date:  2023    Patient Name:  Yesenia Sandoval    :  1950  MRN: [id number]  Restrictions/Precautions: pacemaker - no estim  Diagnosis:    Diagnosis: Weak. Physical deconditioning. Date of Injury/Surgery:   Treatment Diagnosis:  impaired balance, impaired gait, BLE weakness, impaired endurance  Insurance/Certification information: [facility] - $30 copay  Referring Physician:   Abdulkadir Whaley MD   Next Doctor Visit:    Plan of care signed (Y/N): Y   Outcome Measure: HINES/56  Visit# / total visits:   2/10  Pain level:      0/10           Goals:     Patient goals: improve mobility  Short term goals  Time Frame for Short term goals: Refer to [facility]  Long Term Goals  Time Frame for Long Term Goals: 10 visits  Pt will improve 5x STS to 15 seconds or less to show improved transfer independence  Pt will improve 6' walk to being able to tolerate 4' with SpO2 not dropping below 90%  Pt will improve HINES to 41 or more to show low fall risk and improved balance  Pt will improve gross BL hip strength to 4/5 to aide in gait and ADLs        Summary of Evaluation:  Assessment: Pt is a 24-year-old female who presents to therapy with difficulty during activity throughout the day secondary to PMH of CVA and cardiac involvement. She is on supplemental O2 at baseline and uses it most of the day and with sleeping. Upon assessment, pt does present with impaired balance, impaired gait, BLE weakness, reduced endurance and overall reduced independence with ADL and IADL activity. Pt would benefit from skilled therapy interventions to address listed impairments, progress toward goal completion and improve ADL/IADL status.  PT also warranted to reduce risk for further injury or decline        Subjective:  Trip Duggan arrives to therapy

## 2023-09-29 NOTE — CARE COORDINATION
[facility] Care Transitions Follow Up Call    Patient Current Location:  Home: [address] [place] [address]    Care Transition Nurse contacted the patient by telephone to follow up after admission on 23. Verified name and  with patient as identifiers. Patient: Paula Lindsey  Patient : 1950   MRN: [id number]  Reason for Admission: Afib, A/C Resp distress  Discharge Date: 23 RARS: Readmission Risk Score: 27.2      Needs to be reviewed by the provider   Additional needs identified to be addressed with provider: No  none             Method of communication with provider: none. Spoke with Marisela Tesfaye, says she is feeling fairly well, says for the most part her s/s are the same. Denies inc. Sob, swelling, chest pain/tightness, wheezing, cough, fever, chills, n/v/d, malaise. She has been going to OP P. T. which she thinks helps w/ energy conservation & strength. Had appt with Neph last week, says, \"some of my kidney fxn has improved. \"  O2 on 4l/NC, using during day & night. SPO2-94%. Says she is not checking her weights, BP or BS @ home. She has been told by her doctor's that they have been good lately. Reviewed CHF management:   Daily weights in a.m. before breakfast, after voiding  Keep written log of weights for review  Notify MD immediately of weight gain/loss 3# or more in 1-7days  Take all rx as prescribed, keep scheduled MD appts  Low sodium diet- read labels; avoid/limit high sodium foods such as fast food, canned/boxed/processed foods, frozen meals, soups, cheeses, breads, chips, soda. Do not add salt to food. Reviewed upcoming appts:   10/5 OP P.T.   10/6 Cards  10/31/23 Pulm  Has rides from family for these appts. Declines need for any resources @ this time. Addressed changes since last contact:  none  Discussed follow-up appointments. If no appointment was previously scheduled, appointment scheduling offered: Yes.    Is follow up appointment scheduled within 7 days of

## 2023-10-05 ENCOUNTER — HOSPITAL ENCOUNTER (OUTPATIENT)
Dept: PHYSICAL THERAPY | Age: 73
Setting detail: THERAPIES SERIES
Discharge: HOME | End: 2023-10-05

## 2023-10-06 ENCOUNTER — OFFICE VISIT (OUTPATIENT)
Dept: CARDIOLOGY CLINIC | Age: 73
End: 2023-10-06
Payer: MEDICARE

## 2023-10-06 ENCOUNTER — CARE COORDINATION (OUTPATIENT)
Dept: CASE MANAGEMENT | Age: 73
End: 2023-10-06

## 2023-10-06 VITALS — SYSTOLIC BLOOD PRESSURE: 126 MMHG | DIASTOLIC BLOOD PRESSURE: 60 MMHG | HEART RATE: 65 BPM

## 2023-10-06 DIAGNOSIS — J44.9 CHRONIC OBSTRUCTIVE PULMONARY DISEASE, UNSPECIFIED COPD TYPE (HCC): Chronic | ICD-10-CM

## 2023-10-06 DIAGNOSIS — J96.21 ACUTE ON CHRONIC RESPIRATORY FAILURE WITH HYPOXIA (HCC): ICD-10-CM

## 2023-10-06 DIAGNOSIS — I25.10 ATHEROSCLEROSIS OF NATIVE CORONARY ARTERY OF NATIVE HEART WITHOUT ANGINA PECTORIS: Chronic | ICD-10-CM

## 2023-10-06 DIAGNOSIS — I42.9 CARDIOMYOPATHY, PRIMARY (HCC): Chronic | ICD-10-CM

## 2023-10-06 DIAGNOSIS — I63.431 CEREBROVASCULAR ACCIDENT (CVA) DUE TO EMBOLISM OF RIGHT POSTERIOR CEREBRAL ARTERY (HCC): ICD-10-CM

## 2023-10-06 DIAGNOSIS — I73.9 PAD (PERIPHERAL ARTERY DISEASE) (HCC): Chronic | ICD-10-CM

## 2023-10-06 DIAGNOSIS — I48.11 LONGSTANDING PERSISTENT ATRIAL FIBRILLATION (HCC): Chronic | ICD-10-CM

## 2023-10-06 DIAGNOSIS — Z95.5 S/P RIGHT CORONARY ARTERY (RCA) STENT PLACEMENT: ICD-10-CM

## 2023-10-06 DIAGNOSIS — G47.33 OSA (OBSTRUCTIVE SLEEP APNEA): ICD-10-CM

## 2023-10-06 DIAGNOSIS — E03.9 ACQUIRED HYPOTHYROIDISM: Chronic | ICD-10-CM

## 2023-10-06 DIAGNOSIS — I05.0 RHEUMATIC MITRAL STENOSIS: Chronic | ICD-10-CM

## 2023-10-06 DIAGNOSIS — E78.2 MIXED HYPERLIPIDEMIA: Chronic | ICD-10-CM

## 2023-10-06 DIAGNOSIS — I25.10 ASCVD (ARTERIOSCLEROTIC CARDIOVASCULAR DISEASE): ICD-10-CM

## 2023-10-06 DIAGNOSIS — I10 PRIMARY HYPERTENSION: Primary | Chronic | ICD-10-CM

## 2023-10-06 DIAGNOSIS — R09.02 HYPOXIA: ICD-10-CM

## 2023-10-06 DIAGNOSIS — Z95.2 S/P MVR (MITRAL VALVE REPLACEMENT): ICD-10-CM

## 2023-10-06 DIAGNOSIS — I35.0 NONRHEUMATIC AORTIC VALVE STENOSIS: Chronic | ICD-10-CM

## 2023-10-06 DIAGNOSIS — Z95.2 S/P TAVR (TRANSCATHETER AORTIC VALVE REPLACEMENT): ICD-10-CM

## 2023-10-06 DIAGNOSIS — I50.22 CHRONIC SYSTOLIC (CONGESTIVE) HEART FAILURE (HCC): Chronic | ICD-10-CM

## 2023-10-06 DIAGNOSIS — I48.91 ATRIAL FIBRILLATION WITH RVR (HCC): ICD-10-CM

## 2023-10-06 PROCEDURE — 3074F SYST BP LT 130 MM HG: CPT | Performed by: INTERNAL MEDICINE

## 2023-10-06 PROCEDURE — 3078F DIAST BP <80 MM HG: CPT | Performed by: INTERNAL MEDICINE

## 2023-10-06 PROCEDURE — 93000 ELECTROCARDIOGRAM COMPLETE: CPT | Performed by: INTERNAL MEDICINE

## 2023-10-06 PROCEDURE — 99214 OFFICE O/P EST MOD 30 MIN: CPT | Performed by: INTERNAL MEDICINE

## 2023-10-06 PROCEDURE — 1123F ACP DISCUSS/DSCN MKR DOCD: CPT | Performed by: INTERNAL MEDICINE

## 2023-10-06 RX ORDER — FUROSEMIDE 40 MG/1
40 TABLET ORAL DAILY
Qty: 90 TABLET | Refills: 3 | Status: SHIPPED | OUTPATIENT
Start: 2023-10-06

## 2023-10-06 NOTE — PROGRESS NOTES
CARDIOLOGY NOTE      Chief Complaint: Coronary artery disease, mitral valve disease    HPI:   Afshin Gillis is a 68y.o. year old who has history as noted below. Afshin Gillis comes in after hospitalization due to decompensated heart failure and rapid A-fib with altered mental status. She is s/p cardioversion in September 2023 and started on amiodarone today her EKG shows sinus with first-degree heart block. She is feeling much better now but her vision and visual fields are still not normal.    Afshin Gillis had a TAVR using 26 mm valve in July 2023 for JENNA of 0.59 Cm2  Post valve she developed vision changes, MRI shows old lacunar and new occipital CVA  She is s/p pacemaker , she also has history of chronic respiratory failure   she is on atc  O2 ut uses it at night with cpap,  She is  using oxygen at night  at 2 L   She is on eliquis now but it is too expensive but denies any bleeding concerns but occasionally her nose bleeds ( once a week)  She tends to get very symptomatic and goes into heart failure and A-fib she is s/p cardioversion in September 2023  Daughter is handicap but she works and drives and lives with her   In the past she has had strokes and has history of \"thick blood\" and she was put on Coumadin by Dr. Anais Jarvis. She  lives with her  has been  for 50 years, she was swicthed to eliquis from coumadin in 2021.  goes on bike rides for weeks and months he is planning another trip in February 2024 and is wondering if it is safe for him to leave her  She sees Dr. Tristan Maxwell for pulmonology  She has history of mitral valve replacement with a bioprosthetic valve unfortunately now having moderate to severe mitral regurgitation and mitral stenosis. she denies chest pressure or pain except when she is in a hurry chasing after her dogs.   Her puppy is Estelita and she has 3 other chuwawa  which keeps her busy , her [employer] works in scheduling )        Current Outpatient

## 2023-10-06 NOTE — CARE COORDINATION
Care Transitions Outreach Attempt    Patient: Mariola Loya Patient : 1950 MRN: [id number]    Attempted to reach patient for transitions of care follow up. Unable to reach patient. (#1 subsequent) CTN will try again      Last Discharge Facility       Date Complaint Diagnosis Description Type Department Provider    23 Shortness of Breath Acute on chronic respiratory failure with hypoxia ([address]) . .. ED to Hosp-Admission (Discharged) (ADMITTED) [facility] [address] Myron Ruiz MD; Livia Silverio. ..           Noted following upcoming appointments from discharge chart review:     Future Appointments   Date Time Provider [address]   10/11/2023  8:00 AM Alva Downey, PTA [facility] PT [facility]   10/19/2023  8:15 AM Blayne Cox, PT [facility] PT [facility]   10/26/2023  8:15 AM Blayne Cox PT [facility]   10/31/2023  9:15 AM Judi Florentino MD [facility] PULM [facility]   2023 11:00 AM Kerry Dubin, DO [facility]   2024  8:30 AM Ozzie Gutierrez MD [facility]       Salena Beat RN [phone]

## 2023-10-06 NOTE — PATIENT INSTRUCTIONS
We are committed to providing you the best care possible. If you receive a survey after visiting one of our offices, please take time to share your experience concerning your physician office visit. These surveys are confidential and no health information about you is shared. We are eager to improve for you and we are counting on your feedback to help make that happen. Please be informed that if you contact our office outside of normal business hours the physician on call cannot help with any scheduling or rescheduling issues, procedure instruction questions or any type of medication issue. We advise you for any urgent/emergency that you go to the nearest emergency room! PLEASE CALL OUR OFFICE DURING NORMAL BUSINESS HOURS    Monday - Friday   8 am to 5 pm    [place]: [address] [place]: [phone]    [place]:  [phone]  Thank you for allowing us to care for you today! We want to ensure we can follow your treatment plan and we strive to give you the best outcomes and experience possible. If you ever have a life threatening emergency and call 911 - for an ambulance (EMS)   Our providers can only care for you at:   [facility] or [facility]. Even if you have someone take you or you drive yourself we can only care for you in a [facility] facility. Our providers are not setup at the other healthcare locations! **It is YOUR responsibilty to bring medication bottles and/or updated medication list to [address].  This will allow us to better serve you and all your healthcare needs**

## 2023-10-12 ENCOUNTER — CARE COORDINATION (OUTPATIENT)
Dept: CASE MANAGEMENT | Age: 73
End: 2023-10-12

## 2023-10-12 NOTE — CARE COORDINATION
Care Transitions Outreach Attempt      Patient: Paty Query Patient : 1950 MRN: [id number]      2nd unsuccessful attempt to reach for  subsequent Care Transitions call . HIPAA compliant message left requesting call back to [phone]. If no call back received, episode will be closed per protocol, no further outreach scheduled. UTR letter done. Will message [address] to please mail out    Last Discharge Facility       Date Complaint Diagnosis Description Type Department Provider    23 Shortness of Breath Acute on chronic respiratory failure with hypoxia ([address]) . .. ED to Hosp-Admission (Discharged) (ADMITTED) [employer] [employer] [address] Lenore Mayes MD; Nakita Villa. .. Was this an external facility discharge?  No Discharge Facility Name: [facility]    Noted following upcoming appointments from discharge chart review:   [facility] follow up appointment(s):   Future Appointments   Date Time Provider [address]   10/19/2023  8:15 AM Big Run Nab, PT [address] [employer] [facility] PT [facility]   10/26/2023  8:15 AM Sydnee Nab, PT [facility]   10/31/2023  9:15 AM Franklin Holter, MD [facility] [address] KWAN   2023 11:00 AM Arabella Grove DO AFL AD[facility] NEPH [facility]   2024  8:30 AM Shon St MD [facility] ANIYA     [facility]  follow up appointment(s): BRANDON Murphy RN [phone]

## 2023-10-17 ENCOUNTER — CARE COORDINATION (OUTPATIENT)
Dept: CARE COORDINATION | Age: 73
End: 2023-10-17

## 2023-10-26 ENCOUNTER — HOSPITAL ENCOUNTER (OUTPATIENT)
Dept: PHYSICAL THERAPY | Age: 73
Discharge: HOME | End: 2023-10-26

## 2023-10-26 NOTE — DISCHARGE SUMMARY
Outpatient Physical Therapy  [facility]           [x] Phone: [phone]   Fax: [phone]  Jean Claude Carpenter           [] Phone: [phone]   Fax: [phone]        Physical Therapy Daily Discharge Note  Date:  10/26/2023    Patient Name:  Sole Lindsey    :  1950  MRN: [id number]    Restrictions/Precautions: pacemaker - no estim  Diagnosis:    Diagnosis: Weak. Physical deconditioning. Date of Injury/Surgery:   Treatment Diagnosis:  impaired balance, impaired gait, BLE weakness, impaired endurance  Insurance/Certification information: [facility] - $30 copay  Referring Physician:   Taran Still MD   Next Doctor Visit:    Plan of care signed (Y/N): Y   Outcome Measure: HINES/56  Visit# / total visits:   2/10  Pain level:      0/10             Goals:     Patient goals: improve mobility  Short term goals  Time Frame for Short term goals: Refer to [facility]  Long Term Goals  Time Frame for Long Term Goals: 10 visits  Pt will improve 5x STS to 15 seconds or less to show improved transfer independence  Pt will improve 6' walk to being able to tolerate 4' with SpO2 not dropping below 90%  Pt will improve HINES to 41 or more to show low fall risk and improved balance  Pt will improve gross BL hip strength to 4/5 to aide in gait and ADLs        Summary of Evaluation:  Assessment: Pt is a 70-year-old female who presents to therapy with difficulty during activity throughout the day secondary to PMH of CVA and cardiac involvement. She is on supplemental O2 at baseline and uses it most of the day and with sleeping. Upon assessment, pt does present with impaired balance, impaired gait, BLE weakness, reduced endurance and overall reduced independence with ADL and IADL activity. Pt would benefit from skilled therapy interventions to address listed impairments, progress toward goal completion and improve ADL/IADL status.  PT also warranted to reduce risk for further injury or decline       Objective:    Unable to complete an

## 2023-10-26 NOTE — FLOWSHEET NOTE
Physical Therapy  Cancellation/No-show Note  Patient Name:  Joyce López  :  1950   Date:  10/26/2023  Cancelled visits to date: 0  No-shows to date: 3    For today's appointment patient:  []  Cancelled  []  Rescheduled appointment  [x]  No-show     Reason given by patient:  []  Patient ill  []  Conflicting appointment  []  No transportation    []  Conflict with work  [x]  No reason given  []  Other:     Comments:  PT left VM of missed apt and that per clinic attendance policy she would be removed from schedule and dc due to 3 NS in a row.     Electronically signed by:  Cris Mir PT, DPT 10/26/2023, 8:50 AM

## 2023-10-30 DIAGNOSIS — F51.04 PSYCHOPHYSIOLOGICAL INSOMNIA: ICD-10-CM

## 2023-10-30 RX ORDER — DOXEPIN HYDROCHLORIDE 10 MG/1
10 CAPSULE ORAL NIGHTLY
Qty: 30 CAPSULE | Refills: 3 | Status: SHIPPED | OUTPATIENT
Start: 2023-10-30

## 2023-10-31 ENCOUNTER — OFFICE VISIT (OUTPATIENT)
Dept: PULMONOLOGY | Age: 73
End: 2023-10-31
Payer: MEDICARE

## 2023-10-31 VITALS
DIASTOLIC BLOOD PRESSURE: 76 MMHG | HEIGHT: 63 IN | HEART RATE: 66 BPM | WEIGHT: 165.13 LBS | RESPIRATION RATE: 16 BRPM | BODY MASS INDEX: 29.26 KG/M2 | SYSTOLIC BLOOD PRESSURE: 122 MMHG | OXYGEN SATURATION: 96 %

## 2023-10-31 DIAGNOSIS — E66.9 OBESITY (BMI 30-39.9): ICD-10-CM

## 2023-10-31 DIAGNOSIS — G47.33 OSA (OBSTRUCTIVE SLEEP APNEA): ICD-10-CM

## 2023-10-31 DIAGNOSIS — Z87.891 FORMER SMOKER: ICD-10-CM

## 2023-10-31 DIAGNOSIS — R09.02 HYPOXIA: ICD-10-CM

## 2023-10-31 DIAGNOSIS — G47.11 IDIOPATHIC HYPERSOMNIA: ICD-10-CM

## 2023-10-31 PROCEDURE — 99215 OFFICE O/P EST HI 40 MIN: CPT | Performed by: INTERNAL MEDICINE

## 2023-10-31 PROCEDURE — 1123F ACP DISCUSS/DSCN MKR DOCD: CPT | Performed by: INTERNAL MEDICINE

## 2023-10-31 PROCEDURE — 3074F SYST BP LT 130 MM HG: CPT | Performed by: INTERNAL MEDICINE

## 2023-10-31 PROCEDURE — 3078F DIAST BP <80 MM HG: CPT | Performed by: INTERNAL MEDICINE

## 2023-10-31 RX ORDER — MODAFINIL 200 MG/1
200 TABLET ORAL DAILY
Qty: 30 TABLET | Refills: 0 | Status: SHIPPED | OUTPATIENT
Start: 2023-10-31 | End: 2023-11-30

## 2023-10-31 RX ORDER — FLUTICASONE FUROATE, UMECLIDINIUM BROMIDE AND VILANTEROL TRIFENATATE 100; 62.5; 25 UG/1; UG/1; UG/1
1 POWDER RESPIRATORY (INHALATION) DAILY
Qty: 1 EACH | Refills: 4 | Status: SHIPPED | OUTPATIENT
Start: 2023-10-31

## 2023-10-31 ASSESSMENT — ENCOUNTER SYMPTOMS
EYE DISCHARGE: 0
ABDOMINAL DISTENTION: 0
SHORTNESS OF BREATH: 0
BACK PAIN: 0
EYE ITCHING: 0
COUGH: 0
ABDOMINAL PAIN: 0

## 2023-10-31 NOTE — PROGRESS NOTES
Former     Packs/day: 0.50     Years: 56.00     Additional pack years: 0.00     Total pack years: 28.00     Types: Cigarettes     Start date: 65     Quit date: 9/15/2022     Years since quittin.1    Smokeless tobacco: Former     Quit date: 2021   Vaping Use    Vaping Use: Never used   Substance and Sexual Activity    Alcohol use: No     Comment: 2 cups of coffee in the a.m. Drug use: No    Sexual activity: Yes     Partners: Male   Social History Narrative    ** Merged History Encounter **          Social Determinants of Health     Financial Resource Strain: Low Risk  (2023)    Overall Financial Resource Strain (CARDIA)     Difficulty of Paying Living Expenses: Not hard at all   Food Insecurity: Unknown (2023)    Hunger Vital Sign     Worried About Running Out of Food in the Last Year: Never true   Transportation Needs: Unknown (2023)    PRAPARE - Transportation     Lack of Transportation (Non-Medical): No   Physical Activity: Inactive (3/24/2023)    Exercise Vital Sign     Days of Exercise per Week: 0 days     Minutes of Exercise per Session: 0 min   Housing Stability: Unknown (2023)    Housing Stability Vital Sign     Unstable Housing in the Last Year: No       Review of Systems   Constitutional:  Positive for fatigue. HENT:  Negative for congestion and postnasal drip. Eyes:  Negative for discharge and itching. Respiratory:  Negative for cough and shortness of breath. Cardiovascular:  Negative for chest pain and leg swelling. Gastrointestinal:  Negative for abdominal distention and abdominal pain. Endocrine: Negative for cold intolerance and heat intolerance. Genitourinary:  Negative for enuresis and frequency. Musculoskeletal:  Negative for arthralgias and back pain. Allergic/Immunologic: Negative for environmental allergies and food allergies. Neurological:  Negative for light-headedness and headaches. Hematological:  Negative for adenopathy.

## 2023-10-31 NOTE — ASSESSMENT & PLAN NOTE
Advised to c/w quitting smoking  Await for the PFT and Low Dose CT chest   No flu vaccine per patient

## 2023-11-02 ENCOUNTER — TELEPHONE (OUTPATIENT)
Dept: PULMONOLOGY | Age: 73
End: 2023-11-02

## 2023-11-02 NOTE — TELEPHONE ENCOUNTER
PA completed on cover my meds 11/2/2023 waiting on approval or denial    NAT SOLIS ([employer]: Keri Carvalho)    [employer] has not replied to your PA request. Turnaround time for review of a PA request is dependent upon insurance plan and can range from 24 hours to 5 calendar days. Depending on the information you've provided, additional questions may be returned by the plan. You may close this dialog, return to your dashboard, and perform other tasks. To check for an update later, click on the [employer] Detailed Status\" located in the upper right corner of your dashboard.     If this search option is not available or [employer] has not replied to your request withi

## 2023-11-13 ENCOUNTER — PROCEDURE VISIT (OUTPATIENT)
Dept: CARDIOLOGY CLINIC | Age: 73
End: 2023-11-13

## 2023-11-13 DIAGNOSIS — Z95.0 CARDIAC PACEMAKER IN SITU: Primary | ICD-10-CM

## 2023-11-20 ENCOUNTER — TELEPHONE (OUTPATIENT)
Dept: PULMONOLOGY | Age: 73
End: 2023-11-20

## 2023-11-20 NOTE — TELEPHONE ENCOUNTER
Could you please call this patient, [phone]? She is wanting to know why you prescribed the modafinil for her.

## 2023-11-27 ENCOUNTER — TELEPHONE (OUTPATIENT)
Dept: PULMONOLOGY | Age: 73
End: 2023-11-27

## 2023-11-27 ENCOUNTER — TELEPHONE (OUTPATIENT)
Dept: FAMILY MEDICINE CLINIC | Age: 73
End: 2023-11-27

## 2023-11-27 DIAGNOSIS — F51.04 PSYCHOPHYSIOLOGICAL INSOMNIA: ICD-10-CM

## 2023-11-27 RX ORDER — DOXEPIN HYDROCHLORIDE 25 MG/1
25 CAPSULE ORAL NIGHTLY
Qty: 90 CAPSULE | Refills: 1 | Status: SHIPPED | OUTPATIENT
Start: 2023-11-27

## 2023-11-27 NOTE — TELEPHONE ENCOUNTER
Patient stated she has been taking 2 and it works. She will need that prescription sent in because she is out of medication.

## 2023-11-27 NOTE — TELEPHONE ENCOUNTER
I sent a prescription for the 25mg pills to [facility] pharmacy for 90 w/ a refill.

## 2023-11-27 NOTE — TELEPHONE ENCOUNTER
Patient called stating Spiriva inhaler is not longer preferred under her insurance and needs changed.     Please advise

## 2023-11-27 NOTE — TELEPHONE ENCOUNTER
Patient states she does not feel like Doxepin isn't helping her at night patient feels like to dosage needs increased.

## 2023-11-28 RX ORDER — UMECLIDINIUM 62.5 UG/1
1 AEROSOL, POWDER ORAL DAILY
Qty: 1 EACH | Refills: 4 | Status: SHIPPED | OUTPATIENT
Start: 2023-11-28

## 2023-11-29 ENCOUNTER — TELEPHONE (OUTPATIENT)
Dept: PULMONOLOGY | Age: 73
End: 2023-11-29

## 2023-11-29 NOTE — TELEPHONE ENCOUNTER
Allie sent fax stating incruse inhaler not covered, preferred is spiriva    Pended rx for spririva please send to [address]

## 2023-11-30 DIAGNOSIS — E03.9 ACQUIRED HYPOTHYROIDISM: ICD-10-CM

## 2023-11-30 RX ORDER — LEVOTHYROXINE SODIUM 0.07 MG/1
TABLET ORAL
Qty: 30 TABLET | Refills: 0 | Status: ON HOLD | OUTPATIENT
Start: 2023-11-30 | End: 2023-12-29

## 2023-11-30 NOTE — TELEPHONE ENCOUNTER
Needs to get blood test done before additional refills.  There is an order that Dr. Manzano put on for her to get done in October and when she goes in to get blood tests for her kidney doctor she should ask to get the blood tests for Dr. Manzano as well.  If she lets me know then I will take action on it even though Dr. Manzano was the one that put in the TSH order.  I am putting in 30 days worth

## 2023-12-09 ENCOUNTER — HOSPITAL ENCOUNTER (OUTPATIENT)
Age: 73
Discharge: HOME | End: 2023-12-09
Payer: MEDICARE

## 2023-12-09 DIAGNOSIS — E78.2 MIXED HYPERLIPIDEMIA: Chronic | ICD-10-CM

## 2023-12-09 DIAGNOSIS — Z95.2 S/P MVR (MITRAL VALVE REPLACEMENT): ICD-10-CM

## 2023-12-09 DIAGNOSIS — J44.9 CHRONIC OBSTRUCTIVE PULMONARY DISEASE, UNSPECIFIED COPD TYPE (HCC): Chronic | ICD-10-CM

## 2023-12-09 DIAGNOSIS — Z95.5 S/P RIGHT CORONARY ARTERY (RCA) STENT PLACEMENT: ICD-10-CM

## 2023-12-09 DIAGNOSIS — I73.9 PAD (PERIPHERAL ARTERY DISEASE) (HCC): Chronic | ICD-10-CM

## 2023-12-09 DIAGNOSIS — I63.431 CEREBROVASCULAR ACCIDENT (CVA) DUE TO EMBOLISM OF RIGHT POSTERIOR CEREBRAL ARTERY (HCC): ICD-10-CM

## 2023-12-09 DIAGNOSIS — N18.4 CKD (CHRONIC KIDNEY DISEASE), STAGE IV (HCC): ICD-10-CM

## 2023-12-09 DIAGNOSIS — I48.91 ATRIAL FIBRILLATION WITH RVR (HCC): ICD-10-CM

## 2023-12-09 DIAGNOSIS — I25.10 ATHEROSCLEROSIS OF NATIVE CORONARY ARTERY OF NATIVE HEART WITHOUT ANGINA PECTORIS: Chronic | ICD-10-CM

## 2023-12-09 DIAGNOSIS — G47.33 OSA (OBSTRUCTIVE SLEEP APNEA): ICD-10-CM

## 2023-12-09 DIAGNOSIS — I48.11 LONGSTANDING PERSISTENT ATRIAL FIBRILLATION (HCC): Chronic | ICD-10-CM

## 2023-12-09 DIAGNOSIS — I10 PRIMARY HYPERTENSION: Chronic | ICD-10-CM

## 2023-12-09 DIAGNOSIS — I25.10 ASCVD (ARTERIOSCLEROTIC CARDIOVASCULAR DISEASE): ICD-10-CM

## 2023-12-09 DIAGNOSIS — I50.22 CHRONIC SYSTOLIC (CONGESTIVE) HEART FAILURE (HCC): Chronic | ICD-10-CM

## 2023-12-09 DIAGNOSIS — I42.9 CARDIOMYOPATHY, PRIMARY (HCC): Chronic | ICD-10-CM

## 2023-12-09 DIAGNOSIS — I35.0 NONRHEUMATIC AORTIC VALVE STENOSIS: Chronic | ICD-10-CM

## 2023-12-09 DIAGNOSIS — J96.21 ACUTE ON CHRONIC RESPIRATORY FAILURE WITH HYPOXIA (HCC): ICD-10-CM

## 2023-12-09 DIAGNOSIS — E03.9 ACQUIRED HYPOTHYROIDISM: Chronic | ICD-10-CM

## 2023-12-09 DIAGNOSIS — Z95.2 S/P TAVR (TRANSCATHETER AORTIC VALVE REPLACEMENT): ICD-10-CM

## 2023-12-09 DIAGNOSIS — I05.0 RHEUMATIC MITRAL STENOSIS: Chronic | ICD-10-CM

## 2023-12-09 DIAGNOSIS — R09.02 HYPOXIA: ICD-10-CM

## 2023-12-09 LAB
ANION GAP SERPL CALCULATED.3IONS-SCNC: 11 MMOL/L (ref 4–16)
BILIRUBIN URINE: NEGATIVE MG/DL
BLOOD, URINE: NEGATIVE
BUN SERPL-MCNC: 28 MG/DL (ref 6–23)
CALCIUM SERPL-MCNC: 9.3 MG/DL (ref 8.3–10.6)
CHLORIDE BLD-SCNC: 96 MMOL/L (ref 99–110)
CLARITY: CLEAR
CO2: 30 MMOL/L (ref 21–32)
COLOR: YELLOW
COMMENT UA: ABNORMAL
CREAT SERPL-MCNC: 2.5 MG/DL (ref 0.6–1.1)
CREATININE URINE: 16.2 MG/DL (ref 28–217)
GFR SERPL CREATININE-BSD FRML MDRD: 20 ML/MIN/1.73M2
GLUCOSE SERPL-MCNC: 99 MG/DL (ref 70–99)
GLUCOSE, URINE: 250 MG/DL
KETONES, URINE: NEGATIVE MG/DL
LEUKOCYTE ESTERASE, URINE: NEGATIVE
NITRITE URINE, QUANTITATIVE: NEGATIVE
PH, URINE: 7 (ref 5–8)
POTASSIUM SERPL-SCNC: 4.4 MMOL/L (ref 3.5–5.1)
PRO-BNP: 2890 PG/ML
PROT/CREAT RATIO, UR: 0.3
PROTEIN UA: NEGATIVE MG/DL
SODIUM BLD-SCNC: 137 MMOL/L (ref 135–145)
SPECIFIC GRAVITY UA: 1.01 (ref 1–1.03)
T4 FREE SERPL-MCNC: 2.18 NG/DL (ref 0.9–1.8)
TSH SERPL DL<=0.005 MIU/L-ACNC: 8.58 UIU/ML (ref 0.27–4.2)
URINE TOTAL PROTEIN: 4.5 MG/DL
UROBILINOGEN, URINE: 0.2 MG/DL (ref 0.2–1)

## 2023-12-09 PROCEDURE — 80048 BASIC METABOLIC PNL TOTAL CA: CPT

## 2023-12-09 PROCEDURE — 36415 COLL VENOUS BLD VENIPUNCTURE: CPT

## 2023-12-09 PROCEDURE — 83880 ASSAY OF NATRIURETIC PEPTIDE: CPT

## 2023-12-09 PROCEDURE — 84439 ASSAY OF FREE THYROXINE: CPT

## 2023-12-09 PROCEDURE — 82570 ASSAY OF URINE CREATININE: CPT

## 2023-12-09 PROCEDURE — 84443 ASSAY THYROID STIM HORMONE: CPT

## 2023-12-09 PROCEDURE — 84156 ASSAY OF PROTEIN URINE: CPT

## 2023-12-09 PROCEDURE — 81003 URINALYSIS AUTO W/O SCOPE: CPT

## 2023-12-12 ENCOUNTER — TELEPHONE (OUTPATIENT)
Dept: CARDIOLOGY CLINIC | Age: 73
End: 2023-12-12

## 2023-12-12 NOTE — TELEPHONE ENCOUNTER
TSH   SH, High Sensitivity 0.270 - 4.20 uIu/ml 8.580 High  0.052 Low        May speak to  Jeremy Clear to pt regarding lab results. Patient advised and voices understanding.

## 2023-12-21 ENCOUNTER — TELEPHONE (OUTPATIENT)
Dept: FAMILY MEDICINE CLINIC | Age: 73
End: 2023-12-21

## 2023-12-21 DIAGNOSIS — E03.9 ACQUIRED HYPOTHYROIDISM: ICD-10-CM

## 2023-12-21 NOTE — TELEPHONE ENCOUNTER
Patient called stating she had labs done and her thyroid is hyperactive. She would like to know if you are changing her dose. She also stated her hair is falling out in chunks.

## 2023-12-22 RX ORDER — LEVOTHYROXINE SODIUM 0.07 MG/1
TABLET ORAL
Qty: 30 TABLET | Refills: 0 | OUTPATIENT
Start: 2023-12-22

## 2023-12-26 ENCOUNTER — APPOINTMENT (OUTPATIENT)
Dept: GENERAL RADIOLOGY | Age: 73
DRG: 280 | End: 2023-12-26
Attending: EMERGENCY MEDICINE
Payer: MEDICARE

## 2023-12-26 ENCOUNTER — HOSPITAL ENCOUNTER (INPATIENT)
Age: 73
LOS: 6 days | Discharge: HOME | DRG: 280 | End: 2024-01-01
Attending: EMERGENCY MEDICINE | Admitting: STUDENT IN AN ORGANIZED HEALTH CARE EDUCATION/TRAINING PROGRAM
Payer: MEDICARE

## 2023-12-26 DIAGNOSIS — I50.9 ACUTE ON CHRONIC CONGESTIVE HEART FAILURE, UNSPECIFIED HEART FAILURE TYPE (HCC): ICD-10-CM

## 2023-12-26 DIAGNOSIS — R09.02 HYPOXIA: Primary | ICD-10-CM

## 2023-12-26 DIAGNOSIS — I50.9 ACUTE DECOMPENSATED HEART FAILURE (HCC): ICD-10-CM

## 2023-12-26 DIAGNOSIS — D64.9 ANEMIA, UNSPECIFIED TYPE: ICD-10-CM

## 2023-12-26 DIAGNOSIS — R79.89 ELEVATED LFTS: ICD-10-CM

## 2023-12-26 LAB
ABO/RH: NORMAL
ALBUMIN SERPL-MCNC: 3.7 GM/DL (ref 3.4–5)
ALP BLD-CCNC: 82 IU/L (ref 40–129)
ALT SERPL-CCNC: 41 U/L (ref 10–40)
ANION GAP SERPL CALCULATED.3IONS-SCNC: 13 MMOL/L (ref 7–16)
ANTIBODY SCREEN: NEGATIVE
AST SERPL-CCNC: 37 IU/L (ref 15–37)
BASE EXCESS MIXED: 2.4 (ref 0–3)
BASE EXCESS MIXED: 5.3 (ref 0–3)
BASOPHILS ABSOLUTE: 0.1 K/CU MM
BASOPHILS RELATIVE PERCENT: 0.6 % (ref 0–1)
BILIRUB SERPL-MCNC: 1 MG/DL (ref 0–1)
BUN SERPL-MCNC: 36 MG/DL (ref 6–23)
CALCIUM SERPL-MCNC: 9.3 MG/DL (ref 8.3–10.6)
CHLORIDE BLD-SCNC: 95 MMOL/L (ref 99–110)
CO2: 26 MMOL/L (ref 21–32)
COMMENT: ABNORMAL
COMMENT: ABNORMAL
COMMENT: NORMAL
CREAT SERPL-MCNC: 2.4 MG/DL (ref 0.6–1.1)
DIFFERENTIAL TYPE: ABNORMAL
EOSINOPHILS ABSOLUTE: 0.2 K/CU MM
EOSINOPHILS RELATIVE PERCENT: 1.7 % (ref 0–3)
GFR SERPL CREATININE-BSD FRML MDRD: 21 ML/MIN/1.73M2
GLUCOSE SERPL-MCNC: 283 MG/DL (ref 70–99)
HCO3 VENOUS: 30.5 MMOL/L (ref 22–29)
HCO3 VENOUS: 31.9 MMOL/L (ref 22–29)
HCT VFR BLD CALC: 30.2 % (ref 37–47)
HEMOGLOBIN: 9.2 GM/DL (ref 12.5–16)
IMMATURE NEUTROPHIL %: 0.5 % (ref 0–0.43)
INR BLD: 1.8 INDEX
LYMPHOCYTES ABSOLUTE: 1.5 K/CU MM
LYMPHOCYTES RELATIVE PERCENT: 12.5 % (ref 24–44)
MAGNESIUM: 2.3 MG/DL (ref 1.8–2.4)
MCH RBC QN AUTO: 31.1 PG (ref 27–31)
MCHC RBC AUTO-ENTMCNC: 30.5 % (ref 32–36)
MCV RBC AUTO: 102 FL (ref 78–100)
MONOCYTES ABSOLUTE: 0.9 K/CU MM
MONOCYTES RELATIVE PERCENT: 7.5 % (ref 0–4)
NUCLEATED RBC %: 0 %
O2 SAT, VEN: 49.9 % (ref 50–70)
O2 SAT, VEN: 94 % (ref 50–70)
PCO2, VEN: 54 MMHG (ref 41–51)
PCO2, VEN: 62 MMHG (ref 41–51)
PDW BLD-RTO: 15.2 % (ref 11.7–14.9)
PH VENOUS: 7.3 (ref 7.32–7.43)
PH VENOUS: 7.38 (ref 7.32–7.43)
PLATELET # BLD: 260 K/CU MM (ref 140–440)
PMV BLD AUTO: 11.1 FL (ref 7.5–11.1)
PO2, VEN: 154 MMHG (ref 28–48)
PO2, VEN: 32 MMHG (ref 28–48)
POTASSIUM SERPL-SCNC: 3.7 MMOL/L (ref 3.5–5.1)
PRO-BNP: 9332 PG/ML
PROTHROMBIN TIME: 20.8 SECONDS (ref 11.7–14.5)
RBC # BLD: 2.96 M/CU MM (ref 4.2–5.4)
SEGMENTED NEUTROPHILS ABSOLUTE COUNT: 9.1 K/CU MM
SEGMENTED NEUTROPHILS RELATIVE PERCENT: 77.2 % (ref 36–66)
SODIUM BLD-SCNC: 134 MMOL/L (ref 135–145)
T4 FREE SERPL-MCNC: 1.62 NG/DL (ref 0.9–1.8)
TOTAL IMMATURE NEUTOROPHIL: 0.06 K/CU MM
TOTAL NUCLEATED RBC: 0 K/CU MM
TOTAL PROTEIN: 7.7 GM/DL (ref 6.4–8.2)
TROPONIN, HIGH SENSITIVITY: 46 NG/L (ref 0–14)
TROPONIN, HIGH SENSITIVITY: 59 NG/L (ref 0–14)
TSH SERPL DL<=0.005 MIU/L-ACNC: 4.76 UIU/ML (ref 0.27–4.2)
WBC # BLD: 11.8 K/CU MM (ref 4–10.5)

## 2023-12-26 PROCEDURE — 94660 CPAP INITIATION&MGMT: CPT

## 2023-12-26 PROCEDURE — 86901 BLOOD TYPING SEROLOGIC RH(D): CPT

## 2023-12-26 PROCEDURE — 83735 ASSAY OF MAGNESIUM: CPT

## 2023-12-26 PROCEDURE — 5A09357 ASSISTANCE WITH RESPIRATORY VENTILATION, LESS THAN 24 CONSECUTIVE HOURS, CONTINUOUS POSITIVE AIRWAY PRESSURE: ICD-10-PCS | Performed by: STUDENT IN AN ORGANIZED HEALTH CARE EDUCATION/TRAINING PROGRAM

## 2023-12-26 PROCEDURE — 86850 RBC ANTIBODY SCREEN: CPT

## 2023-12-26 PROCEDURE — 87040 BLOOD CULTURE FOR BACTERIA: CPT

## 2023-12-26 PROCEDURE — 84439 ASSAY OF FREE THYROXINE: CPT

## 2023-12-26 PROCEDURE — 1200000000 HC SEMI PRIVATE

## 2023-12-26 PROCEDURE — 93005 ELECTROCARDIOGRAM TRACING: CPT | Performed by: EMERGENCY MEDICINE

## 2023-12-26 PROCEDURE — 96374 THER/PROPH/DIAG INJ IV PUSH: CPT

## 2023-12-26 PROCEDURE — 80053 COMPREHEN METABOLIC PANEL: CPT

## 2023-12-26 PROCEDURE — 85610 PROTHROMBIN TIME: CPT

## 2023-12-26 PROCEDURE — 99285 EMERGENCY DEPT VISIT HI MDM: CPT

## 2023-12-26 PROCEDURE — 85025 COMPLETE CBC W/AUTO DIFF WBC: CPT

## 2023-12-26 PROCEDURE — 71045 X-RAY EXAM CHEST 1 VIEW: CPT

## 2023-12-26 PROCEDURE — 84484 ASSAY OF TROPONIN QUANT: CPT

## 2023-12-26 PROCEDURE — 84443 ASSAY THYROID STIM HORMONE: CPT

## 2023-12-26 PROCEDURE — 6360000002 HC RX W HCPCS: Performed by: EMERGENCY MEDICINE

## 2023-12-26 PROCEDURE — 86900 BLOOD TYPING SEROLOGIC ABO: CPT

## 2023-12-26 PROCEDURE — 83880 ASSAY OF NATRIURETIC PEPTIDE: CPT

## 2023-12-26 PROCEDURE — 82805 BLOOD GASES W/O2 SATURATION: CPT

## 2023-12-26 RX ORDER — ISOSORBIDE MONONITRATE 30 MG/1
30 TABLET, EXTENDED RELEASE ORAL DAILY
Status: DISCONTINUED | OUTPATIENT
Start: 2023-12-27 | End: 2024-01-01 | Stop reason: HOSPADM

## 2023-12-26 RX ORDER — CARVEDILOL 25 MG/1
25 TABLET ORAL 2 TIMES DAILY WITH MEALS
Status: DISCONTINUED | OUTPATIENT
Start: 2023-12-26 | End: 2024-01-01

## 2023-12-26 RX ORDER — ALBUTEROL SULFATE 90 UG/1
2 AEROSOL, METERED RESPIRATORY (INHALATION) EVERY 6 HOURS PRN
Status: DISCONTINUED | OUTPATIENT
Start: 2023-12-26 | End: 2024-01-01 | Stop reason: HOSPADM

## 2023-12-26 RX ORDER — ONDANSETRON 4 MG/1
4 TABLET, ORALLY DISINTEGRATING ORAL EVERY 8 HOURS PRN
Status: DISCONTINUED | OUTPATIENT
Start: 2023-12-26 | End: 2024-01-01 | Stop reason: HOSPADM

## 2023-12-26 RX ORDER — ONDANSETRON 2 MG/ML
4 INJECTION INTRAMUSCULAR; INTRAVENOUS EVERY 6 HOURS PRN
Status: DISCONTINUED | OUTPATIENT
Start: 2023-12-26 | End: 2024-01-01 | Stop reason: HOSPADM

## 2023-12-26 RX ORDER — ACETAMINOPHEN 325 MG/1
650 TABLET ORAL EVERY 6 HOURS PRN
Status: DISCONTINUED | OUTPATIENT
Start: 2023-12-26 | End: 2024-01-01 | Stop reason: HOSPADM

## 2023-12-26 RX ORDER — PANTOPRAZOLE SODIUM 40 MG/1
40 TABLET, DELAYED RELEASE ORAL DAILY
Status: DISCONTINUED | OUTPATIENT
Start: 2023-12-27 | End: 2024-01-01 | Stop reason: HOSPADM

## 2023-12-26 RX ORDER — LEVOTHYROXINE SODIUM 0.07 MG/1
75 TABLET ORAL DAILY
Status: DISCONTINUED | OUTPATIENT
Start: 2023-12-27 | End: 2024-01-01 | Stop reason: HOSPADM

## 2023-12-26 RX ORDER — POLYETHYLENE GLYCOL 3350 17 G/17G
17 POWDER, FOR SOLUTION ORAL DAILY PRN
Status: DISCONTINUED | OUTPATIENT
Start: 2023-12-26 | End: 2024-01-01 | Stop reason: HOSPADM

## 2023-12-26 RX ORDER — DOXEPIN HYDROCHLORIDE 25 MG/1
25 CAPSULE ORAL NIGHTLY
Status: DISCONTINUED | OUTPATIENT
Start: 2023-12-26 | End: 2024-01-01 | Stop reason: HOSPADM

## 2023-12-26 RX ORDER — AMIODARONE HYDROCHLORIDE 200 MG/1
200 TABLET ORAL DAILY
Status: DISCONTINUED | OUTPATIENT
Start: 2023-12-27 | End: 2024-01-01 | Stop reason: HOSPADM

## 2023-12-26 RX ORDER — FUROSEMIDE 10 MG/ML
40 INJECTION INTRAMUSCULAR; INTRAVENOUS ONCE
Status: COMPLETED | OUTPATIENT
Start: 2023-12-26 | End: 2023-12-26

## 2023-12-26 RX ORDER — SODIUM CHLORIDE 0.9 % (FLUSH) 0.9 %
5-40 SYRINGE (ML) INJECTION EVERY 12 HOURS SCHEDULED
Status: DISCONTINUED | OUTPATIENT
Start: 2023-12-26 | End: 2024-01-01 | Stop reason: HOSPADM

## 2023-12-26 RX ORDER — ATORVASTATIN CALCIUM 40 MG/1
40 TABLET, FILM COATED ORAL NIGHTLY
Status: DISCONTINUED | OUTPATIENT
Start: 2023-12-26 | End: 2024-01-01 | Stop reason: HOSPADM

## 2023-12-26 RX ORDER — HYDRALAZINE HYDROCHLORIDE 25 MG/1
25 TABLET, FILM COATED ORAL EVERY 8 HOURS SCHEDULED
Status: DISCONTINUED | OUTPATIENT
Start: 2023-12-26 | End: 2024-01-01 | Stop reason: HOSPADM

## 2023-12-26 RX ORDER — FUROSEMIDE 10 MG/ML
40 INJECTION INTRAMUSCULAR; INTRAVENOUS 2 TIMES DAILY
Status: DISCONTINUED | OUTPATIENT
Start: 2023-12-27 | End: 2023-12-28

## 2023-12-26 RX ORDER — LISINOPRIL 5 MG/1
10 TABLET ORAL DAILY
Status: DISCONTINUED | OUTPATIENT
Start: 2023-12-27 | End: 2024-01-01 | Stop reason: HOSPADM

## 2023-12-26 RX ORDER — ACETAMINOPHEN 650 MG/1
650 SUPPOSITORY RECTAL EVERY 6 HOURS PRN
Status: DISCONTINUED | OUTPATIENT
Start: 2023-12-26 | End: 2024-01-01 | Stop reason: HOSPADM

## 2023-12-26 RX ORDER — BUDESONIDE AND FORMOTEROL FUMARATE DIHYDRATE 80; 4.5 UG/1; UG/1
2 AEROSOL RESPIRATORY (INHALATION) 2 TIMES DAILY
Status: DISCONTINUED | OUTPATIENT
Start: 2023-12-26 | End: 2024-01-01 | Stop reason: HOSPADM

## 2023-12-26 RX ORDER — SODIUM CHLORIDE 9 MG/ML
INJECTION, SOLUTION INTRAVENOUS PRN
Status: DISCONTINUED | OUTPATIENT
Start: 2023-12-26 | End: 2024-01-01 | Stop reason: HOSPADM

## 2023-12-26 RX ORDER — SODIUM CHLORIDE 0.9 % (FLUSH) 0.9 %
5-40 SYRINGE (ML) INJECTION PRN
Status: DISCONTINUED | OUTPATIENT
Start: 2023-12-26 | End: 2024-01-01 | Stop reason: HOSPADM

## 2023-12-26 RX ORDER — ASPIRIN 81 MG/1
81 TABLET, CHEWABLE ORAL DAILY
Status: DISCONTINUED | OUTPATIENT
Start: 2023-12-27 | End: 2024-01-01 | Stop reason: HOSPADM

## 2023-12-26 RX ADMIN — FUROSEMIDE 40 MG: 10 INJECTION, SOLUTION INTRAMUSCULAR; INTRAVENOUS at 19:35

## 2023-12-26 ASSESSMENT — LIFESTYLE VARIABLES
HOW MANY STANDARD DRINKS CONTAINING ALCOHOL DO YOU HAVE ON A TYPICAL DAY: PATIENT DOES NOT DRINK
HOW OFTEN DO YOU HAVE A DRINK CONTAINING ALCOHOL: NEVER

## 2023-12-26 NOTE — ED TRIAGE NOTES
Patient has been feeling short of breath today.  Did not do breathing treatments or inhalers at home.  Did get breathing treatment on squad.  Normally wears 4-5LPM o2 at home all the time.

## 2023-12-27 LAB
ALBUMIN SERPL-MCNC: 3.2 GM/DL (ref 3.4–5)
ALP BLD-CCNC: 68 IU/L (ref 40–129)
ALT SERPL-CCNC: 36 U/L (ref 10–40)
ANION GAP SERPL CALCULATED.3IONS-SCNC: 13 MMOL/L (ref 7–16)
AST SERPL-CCNC: 35 IU/L (ref 15–37)
BASOPHILS ABSOLUTE: 0 K/CU MM
BASOPHILS RELATIVE PERCENT: 0.4 % (ref 0–1)
BILIRUB SERPL-MCNC: 1 MG/DL (ref 0–1)
BUN SERPL-MCNC: 39 MG/DL (ref 6–23)
CALCIUM SERPL-MCNC: 8.7 MG/DL (ref 8.3–10.6)
CHLORIDE BLD-SCNC: 99 MMOL/L (ref 99–110)
CO2: 28 MMOL/L (ref 21–32)
CREAT SERPL-MCNC: 2.2 MG/DL (ref 0.6–1.1)
DIFFERENTIAL TYPE: ABNORMAL
EKG ATRIAL RATE: 65 BPM
EKG DIAGNOSIS: NORMAL
EKG Q-T INTERVAL: 476 MS
EKG QRS DURATION: 152 MS
EKG QTC CALCULATION (BAZETT): 499 MS
EKG R AXIS: 109 DEGREES
EKG T AXIS: 54 DEGREES
EKG VENTRICULAR RATE: 66 BPM
EOSINOPHILS ABSOLUTE: 0.1 K/CU MM
EOSINOPHILS RELATIVE PERCENT: 1.2 % (ref 0–3)
FERRITIN: 201 NG/ML (ref 15–150)
FOLATE SERPL-MCNC: >20 NG/ML (ref 3.1–17.5)
GFR SERPL CREATININE-BSD FRML MDRD: 23 ML/MIN/1.73M2
GLUCOSE SERPL-MCNC: 81 MG/DL (ref 70–99)
HCT VFR BLD CALC: 25.4 % (ref 37–47)
HEMOGLOBIN: 7.7 GM/DL (ref 12.5–16)
IMMATURE NEUTROPHIL %: 0.4 % (ref 0–0.43)
IRON: 28 UG/DL (ref 37–145)
LYMPHOCYTES ABSOLUTE: 1.1 K/CU MM
LYMPHOCYTES RELATIVE PERCENT: 13.7 % (ref 24–44)
MAGNESIUM: 2.1 MG/DL (ref 1.8–2.4)
MCH RBC QN AUTO: 30.4 PG (ref 27–31)
MCHC RBC AUTO-ENTMCNC: 30.3 % (ref 32–36)
MCV RBC AUTO: 100.4 FL (ref 78–100)
MONOCYTES ABSOLUTE: 0.9 K/CU MM
MONOCYTES RELATIVE PERCENT: 11 % (ref 0–4)
NUCLEATED RBC %: 0 %
PCT TRANSFERRIN: 12 % (ref 10–44)
PDW BLD-RTO: 15.2 % (ref 11.7–14.9)
PLATELET # BLD: 188 K/CU MM (ref 140–440)
PMV BLD AUTO: 10.9 FL (ref 7.5–11.1)
POTASSIUM SERPL-SCNC: 3.5 MMOL/L (ref 3.5–5.1)
RBC # BLD: 2.53 M/CU MM (ref 4.2–5.4)
RETICULOCYTE COUNT PCT: 2.5 % (ref 0.2–2.2)
SEGMENTED NEUTROPHILS ABSOLUTE COUNT: 5.7 K/CU MM
SEGMENTED NEUTROPHILS RELATIVE PERCENT: 73.3 % (ref 36–66)
SODIUM BLD-SCNC: 140 MMOL/L (ref 135–145)
TOTAL IMMATURE NEUTOROPHIL: 0.03 K/CU MM
TOTAL IRON BINDING CAPACITY: 241 UG/DL (ref 250–450)
TOTAL NUCLEATED RBC: 0 K/CU MM
TOTAL PROTEIN: 6.2 GM/DL (ref 6.4–8.2)
TROPONIN, HIGH SENSITIVITY: 104 NG/L (ref 0–14)
UNSATURATED IRON BINDING CAPACITY: 213 UG/DL (ref 110–370)
VITAMIN B-12: >2000 PG/ML (ref 211–911)
WBC # BLD: 7.7 K/CU MM (ref 4–10.5)

## 2023-12-27 PROCEDURE — 82746 ASSAY OF FOLIC ACID SERUM: CPT

## 2023-12-27 PROCEDURE — 6370000000 HC RX 637 (ALT 250 FOR IP): Performed by: STUDENT IN AN ORGANIZED HEALTH CARE EDUCATION/TRAINING PROGRAM

## 2023-12-27 PROCEDURE — 94660 CPAP INITIATION&MGMT: CPT

## 2023-12-27 PROCEDURE — 84484 ASSAY OF TROPONIN QUANT: CPT

## 2023-12-27 PROCEDURE — 85025 COMPLETE CBC W/AUTO DIFF WBC: CPT

## 2023-12-27 PROCEDURE — 94761 N-INVAS EAR/PLS OXIMETRY MLT: CPT

## 2023-12-27 PROCEDURE — 82607 VITAMIN B-12: CPT

## 2023-12-27 PROCEDURE — 6360000002 HC RX W HCPCS: Performed by: STUDENT IN AN ORGANIZED HEALTH CARE EDUCATION/TRAINING PROGRAM

## 2023-12-27 PROCEDURE — 94640 AIRWAY INHALATION TREATMENT: CPT

## 2023-12-27 PROCEDURE — 6370000000 HC RX 637 (ALT 250 FOR IP): Performed by: NURSE PRACTITIONER

## 2023-12-27 PROCEDURE — 83735 ASSAY OF MAGNESIUM: CPT

## 2023-12-27 PROCEDURE — 99233 SBSQ HOSP IP/OBS HIGH 50: CPT | Performed by: INTERNAL MEDICINE

## 2023-12-27 PROCEDURE — 1200000000 HC SEMI PRIVATE

## 2023-12-27 PROCEDURE — 2580000003 HC RX 258: Performed by: STUDENT IN AN ORGANIZED HEALTH CARE EDUCATION/TRAINING PROGRAM

## 2023-12-27 PROCEDURE — 2700000000 HC OXYGEN THERAPY PER DAY

## 2023-12-27 PROCEDURE — 83540 ASSAY OF IRON: CPT

## 2023-12-27 PROCEDURE — 89220 SPUTUM SPECIMEN COLLECTION: CPT

## 2023-12-27 PROCEDURE — 82728 ASSAY OF FERRITIN: CPT

## 2023-12-27 PROCEDURE — 83550 IRON BINDING TEST: CPT

## 2023-12-27 PROCEDURE — 94664 DEMO&/EVAL PT USE INHALER: CPT

## 2023-12-27 PROCEDURE — 85045 AUTOMATED RETICULOCYTE COUNT: CPT

## 2023-12-27 PROCEDURE — 80053 COMPREHEN METABOLIC PANEL: CPT

## 2023-12-27 PROCEDURE — 36415 COLL VENOUS BLD VENIPUNCTURE: CPT

## 2023-12-27 PROCEDURE — 93010 ELECTROCARDIOGRAM REPORT: CPT | Performed by: INTERNAL MEDICINE

## 2023-12-27 RX ADMIN — BUDESONIDE AND FORMOTEROL FUMARATE DIHYDRATE 2 PUFF: 80; 4.5 AEROSOL RESPIRATORY (INHALATION) at 08:00

## 2023-12-27 RX ADMIN — ATORVASTATIN CALCIUM 40 MG: 40 TABLET, FILM COATED ORAL at 01:00

## 2023-12-27 RX ADMIN — LEVOTHYROXINE SODIUM 75 MCG: 0.07 TABLET ORAL at 08:30

## 2023-12-27 RX ADMIN — CARVEDILOL 25 MG: 25 TABLET, FILM COATED ORAL at 17:20

## 2023-12-27 RX ADMIN — SODIUM CHLORIDE, PRESERVATIVE FREE 10 ML: 5 INJECTION INTRAVENOUS at 08:32

## 2023-12-27 RX ADMIN — SODIUM CHLORIDE, PRESERVATIVE FREE 10 ML: 5 INJECTION INTRAVENOUS at 01:01

## 2023-12-27 RX ADMIN — CARVEDILOL 25 MG: 25 TABLET, FILM COATED ORAL at 08:31

## 2023-12-27 RX ADMIN — DOXEPIN HYDROCHLORIDE 25 MG: 25 CAPSULE ORAL at 21:35

## 2023-12-27 RX ADMIN — LISINOPRIL 10 MG: 5 TABLET ORAL at 08:29

## 2023-12-27 RX ADMIN — EMPAGLIFLOZIN 10 MG: 10 TABLET, FILM COATED ORAL at 11:30

## 2023-12-27 RX ADMIN — FUROSEMIDE 40 MG: 10 INJECTION, SOLUTION INTRAMUSCULAR; INTRAVENOUS at 08:31

## 2023-12-27 RX ADMIN — SODIUM CHLORIDE, PRESERVATIVE FREE 10 ML: 5 INJECTION INTRAVENOUS at 21:35

## 2023-12-27 RX ADMIN — CARVEDILOL 25 MG: 25 TABLET, FILM COATED ORAL at 01:00

## 2023-12-27 RX ADMIN — TIOTROPIUM BROMIDE INHALATION SPRAY 2 PUFF: 3.12 SPRAY, METERED RESPIRATORY (INHALATION) at 07:58

## 2023-12-27 RX ADMIN — ASPIRIN 81 MG: 81 TABLET, CHEWABLE ORAL at 08:30

## 2023-12-27 RX ADMIN — HYDRALAZINE HYDROCHLORIDE 25 MG: 25 TABLET, FILM COATED ORAL at 01:00

## 2023-12-27 RX ADMIN — APIXABAN 5 MG: 5 TABLET, FILM COATED ORAL at 21:34

## 2023-12-27 RX ADMIN — APIXABAN 5 MG: 5 TABLET, FILM COATED ORAL at 08:30

## 2023-12-27 RX ADMIN — HYDRALAZINE HYDROCHLORIDE 25 MG: 25 TABLET, FILM COATED ORAL at 08:31

## 2023-12-27 RX ADMIN — HYDRALAZINE HYDROCHLORIDE 25 MG: 25 TABLET, FILM COATED ORAL at 21:34

## 2023-12-27 RX ADMIN — AMIODARONE HYDROCHLORIDE 200 MG: 200 TABLET ORAL at 08:30

## 2023-12-27 RX ADMIN — FUROSEMIDE 40 MG: 10 INJECTION, SOLUTION INTRAMUSCULAR; INTRAVENOUS at 17:21

## 2023-12-27 RX ADMIN — ISOSORBIDE MONONITRATE 30 MG: 30 TABLET, EXTENDED RELEASE ORAL at 08:31

## 2023-12-27 RX ADMIN — APIXABAN 5 MG: 5 TABLET, FILM COATED ORAL at 01:01

## 2023-12-27 RX ADMIN — PANTOPRAZOLE SODIUM 40 MG: 40 TABLET, DELAYED RELEASE ORAL at 08:30

## 2023-12-27 RX ADMIN — ATORVASTATIN CALCIUM 40 MG: 40 TABLET, FILM COATED ORAL at 21:35

## 2023-12-27 NOTE — ED NOTES
Report to Lu PEÑA.  
High Sensitivity 4.760 (*)     All other components within normal limits   BLOOD GAS, VENOUS - Abnormal; Notable for the following components:    pH, Pasquale 7.30 (*)     pCO2, Pasquale 62 (*)     HCO3, Venous 30.5 (*)     O2 Sat, Pasquale 49.9 (*)     All other components within normal limits   BLOOD GAS, VENOUS - Abnormal; Notable for the following components:    pCO2, Pasquale 54 (*)     pO2, Pasquale 154 (*)     Base Exc, Mixed 5.3 (*)     HCO3, Venous 31.9 (*)     O2 Sat, Pasquale 94.0 (*)     All other components within normal limits       Background  History:   Past Medical History:   Diagnosis Date    CAD (coronary artery disease)     COPD (chronic obstructive pulmonary disease) ([facility])     GERD (gastroesophageal reflux disease)     H/O cardiovascular stress test 04/26/2021    normal study 63% EF no ischemia    H/O echocardiogram 03/22/2017    EF 35-40%    H/O echocardiogram 08/04/2020    EF 45-50, Mild TR & TX, Severely dilated left atrium.  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, functionally normally     H/O percutaneous left heart catheterization 05/02/2023    RCA is occluded but SVG to RCA is patent,    H/O transesophageal echocardiography (RAMIRO) for monitoring 05/02/2023    Moderately dilated left atrium,  Heavily calcified aortic valve with severe aortic stenosis,  Mild mitral regurgitation.    Heart attack ([facility]) 01/2014    History of transesophageal echocardiography (RAMIRO) 09/06/2023    EF 20-25%, S/P MVR with Mod Regurg.    Hyperlipidemia     Hypertension     On home oxygen therapy     nightly    PAD (peripheral artery disease) ([facility])     S/P right coronary artery (RCA) stent placement     Sleep apnea     uses CPAP    T2 vertebral fracture ([facility]) 12/2017    TIA (transient ischemic attack) 1996    Tobacco dependence     Wears glasses     Wears partial dentures     Upper       Assessment    Vitals:    Level of Consciousness: Alert (0)   Vitals:    12/26/23 2245 12/26/23 2300 12/26/23 2315 12/26/23 2330   BP:  136/61

## 2023-12-27 NOTE — PLAN OF CARE
Problem: Discharge Planning  Goal: Discharge to home or other facility with appropriate resources  Outcome: Progressing  Flowsheets (Taken 12/27/2023 0938)  Discharge to home or other facility with appropriate resources:   Identify barriers to discharge with patient and caregiver   Identify discharge learning needs (meds, wound care, etc)   Refer to discharge planning if patient needs post-hospital services based on physician order or complex needs related to functional status, cognitive ability or social support system     Problem: Skin/Tissue Integrity  Goal: Absence of new skin breakdown  Description: 1.  Monitor for areas of redness and/or skin breakdown  2.  Assess vascular access sites hourly  3.  Every 4-6 hours minimum:  Change oxygen saturation probe site  4.  Every 4-6 hours:  If on nasal continuous positive airway pressure, respiratory therapy assess nares and determine need for appliance change or resting period.  Outcome: Progressing     Problem: ABCDS Injury Assessment  Goal: Absence of physical injury  Outcome: Progressing  Flowsheets (Taken 12/27/2023 0835)  Absence of Physical Injury: Implement safety measures based on patient assessment     Problem: Chronic Conditions and Co-morbidities  Goal: Patient's chronic conditions and co-morbidity symptoms are monitored and maintained or improved  Outcome: Progressing  Flowsheets (Taken 12/27/2023 0938)  Care Plan - Patient's Chronic Conditions and Co-Morbidity Symptoms are Monitored and Maintained or Improved:   Monitor and assess patient's chronic conditions and comorbid symptoms for stability, deterioration, or improvement   Collaborate with multidisciplinary team to address chronic and comorbid conditions and prevent exacerbation or deterioration

## 2023-12-27 NOTE — H&P
History and Physical      Name:  Lynette Gonsalves /Age/Sex: 1950  (73 y.o. female)   MRN & CSN:  [id number] & [id number] Encounter Date/Time: 2023 7:59 PM   Location:  ED16/ED-16 PCP: Francisco Barriga MD       Hospital Day: 1    Assessment and Plan:     Patient is a 73-year-old female who presented with SOB.    # Acute decompensated HFrEF  # Acute on chronic hypoxemic respiratory failure  - Endorsed worsening SOB and orthopnea over 3 days. Complaint with diuretics and low-salt diet. On 4-5L NC at baseline. Last TTE in 2023 with LVEF of 40-45%.  - Clinically hypervolemic, requiring BPAP. Pro-BNP >9.3k. CXR with pulmonary congestion.   - Started on IV diuresis. Cardiology consulted. Strict I/O's. Daily weights. Telemetry. Continue Coreg, Lisinopril, Imdur, Hydralazine and Jardiance.    # Hx severe AS s/p TAVR in 2023    # Paroxysmal atrial fibrillation  - Continue Coreg, Amiodarone and Eliquis.    # CAD s/p CABG in 2016  # Non-MI troponin elevation  - Denied any typical CP.  - Initial Tn mildly elevated. ECG without acute ischemic changes.  - Likely secondary to ADHF and decreased renal clearance. Follow-up repeat Tn.    # CKD4  - Labs overall stable, avoid nephrotoxic medications.    # Macrocytic anemia  - No evidence of bleeding.  - Follow-up anemia panel.    # Acquired hypothyroidism  - Continue Synthroid.  - Follow-up repeat TSH.    Checklist:  Advanced directive: full  Diet: NPO while on BPAP  DVT ppx: Eliquis    Disposition: admit to inpatient.  Estimated discharge: 3-4 day(s).  Current living situation: home.  Expected disposition: home.    Spoke with ED provider who recommended admission for the patient and I agree with that plan.  Personally reviewed lab studies and imaging.  EKG interpreted personally and results as stated above.  Imaging that was interpreted personally and results as stated above.    History of Present Illness:     Chief Complaint: shortness of breath    Patient is

## 2023-12-27 NOTE — ED PROVIDER NOTES
no administration in time range)   amiodarone (CORDARONE) tablet 200 mg (has no administration in time range)   apixaban (ELIQUIS) tablet 5 mg (has no administration in time range)   aspirin chewable tablet 81 mg (has no administration in time range)   atorvastatin (LIPITOR) tablet 40 mg (has no administration in time range)   carvedilol (COREG) tablet 25 mg (has no administration in time range)   doxepin (SINEQUAN) capsule 25 mg (has no administration in time range)   empagliflozin (JARDIANCE) tablet 10 mg (has no administration in time range)   hydrALAZINE (APRESOLINE) tablet 25 mg (has no administration in time range)   isosorbide mononitrate (IMDUR) extended release tablet 30 mg (has no administration in time range)   levothyroxine (SYNTHROID) tablet 75 mcg (has no administration in time range)   lisinopril (PRINIVIL;ZESTRIL) tablet 10 mg (has no administration in time range)   pantoprazole (PROTONIX) tablet 40 mg (has no administration in time range)   budesonide-formoterol (SYMBICORT) 80-4.5 MCG/ACT inhaler 2 puff (has no administration in time range)   sodium chloride flush 0.9 % injection 5-40 mL (has no administration in time range)   sodium chloride flush 0.9 % injection 5-40 mL (has no administration in time range)   0.9 % sodium chloride infusion (has no administration in time range)   ondansetron (ZOFRAN-ODT) disintegrating tablet 4 mg (has no administration in time range)     Or   ondansetron (ZOFRAN) injection 4 mg (has no administration in time range)   polyethylene glycol (GLYCOLAX) packet 17 g (has no administration in time range)   acetaminophen (TYLENOL) tablet 650 mg (has no administration in time range)     Or   acetaminophen (TYLENOL) suppository 650 mg (has no administration in time range)   furosemide (LASIX) injection 40 mg (has no administration in time range)   tiotropium (SPIRIVA RESPIMAT) 2.5 MCG/ACT inhaler 2 puff (has no administration in time range)   furosemide (LASIX) injection 40

## 2023-12-28 LAB
ALBUMIN SERPL-MCNC: 3.4 GM/DL (ref 3.4–5)
ALP BLD-CCNC: 73 IU/L (ref 40–128)
ALT SERPL-CCNC: 45 U/L (ref 10–40)
ANION GAP SERPL CALCULATED.3IONS-SCNC: 11 MMOL/L (ref 7–16)
AST SERPL-CCNC: 45 IU/L (ref 15–37)
BASE EXCESS MIXED: 8 (ref 0–3)
BASOPHILS ABSOLUTE: 0.1 K/CU MM
BASOPHILS RELATIVE PERCENT: 0.6 % (ref 0–1)
BILIRUB SERPL-MCNC: 0.8 MG/DL (ref 0–1)
BUN SERPL-MCNC: 46 MG/DL (ref 6–23)
CALCIUM SERPL-MCNC: 8.5 MG/DL (ref 8.3–10.6)
CHLORIDE BLD-SCNC: 97 MMOL/L (ref 99–110)
CO2: 29 MMOL/L (ref 21–32)
COMMENT: ABNORMAL
CREAT SERPL-MCNC: 2.4 MG/DL (ref 0.6–1.1)
DIFFERENTIAL TYPE: ABNORMAL
EOSINOPHILS ABSOLUTE: 0.3 K/CU MM
EOSINOPHILS RELATIVE PERCENT: 4.1 % (ref 0–3)
GFR SERPL CREATININE-BSD FRML MDRD: 21 ML/MIN/1.73M2
GLUCOSE SERPL-MCNC: 102 MG/DL (ref 70–99)
HCO3 VENOUS: 32.8 MMOL/L (ref 22–29)
HCT VFR BLD CALC: 23.6 % (ref 37–47)
HCT VFR BLD CALC: 24.6 % (ref 37–47)
HEMOGLOBIN: 7.3 GM/DL (ref 12.5–16)
HEMOGLOBIN: 7.4 GM/DL (ref 12.5–16)
IMMATURE NEUTROPHIL %: 0.3 % (ref 0–0.43)
LYMPHOCYTES ABSOLUTE: 0.8 K/CU MM
LYMPHOCYTES RELATIVE PERCENT: 10.9 % (ref 24–44)
MAGNESIUM: 2.2 MG/DL (ref 1.8–2.4)
MCH RBC QN AUTO: 29.8 PG (ref 27–31)
MCHC RBC AUTO-ENTMCNC: 30.1 % (ref 32–36)
MCV RBC AUTO: 99.2 FL (ref 78–100)
MONOCYTES ABSOLUTE: 0.7 K/CU MM
MONOCYTES RELATIVE PERCENT: 9 % (ref 0–4)
NUCLEATED RBC %: 0 %
O2 SAT, VEN: 92.3 % (ref 50–70)
PCO2, VEN: 45 MMHG (ref 41–51)
PDW BLD-RTO: 15.3 % (ref 11.7–14.9)
PH VENOUS: 7.47 (ref 7.32–7.43)
PLATELET # BLD: 209 K/CU MM (ref 140–440)
PMV BLD AUTO: 11.1 FL (ref 7.5–11.1)
PO2, VEN: 165 MMHG (ref 28–48)
POTASSIUM SERPL-SCNC: 3.4 MMOL/L (ref 3.5–5.1)
PRO-BNP: ABNORMAL PG/ML
RBC # BLD: 2.48 M/CU MM (ref 4.2–5.4)
SEGMENTED NEUTROPHILS ABSOLUTE COUNT: 5.8 K/CU MM
SEGMENTED NEUTROPHILS RELATIVE PERCENT: 75.1 % (ref 36–66)
SODIUM BLD-SCNC: 137 MMOL/L (ref 135–145)
TOTAL IMMATURE NEUTOROPHIL: 0.02 K/CU MM
TOTAL NUCLEATED RBC: 0 K/CU MM
TOTAL PROTEIN: 6.2 GM/DL (ref 6.4–8.2)
WBC # BLD: 7.7 K/CU MM (ref 4–10.5)

## 2023-12-28 PROCEDURE — 6370000000 HC RX 637 (ALT 250 FOR IP): Performed by: PHYSICIAN ASSISTANT

## 2023-12-28 PROCEDURE — 1200000000 HC SEMI PRIVATE

## 2023-12-28 PROCEDURE — 99233 SBSQ HOSP IP/OBS HIGH 50: CPT | Performed by: INTERNAL MEDICINE

## 2023-12-28 PROCEDURE — 6370000000 HC RX 637 (ALT 250 FOR IP): Performed by: STUDENT IN AN ORGANIZED HEALTH CARE EDUCATION/TRAINING PROGRAM

## 2023-12-28 PROCEDURE — 6370000000 HC RX 637 (ALT 250 FOR IP): Performed by: NURSE PRACTITIONER

## 2023-12-28 PROCEDURE — 94761 N-INVAS EAR/PLS OXIMETRY MLT: CPT

## 2023-12-28 PROCEDURE — 80053 COMPREHEN METABOLIC PANEL: CPT

## 2023-12-28 PROCEDURE — 85014 HEMATOCRIT: CPT

## 2023-12-28 PROCEDURE — 83880 ASSAY OF NATRIURETIC PEPTIDE: CPT

## 2023-12-28 PROCEDURE — 94660 CPAP INITIATION&MGMT: CPT

## 2023-12-28 PROCEDURE — 82805 BLOOD GASES W/O2 SATURATION: CPT

## 2023-12-28 PROCEDURE — 94640 AIRWAY INHALATION TREATMENT: CPT

## 2023-12-28 PROCEDURE — 36415 COLL VENOUS BLD VENIPUNCTURE: CPT

## 2023-12-28 PROCEDURE — 2700000000 HC OXYGEN THERAPY PER DAY

## 2023-12-28 PROCEDURE — 2580000003 HC RX 258: Performed by: STUDENT IN AN ORGANIZED HEALTH CARE EDUCATION/TRAINING PROGRAM

## 2023-12-28 PROCEDURE — 83735 ASSAY OF MAGNESIUM: CPT

## 2023-12-28 PROCEDURE — 84484 ASSAY OF TROPONIN QUANT: CPT

## 2023-12-28 PROCEDURE — 85025 COMPLETE CBC W/AUTO DIFF WBC: CPT

## 2023-12-28 PROCEDURE — 85018 HEMOGLOBIN: CPT

## 2023-12-28 RX ORDER — FUROSEMIDE 40 MG/1
40 TABLET ORAL 2 TIMES DAILY
Status: DISCONTINUED | OUTPATIENT
Start: 2023-12-28 | End: 2023-12-31

## 2023-12-28 RX ORDER — POTASSIUM CHLORIDE 20 MEQ/1
20 TABLET, EXTENDED RELEASE ORAL ONCE
Status: COMPLETED | OUTPATIENT
Start: 2023-12-28 | End: 2023-12-28

## 2023-12-28 RX ADMIN — ATORVASTATIN CALCIUM 40 MG: 40 TABLET, FILM COATED ORAL at 20:11

## 2023-12-28 RX ADMIN — FUROSEMIDE 40 MG: 40 TABLET ORAL at 17:03

## 2023-12-28 RX ADMIN — SODIUM CHLORIDE, PRESERVATIVE FREE 10 ML: 5 INJECTION INTRAVENOUS at 07:53

## 2023-12-28 RX ADMIN — SODIUM CHLORIDE, PRESERVATIVE FREE 10 ML: 5 INJECTION INTRAVENOUS at 20:12

## 2023-12-28 RX ADMIN — APIXABAN 5 MG: 5 TABLET, FILM COATED ORAL at 07:52

## 2023-12-28 RX ADMIN — PANTOPRAZOLE SODIUM 40 MG: 40 TABLET, DELAYED RELEASE ORAL at 06:31

## 2023-12-28 RX ADMIN — AMIODARONE HYDROCHLORIDE 200 MG: 200 TABLET ORAL at 07:51

## 2023-12-28 RX ADMIN — HYDRALAZINE HYDROCHLORIDE 25 MG: 25 TABLET, FILM COATED ORAL at 06:31

## 2023-12-28 RX ADMIN — LISINOPRIL 10 MG: 5 TABLET ORAL at 07:52

## 2023-12-28 RX ADMIN — HYDRALAZINE HYDROCHLORIDE 25 MG: 25 TABLET, FILM COATED ORAL at 20:11

## 2023-12-28 RX ADMIN — ISOSORBIDE MONONITRATE 30 MG: 30 TABLET, EXTENDED RELEASE ORAL at 07:51

## 2023-12-28 RX ADMIN — TIOTROPIUM BROMIDE INHALATION SPRAY 2 PUFF: 3.12 SPRAY, METERED RESPIRATORY (INHALATION) at 07:34

## 2023-12-28 RX ADMIN — DOXEPIN HYDROCHLORIDE 25 MG: 25 CAPSULE ORAL at 20:12

## 2023-12-28 RX ADMIN — EMPAGLIFLOZIN 10 MG: 10 TABLET, FILM COATED ORAL at 07:52

## 2023-12-28 RX ADMIN — BUDESONIDE AND FORMOTEROL FUMARATE DIHYDRATE 2 PUFF: 80; 4.5 AEROSOL RESPIRATORY (INHALATION) at 07:34

## 2023-12-28 RX ADMIN — LEVOTHYROXINE SODIUM 75 MCG: 0.07 TABLET ORAL at 06:31

## 2023-12-28 RX ADMIN — ASPIRIN 81 MG: 81 TABLET, CHEWABLE ORAL at 07:51

## 2023-12-28 RX ADMIN — CARVEDILOL 25 MG: 25 TABLET, FILM COATED ORAL at 07:52

## 2023-12-28 RX ADMIN — POTASSIUM CHLORIDE 20 MEQ: 1500 TABLET, EXTENDED RELEASE ORAL at 14:17

## 2023-12-28 RX ADMIN — CARVEDILOL 25 MG: 25 TABLET, FILM COATED ORAL at 17:03

## 2023-12-28 RX ADMIN — HYDRALAZINE HYDROCHLORIDE 25 MG: 25 TABLET, FILM COATED ORAL at 14:12

## 2023-12-29 ENCOUNTER — ANESTHESIA (OUTPATIENT)
Dept: ENDOSCOPY | Age: 73
End: 2023-12-29
Payer: MEDICARE

## 2023-12-29 ENCOUNTER — ANESTHESIA EVENT (OUTPATIENT)
Dept: ENDOSCOPY | Age: 73
End: 2023-12-29
Payer: MEDICARE

## 2023-12-29 DIAGNOSIS — E03.9 ACQUIRED HYPOTHYROIDISM: ICD-10-CM

## 2023-12-29 PROBLEM — K92.2 UPPER GI BLEED: Status: ACTIVE | Noted: 2023-12-29

## 2023-12-29 LAB
ANION GAP SERPL CALCULATED.3IONS-SCNC: 12 MMOL/L (ref 7–16)
BUN SERPL-MCNC: 49 MG/DL (ref 6–23)
CALCIUM SERPL-MCNC: 8.2 MG/DL (ref 8.3–10.6)
CHLORIDE BLD-SCNC: 96 MMOL/L (ref 99–110)
CO2: 28 MMOL/L (ref 21–32)
CREAT SERPL-MCNC: 2.4 MG/DL (ref 0.6–1.1)
GFR SERPL CREATININE-BSD FRML MDRD: 21 ML/MIN/1.73M2
GLUCOSE BLD-MCNC: 149 MG/DL (ref 70–99)
GLUCOSE SERPL-MCNC: 97 MG/DL (ref 70–99)
HCT VFR BLD CALC: 24 % (ref 37–47)
HCT VFR BLD CALC: 26.4 % (ref 37–47)
HEMOCCULT STL QL: POSITIVE
HEMOGLOBIN: 7.4 GM/DL (ref 12.5–16)
HEMOGLOBIN: 7.6 GM/DL (ref 12.5–16)
MAGNESIUM: 2.2 MG/DL (ref 1.8–2.4)
POTASSIUM SERPL-SCNC: 3.8 MMOL/L (ref 3.5–5.1)
SODIUM BLD-SCNC: 136 MMOL/L (ref 135–145)

## 2023-12-29 PROCEDURE — 6370000000 HC RX 637 (ALT 250 FOR IP): Performed by: NURSE PRACTITIONER

## 2023-12-29 PROCEDURE — 86850 RBC ANTIBODY SCREEN: CPT

## 2023-12-29 PROCEDURE — 2700000000 HC OXYGEN THERAPY PER DAY

## 2023-12-29 PROCEDURE — 93005 ELECTROCARDIOGRAM TRACING: CPT | Performed by: FAMILY MEDICINE

## 2023-12-29 PROCEDURE — 43235 EGD DIAGNOSTIC BRUSH WASH: CPT | Performed by: INTERNAL MEDICINE

## 2023-12-29 PROCEDURE — 3700000000 HC ANESTHESIA ATTENDED CARE: Performed by: INTERNAL MEDICINE

## 2023-12-29 PROCEDURE — 86901 BLOOD TYPING SEROLOGIC RH(D): CPT

## 2023-12-29 PROCEDURE — 3700000001 HC ADD 15 MINUTES (ANESTHESIA): Performed by: INTERNAL MEDICINE

## 2023-12-29 PROCEDURE — 6370000000 HC RX 637 (ALT 250 FOR IP): Performed by: INTERNAL MEDICINE

## 2023-12-29 PROCEDURE — 86922 COMPATIBILITY TEST ANTIGLOB: CPT

## 2023-12-29 PROCEDURE — 99222 1ST HOSP IP/OBS MODERATE 55: CPT | Performed by: INTERNAL MEDICINE

## 2023-12-29 PROCEDURE — 6370000000 HC RX 637 (ALT 250 FOR IP): Performed by: STUDENT IN AN ORGANIZED HEALTH CARE EDUCATION/TRAINING PROGRAM

## 2023-12-29 PROCEDURE — 86900 BLOOD TYPING SEROLOGIC ABO: CPT

## 2023-12-29 PROCEDURE — 6370000000 HC RX 637 (ALT 250 FOR IP): Performed by: PHYSICIAN ASSISTANT

## 2023-12-29 PROCEDURE — 2580000003 HC RX 258: Performed by: STUDENT IN AN ORGANIZED HEALTH CARE EDUCATION/TRAINING PROGRAM

## 2023-12-29 PROCEDURE — 3609017100 HC EGD: Performed by: INTERNAL MEDICINE

## 2023-12-29 PROCEDURE — 80048 BASIC METABOLIC PNL TOTAL CA: CPT

## 2023-12-29 PROCEDURE — 36415 COLL VENOUS BLD VENIPUNCTURE: CPT

## 2023-12-29 PROCEDURE — 0DJ08ZZ INSPECTION OF UPPER INTESTINAL TRACT, VIA NATURAL OR ARTIFICIAL OPENING ENDOSCOPIC: ICD-10-PCS | Performed by: INTERNAL MEDICINE

## 2023-12-29 PROCEDURE — 82962 GLUCOSE BLOOD TEST: CPT

## 2023-12-29 PROCEDURE — 2140000000 HC CCU INTERMEDIATE R&B

## 2023-12-29 PROCEDURE — 82272 OCCULT BLD FECES 1-3 TESTS: CPT

## 2023-12-29 PROCEDURE — 83735 ASSAY OF MAGNESIUM: CPT

## 2023-12-29 PROCEDURE — 85018 HEMOGLOBIN: CPT

## 2023-12-29 PROCEDURE — 2580000003 HC RX 258: Performed by: NURSE ANESTHETIST, CERTIFIED REGISTERED

## 2023-12-29 PROCEDURE — 2709999900 HC NON-CHARGEABLE SUPPLY: Performed by: INTERNAL MEDICINE

## 2023-12-29 PROCEDURE — 94640 AIRWAY INHALATION TREATMENT: CPT

## 2023-12-29 PROCEDURE — 94761 N-INVAS EAR/PLS OXIMETRY MLT: CPT

## 2023-12-29 PROCEDURE — 85014 HEMATOCRIT: CPT

## 2023-12-29 PROCEDURE — 6360000002 HC RX W HCPCS: Performed by: NURSE ANESTHETIST, CERTIFIED REGISTERED

## 2023-12-29 PROCEDURE — 2500000003 HC RX 250 WO HCPCS: Performed by: NURSE ANESTHETIST, CERTIFIED REGISTERED

## 2023-12-29 RX ORDER — PROPOFOL 10 MG/ML
INJECTION, EMULSION INTRAVENOUS PRN
Status: DISCONTINUED | OUTPATIENT
Start: 2023-12-29 | End: 2023-12-29 | Stop reason: SDUPTHER

## 2023-12-29 RX ORDER — MIDODRINE HYDROCHLORIDE 5 MG/1
10 TABLET ORAL ONCE
Status: DISCONTINUED | OUTPATIENT
Start: 2023-12-29 | End: 2023-12-29

## 2023-12-29 RX ORDER — SODIUM CHLORIDE, SODIUM LACTATE, POTASSIUM CHLORIDE, CALCIUM CHLORIDE 600; 310; 30; 20 MG/100ML; MG/100ML; MG/100ML; MG/100ML
INJECTION, SOLUTION INTRAVENOUS CONTINUOUS PRN
Status: DISCONTINUED | OUTPATIENT
Start: 2023-12-29 | End: 2023-12-29 | Stop reason: SDUPTHER

## 2023-12-29 RX ORDER — 0.9 % SODIUM CHLORIDE 0.9 %
500 INTRAVENOUS SOLUTION INTRAVENOUS ONCE
Status: COMPLETED | OUTPATIENT
Start: 2023-12-29 | End: 2023-12-29

## 2023-12-29 RX ORDER — LEVOTHYROXINE SODIUM 0.07 MG/1
TABLET ORAL
Qty: 30 TABLET | Refills: 1 | Status: SHIPPED | OUTPATIENT
Start: 2023-12-29

## 2023-12-29 RX ORDER — LEVOTHYROXINE SODIUM 0.07 MG/1
75 TABLET ORAL DAILY
Qty: 30 TABLET | Refills: 0 | OUTPATIENT
Start: 2023-12-29

## 2023-12-29 RX ORDER — LIDOCAINE HYDROCHLORIDE 20 MG/ML
INJECTION, SOLUTION EPIDURAL; INFILTRATION; INTRACAUDAL; PERINEURAL PRN
Status: DISCONTINUED | OUTPATIENT
Start: 2023-12-29 | End: 2023-12-29 | Stop reason: SDUPTHER

## 2023-12-29 RX ADMIN — PANTOPRAZOLE SODIUM 40 MG: 40 TABLET, DELAYED RELEASE ORAL at 06:57

## 2023-12-29 RX ADMIN — LEVOTHYROXINE SODIUM 75 MCG: 0.07 TABLET ORAL at 06:57

## 2023-12-29 RX ADMIN — LISINOPRIL 10 MG: 5 TABLET ORAL at 08:38

## 2023-12-29 RX ADMIN — SODIUM CHLORIDE, PRESERVATIVE FREE 10 ML: 5 INJECTION INTRAVENOUS at 09:00

## 2023-12-29 RX ADMIN — HYDRALAZINE HYDROCHLORIDE 25 MG: 25 TABLET, FILM COATED ORAL at 06:57

## 2023-12-29 RX ADMIN — EMPAGLIFLOZIN 10 MG: 10 TABLET, FILM COATED ORAL at 08:40

## 2023-12-29 RX ADMIN — TIOTROPIUM BROMIDE INHALATION SPRAY 2 PUFF: 3.12 SPRAY, METERED RESPIRATORY (INHALATION) at 07:30

## 2023-12-29 RX ADMIN — ASPIRIN 81 MG: 81 TABLET, CHEWABLE ORAL at 08:38

## 2023-12-29 RX ADMIN — ATORVASTATIN CALCIUM 40 MG: 40 TABLET, FILM COATED ORAL at 22:56

## 2023-12-29 RX ADMIN — BUDESONIDE AND FORMOTEROL FUMARATE DIHYDRATE 2 PUFF: 80; 4.5 AEROSOL RESPIRATORY (INHALATION) at 07:30

## 2023-12-29 RX ADMIN — AMIODARONE HYDROCHLORIDE 200 MG: 200 TABLET ORAL at 08:38

## 2023-12-29 RX ADMIN — CARVEDILOL 25 MG: 25 TABLET, FILM COATED ORAL at 08:38

## 2023-12-29 RX ADMIN — CARVEDILOL 25 MG: 25 TABLET, FILM COATED ORAL at 17:03

## 2023-12-29 RX ADMIN — DOXEPIN HYDROCHLORIDE 25 MG: 25 CAPSULE ORAL at 23:00

## 2023-12-29 RX ADMIN — SODIUM CHLORIDE, POTASSIUM CHLORIDE, SODIUM LACTATE AND CALCIUM CHLORIDE: 600; 310; 30; 20 INJECTION, SOLUTION INTRAVENOUS at 13:21

## 2023-12-29 RX ADMIN — LIDOCAINE HYDROCHLORIDE 50 MG: 20 INJECTION, SOLUTION EPIDURAL; INFILTRATION; INTRACAUDAL; PERINEURAL at 13:25

## 2023-12-29 RX ADMIN — FUROSEMIDE 40 MG: 40 TABLET ORAL at 17:03

## 2023-12-29 RX ADMIN — FUROSEMIDE 40 MG: 40 TABLET ORAL at 08:38

## 2023-12-29 RX ADMIN — POLYETHYLENE GLYCOL 3350, SODIUM SULFATE ANHYDROUS, SODIUM BICARBONATE, SODIUM CHLORIDE, POTASSIUM CHLORIDE 4000 ML: 236; 22.74; 6.74; 5.86; 2.97 POWDER, FOR SOLUTION ORAL at 18:51

## 2023-12-29 RX ADMIN — ISOSORBIDE MONONITRATE 30 MG: 30 TABLET, EXTENDED RELEASE ORAL at 08:38

## 2023-12-29 RX ADMIN — SODIUM CHLORIDE 500 ML: 9 INJECTION, SOLUTION INTRAVENOUS at 21:30

## 2023-12-29 RX ADMIN — PROPOFOL 50 MG: 10 INJECTION, EMULSION INTRAVENOUS at 13:25

## 2023-12-29 ASSESSMENT — PAIN - FUNCTIONAL ASSESSMENT: PAIN_FUNCTIONAL_ASSESSMENT: 0-10

## 2023-12-29 ASSESSMENT — PAIN SCALES - GENERAL: PAINLEVEL_OUTOF10: 0

## 2023-12-29 NOTE — ANESTHESIA POSTPROCEDURE EVALUATION
Department of Anesthesiology  Postprocedure Note    Patient: Winifred Mckeon  MRN: [id number]  YOB: 1950  Date of evaluation: 12/29/2023    Procedure Summary       Date: 12/29/23 Room / Location: [address] [place] [address]    Anesthesia Start: 1321 Anesthesia Stop: 1347    Procedure: EGD ESOPHAGOGASTRODUODENOSCOPY Diagnosis:       Anemia, unspecified type      (Anemia, unspecified type [D64.9])    Surgeons: Amanda Frankel MD Responsible Provider: SATURNINO Lambert CRNA    Anesthesia Type: MAC, general ASA Status: 4            Anesthesia Type: No value filed. Jyothi Phase I:      Jyothi Phase II:      Anesthesia Post Evaluation    Patient location during evaluation: bedside  Patient participation: complete - patient participated  Level of consciousness: sleepy but conscious  Pain score: 0  Airway patency: patent  Nausea & Vomiting: no nausea and no vomiting  Cardiovascular status: blood pressure returned to baseline  Respiratory status: spontaneous ventilation  Hydration status: euvolemic  Pain management: adequate    No notable events documented.

## 2023-12-29 NOTE — BRIEF OP NOTE
Brief Postoperative Note      Patient: Lynette Gonsalves  YOB: 1950  MRN: [id number]    Date of Procedure: 12/29/2023    Pre-Op Diagnosis Codes:     * Anemia, unspecified type [D64.9]    Post-Op Diagnosis:  No bleeding, EGD unremarkbale.        Procedure(s):  EGD ESOPHAGOGASTRODUODENOSCOPY    Surgeon(s):  Aleta Wise MD    Assistant:  * No surgical staff found *    Anesthesia: Monitor Anesthesia Care    Estimated Blood Loss (mL): Minimal    Complications: None    Specimens:   * No specimens in log *    Implants:  * No implants in log *      Drains: * No LDAs found *    Findings:   EGD unremarkable - NO bleeding     Plan for colonoscopy tomorrow.       Electronically signed by Aleta Wise MD on 12/29/2023 at 1:34 PM

## 2023-12-29 NOTE — ANESTHESIA PRE PROCEDURE
Department of Anesthesiology  Preprocedure Note       Name:  Negra Gonsales   Age:  68 y.o.  :  1950                                          MRN:  [id number]         Date:  2023      Surgeon: Ani Rangel):  Monica Gustafson MD    Procedure: Procedure(s):  COLONOSCOPY DIAGNOSTIC    Medications prior to admission:   Prior to Admission medications    Medication Sig Start Date End Date Taking?  Authorizing Provider   levothyroxine (SYNTHROID) 75 MCG tablet Take 1 tablet by mouth once daily 23   Jonathan Whitfield MD   tiotropium [facility] RESPIMAT) 2.5 MCG/ACT AERS inhaler Inhale 2 puffs into the lungs daily 23  Meir Pitts MD   doxepin [facility]) 25 MG capsule Take 1 capsule by mouth nightly 23   Jonathan Whitfield MD   fluticasone-umeclidin-vilant (TRELEGY ELLIPTA) 775-77.8-27 MCG/ACT AEPB inhaler Inhale 1 puff into the lungs daily 10/31/23   Meir Pitts MD   furosemide (LASIX) 40 MG tablet Take 1 tablet by mouth daily 10/6/23   Jessika Pennington Mai, MD   empagliflozin (JARDIANCE) 10 MG tablet Take 1 tablet by mouth daily 23   Jonathan Whitfield MD   isosorbide mononitrate (IMDUR) 30 MG extended release tablet Take 1 tablet by mouth daily 23   Kadeem Foster MD   albuterol sulfate HFA (PROVENTIL;VENTOLIN;PROAIR) 108 (90 Base) MCG/ACT inhaler Inhale 2 puffs into the lungs every 6 hours as needed for Wheezing or Shortness of Breath 23   Kadeem Foster MD   hydrALAZINE (APRESOLINE) 25 MG tablet Take 1 tablet by mouth every 8 hours 23   Kadeem Foster MD   amiodarone (CORDARONE) 200 MG tablet Take 1 tablet by mouth daily 23   Carola Lau MD   Cyanocobalamin (VITAMIN B 12 PO) Take 1 tablet by mouth daily    ProviderSachin MD   lisinopril (PRINIVIL;ZESTRIL) 10 MG tablet Take 1 tablet by mouth daily 23   SATURNINO Mackay - CNP   SYMBICORT 80-4.5 MCG/ACT AERO Inhale 2 puffs by mouth twice daily 23   Ivan White,

## 2023-12-30 ENCOUNTER — ANESTHESIA (OUTPATIENT)
Dept: ENDOSCOPY | Age: 73
End: 2023-12-30
Payer: MEDICARE

## 2023-12-30 PROBLEM — D64.9 ANEMIA: Status: ACTIVE | Noted: 2023-12-30

## 2023-12-30 LAB
ANION GAP SERPL CALCULATED.3IONS-SCNC: 14 MMOL/L (ref 7–16)
BASOPHILS ABSOLUTE: 0.1 K/CU MM
BASOPHILS RELATIVE PERCENT: 0.6 % (ref 0–1)
BUN SERPL-MCNC: 36 MG/DL (ref 6–23)
CALCIUM SERPL-MCNC: 8.4 MG/DL (ref 8.3–10.6)
CHLORIDE BLD-SCNC: 97 MMOL/L (ref 99–110)
CO2: 28 MMOL/L (ref 21–32)
CREAT SERPL-MCNC: 2.2 MG/DL (ref 0.6–1.1)
DIFFERENTIAL TYPE: ABNORMAL
EKG ATRIAL RATE: 73 BPM
EKG DIAGNOSIS: NORMAL
EKG P-R INTERVAL: 250 MS
EKG Q-T INTERVAL: 570 MS
EKG QRS DURATION: 158 MS
EKG QTC CALCULATION (BAZETT): 615 MS
EKG R AXIS: 114 DEGREES
EKG T AXIS: 79 DEGREES
EKG VENTRICULAR RATE: 70 BPM
EOSINOPHILS ABSOLUTE: 0.2 K/CU MM
EOSINOPHILS RELATIVE PERCENT: 2.9 % (ref 0–3)
GFR SERPL CREATININE-BSD FRML MDRD: 23 ML/MIN/1.73M2
GLUCOSE SERPL-MCNC: 85 MG/DL (ref 70–99)
HCT VFR BLD CALC: 24.7 % (ref 37–47)
HEMOGLOBIN: 7.6 GM/DL (ref 12.5–16)
IMMATURE NEUTROPHIL %: 0.3 % (ref 0–0.43)
LYMPHOCYTES ABSOLUTE: 0.9 K/CU MM
LYMPHOCYTES RELATIVE PERCENT: 10.8 % (ref 24–44)
MAGNESIUM: 2.1 MG/DL (ref 1.8–2.4)
MCH RBC QN AUTO: 30.4 PG (ref 27–31)
MCHC RBC AUTO-ENTMCNC: 30.8 % (ref 32–36)
MCV RBC AUTO: 98.8 FL (ref 78–100)
MONOCYTES ABSOLUTE: 0.6 K/CU MM
MONOCYTES RELATIVE PERCENT: 7.8 % (ref 0–4)
NUCLEATED RBC %: 0 %
PDW BLD-RTO: 15.2 % (ref 11.7–14.9)
PLATELET # BLD: 247 K/CU MM (ref 140–440)
PMV BLD AUTO: 10.9 FL (ref 7.5–11.1)
POTASSIUM SERPL-SCNC: 3.4 MMOL/L (ref 3.5–5.1)
RBC # BLD: 2.5 M/CU MM (ref 4.2–5.4)
SEGMENTED NEUTROPHILS ABSOLUTE COUNT: 6.2 K/CU MM
SEGMENTED NEUTROPHILS RELATIVE PERCENT: 77.6 % (ref 36–66)
SODIUM BLD-SCNC: 139 MMOL/L (ref 135–145)
TOTAL IMMATURE NEUTOROPHIL: 0.02 K/CU MM
TOTAL NUCLEATED RBC: 0 K/CU MM
WBC # BLD: 8 K/CU MM (ref 4–10.5)

## 2023-12-30 PROCEDURE — 45385 COLONOSCOPY W/LESION REMOVAL: CPT | Performed by: INTERNAL MEDICINE

## 2023-12-30 PROCEDURE — 94660 CPAP INITIATION&MGMT: CPT

## 2023-12-30 PROCEDURE — 2709999900 HC NON-CHARGEABLE SUPPLY: Performed by: INTERNAL MEDICINE

## 2023-12-30 PROCEDURE — 3700000000 HC ANESTHESIA ATTENDED CARE: Performed by: INTERNAL MEDICINE

## 2023-12-30 PROCEDURE — 6370000000 HC RX 637 (ALT 250 FOR IP): Performed by: STUDENT IN AN ORGANIZED HEALTH CARE EDUCATION/TRAINING PROGRAM

## 2023-12-30 PROCEDURE — 93010 ELECTROCARDIOGRAM REPORT: CPT | Performed by: INTERNAL MEDICINE

## 2023-12-30 PROCEDURE — 6360000002 HC RX W HCPCS: Performed by: NURSE ANESTHETIST, CERTIFIED REGISTERED

## 2023-12-30 PROCEDURE — 2580000003 HC RX 258: Performed by: NURSE ANESTHETIST, CERTIFIED REGISTERED

## 2023-12-30 PROCEDURE — 6370000000 HC RX 637 (ALT 250 FOR IP): Performed by: PHYSICIAN ASSISTANT

## 2023-12-30 PROCEDURE — C1889 IMPLANT/INSERT DEVICE, NOC: HCPCS | Performed by: INTERNAL MEDICINE

## 2023-12-30 PROCEDURE — 94640 AIRWAY INHALATION TREATMENT: CPT

## 2023-12-30 PROCEDURE — 36415 COLL VENOUS BLD VENIPUNCTURE: CPT

## 2023-12-30 PROCEDURE — 3609010600 HC COLONOSCOPY POLYPECTOMY SNARE/COLD BIOPSY: Performed by: INTERNAL MEDICINE

## 2023-12-30 PROCEDURE — 83735 ASSAY OF MAGNESIUM: CPT

## 2023-12-30 PROCEDURE — 3700000001 HC ADD 15 MINUTES (ANESTHESIA): Performed by: INTERNAL MEDICINE

## 2023-12-30 PROCEDURE — 2500000003 HC RX 250 WO HCPCS: Performed by: NURSE ANESTHETIST, CERTIFIED REGISTERED

## 2023-12-30 PROCEDURE — 80048 BASIC METABOLIC PNL TOTAL CA: CPT

## 2023-12-30 PROCEDURE — 94761 N-INVAS EAR/PLS OXIMETRY MLT: CPT

## 2023-12-30 PROCEDURE — 0DBN8ZZ EXCISION OF SIGMOID COLON, VIA NATURAL OR ARTIFICIAL OPENING ENDOSCOPIC: ICD-10-PCS | Performed by: INTERNAL MEDICINE

## 2023-12-30 PROCEDURE — 2140000000 HC CCU INTERMEDIATE R&B

## 2023-12-30 PROCEDURE — 2700000000 HC OXYGEN THERAPY PER DAY

## 2023-12-30 PROCEDURE — 2580000003 HC RX 258: Performed by: STUDENT IN AN ORGANIZED HEALTH CARE EDUCATION/TRAINING PROGRAM

## 2023-12-30 PROCEDURE — 85025 COMPLETE CBC W/AUTO DIFF WBC: CPT

## 2023-12-30 PROCEDURE — 6370000000 HC RX 637 (ALT 250 FOR IP): Performed by: NURSE PRACTITIONER

## 2023-12-30 DEVICE — CLIP
Type: IMPLANTABLE DEVICE | Status: FUNCTIONAL
Brand: RESOLUTION 360™ ULTRA CLIP

## 2023-12-30 RX ORDER — LIDOCAINE HYDROCHLORIDE 20 MG/ML
INJECTION, SOLUTION EPIDURAL; INFILTRATION; INTRACAUDAL; PERINEURAL PRN
Status: DISCONTINUED | OUTPATIENT
Start: 2023-12-30 | End: 2023-12-30 | Stop reason: SDUPTHER

## 2023-12-30 RX ORDER — CLOPIDOGREL BISULFATE 75 MG/1
75 TABLET ORAL DAILY
Status: DISCONTINUED | OUTPATIENT
Start: 2023-12-30 | End: 2023-12-31

## 2023-12-30 RX ORDER — POTASSIUM CHLORIDE 20 MEQ/1
20 TABLET, EXTENDED RELEASE ORAL ONCE
Status: COMPLETED | OUTPATIENT
Start: 2023-12-30 | End: 2023-12-30

## 2023-12-30 RX ORDER — PROPOFOL 10 MG/ML
INJECTION, EMULSION INTRAVENOUS PRN
Status: DISCONTINUED | OUTPATIENT
Start: 2023-12-30 | End: 2023-12-30 | Stop reason: SDUPTHER

## 2023-12-30 RX ORDER — SODIUM CHLORIDE, SODIUM LACTATE, POTASSIUM CHLORIDE, CALCIUM CHLORIDE 600; 310; 30; 20 MG/100ML; MG/100ML; MG/100ML; MG/100ML
INJECTION, SOLUTION INTRAVENOUS CONTINUOUS PRN
Status: DISCONTINUED | OUTPATIENT
Start: 2023-12-30 | End: 2023-12-30 | Stop reason: SDUPTHER

## 2023-12-30 RX ADMIN — CARVEDILOL 25 MG: 25 TABLET, FILM COATED ORAL at 14:01

## 2023-12-30 RX ADMIN — BUDESONIDE AND FORMOTEROL FUMARATE DIHYDRATE 2 PUFF: 80; 4.5 AEROSOL RESPIRATORY (INHALATION) at 08:44

## 2023-12-30 RX ADMIN — LIDOCAINE HYDROCHLORIDE 100 MG: 20 INJECTION, SOLUTION EPIDURAL; INFILTRATION; INTRACAUDAL; PERINEURAL at 11:30

## 2023-12-30 RX ADMIN — PROPOFOL 230 MG: 10 INJECTION, EMULSION INTRAVENOUS at 11:30

## 2023-12-30 RX ADMIN — CLOPIDOGREL BISULFATE 75 MG: 75 TABLET ORAL at 17:26

## 2023-12-30 RX ADMIN — SODIUM CHLORIDE, PRESERVATIVE FREE 10 ML: 5 INJECTION INTRAVENOUS at 21:28

## 2023-12-30 RX ADMIN — ISOSORBIDE MONONITRATE 30 MG: 30 TABLET, EXTENDED RELEASE ORAL at 14:03

## 2023-12-30 RX ADMIN — BUDESONIDE AND FORMOTEROL FUMARATE DIHYDRATE 2 PUFF: 80; 4.5 AEROSOL RESPIRATORY (INHALATION) at 22:45

## 2023-12-30 RX ADMIN — EMPAGLIFLOZIN 10 MG: 10 TABLET, FILM COATED ORAL at 14:01

## 2023-12-30 RX ADMIN — FUROSEMIDE 40 MG: 40 TABLET ORAL at 14:01

## 2023-12-30 RX ADMIN — DOXEPIN HYDROCHLORIDE 25 MG: 25 CAPSULE ORAL at 21:28

## 2023-12-30 RX ADMIN — AMIODARONE HYDROCHLORIDE 200 MG: 200 TABLET ORAL at 14:03

## 2023-12-30 RX ADMIN — LISINOPRIL 10 MG: 5 TABLET ORAL at 14:00

## 2023-12-30 RX ADMIN — TIOTROPIUM BROMIDE INHALATION SPRAY 2 PUFF: 3.12 SPRAY, METERED RESPIRATORY (INHALATION) at 08:42

## 2023-12-30 RX ADMIN — ASPIRIN 81 MG: 81 TABLET, CHEWABLE ORAL at 14:03

## 2023-12-30 RX ADMIN — ATORVASTATIN CALCIUM 40 MG: 40 TABLET, FILM COATED ORAL at 21:28

## 2023-12-30 RX ADMIN — SODIUM CHLORIDE, PRESERVATIVE FREE 10 ML: 5 INJECTION INTRAVENOUS at 09:51

## 2023-12-30 RX ADMIN — POTASSIUM CHLORIDE 20 MEQ: 1500 TABLET, EXTENDED RELEASE ORAL at 17:26

## 2023-12-30 RX ADMIN — SODIUM CHLORIDE, POTASSIUM CHLORIDE, SODIUM LACTATE AND CALCIUM CHLORIDE: 600; 310; 30; 20 INJECTION, SOLUTION INTRAVENOUS at 11:27

## 2023-12-30 ASSESSMENT — PAIN SCALES - GENERAL
PAINLEVEL_OUTOF10: 0
PAINLEVEL_OUTOF10: 0

## 2023-12-30 NOTE — ANESTHESIA POSTPROCEDURE EVALUATION
Department of Anesthesiology  Postprocedure Note    Patient: Paty Hopkins  MRN: [id number]  YOB: 1950  Date of evaluation: 12/30/2023    Procedure Summary       Date: 12/30/23 Room / Location: [address] [place] [address] / [facility]    Anesthesia Start: 1127 Anesthesia Stop: 1206    Procedure: COLONOSCOPY POLYPECTOMY SNARE/COLD BIOPSY WITH 1 HEMICLIP PLACED IN THE SIGMOID COLON AT THE POLYPECTOMY SITE Diagnosis:       Anemia, unspecified type      (Anemia, unspecified type [D64.9])    Surgeons: Jorge Holt MD Responsible Provider: Mahendra Juarez MD    Anesthesia Type: MAC ASA Status: 4            Anesthesia Type: No value filed. Jyothi Phase I:  10    Jyothi Phase II:  10    Anesthesia Post Evaluation    Patient location during evaluation: bedside  Patient participation: complete - patient participated  Level of consciousness: awake and alert  Pain score: 0  Airway patency: patent  Nausea & Vomiting: no nausea and no vomiting  Cardiovascular status: hemodynamically stable  Respiratory status: acceptable, spontaneous ventilation, nonlabored ventilation and nasal cannula  Hydration status: euvolemic  Pain management: adequate        No notable events documented.

## 2023-12-30 NOTE — ANESTHESIA PRE PROCEDURE
Department of Anesthesiology  Preprocedure Note       Name:  Mauricio Coulter   Age:  68 y.o.  :  1950                                          MRN:  [id number]         Date:  2023      Surgeon: Melinda Marin):  Maikol Polanco MD    Procedure: Procedure(s):  COLONOSCOPY DIAGNOSTIC    Medications prior to admission:   Prior to Admission medications    Medication Sig Start Date End Date Taking?  Authorizing Provider   levothyroxine (SYNTHROID) 75 MCG tablet Take 1 tablet by mouth once daily 23   Reymundo Pereira MD   tiotropium [facility] RESPIMAT) 2.5 MCG/ACT AERS inhaler Inhale 2 puffs into the lungs daily 23  Rojean Favre, MD   doxepin [facility]) 25 MG capsule Take 1 capsule by mouth nightly 23   Reymundo Pereira MD   fluticasone-umeclidin-vilant (TRELEGY ELLIPTA) 810-65.5-47 MCG/ACT AEPB inhaler Inhale 1 puff into the lungs daily 10/31/23   Rojean Favre, MD   furosemide (LASIX) 40 MG tablet Take 1 tablet by mouth daily 10/6/23   Stephanie Masters MD   empagliflozin (JARDIANCE) 10 MG tablet Take 1 tablet by mouth daily 23   Reymundo Pereira MD   isosorbide mononitrate (IMDUR) 30 MG extended release tablet Take 1 tablet by mouth daily 23   Andrew Coker MD   albuterol sulfate HFA (PROVENTIL;VENTOLIN;PROAIR) 108 (90 Base) MCG/ACT inhaler Inhale 2 puffs into the lungs every 6 hours as needed for Wheezing or Shortness of Breath 23   Andrew Coker MD   hydrALAZINE (APRESOLINE) 25 MG tablet Take 1 tablet by mouth every 8 hours 23   Andrew Coker MD   amiodarone (CORDARONE) 200 MG tablet Take 1 tablet by mouth daily 23   Paul Gutierrez MD   Cyanocobalamin (VITAMIN B 12 PO) Take 1 tablet by mouth daily    Provider, MD Sachin   lisinopril (PRINIVIL;ZESTRIL) 10 MG tablet Take 1 tablet by mouth daily 23   SATURNINO Hannah CNP   SYMBICORT 80-4.5 MCG/ACT AERO Inhale 2 puffs by mouth twice daily 23   Walter Corona

## 2023-12-30 NOTE — PLAN OF CARE
RRT called around 2130 for hypotension and hypoxia while patient sitting on commode. Had BP 88/52 mmHg with hypoxia in 70's that improved after placing back on oxygen. Had received Coreg and Lasix few hours prior, in addition to bowel prep for colonoscopy tomorrow. Patient felt better after being placed back in ED. Received 500 cc NS bolus with significant improvement in BP and symptoms. Hold off further antihypertensive medications for tonight.     Srinath Godinez MD  Night coverage

## 2023-12-30 NOTE — BRIEF OP NOTE
Brief Postoperative Note      Patient: Lynette Gonsalves  YOB: 1950  MRN: [id number]    Date of Procedure: 12/30/2023    Pre-Op Diagnosis Codes:     * Anemia, unspecified type [D64.9]    Post-Op Diagnosis:  see below       Procedure(s):  COLONOSCOPY POLYPECTOMY SNARE/COLD BIOPSY WITH 1 HEMICLIP PLACED IN THE SIGMOID COLON AT THE POLYPECTOMY SITE    Surgeon(s):  Aleta Wise MD    Assistant:  * No surgical staff found *    Anesthesia: Monitor Anesthesia Care    Estimated Blood Loss (mL): Minimal    Complications: None    Specimens:   ID Type Source Tests Collected by Time Destination   A : 6MM SIGMOID COLON POLYP COLD SNARE Tissue Colon SURGICAL PATHOLOGY Aleta Wise MD 12/30/2023 1142        Implants:  Implant Name Type Inv. Item Serial No.  Lot No. LRB No. Used Action   CLIP HEMSTAS DEL SYS 17 [id number] CM ERGO HNDL RESOL 360 ULTRA - [id number]  CLIP HEMSTAS DEL SYS 17 [id number] CM ERGO HNDL RESOL 360 ULTRA  [employer] [id number] N/A 1 Implanted         Drains: * No LDAs found *    Impression:         -  Preparation of the colon was poor.          -  The procedure was aborted due to poor bowel prep with stool present.          -  Stool in the entire examined colon.          -  Moderate diverticulosis in the sigmoid colon and in the descending colon.             There was no evidence of diverticular bleeding.          -  One 6 mm polyp in the sigmoid colon, removed with a cold snare.  Resected             and retrieved.  Clip was placed.          - No bleeding noted in the entire colonoscopy.     Recommendation:         -  Repeat colonoscopy at next available appointment (within 3 months) because             the bowel preparation was suboptimal.          -  Patient has a contact number available for emergencies.  The signs and             symptoms of potential delayed complications were discussed with the patient.             Return to normal activities tomorrow.  Written discharge

## 2023-12-31 LAB
ALBUMIN SERPL-MCNC: 3.4 GM/DL (ref 3.4–5)
ALP BLD-CCNC: 81 IU/L (ref 40–129)
ALT SERPL-CCNC: 61 U/L (ref 10–40)
ANION GAP SERPL CALCULATED.3IONS-SCNC: 10 MMOL/L (ref 7–16)
AST SERPL-CCNC: 59 IU/L (ref 15–37)
BASOPHILS ABSOLUTE: 0.1 K/CU MM
BASOPHILS RELATIVE PERCENT: 0.7 % (ref 0–1)
BILIRUB SERPL-MCNC: 0.5 MG/DL (ref 0–1)
BILIRUBIN DIRECT: 0.2 MG/DL (ref 0–0.3)
BILIRUBIN, INDIRECT: 0.3 MG/DL (ref 0–0.7)
BUN SERPL-MCNC: 39 MG/DL (ref 6–23)
CALCIUM SERPL-MCNC: 8.2 MG/DL (ref 8.3–10.6)
CHLORIDE BLD-SCNC: 99 MMOL/L (ref 99–110)
CO2: 27 MMOL/L (ref 21–32)
CREAT SERPL-MCNC: 2.4 MG/DL (ref 0.6–1.1)
CULTURE: NORMAL
CULTURE: NORMAL
DAT IGG: NEGATIVE
DIFFERENTIAL TYPE: ABNORMAL
DIRECT COOMBS: NEGATIVE
EOSINOPHILS ABSOLUTE: 0.3 K/CU MM
EOSINOPHILS RELATIVE PERCENT: 3.7 % (ref 0–3)
GFR SERPL CREATININE-BSD FRML MDRD: 21 ML/MIN/1.73M2
GLUCOSE SERPL-MCNC: 93 MG/DL (ref 70–99)
HCT VFR BLD CALC: 22.9 % (ref 37–47)
HCT VFR BLD CALC: 26.8 % (ref 37–47)
HEMOGLOBIN: 6.8 GM/DL (ref 12.5–16)
HEMOGLOBIN: 8.2 GM/DL (ref 12.5–16)
IMMATURE NEUTROPHIL %: 0.6 % (ref 0–0.43)
LACTATE DEHYDROGENASE: 392 IU/L (ref 120–246)
LYMPHOCYTES ABSOLUTE: 1.1 K/CU MM
LYMPHOCYTES RELATIVE PERCENT: 15.1 % (ref 24–44)
Lab: NORMAL
Lab: NORMAL
MCH RBC QN AUTO: 30.1 PG (ref 27–31)
MCHC RBC AUTO-ENTMCNC: 29.7 % (ref 32–36)
MCV RBC AUTO: 101.3 FL (ref 78–100)
MONOCYTES ABSOLUTE: 0.7 K/CU MM
MONOCYTES RELATIVE PERCENT: 9.8 % (ref 0–4)
NUCLEATED RBC %: 0 %
PDW BLD-RTO: 15.2 % (ref 11.7–14.9)
PLATELET # BLD: 227 K/CU MM (ref 140–440)
PMV BLD AUTO: 10.8 FL (ref 7.5–11.1)
POTASSIUM SERPL-SCNC: 3.9 MMOL/L (ref 3.5–5.1)
RBC # BLD: 2.26 M/CU MM (ref 4.2–5.4)
RETICULOCYTE COUNT PCT: 2.7 % (ref 0.2–2.2)
SEGMENTED NEUTROPHILS ABSOLUTE COUNT: 4.9 K/CU MM
SEGMENTED NEUTROPHILS RELATIVE PERCENT: 70.1 % (ref 36–66)
SODIUM BLD-SCNC: 136 MMOL/L (ref 135–145)
SPECIMEN: NORMAL
SPECIMEN: NORMAL
TOTAL IMMATURE NEUTOROPHIL: 0.04 K/CU MM
TOTAL NUCLEATED RBC: 0 K/CU MM
TOTAL PROTEIN: 6.8 GM/DL (ref 6.4–8.2)
WBC # BLD: 7 K/CU MM (ref 4–10.5)

## 2023-12-31 PROCEDURE — 94660 CPAP INITIATION&MGMT: CPT

## 2023-12-31 PROCEDURE — P9016 RBC LEUKOCYTES REDUCED: HCPCS

## 2023-12-31 PROCEDURE — 83615 LACTATE (LD) (LDH) ENZYME: CPT

## 2023-12-31 PROCEDURE — 85045 AUTOMATED RETICULOCYTE COUNT: CPT

## 2023-12-31 PROCEDURE — 36415 COLL VENOUS BLD VENIPUNCTURE: CPT

## 2023-12-31 PROCEDURE — 2700000000 HC OXYGEN THERAPY PER DAY

## 2023-12-31 PROCEDURE — 94761 N-INVAS EAR/PLS OXIMETRY MLT: CPT

## 2023-12-31 PROCEDURE — 30233N1 TRANSFUSION OF NONAUTOLOGOUS RED BLOOD CELLS INTO PERIPHERAL VEIN, PERCUTANEOUS APPROACH: ICD-10-PCS | Performed by: STUDENT IN AN ORGANIZED HEALTH CARE EDUCATION/TRAINING PROGRAM

## 2023-12-31 PROCEDURE — 6370000000 HC RX 637 (ALT 250 FOR IP): Performed by: PHYSICIAN ASSISTANT

## 2023-12-31 PROCEDURE — 36430 TRANSFUSION BLD/BLD COMPNT: CPT

## 2023-12-31 PROCEDURE — 6370000000 HC RX 637 (ALT 250 FOR IP): Performed by: STUDENT IN AN ORGANIZED HEALTH CARE EDUCATION/TRAINING PROGRAM

## 2023-12-31 PROCEDURE — 80076 HEPATIC FUNCTION PANEL: CPT

## 2023-12-31 PROCEDURE — 80048 BASIC METABOLIC PNL TOTAL CA: CPT

## 2023-12-31 PROCEDURE — 2140000000 HC CCU INTERMEDIATE R&B

## 2023-12-31 PROCEDURE — 85018 HEMOGLOBIN: CPT

## 2023-12-31 PROCEDURE — 6370000000 HC RX 637 (ALT 250 FOR IP): Performed by: NURSE PRACTITIONER

## 2023-12-31 PROCEDURE — 85014 HEMATOCRIT: CPT

## 2023-12-31 PROCEDURE — 85025 COMPLETE CBC W/AUTO DIFF WBC: CPT

## 2023-12-31 PROCEDURE — 83010 ASSAY OF HAPTOGLOBIN QUANT: CPT

## 2023-12-31 PROCEDURE — 86880 COOMBS TEST DIRECT: CPT

## 2023-12-31 PROCEDURE — 99232 SBSQ HOSP IP/OBS MODERATE 35: CPT | Performed by: INTERNAL MEDICINE

## 2023-12-31 PROCEDURE — 2580000003 HC RX 258: Performed by: STUDENT IN AN ORGANIZED HEALTH CARE EDUCATION/TRAINING PROGRAM

## 2023-12-31 RX ORDER — FUROSEMIDE 40 MG/1
40 TABLET ORAL DAILY
Status: DISCONTINUED | OUTPATIENT
Start: 2023-12-31 | End: 2024-01-01 | Stop reason: HOSPADM

## 2023-12-31 RX ORDER — SODIUM CHLORIDE 9 MG/ML
INJECTION, SOLUTION INTRAVENOUS PRN
Status: DISCONTINUED | OUTPATIENT
Start: 2023-12-31 | End: 2024-01-01 | Stop reason: HOSPADM

## 2023-12-31 RX ADMIN — SODIUM CHLORIDE, PRESERVATIVE FREE 10 ML: 5 INJECTION INTRAVENOUS at 21:25

## 2023-12-31 RX ADMIN — FUROSEMIDE 40 MG: 40 TABLET ORAL at 09:46

## 2023-12-31 RX ADMIN — ISOSORBIDE MONONITRATE 30 MG: 30 TABLET, EXTENDED RELEASE ORAL at 09:46

## 2023-12-31 RX ADMIN — PANTOPRAZOLE SODIUM 40 MG: 40 TABLET, DELAYED RELEASE ORAL at 05:37

## 2023-12-31 RX ADMIN — ATORVASTATIN CALCIUM 40 MG: 40 TABLET, FILM COATED ORAL at 21:26

## 2023-12-31 RX ADMIN — DOXEPIN HYDROCHLORIDE 25 MG: 25 CAPSULE ORAL at 21:26

## 2023-12-31 RX ADMIN — LISINOPRIL 10 MG: 5 TABLET ORAL at 09:46

## 2023-12-31 RX ADMIN — SODIUM CHLORIDE, PRESERVATIVE FREE 10 ML: 5 INJECTION INTRAVENOUS at 09:47

## 2023-12-31 RX ADMIN — EMPAGLIFLOZIN 10 MG: 10 TABLET, FILM COATED ORAL at 09:46

## 2023-12-31 RX ADMIN — CARVEDILOL 25 MG: 25 TABLET, FILM COATED ORAL at 09:46

## 2023-12-31 RX ADMIN — AMIODARONE HYDROCHLORIDE 200 MG: 200 TABLET ORAL at 09:46

## 2023-12-31 RX ADMIN — LEVOTHYROXINE SODIUM 75 MCG: 0.07 TABLET ORAL at 05:37

## 2023-12-31 ASSESSMENT — PAIN SCALES - GENERAL: PAINLEVEL_OUTOF10: 0

## 2023-12-31 NOTE — PLAN OF CARE
Problem: Discharge Planning  Goal: Discharge to home or other facility with appropriate resources  Outcome: Progressing     Problem: Chronic Conditions and Co-morbidities  Goal: Patient's chronic conditions and co-morbidity symptoms are monitored and maintained or improved  Outcome: Progressing     Problem: Safety - Adult  Goal: Free from fall injury  Outcome: Progressing

## 2024-01-01 LAB
ABO/RH: NORMAL
ALBUMIN SERPL-MCNC: 3.5 GM/DL (ref 3.4–5)
ALP BLD-CCNC: 83 IU/L (ref 40–128)
ALT SERPL-CCNC: 64 U/L (ref 10–40)
ANION GAP SERPL CALCULATED.3IONS-SCNC: 11 MMOL/L (ref 7–16)
ANTIBODY SCREEN: NEGATIVE
AST SERPL-CCNC: 65 IU/L (ref 15–37)
BASOPHILS ABSOLUTE: 0.1 K/CU MM
BASOPHILS RELATIVE PERCENT: 0.8 % (ref 0–1)
BILIRUB SERPL-MCNC: 0.6 MG/DL (ref 0–1)
BUN SERPL-MCNC: 34 MG/DL (ref 6–23)
CALCIUM SERPL-MCNC: 8.9 MG/DL (ref 8.3–10.6)
CHLORIDE BLD-SCNC: 98 MMOL/L (ref 99–110)
CO2: 28 MMOL/L (ref 21–32)
COMPONENT: NORMAL
CREAT SERPL-MCNC: 2.5 MG/DL (ref 0.6–1.1)
CROSSMATCH RESULT: NORMAL
DIFFERENTIAL TYPE: ABNORMAL
EOSINOPHILS ABSOLUTE: 0.4 K/CU MM
EOSINOPHILS RELATIVE PERCENT: 4.1 % (ref 0–3)
GFR SERPL CREATININE-BSD FRML MDRD: 20 ML/MIN/1.73M2
GLUCOSE SERPL-MCNC: 124 MG/DL (ref 70–99)
HCT VFR BLD CALC: 28.7 % (ref 37–47)
HEMOGLOBIN: 8.8 GM/DL (ref 12.5–16)
IMMATURE NEUTROPHIL %: 0.3 % (ref 0–0.43)
LYMPHOCYTES ABSOLUTE: 1 K/CU MM
LYMPHOCYTES RELATIVE PERCENT: 11.2 % (ref 24–44)
MCH RBC QN AUTO: 30.2 PG (ref 27–31)
MCHC RBC AUTO-ENTMCNC: 30.7 % (ref 32–36)
MCV RBC AUTO: 98.6 FL (ref 78–100)
MONOCYTES ABSOLUTE: 0.8 K/CU MM
MONOCYTES RELATIVE PERCENT: 8.4 % (ref 0–4)
NUCLEATED RBC %: 0 %
PDW BLD-RTO: 14.8 % (ref 11.7–14.9)
PLATELET # BLD: 268 K/CU MM (ref 140–440)
PMV BLD AUTO: 10.8 FL (ref 7.5–11.1)
POTASSIUM SERPL-SCNC: 3.9 MMOL/L (ref 3.5–5.1)
RBC # BLD: 2.91 M/CU MM (ref 4.2–5.4)
SEGMENTED NEUTROPHILS ABSOLUTE COUNT: 6.8 K/CU MM
SEGMENTED NEUTROPHILS RELATIVE PERCENT: 75.2 % (ref 36–66)
SODIUM BLD-SCNC: 137 MMOL/L (ref 135–145)
STATUS: NORMAL
TOTAL IMMATURE NEUTOROPHIL: 0.03 K/CU MM
TOTAL NUCLEATED RBC: 0 K/CU MM
TOTAL PROTEIN: 6.5 GM/DL (ref 6.4–8.2)
TRANSFUSION STATUS: NORMAL
UNIT DIVISION: 0
UNIT NUMBER: NORMAL
WBC # BLD: 9 K/CU MM (ref 4–10.5)

## 2024-01-01 PROCEDURE — 6370000000 HC RX 637 (ALT 250 FOR IP): Performed by: STUDENT IN AN ORGANIZED HEALTH CARE EDUCATION/TRAINING PROGRAM

## 2024-01-01 PROCEDURE — 2580000003 HC RX 258: Performed by: STUDENT IN AN ORGANIZED HEALTH CARE EDUCATION/TRAINING PROGRAM

## 2024-01-01 PROCEDURE — 2700000000 HC OXYGEN THERAPY PER DAY

## 2024-01-01 PROCEDURE — 85025 COMPLETE CBC W/AUTO DIFF WBC: CPT

## 2024-01-01 PROCEDURE — 80074 ACUTE HEPATITIS PANEL: CPT

## 2024-01-01 PROCEDURE — 94761 N-INVAS EAR/PLS OXIMETRY MLT: CPT

## 2024-01-01 PROCEDURE — 36415 COLL VENOUS BLD VENIPUNCTURE: CPT

## 2024-01-01 PROCEDURE — 94640 AIRWAY INHALATION TREATMENT: CPT

## 2024-01-01 PROCEDURE — 99222 1ST HOSP IP/OBS MODERATE 55: CPT | Performed by: INTERNAL MEDICINE

## 2024-01-01 PROCEDURE — 6370000000 HC RX 637 (ALT 250 FOR IP): Performed by: PHYSICIAN ASSISTANT

## 2024-01-01 PROCEDURE — 6370000000 HC RX 637 (ALT 250 FOR IP): Performed by: NURSE PRACTITIONER

## 2024-01-01 PROCEDURE — 80053 COMPREHEN METABOLIC PANEL: CPT

## 2024-01-01 PROCEDURE — 97162 PT EVAL MOD COMPLEX 30 MIN: CPT

## 2024-01-01 RX ORDER — CARVEDILOL 25 MG/1
12.5 TABLET ORAL 2 TIMES DAILY
Qty: 180 TABLET | Refills: 3
Start: 2024-01-01

## 2024-01-01 RX ORDER — CARVEDILOL 6.25 MG/1
12.5 TABLET ORAL 2 TIMES DAILY WITH MEALS
Status: DISCONTINUED | OUTPATIENT
Start: 2024-01-01 | End: 2024-01-01 | Stop reason: HOSPADM

## 2024-01-01 RX ADMIN — ISOSORBIDE MONONITRATE 30 MG: 30 TABLET, EXTENDED RELEASE ORAL at 09:03

## 2024-01-01 RX ADMIN — SODIUM CHLORIDE, PRESERVATIVE FREE 10 ML: 5 INJECTION INTRAVENOUS at 09:04

## 2024-01-01 RX ADMIN — LEVOTHYROXINE SODIUM 75 MCG: 0.07 TABLET ORAL at 06:48

## 2024-01-01 RX ADMIN — BUDESONIDE AND FORMOTEROL FUMARATE DIHYDRATE 2 PUFF: 80; 4.5 AEROSOL RESPIRATORY (INHALATION) at 08:02

## 2024-01-01 RX ADMIN — PANTOPRAZOLE SODIUM 40 MG: 40 TABLET, DELAYED RELEASE ORAL at 06:48

## 2024-01-01 RX ADMIN — TIOTROPIUM BROMIDE INHALATION SPRAY 2 PUFF: 3.12 SPRAY, METERED RESPIRATORY (INHALATION) at 08:03

## 2024-01-01 RX ADMIN — AMIODARONE HYDROCHLORIDE 200 MG: 200 TABLET ORAL at 09:03

## 2024-01-01 RX ADMIN — HYDRALAZINE HYDROCHLORIDE 25 MG: 25 TABLET, FILM COATED ORAL at 06:48

## 2024-01-01 RX ADMIN — EMPAGLIFLOZIN 10 MG: 10 TABLET, FILM COATED ORAL at 09:04

## 2024-01-01 RX ADMIN — FUROSEMIDE 40 MG: 40 TABLET ORAL at 09:03

## 2024-01-01 RX ADMIN — LISINOPRIL 10 MG: 5 TABLET ORAL at 09:03

## 2024-01-01 RX ADMIN — CARVEDILOL 25 MG: 25 TABLET, FILM COATED ORAL at 09:03

## 2024-01-01 RX ADMIN — ASPIRIN 81 MG: 81 TABLET, CHEWABLE ORAL at 09:03

## 2024-01-01 ASSESSMENT — PAIN SCALES - GENERAL: PAINLEVEL_OUTOF10: 0

## 2024-01-01 NOTE — CONSULTS
[facility]             MD Aleta Sewell MD Carol Christensen, APRN-CNP       Elaine Fox, APRN-CNP             [address] [place] [address]             [phone] fax [phone]      Physician attestation:  I have personally seen and examined the patient independently. I have reviewed the patient's available data,including medical history and recent test results. Reviewed and discussed note as documented by SHELLEY.  I agree with the physical exam findings, assessment and plan.     Briefly   73-year-old female with past medical history of extensive cardiac comorbidities with aortic stenosis status post TAVR, CAD status post PCI with JOSTIN, heart failure, EDITA, stage III CKD who presented to the hospital with complaint of shortness of breath.  Since her admission it was noted that she was anemic and has continued to trend downward.  She is currently on 3 L of O2 at baseline mentions she is on 2 L.   On admission ferritin noted to be 201, iron 28, TIBC 241, creatinine was 2.4, ALT and AST elevated.  Currently denies any nausea, vomiting, melena, hematochezia.  Does mention that she gets dry nose and epistaxis.  Denies any family history of colorectal cancer or stomach cancer. +clark Alleive use.     Gen: normal mood, alert  ENT: normal TJ  Cardiovascular: RRR, No M/R/G  Respiratory:  on supplemental O2  Gastrointestinal: Soft,NT ND  Genitourinary: no suprapubic tenderness  Musculoskeletal: no scars  Skin:moist  Neuro: alert and oriented x3    My assessment and plan reveals   #1 acute on chronic macrocytic anemia.  Hemoglobin has been trending down since July.  Currently 7.4 down from 9 on admission.  Eliquis currently held.  Continue to trend hemoglobin and transfuse for hemoglobin less than 7  Continue n.p.o.  Plan for EGD today    #2 mildly elevated LFTs  Likely secondary to possible congestion with her shortness of 
CARDIOLOGY CONSULT NOTE   Reason for consultation:  SOB/CHF    Referring physician:  Srinath Godinez MD     Primary care physician: Francisco Barriga MD      Dear  Dr. Srinath Godinez MD   Thanks for the consult.    Chief Complaints :  Chief Complaint   Patient presents with    Shortness of Breath        History of present illness:Lynette is a 73 y.o.year old who presents with increased shortness of breath hypoxia she is on 4 to 5 L of oxygen admitted for decompensated heart failure she has history of atrial fibrillation on chronic anticoagulation well-known to me from outpatient service  Chronic leg swelling which is worse   She is s/p cardioversion in September 2023 and started on amiodarone    Lynette had a TAVR using 26 mm valve in July 2023 for JENNA of 0.59 Cm2  Post valve she developed vision changes, MRI shows old lacunar and new occipital CVA  She is s/p pacemaker , she also has history of chronic respiratory failure   she is on atc  O2 ut uses it at night with cpap,  She is  using oxygen at night  at 2 L   She is on eliquis now but it is too expensive but denies any bleeding concerns but occasionally her nose bleeds ( once a week)  She tends to get very symptomatic and goes into heart failure and A-fib she is s/p cardioversion in September 2023  Daughter is handicap but she works and drives and lives with her   In the past she has had strokes and has history of \"thick blood\" and she was put on Coumadin by Dr. Jauregui.  She  lives with her  has been  for 50 years, she was swicthed to eliquis from coumadin in 2021.   goes on bike rides for weeks and months he is planning another trip in February 2024 and is wondering if it is safe for him to leave her  She sees Dr. Car for pulmonology  She has history of mitral valve replacement with a bioprosthetic valve unfortunately now having moderate to severe mitral regurgitation and mitral stenosis.     she denies chest pressure or pain except 
  [facility] 6 Clicks Inpatient Mobility:   AM-PAC Basic Mobility - Inpatient   How much help is needed turning from your back to your side while in a flat bed without using bedrails?: None  How much help is needed moving from lying on your back to sitting on the side of a flat bed without using bedrails?: None  How much help is needed moving to and from a bed to a chair?: None  How much help is needed standing up from a chair using your arms?: None  How much help is needed walking in hospital room?: A Little  How much help is needed climbing 3-5 steps with a railing?: A Little  AM-[facility] Inpatient Mobility Raw Score : 22  AM-PAC Inpatient T-Scale Score : 53.28  Mobility Inpatient CMS 0-100% Score: 20.91  Mobility Inpatient CMS G-Code Modifier : RADHA [facility] Nursing Mobility Assessment Tool Score:  4  patient can march in place and initiate stepping, safely, without assistive device or staff assistance; patient is safe -- no assistive device needed    Treatment today:    none    Timed Code Treatment Minutes: 0 Minutes  PT Individual Minutes  Time In: 1032  Time Out: 1046  Minutes: 14         Electronically signed by:  Milton Bueno, PT  1/1/2024, 11:15 AM     
Average packs/day: 0.5 packs/day for 57.7 years (28.9 ttl pk-yrs)     Types: Cigarettes     Start date:      Quit date: 9/15/2022     Years since quittin.2    Smokeless tobacco: Former     Quit date: 2021   Vaping Use    Vaping Use: Never used   Substance Use Topics    Alcohol use: No     Comment: 2 cups of coffee in the a.m.    Drug use: No     REVIEW OF SYSTEMS    Constitutional:  Denies fever, chills, loss of appetite, weight loss, tiredness, fatigue or weakness   HEENT:  Denies swelling of neck glands  Respiratory:  SOB improving.  Cardiovascular:  Denies chest pain, palpitations or swelling   GI:  Denies abdominal pain, nausea, vomiting, constipation or diarrhea   Musculoskeletal:  Denies back pain   Skin:  Denies rash   Neurologic:  Denies headache, focal weakness or sensory changes   All systems negative except as marked.     PHYSICAL EXAM    Vitals: BP (!) 138/56   Pulse 63   Temp 98 °F (36.7 °C) (Oral)   Resp 17   Ht 1.575 m (5' 2\")   Wt 70.5 kg (155 lb 6.8 oz)   SpO2 98%   BMI 28.43 kg/m²   CONSTITUTIONAL: awake, alert, cooperative, no apparent distress   EYES: pupils equal, round and reactive to light, sclera clear and conjunctiva normal  ENT: Normocephalic, without obvious abnormality, atraumatic  NECK: supple, symmetrical, no jugular venous distension and no carotid bruits   HEMATOLOGIC/LYMPHATIC: no cervical, supraclavicular or axillary lymphadenopathy   LUNGS: VBS, no wheezes, no crackles, no rhonchi, no increased work of breathing and clear to auscultation   CARDIOVASCULAR: regular rate and rhythm, normal S1 and S2, no murmur noted  ABDOMEN: normal bowel sounds x 4, soft, non-distended, non-tender, no masses palpated, no hepatosplenomgaly   MUSCULOSKELETAL: full range of motion noted, tone is normal  NEUROLOGIC: awake, alert, oriented to name, place and time. Motor skills grossly intact.   SKIN: No jaundice. Ecchymosis to UE noted.  EXTREMITIES: no LE edema, no cyanosis, no

## 2024-01-01 NOTE — PLAN OF CARE
Problem: Discharge Planning  Goal: Discharge to home or other facility with appropriate resources  Outcome: Progressing     Problem: Skin/Tissue Integrity  Goal: Absence of new skin breakdown  Description: 1.  Monitor for areas of redness and/or skin breakdown  2.  Assess vascular access sites hourly  3.  Every 4-6 hours minimum:  Change oxygen saturation probe site  4.  Every 4-6 hours:  If on nasal continuous positive airway pressure, respiratory therapy assess nares and determine need for appliance change or resting period.  Outcome: Progressing     Problem: ABCDS Injury Assessment  Goal: Absence of physical injury  Outcome: Progressing     Problem: Chronic Conditions and Co-morbidities  Goal: Patient's chronic conditions and co-morbidity symptoms are monitored and maintained or improved  Outcome: Progressing     Problem: Safety - Adult  Goal: Free from fall injury  Outcome: Progressing     Problem: Pain  Goal: Verbalizes/displays adequate comfort level or baseline comfort level  Outcome: Progressing    Electronically signed by Georgina Herman RN on 1/1/24 at 4:15 AM EST

## 2024-01-01 NOTE — DISCHARGE INSTRUCTIONS
Have your labs rechecked in 5 days. Please have results sent to your primary care doctor, Dr. Barriga.     Follow-up with cardiology as soon as possible to discuss your blood thinner and cardiac meds. Dr. Manzano is considering a Watchman device which can be placed in the heart to prevent stroke instead of a blood thinner. Please discuss this at your next visit.     Check your blood pressure every day and keep a log. Ideally, the top number (systolic blood pressure) should be between 100-130. If the top number is consistently less than 100, contact your primary care doctor or cardiologist to have your medications addressed.     Return to the hospital or call 911 if you develop worsening shortness of breath, chest pain, loss consciousness, develop symptoms of stroke including facial droop, speech changes, weakness on one side of the body, new numbness/tingling, room-spinning dizziness.     Continue taking baby aspirin daily. Do not take Eliquis until you follow-up with cardiology as outpatient.

## 2024-01-01 NOTE — DISCHARGE SUMMARY
WBCUA 2 05/15/2020 10:20 PM    RBCUA NONE SEEN 05/15/2020 10:20 PM    MUCUS RARE 06/18/2016 05:35 AM    TRICHOMONAS NONE SEEN 05/15/2020 10:20 PM    YEAST MODERATE 06/18/2016 05:35 AM    BACTERIA RARE 05/15/2020 10:20 PM    CLARITYU CLEAR 12/09/2023 08:30 AM    SPECGRAV 1.010 12/09/2023 08:30 AM    LEUKOCYTESUR NEGATIVE 12/09/2023 08:30 AM    UROBILINOGEN 0.2 12/09/2023 08:30 AM    BILIRUBINUR NEGATIVE 12/09/2023 08:30 AM    BLOODU NEGATIVE 12/09/2023 08:30 AM    GLUCOSEU Negative 12/31/2018 11:29 AM    KETUA NEGATIVE 12/09/2023 08:30 AM     Urine Cultures: No results found for: \"LABURIN\"  Blood Cultures: No results found for: \"BC\"  No results found for: \"BLOODCULT2\"  Organism: No results found for: \"ORG\"    Time Spent Discharging patient 35 minutes    Electronically signed by Maine Martinez PA-C on 1/1/2024 at 12:04 PM

## 2024-01-02 ENCOUNTER — CARE COORDINATION (OUTPATIENT)
Dept: CASE MANAGEMENT | Age: 74
End: 2024-01-02

## 2024-01-02 ENCOUNTER — CARE COORDINATION (OUTPATIENT)
Dept: PRIMARY CARE CLINIC | Age: 74
End: 2024-01-02

## 2024-01-02 VITALS
DIASTOLIC BLOOD PRESSURE: 47 MMHG | HEART RATE: 66 BPM | OXYGEN SATURATION: 98 % | BODY MASS INDEX: 28.6 KG/M2 | HEIGHT: 62 IN | RESPIRATION RATE: 17 BRPM | SYSTOLIC BLOOD PRESSURE: 131 MMHG | WEIGHT: 155.42 LBS | TEMPERATURE: 98.1 F

## 2024-01-02 DIAGNOSIS — I50.22 CHRONIC SYSTOLIC (CONGESTIVE) HEART FAILURE (HCC): Chronic | ICD-10-CM

## 2024-01-02 DIAGNOSIS — D64.9 ANEMIA, UNSPECIFIED TYPE: Primary | ICD-10-CM

## 2024-01-02 DIAGNOSIS — N18.32 STAGE 3B CHRONIC KIDNEY DISEASE (HCC): Primary | ICD-10-CM

## 2024-01-02 NOTE — PROGRESS NOTES
Remote Patient Monitoring Treatment Plan    Received request from ACM/CTSameera Mccall RN  to order remote patient monitoring for in home monitoring of Kidney Disease  and CHF and order completed.     Patient will be monitoring blood pressure   weight.      Patient will engage in Remote Patient Monitoring each day to develop the skills necessary for self management.       RPM Care Team Responsibilities:   Alerts will be reviewed daily and addressed within 2-4 hours during operational hours (Monday -Friday 9 am-4 pm)  Alert response and intervention documented in patient medical record  Alert response escalated to PCP per protocol and documented in patient medical record  Patient monitored over approximately  days  Discharge from program based on self-management readiness    See care coordination encounters for additional details.

## 2024-01-02 NOTE — PROGRESS NOTES
Cardiology Progress Note     Admit Date:  12/26/2023    Consult reason/ Seen today for :       Subjective and  Overnight Events :  Breathign is much improved wants to go home      Chief complain on admission : 73 y.o.year old who is admitted for  Chief Complaint   Patient presents with    Shortness of Breath      Assessment / Plan:  CHF: Acute decompensated heartF has improved to normal from EF of  40 %- 45 % probably related to valve disease / ischemic? She had MI in 2014 .   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  RAMIRO in September 2023 showed gradient of 12 across bioprosthetic mitral valve consistent with severe mitral stenosis   we talked about valve replacement surgery versus percutaneous mitral valve replacement?  At this point we will continue conservative management as per her choice and will debate on clinical course  Will Plan watchman eventually if she has more bleeding concerns  3.  Atrial fibrillation s/p cardioversion on amiodarone continue anticoag       Past medical history:    has a past medical history of CAD (coronary artery disease), COPD (chronic obstructive pulmonary disease) ([facility]), GERD (gastroesophageal reflux disease), H/O cardiovascular stress test, H/O echocardiogram, H/O echocardiogram, H/O percutaneous left heart catheterization, H/O transesophageal echocardiography (RAMIRO) for monitoring, Heart attack ([facility]), History of transesophageal echocardiography (RAMIRO), Hyperlipidemia, Hypertension, On home oxygen therapy, PAD (peripheral artery disease) ([facility]), S/P right coronary artery (RCA) stent placement, Sleep apnea, T2 vertebral fracture ([facility]), TIA (transient ischemic attack), Tobacco dependence, Wears glasses, and Wears partial 
             [facility] Gastroenterology and Hepatology             MD Aleta Sewell MD Carol Christensen, APRN-CNP             [address] [place] [address]             [phone] fax [phone]      Gastroenterology Progress note . 12/31/2023  Reason for consult:   Anemia          Interval H/P  Patient underwent colonoscopy yesterday with incomplete prep, throughout colon brown stool noted.  No melena.  Overnight had a drop in hemoglobin transfuse 1 unit PRBC with hemoglobin trending up from 6.8 to 8.2  No overt melena or hematochezia         Physical Exam  Blood pressure 131/60, pulse 63, temperature 98.3 °F (36.8 °C), temperature source Oral, resp. rate 16, height 1.575 m (5' 2\"), weight 70.5 kg (155 lb 6.8 oz), SpO2 92 %.  Constitutional: Patient is in no distress.   Eyes: Pupils equal, round.  No icterus.  HENT: Oral mucosa is moist.   Pulmonary: No accessory muscle use. No localizing pulmonary findings.     Cardiovascular: Heart has a regular rate and rhythm, no JVD.   Gastrointestinal: Bowel sounds are present in all four quadrants. Abdomen is soft, nontender, nondistended. Rectal examination deferred.   Skin: No jaundice, spider angiomas, or palmar erythema. No purpura.  Neuro: Awake,alert, and oriented x3. No gross focal neurologic deficits.       Chart and labs reviewed.  My assessment and plan reveals   #1 acute on chronic macrocytic anemia.  Hemoglobin has been trending down since July.  Currently 7.4 down from 9 on admission.  Eliquis currently held.  -EGD 12/29/2023 with no overt bleeding,  -Colonoscopy 12/30/2023, incomplete prep, no bleeding noted, brown stool throughout.  1 polyp removed  Continue to trend hemoglobin and transfuse for hemoglobin less than 7  -Iron studies reviewed with anemia of mixed picture.  Ferritin elevated, TIBC normal  -Transfused overnight.  -Recommend evaluating other causes of anemia such as hemolysis.  - Outpatient 
    V2.0  [facility]      Name:  Lynette Gonsalves /Age/Sex: 1950  (73 y.o. female)   MRN & CSN:  [id number] & [id number] Encounter Date/Time: 2023 4:13 PM EST    Location:  OBS  PCP: Francisco Barriga MD       Hospital Day: 3    Assessment and Plan:   Lynette Gonsalves is a 73 y.o. female with pmh of CAD s/p CABG in 2016, HFrEF, severe AS s/p TAVR in 2023, PAF, chronic hypoxemic respiratory failure (on 4-5L NC), HTN, HLD, CKD4 and hypothyroidism   who presents with Acute decompensated heart failure (HCC)       Acute decompensated HFrEF  Acute on chronic hypoxemic respiratory failure  - Endorsed worsening SOB and orthopnea over 3 days. Complaint with diuretics and low-salt diet. On 4-5L NC at baseline. Last TTE in 2023 with LVEF of 40-45%.  - required BiPAP.  -pro-BNP >9.3k. CXR with pulmonary congestion.   - Started on IV diuresis (Lasix 40 BID) with good result, weaned to baseline O2, transitioned to PO lasix  -Cardiology followed, OK to discharge from cardiology perspective  -Continue Coreg, Lisinopril, Imdur, Hydralazine and Jardiance   -HOLD parameters on Imdur and hydralazine if Systolic BP <100    Acute on chronic macrocytic anemia  - Hgb 9.2-->7.3, steadily downtrending over last several months   - no signs of active bleeding   - iron mildly low, but ferritin elevated; B12/folate unremarkable   - hold eliquis  - no history of GI work-up, will consult. Appreciate recs.   - monitor serial H&H, transfuse Hgb <7     Hx severe AS s/p TAVR in 2023     Paroxysmal atrial fibrillation  - Continue Coreg, Amiodarone  - holding Eliquis has H&H downtrending     CAD s/p CABG in 2016  Non-MI troponin elevation  - Denied any typical CP.  - Initial Tn mildly elevated. ECG without acute ischemic changes.  - Likely secondary to ADHF and decreased renal clearance.  - Follow-up repeat Trop 104, Cardiology aware and no further work-up planned at this time     CKD4  - Cr 2.4, 
    V2.0  [facility] Hospitalist Progress Note      Name:  Lynette Gonsalves /Age/Sex: 1950  (73 y.o. female)   MRN & CSN:  [id number] & [id number] Encounter Date/Time: 2023 8:12 AM EST    Location:  OBS  PCP: Francisco Barriga MD       Hospital Day: 2    Assessment and Plan:   Lynette Gonsalves is a 73 y.o. female with pmh of CAD s/p CABG in 2016, HFrEF, severe AS s/p TAVR in 2023, PAF, chronic hypoxemic respiratory failure (on 4-5L NC), HTN, HLD, CKD4 and hypothyroidism   who presents with Acute decompensated heart failure (HCC)      Plan:    Acute decompensated HFrEF  Acute on chronic hypoxemic respiratory failure  - Endorsed worsening SOB and orthopnea over 3 days. Complaint with diuretics and low-salt diet. On 4-5L NC at baseline. Last TTE in 2023 with LVEF of 40-45%.  - Clinically hypervolemic, requiring BPAP. Pro-BNP >9.3k. CXR with pulmonary congestion.   - Started on IV diuresis (Lasix 40 BID).   -Cardiology consulted.   -Strict I/O's. Daily weights. Telemetry.   -Continue Coreg, Lisinopril, Imdur, Hydralazine and Jardiance   -HOLD parameters on Imdur and hydralazine if Systolic BP <100     Hx severe AS s/p TAVR in 2023     Paroxysmal atrial fibrillation  - Continue Coreg, Amiodarone and Eliquis.     CAD s/p CABG in 2016  Non-MI troponin elevation  - Denied any typical CP.  - Initial Tn mildly elevated. ECG without acute ischemic changes.  - Likely secondary to ADHF and decreased renal clearance.  - Follow-up repeat Trop 104, Cardiology aware      CKD4  - Labs overall stable, avoid nephrotoxic medications.     Macrocytic anemia  - No evidence of bleeding.  - Follow-up anemia panel.     Acquired hypothyroidism  - Continue Synthroid.  - Follow-up repeat TSH.      Diet ADULT DIET; Regular; Low Fat/Low Chol/High Fiber/2 gm Na   DVT Prophylaxis [] Lovenox, []  Heparin, [] SCDs, [] Ambulation,  [x] Eliquis, [] Xarelto  [] Coumadin   Code Status Full Code   Disposition From: 
    V2.0  [facility] [place] Hospitalist Progress Note      Name:  Lynette Gonsalves /Age/Sex: 1950  (73 y.o. female)   MRN & CSN:  [id number] & [id number] Encounter Date/Time: 2023 4:13 PM EST    Location:  OBS  PCP: Francisco Barriga MD       Hospital Day: 4    Assessment and Plan:   Lynette Gonsalves is a 73 y.o. female with pmh of CAD s/p CABG in 2016, HFrEF, severe AS s/p TAVR in 2023, PAF, chronic hypoxemic respiratory failure (on 4-5L NC), HTN, HLD, CKD4 and hypothyroidism   who presents with Acute decompensated heart failure (HCC)       Acute decompensated HFrEF  Acute on chronic hypoxemic respiratory failure  - Endorsed worsening SOB and orthopnea over 3 days. Complaint with diuretics and low-salt diet. On 4-5L NC at baseline. Last TTE in 2023 with LVEF of 40-45%.  - required BiPAP.  -pro-BNP >9.3k. CXR with pulmonary congestion.   - s/p IV diuresis with good result, weaned to baseline O2, transitioned to PO lasix 40 mg BID  -Cardiology followed, OK to discharge from cardiology perspective  -Continue Coreg, Lisinopril, Imdur, Hydralazine and Jardiance     Acute on chronic macrocytic anemia  - Hgb 9.2-->7.3, steadily downtrending over last several months, has been stable over last several draws  - no signs of active bleeding, does report intermittent nose bleeds that might be contributing   - iron mildly low, but ferritin elevated; B12/folate unremarkable   - hold eliquis  - GI consulted, planning for EGD today  - monitor serial H&H, transfuse Hgb <7     Hx severe AS s/p TAVR in 2023     Paroxysmal atrial fibrillation  - Continue Coreg, Amiodarone  - holding Eliquis has H&H downtrending     CAD s/p CABG in 2016  NSTEMI, type II  - Denied any typical CP.  - Initial Tn mildly elevated. ECG without acute ischemic changes.  - Likely secondary to ADHF and decreased renal clearance.  - repeat Trop 104, Cardiology aware and no further work-up planned at this time, suspect type II MI from demand 
    V2.0  [facility] Hospitalist Progress Note      Name:  Lynette Gonsalves /Age/Sex: 1950  (73 y.o. female)   MRN & CSN:  [id number] & [id number] Encounter Date/Time: 2023 4:13 PM EST    Location:  [address] [place], [place] [address]-A PCP: Francisco Barriga MD       Hospital Day: 6    Assessment and Plan:   Lynette Gonsalves is a 73 y.o. female with pmh of CAD s/p CABG in 2016, HFrEF, severe AS s/p TAVR in 2023, PAF, chronic hypoxemic respiratory failure (on 4-5L NC), HTN, HLD, CKD4 and hypothyroidism  who presents with Acute decompensated heart failure (HCC)    Acute on chronic macrocytic anemia  - Hgb 9.2-->6.8, steadily downtrending over last several months  - no signs of active bleeding, does report intermittent nose bleeds that might be contributing   - iron mildly low, but ferritin elevated; B12/folate unremarkable   - s/p EGD  - normal  -s/p colonoscopy with  with poor prep, polypectomy and biopsy x1  - stopped Eliquis per cardio recs  - restarted Plavix and Hgb dropped to 6.8 last night, transfused 1u PRBCs  - GI following, recommended repeat colonoscopy as outpatient  - hemolytic anemia w/u ordered   - consulted hematology, appreciate recs  - monitor serial H&H, transfuse Hgb <7     Acute decompensated HFrEF  Acute on chronic hypoxemic respiratory failure, improved  - Endorsed worsening SOB and orthopnea over 3 days. Complaint with diuretics and low-salt diet. On 4-5L NC at baseline. Last TTE in 2023 with LVEF of 40-45%.  - required BiPAP.  -pro-BNP >9.3k. CXR with pulmonary congestion.   - s/p IV diuresis with good result, weaned to baseline O2, transitioned to PO lasix 40 mg BID  -Cardiology followed, OK to discharge from cardiology perspective  -Continue Coreg, Lisinopril, Imdur, Hydralazine and Jardiance - monitor BP as occasionally hypotensive      Paroxysmal atrial fibrillation  - Continue Coreg, Amiodarone  - holding Eliquis as H&H downtrending  - Dr. Manzano recommended to stop Eliquis and start 
   12/30/23 1906   NIV Type   $NIV $Daily Charge   NIV Started/Stopped On   Equipment Type Autopap   Mode CPAP   Mask Type Full face mask   Mask Size Small   Bonnet size Small   Assessment   Pulse 62   Respirations 13   SpO2 99 %   Level of Consciousness 0   Comfort Level Good   Using Accessory Muscles No   Mask Compliance Good   Skin Protection for O2 Device N/A   Settings/Measurements   CPAP/EPAP 4 cmH2O   O2 Flow Rate (L/min) 4 L/min   Patient's Home Machine No   Patient Observation   Observations RRT decreased O2 to 3lpm bleed in on Cpap machine       
/38 Dr. Garcia notified, new order received to hold 1700 coreg dose.  
4 Eyes Skin Assessment     NAME:  Lynette Gonsalves  YOB: 1950  MEDICAL RECORD NUMBER:  [id number]    The patient is being assessed for  Transfer to New Unit    I agree that at least one RN has performed a thorough Head to Toe Skin Assessment on the patient. ALL assessment sites listed below have been assessed.      Areas assessed by both nurses:    Head, Face, Ears, Shoulders, Back, Chest, Arms, Elbows, Hands, Sacrum. Buttock, Coccyx, Ischium, Legs. Feet and Heels, and Under Medical Devices         Does the Patient have a Wound? No noted wound(s)       Luc Prevention initiated by RN: Yes  Wound Care Orders initiated by RN: No    Pressure Injury (Stage 3,4, Unstageable, DTI, NWPT, and Complex wounds) if present, place Wound referral order by RN under : No    New Ostomies, if present place, Ostomy referral order under : No     Nurse 1 eSignature: Electronically signed by Bethel Raman RN on 12/30/23 at 2:40 AM EST    **SHARE this note so that the co-signing nurse can place an eSignature**    Nurse 2 eSignature: Electronically signed by Jj Kaur RN on 12/30/23 at 2:41 AM EST    
4 Eyes Skin Assessment     NAME:  Lynette Gonsalves  YOB: 1950  MEDICAL RECORD NUMBER:  [id number]    The patient is being assessed for  Admission    I agree that at least one RN has performed a thorough Head to Toe Skin Assessment on the patient. ALL assessment sites listed below have been assessed.      Areas assessed by both nurses:    Head, Face, Ears, Shoulders, Back, Chest, Arms, Elbows, Hands, Sacrum. Buttock, Coccyx, Ischium, and Legs. Feet and Heels        Does the Patient have a Wound? No noted wound(s)       Luc Prevention initiated by RN: No  Wound Care Orders initiated by RN: No    Pressure Injury (Stage 3,4, Unstageable, DTI, NWPT, and Complex wounds) if present, place Wound referral order by RN under : No    New Ostomies, if present place, Ostomy referral order under : No     Nurse 1 eSignature: Electronically signed by Janice Melissa RN on 12/29/23 at 5:00 PM EST    **SHARE this note so that the co-signing nurse can place an eSignature**    Nurse 2 eSignature: {Esignature:[id number]}   
BP dropped to 90/74 while getting up to BSC at 1550. Pt was medicated at 1400 with imdur, coreg, lasix, lisinopril, and amio.  Pt is due for lasix and coreg now. Secure message sent to Dr. Garcia, physician states to hold evening dose. BP is now 122/40.  0  
Discussed with Dr. Manzano who recommended to stop Eliquis and start Plavix/ASA on discharge. Will await colonoscopy prior to initiating.   
Lab notified this nurse that troponin resulted at 104. Notified JITENDRA Jerez NP and Dr. Manzano of results. Awaiting any further orders.  
Occupational Therapy      Attempt x 2 today. Pt receiving blood in AM and still not medically appropriate after transfusion. Will re-attempt later when pt is medically appropriate    Dalila Mckeon OTR/L [id number]  10:11 AM,12/31/2023   
Occupational Therapy      Attempted to see pt today. Pt in OR for COLONOSCOPY POLYPECTOMY SNARE/COLD BIOPSY WITH 1 HEMICLIP PLACED IN THE SIGMOID COLON AT THE POLYPECTOMY SITE . Will re-attempt to see pt when medically appropriate    Dalila Brown/L [id number]  1:09 PM,12/30/2023   
Received report from MARIANA Mclaughlin. Patient alert and oriented. Verified patient's name, , allergies and procedure.  Took Carvedilol on 23 @0838, took Eliquis on 23, has implants: heart stents, sternal wires, pacemaker, TAVR. H&P on chart. No family/friend at bedside.  
Report given to MARIANA Hugo. All questions answered.  at bedside.   
Transferred to ED Observation 6 by bed with portable oxygen at 4 lpm by NC and paperwork accompanied by Gomez starr.  
Vital signs has over all improved recorded.   
unchanged.       Electronically signed by Maine Martinez PA-C on 12/30/2023 at 11:29 AM

## 2024-01-02 NOTE — CARE COORDINATION
Care Transitions Initial Follow Up Call    Call within 2 business days of discharge: Yes    Patient Current Location:  Home: [address] [place], [place] [address]    Care Transition Nurse contacted the patient by telephone to perform post hospital discharge assessment. Verified name and  with patient as identifiers. Provided introduction to self, and explanation of the Care Transition Nurse role.     Patient: Lynette Gonsalves Patient : 1950   MRN: [id number]  Reason for Admission: A/C Anemia, A/C CHF   Discharge Date: 24 RARS: Readmission Risk Score: 25.3  Facility: [facility] 23-24    Last Discharge Facility       Date Complaint Diagnosis Description Type Department Provider    23 Shortness of Breath Hypoxia ... ED to Hosp-Admission (Discharged) (ADMITTED) Max Saunders MD; Bishop Dotson,...          Was this an external facility discharge? No     Challenges to be reviewed by the provider   Additional needs identified to be addressed with provider:     Reason for Admission: A/C Anemia, A/C CHF   Discharge Date: 24 RARS: Readmission Risk Score: 25.3  Facility: [facility] 23-24    Please contact for scheduling of 7 day hospital follow up appt due by 24     Method of communication with provider: chart routing.    Reviewed CHF Zone Mgt:   Daily weights in a.m. before breakfast, after voiding  Keep written log of weights for review  Notify MD immediately of weight gain/loss 3# or more in 1-7days  Take all rx as prescribed, keep scheduled MD appts  Low sodium diet- read labels; avoid/limit high sodium foods such as fast food, canned/boxed/processed foods, frozen meals, soups, cheeses, breads, chips, soda.  Do not add salt to food  Adhere to MD recommended fluid restriction  Notify MD immediately if experiencing increased sob, orthopnea, edema, weight gain, feeling of uneasiness, chest pain, increased cough, confusion, wheezing or chest tightness. Call 911 if noting acute

## 2024-01-02 NOTE — CARE COORDINATION
Remote Patient Kit Ordering Note      Date/Time:  1/2/2024 1:00 PM      [] CCSS confirmed patient shipping address  [x] Patient will receive package over the next 1-3 business days. Someone 21 years or older must be present to sign for UPS delivery.  [x] HRS will contact patient within 24 hours, an HRS  will call the patient directly: If the patient does not answer, HRS will follow up with the clinical team notifying them about the unsuccessful attempt to contact the patient. HRS will make three call attempts to the patient.Provide patient with [facility] Virtual install number is: [phone].  [x] LPN will contact patient once equipment is active to welcome them to the program.                                                         [x] Hours of RPM monitoring - Monday-Friday 6093-2721; encourage patient to get vitals entered by Noon each day to have the alert addressed same day.  [[facility]                    LPN made aware the RPM kit has been ordered.      EMTP notified patient of RPM equipment order.     Paramedic attempted to contact patient in regards to RPM Kit order. Patient is unavailable at this time. Left HIPAA compliant message with Paramedic contact information, reason for call and request for call back. Will expect a return call.    RPM Kit Order was completed successfully.

## 2024-01-03 DIAGNOSIS — I25.10 ATHEROSCLEROSIS OF NATIVE CORONARY ARTERY OF NATIVE HEART WITHOUT ANGINA PECTORIS: Chronic | ICD-10-CM

## 2024-01-03 DIAGNOSIS — I42.9 CARDIOMYOPATHY, PRIMARY (HCC): Primary | Chronic | ICD-10-CM

## 2024-01-03 RX ORDER — ISOSORBIDE MONONITRATE 30 MG/1
30 TABLET, EXTENDED RELEASE ORAL DAILY
Qty: 90 TABLET | Refills: 1 | Status: SHIPPED | OUTPATIENT
Start: 2024-01-03

## 2024-01-03 NOTE — RESULT ENCOUNTER NOTE
Please call the patient and inform them that she will need a repeat colonoscopy at next available appointment (within 3 months) because  the bowel preparation was suboptimal. Please let her know that the 6mm polyp in the sigmoid colon that was collected was found to be hyperplastic (benign).

## 2024-01-04 ENCOUNTER — CARE COORDINATION (OUTPATIENT)
Dept: CASE MANAGEMENT | Age: 74
End: 2024-01-04

## 2024-01-04 ENCOUNTER — OFFICE VISIT (OUTPATIENT)
Dept: CARDIOLOGY CLINIC | Age: 74
End: 2024-01-04
Payer: MEDICARE

## 2024-01-04 VITALS
DIASTOLIC BLOOD PRESSURE: 60 MMHG | BODY MASS INDEX: 29.33 KG/M2 | HEART RATE: 62 BPM | WEIGHT: 159.4 LBS | HEIGHT: 62 IN | SYSTOLIC BLOOD PRESSURE: 138 MMHG

## 2024-01-04 DIAGNOSIS — I25.10 ATHEROSCLEROSIS OF NATIVE CORONARY ARTERY OF NATIVE HEART WITHOUT ANGINA PECTORIS: Chronic | ICD-10-CM

## 2024-01-04 DIAGNOSIS — D68.69 HYPERCOAGULABLE STATE DUE TO PAROXYSMAL ATRIAL FIBRILLATION (HCC): ICD-10-CM

## 2024-01-04 DIAGNOSIS — I48.0 HYPERCOAGULABLE STATE DUE TO PAROXYSMAL ATRIAL FIBRILLATION (HCC): ICD-10-CM

## 2024-01-04 DIAGNOSIS — I73.9 PAD (PERIPHERAL ARTERY DISEASE) (HCC): ICD-10-CM

## 2024-01-04 DIAGNOSIS — E11.69 TYPE 2 DIABETES MELLITUS WITH OTHER SPECIFIED COMPLICATION, WITHOUT LONG-TERM CURRENT USE OF INSULIN (HCC): ICD-10-CM

## 2024-01-04 DIAGNOSIS — I38 VHD (VALVULAR HEART DISEASE): ICD-10-CM

## 2024-01-04 DIAGNOSIS — D64.9 ANEMIA, UNSPECIFIED TYPE: Primary | ICD-10-CM

## 2024-01-04 DIAGNOSIS — I48.91 ATRIAL FIBRILLATION WITH RVR (HCC): ICD-10-CM

## 2024-01-04 DIAGNOSIS — Z09 HOSPITAL DISCHARGE FOLLOW-UP: ICD-10-CM

## 2024-01-04 DIAGNOSIS — I63.431 CEREBROVASCULAR ACCIDENT (CVA) DUE TO EMBOLISM OF RIGHT POSTERIOR CEREBRAL ARTERY (HCC): ICD-10-CM

## 2024-01-04 LAB — HAPTOGLOB SERPL-MCNC: 75 MG/DL (ref 30–200)

## 2024-01-04 PROCEDURE — 1123F ACP DISCUSS/DSCN MKR DOCD: CPT | Performed by: NURSE PRACTITIONER

## 2024-01-04 PROCEDURE — 3078F DIAST BP <80 MM HG: CPT | Performed by: NURSE PRACTITIONER

## 2024-01-04 PROCEDURE — 1111F DSCHRG MED/CURRENT MED MERGE: CPT | Performed by: NURSE PRACTITIONER

## 2024-01-04 PROCEDURE — 99214 OFFICE O/P EST MOD 30 MIN: CPT | Performed by: NURSE PRACTITIONER

## 2024-01-04 PROCEDURE — 3075F SYST BP GE 130 - 139MM HG: CPT | Performed by: NURSE PRACTITIONER

## 2024-01-04 NOTE — CARE COORDINATION
Remote Alert Monitoring Note  Rpm alert to be reviewed by the provider   red alert   pulse ox reading (83%)   Survey response indicating trouble thinking clearly over the past 24 hrs  Additional needs to be addressed by PCP:  Cardio  NICOLLE    LPN contacted pt in regards to RPM red alert for PO of 83%. Pt denied chest pain, distress and dyspnea. Pt rechecked and updated PO is still at 83%. Pt also states that she has had difficulty thinking clearly over the past 24 hrs. When writer asked pt if she could provide any specific examples, pt stated \"I am having trouble getting the words out.\" Pt speaking in complete sentences. No audible distress or dyspnea noted during call. Please advise, thank you.                  Date/Time:  2024 11:45 AM  [facility] Verified patients name and  as identifiers.  Background: RPM for CHF, KD  Refer to 911 immediately if:  Patient unresponsive or unable to provide history  Change in cognition or sudden confusion  Patient unable to respond in complete sentences  Intense chest pain/tightness  Any concern for any clinical emergency  Red Alert: Provider response time of 1 hr required for any red alert requiring intervention  Yellow Alert: Provider response time of 3hr required for any escalated yellow alert    O2 Triage  Are you having any Chest Pain? no   Are you having any Shortness of Breath? no   Swelling in your hands or feet? no     Are you having any other health concerns or issues? Yes, trouble thinking clearly over the past 24 hrs.      Clinical Interventions: red alert   pulse ox reading (83%)   Survey response indicating trouble thinking clearly over the past 24 hrs  Additional needs to be addressed by PCP: NICOLLE    LPN contacted pt in regards to RPM red alert for PO of 83%. Pt denied chest pain, distress and dyspnea. Pt rechecked and updated PO is still at 83%. Pt also states that she has had difficulty thinking clearly

## 2024-01-04 NOTE — CARE COORDINATION
Care Transitions Follow Up Call    Patient Current Location:  Home: [address] [place] [id number]    Care Transition Nurse contacted the patient by telephone to follow up after admission on 23-24 .  Verified name and  with patient as identifiers.    Patient: Lynette Gonsalves  Patient : 1950   MRN: [id number]  Reason for Admission: A/C Anemia, A/C CHF    Discharge Date: 24 RARS: Readmission Risk Score: 25.3  Facility: [facility]     Needs to be reviewed by the provider   Additional needs identified to be addressed with provider:     FYI: Patient reports she checked SPO2 w/ cold hands/fingers with reading of 83-84%. Once she warmed hands, SPO2 96%, HR 69. Denies sob, ac resp distress. Denies sx of CHF exac. Noted to be speaking in complete, unlabored sentences. Noted to be a&o, answering questions appropriately. Does however report fatigue and mild periods of \"confusion\" since home. Uses O2 at 4L cont and cpap at hs. Patient would like to further discuss cpap w/ Dr Crum.   See below upcoming appts.     Thank You,   Sameera BRYAN       Method of communication with provider: chart routing to PCP, MARY Adrian NP, Dr Crum.    Noted to have RPM red alert for pulse ox of 83%.     CTN placed call to Patient. Reports she checked SPO2 w/ cold hands/fingers with reading of 84%. Discusses proper pulse ox techinque, parameters, interventions. Once she warmed her hands, rechecked SPO2 which is now 96%, HR 69. Denies sob, ac resp distress, cough, edema, wheezing, dizziness. Denies sx of CHF exac. Noted to be speaking in complete, unlabored sentences. Noted to be a&o, answering questions appropriately. Does however report fatigue and mild periods of \"confusion\" since home. Uses O2 at 4L cont and cpap at hs.  Patient would like to further discuss cpap w/ Dr Crum. Has appt today w/ cardiology, tomorrow w/ PCP, Monday w/ pulm. Per HRS- wt 156#, /82. Reports appetite, fluid intake good w/ b&b

## 2024-01-04 NOTE — PROGRESS NOTES
Visually estimated ejection fraction is 35-40 %.   Grade III diastolic dysfunction.   Mild left atrium enlargement.   Bioprostethic mitral valve is seated well and functioning properly.   No pericardial effusion.     Echo 2020  Summary   Left ventricular function is low normal, EF is estimated at 45-50%.   Severely dilated left atrium.   Evidence of a porcine bio-prosthetic valve in the mitral position,   functionally normally with a mean gradient of 3mmHg.   Sclerotic, but non-stenotic aortic valve.   Mild tricuspid and pulmonic regurgitation noted.   No evidence of pericardial effusion.     30 day monitor 21     30-day monitor worn from 2021 to 3/20/2021 there were for patient triggers 6 auto triggers.  Lowest heart rate was 51 at 9:47 PM average heart rate was 75 highest heart rate 117 patient was primarily in sinus rhythm no episodes of A. fib or SVT noted.  One episode of wide-complex tachycardia noted with possible fusion beats at 10:33 PM on day 16 which could be PVCs or aberrant conduction uptitrate beta-blockers clinical correlation needed     Stress test  21  Summary    Supervising physician Dr. Manzano .    ECG portion of stress test is negative for ischemia by diagnostic criteria.    No infarct or ischemia noted. Normal EF 63 % with normal ventricular    contractility.    Normal stress myocardial perfusion.    This is a normal study.      Echo 21  Left ventricular systolic function is hyperdynamic.   Ejection fraction is visually estimated at 60%.   Grade III diastolic dysfunction.   Moderately dilated left atrium.   Moderate aortic stenosis; mean gradient: 24 mmHg, JENNA: 1.2 cm sq.   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   No evidence of any pericardial effusion.        Echo 2022  Left ventricular systolic function is normal with an ejection fraction of   50%-55%.   The left atrium is mildly dilated.   Moderate aortic stenosis

## 2024-01-04 NOTE — ANESTHESIA POSTPROCEDURE EVALUATION
Department of Anesthesiology  Postprocedure Note    Patient: Ekaterina Kirk  MRN: [id number]  YOB: 1950  Date of evaluation: 7/26/2023      Procedure Summary     Date: 07/26/23 Room / Location: [facility] [employer] Cath Lab    Anesthesia Start: 9835 Anesthesia Stop: 1101    Procedure: TAVR W/ ANESTHESIA Diagnosis: Nonrheumatic aortic (valve) stenosis    Scheduled Providers:  Responsible Provider: Madison Ballard MD    Anesthesia Type: MAC ASA Status: 4          Anesthesia Type: No value filed.     Jyothi Phase I:      Jyothi Phase II:        Anesthesia Post Evaluation    Patient location during evaluation: ICU  Patient participation: complete - patient participated  Level of consciousness: awake  Pain score: 0  Airway patency: patent  Nausea & Vomiting: no nausea and no vomiting  Complications: no  Cardiovascular status: blood pressure returned to baseline and hemodynamically stable  Respiratory status: acceptable and face mask  Hydration status: euvolemic rTc 6mo w labs

## 2024-01-05 ENCOUNTER — CARE COORDINATION (OUTPATIENT)
Dept: CASE MANAGEMENT | Age: 74
End: 2024-01-05

## 2024-01-05 ENCOUNTER — OFFICE VISIT (OUTPATIENT)
Dept: FAMILY MEDICINE CLINIC | Age: 74
End: 2024-01-05
Payer: MEDICARE

## 2024-01-05 VITALS
TEMPERATURE: 97.5 F | SYSTOLIC BLOOD PRESSURE: 120 MMHG | BODY MASS INDEX: 28.94 KG/M2 | HEART RATE: 64 BPM | DIASTOLIC BLOOD PRESSURE: 50 MMHG | WEIGHT: 158.2 LBS | OXYGEN SATURATION: 86 %

## 2024-01-05 DIAGNOSIS — N18.4 CKD (CHRONIC KIDNEY DISEASE), STAGE IV (HCC): ICD-10-CM

## 2024-01-05 DIAGNOSIS — J44.9 CHRONIC OBSTRUCTIVE PULMONARY DISEASE, UNSPECIFIED COPD TYPE (HCC): ICD-10-CM

## 2024-01-05 DIAGNOSIS — I48.11 LONGSTANDING PERSISTENT ATRIAL FIBRILLATION (HCC): Chronic | ICD-10-CM

## 2024-01-05 DIAGNOSIS — D64.9 ANEMIA, UNSPECIFIED TYPE: Primary | ICD-10-CM

## 2024-01-05 DIAGNOSIS — K21.9 GASTROESOPHAGEAL REFLUX DISEASE WITHOUT ESOPHAGITIS: Chronic | ICD-10-CM

## 2024-01-05 DIAGNOSIS — I25.118 ATHEROSCLEROSIS OF NATIVE CORONARY ARTERY OF NATIVE HEART WITH STABLE ANGINA PECTORIS (HCC): ICD-10-CM

## 2024-01-05 DIAGNOSIS — I10 PRIMARY HYPERTENSION: Chronic | ICD-10-CM

## 2024-01-05 DIAGNOSIS — D68.69 SECONDARY HYPERCOAGULABLE STATE (HCC): ICD-10-CM

## 2024-01-05 DIAGNOSIS — F51.04 PSYCHOPHYSIOLOGICAL INSOMNIA: ICD-10-CM

## 2024-01-05 DIAGNOSIS — I42.9 CARDIOMYOPATHY, PRIMARY (HCC): Chronic | ICD-10-CM

## 2024-01-05 DIAGNOSIS — E03.9 ACQUIRED HYPOTHYROIDISM: ICD-10-CM

## 2024-01-05 DIAGNOSIS — R74.01 TRANSAMINITIS: ICD-10-CM

## 2024-01-05 DIAGNOSIS — E78.2 MIXED HYPERLIPIDEMIA: Chronic | ICD-10-CM

## 2024-01-05 PROBLEM — R09.02 HYPOXEMIA: Status: ACTIVE | Noted: 2023-09-19

## 2024-01-05 PROBLEM — E11.9 TYPE 2 DIABETES MELLITUS (HCC): Status: ACTIVE | Noted: 2024-01-05

## 2024-01-05 PROBLEM — I48.91 ATRIAL FIBRILLATION WITH RVR (HCC): Status: RESOLVED | Noted: 2023-09-06 | Resolved: 2024-01-05

## 2024-01-05 PROBLEM — I50.9 ACUTE DECOMPENSATED HEART FAILURE (HCC): Status: RESOLVED | Noted: 2023-12-26 | Resolved: 2024-01-05

## 2024-01-05 PROBLEM — R09.02 HYPOXEMIA: Status: RESOLVED | Noted: 2023-09-19 | Resolved: 2024-01-05

## 2024-01-05 PROBLEM — E11.9 TYPE 2 DIABETES MELLITUS (HCC): Status: RESOLVED | Noted: 2023-07-14 | Resolved: 2024-01-05

## 2024-01-05 PROCEDURE — 99215 OFFICE O/P EST HI 40 MIN: CPT | Performed by: FAMILY MEDICINE

## 2024-01-05 PROCEDURE — 3074F SYST BP LT 130 MM HG: CPT | Performed by: FAMILY MEDICINE

## 2024-01-05 PROCEDURE — 1123F ACP DISCUSS/DSCN MKR DOCD: CPT | Performed by: FAMILY MEDICINE

## 2024-01-05 PROCEDURE — 3078F DIAST BP <80 MM HG: CPT | Performed by: FAMILY MEDICINE

## 2024-01-05 RX ORDER — HYDRALAZINE HYDROCHLORIDE 25 MG/1
25 TABLET, FILM COATED ORAL EVERY 8 HOURS SCHEDULED
Qty: 270 TABLET | Refills: 1 | Status: SHIPPED | OUTPATIENT
Start: 2024-01-05

## 2024-01-05 RX ORDER — LEVOTHYROXINE SODIUM 88 UG/1
88 TABLET ORAL DAILY
Qty: 90 TABLET | Refills: 0 | Status: SHIPPED | OUTPATIENT
Start: 2024-01-05

## 2024-01-05 RX ORDER — ASPIRIN 81 MG/1
81 TABLET ORAL DAILY
Qty: 90 TABLET | Refills: 4 | Status: SHIPPED | OUTPATIENT
Start: 2024-01-05

## 2024-01-05 RX ORDER — ALBUTEROL SULFATE 90 UG/1
2 AEROSOL, METERED RESPIRATORY (INHALATION) EVERY 6 HOURS PRN
Qty: 18 G | Refills: 5 | Status: SHIPPED | OUTPATIENT
Start: 2024-01-05

## 2024-01-05 RX ORDER — DILTIAZEM HYDROCHLORIDE 60 MG/1
2 TABLET, FILM COATED ORAL 2 TIMES DAILY
Qty: 3 EACH | Refills: 3 | Status: SHIPPED | OUTPATIENT
Start: 2024-01-05

## 2024-01-05 RX ORDER — PANTOPRAZOLE SODIUM 40 MG/1
40 TABLET, DELAYED RELEASE ORAL DAILY
Qty: 90 TABLET | Refills: 1 | Status: SHIPPED | OUTPATIENT
Start: 2024-01-05

## 2024-01-05 RX ORDER — AMIODARONE HYDROCHLORIDE 200 MG/1
200 TABLET ORAL DAILY
Qty: 30 TABLET | Refills: 0 | Status: SHIPPED | OUTPATIENT
Start: 2024-01-05

## 2024-01-05 RX ORDER — ATORVASTATIN CALCIUM 40 MG/1
40 TABLET, FILM COATED ORAL DAILY
Qty: 90 TABLET | Refills: 3 | Status: SHIPPED | OUTPATIENT
Start: 2024-01-05

## 2024-01-05 ASSESSMENT — ENCOUNTER SYMPTOMS
SHORTNESS OF BREATH: 1
ANAL BLEEDING: 0
COUGH: 0
SHORTNESS OF BREATH: 1
ABDOMINAL PAIN: 0

## 2024-01-05 NOTE — ASSESSMENT & PLAN NOTE
She has congestive heart failure that currently does not seem exacerbated.  Continue empagliflozin, carvedilol, atorvastatin, furosemide, lisinopril

## 2024-01-05 NOTE — ASSESSMENT & PLAN NOTE
Continue empagliflozin for protection against decline of kidney function  And for all cause mortality reduction

## 2024-01-05 NOTE — CARE COORDINATION
Care Transitions Follow Up Call    Patient Current Location:  Home: [address] [place] [id number]    Care Transition Nurse contacted the patient by telephone to follow up after admission on 23-24  .  Verified name and  with patient as identifiers.    Patient: Lynette Gonsalves  Patient : 1950   MRN: [id number]  Reason for Admission: A/C Anemia, A/C CHF     Discharge Date: 24 RARS: Readmission Risk Score: 25.3  Facility: [facility]      Needs to be reviewed by the provider   Additional needs identified to be addressed with provider: No     Method of communication with provider: none.    Patient reports feeling better today, more alert. Answering questions appropriately, appears a&o. RPM SPO2 96% this morning w/ O2 at 4L. Denies sob, cough, wheezing. Noted to be speaking in complete, unlabored sentences. Advised Patient that per Dr Crum she uses BIPAP (not cpap) and he will address her concerns at 24 appt. Advised she is in need of pulm testing. Reviewed COPD Zone Mgt education, v/u. Patient is non-smoker. On Albuterol, Symbicort, Trelegy inhalers; denies rx needs. Reports O2 and BIPAP in good working order. Was seen by cardiology yesterday- no new orders. Was seen by PCP today w/ rx refills and Levothyroxine increased to 88mcg qd as experiencing hair lose and recent TSH 4-8 range. Patient denies questions. Reports appetite, fluid intake good w/ b&b wnl. Denies resource needs.     Future Appointments   Date Time Provider Department Center   2024 10:45 AM Len Crum MD [facility] PULM [facility]   2024  8:30 AM Angelo Henriquez DO [facility] NEURO Neurology -   2024  1:30 PM Giovanny Lopez MD [facility] CC [facility]   2024  1:45 PM YOJANA, MED ONC NURSE [facility] MED ONC [place]   2024  8:30 AM Gabo Douglas MD [facility]   2024  7:45 AM Francisco Barriga MD [facility] [place] [facility]   2024 10:45 AM Ta Agarwal,  AFL ADL NEPH [facility]     Care Transition

## 2024-01-05 NOTE — PROGRESS NOTES
1/5/24    Lynette Gonsalves  1950    SUBJECTIVE    HPI - Lynette is a 73 y.o. female who presents today for evaluation of:  Chief Complaint   Patient presents with    Follow-Up from Hospital     12/26/2023 - 1/1/2024 heart failure     Hair/Scalp Problem     Hair loss     CHF : Infrequent chest pain. No leg swelling. No orthopnea.     COPD : shortness of breath isno worse than usual. No productive cough. Albuterol helps and needs refills.     Anemia : She was recently in the hospital with anemia.  Eliquis was stopped and she was put on clopidogrel on and aspirin for her atrial fibrillation.    Sleep : Does not sleep well. Does not want to increase doxepin nor try trazodone.     Thyroid : hair is falling out a lot. LT4 dose was not changed when in hospital in Dec.     Review of Systems   Constitutional:  Positive for fatigue. Negative for fever.   Respiratory:  Positive for shortness of breath.    Cardiovascular:  Positive for chest pain (infrequent). Negative for palpitations and leg swelling.   Gastrointestinal:  Negative for abdominal pain and anal bleeding.   Endocrine: Positive for cold intolerance. Negative for heat intolerance.   Genitourinary:  Negative for hematuria.         No Known Allergies     OBJECTIVE    BP (!) 120/50 (Site: Left Upper Arm, Position: Sitting, Cuff Size: Large Adult)   Pulse 64   Temp 97.5 °F (36.4 °C) (Infrared)   Wt 71.8 kg (158 lb 3.2 oz)   SpO2 (!) 86% Comment: 4 L O2  BMI 28.94 kg/m²     Physical Exam   Constitutional:       General: Not in acute distress.     Appearance: Normal appearance. Not ill-appearing.   Eyes:      General: No scleral icterus.  Cardiovascular:      Rate and Rhythm: Normal rate and regular rhythm.      Heart sounds: No murmur heard.   No friction rub. No gallop.    Pulmonary:      Effort: Pulmonary effort is normal. No respiratory distress.      Breath sounds: No wheezing, rhonchi or rales.   Abdominal:      Palpations: Abdomen is soft. There is no mass.

## 2024-01-05 NOTE — ASSESSMENT & PLAN NOTE
Her heart rate was normal today.  Continue aspirin and clopidogrel.  Anticoagulation with Eliquis is not contraindicated by her recent development of moderately severe anemia.

## 2024-01-05 NOTE — ASSESSMENT & PLAN NOTE
She has insomnia and can continue doxepin.  She was not interested in adjusting the dose or trying trazodone.

## 2024-01-05 NOTE — ASSESSMENT & PLAN NOTE
Blood pressure is adequately controlled.  Continue carvedilol 12.5 mg twice a day.  She is willing to break the pills in half.

## 2024-01-08 ENCOUNTER — OFFICE VISIT (OUTPATIENT)
Dept: PULMONOLOGY | Age: 74
End: 2024-01-08
Payer: MEDICARE

## 2024-01-08 ENCOUNTER — HOSPITAL ENCOUNTER (OUTPATIENT)
Age: 74
Discharge: HOME | End: 2024-01-08
Payer: MEDICARE

## 2024-01-08 VITALS
HEART RATE: 68 BPM | BODY MASS INDEX: 26.87 KG/M2 | DIASTOLIC BLOOD PRESSURE: 86 MMHG | SYSTOLIC BLOOD PRESSURE: 140 MMHG | HEIGHT: 62 IN | WEIGHT: 146 LBS | OXYGEN SATURATION: 85 %

## 2024-01-08 DIAGNOSIS — E66.3 OVERWEIGHT (BMI 25.0-29.9): ICD-10-CM

## 2024-01-08 DIAGNOSIS — Z87.891 PERSONAL HISTORY OF TOBACCO USE: ICD-10-CM

## 2024-01-08 DIAGNOSIS — R09.02 HYPOXIA: ICD-10-CM

## 2024-01-08 DIAGNOSIS — Z87.891 FORMER SMOKER: ICD-10-CM

## 2024-01-08 DIAGNOSIS — G47.10 HYPERSOMNIA: ICD-10-CM

## 2024-01-08 DIAGNOSIS — J96.21 ACUTE ON CHRONIC RESPIRATORY FAILURE WITH HYPOXIA (HCC): ICD-10-CM

## 2024-01-08 DIAGNOSIS — D64.9 ANEMIA, UNSPECIFIED TYPE: ICD-10-CM

## 2024-01-08 DIAGNOSIS — R74.01 TRANSAMINITIS: ICD-10-CM

## 2024-01-08 DIAGNOSIS — J44.9 CHRONIC OBSTRUCTIVE PULMONARY DISEASE, UNSPECIFIED COPD TYPE (HCC): Chronic | ICD-10-CM

## 2024-01-08 DIAGNOSIS — G47.33 OSA (OBSTRUCTIVE SLEEP APNEA): Primary | ICD-10-CM

## 2024-01-08 LAB
HAV IGM SERPL QL IA: NON REACTIVE
HBV CORE IGM SERPL QL IA: NON REACTIVE
HBV SURFACE AG SERPL QL IA: NON REACTIVE
HCT VFR BLD CALC: 28.7 % (ref 37–47)
HCV AB SERPL QL IA: NON REACTIVE
HEMOGLOBIN: 8.6 GM/DL (ref 12.5–16)

## 2024-01-08 PROCEDURE — 1123F ACP DISCUSS/DSCN MKR DOCD: CPT | Performed by: INTERNAL MEDICINE

## 2024-01-08 PROCEDURE — G0296 VISIT TO DETERM LDCT ELIG: HCPCS | Performed by: INTERNAL MEDICINE

## 2024-01-08 PROCEDURE — 80074 ACUTE HEPATITIS PANEL: CPT

## 2024-01-08 PROCEDURE — 3079F DIAST BP 80-89 MM HG: CPT | Performed by: INTERNAL MEDICINE

## 2024-01-08 PROCEDURE — 36415 COLL VENOUS BLD VENIPUNCTURE: CPT

## 2024-01-08 PROCEDURE — 85014 HEMATOCRIT: CPT

## 2024-01-08 PROCEDURE — 3077F SYST BP >= 140 MM HG: CPT | Performed by: INTERNAL MEDICINE

## 2024-01-08 PROCEDURE — 99215 OFFICE O/P EST HI 40 MIN: CPT | Performed by: INTERNAL MEDICINE

## 2024-01-08 PROCEDURE — 85018 HEMOGLOBIN: CPT

## 2024-01-08 ASSESSMENT — ENCOUNTER SYMPTOMS
EYE DISCHARGE: 0
ABDOMINAL DISTENTION: 0
BACK PAIN: 0
SHORTNESS OF BREATH: 1
EYE ITCHING: 0
COUGH: 0
ABDOMINAL PAIN: 0

## 2024-01-08 NOTE — ASSESSMENT & PLAN NOTE
C/w quitting smoking  C.w Inhalers  C.w oxygen  PFT  Low Dose CT chest in july'24  No flu vaccine per patient

## 2024-01-08 NOTE — PROGRESS NOTES
Replacement 27mm Mosaic     CORONARY ARTERY BYPASS GRAFT N/A 2023    TRANSCATHETER AORTIC VALVE REPLACEMENT WITH 26MM MEDTRONIC EVOLUT TISSUE VALVE; performed by Ilan De Leon MD at [facility] OR    HERNIA REPAIR N/A 2021    ROBOTIC HERNIA INCISIONAL REPAIR LAPAROSCOPIC WITH COMPONENT SEPARATION performed by Sumit Haynes MD at [facility] OR    MITRAL VALVE REPLACEMENT  2016    27mm mosaic    PACEMAKER INSERTION Left 2023    Dual chamber Medtronic Trinity implanted by Dr. Engel.    UPPER GASTROINTESTINAL ENDOSCOPY N/A 2023    EGD DIAGNOSTIC ONLY performed by Aleta Wise MD at [facility] ENDOSCOPY       Social History     Socioeconomic History    Marital status:      Spouse name: None    Number of children: None    Years of education: None    Highest education level: None   Tobacco Use    Smoking status: Former     Current packs/day: 0.00     Average packs/day: 0.5 packs/day for 57.7 years (28.9 ttl pk-yrs)     Types: Cigarettes     Start date:      Quit date: 9/15/2022     Years since quittin.3    Smokeless tobacco: Former     Quit date: 2021   Vaping Use    Vaping Use: Never used   Substance and Sexual Activity    Alcohol use: No     Comment: 2 cups of coffee in the a.m.    Drug use: No    Sexual activity: Yes     Partners: Male   Social History Narrative    ** Merged History Encounter **          Social Determinants of Health     Financial Resource Strain: Low Risk  (2023)    Overall Financial Resource Strain (CARDIA)     Difficulty of Paying Living Expenses: Not hard at all   Food Insecurity: No Food Insecurity (2023)    Hunger Vital Sign     Worried About Running Out of Food in the Last Year: Never true     Ran Out of Food in the Last Year: Never true   Transportation Needs: No Transportation Needs (2023)    PRAPARE - Transportation     Lack of Transportation (Medical): No     Lack of Transportation (Non-Medical): No   Physical Activity: Inactive

## 2024-01-09 ENCOUNTER — CARE COORDINATION (OUTPATIENT)
Dept: CASE MANAGEMENT | Age: 74
End: 2024-01-09

## 2024-01-09 ENCOUNTER — OFFICE VISIT (OUTPATIENT)
Dept: NEUROLOGY | Age: 74
End: 2024-01-09
Payer: MEDICARE

## 2024-01-09 VITALS
BODY MASS INDEX: 29 KG/M2 | HEIGHT: 62 IN | SYSTOLIC BLOOD PRESSURE: 120 MMHG | HEART RATE: 64 BPM | WEIGHT: 157.6 LBS | DIASTOLIC BLOOD PRESSURE: 58 MMHG

## 2024-01-09 DIAGNOSIS — I63.531 ACUTE ISCHEMIC RIGHT PCA STROKE (HCC): Primary | ICD-10-CM

## 2024-01-09 PROCEDURE — 3074F SYST BP LT 130 MM HG: CPT | Performed by: PSYCHIATRY & NEUROLOGY

## 2024-01-09 PROCEDURE — 99205 OFFICE O/P NEW HI 60 MIN: CPT | Performed by: PSYCHIATRY & NEUROLOGY

## 2024-01-09 PROCEDURE — 1123F ACP DISCUSS/DSCN MKR DOCD: CPT | Performed by: PSYCHIATRY & NEUROLOGY

## 2024-01-09 PROCEDURE — 3078F DIAST BP <80 MM HG: CPT | Performed by: PSYCHIATRY & NEUROLOGY

## 2024-01-09 NOTE — CARE COORDINATION
Care Transitions Outreach Attempt      Patient: Lynette Gonsalves Patient : 1950 MRN: [id number]   Reason for Admission: A/C Anemia, A/C CHF     Discharge Date: 24         RARS: Readmission Risk Score: 25.3  Facility: [facility]      Last Discharge Facility       Date Complaint Diagnosis Description Type Department Provider    23 Shortness of Breath Hypoxia ... ED to Hosp-Admission (Discharged) (ADMITTED) Max Saunders MD; Bishop Dotson,...        Noted following upcoming appointments from discharge chart review:   [facility] follow up appointment(s):   Future Appointments   Date Time Provider Department Center   2024  1:30 PM Giovanny Lopez MD [facility] CC [facility]   2024  1:45 PM YOJANA, MED ONC NURSE [facility] MED ONC [place]   2024  8:30 AM Gabo Douglas MD [facility]   2024  7:45 AM Francisco Barriga MD [facility] [place] [facility]   3/29/2024  9:00 AM Len Crum MD [facility] PULM [facility]   2024  8:45 AM Luis Eduardo Hernández, APRN - CNP [facility] NEURO Neurology -   2024 10:45 AM Ta Agarwal,  AFL ADL NEPH [facility]     Attempt to reach for Care Transition follow up call unsuccessful. HIPAA compliant message left requesting call back. UTR letter sent via [employer].

## 2024-01-09 NOTE — PROGRESS NOTES
1/9/24    Lynette Gonsalves  1950    Chief Complaint   Patient presents with    New Patient     Patient presents for evaluation of CVA.        History of Present Illness  Lynette is a 73 y.o. female presenting today for evaluation of:  Stroke    She had a TAVR procedure with right femoral access in July 2023.  She states since July she has been having some issues with her left visual field.  She discussed this with her cardiologist who ordered an MRI of her brain which was performed in at the end of August 2023 which revealed encephalomalacia to the right occipital lobe correlating with her symptoms.  Cardiology notes indicate that she has had some atrial flutter and atrial fibrillation on her EKGs.  She was on Eliquis but states she has been off of Eliquis for about a month due to anemia and concern for gastrointestinal bleeding.  She had an EGD which was unremarkable.  She had a colonoscopy which did not reveal any abnormalities but it was poor that it was a poor prep so she requires a repeat colonoscopy.  Other than the vision complaints she denies any other stroke symptoms of lateralizing weakness or numbness.  She did not have any facial droop or difficulty with her speech.  She tells me that she has had a \"mini strokes\" in the past.  There is family history of stroke in her father.  She does not currently smoke but stopped smoking a little bit over a year ago.  She does have sleep apnea but is compliant with her CPAP.  She is on medication for hypertension.  She takes atorvastatin for hyperlipidemia and stroke prevention.  She is on Jardiance but states it is not for diabetes but for kidney disease.      Subjective    Review of Symptoms:  Neurologic   Symptoms: visual loss, no difficulty with gait or walking, no bowel symptoms, no vertigo, no confusion, no memory loss, no speech disorder, no double vision, no dizziness, no loss of hearing, no sensory disturbances, no weakness, no headaches, no bladder

## 2024-01-10 ENCOUNTER — CARE COORDINATION (OUTPATIENT)
Dept: CASE MANAGEMENT | Age: 74
End: 2024-01-10

## 2024-01-10 NOTE — CARE COORDINATION
Date/Time:  1/10/2024 8:10 AM    LPN attempted to reach patient by telephone regarding red alert in remote patient monitoring program for wt increase of 10.2 lbs in 7 days. Pt it manually entering weight metrics. Advised against this . Left HIPPA compliant message requesting a return call. Will attempt to reach patient again.     Maranda Parks LPN, [facility]  PH: [phone]    - Current Patient Metrics ---- Blood Pressure: 146/75, 63bpm Pulseox: 94%, 65bpm Survey: - Weight: 156.0lbs Note Created at: 01/10/2024 08:13 AM ET ---- Time-Spent: 3 minutes 0 seconds

## 2024-01-10 NOTE — CARE COORDINATION
understanding of plan of care after discharge. Discussed appropriate site of care based on symptoms and resources available to patient including: PCP  Specialist  Urgent care clinics  MyChart Messaging. The patient agrees to contact PCP office for questions related to healthcare.     Patients top risk factors for readmission: medical condition-CHF  Interventions to address risk factors: continued CHF education    Offered patient enrollment in the Remote Patient Monitoring (RPM) program for in-home monitoring: Enrolled as of 1/2/24. Per HRS review Patient has been manually entering weights. Advised Patient against manually entering data; advised equipment readings for accuracy. Reports she will have  assist her with weighing when he gets home. Weight noted to be 156# 1/10/24 neuro appt.      Care Transitions Subsequent and Final Call    Schedule Follow Up Appointment with PCP: Completed  Subsequent and Final Calls  Do you have any ongoing symptoms?: No  Have your medications changed?: No  Do you have any questions related to your medications?: No  Do you currently have any active services?: Yes  Are you currently active with any services?: Other  Do you have any needs or concerns that I can assist you with?: No  Identified Barriers: Other  Care Transitions Interventions   Home Care Waiver: Declined  Other Services: Completed (Comment: RPM enrolled 1/2/24)      Transportation Support: Declined      DME Assistance: Declined   CHF Nurse: Completed  Senior Services: Declined    Diabetes Education: Completed      Disease Specific Clinic: Completed  Social Work: Declined    Disease Association: Completed      Other Interventions:             Care Transition Nurse provided contact information for future needs. Plan for follow-up call in 5-7 days based on severity of symptoms and risk factors.  Plan for next call: weight?, cont chf/copd education    Sameera Dodson RN

## 2024-01-10 NOTE — CARE COORDINATION
Writer noted that pt saw Neuro in office on yesterday, and wt at OV was 157 lbs. Pt is manually entering random wt values in HRS. Writer corrected wt metrics for yesterday, 01/09/2024, per Neuro's note.    Maranda Parks LPN, [facility]  PH: [phone]

## 2024-01-12 ENCOUNTER — TELEPHONE (OUTPATIENT)
Dept: PULMONOLOGY | Age: 74
End: 2024-01-12

## 2024-01-12 NOTE — TELEPHONE ENCOUNTER
Patient LVM stating RX that was due to be sent to [facility] was not received.    LVM with patient asking her to call our office back. Doesn't look like nothing was ordered. Patient is to continue with inhaler and o2 usage. And go for a Sleep study titration. If she's referring to her o2 usage [facility] should already have that order on file.

## 2024-01-15 ENCOUNTER — CARE COORDINATION (OUTPATIENT)
Dept: CARE COORDINATION | Age: 74
End: 2024-01-15

## 2024-01-15 NOTE — CARE COORDINATION
Date/Time:  1/15/2024 8:32 AM  LPN attempted to reach patient by telephone regarding red alert in remote patient monitoring program. Left HIPPA compliant message requesting a return call. Will attempt to reach patient again.

## 2024-01-17 ENCOUNTER — CARE COORDINATION (OUTPATIENT)
Dept: CASE MANAGEMENT | Age: 74
End: 2024-01-17

## 2024-01-17 ENCOUNTER — OFFICE VISIT (OUTPATIENT)
Dept: ONCOLOGY | Age: 74
End: 2024-01-17
Payer: MEDICARE

## 2024-01-17 ENCOUNTER — HOSPITAL ENCOUNTER (OUTPATIENT)
Dept: INFUSION THERAPY | Age: 74
Discharge: HOME | End: 2024-01-17
Payer: MEDICARE

## 2024-01-17 ENCOUNTER — CARE COORDINATION (OUTPATIENT)
Dept: CARE COORDINATION | Age: 74
End: 2024-01-17

## 2024-01-17 VITALS
WEIGHT: 160.2 LBS | BODY MASS INDEX: 29.48 KG/M2 | TEMPERATURE: 97.8 F | DIASTOLIC BLOOD PRESSURE: 56 MMHG | HEART RATE: 65 BPM | SYSTOLIC BLOOD PRESSURE: 133 MMHG | HEIGHT: 62 IN | RESPIRATION RATE: 18 BRPM | OXYGEN SATURATION: 93 %

## 2024-01-17 DIAGNOSIS — D64.9 ANEMIA, UNSPECIFIED TYPE: ICD-10-CM

## 2024-01-17 DIAGNOSIS — Z12.31 SCREENING MAMMOGRAM FOR HIGH-RISK PATIENT: Primary | ICD-10-CM

## 2024-01-17 DIAGNOSIS — Z12.31 SCREENING MAMMOGRAM FOR HIGH-RISK PATIENT: ICD-10-CM

## 2024-01-17 LAB
BASOPHILS ABSOLUTE: 0 K/CU MM
BASOPHILS RELATIVE PERCENT: 0.4 % (ref 0–1)
DIFFERENTIAL TYPE: ABNORMAL
EOSINOPHILS ABSOLUTE: 0.2 K/CU MM
EOSINOPHILS RELATIVE PERCENT: 2.6 % (ref 0–3)
FERRITIN: 134 NG/ML (ref 15–150)
HCT VFR BLD CALC: 28.7 % (ref 37–47)
HEMOGLOBIN: 8.7 GM/DL (ref 12.5–16)
IRON: 42 UG/DL (ref 37–145)
LYMPHOCYTES ABSOLUTE: 1 K/CU MM
LYMPHOCYTES RELATIVE PERCENT: 14.5 % (ref 24–44)
MCH RBC QN AUTO: 29.8 PG (ref 27–31)
MCHC RBC AUTO-ENTMCNC: 30.3 % (ref 32–36)
MCV RBC AUTO: 98.3 FL (ref 78–100)
MONOCYTES ABSOLUTE: 0.8 K/CU MM
MONOCYTES RELATIVE PERCENT: 11.5 % (ref 0–4)
PCT TRANSFERRIN: 13 % (ref 10–44)
PDW BLD-RTO: 14.7 % (ref 11.7–14.9)
PLATELET # BLD: 215 K/CU MM (ref 140–440)
PMV BLD AUTO: 10.1 FL (ref 7.5–11.1)
RBC # BLD: 2.92 M/CU MM (ref 4.2–5.4)
SEGMENTED NEUTROPHILS ABSOLUTE COUNT: 5 K/CU MM
SEGMENTED NEUTROPHILS RELATIVE PERCENT: 71 % (ref 36–66)
TOTAL IRON BINDING CAPACITY: 330 UG/DL (ref 250–450)
UNSATURATED IRON BINDING CAPACITY: 288 UG/DL (ref 110–370)
WBC # BLD: 7 K/CU MM (ref 4–10.5)

## 2024-01-17 PROCEDURE — 85025 COMPLETE CBC W/AUTO DIFF WBC: CPT

## 2024-01-17 PROCEDURE — 99211 OFF/OP EST MAY X REQ PHY/QHP: CPT

## 2024-01-17 PROCEDURE — 99213 OFFICE O/P EST LOW 20 MIN: CPT | Performed by: INTERNAL MEDICINE

## 2024-01-17 PROCEDURE — 1123F ACP DISCUSS/DSCN MKR DOCD: CPT | Performed by: INTERNAL MEDICINE

## 2024-01-17 PROCEDURE — 36415 COLL VENOUS BLD VENIPUNCTURE: CPT

## 2024-01-17 PROCEDURE — 3078F DIAST BP <80 MM HG: CPT | Performed by: INTERNAL MEDICINE

## 2024-01-17 PROCEDURE — 83550 IRON BINDING TEST: CPT

## 2024-01-17 PROCEDURE — 82728 ASSAY OF FERRITIN: CPT

## 2024-01-17 PROCEDURE — 3075F SYST BP GE 130 - 139MM HG: CPT | Performed by: INTERNAL MEDICINE

## 2024-01-17 PROCEDURE — 83540 ASSAY OF IRON: CPT

## 2024-01-17 NOTE — CARE COORDINATION
Date/Time:  1/17/2024 8:20 AM  LPN attempted to reach patient by telephone regarding red alert in remote patient monitoring program. Left HIPPA compliant message requesting a return call. Will attempt to reach patient again.

## 2024-01-17 NOTE — CARE COORDINATION
Remote Alert Monitoring Note  Rpm alert to be reviewed by the provider   red alert   pulse ox reading (83%)                       Date/Time:  2024 8:44 AM  Patient Current Location: [facility] contacted patient by telephone. Verified patients name and  as identifiers.  Background: Pt enrolled for ckd and chf  Refer to 911 immediately if:  Patient unresponsive or unable to provide history  Change in cognition or sudden confusion  Patient unable to respond in complete sentences  Intense chest pain/tightness  Any concern for any clinical emergency  Red Alert: Provider response time of 1 hr required for any red alert requiring intervention  Yellow Alert: Provider response time of 3hr required for any escalated yellow alert    O2 Triage  Are you having any Chest Pain? no   Are you having any Shortness of Breath? no   Swelling in your hands or feet? no     Are you having any other health concerns or issues? no      Clinical Interventions: Reviewed and followed up on alerts and treatments-Spoke with patient, she reports she did not have her oxygen on. Once she put it on, recheck was wnl at 95%. She denies any concerning sx, other metrics are WNL.      Plan/Follow Up: Will continue to review, monitor and address alerts with follow up based on severity of symptoms and risk factors.

## 2024-01-17 NOTE — PROGRESS NOTES
MA Rooming Questions  Patient: Lynette Gonsalves  MRN: [id number]    Date: 1/17/2024        GABRIEL Walker CMA      
Patient Name:  Lynette Gonsalves  Patient :  1950  Patient MRN:  [id number]     Primary Oncologist: Giovanny Lopez MD  Referring Provider: Francisco Barriga MD     Date of Service: 2024      Reason for Consult:  ***      Chief Complaint:  No chief complaint on file.       Patient Active Problem List:     HTN, Primary hypertension     HLD, Mixed hyperlipidemia     Hypothyroidism, Acquired      PAD (peripheral artery disease) ([facility])     S/P right coronary artery (RCA) stent placement     Former smoker     CHF following cardiac surgery, postop     Gait disturbance     ST elevation myocardial infarction (STEMI) ([facility])     Rheumatic mitral stenosis     S/P MVR (mitral valve replacement)     Cardiomyopathy, primary ([facility])     On home O2     Depression with anxiety     Urinary incontinence     Osteoporosis, Age-related, without current pathological fracture     EDITA (obstructive sleep apnea)     Hypersomnia     Incisional hernia of anterior abdominal wall without obstruction or gangrene     Class 1 obesity     A-fib, Longstanding persistent atrial fibrillation ([facility])     Stage 3b chronic kidney disease ([facility])     Atherosclerosis of native coronary artery of native heart with stable angina pectoris ([facility])     Chronic systolic (congestive) heart failure     Aortic valve stenosis, Nonrheumatic      Chronic obstructive pulmonary disease, unspecified     Gastroesophageal reflux disease without esophagitis     Secondary hypercoagulable state ([facility])     Pacemaker     Sick sinus syndrome ([facility])     Insomnia , Psychophysiological insomnia     Chronic right-sided low back pain with right-sided sciatica     Acute on chronic respiratory failure with hypoxia ([facility])     VHD (valvular heart disease)     Nonrheumatic aortic (valve) stenosis     S/P TAVR (transcatheter aortic valve replacement)     Loss of vision     Cerebrovascular accident (CVA) due to embolism of right posterior cerebral artery ([facility])     Obesity (BMI 30-39.9)     
OR    MITRAL VALVE REPLACEMENT  2016    27mm mosaic    PACEMAKER INSERTION Left 2023    Dual chamber Medtronic Trinity implanted by Dr. Engel.    UPPER GASTROINTESTINAL ENDOSCOPY N/A 2023    EGD DIAGNOSTIC ONLY performed by Aleta Wise MD at [facility] ENDOSCOPY     Social History     Tobacco Use    Smoking status: Former     Current packs/day: 0.00     Average packs/day: 0.5 packs/day for 57.7 years (28.9 ttl pk-yrs)     Types: Cigarettes     Start date:      Quit date: 9/15/2022     Years since quittin.3    Smokeless tobacco: Never   Vaping Use    Vaping Use: Never used   Substance Use Topics    Alcohol use: No     Comment: 2 cups of coffee in the a.m.    Drug use: No     Family History   Problem Relation Age of Onset    Stroke Mother     Heart Attack Father     Coronary Art Dis Father     Pacemaker Father     Arrhythmia Father     Diabetes Sister     Rheum Arthritis Sister     Heart Attack Sister     Arrhythmia Sister     Breast Cancer Neg Hx      Review of Systems:  \"Per interval history; otherwise 10 point ROS is negative.\"     Vital Signs:  There were no vitals taken for this visit.    Physical Exam:  CONSTITUTIONAL: awake, alert, cooperative, no apparent distress   EYES: pupils equal and round. Sclera clear and + pallor  ENT: Normocephalic, without obvious abnormality, atraumatic  O2 via NC.  NECK: supple, symmetrical, no jugular venous distension and no carotid bruits   HEMATOLOGIC/LYMPHATIC: no cervical, supraclavicular or axillary lymphadenopathy   LUNGS: no increased work of breathing and clear to auscultation bilaterally.  CARDIOVASCULAR: regular rate and rhythm, normal S1 and S2, no murmur  ABDOMEN: normal bowel sound, soft, non-distended, non-tender, no masses palpated, no hepatosplenomegaly   MUSCULOSKELETAL: full range of motion noted, tone is normal  NEUROLOGIC: Motor skills grossly intact. CN II - XII grossly intact.  SKIN: No jaundice. Dry and warm skin.  EXTREMITIES: no LE

## 2024-01-17 NOTE — CARE COORDINATION
[place]   1/19/2024  8:30 AM Gabo Douglas MD [place] [facility]   2/16/2024  7:45 AM Francisco Barriga MD [facility] [place] [facility]   3/13/2024  8:30 PM SRMZ IN LAB SLEEP STUDY [facility] SLE [place]   3/29/2024  9:00 AM Len Crum MD [facility] PULM [facility]   4/9/2024  8:45 AM Luis Eduardo Hernández, APRN - CNP [facility] NEURO Neurology -   4/26/2024 10:45 AM Ta Agarwal,  AFL ADL NEPH AFL Kidney &     Care Transition Nurse reviewed medical action plan and red flags with patient and discussed any barriers to care and/or understanding of plan of care after discharge. Discussed appropriate site of care based on symptoms and resources available to patient including: PCP  Specialist  Urgent care clinics  MyChart Messaging. The patient agrees to contact PCP office for questions related to healthcare.     Patients top risk factors for readmission: medical condition-chf  Interventions to address risk factors: CHF Zone [facility] ed    Offered patient enrollment in the Remote Patient Monitoring (RPM) program for in-home monitoring: Yes, patient already enrolled.  /82, HR 62  95% w/ O2 at 4-5L, 152#.      Care Transitions Subsequent and Final Call    Subsequent and Final Calls  Do you have any ongoing symptoms?: No  Have your medications changed?: No  Do you have any questions related to your medications?: No  Do you currently have any active services?: No  Are you currently active with any services?: Other  Do you have any needs or concerns that I can assist you with?: No  Identified Barriers: Other  Care Transitions Interventions   Home Care Waiver: Declined  Other Services: Completed (Comment: RPM enrolled 1/2/24)      Transportation Support: Declined      DME Assistance: Declined   CHF Nurse: Completed  Senior Services: Declined    Diabetes Education: Completed      Disease Specific Clinic: Completed  Social Work: Declined    Disease Association: Completed      Other Interventions:             Care Transition Nurse

## 2024-01-19 ENCOUNTER — OFFICE VISIT (OUTPATIENT)
Dept: CARDIOLOGY CLINIC | Age: 74
End: 2024-01-19

## 2024-01-19 VITALS
DIASTOLIC BLOOD PRESSURE: 68 MMHG | BODY MASS INDEX: 29.26 KG/M2 | WEIGHT: 160 LBS | HEART RATE: 64 BPM | OXYGEN SATURATION: 95 % | SYSTOLIC BLOOD PRESSURE: 132 MMHG

## 2024-01-19 DIAGNOSIS — G47.33 OSA (OBSTRUCTIVE SLEEP APNEA): ICD-10-CM

## 2024-01-19 DIAGNOSIS — I21.3 ST ELEVATION MYOCARDIAL INFARCTION (STEMI), UNSPECIFIED ARTERY (HCC): ICD-10-CM

## 2024-01-19 DIAGNOSIS — Z87.891 FORMER SMOKER: ICD-10-CM

## 2024-01-19 DIAGNOSIS — G47.11 IDIOPATHIC HYPERSOMNIA: ICD-10-CM

## 2024-01-19 DIAGNOSIS — I35.0 NONRHEUMATIC AORTIC (VALVE) STENOSIS: ICD-10-CM

## 2024-01-19 DIAGNOSIS — D68.69 SECONDARY HYPERCOAGULABLE STATE (HCC): ICD-10-CM

## 2024-01-19 DIAGNOSIS — N18.4 CKD (CHRONIC KIDNEY DISEASE), STAGE IV (HCC): ICD-10-CM

## 2024-01-19 DIAGNOSIS — I63.431 CEREBROVASCULAR ACCIDENT (CVA) DUE TO EMBOLISM OF RIGHT POSTERIOR CEREBRAL ARTERY (HCC): ICD-10-CM

## 2024-01-19 DIAGNOSIS — Z95.2 S/P TAVR (TRANSCATHETER AORTIC VALVE REPLACEMENT): ICD-10-CM

## 2024-01-19 DIAGNOSIS — I25.118 ATHEROSCLEROSIS OF NATIVE CORONARY ARTERY OF NATIVE HEART WITH STABLE ANGINA PECTORIS (HCC): Chronic | ICD-10-CM

## 2024-01-19 DIAGNOSIS — E78.2 MIXED HYPERLIPIDEMIA: ICD-10-CM

## 2024-01-19 DIAGNOSIS — G47.10 HYPERSOMNIA: ICD-10-CM

## 2024-01-19 DIAGNOSIS — I48.11 LONGSTANDING PERSISTENT ATRIAL FIBRILLATION (HCC): Primary | Chronic | ICD-10-CM

## 2024-01-19 DIAGNOSIS — J96.21 ACUTE ON CHRONIC RESPIRATORY FAILURE WITH HYPOXIA (HCC): ICD-10-CM

## 2024-01-19 DIAGNOSIS — Z95.2 S/P MVR (MITRAL VALVE REPLACEMENT): ICD-10-CM

## 2024-01-19 NOTE — PATIENT INSTRUCTIONS
We are committed to providing you the best care possible.    If you receive a survey after visiting one of our offices, please take time to share your experience concerning your physician office visit.  These surveys are confidential and no health information about you is shared.    We are eager to improve for you and we are counting on your feedback to help make that happen.      **It is YOUR responsibilty to bring medication bottles and/or updated medication list to EACH APPOINTMENT. This will allow us to better serve you and all your healthcare needs**  Thank you for allowing us to care for you today!   We want to ensure we can follow your treatment plan and we strive to give you the best outcomes and experience possible.   If you ever have a life threatening emergency and call 911 - for an ambulance (EMS)   Our providers can only care for you at:   [facility] or [facility].   Even if you have someone take you or you drive yourself we can only care for you in a [facility] facility. Our providers are not setup at the other healthcare locations!   Please be informed that if you contact our office outside of normal business hours the physician on call cannot help with any scheduling or rescheduling issues, procedure instruction questions or any type of medication issue.    We advise you for any urgent/emergency that you go to the nearest emergency room!    PLEASE CALL OUR OFFICE DURING NORMAL BUSINESS HOURS    Monday - Friday   8 am to 5 pm    [place]: [phone]    [place]: [phone]    [place]:  [phone]

## 2024-01-19 NOTE — PROGRESS NOTES
CARDIOLOGY NOTE      Chief Complaint: Coronary artery disease, mitral valve disease    HPI:   Lynette is a 73 y.o. year old who has history as noted below.  Lynette was found to be anemic with no clear obvious signs of bleeding esophagogastroduodenostomy and colonoscopy are both normal the colonoscopy was not of good quality her Eliquis is stopped she is on aspirin and Plavix .  She was evaluated by hematology and may need bone marrow biopsy?   Lynette has had several admissions and hospitalization due to decompensated heart failure and anemia and rapid A-fib with altered mental status.  She is s/p cardioversion in September 2023 and started on amiodarone .  She is feeling much better now but her vision and visual fields are still not normal.    Lynette had a TAVR using 26 mm valve in July 2023 for JENNA of 0.59 Cm2  Post valve she developed vision changes, MRI shows old lacunar and new occipital CVA  She is s/p pacemaker , she also has history of chronic respiratory failure   she is on atc  O2 ut uses it at night with cpap,  She is  using oxygen at night  at 2 L   She is on eliquis now but it is too expensive but denies any bleeding concerns but occasionally her nose bleeds ( once a week)  She tends to get very symptomatic and goes into heart failure and A-fib she is s/p cardioversion in September 2023  Daughter is handicap but she works and drives and lives with her   In the past she has had strokes and has history of \"thick blood\" and she was put on Coumadin by Dr. Jauregui.  She  lives with her  has been  for 50 years, she was swicthed to [facility] from coumadin in 2021.   goes on bike rides for weeks and months he is planning another trip in February 2024 and is wondering if it is safe for him to leave her  is going for an insulin for 1 month  She sees Dr. Car for pulmonology  She has history of mitral valve replacement with a bioprosthetic valve

## 2024-01-19 NOTE — PROGRESS NOTES
Patient Name: Lynette Gonsalves  Patient : 1950  Patient MRN: [id number]     Primary Oncologist: Giovanny Lopez MD  Referring Provider: Francisco Barriga MD     Date of Service: 2024      Chief Complaint:   Chief Complaint   Patient presents with    Follow-up    Results     FU visit.     Patient Active Problem List:     HTN, Primary hypertension     HLD, Mixed hyperlipidemia     Hypothyroidism, Acquired      PAD (peripheral artery disease) ([facility])     S/P right coronary artery (RCA) stent placement     Former smoker     CHF following cardiac surgery, postop     Gait disturbance     ST elevation myocardial infarction (STEMI) ([facility])     Rheumatic mitral stenosis     S/P MVR (mitral valve replacement)     Cardiomyopathy, primary ([facility])     On home O2     Depression with anxiety     Urinary incontinence     Osteoporosis, Age-related, without current pathological fracture     EDITA (obstructive sleep apnea)     Hypersomnia     Incisional hernia of anterior abdominal wall without obstruction or gangrene     Class 1 obesity     A-fib, Longstanding persistent atrial fibrillation ([facility])     Stage 3b chronic kidney disease ([facility])     Atherosclerosis of native coronary artery of native heart with stable angina pectoris ([facility])     Chronic systolic (congestive) heart failure     Aortic valve stenosis, Nonrheumatic      Chronic obstructive pulmonary disease, unspecified     Gastroesophageal reflux disease without esophagitis     Secondary hypercoagulable state ([facility])     Pacemaker     Sick sinus syndrome ([facility])     Insomnia , Psychophysiological insomnia     Chronic right-sided low back pain with right-sided sciatica     Acute on chronic respiratory failure with hypoxia ([facility])     VHD (valvular heart disease)     Nonrheumatic aortic (valve) stenosis     S/P TAVR (transcatheter aortic valve replacement)     Loss of vision     Cerebrovascular accident (CVA) due to embolism of right posterior cerebral artery ([facility])     Hypoxia

## 2024-01-22 ENCOUNTER — CARE COORDINATION (OUTPATIENT)
Dept: CASE MANAGEMENT | Age: 74
End: 2024-01-22

## 2024-01-22 ENCOUNTER — TELEPHONE (OUTPATIENT)
Dept: GASTROENTEROLOGY | Age: 74
End: 2024-01-22

## 2024-01-22 NOTE — CARE COORDINATION
Remote Patient Monitoring Note      Date/Time:  1/22/2024 3:31 PM  Patient Current Location: [facility] noted RPM regarding yellow alert received for missing metrics. Background: RPM for KD, CHF    Clinical Interventions: Pt is having transmission issues. Writer did request that [facility] IT contact pt to troubleshoot. [facility] IT did reach out to pt today, however the pt did not answer their call. [facility] IT will make another attempt   to reach the pt.      Tay Adams (HRS Support)   Jan 22, 2024, 2:28?PM ROME Rhodes,      Thank you for bringing this to our attention. We attempted to contact the patient to troubleshoot however, the patient did not answer our call.      We left a voicemail with our tech support number in case they decide to give us a callback. We will continue to contact the patient and follow up with any updates afterward.   Tay Condon    [email or web address]  Visit [employer] [place] [facility] [place] for FAQ and self-help articles HERE               Plan/Follow Up: Will continue to review, monitor and address alerts with follow up based on severity of symptoms and risk factors.       Maranda Parks LPN, [facility]  PH: [phone]     Current Patient Metrics ---- Blood Pressure: -/-, -bpm Pulseox: -%, -bpm Survey: - Weight: -lbs Note Created at: 01/22/2024 03:38 PM ET ---- Time-Spent: 5 minutes 0 seconds

## 2024-01-24 ENCOUNTER — HOSPITAL ENCOUNTER (OUTPATIENT)
Dept: WOMENS IMAGING | Age: 74
Discharge: HOME | End: 2024-01-24
Attending: INTERNAL MEDICINE
Payer: MEDICARE

## 2024-01-24 DIAGNOSIS — D64.9 ANEMIA, UNSPECIFIED TYPE: ICD-10-CM

## 2024-01-24 DIAGNOSIS — Z12.31 SCREENING MAMMOGRAM FOR HIGH-RISK PATIENT: ICD-10-CM

## 2024-01-24 PROCEDURE — 77063 BREAST TOMOSYNTHESIS BI: CPT

## 2024-01-25 ENCOUNTER — CARE COORDINATION (OUTPATIENT)
Dept: CASE MANAGEMENT | Age: 74
End: 2024-01-25

## 2024-01-25 NOTE — CARE COORDINATION
[facility] Care Transitions Follow Up Call    2024    Patient: Lynette Gonsalves  Patient : 1950   MRN: [id number]  Reason for Admission:  A/C Anemia, A/C CHF       Discharge Date: 24 RARS: Readmission Risk Score: 25.3         Spoke with: cheo    [facility] attempted outreach for care transition follow up call. Left HIPPA compliant message and contact information for call back.     Care Transitions Subsequent and Final Call    Subsequent and Final Calls  Care Transitions Interventions  Other Interventions:             Follow Up  Future Appointments   Date Time Provider Department Center   2024  9:45 AM Giovanny Lopez MD [facility] CC [facility]   2024 10:00 AM SRMZ, MED ONC NURSE [facility] MED ONC [place]   2024  7:45 AM Francisco Barriga MD [facility] [place] [facility]   2024  8:00 AM Gabo Douglas MD [place] [facility]   3/14/2024 10:00 AM [facility] ROOM 2 [facility]   3/14/2024 10:30 AM [facility] PFT [facility] PFT [place]   3/20/2024  8:30 PM SR[facility] IN LAB SLEEP STUDY [facility] SLE [place]   3/29/2024  9:00 AM Len Crum MD [facility] PULM [facility]   2024  8:45 AM Luis Eduardo Hernández, APRN - CNP [facility] NEURO Neurology -   2024 10:45 AM Ta Agarwal,  AFL ADL NEPH AFL Kidney &       Agatha Davis LPN

## 2024-01-26 ENCOUNTER — CARE COORDINATION (OUTPATIENT)
Dept: CASE MANAGEMENT | Age: 74
End: 2024-01-26

## 2024-01-26 NOTE — CARE COORDINATION
Care Transitions Follow Up Call    Patient Current Location:  Home: [address] [place], [place] [id number]    [facility] Care Coordinator contacted the patient by telephone to follow up after admission on 23.  Verified name and  with patient as identifiers.    Patient: Lynette Gonsalves  Patient : 1950   MRN: [id number]  Reason for Admission: A/C Anemia, A/C CHF  Discharge Date: 24 RARS: Readmission Risk Score: 25.3      Needs to be reviewed by the provider   Additional needs identified to be addressed with provider: No  none             Method of communication with provider: none.    Spoke with Lynette Gonsalves who reported that she was doing fine. Patient denied cp, sob, cough, dizziness, headache, n/v, diarrhea, abdominal pains, fever, or chills. Patient report that appetite and fluid intake is good and denied any problems with bowel or bladder. Patient reported that he is taking all medications as ordered. Writer reviewed patient's historical data in HRS and noted that her weight did not upload in the computer. Patient stated that weight today was 153.8. Writer will send RPM nurse a message to please advise on how she would like to get patient's weight updated. Patient denied any other needs at this time. Patient instructed to continue to monitor s/s, reporting any that may present to MD immediately for early intervention    Reviewed CHF Zone [place]:   Daily weights in a.m. before breakfast, after voiding  Keep written log of weights for review  Notify MD immediately of weight gain/loss 3# or more in 1-7days  Take all rx as prescribed, keep scheduled MD appts  Low sodium diet- read labels; avoid/limit high sodium foods such as fast food,   Patient v/u.     Addressed changes since last contact:  none  Discussed follow-up appointments. If no appointment was previously scheduled, appointment scheduling offered: Yes.   Is follow up appointment scheduled within 7 days of discharge? Yes.    Follow

## 2024-01-29 ENCOUNTER — TELEPHONE (OUTPATIENT)
Dept: GASTROENTEROLOGY | Age: 74
End: 2024-01-29

## 2024-01-29 NOTE — TELEPHONE ENCOUNTER
Called pt. In regards to a referral for anemia. Pt. Stated she wanted to talk with dr. Manzano first before making an appt.

## 2024-02-01 ENCOUNTER — CARE COORDINATION (OUTPATIENT)
Dept: CASE MANAGEMENT | Age: 74
End: 2024-02-01

## 2024-02-01 ENCOUNTER — CARE COORDINATION (OUTPATIENT)
Dept: CARE COORDINATION | Age: 74
End: 2024-02-01

## 2024-02-01 NOTE — CARE COORDINATION
ACM received handoff of RPM patient from CTN. ACM opened up episode at this time for ongoing RPM management. ACM will make outreach to patient next week.

## 2024-02-01 NOTE — CARE COORDINATION
Care Transitions Follow Up Call    Patient Current Location:  Home: [address] [place] [id number]    Care Transition Nurse contacted the patient by telephone to follow up after admission on 23-24.  Verified name and  with patient as identifiers.    Patient: Lynette Gonsalves  Patient : 1950   MRN: [id number]  Reason for Admission: A/C Anemia, A/C CHF        Discharge Date: 24 RARS: Readmission Risk Score: 25.3  Facility: [facility]      Needs to be reviewed by the provider   Additional needs identified to be addressed with provider: No     Method of communication with provider: none.    Patient reports doing well. Denies cp, sob, orthpnea, edema, ac distress. Weight 153# today which is down from 156# at start of RPM program 24. Reports taking Lasix, Coreg, Imdur and all routine meds as directed. Reports adherence to low na diet. Follows at [facility]. Reviewed sx which require MD notification for early outpt intervention. Reports appetite, fluid intake good w/ b&b wnl. Denies resource needs.     Future Appointments   Date Time Provider Department Center   2024  9:45 AM Giovanny Lopez MD [facility] CC [facility]   2024 10:00 AM SRMTED, MED ONC NURSE [facility] MED ONC [place]   2024  7:45 AM Francisco Barriga MD [facility] [place] [facility]   2024  8:00 AM Gabo Douglas MD [place] [facility]   3/14/2024 10:00 AM [facility] CT ROOM 2 [facility] CT [facility]   3/14/2024 10:30 AM SR[facility] PFT [facility] PFT [place]   3/20/2024  8:30 PM SR[facility] IN LAB SLEEP STUDY [facility] SLE [place]   3/29/2024  9:00 AM Len Crum MD [facility] PULM [facility]   2024  8:45 AM Luis Eduardo Hernández, APRN - CNP [facility] NEURO Neurology -   2024 10:45 AM Ta Agarwal,  AFL ADL NEPH [facility] Kidney &     Care Transition Nurse reviewed medical action plan and red flags with patient and discussed any barriers to care and/or understanding of plan of care after discharge. Discussed appropriate site of care based on symptoms

## 2024-02-02 RX ORDER — AMLODIPINE BESYLATE 5 MG/1
10 TABLET ORAL DAILY
Qty: 180 TABLET | Refills: 3 | Status: SHIPPED | OUTPATIENT
Start: 2024-02-02

## 2024-02-05 ENCOUNTER — CARE COORDINATION (OUTPATIENT)
Dept: CARE COORDINATION | Age: 74
End: 2024-02-05

## 2024-02-05 NOTE — CARE COORDINATION
Ambulatory Care Coordination Note  2024    Patient Current Location:  Home: [address] [place] [id number]     ACM contacted the patient by telephone. Verified name and  with patient as identifiers. Provided introduction to self, and explanation of the ACM role.     Challenges to be reviewed by the provider   Additional needs identified to be addressed with provider: No  none               Method of communication with provider: chart routing.    ACM: Lizbeth Willingham RN     ACM completed follow up call with patient who said she is doing well at this time. Patient agreed to ongoing calls with ACM and at this time she feels she is stable. ACM will reach out next week to patient to complete enrollment into care management.     Plan:    F/U call 1 week    Offered patient enrollment in the Remote Patient Monitoring (RPM) program for in-home monitoring: Yes, patient already enrolled.    Lab Results       None            Care Coordination Interventions    Referral from Primary Care Provider: No  Suggested Interventions and Community Resources          Goals Addressed    None         Future Appointments   Date Time Provider Department Center   2024  9:45 AM Giovanny Lopez MD [facility] CC [facility]   2024 10:00 AM SRMZ, MED ONC NURSE [facility] MED ONC [place]   2024  7:45 AM Francisco Barriga MD [facility] [place] [facility]   2024  8:00 AM Gabo Douglas MD [place] [facility]   3/14/2024 10:00 AM [facility] CT ROOM 2 [facility] CT [facility]   3/14/2024 10:30 AM SRMZ PFT [facility] PFT [place]   3/20/2024  8:30 PM SRMZ IN LAB SLEEP STUDY [facility] SLE [place]   3/29/2024  9:00 AM Len Crum MD [facility] PULM [facility]   2024  8:45 AM Luis Eduardo Hernández, APRN - CNP [facility] NEURO Neurology -   2024 10:45 AM Ta Agarwal, DO AFL ADL NEPH [facility] Kidney &

## 2024-02-12 ENCOUNTER — OFFICE VISIT (OUTPATIENT)
Dept: ONCOLOGY | Age: 74
End: 2024-02-12
Payer: MEDICARE

## 2024-02-12 ENCOUNTER — CARE COORDINATION (OUTPATIENT)
Dept: CARE COORDINATION | Age: 74
End: 2024-02-12

## 2024-02-12 ENCOUNTER — HOSPITAL ENCOUNTER (OUTPATIENT)
Dept: INFUSION THERAPY | Age: 74
Discharge: HOME | End: 2024-02-12
Payer: MEDICARE

## 2024-02-12 VITALS
DIASTOLIC BLOOD PRESSURE: 56 MMHG | SYSTOLIC BLOOD PRESSURE: 121 MMHG | OXYGEN SATURATION: 90 % | BODY MASS INDEX: 28.16 KG/M2 | HEART RATE: 65 BPM | TEMPERATURE: 97.7 F | HEIGHT: 62 IN | WEIGHT: 153 LBS

## 2024-02-12 DIAGNOSIS — D64.9 ANEMIA, UNSPECIFIED TYPE: Primary | ICD-10-CM

## 2024-02-12 DIAGNOSIS — D64.9 ANEMIA, UNSPECIFIED TYPE: ICD-10-CM

## 2024-02-12 LAB
BASOPHILS ABSOLUTE: 0 K/CU MM
BASOPHILS RELATIVE PERCENT: 0.5 % (ref 0–1)
DIFFERENTIAL TYPE: ABNORMAL
EOSINOPHILS ABSOLUTE: 0.2 K/CU MM
EOSINOPHILS RELATIVE PERCENT: 2.5 % (ref 0–3)
FERRITIN: 151 NG/ML (ref 15–150)
HCT VFR BLD CALC: 29 % (ref 37–47)
HEMOGLOBIN: 8.6 GM/DL (ref 12.5–16)
IRON: 35 UG/DL (ref 37–145)
LYMPHOCYTES ABSOLUTE: 0.9 K/CU MM
LYMPHOCYTES RELATIVE PERCENT: 13.7 % (ref 24–44)
MCH RBC QN AUTO: 28.6 PG (ref 27–31)
MCHC RBC AUTO-ENTMCNC: 29.7 % (ref 32–36)
MCV RBC AUTO: 96.3 FL (ref 78–100)
MONOCYTES ABSOLUTE: 0.6 K/CU MM
MONOCYTES RELATIVE PERCENT: 9.3 % (ref 0–4)
PCT TRANSFERRIN: 9 % (ref 10–44)
PDW BLD-RTO: 15.2 % (ref 11.7–14.9)
PLATELET # BLD: 205 K/CU MM (ref 140–440)
PMV BLD AUTO: 10.6 FL (ref 7.5–11.1)
RBC # BLD: 3.01 M/CU MM (ref 4.2–5.4)
SEGMENTED NEUTROPHILS ABSOLUTE COUNT: 4.7 K/CU MM
SEGMENTED NEUTROPHILS RELATIVE PERCENT: 74 % (ref 36–66)
TOTAL IRON BINDING CAPACITY: 375 UG/DL (ref 250–450)
UNSATURATED IRON BINDING CAPACITY: 340 UG/DL (ref 110–370)
WBC # BLD: 6.4 K/CU MM (ref 4–10.5)

## 2024-02-12 PROCEDURE — 1123F ACP DISCUSS/DSCN MKR DOCD: CPT | Performed by: INTERNAL MEDICINE

## 2024-02-12 PROCEDURE — 36415 COLL VENOUS BLD VENIPUNCTURE: CPT

## 2024-02-12 PROCEDURE — 3074F SYST BP LT 130 MM HG: CPT | Performed by: INTERNAL MEDICINE

## 2024-02-12 PROCEDURE — 3078F DIAST BP <80 MM HG: CPT | Performed by: INTERNAL MEDICINE

## 2024-02-12 PROCEDURE — 83550 IRON BINDING TEST: CPT

## 2024-02-12 PROCEDURE — 83540 ASSAY OF IRON: CPT

## 2024-02-12 PROCEDURE — 99211 OFF/OP EST MAY X REQ PHY/QHP: CPT

## 2024-02-12 PROCEDURE — 85025 COMPLETE CBC W/AUTO DIFF WBC: CPT

## 2024-02-12 PROCEDURE — 99213 OFFICE O/P EST LOW 20 MIN: CPT | Performed by: INTERNAL MEDICINE

## 2024-02-12 PROCEDURE — 82728 ASSAY OF FERRITIN: CPT

## 2024-02-12 NOTE — CARE COORDINATION
Contacted [facility] IT regarding all manual entries in RPM. Patient put in 41 individual separate weights yesterday, [facility] IT will contact to trouble shoot equipment

## 2024-02-12 NOTE — PROGRESS NOTES
MA Rooming Questions  Patient: Lynette Gonsalves  MRN: [id number]    Date: 2/12/2024        1. Do you have any new issues?   no         2. Do you need any refills on medications?    no    3. Have you had any imaging done since your last visit?   yes - labs 1/17 shahnaz 1/24    4. Have you been hospitalized or seen in the emergency room since your last visit here?   no    5. Did the patient have a depression screening completed today? Yes    PHQ-9 Total Score: 0 (2/12/2024 10:15 AM)       PHQ-9 Given to (if applicable):               PHQ-9 Score (if applicable):                     [] Positive     []  Negative              Does question #9 need addressed (if applicable)                     [] Yes    []  No               Romana Bird CMA

## 2024-02-15 ENCOUNTER — CARE COORDINATION (OUTPATIENT)
Dept: CARE COORDINATION | Age: 74
End: 2024-02-15

## 2024-02-15 NOTE — CARE COORDINATION
Remote Alert Monitoring Note  Rpm alert to be reviewed by the provider   yellow alert   blood pressure reading (176/82)                       Date/Time:  2/15/2024 8:47 AM  Patient Current Location: [facility] contacted patient by telephone. Verified patients name and  as identifiers.  Background: PT enrolled for HTN, CHF  Refer to 911 immediately if:  Patient unresponsive or unable to provide history  Change in cognition or sudden confusion  Patient unable to respond in complete sentences  Intense chest pain/tightness  Any concern for any clinical emergency  Red Alert: Provider response time of 1 hr required for any red alert requiring intervention  Yellow Alert: Provider response time of 3hr required for any escalated yellow alert    HR Triage  Are you having any Chest Pain? no   Are you having any Shortness of Breath? no   Are you having any dizziness? no   Are you feeling more fatigued or tired than normal? no   Are you having any other health concerns or issues? no      Clinical Interventions:  Spoke with patient, she reports she just took her medications around 0800. She is overall feeling well, she is going to recheck BP around 10am to allow medications to kick in. She did alert for her weight, however, there were about 20 weights in from yesterday and she reports her  has been weighing. Removed erroneous weights and her weight today is at baseline. She reports the tablet is not reading the BP reading aloud, sent message to HRS IT to push an update to fix this. She also reports the tablet is saying \"reminder\" twice a day but her entries are in the system. Turned off audio reminders. Will mointor for repeat reading      Plan/Follow Up: Will continue to review, monitor and address alerts with follow up based on severity of symptoms and risk factors.

## 2024-02-16 ENCOUNTER — CARE COORDINATION (OUTPATIENT)
Dept: CASE MANAGEMENT | Age: 74
End: 2024-02-16

## 2024-02-16 VITALS
OXYGEN SATURATION: 99 % | WEIGHT: 154.8 LBS | DIASTOLIC BLOOD PRESSURE: 57 MMHG | BODY MASS INDEX: 28.31 KG/M2 | SYSTOLIC BLOOD PRESSURE: 120 MMHG | HEART RATE: 62 BPM

## 2024-02-16 NOTE — CARE COORDINATION
Previous note entry in error. Please disregard BP update of 152/92.     Maranda Parks LPN, [facility]  PH: [phone]

## 2024-02-16 NOTE — CARE COORDINATION
Remote Alert Monitoring Note  Rpm alert to be reviewed by the provider                   Date/Time:  2024 8:24 AM  Patient Current Location: [place]  LPN contacted patient by telephone. Verified patients name and  as identifiers.  Background: Pt enrolled for ckd and chf  Refer to 911 immediately if:  Patient unresponsive or unable to provide history  Change in cognition or sudden confusion  Patient unable to respond in complete sentences  Intense chest pain/tightness  Any concern for any clinical emergency  Red Alert: Provider response time of 1 hr required for any red alert requiring intervention  Yellow Alert: Provider response time of 3hr required for any escalated yellow alert     BP Triage  Are you having any Chest Pain? no   Are you having any Shortness of Breath? no   Do you have a headache or have any vision changes? no   Are you having any numbness or tingling? no   Are you having any other health concerns or issues? no       Clinical Interventions: Reviewed and followed up on alerts and treatments-LPN contacted pt in regards to RPM yellow alert for BP of KD, CHF. Pt denied any worrisome symptoms. Pt stated that she had not been taking her BP meds prior to checking BP. Pt not made aware of this protocol. Writer advised pt to take her BP meds at least one hour prior to checking BP each day, pt and spouse v/u. Pt will take her meds and recheck BP. Will update at that time.Advised ACM.      Plan/Follow Up: Will continue to review, monitor and address alerts with follow up based on severity of symptoms and risk factors.       Maranda Parks LPN, [facility]  PH: [phone]      - Current Patient Metrics ---- Blood Pressure: 172/85, 63bpm Pulseox: 99%, 65bpm Survey: - Weight: 154.8lbs Note Created at: 2024 08:27 AM ET ---- Time-Spent: 10 minutes 0 seconds

## 2024-02-19 ENCOUNTER — PROCEDURE VISIT (OUTPATIENT)
Dept: CARDIOLOGY CLINIC | Age: 74
End: 2024-02-19

## 2024-02-19 ENCOUNTER — CARE COORDINATION (OUTPATIENT)
Dept: CASE MANAGEMENT | Age: 74
End: 2024-02-19

## 2024-02-19 DIAGNOSIS — Z95.0 CARDIAC PACEMAKER IN SITU: Primary | ICD-10-CM

## 2024-02-19 NOTE — CARE COORDINATION
Remote Patient Monitoring Note      Date/Time:  2024 3:29 PM  Patient Current Location: [place]  LPN contacted patient by telephone regarding yellow alert received for No VS for 2 days.  Verified patients name and  as identifiers.    Background: CHF, KD  Clinical Interventions: Reviewed and followed up on alerts and treatments-         Called and spoke with patient.  She will start VS tomorrow.    Plan/Follow Up: Will continue to review, monitor and address alerts with follow up based on severity of symptoms and risk factors.

## 2024-02-20 ENCOUNTER — CARE COORDINATION (OUTPATIENT)
Dept: CARE COORDINATION | Age: 74
End: 2024-02-20

## 2024-02-20 NOTE — CARE COORDINATION
Remote Patient Monitoring Note      Date/Time:  2024 3:25 PM  Patient Current Location: [facility] contacted patient by telephone regarding yellow alert received for activity level (no BP reading x 3 days). Verified patients name and  as identifiers.    Background: Pt enrolled for CKD and CHF    Clinical Interventions:  Spoke with this pleasant patient, she reports the BP cuff is not reading the BP aloud so it appears it has disconnected itself from the tablet. Emailed HRS IT to push an update through to try to reconnect it. Pt reports she is doing well and wrote down her BP today- manually entered in HRS       Plan/Follow Up: Will continue to review, monitor and address alerts with follow up based on severity of symptoms and risk factors.

## 2024-02-21 ENCOUNTER — TELEPHONE (OUTPATIENT)
Dept: CARDIOLOGY CLINIC | Age: 74
End: 2024-02-21

## 2024-02-21 NOTE — TELEPHONE ENCOUNTER
Placed call to patient to set Watchman date. Patient states spouse will be out of country until mid April.  Watchman scheduled for 4/18/24.

## 2024-02-22 ENCOUNTER — CARE COORDINATION (OUTPATIENT)
Dept: CASE MANAGEMENT | Age: 74
End: 2024-02-22

## 2024-02-22 NOTE — CARE COORDINATION
Remote Patient Monitoring Note      Date/Time:  2/22/2024 2:02 PM  Patient Current Location: [place]  LPN noted RPM regarding yellow alert received for missing metrics. Background: RPM for KD, CHF     Clinical Interventions: LPN noted RPM regarding yellow alert for missing metrics. Writer contacted [facility] to request that a call be placed to pt to assist with tablet issues. [facility] did speak with pt and pt's spouse. Pt will need a new charging cord as pt's charging cord is not working. [facility] [employer] prepping to send out new cord. Will follow.     Plan/Follow Up: Will continue to review, monitor and address alerts with follow up based on severity of symptoms and risk factors.       Maranda Parks  N, PCC  PH: [phone]      Chad Howard (HRS Support)   Feb 22, 2024, 1:41?PM EST   Hello.     Thank you for your email.     We have reached out to the patient in regards to issues with wt scale. We spoke with the patient to troubleshoot, however he had informed us the tablet was not powering on. We performed troubleshooting steps on getting the tablet to power back on. However, we were unsuccessful and determined the patient needs a new tablet charging cord.  Regards,  Chad Howard    [email or web address]     Current Patient Metrics ---- Blood Pressure: -/-, -bpm Pulseox: -%, -bpm Survey: - Weight: -lbs Note Created at: 02/22/2024 02:06 PM ET ---- Time-Spent: 7 minutes 0 seconds

## 2024-02-23 ENCOUNTER — CARE COORDINATION (OUTPATIENT)
Dept: CASE MANAGEMENT | Age: 74
End: 2024-02-23

## 2024-02-23 NOTE — CARE COORDINATION
Remote Patient Monitoring Note      Date/Time:  2/23/2024 11:36 AM  Patient Current Location: [place]  LPN noted RPM regarding yellow alert received for missing metrics. Background: RPM for KD, CHF     Clinical Interventions: LPN noted RPM regarding yellow alert for missing metrics. HRS it shipped replacement  on 02/23/2024.     Plan/Follow Up: Will continue to review, monitor and address alerts with follow up based on severity of symptoms and risk factors.       Maranda Parks LPN, [facility]  PH: [phone]    Current Patient Metrics ---- Blood Pressure: -/-, -bpm Pulseox: -%, -bpm Survey: - Weight: -lbs Note Created at: 02/23/2024 11:35 AM ET ---- Time-Spent: 5 minutes 0 seconds

## 2024-02-26 ENCOUNTER — CARE COORDINATION (OUTPATIENT)
Dept: CASE MANAGEMENT | Age: 74
End: 2024-02-26

## 2024-02-26 NOTE — CARE COORDINATION
Remote Patient Monitoring Note      Date/Time:  2/26/2024 2:11 PM  Patient Current Location: [place]  LPN noted RPM regarding yellow alert received for missing metrics. Background: RPM for KD, CHF     Clinical Interventions: LPN noted RPM regarding yellow alert for missing metrics. HRS it shipped replacement  on 02/23/2024.     Plan/Follow Up: Will continue to review, monitor and address alerts with follow up based on severity of symptoms and risk factors.       Maranda Parks LPN, [facility]  PH: [phone]     Current Patient Metrics ---- Blood Pressure: -/-, -bpm Pulseox: -%, -bpm Survey: - Weight: -lbs Note Created at: 02/26/2024 02:12 PM ET ---- Time-Spent: 5 minutes 0 seconds

## 2024-02-27 ENCOUNTER — CARE COORDINATION (OUTPATIENT)
Dept: CARE COORDINATION | Age: 74
End: 2024-02-27

## 2024-02-27 ENCOUNTER — CARE COORDINATION (OUTPATIENT)
Dept: CASE MANAGEMENT | Age: 74
End: 2024-02-27

## 2024-02-27 NOTE — CARE COORDINATION
Remote Patient Monitoring Note      Date/Time:  2/27/2024 1:56 PM  Patient Current Location: [place]  LPN noted RPM regarding yellow alert received for missing metrics. Background: RPM for KD, CHF     Clinical Interventions: LPN noted RPM regarding yellow alert for missing metrics. HRS it shipped replacement  on 02/23/2024.     Plan/Follow Up: Will continue to review, monitor and address alerts with follow up based on severity of symptoms and risk factors.       Maranda Parks LPN, [facility]  PH: [phone]     Current Patient Metrics ---- Blood Pressure: -/-, -bpm Pulseox: -%, -bpm Survey: - Weight: -lbs Note Created at: 02/27/2024 01:57 PM ET ---- Time-Spent: 5 minutes 0 seconds  
Purple DH (Discharge Huddle; Vulnerable Patient)

## 2024-02-28 ENCOUNTER — CARE COORDINATION (OUTPATIENT)
Dept: CASE MANAGEMENT | Age: 74
End: 2024-02-28

## 2024-02-28 NOTE — CARE COORDINATION
Remote Patient Monitoring Note      Date/Time:  2/28/2024 2:07 PM  Patient Current Location: [place]  LPN noted RPM regarding yellow alert received for missing metrics. Background: RPM for KD, CHF     Clinical Interventions: LPN noted RPM regarding yellow alert for missing metrics. HRS it shipped replacement  on 02/23/2024.     Plan/Follow Up: Will continue to review, monitor and address alerts with follow up based on severity of symptoms and risk factors.       Maranda Parks LPN, [facility]  PH: [phone]    Current Patient Metrics ---- Blood Pressure: -/-, -bpm Pulseox: -%, -bpm Survey: - Weight: -lbs Note Created at: 02/28/2024 02:07 PM ET ---- Time-Spent: 5 minutes 0 seconds

## 2024-02-29 ENCOUNTER — CARE COORDINATION (OUTPATIENT)
Dept: CASE MANAGEMENT | Age: 74
End: 2024-02-29

## 2024-02-29 NOTE — CARE COORDINATION
Remote Patient Monitoring Note      Date/Time:  2/29/2024 2:40 PM  Patient Current Location: [place]  LPN attempted to contacted patient by telephone regarding yellow alert received for No VS for 8 days.    Background: CHF, KD  Clinical Interventions: Reviewed and followed up on alerts and treatments-       Attempted to reach patient for RPM yellow Alert.  Unable to reach patient.  Left HIPAA Compliant message on Voice Mail to REMIND patient to put metrics in.  Phone number left on Voice Mail to call back.    ACM notified  Will continue to follow.     Keara Salazar LPN    [phone]  [facility] Coordinator      Plan/Follow Up: Will continue to review, monitor and address alerts with follow up based on severity of symptoms and risk factors.

## 2024-03-01 ENCOUNTER — OFFICE VISIT (OUTPATIENT)
Dept: CARDIOLOGY CLINIC | Age: 74
End: 2024-03-01
Payer: MEDICARE

## 2024-03-01 VITALS
HEART RATE: 66 BPM | HEIGHT: 62 IN | DIASTOLIC BLOOD PRESSURE: 70 MMHG | WEIGHT: 155.4 LBS | SYSTOLIC BLOOD PRESSURE: 118 MMHG | BODY MASS INDEX: 28.6 KG/M2

## 2024-03-01 DIAGNOSIS — I63.431 CEREBROVASCULAR ACCIDENT (CVA) DUE TO EMBOLISM OF RIGHT POSTERIOR CEREBRAL ARTERY (HCC): ICD-10-CM

## 2024-03-01 DIAGNOSIS — Z95.2 S/P MVR (MITRAL VALVE REPLACEMENT): ICD-10-CM

## 2024-03-01 DIAGNOSIS — Z95.0 PACEMAKER: ICD-10-CM

## 2024-03-01 DIAGNOSIS — Z95.2 S/P TAVR (TRANSCATHETER AORTIC VALVE REPLACEMENT): ICD-10-CM

## 2024-03-01 DIAGNOSIS — I50.22 CHRONIC SYSTOLIC (CONGESTIVE) HEART FAILURE (HCC): Chronic | ICD-10-CM

## 2024-03-01 DIAGNOSIS — I73.9 PAD (PERIPHERAL ARTERY DISEASE) (HCC): Chronic | ICD-10-CM

## 2024-03-01 DIAGNOSIS — I25.118 ATHEROSCLEROSIS OF NATIVE CORONARY ARTERY OF NATIVE HEART WITH STABLE ANGINA PECTORIS (HCC): Chronic | ICD-10-CM

## 2024-03-01 DIAGNOSIS — I10 PRIMARY HYPERTENSION: Primary | Chronic | ICD-10-CM

## 2024-03-01 DIAGNOSIS — N18.32 STAGE 3B CHRONIC KIDNEY DISEASE (HCC): ICD-10-CM

## 2024-03-01 DIAGNOSIS — I48.11 LONGSTANDING PERSISTENT ATRIAL FIBRILLATION (HCC): Chronic | ICD-10-CM

## 2024-03-01 DIAGNOSIS — Z99.81 ON HOME O2: ICD-10-CM

## 2024-03-01 DIAGNOSIS — Z95.5 S/P RIGHT CORONARY ARTERY (RCA) STENT PLACEMENT: ICD-10-CM

## 2024-03-01 DIAGNOSIS — I05.0 RHEUMATIC MITRAL STENOSIS: Chronic | ICD-10-CM

## 2024-03-01 PROBLEM — I35.0 NONRHEUMATIC AORTIC VALVE STENOSIS: Chronic | Status: RESOLVED | Noted: 2023-03-24 | Resolved: 2024-03-01

## 2024-03-01 PROCEDURE — 3074F SYST BP LT 130 MM HG: CPT | Performed by: INTERNAL MEDICINE

## 2024-03-01 PROCEDURE — 1123F ACP DISCUSS/DSCN MKR DOCD: CPT | Performed by: INTERNAL MEDICINE

## 2024-03-01 PROCEDURE — 99214 OFFICE O/P EST MOD 30 MIN: CPT | Performed by: INTERNAL MEDICINE

## 2024-03-01 PROCEDURE — 3078F DIAST BP <80 MM HG: CPT | Performed by: INTERNAL MEDICINE

## 2024-03-01 RX ORDER — AMIODARONE HYDROCHLORIDE 200 MG/1
200 TABLET ORAL DAILY
Qty: 30 TABLET | Refills: 0 | Status: SHIPPED | OUTPATIENT
Start: 2024-03-01

## 2024-03-01 RX ORDER — CLOPIDOGREL BISULFATE 75 MG/1
75 TABLET ORAL DAILY
Qty: 90 TABLET | Refills: 0 | Status: SHIPPED | OUTPATIENT
Start: 2024-03-01

## 2024-03-01 NOTE — PATIENT INSTRUCTIONS
We are committed to providing you the best care possible.    If you receive a survey after visiting one of our offices, please take time to share your experience concerning your physician office visit.  These surveys are confidential and no health information about you is shared.    We are eager to improve for you and we are counting on your feedback to help make that happen.      **It is YOUR responsibilty to bring medication bottles and/or updated medication list to EACH APPOINTMENT. This will allow us to better serve you and all your healthcare needs**    Thank you for allowing us to care for you today!   We want to ensure we can follow your treatment plan and we strive to give you the best outcomes and experience possible.   If you ever have a life threatening emergency and call 911 - for an ambulance (EMS)   Our providers can only care for you at:   [facility] or [facility] [place].   Even if you have someone take you or you drive yourself we can only care for you in a [facility] facility. Our providers are not setup at the other healthcare locations!     Please be informed that if you contact our office outside of normal business hours the physician on call cannot help with any scheduling or rescheduling issues, procedure instruction questions or any type of medication issue.    We advise you for any urgent/emergency that you go to the nearest emergency room!    PLEASE CALL OUR OFFICE DURING NORMAL BUSINESS HOURS    Monday - Friday   8 am to 5 pm    [place]: [phone]    [place]: [phone]    [place]:  [phone]

## 2024-03-01 NOTE — PROGRESS NOTES
Estimated EF is 20-25%   TAVR valve in place.   S/P MVR with moderate regurgitation. Mean PG 12mmHg. High pressure gradient   is likely a combination of increased flow due to regurgitation and mild to   moderate valvular stenosis   Mild tricuspid regurgitation; RVSP: 35 mmHg.   There is no evidence of any pericardial effusion.   No thrombus noted in the left atrial appendage.   Follow up echo recommend din 3 months.               All labs, medications and tests reviewed by myself including data and history from outside source , patient and available family .  Assessment & Plan:      1. HTN, Primary hypertension    2. A-fib, Longstanding persistent atrial fibrillation (HCC)    3. PAD (peripheral artery disease) ([facility])    4. Rheumatic mitral stenosis    5. Atherosclerosis of native coronary artery of native heart with stable angina pectoris ([facility])    6. Chronic systolic (congestive) heart failure    7. S/P right coronary artery (RCA) stent placement    8. S/P MVR (mitral valve replacement)    9. On home O2    10. Stage 3b chronic kidney disease (HCC)    11. Pacemaker    12. S/P TAVR (transcatheter aortic valve replacement)    13. Cerebrovascular accident (CVA) due to embolism of right posterior cerebral artery (HCC)          Aortic stenosis  S/p TAVR in July 2023 using 26 mm Medtronic valve.    We went over the findings Echo reviewed etc.  Post TVAR has CVA  (occipital lobe affecting vision)        CVA  Acute occipital CVA causing visual problems and balance issues also has old lacunar stroke stroke  also get neurology evaluation c  Eliquis was stopped due to persistent anemia and GI workup being negative  Restart aspirin and Plavix check blood counts in 1 month we will plan watchman due to A-fib and risk of stroke very high CHADS2 Vascor      Hypertension  Blood pressures is well controlled on carvedilol 25mg twice daily and  lisinopril 10 mg daily keep a log of blood pressure,  Norvasc 10 mg daily       Afib  S/p

## 2024-03-04 ENCOUNTER — TELEPHONE (OUTPATIENT)
Age: 74
End: 2024-03-04

## 2024-03-04 DIAGNOSIS — I48.11 LONGSTANDING PERSISTENT ATRIAL FIBRILLATION (HCC): Primary | ICD-10-CM

## 2024-03-04 DIAGNOSIS — I48.11 LONGSTANDING PERSISTENT ATRIAL FIBRILLATION (HCC): Chronic | ICD-10-CM

## 2024-03-04 RX ORDER — CARVEDILOL 25 MG/1
25 TABLET ORAL 2 TIMES DAILY
Qty: 180 TABLET | Refills: 3 | Status: ON HOLD | OUTPATIENT
Start: 2024-03-04

## 2024-03-04 RX ORDER — AMIODARONE HYDROCHLORIDE 200 MG/1
200 TABLET ORAL DAILY
Qty: 30 TABLET | Refills: 0 | OUTPATIENT
Start: 2024-03-04

## 2024-03-04 RX ORDER — CARVEDILOL 25 MG/1
25 TABLET ORAL 2 TIMES DAILY
Qty: 180 TABLET | Refills: 3 | OUTPATIENT
Start: 2024-03-04

## 2024-03-04 RX ORDER — CLOPIDOGREL BISULFATE 75 MG/1
75 TABLET ORAL DAILY
Qty: 90 TABLET | Refills: 0 | OUTPATIENT
Start: 2024-03-04

## 2024-03-04 NOTE — TELEPHONE ENCOUNTER
Called the patient for Pre Watchman Education.   Patient was scheduled for 4/18/2024 but is agreeable to change procedure date to 4/4/2024.  My role as Watchman Coordinator explained.   [place] discussed.  Nice Tool utilized and filled out.   Shared decision and NICE Tool faxed to Dr Lopez per patient request.    Explained to the patient:  Part of the FDA approval of the Watchman procedure in 2015 mandated that each procedure must participate in a national registry, this requires several follow up appointments and testing following the procedure.      These expectations Include:      7-10 day follow up with the Nurse practitioner or Implanting Physician    45 day follow up lab work and RAMIRO to check positioning of the device.      6 month follow up telephone call     1 year follow up appointment and lab work (RAMIRO per Implanting physician discretion)    2  year follow up appointment and lab work .    Discussed the importance of blood thinners as prescribed and that the patient cannot come off those blood thinners until discontinued by the implanting physician.     Assessment/History of:    Nickel or metal allergy?  [] Yes     [x] No    Contrast allergy?  [] Yes     [] No    Anesthesia issues?  [] Yes     [x] No    Difficulty swallowing?  [] Yes     [x] No    Do you have any procedures/surgeries planned that would interrupt Plavix and ASA for next 6 months?  [] Yes     [x] No    Modified Swain Scale:    [x] 0: No symptoms at all  [] 1: No significant disability despite symptoms  [] 2: Slight disability   [] 3: Moderate disability  [] 4: Moderately severe disability  [] 5: Severe disability    [] Modified Swain Not Administered      All questions and concerns answered. My phone number provided if questions arise.  Will follow    Slime Valencia RN  Watchman Coordinator  Electronically signed by Slime Valencia RN on 3/4/2024 at 11:44 AM Watchman Care Coordinator

## 2024-03-04 NOTE — PROGRESS NOTES
above-named physician, his/her assistants or his/her designees, perform procedures as necessary and desirable if deemed to be in my (our) best interest.     3. I acknowledge that other health care personnel may be observing this procedure for the purpose of medical education or other specified purposes as may be necessary as requested and/or approved by my (our) physician.     4. I (we) consent to the disposal by the hospital Pathologist of the removed tissue, parts or organs in accordance with hospital policy.     5. I do_____ do not______ consent to the use of a local infiltration pain blocking agent that will be used by my provider/surgical provider to help alleviate pain during my procedure.     6. I do_____ do not_____ consent to an emergent blood transfusion in the case of a life-threatening situation that requires blood components to be administered. This consent is valid for 24 hours from the beginning of the procedure.     7. This patient does _____ or does not ______currently have a DNR status/order. If DNR order is in place, obtain \"Addendum to the Surgical Consent for ALL Patients with a DNR Order\" to address cristina-operative status for limited intervention or DNR suspension.     8. I have read and fully understand the above Consent for Operation/Procedure and that all blanks were completed before I signed the consent.     _____________________________________________ _______/______am/pm   Signature of Patient or legal representative Printed Name / Relationship Date / Time     _____________________________________________  _______/______ am/pm   Witness to Signature Printed Name Date / Time       Informed consent:   I have provided the explanation described above in section 1 to the patient and/or legal representative. I have provided the patient and/or legal representative with an opportunity to ask any questions about the proposed operation/procedure.     ________________________________Dr. Gabo Fowler

## 2024-03-05 ENCOUNTER — TELEPHONE (OUTPATIENT)
Dept: CARDIOLOGY CLINIC | Age: 74
End: 2024-03-05

## 2024-03-05 ENCOUNTER — CARE COORDINATION (OUTPATIENT)
Dept: CARE COORDINATION | Age: 74
End: 2024-03-05

## 2024-03-05 RX ORDER — CLOPIDOGREL BISULFATE 75 MG/1
75 TABLET ORAL DAILY
Qty: 90 TABLET | Refills: 3 | Status: ON HOLD | OUTPATIENT
Start: 2024-03-05

## 2024-03-05 RX ORDER — CLOPIDOGREL BISULFATE 75 MG/1
75 TABLET ORAL DAILY
Qty: 90 TABLET | Refills: 0 | Status: CANCELLED | OUTPATIENT
Start: 2024-03-05

## 2024-03-05 ASSESSMENT — SOCIAL DETERMINANTS OF HEALTH (SDOH)
HOW OFTEN DO YOU ATTEND CHURCH OR RELIGIOUS SERVICES?: NEVER
IN A TYPICAL WEEK, HOW MANY TIMES DO YOU TALK ON THE PHONE WITH FAMILY, FRIENDS, OR NEIGHBORS?: THREE TIMES A WEEK
HOW OFTEN DO YOU ATTENT MEETINGS OF THE CLUB OR ORGANIZATION YOU BELONG TO?: NEVER
DO YOU BELONG TO ANY CLUBS OR ORGANIZATIONS SUCH AS CHURCH GROUPS UNIONS, FRATERNAL OR ATHLETIC GROUPS, OR SCHOOL GROUPS?: NO
HOW OFTEN DO YOU GET TOGETHER WITH FRIENDS OR RELATIVES?: THREE TIMES A WEEK

## 2024-03-05 NOTE — TELEPHONE ENCOUNTER
Refill/90 day- Patient has lost this medication. Patient said she will be glad to pay for the prescription.

## 2024-03-05 NOTE — TELEPHONE ENCOUNTER
Patient called concerning her lost Coreg prescription  Pharmacy telling her they can do a over ride   Patient asked for a call back .

## 2024-03-05 NOTE — CARE COORDINATION
Ambulatory Care Coordination Note  3/5/2024    Patient Current Location:  Home: [address] [place] [id number]     ACM contacted the patient by telephone. Verified name and  with patient as identifiers. Provided introduction to self, and explanation of the ACM role.     Challenges to be reviewed by the provider   Additional needs identified to be addressed with provider: No  none               Method of communication with provider: chart routing.    ACM: Lizbeth Willingham RN     ACM reached out to patient who said she is doing great. Patient said she needed to make a follow up with PCP. Patient said she has no other concerns at this time.     Plan:    F/U call 3 weeks         Offered patient enrollment in the Remote Patient Monitoring (RPM) program for in-home monitoring: Yes, patient already enrolled.    Lab Results       None            Care Coordination Interventions    Referral from Primary Care Provider: No  Suggested Interventions and Community Resources  Zone Management Tools: In Process (Comment: CHF, COPD)          Goals Addressed    None         Future Appointments   Date Time Provider Department Center   3/14/2024 10:00 AM [facility] CT ROOM 2 [facility] CT [facility] Rad/Car   3/14/2024 10:30 AM [facility] PFT [facility] PFT [place]   3/20/2024  8:30 PM [facility] IN LAB SLEEP STUDY [facility] SLE [place]   3/29/2024  9:00 AM Len Crum MD [facility] PULM [facility]   3/29/2024 10:00 AM SCHEDULE, [facility] CARDIO SPR [facility] Heart MMA   2024  8:45 AM Luis Eduardo Hernández, APRN - CNP [facility] NEURO Neurology -   2024 10:45 AM Ta Agarwal, DO AFL ADL NEPH [facility] Kidney &   2024  9:45 AM SCHEDULE, [facility] MED ONC LAB [facility] MED ONC [place]   5/15/2024  9:45 AM Giovanny Lopez MD [facility] CC MMA   5/15/2024 10:00 AM [facility], MED ONC NURSE [facility] MED ONC [place]   2024 10:15 AM Gabo Douglas MD [facility] MMA    and   COPD Assessment    Does the patient understand envrionmental exposure?: Yes  Is the patient able to

## 2024-03-05 NOTE — CARE COORDINATION
Left message with pt to inquire if she was able to receive the charging cord for the RPM equipment. No metrics x13 days, the cord delivery was delayed but it should have been delivered by now, will notify ACM

## 2024-03-06 ENCOUNTER — CARE COORDINATION (OUTPATIENT)
Dept: CASE MANAGEMENT | Age: 74
End: 2024-03-06

## 2024-03-06 NOTE — CARE COORDINATION
Remote Patient Monitoring Note      Date/Time:  3/6/2024 2:44 PM  Patient Current Location: [place]  LPN noted RPM regarding yellow alert received for missing metrics.   Background: RPM for KD, CHF     Clinical Interventions: LPN noted RPM regarding yellow alert for missing metrics. HRS it shipped replacement  on, 03/01/2024.     Plan/Follow Up: Will continue to review, monitor and address alerts with follow up based on severity of symptoms and risk factors.       Maranda Parks LPN, [facility]  PH: [phone]

## 2024-03-07 ENCOUNTER — APPOINTMENT (OUTPATIENT)
Dept: GENERAL RADIOLOGY | Age: 74
End: 2024-03-07
Payer: MEDICARE

## 2024-03-07 ENCOUNTER — HOSPITAL ENCOUNTER (INPATIENT)
Age: 74
LOS: 8 days | Discharge: HOME | End: 2024-03-15
Attending: EMERGENCY MEDICINE
Payer: MEDICARE

## 2024-03-07 ENCOUNTER — APPOINTMENT (OUTPATIENT)
Dept: NON INVASIVE DIAGNOSTICS | Age: 74
End: 2024-03-07
Attending: INTERNAL MEDICINE
Payer: MEDICARE

## 2024-03-07 DIAGNOSIS — I48.11 LONGSTANDING PERSISTENT ATRIAL FIBRILLATION (HCC): Chronic | ICD-10-CM

## 2024-03-07 DIAGNOSIS — I35.0 NONRHEUMATIC AORTIC (VALVE) STENOSIS: ICD-10-CM

## 2024-03-07 DIAGNOSIS — N17.9 ACUTE KIDNEY INJURY SUPERIMPOSED ON CHRONIC KIDNEY DISEASE (HCC): ICD-10-CM

## 2024-03-07 DIAGNOSIS — I38 VHD (VALVULAR HEART DISEASE): ICD-10-CM

## 2024-03-07 DIAGNOSIS — J96.01 ACUTE RESPIRATORY FAILURE WITH HYPOXIA (HCC): ICD-10-CM

## 2024-03-07 DIAGNOSIS — J96.21 ACUTE ON CHRONIC RESPIRATORY FAILURE WITH HYPOXIA (HCC): ICD-10-CM

## 2024-03-07 DIAGNOSIS — D64.9 ANEMIA, UNSPECIFIED TYPE: ICD-10-CM

## 2024-03-07 DIAGNOSIS — N18.9 ACUTE KIDNEY INJURY SUPERIMPOSED ON CHRONIC KIDNEY DISEASE (HCC): ICD-10-CM

## 2024-03-07 DIAGNOSIS — I50.9 ACUTE ON CHRONIC CONGESTIVE HEART FAILURE, UNSPECIFIED HEART FAILURE TYPE (HCC): Primary | ICD-10-CM

## 2024-03-07 DIAGNOSIS — N18.4 CKD (CHRONIC KIDNEY DISEASE), STAGE IV (HCC): ICD-10-CM

## 2024-03-07 DIAGNOSIS — Z95.2 S/P TAVR (TRANSCATHETER AORTIC VALVE REPLACEMENT): ICD-10-CM

## 2024-03-07 LAB
ALBUMIN SERPL-MCNC: 3.8 GM/DL (ref 3.4–5)
ALP BLD-CCNC: 93 IU/L (ref 40–129)
ALT SERPL-CCNC: 54 U/L (ref 10–40)
ANION GAP SERPL CALCULATED.3IONS-SCNC: 12 MMOL/L (ref 7–16)
APTT: 30.3 SECONDS (ref 25.1–37.1)
AST SERPL-CCNC: 47 IU/L (ref 15–37)
BASE EXCESS MIXED: 0.5 (ref 0–3)
BASE EXCESS: 2 (ref 0–3)
BASE EXCESS: 5 (ref 0–3)
BASOPHILS ABSOLUTE: 0.1 K/CU MM
BASOPHILS RELATIVE PERCENT: 0.4 % (ref 0–1)
BILIRUB SERPL-MCNC: 1.4 MG/DL (ref 0–1)
BILIRUBIN URINE: NEGATIVE MG/DL
BLOOD, URINE: NEGATIVE
BUN SERPL-MCNC: 46 MG/DL (ref 6–23)
CALCIUM SERPL-MCNC: 9.3 MG/DL (ref 8.3–10.6)
CHLORIDE BLD-SCNC: 100 MMOL/L (ref 99–110)
CLARITY: CLEAR
CO2: 22 MMOL/L (ref 21–32)
COLOR: YELLOW
COMMENT UA: ABNORMAL
COMMENT: ABNORMAL
CREAT SERPL-MCNC: 3.1 MG/DL (ref 0.6–1.1)
DIFFERENTIAL TYPE: ABNORMAL
ECHO AV AREA PEAK VELOCITY: 1.3 CM2
ECHO AV AREA VTI: 1.5 CM2
ECHO AV AREA/BSA PEAK VELOCITY: 0.8 CM2/M2
ECHO AV AREA/BSA VTI: 0.9 CM2/M2
ECHO AV MEAN GRADIENT: 3 MMHG
ECHO AV MEAN VELOCITY: 0.8 M/S
ECHO AV PEAK GRADIENT: 5 MMHG
ECHO AV PEAK VELOCITY: 1.1 M/S
ECHO AV VELOCITY RATIO: 0.55
ECHO AV VTI: 16.1 CM
ECHO BSA: 1.75 M2
ECHO EST RA PRESSURE: 3 MMHG
ECHO IVC PROX: 1.8 CM
ECHO LA AREA 4C: 23.5 CM2
ECHO LA DIAMETER INDEX: 2.73 CM/M2
ECHO LA DIAMETER: 4.7 CM
ECHO LA MAJOR AXIS: 5.8 CM
ECHO LA VOL MOD A4C: 80 ML (ref 22–52)
ECHO LA VOLUME INDEX MOD A4C: 47 ML/M2 (ref 16–34)
ECHO LV EDV A4C: 77 ML
ECHO LV EDV INDEX A4C: 45 ML/M2
ECHO LV EJECTION FRACTION A4C: 34 %
ECHO LV ESV A4C: 51 ML
ECHO LV ESV INDEX A4C: 30 ML/M2
ECHO LV FRACTIONAL SHORTENING: 20 % (ref 28–44)
ECHO LV INTERNAL DIMENSION DIASTOLE INDEX: 2.38 CM/M2
ECHO LV INTERNAL DIMENSION DIASTOLIC: 4.1 CM (ref 3.9–5.3)
ECHO LV INTERNAL DIMENSION SYSTOLIC INDEX: 1.92 CM/M2
ECHO LV INTERNAL DIMENSION SYSTOLIC: 3.3 CM
ECHO LV IVSD: 1.4 CM (ref 0.6–0.9)
ECHO LV MASS 2D: 204.9 G (ref 67–162)
ECHO LV MASS INDEX 2D: 119.1 G/M2 (ref 43–95)
ECHO LV POSTERIOR WALL DIASTOLIC: 1.3 CM (ref 0.6–0.9)
ECHO LV RELATIVE WALL THICKNESS RATIO: 0.63
ECHO LVOT AREA: 2.5 CM2
ECHO LVOT AV VTI INDEX: 0.61
ECHO LVOT DIAM: 1.8 CM
ECHO LVOT MEAN GRADIENT: 1 MMHG
ECHO LVOT PEAK GRADIENT: 2 MMHG
ECHO LVOT PEAK VELOCITY: 0.6 M/S
ECHO LVOT STROKE VOLUME INDEX: 14.5 ML/M2
ECHO LVOT SV: 24.9 ML
ECHO LVOT VTI: 9.8 CM
ECHO MV AREA VTI: 0.7 CM2
ECHO MV LVOT VTI INDEX: 3.8
ECHO MV MAX VELOCITY: 2.2 M/S
ECHO MV MEAN GRADIENT: 8 MMHG
ECHO MV MEAN VELOCITY: 1.3 M/S
ECHO MV PEAK GRADIENT: 18 MMHG
ECHO MV REGURGITANT PEAK GRADIENT: 125 MMHG
ECHO MV REGURGITANT PEAK VELOCITY: 5.6 M/S
ECHO MV REGURGITANT VTIA: 169 CM
ECHO MV VTI: 37.2 CM
ECHO RIGHT VENTRICULAR SYSTOLIC PRESSURE (RVSP): 44 MMHG
ECHO TV REGURGITANT MAX VELOCITY: 3.19 M/S
ECHO TV REGURGITANT PEAK GRADIENT: 41 MMHG
EKG ATRIAL RATE: 75 BPM
EKG DIAGNOSIS: NORMAL
EKG P-R INTERVAL: 282 MS
EKG Q-T INTERVAL: 476 MS
EKG QRS DURATION: 168 MS
EKG QTC CALCULATION (BAZETT): 491 MS
EKG R AXIS: 85 DEGREES
EKG T AXIS: 62 DEGREES
EKG VENTRICULAR RATE: 64 BPM
EOSINOPHILS ABSOLUTE: 0.1 K/CU MM
EOSINOPHILS RELATIVE PERCENT: 0.6 % (ref 0–3)
GFR SERPL CREATININE-BSD FRML MDRD: 15 ML/MIN/1.73M2
GLUCOSE SERPL-MCNC: 207 MG/DL (ref 70–99)
GLUCOSE, URINE: 250 MG/DL
HCO3 VENOUS: 22 MMOL/L (ref 22–29)
HCO3 VENOUS: 25 MMOL/L (ref 22–29)
HCO3 VENOUS: 25.4 MMOL/L (ref 22–29)
HCT VFR BLD CALC: 31.6 % (ref 37–47)
HEMOGLOBIN: 9.1 GM/DL (ref 12.5–16)
IMMATURE NEUTROPHIL %: 0.5 % (ref 0–0.43)
INFLUENZA A ANTIGEN: NOT DETECTED
INFLUENZA B ANTIGEN: NOT DETECTED
INR BLD: 1.2 INDEX
KETONES, URINE: NEGATIVE MG/DL
LACTIC ACID, SEPSIS: 1.4 MMOL/L (ref 0.4–2)
LACTIC ACID, SEPSIS: 2.1 MMOL/L (ref 0.4–2)
LEUKOCYTE ESTERASE, URINE: NEGATIVE
LYMPHOCYTES ABSOLUTE: 1.4 K/CU MM
LYMPHOCYTES RELATIVE PERCENT: 10.2 % (ref 24–44)
MCH RBC QN AUTO: 27.5 PG (ref 27–31)
MCHC RBC AUTO-ENTMCNC: 28.8 % (ref 32–36)
MCV RBC AUTO: 95.5 FL (ref 78–100)
MONOCYTES ABSOLUTE: 0.8 K/CU MM
MONOCYTES RELATIVE PERCENT: 5.6 % (ref 0–4)
NITRITE URINE, QUANTITATIVE: NEGATIVE
NUCLEATED RBC %: 0 %
O2 SAT, VEN: 83.5 % (ref 50–70)
O2 SAT, VEN: 93.7 % (ref 50–70)
O2 SAT, VEN: 94.4 % (ref 50–70)
PCO2, VEN: 41 MMHG (ref 41–51)
PCO2, VEN: 48 MMHG (ref 41–51)
PCO2, VEN: 52 MMHG (ref 41–51)
PDW BLD-RTO: 15.9 % (ref 11.7–14.9)
PH VENOUS: 7.27 (ref 7.32–7.43)
PH VENOUS: 7.29 (ref 7.32–7.43)
PH VENOUS: 7.4 (ref 7.32–7.43)
PH, URINE: 5.5 (ref 5–8)
PLATELET # BLD: 224 K/CU MM (ref 140–440)
PMV BLD AUTO: 11.4 FL (ref 7.5–11.1)
PO2, VEN: 112 MMHG (ref 28–48)
PO2, VEN: 57 MMHG (ref 28–48)
PO2, VEN: 85 MMHG (ref 28–48)
POTASSIUM SERPL-SCNC: 4.9 MMOL/L (ref 3.5–5.1)
PRO-BNP: ABNORMAL PG/ML
PROCALCITONIN SERPL-MCNC: 1.5 NG/ML
PROTEIN UA: NEGATIVE MG/DL
PROTHROMBIN TIME: 15.3 SECONDS (ref 11.7–14.5)
RBC # BLD: 3.31 M/CU MM (ref 4.2–5.4)
SARS-COV-2 RDRP RESP QL NAA+PROBE: NOT DETECTED
SEGMENTED NEUTROPHILS ABSOLUTE COUNT: 11.2 K/CU MM
SEGMENTED NEUTROPHILS RELATIVE PERCENT: 82.7 % (ref 36–66)
SODIUM BLD-SCNC: 134 MMOL/L (ref 135–145)
SOURCE: NORMAL
SPECIFIC GRAVITY UA: 1.01 (ref 1–1.03)
TOTAL IMMATURE NEUTOROPHIL: 0.07 K/CU MM
TOTAL NUCLEATED RBC: 0 K/CU MM
TOTAL PROTEIN: 7.6 GM/DL (ref 6.4–8.2)
TROPONIN, HIGH SENSITIVITY: 44 NG/L (ref 0–14)
TROPONIN, HIGH SENSITIVITY: 50 NG/L (ref 0–14)
TROPONIN, HIGH SENSITIVITY: 67 NG/L (ref 0–14)
UROBILINOGEN, URINE: 0.2 MG/DL (ref 0.2–1)
WBC # BLD: 13.5 K/CU MM (ref 4–10.5)

## 2024-03-07 PROCEDURE — 2580000003 HC RX 258: Performed by: EMERGENCY MEDICINE

## 2024-03-07 PROCEDURE — 82805 BLOOD GASES W/O2 SATURATION: CPT

## 2024-03-07 PROCEDURE — 93005 ELECTROCARDIOGRAM TRACING: CPT | Performed by: EMERGENCY MEDICINE

## 2024-03-07 PROCEDURE — 85025 COMPLETE CBC W/AUTO DIFF WBC: CPT

## 2024-03-07 PROCEDURE — 6360000002 HC RX W HCPCS: Performed by: EMERGENCY MEDICINE

## 2024-03-07 PROCEDURE — 83880 ASSAY OF NATRIURETIC PEPTIDE: CPT

## 2024-03-07 PROCEDURE — 85610 PROTHROMBIN TIME: CPT

## 2024-03-07 PROCEDURE — 84484 ASSAY OF TROPONIN QUANT: CPT

## 2024-03-07 PROCEDURE — 99222 1ST HOSP IP/OBS MODERATE 55: CPT | Performed by: INTERNAL MEDICINE

## 2024-03-07 PROCEDURE — 71045 X-RAY EXAM CHEST 1 VIEW: CPT

## 2024-03-07 PROCEDURE — 87040 BLOOD CULTURE FOR BACTERIA: CPT

## 2024-03-07 PROCEDURE — 6360000002 HC RX W HCPCS: Performed by: FAMILY MEDICINE

## 2024-03-07 PROCEDURE — 99291 CRITICAL CARE FIRST HOUR: CPT

## 2024-03-07 PROCEDURE — 83605 ASSAY OF LACTIC ACID: CPT

## 2024-03-07 PROCEDURE — 87899 AGENT NOS ASSAY W/OPTIC: CPT

## 2024-03-07 PROCEDURE — 93010 ELECTROCARDIOGRAM REPORT: CPT | Performed by: INTERNAL MEDICINE

## 2024-03-07 PROCEDURE — 6370000000 HC RX 637 (ALT 250 FOR IP): Performed by: FAMILY MEDICINE

## 2024-03-07 PROCEDURE — 87449 NOS EACH ORGANISM AG IA: CPT

## 2024-03-07 PROCEDURE — 80053 COMPREHEN METABOLIC PANEL: CPT

## 2024-03-07 PROCEDURE — 94660 CPAP INITIATION&MGMT: CPT

## 2024-03-07 PROCEDURE — 85730 THROMBOPLASTIN TIME PARTIAL: CPT

## 2024-03-07 PROCEDURE — 93306 TTE W/DOPPLER COMPLETE: CPT

## 2024-03-07 PROCEDURE — 93306 TTE W/DOPPLER COMPLETE: CPT | Performed by: INTERNAL MEDICINE

## 2024-03-07 PROCEDURE — 2060000000 HC ICU INTERMEDIATE R&B

## 2024-03-07 PROCEDURE — 87635 SARS-COV-2 COVID-19 AMP PRB: CPT

## 2024-03-07 PROCEDURE — 81003 URINALYSIS AUTO W/O SCOPE: CPT

## 2024-03-07 PROCEDURE — 5A09557 ASSISTANCE WITH RESPIRATORY VENTILATION, GREATER THAN 96 CONSECUTIVE HOURS, CONTINUOUS POSITIVE AIRWAY PRESSURE: ICD-10-PCS | Performed by: FAMILY MEDICINE

## 2024-03-07 PROCEDURE — 87502 INFLUENZA DNA AMP PROBE: CPT

## 2024-03-07 PROCEDURE — 2580000003 HC RX 258: Performed by: FAMILY MEDICINE

## 2024-03-07 PROCEDURE — 84145 PROCALCITONIN (PCT): CPT

## 2024-03-07 RX ORDER — FUROSEMIDE 10 MG/ML
40 INJECTION INTRAMUSCULAR; INTRAVENOUS ONCE
Status: COMPLETED | OUTPATIENT
Start: 2024-03-07 | End: 2024-03-07

## 2024-03-07 RX ORDER — LEVOTHYROXINE SODIUM 88 UG/1
88 TABLET ORAL DAILY
Status: DISCONTINUED | OUTPATIENT
Start: 2024-03-07 | End: 2024-03-08

## 2024-03-07 RX ORDER — SODIUM CHLORIDE 9 MG/ML
INJECTION, SOLUTION INTRAVENOUS PRN
Status: DISCONTINUED | OUTPATIENT
Start: 2024-03-07 | End: 2024-03-07

## 2024-03-07 RX ORDER — SODIUM CHLORIDE 0.9 % (FLUSH) 0.9 %
5-40 SYRINGE (ML) INJECTION PRN
Status: DISCONTINUED | OUTPATIENT
Start: 2024-03-07 | End: 2024-03-07

## 2024-03-07 RX ORDER — POLYETHYLENE GLYCOL 3350 17 G/17G
17 POWDER, FOR SOLUTION ORAL DAILY PRN
Status: DISCONTINUED | OUTPATIENT
Start: 2024-03-07 | End: 2024-03-15 | Stop reason: HOSPADM

## 2024-03-07 RX ORDER — ASPIRIN 81 MG/1
81 TABLET, CHEWABLE ORAL DAILY
Status: DISCONTINUED | OUTPATIENT
Start: 2024-03-07 | End: 2024-03-11

## 2024-03-07 RX ORDER — ONDANSETRON 2 MG/ML
4 INJECTION INTRAMUSCULAR; INTRAVENOUS EVERY 6 HOURS PRN
Status: DISCONTINUED | OUTPATIENT
Start: 2024-03-07 | End: 2024-03-15 | Stop reason: HOSPADM

## 2024-03-07 RX ORDER — POTASSIUM CHLORIDE 7.45 MG/ML
10 INJECTION INTRAVENOUS PRN
Status: DISCONTINUED | OUTPATIENT
Start: 2024-03-07 | End: 2024-03-15 | Stop reason: HOSPADM

## 2024-03-07 RX ORDER — ACETAMINOPHEN 325 MG/1
650 TABLET ORAL EVERY 6 HOURS PRN
Status: DISCONTINUED | OUTPATIENT
Start: 2024-03-07 | End: 2024-03-15 | Stop reason: HOSPADM

## 2024-03-07 RX ORDER — CLOPIDOGREL BISULFATE 75 MG/1
75 TABLET ORAL DAILY
Status: CANCELLED | OUTPATIENT
Start: 2024-03-07

## 2024-03-07 RX ORDER — ALBUTEROL SULFATE 90 UG/1
2 AEROSOL, METERED RESPIRATORY (INHALATION) EVERY 6 HOURS PRN
Status: DISCONTINUED | OUTPATIENT
Start: 2024-03-07 | End: 2024-03-15 | Stop reason: HOSPADM

## 2024-03-07 RX ORDER — CARVEDILOL 25 MG/1
25 TABLET ORAL 2 TIMES DAILY
Status: DISCONTINUED | OUTPATIENT
Start: 2024-03-07 | End: 2024-03-08

## 2024-03-07 RX ORDER — PANTOPRAZOLE SODIUM 40 MG/1
40 TABLET, DELAYED RELEASE ORAL DAILY
Status: DISCONTINUED | OUTPATIENT
Start: 2024-03-07 | End: 2024-03-15 | Stop reason: HOSPADM

## 2024-03-07 RX ORDER — SODIUM CHLORIDE 9 MG/ML
INJECTION, SOLUTION INTRAVENOUS PRN
Status: DISCONTINUED | OUTPATIENT
Start: 2024-03-07 | End: 2024-03-15 | Stop reason: HOSPADM

## 2024-03-07 RX ORDER — ISOSORBIDE MONONITRATE 30 MG/1
30 TABLET, EXTENDED RELEASE ORAL DAILY
Status: DISCONTINUED | OUTPATIENT
Start: 2024-03-07 | End: 2024-03-15 | Stop reason: HOSPADM

## 2024-03-07 RX ORDER — ATORVASTATIN CALCIUM 40 MG/1
40 TABLET, FILM COATED ORAL DAILY
Status: DISCONTINUED | OUTPATIENT
Start: 2024-03-07 | End: 2024-03-15 | Stop reason: HOSPADM

## 2024-03-07 RX ORDER — FUROSEMIDE 10 MG/ML
40 INJECTION INTRAMUSCULAR; INTRAVENOUS 2 TIMES DAILY
Status: DISCONTINUED | OUTPATIENT
Start: 2024-03-07 | End: 2024-03-09

## 2024-03-07 RX ORDER — ONDANSETRON 4 MG/1
4 TABLET, ORALLY DISINTEGRATING ORAL EVERY 8 HOURS PRN
Status: DISCONTINUED | OUTPATIENT
Start: 2024-03-07 | End: 2024-03-15 | Stop reason: HOSPADM

## 2024-03-07 RX ORDER — MAGNESIUM SULFATE IN WATER 40 MG/ML
2000 INJECTION, SOLUTION INTRAVENOUS PRN
Status: DISCONTINUED | OUTPATIENT
Start: 2024-03-07 | End: 2024-03-15 | Stop reason: HOSPADM

## 2024-03-07 RX ORDER — ENOXAPARIN SODIUM 100 MG/ML
30 INJECTION SUBCUTANEOUS DAILY
Status: CANCELLED | OUTPATIENT
Start: 2024-03-07

## 2024-03-07 RX ORDER — SODIUM CHLORIDE 0.9 % (FLUSH) 0.9 %
5-40 SYRINGE (ML) INJECTION PRN
Status: DISCONTINUED | OUTPATIENT
Start: 2024-03-07 | End: 2024-03-15 | Stop reason: HOSPADM

## 2024-03-07 RX ORDER — ACETAMINOPHEN 650 MG/1
650 SUPPOSITORY RECTAL EVERY 6 HOURS PRN
Status: DISCONTINUED | OUTPATIENT
Start: 2024-03-07 | End: 2024-03-15 | Stop reason: HOSPADM

## 2024-03-07 RX ORDER — AMIODARONE HYDROCHLORIDE 200 MG/1
200 TABLET ORAL DAILY
Status: DISCONTINUED | OUTPATIENT
Start: 2024-03-07 | End: 2024-03-15 | Stop reason: HOSPADM

## 2024-03-07 RX ORDER — HYDRALAZINE HYDROCHLORIDE 25 MG/1
25 TABLET, FILM COATED ORAL EVERY 8 HOURS SCHEDULED
Status: DISCONTINUED | OUTPATIENT
Start: 2024-03-07 | End: 2024-03-15 | Stop reason: HOSPADM

## 2024-03-07 RX ORDER — POTASSIUM CHLORIDE 20 MEQ/1
40 TABLET, EXTENDED RELEASE ORAL PRN
Status: DISCONTINUED | OUTPATIENT
Start: 2024-03-07 | End: 2024-03-15 | Stop reason: HOSPADM

## 2024-03-07 RX ORDER — SODIUM CHLORIDE 0.9 % (FLUSH) 0.9 %
5-40 SYRINGE (ML) INJECTION EVERY 12 HOURS SCHEDULED
Status: DISCONTINUED | OUTPATIENT
Start: 2024-03-07 | End: 2024-03-07

## 2024-03-07 RX ORDER — LISINOPRIL 5 MG/1
10 TABLET ORAL DAILY
Status: CANCELLED | OUTPATIENT
Start: 2024-03-07

## 2024-03-07 RX ORDER — SODIUM CHLORIDE 0.9 % (FLUSH) 0.9 %
5-40 SYRINGE (ML) INJECTION EVERY 12 HOURS SCHEDULED
Status: DISCONTINUED | OUTPATIENT
Start: 2024-03-07 | End: 2024-03-15 | Stop reason: HOSPADM

## 2024-03-07 RX ORDER — BUDESONIDE AND FORMOTEROL FUMARATE DIHYDRATE 80; 4.5 UG/1; UG/1
2 AEROSOL RESPIRATORY (INHALATION) 2 TIMES DAILY
Status: DISCONTINUED | OUTPATIENT
Start: 2024-03-07 | End: 2024-03-15 | Stop reason: HOSPADM

## 2024-03-07 RX ADMIN — SODIUM CHLORIDE, PRESERVATIVE FREE 10 ML: 5 INJECTION INTRAVENOUS at 11:14

## 2024-03-07 RX ADMIN — FUROSEMIDE 40 MG: 10 INJECTION, SOLUTION INTRAMUSCULAR; INTRAVENOUS at 11:11

## 2024-03-07 RX ADMIN — AZITHROMYCIN MONOHYDRATE 500 MG: 500 INJECTION, POWDER, LYOPHILIZED, FOR SOLUTION INTRAVENOUS at 15:34

## 2024-03-07 RX ADMIN — PANTOPRAZOLE SODIUM 40 MG: 40 TABLET, DELAYED RELEASE ORAL at 15:27

## 2024-03-07 RX ADMIN — FUROSEMIDE 40 MG: 10 INJECTION, SOLUTION INTRAMUSCULAR; INTRAVENOUS at 18:40

## 2024-03-07 RX ADMIN — ATORVASTATIN CALCIUM 40 MG: 40 TABLET, FILM COATED ORAL at 15:27

## 2024-03-07 RX ADMIN — HYDRALAZINE HYDROCHLORIDE 25 MG: 25 TABLET ORAL at 15:27

## 2024-03-07 RX ADMIN — ASPIRIN 81 MG: 81 TABLET, CHEWABLE ORAL at 15:27

## 2024-03-07 RX ADMIN — CARVEDILOL 25 MG: 25 TABLET, FILM COATED ORAL at 21:28

## 2024-03-07 RX ADMIN — AMIODARONE HYDROCHLORIDE 200 MG: 200 TABLET ORAL at 15:27

## 2024-03-07 RX ADMIN — HYDRALAZINE HYDROCHLORIDE 25 MG: 25 TABLET ORAL at 21:28

## 2024-03-07 RX ADMIN — CARVEDILOL 25 MG: 25 TABLET, FILM COATED ORAL at 15:27

## 2024-03-07 RX ADMIN — CEFTRIAXONE 1000 MG: 1 INJECTION, POWDER, FOR SOLUTION INTRAMUSCULAR; INTRAVENOUS at 15:32

## 2024-03-07 RX ADMIN — SODIUM CHLORIDE, PRESERVATIVE FREE 10 ML: 5 INJECTION INTRAVENOUS at 21:28

## 2024-03-07 ASSESSMENT — PAIN - FUNCTIONAL ASSESSMENT: PAIN_FUNCTIONAL_ASSESSMENT: NONE - DENIES PAIN

## 2024-03-07 NOTE — CONSULTS
Chart reviewed full note to follow  History of mitral valve bioprosthetic replacement, status post TAVR, history of bleeding issues, history of atrial fibrillation now with worsening dyspnea

## 2024-03-07 NOTE — ED PROVIDER NOTES
Emergency Department Encounter    Patient: Lynette Gonsavles  MRN: [id number]  : 1950  Date of Evaluation: 3/7/2024  ED Provider:  Preeti Blake MD    Triage Chief Complaint:   Respiratory Distress    [place]:  Lynette Gonsalves is a 74 y.o. female that presents with concern for respiratory distress.  EMS was called for shortness of breath.  She is speaking a couple of words at a time.  When I ask her when this started it sounds like just in the last couple of days.  She had not had cough or fevers.  She denies vomiting.  She denies chest pain.  She acknowledges a history of COPD as well as CHF.  She is on oxygen at home.    EMS related that when they arrived she was 80% on 5 L which is her baseline oxygen at home.  They gave her a DuoNeb and put her on nonrebreather and she is in the 90s.    ROS - see HPI, below listed is current ROS at time of my eval:  Limited secondary to acuity of condition    Past Medical History:   Diagnosis Date    CAD (coronary artery disease)     COPD (chronic obstructive pulmonary disease) ([facility])     GERD (gastroesophageal reflux disease)     H/O cardiovascular stress test 2021    normal study 63% EF no ischemia    H/O echocardiogram 2017    EF 35-40%    H/O echocardiogram 2020    EF 45-50, Mild TR & NY, Severely dilated left atrium.  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, functionally normally     H/O percutaneous left heart catheterization 2023    RCA is occluded but SVG to RCA is patent,    H/O transesophageal echocardiography (RAMIRO) for monitoring 2023    Moderately dilated left atrium,  Heavily calcified aortic valve with severe aortic stenosis,  Mild mitral regurgitation.    Heart attack ([facility]) 2014    History of transesophageal echocardiography (RAMIRO) 2023    EF 20-25%, S/P MVR with Mod Regurg.    Hyperlipidemia     Hypertension     On home oxygen therapy     nightly    PAD (peripheral artery disease) ([facility])     S/P right coronary  ttl pk-yrs)     Types: Cigarettes     Start date:      Quit date: 9/15/2022     Years since quittin.4    Smokeless tobacco: Never   Vaping Use    Vaping Use: Never used   Substance and Sexual Activity    Alcohol use: No     Comment: 2 cups of coffee in the a.m.    Drug use: No    Sexual activity: Yes     Partners: Male   Other Topics Concern    Not on file   Social History Narrative    ** Merged History Encounter **          Social Determinants of Health     Financial Resource Strain: Low Risk  (2023)    Overall Financial Resource Strain (CARDIA)     Difficulty of Paying Living Expenses: Not hard at all   Food Insecurity: No Food Insecurity (2023)    Hunger Vital Sign     Worried About Running Out of Food in the Last Year: Never true     Ran Out of Food in the Last Year: Never true   Transportation Needs: No Transportation Needs (2023)    PRAPARE - Transportation     Lack of Transportation (Medical): No     Lack of Transportation (Non-Medical): No   Physical Activity: Inactive (3/24/2023)    Exercise Vital Sign     Days of Exercise per Week: 0 days     Minutes of Exercise per Session: 0 min   Stress: Stress Concern Present (3/5/2024)    [employer] [place] [employer] - Occupational Stress Questionnaire     Feeling of Stress : To some extent   Social Connections: Moderately Isolated (3/5/2024)    Social Connection and Isolation Panel [NHANES]     Frequency of Communication with Friends and Family: Three times a week     Frequency of Social Gatherings with Friends and Family: Three times a week     Attends Faith Services: Never     Active Member of Clubs or Organizations: No     Attends Club or Organization Meetings: Never     Marital Status:    Intimate Partner Violence: Not on file   Housing Stability: Low Risk  (2023)    Housing Stability Vital Sign     Unable to Pay for Housing in the Last Year: No     Number of Places Lived in the Last Year: 1     Unstable

## 2024-03-07 NOTE — CONSULTS
Name:  Lynette Gonsalves /Age/Sex: 1950  (74 y.o. female)   MRN & CSN:  [id number] & [id number] Admission Date/Time: 3/7/2024  7:06 AM   Location:  [facility]- PCP: Francisco Barriga MD       Hospital Day: 1          Referring physician:  Andrade Rhodes MD         Reason for consultation: Short of breath        Thanks for referral.    Information source: Patient    CC; short of breath      HPI:   Thank you for involving me in taking  care of Lynette Gonsalves who  is a 74 y.o.year  Old female  Presents with history of for TAVR done in , history of mitral valve replacement with a bioprosthetic valve now with mitral insufficiency, history of occipital CVA, history of permanent pacemaker implantation, anemia being worked up by hematology, history of atrial fibrillation, patient Eliquis is stopped she is on aspirin but not on Plavix per primary cardiologist and her anemia has been worked up extensively  Now she presents here with complains of increasing dyspnea she said that she cannot take a deep breath                     Past medical history:    has a past medical history of CAD (coronary artery disease), COPD (chronic obstructive pulmonary disease) ([facility]), GERD (gastroesophageal reflux disease), H/O cardiovascular stress test, H/O echocardiogram, H/O echocardiogram, H/O percutaneous left heart catheterization, H/O transesophageal echocardiography (RAMIRO) for monitoring, Heart attack ([facility]), History of transesophageal echocardiography (RAMIRO), Hyperlipidemia, Hypertension, On home oxygen therapy, PAD (peripheral artery disease) ([facility]), S/P right coronary artery (RCA) stent placement, Sleep apnea, T2 vertebral fracture ([facility]), TIA (transient ischemic attack), Tobacco dependence, Wears glasses, and Wears partial dentures.  Past surgical history:   has a past surgical history that includes Coronary angioplasty with stent; Coronary artery bypass graft (2016); Mitral valve replacement  O2 Sat, Pasquale 93.7 (H) 50 - 70 %    Comment VBG        The ASCVD Risk score (Shin DK, et al., 2019) failed to calculate for the following reasons:    The patient has a prior MI or stroke diagnosis     Assessment/Recommendations:   -Short of breath is a combination of multiple factors, anemia and her valvulopathy  -Today's echo again shows that she has severe mitral insufficiency will diurese her and monitor  We will discuss with Dr. Manzano regarding her mitral valve  -Has history of TAVR however the valve has no issues there  -CVA she is stable has no issues  -Hypertension patient is on Coreg and lisinopril along with Norvasc  -Has history of A-fib had cardioversion done last year has been on amiodarone as well she stopped Eliquis and generally 24 and started aspirin in January as well due to anemia and low hemoglobin we will get a pulmonary function test as well to confirm that there is no amiodarone toxicity  Dr. Manzano is already spoken to her about Watchman device which is being scheduled next month as well  -Status post permanent pacemaker implantation she had a 6-second pause  -She also history of coronary disease with one-vessel bypass at the time of her surgery with the saphenous vein graft to the RCA LAD and circumflex were patent  -Status post mitral valve replacement with a 27 mm valve in June 2016  -She also has aortofemoral bypass as well           Mikey Johnson MD, 3/7/2024 1:59 PM       Please note this report has been partially produced using speech recognition software and may contain errors related to that system including errors in grammar, punctuation, and spelling, as well as words and phrases that may be inappropriate. If there are any questions or concerns please feel free to contact the dictating provider for clarification.

## 2024-03-07 NOTE — H&P
Wears partial dentures     Upper     PSHX:  has a past surgical history that includes Coronary angioplasty with stent; Coronary artery bypass graft (2016); Mitral valve replacement (2016); Cardiac surgery; Appendectomy; hernia repair (N/A, 2021); Pacemaker insertion (Left, 2023); Coronary artery bypass graft (N/A, 2023); Upper gastrointestinal endoscopy (N/A, 2023); and Colonoscopy (N/A, 2023).  Allergies: No Known Allergies  Fam HX:  family history includes Arrhythmia in her father and sister; Coronary Art Dis in her father; Diabetes in her sister; Heart Attack in her father and sister; Pacemaker in her father; Rheum Arthritis in her sister; Stroke in her mother.  Soc HX:   Social History     Socioeconomic History    Marital status:    Tobacco Use    Smoking status: Former     Current packs/day: 0.00     Average packs/day: 0.5 packs/day for 57.7 years (28.9 ttl pk-yrs)     Types: Cigarettes     Start date:      Quit date: 9/15/2022     Years since quittin.4    Smokeless tobacco: Never   Vaping Use    Vaping Use: Never used   Substance and Sexual Activity    Alcohol use: No     Comment: 2 cups of coffee in the a.m.    Drug use: No    Sexual activity: Yes     Partners: Male   Social History Narrative    ** Merged History Encounter **          Social Determinants of Health     Financial Resource Strain: Low Risk  (2023)    Overall Financial Resource Strain (CARDIA)     Difficulty of Paying Living Expenses: Not hard at all   Food Insecurity: No Food Insecurity (2023)    Hunger Vital Sign     Worried About Running Out of Food in the Last Year: Never true     Ran Out of Food in the Last Year: Never true   Transportation Needs: No Transportation Needs (2023)    PRAPARE - Transportation     Lack of Transportation (Medical): No     Lack of Transportation (Non-Medical): No   Physical Activity: Inactive (3/24/2023)    Exercise Vital Sign     Days of  Exercise per Week: 0 days     Minutes of Exercise per Session: 0 min   Stress: Stress Concern Present (3/5/2024)    [employer] - Occupational Stress Questionnaire     Feeling of Stress : To some extent   Social Connections: Moderately Isolated (3/5/2024)    Social Connection and Isolation Panel [NHANES]     Frequency of Communication with Friends and Family: Three times a week     Frequency of Social Gatherings with Friends and Family: Three times a week     Attends Buddhism Services: Never     Active Member of Clubs or Organizations: No     Attends Club or Organization Meetings: Never     Marital Status:    Housing Stability: Low Risk  (12/27/2023)    Housing Stability Vital Sign     Unable to Pay for Housing in the Last Year: No     Number of Places Lived in the Last Year: 1     Unstable Housing in the Last Year: No       Medications:   Medications:    sodium chloride flush  5-40 mL IntraVENous 2 times per day      Infusions:    sodium chloride       PRN Meds: sodium chloride flush, 5-40 mL, PRN  sodium chloride, , PRN        Labs      CBC:   Recent Labs     03/07/24  0734   WBC 13.5*   HGB 9.1*        BMP:    Recent Labs     03/07/24  0734   *   K 4.9      CO2 22   BUN 46*   CREATININE 3.1*   GLUCOSE 207*     Hepatic:   Recent Labs     03/07/24  0734   AST 47*   ALT 54*   BILITOT 1.4*   ALKPHOS 93     Lipids:   Lab Results   Component Value Date/Time    CHOL 136 03/24/2023 12:04 PM    HDL 58 03/24/2023 12:04 PM    TRIG 70 03/24/2023 12:04 PM     Hemoglobin A1C:   Lab Results   Component Value Date/Time    LABA1C 5.3 06/23/2023 08:35 AM     TSH:   Lab Results   Component Value Date/Time    TSH 2.09 03/24/2023 12:04 PM     Troponin:   Lab Results   Component Value Date/Time    TROPONINT 0.028 09/07/2023 04:21 PM    TROPONINT 0.035 09/06/2023 11:49 AM    TROPONINT <0.010 07/02/2023 08:53 AM     Lactic Acid: No results for input(s): \"LACTA\" in the last 72

## 2024-03-07 NOTE — ED NOTES
Notified Dr. Rhodes about VBG results, able to come off Bipap and go onto N/C. Family at bedside.

## 2024-03-07 NOTE — ED NOTES
ED TO INPATIENT SBAR HANDOFF    Patient Name: Lynette Gonsalves   :  1950  74 y.o.   Preferred Name  Lynette  Family/Caregiver Present yes   Restraints no   C-SSRS: Risk of Suicide: No Risk  Sitter no   Sepsis Risk Score Sepsis Risk Score: 3.29      Situation  Chief Complaint   Patient presents with    Respiratory Distress     Brief Description of Patient's Condition: Patient brought into the ER for SOB, was found on 4L n/c at home, at home dose. Patient was at 80%s oxygen level when found. Patient is currently on bipap, oxygen level is 100%.   Mental Status: oriented, alert, and coherent  Arrived from: home    Imaging:   XR CHEST PORTABLE   Final Result      Cardiomegaly with vascular congestion and pulmonary edema.      Electronically signed by Jake Orta MD        Abnormal labs:   Abnormal Labs Reviewed   CBC WITH AUTO DIFFERENTIAL - Abnormal; Notable for the following components:       Result Value    WBC 13.5 (*)     RBC 3.31 (*)     Hemoglobin 9.1 (*)     Hematocrit 31.6 (*)     MCHC 28.8 (*)     RDW 15.9 (*)     MPV 11.4 (*)     Segs Relative 82.7 (*)     Lymphocytes % 10.2 (*)     Monocytes % 5.6 (*)     Immature Neutrophil % 0.5 (*)     All other components within normal limits   COMPREHENSIVE METABOLIC PANEL W/ REFLEX TO MG FOR LOW K - Abnormal; Notable for the following components:    Sodium 134 (*)     Glucose 207 (*)     BUN 46 (*)     Creatinine 3.1 (*)     Est, Glom Filt Rate 15 (*)     Total Bilirubin 1.4 (*)     ALT 54 (*)     AST 47 (*)     All other components within normal limits   LACTATE, SEPSIS - Abnormal; Notable for the following components:    Lactic Acid, Sepsis 2.1 (*)     All other components within normal limits   BLOOD GAS, VENOUS - Abnormal; Notable for the following components:    pH, Pasquale 7.27 (*)     pO2, Pasquale 57 (*)     Base Excess 5 (*)     O2 Sat, Pasquale 83.5 (*)     All other components within normal limits   TROPONIN - Abnormal; Notable for the following components:

## 2024-03-07 NOTE — ED NOTES
Medication History  [facility]    Patient Name: Lynette Gonsalves 1950     Medication history has been completed by: Janice Pierce CPhT    Source(s) of information: patient's family and insurance claims     Primary Care Physician: Francisco Barriga MD     Pharmacy: [facility]    Allergies as of 03/07/2024    (No Known Allergies)        Prior to Admission medications    Medication Sig Start Date End Date Taking? Authorizing Provider   clopidogrel (PLAVIX) 75 MG tablet Take 1 tablet by mouth daily 3/5/24   Elaine Adrian APRN - CNP   carvedilol (COREG) 25 MG tablet Take 1 tablet by mouth 2 times daily 3/4/24   Elaine Adrian APRN - CNP   amiodarone (CORDARONE) 200 MG tablet Take 1 tablet by mouth daily 3/1/24   Gabo Douglas MD   amLODIPine (NORVASC) 5 MG tablet Take 2 tablets by mouth daily 2/2/24   Elaine Adrian APRN - CNP   atorvastatin (LIPITOR) 40 MG tablet Take 1 tablet by mouth daily 1/5/24   Francisco Barriga MD   levothyroxine (SYNTHROID) 88 MCG tablet Take 1 tablet by mouth daily 1/5/24   Francisco Barriga MD   albuterol sulfate HFA (PROVENTIL;VENTOLIN;PROAIR) 108 (90 Base) MCG/ACT inhaler Inhale 2 puffs into the lungs every 6 hours as needed for Wheezing or Shortness of Breath 1/5/24   Francisco Barriga MD   SYMBICORT 80-4.5 MCG/ACT AERO Inhale 2 puffs into the lungs 2 times daily 1/5/24   Francisco Barriga MD   pantoprazole (PROTONIX) 40 MG tablet Take 1 tablet by mouth daily 1/5/24   Francisco Barriga MD   hydrALAZINE (APRESOLINE) 25 MG tablet Take 1 tablet by mouth every 8 hours 1/5/24   Francisco Barriga MD   empagliflozin (JARDIANCE) 10 MG tablet Take 1 tablet by mouth daily 1/5/24   Francisco Barriga MD   aspirin 81 MG EC tablet Take 1 tablet by mouth daily 1/5/24   Francisco Barriga MD   isosorbide mononitrate (IMDUR) 30 MG extended release tablet Take 1 tablet by mouth daily 1/3/24   Francisco Barriga MD   tiotropium (SPIRIVA RESPIMAT) 2.5 MCG/ACT

## 2024-03-07 NOTE — CARE COORDINATION
MCG criteria for Respiratory Failure reviewed at this time, criteria supports Inpatient Admission. GENESIS,RN/CM

## 2024-03-08 LAB
ALBUMIN SERPL-MCNC: 2.1 GM/DL (ref 3.4–5)
ALP BLD-CCNC: 47 IU/L (ref 40–129)
ALT SERPL-CCNC: 33 U/L (ref 10–40)
ANION GAP SERPL CALCULATED.3IONS-SCNC: 12 MMOL/L (ref 7–16)
AST SERPL-CCNC: 32 IU/L (ref 15–37)
B PARAP IS1001 DNA NPH QL NAA+NON-PROBE: NOT DETECTED
B PERT.PT PRMT NPH QL NAA+NON-PROBE: NOT DETECTED
BASOPHILS ABSOLUTE: 0.1 K/CU MM
BASOPHILS RELATIVE PERCENT: 0.6 % (ref 0–1)
BILIRUB SERPL-MCNC: 0.3 MG/DL (ref 0–1)
BILIRUBIN DIRECT: 0.2 MG/DL (ref 0–0.3)
BILIRUBIN, INDIRECT: 0.1 MG/DL (ref 0–0.7)
BUN SERPL-MCNC: 50 MG/DL (ref 6–23)
C PNEUM DNA NPH QL NAA+NON-PROBE: NOT DETECTED
CALCIUM SERPL-MCNC: 8.6 MG/DL (ref 8.3–10.6)
CHLORIDE BLD-SCNC: 101 MMOL/L (ref 99–110)
CHOLEST SERPL-MCNC: 111 MG/DL
CO2: 23 MMOL/L (ref 21–32)
CREAT SERPL-MCNC: 3.1 MG/DL (ref 0.6–1.1)
DIFFERENTIAL TYPE: ABNORMAL
EOSINOPHILS ABSOLUTE: 0.1 K/CU MM
EOSINOPHILS RELATIVE PERCENT: 1.6 % (ref 0–3)
FLUAV H1 2009 PAN RNA NPH NAA+NON-PROBE: NOT DETECTED
FLUAV H1 RNA NPH QL NAA+NON-PROBE: NOT DETECTED
FLUAV H3 RNA NPH QL NAA+NON-PROBE: NOT DETECTED
FLUAV RNA NPH QL NAA+NON-PROBE: NOT DETECTED
FLUBV RNA NPH QL NAA+NON-PROBE: NOT DETECTED
GFR SERPL CREATININE-BSD FRML MDRD: 15 ML/MIN/1.73M2
GLUCOSE SERPL-MCNC: 110 MG/DL (ref 70–99)
HADV DNA NPH QL NAA+NON-PROBE: NOT DETECTED
HCOV 229E RNA NPH QL NAA+NON-PROBE: NOT DETECTED
HCOV HKU1 RNA NPH QL NAA+NON-PROBE: NOT DETECTED
HCOV NL63 RNA NPH QL NAA+NON-PROBE: NOT DETECTED
HCOV OC43 RNA NPH QL NAA+NON-PROBE: NOT DETECTED
HCT VFR BLD CALC: 29.2 % (ref 37–47)
HDLC SERPL-MCNC: 42 MG/DL
HEMOGLOBIN: 8.6 GM/DL (ref 12.5–16)
HMPV RNA NPH QL NAA+NON-PROBE: NOT DETECTED
HPIV1 RNA NPH QL NAA+NON-PROBE: NOT DETECTED
HPIV2 RNA NPH QL NAA+NON-PROBE: NOT DETECTED
HPIV3 RNA NPH QL NAA+NON-PROBE: NOT DETECTED
HPIV4 RNA NPH QL NAA+NON-PROBE: NOT DETECTED
IMMATURE NEUTROPHIL %: 0.5 % (ref 0–0.43)
IRON: 41 UG/DL (ref 37–145)
L PNEUMO AG UR QL IA: NEGATIVE
LACTATE: 1.7 MMOL/L (ref 0.5–1.9)
LACTATE: 2.5 MMOL/L (ref 0.5–1.9)
LDLC SERPL CALC-MCNC: 55 MG/DL
LYMPHOCYTES ABSOLUTE: 1.1 K/CU MM
LYMPHOCYTES RELATIVE PERCENT: 13.1 % (ref 24–44)
M PNEUMO DNA NPH QL NAA+NON-PROBE: NOT DETECTED
MAGNESIUM: 2.2 MG/DL (ref 1.8–2.4)
MAGNESIUM: 2.3 MG/DL (ref 1.8–2.4)
MCH RBC QN AUTO: 27.2 PG (ref 27–31)
MCHC RBC AUTO-ENTMCNC: 29.5 % (ref 32–36)
MCV RBC AUTO: 92.4 FL (ref 78–100)
MONOCYTES ABSOLUTE: 0.9 K/CU MM
MONOCYTES RELATIVE PERCENT: 10.4 % (ref 0–4)
NUCLEATED RBC %: 0 %
PCT TRANSFERRIN: 12 % (ref 10–44)
PDW BLD-RTO: 16.1 % (ref 11.7–14.9)
PLATELET # BLD: 177 K/CU MM (ref 140–440)
PMV BLD AUTO: 11.9 FL (ref 7.5–11.1)
POTASSIUM SERPL-SCNC: 3.8 MMOL/L (ref 3.5–5.1)
POTASSIUM SERPL-SCNC: 4.5 MMOL/L (ref 3.5–5.1)
RBC # BLD: 3.16 M/CU MM (ref 4.2–5.4)
REASON FOR REJECTION: NORMAL
REJECTED TEST: NORMAL
RSV RNA NPH QL NAA+NON-PROBE: NOT DETECTED
RV+EV RNA NPH QL NAA+NON-PROBE: ABNORMAL
S PNEUM AG CSF QL: NORMAL
SARS-COV-2 RNA NPH QL NAA+NON-PROBE: NOT DETECTED
SEGMENTED NEUTROPHILS ABSOLUTE COUNT: 6.3 K/CU MM
SEGMENTED NEUTROPHILS RELATIVE PERCENT: 73.8 % (ref 36–66)
SODIUM BLD-SCNC: 136 MMOL/L (ref 135–145)
T4 FREE SERPL-MCNC: 1.91 NG/DL (ref 0.9–1.8)
TOTAL IMMATURE NEUTOROPHIL: 0.04 K/CU MM
TOTAL IRON BINDING CAPACITY: 349 UG/DL (ref 250–450)
TOTAL NUCLEATED RBC: 0 K/CU MM
TOTAL PROTEIN: 3.9 GM/DL (ref 6.4–8.2)
TRIGL SERPL-MCNC: 70 MG/DL
TSH SERPL DL<=0.005 MIU/L-ACNC: 0.58 UIU/ML (ref 0.27–4.2)
UNSATURATED IRON BINDING CAPACITY: 308 UG/DL (ref 110–370)
WBC # BLD: 8.6 K/CU MM (ref 4–10.5)

## 2024-03-08 PROCEDURE — 99233 SBSQ HOSP IP/OBS HIGH 50: CPT | Performed by: INTERNAL MEDICINE

## 2024-03-08 PROCEDURE — 2700000000 HC OXYGEN THERAPY PER DAY

## 2024-03-08 PROCEDURE — 93005 ELECTROCARDIOGRAM TRACING: CPT | Performed by: INTERNAL MEDICINE

## 2024-03-08 PROCEDURE — 6360000002 HC RX W HCPCS: Performed by: FAMILY MEDICINE

## 2024-03-08 PROCEDURE — 83550 IRON BINDING TEST: CPT

## 2024-03-08 PROCEDURE — 84439 ASSAY OF FREE THYROXINE: CPT

## 2024-03-08 PROCEDURE — 6370000000 HC RX 637 (ALT 250 FOR IP): Performed by: FAMILY MEDICINE

## 2024-03-08 PROCEDURE — 36415 COLL VENOUS BLD VENIPUNCTURE: CPT

## 2024-03-08 PROCEDURE — 94640 AIRWAY INHALATION TREATMENT: CPT

## 2024-03-08 PROCEDURE — 6360000002 HC RX W HCPCS: Performed by: INTERNAL MEDICINE

## 2024-03-08 PROCEDURE — 2580000003 HC RX 258: Performed by: INTERNAL MEDICINE

## 2024-03-08 PROCEDURE — 84132 ASSAY OF SERUM POTASSIUM: CPT

## 2024-03-08 PROCEDURE — 80061 LIPID PANEL: CPT

## 2024-03-08 PROCEDURE — 2580000003 HC RX 258: Performed by: FAMILY MEDICINE

## 2024-03-08 PROCEDURE — 6370000000 HC RX 637 (ALT 250 FOR IP): Performed by: INTERNAL MEDICINE

## 2024-03-08 PROCEDURE — 2500000003 HC RX 250 WO HCPCS: Performed by: INTERNAL MEDICINE

## 2024-03-08 PROCEDURE — 84443 ASSAY THYROID STIM HORMONE: CPT

## 2024-03-08 PROCEDURE — 83605 ASSAY OF LACTIC ACID: CPT

## 2024-03-08 PROCEDURE — 80076 HEPATIC FUNCTION PANEL: CPT

## 2024-03-08 PROCEDURE — 83735 ASSAY OF MAGNESIUM: CPT

## 2024-03-08 PROCEDURE — 94761 N-INVAS EAR/PLS OXIMETRY MLT: CPT

## 2024-03-08 PROCEDURE — 83540 ASSAY OF IRON: CPT

## 2024-03-08 PROCEDURE — 2060000000 HC ICU INTERMEDIATE R&B

## 2024-03-08 PROCEDURE — 85025 COMPLETE CBC W/AUTO DIFF WBC: CPT

## 2024-03-08 PROCEDURE — 0202U NFCT DS 22 TRGT SARS-COV-2: CPT

## 2024-03-08 PROCEDURE — 80048 BASIC METABOLIC PNL TOTAL CA: CPT

## 2024-03-08 RX ORDER — EPINEPHRINE NASAL SOLUTION 1 MG/ML
1 SOLUTION NASAL ONCE
Status: DISCONTINUED | OUTPATIENT
Start: 2024-03-08 | End: 2024-03-14

## 2024-03-08 RX ORDER — ENOXAPARIN SODIUM 100 MG/ML
30 INJECTION SUBCUTANEOUS DAILY
Status: DISCONTINUED | OUTPATIENT
Start: 2024-03-08 | End: 2024-03-08

## 2024-03-08 RX ORDER — LEVOTHYROXINE SODIUM 0.07 MG/1
75 TABLET ORAL DAILY
Status: DISCONTINUED | OUTPATIENT
Start: 2024-03-09 | End: 2024-03-15 | Stop reason: HOSPADM

## 2024-03-08 RX ORDER — HEPARIN SODIUM 5000 [USP'U]/ML
5000 INJECTION, SOLUTION INTRAVENOUS; SUBCUTANEOUS EVERY 8 HOURS SCHEDULED
Status: DISCONTINUED | OUTPATIENT
Start: 2024-03-08 | End: 2024-03-08

## 2024-03-08 RX ORDER — PETROLATUM,WHITE
OINTMENT IN PACKET (GRAM) TOPICAL ONCE
Status: DISCONTINUED | OUTPATIENT
Start: 2024-03-08 | End: 2024-03-15 | Stop reason: HOSPADM

## 2024-03-08 RX ORDER — HEPARIN SODIUM 5000 [USP'U]/ML
5000 INJECTION, SOLUTION INTRAVENOUS; SUBCUTANEOUS EVERY 8 HOURS SCHEDULED
Status: DISCONTINUED | OUTPATIENT
Start: 2024-03-08 | End: 2024-03-11

## 2024-03-08 RX ORDER — MIDODRINE HYDROCHLORIDE 5 MG/1
5 TABLET ORAL
Status: DISCONTINUED | OUTPATIENT
Start: 2024-03-08 | End: 2024-03-15 | Stop reason: HOSPADM

## 2024-03-08 RX ORDER — CARVEDILOL 6.25 MG/1
6.25 TABLET ORAL 2 TIMES DAILY
Status: DISCONTINUED | OUTPATIENT
Start: 2024-03-08 | End: 2024-03-15 | Stop reason: HOSPADM

## 2024-03-08 RX ORDER — CLOPIDOGREL BISULFATE 75 MG/1
75 TABLET ORAL DAILY
Status: DISCONTINUED | OUTPATIENT
Start: 2024-03-08 | End: 2024-03-11

## 2024-03-08 RX ADMIN — BUDESONIDE AND FORMOTEROL FUMARATE DIHYDRATE 2 PUFF: 80; 4.5 AEROSOL RESPIRATORY (INHALATION) at 19:46

## 2024-03-08 RX ADMIN — FUROSEMIDE 40 MG: 10 INJECTION, SOLUTION INTRAMUSCULAR; INTRAVENOUS at 17:13

## 2024-03-08 RX ADMIN — ENOXAPARIN SODIUM 30 MG: 100 INJECTION SUBCUTANEOUS at 08:31

## 2024-03-08 RX ADMIN — SODIUM CHLORIDE, PRESERVATIVE FREE 10 ML: 5 INJECTION INTRAVENOUS at 09:45

## 2024-03-08 RX ADMIN — CARVEDILOL 6.25 MG: 6.25 TABLET, FILM COATED ORAL at 21:25

## 2024-03-08 RX ADMIN — MIDODRINE HYDROCHLORIDE 5 MG: 5 TABLET ORAL at 12:51

## 2024-03-08 RX ADMIN — FUROSEMIDE 40 MG: 10 INJECTION, SOLUTION INTRAMUSCULAR; INTRAVENOUS at 08:31

## 2024-03-08 RX ADMIN — ACETAMINOPHEN 650 MG: 325 TABLET ORAL at 17:24

## 2024-03-08 RX ADMIN — ASPIRIN 81 MG: 81 TABLET, CHEWABLE ORAL at 08:29

## 2024-03-08 RX ADMIN — BUDESONIDE AND FORMOTEROL FUMARATE DIHYDRATE 2 PUFF: 80; 4.5 AEROSOL RESPIRATORY (INHALATION) at 07:48

## 2024-03-08 RX ADMIN — CLOPIDOGREL BISULFATE 75 MG: 75 TABLET ORAL at 08:29

## 2024-03-08 RX ADMIN — LEVOTHYROXINE SODIUM 88 MCG: 0.09 TABLET ORAL at 09:05

## 2024-03-08 RX ADMIN — MIDODRINE HYDROCHLORIDE 5 MG: 5 TABLET ORAL at 17:13

## 2024-03-08 RX ADMIN — CEFTRIAXONE 1000 MG: 1 INJECTION, POWDER, FOR SOLUTION INTRAMUSCULAR; INTRAVENOUS at 13:15

## 2024-03-08 RX ADMIN — ATORVASTATIN CALCIUM 40 MG: 40 TABLET, FILM COATED ORAL at 08:30

## 2024-03-08 RX ADMIN — PANTOPRAZOLE SODIUM 40 MG: 40 TABLET, DELAYED RELEASE ORAL at 08:31

## 2024-03-08 RX ADMIN — HYDRALAZINE HYDROCHLORIDE 25 MG: 25 TABLET ORAL at 21:25

## 2024-03-08 RX ADMIN — HEPARIN SODIUM 5000 UNITS: 5000 INJECTION INTRAVENOUS; SUBCUTANEOUS at 17:13

## 2024-03-08 RX ADMIN — AMIODARONE HYDROCHLORIDE 200 MG: 200 TABLET ORAL at 08:30

## 2024-03-08 RX ADMIN — CARVEDILOL 25 MG: 25 TABLET, FILM COATED ORAL at 08:29

## 2024-03-08 RX ADMIN — SODIUM CHLORIDE, PRESERVATIVE FREE 10 ML: 5 INJECTION INTRAVENOUS at 21:27

## 2024-03-08 RX ADMIN — METOPROLOL TARTRATE 5 MG: 5 INJECTION INTRAVENOUS at 17:12

## 2024-03-08 ASSESSMENT — PAIN SCALES - WONG BAKER
WONGBAKER_NUMERICALRESPONSE: NO HURT
WONGBAKER_NUMERICALRESPONSE: 0

## 2024-03-08 NOTE — CONSULTS
Nephrology Service Consultation      [address], [address]  [place] [address]  Phone: [phone]  Office Hours: 8:30AM - 4:30PM  Monday - Friday        MEDICAL DECISION MAKING and Recommendations     -MARK form ATN: cr 3.1 from 2.4 at baseline  -CKD4  -Hypervolemia  -Acute on chronic CHF  -Transaminitis: congestive?  -Hx TAVR  -Anemia    Suggest:  -Monitor UOP  -Continue IV lasix per primary team  -Limit oral fluid to 1800ml  -Avoid nephrotoxins  -Obtain daily BMP    Thank you    Patient Active Problem List    Diagnosis Date Noted    Class 1 obesity 07/11/2022    Former smoker 06/12/2016    Acute respiratory failure with hypoxia (HCC) 03/07/2024    Overweight (BMI 25.0-29.9) 01/08/2024    CKD (chronic kidney disease), stage IV ([facility]) 01/05/2024    Transaminitis 01/05/2024    Type 2 diabetes mellitus 01/05/2024    Anemia 12/30/2023    Upper GI bleed 12/29/2023    Idiopathic hypersomnia 10/31/2023    Hypoxia 09/19/2023    Cerebrovascular accident (CVA) due to embolism of right posterior cerebral artery (HCC) 09/01/2023    Loss of vision 08/04/2023    S/P TAVR (transcatheter aortic valve replacement) 07/27/2023    Nonrheumatic aortic (valve) stenosis 07/26/2023    VHD (valvular heart disease) 07/19/2023    Acute on chronic respiratory failure with hypoxia (HCC) 07/02/2023    Insomnia , Psychophysiological insomnia 06/23/2023    Chronic right-sided low back pain with right-sided sciatica 06/23/2023    Pacemaker 05/03/2023    Sick sinus syndrome (HCC) 05/03/2023    Secondary hypercoagulable state (HCC) 04/28/2023    A-fib, Longstanding persistent atrial fibrillation (HCC) 03/24/2023    Stage 3b chronic kidney disease (HCC) 03/24/2023    Atherosclerosis of native coronary artery of native heart with stable angina pectoris (HCC) 03/24/2023    Chronic systolic (congestive) heart failure 03/24/2023    Chronic obstructive pulmonary disease, unspecified 03/24/2023    Gastroesophageal reflux disease without  SATURNINO Benitez CNP   carvedilol (COREG) 25 MG tablet Take 1 tablet by mouth 2 times daily 3/4/24   Elaine Adrian APRN - CNP   amiodarone (CORDARONE) 200 MG tablet Take 1 tablet by mouth daily 3/1/24   Gabo Douglas MD   amLODIPine (NORVASC) 5 MG tablet Take 2 tablets by mouth daily 2/2/24   Elaine Adrian APRN - CNP   atorvastatin (LIPITOR) 40 MG tablet Take 1 tablet by mouth daily 1/5/24   Francisco Barriga MD   levothyroxine (SYNTHROID) 88 MCG tablet Take 1 tablet by mouth daily 1/5/24   Francisco Barriga MD   albuterol sulfate HFA (PROVENTIL;VENTOLIN;PROAIR) 108 (90 Base) MCG/ACT inhaler Inhale 2 puffs into the lungs every 6 hours as needed for Wheezing or Shortness of Breath 1/5/24   Francisco Barriga MD   SYMBICORT 80-4.5 MCG/ACT AERO Inhale 2 puffs into the lungs 2 times daily 1/5/24   Francisco Barriga MD   pantoprazole (PROTONIX) 40 MG tablet Take 1 tablet by mouth daily 1/5/24   Francisco Barriga MD   hydrALAZINE (APRESOLINE) 25 MG tablet Take 1 tablet by mouth every 8 hours 1/5/24   Francisco Barriga MD   empagliflozin (JARDIANCE) 10 MG tablet Take 1 tablet by mouth daily 1/5/24   Francisco Barriga MD   aspirin 81 MG EC tablet Take 1 tablet by mouth daily 1/5/24   Francisco Barriga MD   isosorbide mononitrate (IMDUR) 30 MG extended release tablet Take 1 tablet by mouth daily 1/3/24   Francisco Barriga MD   tiotropium (SPIRIVA RESPIMAT) 2.5 MCG/ACT AERS inhaler Inhale 2 puffs into the lungs daily 11/29/23 3/1/24  Len Crum MD   doxepin (SINEQUAN) 25 MG capsule Take 1 capsule by mouth nightly 11/27/23   Francisco Barriga MD   fluticasone-umeclidin-vilant (TRELEGY ELLIPTA) 100-62.5-25 MCG/ACT AEPB inhaler Inhale 1 puff into the lungs daily 10/31/23   Len Crum MD   furosemide (LASIX) 40 MG tablet Take 1 tablet by mouth daily 10/6/23   Gabo Douglas MD   Cyanocobalamin (VITAMIN B 12 PO) Take 1 tablet by mouth daily    Provider, MD Sachin   lisinopril

## 2024-03-08 NOTE — PROGRESS NOTES
Patient arrived on unit from ED at 1345. Patient was oriented to unit, staff and use of call light. Was transferred from stretcher to bed. Patient voices no concerns at this time.

## 2024-03-08 NOTE — CONSULTS
Nutrition Education    Educated on heart healthy eating plan   Dtr cooks meals for patient. Follow low sodium diet.   Learners: Patient  Readiness: Acceptance  Method: Explanation  Response: Verbalizes Understanding  Contact name and number provided.    Chris Martin RD, LD  Contact Number: [phone]

## 2024-03-08 NOTE — PROGRESS NOTES
4 Eyes Skin Assessment     NAME:  Lynette Gonsalves  YOB: 1950  MEDICAL RECORD NUMBER:  [id number]    The patient is being assessed for  Admission    I agree that at least one RN has performed a thorough Head to Toe Skin Assessment on the patient. ALL assessment sites listed below have been assessed.      Areas assessed by both nurses:    Head, Face, Ears, Shoulders, Back, Chest, Arms, Elbows, Hands, Sacrum. Buttock, Coccyx, Ischium, Legs. Feet and Heels, and Under Medical Devices         Does the Patient have a Wound? No noted wound(s)       Luc Prevention initiated by RN: No  Wound Care Orders initiated by RN: Yes    Pressure Injury (Stage 3,4, Unstageable, DTI, NWPT, and Complex wounds) if present, place Wound referral order by RN under : No    New Ostomies, if present place, Ostomy referral order under : No     Nurse 1 eSignature: Electronically signed by Mercy Jane RN on 3/8/24 at 2:30 PM EST    **SHARE this note so that the co-signing nurse can place an eSignature**    Nurse 2 eSignature: Electronically signed by Elaine Figueroa RN on 3/8/24 at 2:45 PM EST

## 2024-03-08 NOTE — PROGRESS NOTES
midodrine 5mg TID  May require ICU and pressors.         # Peptic ulcer prophylaxis: Pantoprazole   # DVT Prophylaxis: Heparin       Expected Disposition: TBD   Estimated discharge date: 2-3 days.     Personally reviewed Lab Studies and Imaging     Discussed management of the case with IDR team - patient with heart failure and possible sepsis. Pending final cultures. On IV abx.       Imaging that was interpreted personally includes CXR and results show vascular congestion and pulm edema     Drugs that require monitoring for toxicity include lasix and the method of monitoring was Cr and K   Drugs that require monitoring for toxicity include heparin  and the method of monitoring was CBC   Drugs that require monitoring for toxicity include ceftriaxone / azithromycin and the method of monitoring was CBC       Review of System     Review of Systems   Constitutional:  Negative for chills, fatigue, fever and unexpected weight change.   Eyes:  Negative for pain and visual disturbance.   Respiratory:  Positive for cough. Negative for choking, chest tightness, shortness of breath, wheezing and stridor.    Cardiovascular:  Negative for chest pain, palpitations and leg swelling.   Gastrointestinal:  Negative for abdominal distention, abdominal pain, blood in stool, constipation, diarrhea, nausea and vomiting.   Genitourinary:  Negative for decreased urine volume, dysuria, frequency, hematuria and urgency.   Musculoskeletal:  Negative for arthralgias, back pain and myalgias.   Skin:  Negative for pallor and rash.   Neurological:  Negative for dizziness, tremors, syncope, speech difficulty, weakness, light-headedness, numbness and headaches.   Psychiatric/Behavioral:  Negative for agitation.        I have reviewed all pertinent PMHx, PSHx, FamHx, SocialHx, medications, and allergies and updated history as appropriate.    Physical Exam   VITAL SIGNS:  BP (!) 91/51   Pulse (!) 115   Temp 97.7 °F (36.5 °C) (Oral)   Resp 20   Ht    Result Value Ref Range    TSH, High Sensitivity 0.581 0.270 - 4.20 uIu/ml   Lipid Panel    Collection Time: 03/08/24  8:35 AM   Result Value Ref Range    Triglycerides 70 <150 MG/DL    Cholesterol 111 <200 MG/DL    HDL 42 >40 MG/DL    LDL Calculated 55 <100 MG/DL   Hepatic Function Panel    Collection Time: 03/08/24  8:35 AM   Result Value Ref Range    Albumin 2.1 (L) 3.4 - 5.0 GM/DL    Total Bilirubin 0.3 0.0 - 1.0 MG/DL    Bilirubin, Direct 0.2 0.0 - 0.3 MG/DL    Bilirubin, Indirect 0.1 0 - 0.7 MG/DL    Alkaline Phosphatase 47 40 - 129 IU/L    AST 32 15 - 37 IU/L    ALT 33 10 - 40 U/L    Total Protein 3.9 (L) 6.4 - 8.2 GM/DL   Respiratory Panel, Molecular, with COVID-19 (Restricted: peds pts or suitable admitted adults)    Collection Time: 03/08/24 12:58 PM    Specimen: Nasopharyngeal   Result Value Ref Range    Adenovirus Detection by PCR NOT DETECTED NOT DETECTED    Coronavirus 229E PCR NOT DETECTED NOT DETECTED    Coronavirus HKU1 PCR NOT DETECTED NOT DETECTED    Coronavirus NL63 PCR NOT DETECTED NOT DETECTED    Coronavirus OC43 PCR NOT DETECTED NOT DETECTED    SARS-CoV-2 NOT DETECTED NOT DETECTED    Human Metapneumovirus PCR NOT DETECTED NOT DETECTED    Rhinovirus Enterovirus PCR DETECTED BY PCR (A) NOT DETECTED    Influenza A by PCR NOT DETECTED NOT DETECTED    Influenza A H1 Pandemic PCR NOT DETECTED NOT DETECTED    Influenza A H1 (2009) PCR NOT DETECTED NOT DETECTED    Influenza A H3 PCR NOT DETECTED NOT DETECTED    Influenza B by PCR NOT DETECTED NOT DETECTED    Parainfluenza 1 PCR NOT DETECTED NOT DETECTED    Parainfluenza 2 PCR NOT DETECTED NOT DETECTED    Parainfluenza 3 PCR NOT DETECTED NOT DETECTED    Parainfluenza 4 PCR NOT DETECTED NOT DETECTED    RSV PCR NOT DETECTED NOT DETECTED    Bordetella parapertussis by PCR NOT DETECTED NOT DETECTED    B Pertussis by PCR NOT DETECTED NOT DETECTED    Chlamydophila Pneumonia PCR NOT DETECTED NOT DETECTED    Mycoplasma pneumo by PCR NOT DETECTED NOT DETECTED

## 2024-03-08 NOTE — PROGRESS NOTES
CARDIOLOGY  NOTE        Name:  Lynette Gonsalves /Age/Sex: 1950  (74 y.o. female)   MRN & CSN:  [id number] & [id number] Admission Date/Time: 3/7/2024  7:06 AM   Location:  -A PCP: Francisco Barriga MD       Hospital Day: 2        PLAN FROM CARDIOLOGY FOR TODAY:   Continue present medical therapy with diuresis      - cardiology consult is for: Congestive heart failure    -  Interval history: Breathing better    ASSESSMENT/ PLAN:      -Short of breath is a combination of multiple factors, anemia and her valvulopathy however breathing better today  -Today's echo again shows that she has severe mitral insufficiency will diurese her and monitor  We will discuss with Dr. Manzano regarding her mitral valve  -Has history of TAVR however the valve has no issues there  -CVA she is stable has no issues  -Hypertension patient is on Coreg and lisinopril along with Norvasc  -Has history of A-fib had cardioversion done last year has been on amiodarone as well she stopped Eliquis and generally 24 and started aspirin in January as well due to anemia and low hemoglobin we will get a pulmonary function test as well to confirm that there is no amiodarone toxicity  Dr. Manzano is already spoken to her about Watchman device which is being scheduled next month as well  -Status post permanent pacemaker implantation she had a 6-second pause  -She also history of coronary disease with one-vessel bypass at the time of her surgery with the saphenous vein graft to the RCA LAD and circumflex were patent  -Status post mitral valve replacement with a 27 mm valve in 2016  -She also has aortofemoral bypass as well    Pt was in WQRS  ? A flutter, will add IV lopressor      Subjective:      Todays complain: Patient breathing better    HPI:  Lynette is a 74 y.o.year old who and presents with had concerns including Respiratory Distress.  Chief Complaint   Patient presents with     Respiratory Distress           Objective: Temperature:  Current - Temp: 97.7 °F (36.5 °C); Max - Temp  Av.7 °F (36.5 °C)  Min: 97.7 °F (36.5 °C)  Max: 97.7 °F (36.5 °C)    Respiratory Rate : Resp  Av.9  Min: 12  Max: 22    Pulse Range: Pulse  Av  Min: 75  Max: 115    Blood Presuure Range:  Systolic (24hrs), Av , Min:73 , Max:141   ; Diastolic (24hrs), Av, Min:36, Max:110      Pulse ox Range: SpO2  Av.3 %  Min: 95 %  Max: 100 %    24hr I & O:  No intake or output data in the 24 hours ending 24 1604      BP (!) 97/51   Pulse 82   Temp 97.7 °F (36.5 °C) (Oral)   Resp 17   Ht 1.57 m (5' 1.81\")   Wt 70.6 kg (155 lb 10.3 oz)   SpO2 99%   BMI 28.64 kg/m²           Review of Systems:    Breathing better    TELEMETRY: Atrial fibrillation   has a past medical history of CAD (coronary artery disease), COPD (chronic obstructive pulmonary disease) ([facility]), GERD (gastroesophageal reflux disease), H/O cardiovascular stress test, H/O echocardiogram, H/O echocardiogram, H/O percutaneous left heart catheterization, H/O transesophageal echocardiography (RAMIRO) for monitoring, Heart attack ([facility]), History of transesophageal echocardiography (RAMIRO), Hyperlipidemia, Hypertension, On home oxygen therapy, PAD (peripheral artery disease) ([facility]), S/P right coronary artery (RCA) stent placement, Sleep apnea, T2 vertebral fracture ([facility]), TIA (transient ischemic attack), Tobacco dependence, Wears glasses, and Wears partial dentures.   has a past surgical history that includes Coronary angioplasty with stent; Coronary artery bypass graft (2016); Mitral valve replacement (2016); Cardiac surgery; Appendectomy; hernia repair (N/A, 2021); Pacemaker insertion (Left, 2023); Coronary artery bypass graft (N/A, 2023); Upper gastrointestinal endoscopy (N/A, 2023); and Colonoscopy (N/A, 2023).  Physical Exam:  General:  Awake, alert, NAD  Head:normal  Eye: Pupils equal and

## 2024-03-09 LAB
ALBUMIN SERPL-MCNC: 3.5 GM/DL (ref 3.4–5)
ALP BLD-CCNC: 84 IU/L (ref 40–129)
ALT SERPL-CCNC: 73 U/L (ref 10–40)
ANION GAP SERPL CALCULATED.3IONS-SCNC: 14 MMOL/L (ref 7–16)
AST SERPL-CCNC: 77 IU/L (ref 15–37)
BASOPHILS ABSOLUTE: 0.1 K/CU MM
BASOPHILS RELATIVE PERCENT: 1 % (ref 0–1)
BILIRUB SERPL-MCNC: 0.5 MG/DL (ref 0–1)
BILIRUBIN DIRECT: 0.2 MG/DL (ref 0–0.3)
BILIRUBIN, INDIRECT: 0.3 MG/DL (ref 0–0.7)
BUN SERPL-MCNC: 58 MG/DL (ref 6–23)
CALCIUM SERPL-MCNC: 9 MG/DL (ref 8.3–10.6)
CHLORIDE BLD-SCNC: 102 MMOL/L (ref 99–110)
CO2: 22 MMOL/L (ref 21–32)
CREAT SERPL-MCNC: 3.8 MG/DL (ref 0.6–1.1)
DIFFERENTIAL TYPE: ABNORMAL
EOSINOPHILS ABSOLUTE: 0.4 K/CU MM
EOSINOPHILS RELATIVE PERCENT: 5 % (ref 0–3)
GFR SERPL CREATININE-BSD FRML MDRD: 12 ML/MIN/1.73M2
GLUCOSE SERPL-MCNC: 98 MG/DL (ref 70–99)
HCT VFR BLD CALC: 32 % (ref 37–47)
HEMOGLOBIN: 8.7 GM/DL (ref 12.5–16)
IMMATURE NEUTROPHIL %: 0.4 % (ref 0–0.43)
LYMPHOCYTES ABSOLUTE: 1.3 K/CU MM
LYMPHOCYTES RELATIVE PERCENT: 17.8 % (ref 24–44)
MAGNESIUM: 2.3 MG/DL (ref 1.8–2.4)
MCH RBC QN AUTO: 27.3 PG (ref 27–31)
MCHC RBC AUTO-ENTMCNC: 27.2 % (ref 32–36)
MCV RBC AUTO: 100.3 FL (ref 78–100)
MONOCYTES ABSOLUTE: 0.9 K/CU MM
MONOCYTES RELATIVE PERCENT: 12.8 % (ref 0–4)
NUCLEATED RBC %: 0 %
PDW BLD-RTO: 16.2 % (ref 11.7–14.9)
PLATELET # BLD: 168 K/CU MM (ref 140–440)
PMV BLD AUTO: 11.6 FL (ref 7.5–11.1)
POTASSIUM SERPL-SCNC: 4.3 MMOL/L (ref 3.5–5.1)
RBC # BLD: 3.19 M/CU MM (ref 4.2–5.4)
SEGMENTED NEUTROPHILS ABSOLUTE COUNT: 4.6 K/CU MM
SEGMENTED NEUTROPHILS RELATIVE PERCENT: 63 % (ref 36–66)
SODIUM BLD-SCNC: 138 MMOL/L (ref 135–145)
TOTAL IMMATURE NEUTOROPHIL: 0.03 K/CU MM
TOTAL NUCLEATED RBC: 0 K/CU MM
TOTAL PROTEIN: 6.8 GM/DL (ref 6.4–8.2)
WBC # BLD: 7.3 K/CU MM (ref 4–10.5)

## 2024-03-09 PROCEDURE — 82248 BILIRUBIN DIRECT: CPT

## 2024-03-09 PROCEDURE — 94761 N-INVAS EAR/PLS OXIMETRY MLT: CPT

## 2024-03-09 PROCEDURE — 6370000000 HC RX 637 (ALT 250 FOR IP): Performed by: INTERNAL MEDICINE

## 2024-03-09 PROCEDURE — 2580000003 HC RX 258: Performed by: INTERNAL MEDICINE

## 2024-03-09 PROCEDURE — 2060000000 HC ICU INTERMEDIATE R&B

## 2024-03-09 PROCEDURE — 85025 COMPLETE CBC W/AUTO DIFF WBC: CPT

## 2024-03-09 PROCEDURE — 99233 SBSQ HOSP IP/OBS HIGH 50: CPT | Performed by: INTERNAL MEDICINE

## 2024-03-09 PROCEDURE — 2700000000 HC OXYGEN THERAPY PER DAY

## 2024-03-09 PROCEDURE — 6360000002 HC RX W HCPCS: Performed by: FAMILY MEDICINE

## 2024-03-09 PROCEDURE — 83735 ASSAY OF MAGNESIUM: CPT

## 2024-03-09 PROCEDURE — 2500000003 HC RX 250 WO HCPCS: Performed by: INTERNAL MEDICINE

## 2024-03-09 PROCEDURE — 36415 COLL VENOUS BLD VENIPUNCTURE: CPT

## 2024-03-09 PROCEDURE — 2580000003 HC RX 258: Performed by: FAMILY MEDICINE

## 2024-03-09 PROCEDURE — 94640 AIRWAY INHALATION TREATMENT: CPT

## 2024-03-09 PROCEDURE — 6370000000 HC RX 637 (ALT 250 FOR IP): Performed by: FAMILY MEDICINE

## 2024-03-09 PROCEDURE — 80053 COMPREHEN METABOLIC PANEL: CPT

## 2024-03-09 PROCEDURE — 6360000002 HC RX W HCPCS: Performed by: INTERNAL MEDICINE

## 2024-03-09 RX ORDER — FUROSEMIDE 10 MG/ML
40 INJECTION INTRAMUSCULAR; INTRAVENOUS DAILY
Status: DISCONTINUED | OUTPATIENT
Start: 2024-03-10 | End: 2024-03-12

## 2024-03-09 RX ORDER — SENNA AND DOCUSATE SODIUM 50; 8.6 MG/1; MG/1
2 TABLET, FILM COATED ORAL DAILY
Status: DISCONTINUED | OUTPATIENT
Start: 2024-03-09 | End: 2024-03-15 | Stop reason: HOSPADM

## 2024-03-09 RX ADMIN — ASPIRIN 81 MG: 81 TABLET, CHEWABLE ORAL at 08:55

## 2024-03-09 RX ADMIN — ISOSORBIDE MONONITRATE 30 MG: 30 TABLET, EXTENDED RELEASE ORAL at 08:55

## 2024-03-09 RX ADMIN — PANTOPRAZOLE SODIUM 40 MG: 40 TABLET, DELAYED RELEASE ORAL at 08:55

## 2024-03-09 RX ADMIN — BUDESONIDE AND FORMOTEROL FUMARATE DIHYDRATE 2 PUFF: 80; 4.5 AEROSOL RESPIRATORY (INHALATION) at 11:08

## 2024-03-09 RX ADMIN — MIDODRINE HYDROCHLORIDE 5 MG: 5 TABLET ORAL at 11:37

## 2024-03-09 RX ADMIN — MIDODRINE HYDROCHLORIDE 5 MG: 5 TABLET ORAL at 08:55

## 2024-03-09 RX ADMIN — SODIUM CHLORIDE, PRESERVATIVE FREE 10 ML: 5 INJECTION INTRAVENOUS at 08:54

## 2024-03-09 RX ADMIN — METOPROLOL TARTRATE 5 MG: 5 INJECTION INTRAVENOUS at 11:38

## 2024-03-09 RX ADMIN — METOPROLOL TARTRATE 5 MG: 5 INJECTION INTRAVENOUS at 17:15

## 2024-03-09 RX ADMIN — FUROSEMIDE 40 MG: 10 INJECTION, SOLUTION INTRAMUSCULAR; INTRAVENOUS at 08:54

## 2024-03-09 RX ADMIN — CARVEDILOL 6.25 MG: 6.25 TABLET, FILM COATED ORAL at 08:55

## 2024-03-09 RX ADMIN — AMIODARONE HYDROCHLORIDE 200 MG: 200 TABLET ORAL at 08:55

## 2024-03-09 RX ADMIN — SODIUM CHLORIDE, PRESERVATIVE FREE 10 ML: 5 INJECTION INTRAVENOUS at 20:59

## 2024-03-09 RX ADMIN — ACETAMINOPHEN 650 MG: 325 TABLET ORAL at 21:03

## 2024-03-09 RX ADMIN — CLOPIDOGREL BISULFATE 75 MG: 75 TABLET ORAL at 08:55

## 2024-03-09 RX ADMIN — HEPARIN SODIUM 5000 UNITS: 5000 INJECTION INTRAVENOUS; SUBCUTANEOUS at 20:57

## 2024-03-09 RX ADMIN — HYDRALAZINE HYDROCHLORIDE 25 MG: 25 TABLET ORAL at 14:13

## 2024-03-09 RX ADMIN — DOCUSATE SODIUM AND SENNOSIDES 2 TABLET: 8.6; 5 TABLET, FILM COATED ORAL at 08:55

## 2024-03-09 RX ADMIN — CEFTRIAXONE 1000 MG: 1 INJECTION, POWDER, FOR SOLUTION INTRAMUSCULAR; INTRAVENOUS at 14:15

## 2024-03-09 RX ADMIN — BUDESONIDE AND FORMOTEROL FUMARATE DIHYDRATE 2 PUFF: 80; 4.5 AEROSOL RESPIRATORY (INHALATION) at 19:37

## 2024-03-09 RX ADMIN — AZITHROMYCIN MONOHYDRATE 500 MG: 500 INJECTION, POWDER, LYOPHILIZED, FOR SOLUTION INTRAVENOUS at 15:07

## 2024-03-09 RX ADMIN — HEPARIN SODIUM 5000 UNITS: 5000 INJECTION INTRAVENOUS; SUBCUTANEOUS at 14:13

## 2024-03-09 RX ADMIN — LEVOTHYROXINE SODIUM 75 MCG: 75 TABLET ORAL at 08:55

## 2024-03-09 RX ADMIN — CARVEDILOL 6.25 MG: 6.25 TABLET, FILM COATED ORAL at 20:55

## 2024-03-09 RX ADMIN — METOPROLOL TARTRATE 5 MG: 5 INJECTION INTRAVENOUS at 23:09

## 2024-03-09 RX ADMIN — MIDODRINE HYDROCHLORIDE 5 MG: 5 TABLET ORAL at 17:14

## 2024-03-09 RX ADMIN — ATORVASTATIN CALCIUM 40 MG: 40 TABLET, FILM COATED ORAL at 08:55

## 2024-03-09 ASSESSMENT — PAIN SCALES - GENERAL
PAINLEVEL_OUTOF10: 0
PAINLEVEL_OUTOF10: 4

## 2024-03-09 ASSESSMENT — PAIN DESCRIPTION - DESCRIPTORS: DESCRIPTORS: ACHING

## 2024-03-09 ASSESSMENT — PAIN DESCRIPTION - ORIENTATION: ORIENTATION: LEFT

## 2024-03-09 ASSESSMENT — PAIN DESCRIPTION - LOCATION: LOCATION: RIB CAGE

## 2024-03-09 NOTE — PROGRESS NOTES
In-Patient Progress Note    Patient:  Lynette Gonsalves 74 y.o. female MRN: [id number]     Date of Service: 3/9/2024    Hospital Day: 3      Chief complaint: had concerns including Respiratory Distress.      Subjective   Patient seen and examined in the morning. Patient states she feels much better. Cough is better. SOB and dyspnea has improved. Currently A Flutter with RVR intermittently. Does state she had a nose bleed overnight. Also complaining of constipation.       See ROS    I have reviewed all pertinent PMHx, PSHx, FamHx, SocialHx, medications, and allergies and updated history as appropriate. I have reviewed images as appropriate.     Assessment and Plan   Lynette Gonsalves, a 74 y.o. female, with a history of CAD, TAVR, PPM,COPD on 5L NC at [facility], HTN, HLD, PAD, TIA  was admitted on 3/7/2024 with complaints of had concerns including Respiratory Distress.    Assessment  Acute on Chronic resp Failure with Hypoxia 2/2 HFrEF and Rhinovirus   Initially required 15L/min NRB   Now down to baseline 5L/min   Concern for heart failure on IV diuretics   Also with Rhinovirus     Concern for Severe Sepsis with possible Superimposed Pneumonia on top of Rhinovirus - Present on admission   On ceftriaxone and azithromycin   Initial HR >90, RR > 20, WBC >12.5  Initial LA 2.1 but repeat resolved   Procal elevated at 1.5     Acute on Chronic HFrEF  Pro-BNP elevated > 10,000  CXR suggestive of congestion   Cardiology on board     A Flutter with RVR   Intermittently   Cardio on board     VHD   Has severe MR   H/O TAVR     MARK on CKD IV  Baseline Cr 2.2-2.5  Cr worsening at 3.8  Nephro on board     Nose Bleed 2/2 likely Dry O2   Had nose bleed overnight     Constipation     PAF s/p Pacer   On amio     H/O CAD with elevated trop, s/p CABG and PCI in the past   H/O COPD   H/O HTN, HLD   Chronic Anemia - Stable   H/O Hypothyroidism   TSH 0.581, FT4 1.91      Plan  F/U Final cultures   Continue azithromycin 500mg IV Q24H and  Nucleic Acid Amplification         Influenza A/B, Molecular [[id number]] Collected: 03/07/24 0732    Order Status: Completed Specimen: Nasal Updated: 03/07/24 0817     Influenza A Antigen NOT DETECTED     Comment: A negative result does not rule out Influenzae A&B.  METHODOLOGY: Isothermal Nucleic Acid Amplification          Influenza B Antigen NOT DETECTED     Comment: A negative result does not rule out Influenzae A&B.  METHODOLOGY: Isothermal Nucleic Acid Amplification                     Electronically signed by London Bray MD on 3/9/2024 at 1:02 PM

## 2024-03-09 NOTE — PROGRESS NOTES
Cardiology Progress Note     Admit Date:  3/7/2024      Subjective and  Overnight Events : Patient states that her breathing is improved considerably.        Physical Exam:  Vitals:    03/09/24 1202   BP: (!) 96/54   Pulse: (!) 115   Resp: 18   Temp: 98.7 °F (37.1 °C)   SpO2: 99%        General Appearance:  No distress, conversant  Respiratory:  Normal breath sounds, No respiratory distress, No wheezing  Cardiovascular: S1, S2, no murmurs, gallops. JVD wnl  Abdomen /GI:  Bowel sounds normal, Soft, No tenderness   Integument:  Well hydrated, no rash   Neurologic:  Alert & oriented x 3, no focal deficits noted       Lab Data:  CBC:   Recent Labs     03/07/24  0734 03/08/24  0704 03/09/24  0314   WBC 13.5* 8.6 7.3   HGB 9.1* 8.6* 8.7*   HCT 31.6* 29.2* 32.0*   MCV 95.5 92.4 100.3*    177 168     BMP:   Recent Labs     03/07/24  0734 03/08/24  0835 03/08/24  1635 03/09/24  0314   * 136  --  138   K 4.9 3.8 4.5 4.3    101  --  102   CO2 22 23  --  22   BUN 46* 50*  --  58*   CREATININE 3.1* 3.1*  --  3.8*     PT/INR:   Recent Labs     03/07/24  0734   PROTIME 15.3*   INR 1.2     BNP:    Recent Labs     03/07/24  0734   PROBNP 10,221*     TROPONIN: No results for input(s): \"TROPONINT\" in the last 72 hours.       Assessment and Recommendations:      Atrial flutter with controlled ventricular rate  Acute on chronic HFrEF  S/p mitral valve replacement with bioprosthetic mitral valve with severe regurgitation  Chronic anemia  Coronary artery disease status post single-vessel CABG        As above she is feeling better.  We will continue with Lasix 40 mg IV for 1 more day and then switch to oral.  Will also continue with the hydralazine and isosorbide mononitrate along with carvedilol.  If she continues to be in atrial flutter we will then consider doing cardioversion on Monday.  All labs, medications and tests reviewed by myself ,  continue all other medications of all above medical condition listed as is except for changes mentioned above.  Thank you very much for consult , please call with questions.    Rocio Carrasquillo MD, MD 3/9/2024 12:55 PM

## 2024-03-09 NOTE — PROGRESS NOTES
Cardiology Progress Note     Today's Plan: Stop Lopressor, NPO after Midnight for RAMIRO/DCCV in am    Admit Date:  3/7/2024    Consult reason/ Seen today for: CHF    Subjective and  Overnight Events:  Denies any chest pain, SOB, or palpations.     Assessment / Plan / Recommendation:     SOB: Multifactorial-anemia, valvoplasty, CHF.  Severe mitral valve insufficiency: S/P MVR in 6/2016, recent echo shows EF 35 to 40%.  Nephrology managing diuretics.   A-fib: Last cardioversion 1 year prior has remained on amiodarone.  Off anticoagulation due to anemia.  Awaiting watchman. Stop lopressor IVPB already on Coreg. Plan for RAMIRO/DCCV in am, NPO after midnight.   PPM: Stable device.  CAD: CABG x 1 (SVG-RCA), stable continue with aspirin, statin, beta-blocker  HTN:soft B/P requiring midodrine. continue Coreg 6.25 mg twice daily, Lasix 40 mg twice daily, hydralazine 25 mg Q8, midodrine 5 mg 3 times daily for BP support.  Can titrate accordingly.  PAD: S/p aortofemoral bypass  HLD: continue statin.   DVT prophylaxis if not contraindicated.      History of Presenting Illness:    Chief complain on admission : 74 y.o.year old who is admitted for  Chief Complaint   Patient presents with    Respiratory Distress        Past medical history:    has a past medical history of CAD (coronary artery disease), COPD (chronic obstructive pulmonary disease) ([facility]), GERD (gastroesophageal reflux disease), H/O cardiovascular stress test, H/O echocardiogram, H/O echocardiogram, H/O percutaneous left heart catheterization, H/O transesophageal echocardiography (RAMIRO) for monitoring, Heart attack ([facility]), History of transesophageal echocardiography (RAMIRO), Hyperlipidemia, Hypertension, On home oxygen therapy, PAD (peripheral artery disease) ([facility]), S/P right coronary artery (RCA) stent placement, Sleep apnea, T2 vertebral fracture ([facility]), TIA (transient ischemic attack), Tobacco dependence, Wears glasses, and

## 2024-03-09 NOTE — PLAN OF CARE
stability  3/9/2024 1021 by Gina Maravilla RN  Outcome: Progressing  3/9/2024 0421 by Franck Fong RN  Outcome: Progressing     Problem: ABCDS Injury Assessment  Goal: Absence of physical injury  3/9/2024 1021 by Gina Maravilla RN  Outcome: Progressing  3/9/2024 0421 by Franck Fong RN  Outcome: Progressing

## 2024-03-09 NOTE — PROGRESS NOTES
Nephrology Progress Note        [address] [facility], [address] [place] [address]  Phone: [phone]  Office Hours: 8:30AM - 4:30PM  Monday - Friday        3/9/2024 11:23 AM  Subjective:   Admit Date: 3/7/2024  PCP: Francisco Barriga MD  Interval History: on NC  Not much urine, per chart    Diet: ADULT DIET; Regular; Low Fat/Low Chol/High Fiber/2 gm Na; No Added Salt (3-4 gm); 2000 ml      Data:   Scheduled Meds:   sennosides-docusate sodium  2 tablet Oral Daily    [START ON 3/10/2024] furosemide  40 mg IntraVENous Daily    clopidogrel  75 mg Oral Daily    carvedilol  6.25 mg Oral BID    levothyroxine  75 mcg Oral Daily    midodrine  5 mg Oral TID WC    cefTRIAXone (ROCEPHIN) IV  1,000 mg IntraVENous Q24H    azithromycin  500 mg IntraVENous Q24H    heparin (porcine)  5,000 Units SubCUTAneous 3 times per day    metoprolol (LOPRESSOR) 5 mg in sodium chloride 0.9 % 50 mL IVPB  5 mg IntraVENous Q6H    EPINEPHrine  1 puff Each Nostril Once    white petrolatum   Topical Once    amiodarone  200 mg Oral Daily    aspirin  81 mg Oral Daily    atorvastatin  40 mg Oral Daily    [Held by provider] empagliflozin  10 mg Oral Daily    hydrALAZINE  25 mg Oral 3 times per day    isosorbide mononitrate  30 mg Oral Daily    pantoprazole  40 mg Oral Daily    sodium chloride flush  5-40 mL IntraVENous 2 times per day    budesonide-formoterol  2 puff Inhalation BID     Continuous Infusions:   sodium chloride       PRN Meds:albuterol sulfate HFA, sodium chloride flush, sodium chloride, ondansetron **OR** ondansetron, polyethylene glycol, acetaminophen **OR** acetaminophen, potassium chloride **OR** potassium alternative oral replacement **OR** potassium chloride, magnesium sulfate  I/O last 3 completed shifts:  In: 240 [P.O.:240]  Out: 480 [Urine:480]  No intake/output data recorded.    Intake/Output Summary (Last 24 hours) at 3/9/2024 1123  Last data filed at 3/8/2024 1729  Gross per 24 hour   Intake 240 ml   Output 480 ml

## 2024-03-10 LAB
ALBUMIN SERPL-MCNC: 3.4 GM/DL (ref 3.4–5)
ALP BLD-CCNC: 87 IU/L (ref 40–129)
ALT SERPL-CCNC: 93 U/L (ref 10–40)
ANION GAP SERPL CALCULATED.3IONS-SCNC: 10 MMOL/L (ref 7–16)
AST SERPL-CCNC: 104 IU/L (ref 15–37)
BASOPHILS ABSOLUTE: 0.1 K/CU MM
BASOPHILS RELATIVE PERCENT: 0.9 % (ref 0–1)
BILIRUB SERPL-MCNC: 0.2 MG/DL (ref 0–1)
BILIRUBIN DIRECT: 0.2 MG/DL (ref 0–0.3)
BILIRUBIN, INDIRECT: 0 MG/DL (ref 0–0.7)
BUN SERPL-MCNC: 58 MG/DL (ref 6–23)
CALCIUM SERPL-MCNC: 9 MG/DL (ref 8.3–10.6)
CHLORIDE BLD-SCNC: 102 MMOL/L (ref 99–110)
CO2: 24 MMOL/L (ref 21–32)
CREAT SERPL-MCNC: 3.8 MG/DL (ref 0.6–1.1)
DIFFERENTIAL TYPE: ABNORMAL
EKG ATRIAL RATE: 104 BPM
EKG DIAGNOSIS: NORMAL
EKG Q-T INTERVAL: 378 MS
EKG QRS DURATION: 156 MS
EKG QTC CALCULATION (BAZETT): 506 MS
EKG R AXIS: 237 DEGREES
EKG T AXIS: 63 DEGREES
EKG VENTRICULAR RATE: 108 BPM
EOSINOPHILS ABSOLUTE: 0.5 K/CU MM
EOSINOPHILS RELATIVE PERCENT: 6.4 % (ref 0–3)
GFR SERPL CREATININE-BSD FRML MDRD: 12 ML/MIN/1.73M2
GLUCOSE SERPL-MCNC: 101 MG/DL (ref 70–99)
HCT VFR BLD CALC: 27.2 % (ref 37–47)
HEMOGLOBIN: 7.9 GM/DL (ref 12.5–16)
IMMATURE NEUTROPHIL %: 0.4 % (ref 0–0.43)
LYMPHOCYTES ABSOLUTE: 1.3 K/CU MM
LYMPHOCYTES RELATIVE PERCENT: 17.9 % (ref 24–44)
MAGNESIUM: 2.2 MG/DL (ref 1.8–2.4)
MCH RBC QN AUTO: 27.5 PG (ref 27–31)
MCHC RBC AUTO-ENTMCNC: 29 % (ref 32–36)
MCV RBC AUTO: 94.8 FL (ref 78–100)
MONOCYTES ABSOLUTE: 0.9 K/CU MM
MONOCYTES RELATIVE PERCENT: 11.7 % (ref 0–4)
NUCLEATED RBC %: 0 %
PDW BLD-RTO: 16 % (ref 11.7–14.9)
PLATELET # BLD: 178 K/CU MM (ref 140–440)
PMV BLD AUTO: 11.6 FL (ref 7.5–11.1)
POTASSIUM SERPL-SCNC: 4 MMOL/L (ref 3.5–5.1)
PRO-BNP: ABNORMAL PG/ML
RBC # BLD: 2.87 M/CU MM (ref 4.2–5.4)
SEGMENTED NEUTROPHILS ABSOLUTE COUNT: 4.6 K/CU MM
SEGMENTED NEUTROPHILS RELATIVE PERCENT: 62.7 % (ref 36–66)
SODIUM BLD-SCNC: 136 MMOL/L (ref 135–145)
TOTAL IMMATURE NEUTOROPHIL: 0.03 K/CU MM
TOTAL NUCLEATED RBC: 0 K/CU MM
TOTAL PROTEIN: 6.4 GM/DL (ref 6.4–8.2)
WBC # BLD: 7.4 K/CU MM (ref 4–10.5)

## 2024-03-10 PROCEDURE — 80053 COMPREHEN METABOLIC PANEL: CPT

## 2024-03-10 PROCEDURE — 94640 AIRWAY INHALATION TREATMENT: CPT

## 2024-03-10 PROCEDURE — 2580000003 HC RX 258: Performed by: INTERNAL MEDICINE

## 2024-03-10 PROCEDURE — 83880 ASSAY OF NATRIURETIC PEPTIDE: CPT

## 2024-03-10 PROCEDURE — 36415 COLL VENOUS BLD VENIPUNCTURE: CPT

## 2024-03-10 PROCEDURE — 6370000000 HC RX 637 (ALT 250 FOR IP): Performed by: INTERNAL MEDICINE

## 2024-03-10 PROCEDURE — 83735 ASSAY OF MAGNESIUM: CPT

## 2024-03-10 PROCEDURE — 93010 ELECTROCARDIOGRAM REPORT: CPT | Performed by: INTERNAL MEDICINE

## 2024-03-10 PROCEDURE — 82248 BILIRUBIN DIRECT: CPT

## 2024-03-10 PROCEDURE — APPSS60 APP SPLIT SHARED TIME 46-60 MINUTES: Performed by: NURSE PRACTITIONER

## 2024-03-10 PROCEDURE — 2060000000 HC ICU INTERMEDIATE R&B

## 2024-03-10 PROCEDURE — 6370000000 HC RX 637 (ALT 250 FOR IP): Performed by: FAMILY MEDICINE

## 2024-03-10 PROCEDURE — 6360000002 HC RX W HCPCS: Performed by: INTERNAL MEDICINE

## 2024-03-10 PROCEDURE — 85025 COMPLETE CBC W/AUTO DIFF WBC: CPT

## 2024-03-10 PROCEDURE — 2500000003 HC RX 250 WO HCPCS: Performed by: INTERNAL MEDICINE

## 2024-03-10 PROCEDURE — 99233 SBSQ HOSP IP/OBS HIGH 50: CPT | Performed by: INTERNAL MEDICINE

## 2024-03-10 PROCEDURE — 2580000003 HC RX 258: Performed by: FAMILY MEDICINE

## 2024-03-10 RX ADMIN — LEVOTHYROXINE SODIUM 75 MCG: 75 TABLET ORAL at 08:25

## 2024-03-10 RX ADMIN — CARVEDILOL 6.25 MG: 6.25 TABLET, FILM COATED ORAL at 20:34

## 2024-03-10 RX ADMIN — MIDODRINE HYDROCHLORIDE 5 MG: 5 TABLET ORAL at 14:06

## 2024-03-10 RX ADMIN — HEPARIN SODIUM 5000 UNITS: 5000 INJECTION INTRAVENOUS; SUBCUTANEOUS at 14:06

## 2024-03-10 RX ADMIN — ISOSORBIDE MONONITRATE 30 MG: 30 TABLET, EXTENDED RELEASE ORAL at 08:25

## 2024-03-10 RX ADMIN — HEPARIN SODIUM 5000 UNITS: 5000 INJECTION INTRAVENOUS; SUBCUTANEOUS at 05:15

## 2024-03-10 RX ADMIN — AMIODARONE HYDROCHLORIDE 200 MG: 200 TABLET ORAL at 08:25

## 2024-03-10 RX ADMIN — CARVEDILOL 6.25 MG: 6.25 TABLET, FILM COATED ORAL at 08:25

## 2024-03-10 RX ADMIN — HEPARIN SODIUM 5000 UNITS: 5000 INJECTION INTRAVENOUS; SUBCUTANEOUS at 20:36

## 2024-03-10 RX ADMIN — CEFTRIAXONE 1000 MG: 1 INJECTION, POWDER, FOR SOLUTION INTRAMUSCULAR; INTRAVENOUS at 14:04

## 2024-03-10 RX ADMIN — SODIUM CHLORIDE, PRESERVATIVE FREE 10 ML: 5 INJECTION INTRAVENOUS at 20:39

## 2024-03-10 RX ADMIN — METOPROLOL TARTRATE 5 MG: 5 INJECTION INTRAVENOUS at 05:15

## 2024-03-10 RX ADMIN — ATORVASTATIN CALCIUM 40 MG: 40 TABLET, FILM COATED ORAL at 08:25

## 2024-03-10 RX ADMIN — MIDODRINE HYDROCHLORIDE 5 MG: 5 TABLET ORAL at 08:25

## 2024-03-10 RX ADMIN — PANTOPRAZOLE SODIUM 40 MG: 40 TABLET, DELAYED RELEASE ORAL at 08:25

## 2024-03-10 RX ADMIN — HYDRALAZINE HYDROCHLORIDE 25 MG: 25 TABLET ORAL at 21:47

## 2024-03-10 RX ADMIN — BUDESONIDE AND FORMOTEROL FUMARATE DIHYDRATE 2 PUFF: 80; 4.5 AEROSOL RESPIRATORY (INHALATION) at 19:33

## 2024-03-10 RX ADMIN — FUROSEMIDE 40 MG: 10 INJECTION, SOLUTION INTRAMUSCULAR; INTRAVENOUS at 08:26

## 2024-03-10 RX ADMIN — BUDESONIDE AND FORMOTEROL FUMARATE DIHYDRATE 2 PUFF: 80; 4.5 AEROSOL RESPIRATORY (INHALATION) at 12:20

## 2024-03-10 RX ADMIN — ASPIRIN 81 MG: 81 TABLET, CHEWABLE ORAL at 08:25

## 2024-03-10 RX ADMIN — MIDODRINE HYDROCHLORIDE 5 MG: 5 TABLET ORAL at 16:37

## 2024-03-10 RX ADMIN — AZITHROMYCIN MONOHYDRATE 500 MG: 500 INJECTION, POWDER, LYOPHILIZED, FOR SOLUTION INTRAVENOUS at 14:20

## 2024-03-10 RX ADMIN — DOCUSATE SODIUM AND SENNOSIDES 2 TABLET: 8.6; 5 TABLET, FILM COATED ORAL at 08:25

## 2024-03-10 RX ADMIN — CLOPIDOGREL BISULFATE 75 MG: 75 TABLET ORAL at 08:25

## 2024-03-10 RX ADMIN — SODIUM CHLORIDE, PRESERVATIVE FREE 10 ML: 5 INJECTION INTRAVENOUS at 08:26

## 2024-03-10 ASSESSMENT — PAIN SCALES - GENERAL
PAINLEVEL_OUTOF10: 0
PAINLEVEL_OUTOF10: 0

## 2024-03-10 NOTE — PROGRESS NOTES
(37.1 °C)      Patient Vitals for the past 6 hrs:   BP Temp Temp src Pulse Resp SpO2   03/10/24 1203 -- -- -- (!) 120 19 --   03/10/24 1150 104/61 97.9 °F (36.6 °C) Oral (!) 115 18 100 %   03/10/24 1100 (!) 106/59 -- -- (!) 106 18 100 %   03/10/24 0737 115/78 98.2 °F (36.8 °C) Oral 94 14 --   03/10/24 0700 115/78 98.2 °F (36.8 °C) Oral 94 14 97 %           Intake/Output Summary (Last 24 hours) at 3/10/2024 1205  Last data filed at 3/10/2024 1203  Gross per 24 hour   Intake 10 ml   Output 1650 ml   Net -1640 ml       Wt Readings from Last 2 Encounters:   03/10/24 68.9 kg (151 lb 14.4 oz)   03/01/24 70.5 kg (155 lb 6.4 oz)     Body mass index is 27.95 kg/m².      Physical Exam  Vitals and nursing note reviewed.   Constitutional:       General: She is not in acute distress.     Appearance: She is not ill-appearing, toxic-appearing or diaphoretic.   HENT:      Head: Normocephalic and atraumatic.      Right Ear: External ear normal.      Left Ear: External ear normal.      Nose: Nose normal.      Mouth/Throat:      Pharynx: Oropharynx is clear. No oropharyngeal exudate or posterior oropharyngeal erythema.   Eyes:      General: No scleral icterus.        Right eye: No discharge.         Left eye: No discharge.      Conjunctiva/sclera: Conjunctivae normal.   Cardiovascular:      Rate and Rhythm: Regular rhythm. Tachycardia present.      Pulses: Normal pulses.      Heart sounds: Normal heart sounds. No murmur heard.     No friction rub. No gallop.   Pulmonary:      Effort: Pulmonary effort is normal. No tachypnea or respiratory distress.      Breath sounds: Decreased air movement and transmitted upper airway sounds present. No stridor. Rhonchi (Improving) present. No decreased breath sounds, wheezing or rales.   Abdominal:      General: Bowel sounds are normal. There is no distension.      Palpations: Abdomen is soft. There is no mass.      Tenderness: There is no abdominal tenderness. There is no guarding or rebound.       be tested with an alternate   molecular assay.  Negative results do not preclude SARS-CoV-2 infection and should not be used as the sole   basis for patient management decisions.          This test has been authorized by the FDA under an Emergency Use Authorization (EUA) for use   by authorized laboratories.  The ID NOW COVID-19 assay is designed to detect the virus that causes COVID-19 in patients   with signs and symptoms of infection who are suspected of COVID-19.  An individual without symptoms of COVID-19 and who is not shedding SARS-CoV-2 virus would   expect to have a negative (not detected) result in this assay.          Fact sheet for Healthcare Providers: https://www.fda.gov/media/409727/download  Fact sheet for Patients: https://www.fda.gov/media/268624/download          METHODOLOGY: Isothermal Nucleic Acid Amplification         Influenza A/B, Molecular [[id number]] Collected: 03/07/24 0732    Order Status: Completed Specimen: Nasal Updated: 03/07/24 0817     Influenza A Antigen NOT DETECTED     Comment: A negative result does not rule out Influenzae A&B.  METHODOLOGY: Isothermal Nucleic Acid Amplification          Influenza B Antigen NOT DETECTED     Comment: A negative result does not rule out Influenzae A&B.  METHODOLOGY: Isothermal Nucleic Acid Amplification                     Electronically signed by London Bray MD on 3/10/2024 at 12:05 PM

## 2024-03-10 NOTE — PLAN OF CARE
Problem: Safety - Adult  Goal: Free from fall injury  3/10/2024 0734 by Elaine Figueroa RN  Outcome: Progressing  3/10/2024 0056 by Pat Manning RN  Outcome: Progressing     Problem: Discharge Planning  Goal: Discharge to home or other facility with appropriate resources  3/10/2024 0734 by Elaine Figueroa RN  Outcome: Progressing  3/10/2024 0056 by Pat Manning RN  Outcome: Progressing     Problem: Neurosensory - Adult  Goal: Achieves stable or improved neurological status  3/10/2024 0734 by Elaine Figueroa RN  Outcome: Progressing  3/10/2024 0056 by Pat Manning RN  Outcome: Progressing  Goal: Achieves maximal functionality and self care  3/10/2024 0734 by Elaine Figueroa RN  Outcome: Progressing  3/10/2024 0056 by Pat Manning RN  Outcome: Progressing     Problem: Respiratory - Adult  Goal: Achieves optimal ventilation and oxygenation  3/10/2024 0734 by Elaine Figueroa RN  Outcome: Progressing  3/10/2024 0056 by Pat Manning RN  Outcome: Progressing     Problem: Genitourinary - Adult  Goal: Absence of urinary retention  3/10/2024 0734 by Elaine Figueroa RN  Outcome: Progressing  3/10/2024 0056 by Pat Manning RN  Outcome: Progressing     Problem: Infection - Adult  Goal: Absence of infection at discharge  3/10/2024 0734 by Elaine Figueroa RN  Outcome: Progressing  3/10/2024 0056 by Pat Manning RN  Outcome: Progressing  Goal: Absence of infection during hospitalization  3/10/2024 0734 by Elaine Figueroa RN  Outcome: Progressing  3/10/2024 0056 by Pat Manning RN  Outcome: Progressing     Problem: Metabolic/Fluid and Electrolytes - Adult  Goal: Electrolytes maintained within normal limits  3/10/2024 0734 by Elaine Figueroa RN  Outcome: Progressing  3/10/2024 0056 by Casareno, Pat Bere Calado, RN  Outcome: Progressing  Goal: Hemodynamic stability and optimal  renal function maintained  3/10/2024 0734 by Elaine Figueroa RN  Outcome: Progressing  3/10/2024 0056 by Pat Manning RN  Outcome: Progressing     Problem: ABCDS Injury Assessment  Goal: Absence of physical injury  3/10/2024 0734 by Elaine Figueroa RN  Outcome: Progressing  3/10/2024 0056 by Pat Manning RN  Outcome: Progressing     Problem: Hematologic - Adult  Goal: Maintains hematologic stability  3/10/2024 0734 by Elaine Figueroa RN  Outcome: Progressing  3/10/2024 0056 by Pat Manning RN  Outcome: Progressing     Problem: Chronic Conditions and Co-morbidities  Goal: Patient's chronic conditions and co-morbidity symptoms are monitored and maintained or improved  3/10/2024 0734 by Elaine Figueroa RN  Outcome: Progressing  3/10/2024 0056 by Pat Manning RN  Outcome: Progressing

## 2024-03-10 NOTE — PLAN OF CARE
Problem: Safety - Adult  Goal: Free from fall injury  Outcome: Progressing     Problem: Discharge Planning  Goal: Discharge to home or other facility with appropriate resources  Outcome: Progressing     Problem: Neurosensory - Adult  Goal: Achieves stable or improved neurological status  Outcome: Progressing  Goal: Achieves maximal functionality and self care  Outcome: Progressing     Problem: Respiratory - Adult  Goal: Achieves optimal ventilation and oxygenation  Outcome: Progressing     Problem: Cardiovascular - Adult  Goal: Maintains optimal cardiac output and hemodynamic stability  Outcome: Progressing  Goal: Absence of cardiac dysrhythmias or at baseline  Outcome: Progressing     Problem: Skin/Tissue Integrity - Adult  Goal: Skin integrity remains intact  Outcome: Progressing  Goal: Incisions, wounds, or drain sites healing without S/S of infection  Outcome: Progressing  Goal: Oral mucous membranes remain intact  Outcome: Progressing     Problem: Musculoskeletal - Adult  Goal: Return mobility to safest level of function  Outcome: Progressing  Goal: Return ADL status to a safe level of function  Outcome: Progressing     Problem: Gastrointestinal - Adult  Goal: Minimal or absence of nausea and vomiting  Outcome: Progressing  Goal: Maintains or returns to baseline bowel function  Outcome: Progressing  Goal: Maintains adequate nutritional intake  Outcome: Progressing     Problem: Genitourinary - Adult  Goal: Absence of urinary retention  Outcome: Progressing     Problem: Infection - Adult  Goal: Absence of infection at discharge  Outcome: Progressing  Goal: Absence of infection during hospitalization  Outcome: Progressing     Problem: Metabolic/Fluid and Electrolytes - Adult  Goal: Electrolytes maintained within normal limits  Outcome: Progressing  Goal: Hemodynamic stability and optimal renal function maintained  Outcome: Progressing     Problem: Hematologic - Adult  Goal: Maintains hematologic

## 2024-03-10 NOTE — PROGRESS NOTES
Net -830 ml       CBC:   Recent Labs     03/08/24  0704 03/09/24  0314 03/10/24  0314   WBC 8.6 7.3 7.4   HGB 8.6* 8.7* 7.9*    168 178       BMP:    Recent Labs     03/08/24  0835 03/08/24  1635 03/09/24 0314 03/10/24  0314     --  138 136   K 3.8 4.5 4.3 4.0     --  102 102   CO2 23  --  22 24   BUN 50*  --  58* 58*   CREATININE 3.1*  --  3.8* 3.8*   GLUCOSE 110*  --  98 101*   CALCIUM 8.6  --  9.0 9.0     Hepatic:   Recent Labs     03/08/24  0835 03/09/24  0314 03/10/24  0314   AST 32 77* 104*   ALT 33 73* 93*   BILITOT 0.3 0.5 0.2   ALKPHOS 47 84 87     Troponin: No results for input(s): \"TROPONINI\" in the last 72 hours.  BNP: No results for input(s): \"BNP\" in the last 72 hours.  Lipids:   Recent Labs     03/08/24  0835   CHOL 111   HDL 42     ABGs:   Lab Results   Component Value Date/Time    PO2ART 47 09/06/2023 12:15 PM    HIP5EYW 52.0 09/06/2023 12:15 PM     INR:   Recent Labs     03/07/24  0734   INR 1.2       Objective:   Vitals: BP 95/71   Pulse 86   Temp 98.1 °F (36.7 °C) (Oral)   Resp 15   Ht 1.57 m (5' 1.81\")   Wt 68.9 kg (151 lb 14.4 oz)   SpO2 99%   BMI 27.95 kg/m²   General appearance: alert and cooperative with exam, in no acute distress  HEENT: normocephalic, atraumatic,   Neck: supple, trachea midline  Lungs:breathing comfortably on nc  Heart::tachycardic  Abdomen: soft, non-tender; non distended,  Extremities: extremities atraumatic, no cyanosis or edema  Neurologic: alert, oriented, follows commands, interactive    MEDICAL DECISION MAKING     -MARK from ATN: cr 3.8 from 3.1 from 2.4 at baseline  -CKD4  -Hypervolemia  -Acute on chronic CHF  -Transaminitis: congestive?  -Hx TAVR  -Anemia     Suggest:  -Cr 3.8, stable  -Continue IV lasix today  -Limit oral fluid to 1800ml  -Avoid nephrotoxins                          Electronically signed by Ta Agarwal DO on 3/10/2024 at 7:10 AM    [facility]  MD JOE MARTINEZ,  DO  [address] [facility],  [place] [address]  PHONE: [phone]  FAX: [phone]

## 2024-03-11 ENCOUNTER — APPOINTMENT (OUTPATIENT)
Dept: NON INVASIVE DIAGNOSTICS | Age: 74
End: 2024-03-11
Payer: MEDICARE

## 2024-03-11 LAB
ANION GAP SERPL CALCULATED.3IONS-SCNC: 12 MMOL/L (ref 7–16)
BUN SERPL-MCNC: 52 MG/DL (ref 6–23)
CALCIUM SERPL-MCNC: 8.9 MG/DL (ref 8.3–10.6)
CHLORIDE BLD-SCNC: 100 MMOL/L (ref 99–110)
CO2: 24 MMOL/L (ref 21–32)
CREAT SERPL-MCNC: 3.5 MG/DL (ref 0.6–1.1)
GFR SERPL CREATININE-BSD FRML MDRD: 13 ML/MIN/1.73M2
GLUCOSE SERPL-MCNC: 101 MG/DL (ref 70–99)
MAGNESIUM: 2.1 MG/DL (ref 1.8–2.4)
POTASSIUM SERPL-SCNC: 4 MMOL/L (ref 3.5–5.1)
SODIUM BLD-SCNC: 136 MMOL/L (ref 135–145)

## 2024-03-11 PROCEDURE — 83735 ASSAY OF MAGNESIUM: CPT

## 2024-03-11 PROCEDURE — 6360000002 HC RX W HCPCS: Performed by: INTERNAL MEDICINE

## 2024-03-11 PROCEDURE — 94640 AIRWAY INHALATION TREATMENT: CPT

## 2024-03-11 PROCEDURE — 6370000000 HC RX 637 (ALT 250 FOR IP): Performed by: INTERNAL MEDICINE

## 2024-03-11 PROCEDURE — 6370000000 HC RX 637 (ALT 250 FOR IP): Performed by: FAMILY MEDICINE

## 2024-03-11 PROCEDURE — 36415 COLL VENOUS BLD VENIPUNCTURE: CPT

## 2024-03-11 PROCEDURE — 5A2204Z RESTORATION OF CARDIAC RHYTHM, SINGLE: ICD-10-PCS | Performed by: INTERNAL MEDICINE

## 2024-03-11 PROCEDURE — 99233 SBSQ HOSP IP/OBS HIGH 50: CPT | Performed by: INTERNAL MEDICINE

## 2024-03-11 PROCEDURE — 94761 N-INVAS EAR/PLS OXIMETRY MLT: CPT

## 2024-03-11 PROCEDURE — 6370000000 HC RX 637 (ALT 250 FOR IP)

## 2024-03-11 PROCEDURE — 2580000003 HC RX 258: Performed by: INTERNAL MEDICINE

## 2024-03-11 PROCEDURE — 94664 DEMO&/EVAL PT USE INHALER: CPT

## 2024-03-11 PROCEDURE — 7100000010 HC PHASE II RECOVERY - FIRST 15 MIN: Performed by: INTERNAL MEDICINE

## 2024-03-11 PROCEDURE — 93312 ECHO TRANSESOPHAGEAL: CPT

## 2024-03-11 PROCEDURE — APPNB15 APP NON BILLABLE TIME 0-15 MINS

## 2024-03-11 PROCEDURE — 94660 CPAP INITIATION&MGMT: CPT

## 2024-03-11 PROCEDURE — 80048 BASIC METABOLIC PNL TOTAL CA: CPT

## 2024-03-11 PROCEDURE — 2580000003 HC RX 258: Performed by: FAMILY MEDICINE

## 2024-03-11 PROCEDURE — 99152 MOD SED SAME PHYS/QHP 5/>YRS: CPT | Performed by: INTERNAL MEDICINE

## 2024-03-11 PROCEDURE — 93312 ECHO TRANSESOPHAGEAL: CPT | Performed by: INTERNAL MEDICINE

## 2024-03-11 PROCEDURE — 7100000011 HC PHASE II RECOVERY - ADDTL 15 MIN: Performed by: INTERNAL MEDICINE

## 2024-03-11 PROCEDURE — 93005 ELECTROCARDIOGRAM TRACING: CPT | Performed by: INTERNAL MEDICINE

## 2024-03-11 PROCEDURE — 99153 MOD SED SAME PHYS/QHP EA: CPT | Performed by: INTERNAL MEDICINE

## 2024-03-11 PROCEDURE — 2700000000 HC OXYGEN THERAPY PER DAY

## 2024-03-11 PROCEDURE — 2060000000 HC ICU INTERMEDIATE R&B

## 2024-03-11 RX ORDER — LIDOCAINE HYDROCHLORIDE 20 MG/ML
SOLUTION OROPHARYNGEAL PRN
Status: COMPLETED | OUTPATIENT
Start: 2024-03-11 | End: 2024-03-11

## 2024-03-11 RX ORDER — MIDAZOLAM HYDROCHLORIDE 1 MG/ML
INJECTION INTRAMUSCULAR; INTRAVENOUS PRN
Status: COMPLETED | OUTPATIENT
Start: 2024-03-11 | End: 2024-03-11

## 2024-03-11 RX ORDER — FENTANYL CITRATE 50 UG/ML
INJECTION, SOLUTION INTRAMUSCULAR; INTRAVENOUS PRN
Status: COMPLETED | OUTPATIENT
Start: 2024-03-11 | End: 2024-03-11

## 2024-03-11 RX ORDER — ASPIRIN 81 MG/1
81 TABLET, CHEWABLE ORAL DAILY
Status: DISCONTINUED | OUTPATIENT
Start: 2024-03-12 | End: 2024-03-15 | Stop reason: HOSPADM

## 2024-03-11 RX ORDER — BISACODYL 10 MG
10 SUPPOSITORY, RECTAL RECTAL DAILY PRN
Status: DISCONTINUED | OUTPATIENT
Start: 2024-03-11 | End: 2024-03-15 | Stop reason: HOSPADM

## 2024-03-11 RX ADMIN — BISACODYL 10 MG: 10 SUPPOSITORY RECTAL at 02:04

## 2024-03-11 RX ADMIN — MIDODRINE HYDROCHLORIDE 5 MG: 5 TABLET ORAL at 12:50

## 2024-03-11 RX ADMIN — CLOPIDOGREL BISULFATE 75 MG: 75 TABLET ORAL at 12:50

## 2024-03-11 RX ADMIN — FUROSEMIDE 40 MG: 10 INJECTION, SOLUTION INTRAMUSCULAR; INTRAVENOUS at 12:50

## 2024-03-11 RX ADMIN — APIXABAN 2.5 MG: 2.5 TABLET, FILM COATED ORAL at 20:39

## 2024-03-11 RX ADMIN — FENTANYL CITRATE 50 MCG: 50 INJECTION, SOLUTION INTRAMUSCULAR; INTRAVENOUS at 10:48

## 2024-03-11 RX ADMIN — MIDAZOLAM 2 MG: 1 INJECTION INTRAMUSCULAR; INTRAVENOUS at 10:48

## 2024-03-11 RX ADMIN — DOCUSATE SODIUM AND SENNOSIDES 2 TABLET: 8.6; 5 TABLET, FILM COATED ORAL at 12:51

## 2024-03-11 RX ADMIN — CEFTRIAXONE 1000 MG: 1 INJECTION, POWDER, FOR SOLUTION INTRAMUSCULAR; INTRAVENOUS at 14:34

## 2024-03-11 RX ADMIN — BUDESONIDE AND FORMOTEROL FUMARATE DIHYDRATE 2 PUFF: 80; 4.5 AEROSOL RESPIRATORY (INHALATION) at 19:49

## 2024-03-11 RX ADMIN — SODIUM CHLORIDE, PRESERVATIVE FREE 10 ML: 5 INJECTION INTRAVENOUS at 12:52

## 2024-03-11 RX ADMIN — ASPIRIN 81 MG: 81 TABLET, CHEWABLE ORAL at 12:50

## 2024-03-11 RX ADMIN — CARVEDILOL 6.25 MG: 6.25 TABLET, FILM COATED ORAL at 20:39

## 2024-03-11 RX ADMIN — SODIUM CHLORIDE, PRESERVATIVE FREE 10 ML: 5 INJECTION INTRAVENOUS at 20:40

## 2024-03-11 RX ADMIN — ISOSORBIDE MONONITRATE 30 MG: 30 TABLET, EXTENDED RELEASE ORAL at 12:50

## 2024-03-11 RX ADMIN — LIDOCAINE HYDROCHLORIDE 15 ML: 20 SOLUTION ORAL; TOPICAL at 10:39

## 2024-03-11 RX ADMIN — ALBUTEROL SULFATE 2 PUFF: 90 AEROSOL, METERED RESPIRATORY (INHALATION) at 04:59

## 2024-03-11 RX ADMIN — ATORVASTATIN CALCIUM 40 MG: 40 TABLET, FILM COATED ORAL at 12:49

## 2024-03-11 RX ADMIN — BUDESONIDE AND FORMOTEROL FUMARATE DIHYDRATE 2 PUFF: 80; 4.5 AEROSOL RESPIRATORY (INHALATION) at 07:09

## 2024-03-11 RX ADMIN — HYDRALAZINE HYDROCHLORIDE 25 MG: 25 TABLET ORAL at 23:05

## 2024-03-11 RX ADMIN — AMIODARONE HYDROCHLORIDE 200 MG: 200 TABLET ORAL at 12:49

## 2024-03-11 RX ADMIN — CARVEDILOL 6.25 MG: 6.25 TABLET, FILM COATED ORAL at 12:50

## 2024-03-11 RX ADMIN — PANTOPRAZOLE SODIUM 40 MG: 40 TABLET, DELAYED RELEASE ORAL at 12:50

## 2024-03-11 RX ADMIN — AZITHROMYCIN MONOHYDRATE 500 MG: 500 INJECTION, POWDER, LYOPHILIZED, FOR SOLUTION INTRAVENOUS at 15:19

## 2024-03-11 RX ADMIN — HYDRALAZINE HYDROCHLORIDE 25 MG: 25 TABLET ORAL at 14:36

## 2024-03-11 RX ADMIN — HEPARIN SODIUM 5000 UNITS: 5000 INJECTION INTRAVENOUS; SUBCUTANEOUS at 14:31

## 2024-03-11 RX ADMIN — MIDODRINE HYDROCHLORIDE 5 MG: 5 TABLET ORAL at 17:51

## 2024-03-11 ASSESSMENT — ENCOUNTER SYMPTOMS
ABDOMINAL DISTENTION: 0
CHEST TIGHTNESS: 0
STRIDOR: 0
COUGH: 0
CONSTIPATION: 0
NAUSEA: 0
BLOOD IN STOOL: 0
ABDOMINAL PAIN: 0
BACK PAIN: 0
VOMITING: 0
EYE PAIN: 0
SHORTNESS OF BREATH: 0
WHEEZING: 0
CHOKING: 0
DIARRHEA: 0

## 2024-03-11 ASSESSMENT — PAIN SCALES - WONG BAKER
WONGBAKER_NUMERICALRESPONSE: NO HURT
WONGBAKER_NUMERICALRESPONSE: 0
WONGBAKER_NUMERICALRESPONSE: NO HURT
WONGBAKER_NUMERICALRESPONSE: 0
WONGBAKER_NUMERICALRESPONSE: NO HURT
WONGBAKER_NUMERICALRESPONSE: NO HURT
WONGBAKER_NUMERICALRESPONSE: 0
WONGBAKER_NUMERICALRESPONSE: NO HURT
WONGBAKER_NUMERICALRESPONSE: 0
WONGBAKER_NUMERICALRESPONSE: 0
WONGBAKER_NUMERICALRESPONSE: NO HURT
WONGBAKER_NUMERICALRESPONSE: 0
WONGBAKER_NUMERICALRESPONSE: NO HURT

## 2024-03-11 ASSESSMENT — PAIN SCALES - GENERAL
PAINLEVEL_OUTOF10: 0

## 2024-03-11 NOTE — PLAN OF CARE
Problem: Safety - Adult  Goal: Free from fall injury  3/11/2024 1000 by Yasmany Beltre RN  Outcome: Progressing  3/11/2024 0332 by Pat Manning RN  Outcome: Progressing     Problem: Discharge Planning  Goal: Discharge to home or other facility with appropriate resources  3/11/2024 1000 by Yasmany Beltre RN  Outcome: Progressing  3/11/2024 0332 by Pat Manning RN  Outcome: Progressing     Problem: Neurosensory - Adult  Goal: Achieves stable or improved neurological status  3/11/2024 0332 by Pat Manning RN  Outcome: Progressing  Goal: Achieves maximal functionality and self care  3/11/2024 0332 by Pat Manning RN  Outcome: Progressing     Problem: Respiratory - Adult  Goal: Achieves optimal ventilation and oxygenation  3/11/2024 1000 by Yasmany Beltre RN  Outcome: Progressing  3/11/2024 0332 by Pat Manning RN  Outcome: Progressing     Problem: Cardiovascular - Adult  Goal: Maintains optimal cardiac output and hemodynamic stability  3/11/2024 1000 by Yasmany Beltre RN  Outcome: Progressing  3/11/2024 0332 by Pat Manning RN  Outcome: Progressing  Goal: Absence of cardiac dysrhythmias or at baseline  3/11/2024 1000 by Yasmany Beltre RN  Outcome: Progressing  3/11/2024 0332 by Pat Manning RN  Outcome: Progressing     Problem: Skin/Tissue Integrity - Adult  Goal: Skin integrity remains intact  3/11/2024 1000 by Yasmany Beltre RN  Outcome: Progressing  3/11/2024 0332 by Pat Manning RN  Outcome: Progressing  Goal: Incisions, wounds, or drain sites healing without S/S of infection  3/11/2024 1000 by Yasmany Beltre RN  Outcome: Progressing  3/11/2024 0332 by Pat Manning RN  Outcome: Progressing  Goal: Oral mucous membranes remain intact  3/11/2024 1000 by Yasmany Beltre RN  Outcome: Progressing  3/11/2024 0332 by Pat Manning  RN  Outcome: Progressing     Problem: Musculoskeletal - Adult  Goal: Return mobility to safest level of function  3/11/2024 1000 by Yasmany Beltre RN  Outcome: Progressing  3/11/2024 0332 by Pat Manning RN  Outcome: Progressing  Goal: Return ADL status to a safe level of function  3/11/2024 1000 by Yasmany Beltre RN  Outcome: Progressing  3/11/2024 0332 by Pat Manning RN  Outcome: Progressing     Problem: Gastrointestinal - Adult  Goal: Minimal or absence of nausea and vomiting  3/11/2024 1000 by Yasmany Beltre RN  Outcome: Progressing  3/11/2024 0332 by Pat Manning RN  Outcome: Progressing  Goal: Maintains or returns to baseline bowel function  3/11/2024 1000 by Yasmany Beltre RN  Outcome: Progressing  3/11/2024 0332 by Pat Manning RN  Outcome: Progressing  Goal: Maintains adequate nutritional intake  3/11/2024 1000 by Yasmany Beltre RN  Outcome: Progressing  3/11/2024 0332 by Pat Manning RN  Outcome: Progressing     Problem: Genitourinary - Adult  Goal: Absence of urinary retention  3/11/2024 1000 by Yasmany Beltre RN  Outcome: Progressing  3/11/2024 0332 by Pat Manning RN  Outcome: Progressing     Problem: Infection - Adult  Goal: Absence of infection at discharge  3/11/2024 0332 by Pat Manning RN  Outcome: Progressing  Goal: Absence of infection during hospitalization  3/11/2024 0332 by Pat Manning RN  Outcome: Progressing     Problem: Metabolic/Fluid and Electrolytes - Adult  Goal: Electrolytes maintained within normal limits  3/11/2024 0332 by Pat Manning RN  Outcome: Progressing  Goal: Hemodynamic stability and optimal renal function maintained  3/11/2024 0332 by Pat Manning RN  Outcome: Progressing     Problem: Hematologic - Adult  Goal: Maintains hematologic stability  3/11/2024 0332 by Pat Manning

## 2024-03-11 NOTE — CARE COORDINATION
03/11/24 1341   Service Assessment   Patient Orientation Alert and Oriented   Cognition Alert   History Provided By Medical Record   Primary Caregiver Family   Support Systems Spouse/Significant Other   Patient's Healthcare Decision Maker is: Legal Next of Kin   PCP Verified by CM Yes   Prior Functional Level Assistance with the following:;Bathing;Dressing   Current Functional Level Assistance with the following:;Bathing;Toileting;Mobility  (Hospital policy)   Can patient return to prior living arrangement Unknown at present   Ability to make needs known: Fair   Family able to assist with home care needs: Yes   Would you like for me to discuss the discharge plan with any other family members/significant others, and if so, who? Yes   Financial Resources Medicare   Social/Functional History   Lives With Spouse   Type of Home House   Home Equipment Oxygen;Walker, rolling   Receives Help From Family     Patient is from home with spouse; needs assistance for personal care as she becomes winded, tires out. She has Home O2 and RW . Patient has PCP and insurance that assists with Rx when needed. CM will follow for needs as he is having RAMIRO/CVN today. Janice Stanton, RN

## 2024-03-11 NOTE — PROGRESS NOTES
126 18 100 %   03/11/24 0937 -- -- (!) 113 16 100 %   03/11/24 0936 -- -- (!) 105 14 100 %   03/11/24 0935 -- -- (!) 113 13 100 %   03/11/24 0934 -- -- (!) 108 15 100 %   03/11/24 0933 -- -- (!) 112 16 100 %   03/11/24 0932 -- -- (!) 113 14 100 %   03/11/24 0931 -- -- (!) 113 16 97 %   03/11/24 0930 -- -- (!) 115 25 100 %   03/11/24 0929 -- -- (!) 114 22 --   03/11/24 0928 -- -- (!) 112 (!) 31 --   03/11/24 0927 -- -- (!) 113 22 100 %   03/11/24 0926 -- -- (!) 112 20 100 %   03/11/24 0924 -- -- (!) 109 17 100 %   03/11/24 0923 -- -- (!) 115 15 100 %   03/11/24 0922 -- -- (!) 114 18 100 %   03/11/24 0921 -- -- (!) 114 17 100 %   03/11/24 0920 -- -- (!) 113 15 100 %   03/11/24 0919 -- -- (!) 101 21 100 %   03/11/24 0918 -- -- (!) 113 18 100 %   03/11/24 0917 -- -- (!) 114 16 100 %   03/11/24 0916 -- -- (!) 109 15 --   03/11/24 0915 -- -- (!) 108 17 100 %   03/11/24 0914 -- -- (!) 109 18 100 %   03/11/24 0913 -- -- (!) 114 24 100 %   03/11/24 0912 -- -- (!) 108 18 100 %   03/11/24 0911 -- -- (!) 107 14 100 %   03/11/24 0910 -- -- (!) 108 20 100 %   03/11/24 0909 -- -- (!) 108 18 100 %   03/11/24 0908 -- -- (!) 108 18 100 %   03/11/24 0907 -- -- (!) 108 22 100 %   03/11/24 0906 -- -- (!) 110 20 (!) 89 %   03/11/24 0905 -- -- (!) 110 12 --   03/11/24 0904 -- -- (!) 109 16 (!) 87 %   03/11/24 0903 -- -- (!) 107 11 (!) 87 %   03/11/24 0901 -- -- (!) 117 17 91 %   03/11/24 0900 -- -- (!) 106 10 92 %   03/11/24 0859 -- -- (!) 127 14 91 %   03/11/24 0858 -- -- (!) 124 14 91 %   03/11/24 0857 -- -- (!) 107 15 92 %   03/11/24 0856 -- -- (!) 108 12 91 %   03/11/24 0855 -- -- (!) 107 (!) 9 90 %   03/11/24 0854 -- -- (!) 107 13 92 %   03/11/24 0853 -- -- (!) 110 11 90 %   03/11/24 0852 -- -- (!) 110 10 90 %   03/11/24 0851 -- -- (!) 109 12 95 %   03/11/24 0850 -- -- (!) 110 11 90 %   03/11/24 0849 -- -- (!) 110 10 93 %   03/11/24 0848 -- -- (!) 114 (!) 9 95 %   03/11/24 0847 -- -- (!) 108 (!) 9 95 %   03/11/24 0846 -- -- (!) 108  COVID-19 assay is designed to detect the virus that causes COVID-19 in patients   with signs and symptoms of infection who are suspected of COVID-19.  An individual without symptoms of COVID-19 and who is not shedding SARS-CoV-2 virus would   expect to have a negative (not detected) result in this assay.          Fact sheet for Healthcare Providers: https://www.fda.gov/media/175532/download  Fact sheet for Patients: https://www.fda.gov/media/135568/download          METHODOLOGY: Isothermal Nucleic Acid Amplification         Influenza A/B, Molecular [[id number]] Collected: 03/07/24 0732    Order Status: Completed Specimen: Nasal Updated: 03/07/24 0817     Influenza A Antigen NOT DETECTED     Comment: A negative result does not rule out Influenzae A&B.  METHODOLOGY: Isothermal Nucleic Acid Amplification          Influenza B Antigen NOT DETECTED     Comment: A negative result does not rule out Influenzae A&B.  METHODOLOGY: Isothermal Nucleic Acid Amplification                     Electronically signed by London Bray MD on 3/11/2024 at 1:33 PM

## 2024-03-11 NOTE — PROGRESS NOTES
Data:  CBC:   Recent Labs     03/09/24  0314 03/10/24  0314   WBC 7.3 7.4   HGB 8.7* 7.9*   HCT 32.0* 27.2*   .3* 94.8    178     BMP:   Recent Labs     03/09/24 0314 03/10/24  0314 03/11/24  0431    136 136   K 4.3 4.0 4.0    102 100   CO2 22 24 24   BUN 58* 58* 52*   CREATININE 3.8* 3.8* 3.5*     LIVER PROFILE:   Recent Labs     03/09/24  0314 03/10/24  0314   AST 77* 104*   ALT 73* 93*   BILIDIR 0.2 0.2   BILITOT 0.5 0.2   ALKPHOS 84 87     PT/INR: No results for input(s): \"PROTIME\", \"INR\" in the last 72 hours.  APTT: No results for input(s): \"APTT\" in the last 72 hours.  BNP:  No results for input(s): \"BNP\" in the last 72 hours.  TROPONIN: No results for input(s): \"TROPONINT\" in the last 72 hours.  Labs, consult, tests reviewed          Kaiden Huggins PA-C, 3/11/2024 1:21 PM     CARDIOLOGY ATTENDING ADDENDUM    MEDICAL DECISION MAKING:    Atrial flutter with controlled ventricular rate  Acute on chronic HFrEF  S/p mitral valve replacement with bioprosthetic mitral valve with severe regurgitation  Chronic anemia  Coronary artery disease status post single-vessel CABG    Continue current medications will start Eliquis 2.5 mg twice a day.  Will need outpatient watchman evaluation.  Continue with guideline directed medical therapy for acute on chronic HFrEF.      HPI:  I have reviewed the HPI  And agree with above   Lynette is a 74 y.o.year old who and presents with had concerns including Respiratory Distress.  Chief Complaint   Patient presents with    Respiratory Distress     Please review addendum/changes made to note above   Interval history: S/p cardioversion today.    Physical Exam:  General:  Awake, alert, NAD  Head:normal  Eye:normal  Neck:  No JVD   Chest:  Clear to auscultation, respiration easy  Cardiovascular: Regular pulse   Abdomen:   nontender  Extremities: No edema  Pulses; palpable  Neuro: grossly normal        I agree with the plan, which was planned by myself and  discussed with advanced level provider.  My documented MDM is a substantive portion of the supervisory note.    I have seen ,spoken to  and examined this patient personally, independently of the advanced level provider.  I have spent substantiate  portion of this encounter independently myself in examining patient and developing the medical management plan . I have reviewed the hospital care given to date and reviewed all pertinent labs and imaging.The plan was developed mutually at the time of the visit with the patient,  NP /PA  and myself. I have spoken with patient, nursing staff and provided written and verbal instructions .The above note has been reviewed and I agree with the assessment, diagnosis, and treatment plan with changes made by me as follows .    Rocio Carrasquillo MD

## 2024-03-11 NOTE — PROGRESS NOTES
Met with patient and introduced myself as the Heart failure education R.N.   Patient states her daughter does her cooking and shopping. Daughter does not add salt to food and reads labels to avoid sodium.  Spouse sets up her medications and \"is a control freak\" - making sure patient takes medications as prescribed     Admitting diagnosis- Acute respiratory failure   Cardiologist- Jose Juan  Heart Failure Education Nurse consulted- yes   Ejection fraction 35-40% as of 3/7/2024  Pro BNP- 14,112 on 3/10/24  Hospital follow up appt-  3/29 Radames  - reports enrollment in RPM  Patient informed of appointment and appointment added to AVS  Cardiac rehabilitation referral- N/A   ICD information-N/A   Patient informed of Heart Failure Clinic at the [facility]  Smoking Cessation information and referral- N/A   Pneumonia vaccine- up to date   PCP- Nithya  Patient has a digital scale? Yes   Transportation- spouse or daughter transports    Able to obtain medications without difficulty?- Yes- Patient denies difficulty in obtaining or taking medications. Advised not to stop taking a medication without notifying provider.    Heart failure specific Medications-  Amiodarone, Lasix, Apresoline, potassium and Imdur      Reviewed Heart failure patient education book with patient. Heart failure education included: Type of heart failure, How it is diagnosed, causes, symptoms, medications, diet, daily weights, exercise and fluid control.  Questions answered.      Patient's heart failure medications reviewed and information given on each.     Reviewed the Stop Light Handout with patient and instructed when to call her provider.   Patient was moderately engaged and attentive during education session.    The following handouts were reviewed with patient and patient was given a copy of the following : Heart Failure education booklet, the 'Stop Light' Handout,  a link to the [employer]'s Healthier Living with Heart

## 2024-03-11 NOTE — PROGRESS NOTES
Nephrology Progress Note        [address] [facility], [address] [place] [address]  Phone: [phone]  Office Hours: 8:30AM - 4:30PM  Monday - Friday        3/11/2024 7:58 AM  Subjective:   Admit Date: 3/7/2024  PCP: Francisco Barriga MD  Interval History:   On NC  Feeling well  Nonoliguric  Still tachycardic    Diet: Diet NPO      Data:   Scheduled Meds:   sennosides-docusate sodium  2 tablet Oral Daily    furosemide  40 mg IntraVENous Daily    clopidogrel  75 mg Oral Daily    carvedilol  6.25 mg Oral BID    levothyroxine  75 mcg Oral Daily    midodrine  5 mg Oral TID WC    cefTRIAXone (ROCEPHIN) IV  1,000 mg IntraVENous Q24H    azithromycin  500 mg IntraVENous Q24H    heparin (porcine)  5,000 Units SubCUTAneous 3 times per day    EPINEPHrine  1 puff Each Nostril Once    white petrolatum   Topical Once    amiodarone  200 mg Oral Daily    aspirin  81 mg Oral Daily    atorvastatin  40 mg Oral Daily    [Held by provider] empagliflozin  10 mg Oral Daily    hydrALAZINE  25 mg Oral 3 times per day    isosorbide mononitrate  30 mg Oral Daily    pantoprazole  40 mg Oral Daily    sodium chloride flush  5-40 mL IntraVENous 2 times per day    budesonide-formoterol  2 puff Inhalation BID     Continuous Infusions:   sodium chloride       PRN Meds:bisacodyl, albuterol sulfate HFA, sodium chloride flush, sodium chloride, ondansetron **OR** ondansetron, polyethylene glycol, acetaminophen **OR** acetaminophen, potassium chloride **OR** potassium alternative oral replacement **OR** potassium chloride, magnesium sulfate  I/O last 3 completed shifts:  In: 2013.6 [P.O.:1200; I.V.:10; IV Piggyback:803.6]  Out: 1850 [Urine:1850]  No intake/output data recorded.    Intake/Output Summary (Last 24 hours) at 3/11/2024 0758  Last data filed at 3/11/2024 0515  Gross per 24 hour   Intake 2013.62 ml   Output 1550 ml   Net 463.62 ml       CBC:   Recent Labs     03/08/24  0704 03/09/24  0314 03/10/24  0314   WBC 8.6 7.3 7.4   HGB 8.6*

## 2024-03-11 NOTE — PROGRESS NOTES
Physician Progress Note      PATIENT:               NAT SOLIS  CSN #:                  [id number]  :                       1950  ADMIT DATE:       3/7/2024 7:06 AM  DISCH DATE:  RESPONDING  PROVIDER #:        London Bray MD          QUERY TEXT:    Patient admitted with acute respiratory failure. Documentation reflects   \"concerns for severe sepsis\" in note(s) dated 3/10.  If possible, please   document in the progress notes and discharge summary if severe sepsis was:    The medical record reflects the following:  Risk Factors: Rhinovirus, PNA  Clinical Indicators: 3/10 progress note \"Concern for Severe Sepsis with   possible Superimposed Pneumonia on top of Rhinovirus - Present on admission   Initial HR >90, RR > 20, WBC >12.5 Initial LA 2.1 but repeat resolved Procal   elevated at 1.5\"  Treatment: IV antibiotics, O2, labs.    Thank you,  Susy MENDOZA, RN, [facility] [phone]  Options provided:  -- Severe sepsis confirmed after study  -- Severe sepsis ruled out after study  -- Other - I will add my own diagnosis  -- Disagree - Not applicable / Not valid  -- Disagree - Clinically unable to determine / Unknown  -- Refer to Clinical Documentation Reviewer    PROVIDER RESPONSE TEXT:    Severe sepsis confirmed after study.    Query created by: Susy Mata on 3/11/2024 9:20 AM      Electronically signed by:  London Bray MD 3/11/2024 11:13 AM

## 2024-03-12 LAB
ALBUMIN SERPL-MCNC: 3.3 GM/DL (ref 3.4–5)
ALP BLD-CCNC: 81 IU/L (ref 40–129)
ALT SERPL-CCNC: 101 U/L (ref 10–40)
ANION GAP SERPL CALCULATED.3IONS-SCNC: 10 MMOL/L (ref 7–16)
AST SERPL-CCNC: 93 IU/L (ref 15–37)
BASOPHILS ABSOLUTE: 0.1 K/CU MM
BASOPHILS RELATIVE PERCENT: 0.9 % (ref 0–1)
BILIRUB SERPL-MCNC: 0.4 MG/DL (ref 0–1)
BILIRUBIN DIRECT: 0.2 MG/DL (ref 0–0.3)
BILIRUBIN, INDIRECT: 0.2 MG/DL (ref 0–0.7)
BUN SERPL-MCNC: 52 MG/DL (ref 6–23)
CALCIUM SERPL-MCNC: 8.8 MG/DL (ref 8.3–10.6)
CHLORIDE BLD-SCNC: 100 MMOL/L (ref 99–110)
CO2: 24 MMOL/L (ref 21–32)
CREAT SERPL-MCNC: 3.9 MG/DL (ref 0.6–1.1)
CULTURE: NORMAL
CULTURE: NORMAL
DIFFERENTIAL TYPE: ABNORMAL
EKG ATRIAL RATE: 63 BPM
EKG DIAGNOSIS: NORMAL
EKG P-R INTERVAL: 234 MS
EKG Q-T INTERVAL: 510 MS
EKG QRS DURATION: 154 MS
EKG QTC CALCULATION (BAZETT): 521 MS
EKG R AXIS: 106 DEGREES
EKG T AXIS: 82 DEGREES
EKG VENTRICULAR RATE: 63 BPM
EOSINOPHILS ABSOLUTE: 0.3 K/CU MM
EOSINOPHILS RELATIVE PERCENT: 4.7 % (ref 0–3)
GFR SERPL CREATININE-BSD FRML MDRD: 12 ML/MIN/1.73M2
GLUCOSE SERPL-MCNC: 92 MG/DL (ref 70–99)
HCT VFR BLD CALC: 24.9 % (ref 37–47)
HEMOGLOBIN: 7.3 GM/DL (ref 12.5–16)
IMMATURE NEUTROPHIL %: 0.2 % (ref 0–0.43)
LYMPHOCYTES ABSOLUTE: 0.9 K/CU MM
LYMPHOCYTES RELATIVE PERCENT: 14.7 % (ref 24–44)
Lab: NORMAL
Lab: NORMAL
MAGNESIUM: 2 MG/DL (ref 1.8–2.4)
MCH RBC QN AUTO: 27.7 PG (ref 27–31)
MCHC RBC AUTO-ENTMCNC: 29.3 % (ref 32–36)
MCV RBC AUTO: 94.3 FL (ref 78–100)
MONOCYTES ABSOLUTE: 0.6 K/CU MM
MONOCYTES RELATIVE PERCENT: 10.7 % (ref 0–4)
NUCLEATED RBC %: 0 %
PDW BLD-RTO: 16.4 % (ref 11.7–14.9)
PLATELET # BLD: 187 K/CU MM (ref 140–440)
PMV BLD AUTO: 12 FL (ref 7.5–11.1)
POTASSIUM SERPL-SCNC: 4.2 MMOL/L (ref 3.5–5.1)
RBC # BLD: 2.64 M/CU MM (ref 4.2–5.4)
SEGMENTED NEUTROPHILS ABSOLUTE COUNT: 4 K/CU MM
SEGMENTED NEUTROPHILS RELATIVE PERCENT: 68.8 % (ref 36–66)
SODIUM BLD-SCNC: 134 MMOL/L (ref 135–145)
SPECIMEN: NORMAL
SPECIMEN: NORMAL
TOTAL IMMATURE NEUTOROPHIL: 0.01 K/CU MM
TOTAL NUCLEATED RBC: 0 K/CU MM
TOTAL PROTEIN: 6.5 GM/DL (ref 6.4–8.2)
WBC # BLD: 5.8 K/CU MM (ref 4–10.5)

## 2024-03-12 PROCEDURE — 87070 CULTURE OTHR SPECIMN AEROBIC: CPT

## 2024-03-12 PROCEDURE — 85025 COMPLETE CBC W/AUTO DIFF WBC: CPT

## 2024-03-12 PROCEDURE — 87205 SMEAR GRAM STAIN: CPT

## 2024-03-12 PROCEDURE — 94660 CPAP INITIATION&MGMT: CPT

## 2024-03-12 PROCEDURE — 93010 ELECTROCARDIOGRAM REPORT: CPT | Performed by: INTERNAL MEDICINE

## 2024-03-12 PROCEDURE — 6370000000 HC RX 637 (ALT 250 FOR IP)

## 2024-03-12 PROCEDURE — 6360000002 HC RX W HCPCS: Performed by: INTERNAL MEDICINE

## 2024-03-12 PROCEDURE — 80076 HEPATIC FUNCTION PANEL: CPT

## 2024-03-12 PROCEDURE — APPNB15 APP NON BILLABLE TIME 0-15 MINS

## 2024-03-12 PROCEDURE — 2580000003 HC RX 258: Performed by: INTERNAL MEDICINE

## 2024-03-12 PROCEDURE — 6370000000 HC RX 637 (ALT 250 FOR IP): Performed by: INTERNAL MEDICINE

## 2024-03-12 PROCEDURE — 99232 SBSQ HOSP IP/OBS MODERATE 35: CPT | Performed by: INTERNAL MEDICINE

## 2024-03-12 PROCEDURE — 6370000000 HC RX 637 (ALT 250 FOR IP): Performed by: FAMILY MEDICINE

## 2024-03-12 PROCEDURE — 94640 AIRWAY INHALATION TREATMENT: CPT

## 2024-03-12 PROCEDURE — 36415 COLL VENOUS BLD VENIPUNCTURE: CPT

## 2024-03-12 PROCEDURE — 2060000000 HC ICU INTERMEDIATE R&B

## 2024-03-12 PROCEDURE — 80048 BASIC METABOLIC PNL TOTAL CA: CPT

## 2024-03-12 PROCEDURE — 2700000000 HC OXYGEN THERAPY PER DAY

## 2024-03-12 PROCEDURE — 94761 N-INVAS EAR/PLS OXIMETRY MLT: CPT

## 2024-03-12 PROCEDURE — 2580000003 HC RX 258: Performed by: FAMILY MEDICINE

## 2024-03-12 PROCEDURE — 83735 ASSAY OF MAGNESIUM: CPT

## 2024-03-12 RX ADMIN — CARVEDILOL 6.25 MG: 6.25 TABLET, FILM COATED ORAL at 09:11

## 2024-03-12 RX ADMIN — SODIUM CHLORIDE, PRESERVATIVE FREE 10 ML: 5 INJECTION INTRAVENOUS at 09:12

## 2024-03-12 RX ADMIN — PANTOPRAZOLE SODIUM 40 MG: 40 TABLET, DELAYED RELEASE ORAL at 09:12

## 2024-03-12 RX ADMIN — CEFTRIAXONE 1000 MG: 1 INJECTION, POWDER, FOR SOLUTION INTRAMUSCULAR; INTRAVENOUS at 12:21

## 2024-03-12 RX ADMIN — AMIODARONE HYDROCHLORIDE 200 MG: 200 TABLET ORAL at 09:11

## 2024-03-12 RX ADMIN — DOCUSATE SODIUM AND SENNOSIDES 2 TABLET: 8.6; 5 TABLET, FILM COATED ORAL at 09:11

## 2024-03-12 RX ADMIN — BUDESONIDE AND FORMOTEROL FUMARATE DIHYDRATE 2 PUFF: 80; 4.5 AEROSOL RESPIRATORY (INHALATION) at 07:11

## 2024-03-12 RX ADMIN — AZITHROMYCIN MONOHYDRATE 500 MG: 500 INJECTION, POWDER, LYOPHILIZED, FOR SOLUTION INTRAVENOUS at 15:51

## 2024-03-12 RX ADMIN — HYDRALAZINE HYDROCHLORIDE 25 MG: 25 TABLET ORAL at 21:03

## 2024-03-12 RX ADMIN — BUDESONIDE AND FORMOTEROL FUMARATE DIHYDRATE 2 PUFF: 80; 4.5 AEROSOL RESPIRATORY (INHALATION) at 21:09

## 2024-03-12 RX ADMIN — APIXABAN 2.5 MG: 2.5 TABLET, FILM COATED ORAL at 21:03

## 2024-03-12 RX ADMIN — CARVEDILOL 6.25 MG: 6.25 TABLET, FILM COATED ORAL at 21:03

## 2024-03-12 RX ADMIN — MIDODRINE HYDROCHLORIDE 5 MG: 5 TABLET ORAL at 09:12

## 2024-03-12 RX ADMIN — APIXABAN 2.5 MG: 2.5 TABLET, FILM COATED ORAL at 09:12

## 2024-03-12 RX ADMIN — ATORVASTATIN CALCIUM 40 MG: 40 TABLET, FILM COATED ORAL at 09:11

## 2024-03-12 RX ADMIN — HYDRALAZINE HYDROCHLORIDE 25 MG: 25 TABLET ORAL at 12:21

## 2024-03-12 RX ADMIN — SODIUM CHLORIDE, PRESERVATIVE FREE 10 ML: 5 INJECTION INTRAVENOUS at 21:06

## 2024-03-12 RX ADMIN — ALBUTEROL SULFATE 2 PUFF: 90 AEROSOL, METERED RESPIRATORY (INHALATION) at 03:02

## 2024-03-12 RX ADMIN — HYDRALAZINE HYDROCHLORIDE 25 MG: 25 TABLET ORAL at 06:00

## 2024-03-12 RX ADMIN — ASPIRIN 81 MG: 81 TABLET, CHEWABLE ORAL at 09:11

## 2024-03-12 RX ADMIN — ISOSORBIDE MONONITRATE 30 MG: 30 TABLET, EXTENDED RELEASE ORAL at 09:11

## 2024-03-12 RX ADMIN — LEVOTHYROXINE SODIUM 75 MCG: 75 TABLET ORAL at 06:00

## 2024-03-12 ASSESSMENT — PAIN SCALES - WONG BAKER
WONGBAKER_NUMERICALRESPONSE: NO HURT
WONGBAKER_NUMERICALRESPONSE: 0
WONGBAKER_NUMERICALRESPONSE: 0
WONGBAKER_NUMERICALRESPONSE: NO HURT
WONGBAKER_NUMERICALRESPONSE: 0
WONGBAKER_NUMERICALRESPONSE: 0
WONGBAKER_NUMERICALRESPONSE: NO HURT
WONGBAKER_NUMERICALRESPONSE: 0
WONGBAKER_NUMERICALRESPONSE: 0
WONGBAKER_NUMERICALRESPONSE: NO HURT
WONGBAKER_NUMERICALRESPONSE: 0
WONGBAKER_NUMERICALRESPONSE: NO HURT
WONGBAKER_NUMERICALRESPONSE: 0
WONGBAKER_NUMERICALRESPONSE: NO HURT
WONGBAKER_NUMERICALRESPONSE: 0
WONGBAKER_NUMERICALRESPONSE: 0
WONGBAKER_NUMERICALRESPONSE: NO HURT
WONGBAKER_NUMERICALRESPONSE: 0
WONGBAKER_NUMERICALRESPONSE: 0
WONGBAKER_NUMERICALRESPONSE: NO HURT

## 2024-03-12 ASSESSMENT — ENCOUNTER SYMPTOMS
VOMITING: 0
DIARRHEA: 0
EYE PAIN: 0
BLOOD IN STOOL: 0
WHEEZING: 0
ABDOMINAL PAIN: 0
BACK PAIN: 0
NAUSEA: 0
SHORTNESS OF BREATH: 0
CHOKING: 0
STRIDOR: 0
COUGH: 0
CHEST TIGHTNESS: 0
ABDOMINAL DISTENTION: 0
CONSTIPATION: 0

## 2024-03-12 ASSESSMENT — PAIN SCALES - GENERAL
PAINLEVEL_OUTOF10: 0
PAINLEVEL_OUTOF10: 0

## 2024-03-12 NOTE — PLAN OF CARE
Problem: Safety - Adult  Goal: Free from fall injury  3/12/2024 1003 by Elva Petit RN  Outcome: Progressing  3/12/2024 0937 by Elva Petit RN  Outcome: Progressing     Problem: Discharge Planning  Goal: Discharge to home or other facility with appropriate resources  3/12/2024 1003 by Elva Petit RN  Outcome: Progressing  3/12/2024 0937 by Elva Petit RN  Outcome: Progressing  Flowsheets (Taken 3/12/2024 0800)  Discharge to home or other facility with appropriate resources: Identify barriers to discharge with patient and caregiver     Problem: Neurosensory - Adult  Goal: Achieves stable or improved neurological status  3/12/2024 1003 by Elva Petit RN  Outcome: Progressing  3/12/2024 0937 by Elva Petit RN  Outcome: Progressing  Flowsheets (Taken 3/12/2024 0800)  Achieves stable or improved neurological status: Assess for and report changes in neurological status  Goal: Achieves maximal functionality and self care  3/12/2024 1003 by Elva Petit RN  Outcome: Progressing  3/12/2024 0937 by Elva Petit RN  Outcome: Progressing  Flowsheets (Taken 3/12/2024 0800)  Achieves maximal functionality and self care: Monitor swallowing and airway patency with patient fatigue and changes in neurological status     Problem: Respiratory - Adult  Goal: Achieves optimal ventilation and oxygenation  3/12/2024 1003 by Elva Petit RN  Outcome: Progressing  3/12/2024 0937 by Elva Petit RN  Outcome: Progressing  Flowsheets (Taken 3/12/2024 0800)  Achieves optimal ventilation and oxygenation: Assess for changes in respiratory status     Problem: Cardiovascular - Adult  Goal: Maintains optimal cardiac output and hemodynamic stability  3/12/2024 1003 by Elva Petit RN  Outcome: Progressing  3/12/2024 0937 by Elva Petit RN  Outcome: Progressing  Flowsheets (Taken 3/12/2024 0800)  Maintains optimal cardiac output and hemodynamic stability: Monitor blood pressure and heart rate  Goal: Absence of cardiac dysrhythmias

## 2024-03-12 NOTE — PLAN OF CARE
Problem: Safety - Adult  Goal: Free from fall injury  Outcome: Progressing     Problem: Discharge Planning  Goal: Discharge to home or other facility with appropriate resources  Outcome: Progressing  Flowsheets (Taken 3/12/2024 0800)  Discharge to home or other facility with appropriate resources: Identify barriers to discharge with patient and caregiver     Problem: Neurosensory - Adult  Goal: Achieves stable or improved neurological status  Outcome: Progressing  Flowsheets (Taken 3/12/2024 0800)  Achieves stable or improved neurological status: Assess for and report changes in neurological status  Goal: Achieves maximal functionality and self care  Outcome: Progressing  Flowsheets (Taken 3/12/2024 0800)  Achieves maximal functionality and self care: Monitor swallowing and airway patency with patient fatigue and changes in neurological status     Problem: Respiratory - Adult  Goal: Achieves optimal ventilation and oxygenation  Outcome: Progressing  Flowsheets (Taken 3/12/2024 0800)  Achieves optimal ventilation and oxygenation: Assess for changes in respiratory status     Problem: Cardiovascular - Adult  Goal: Maintains optimal cardiac output and hemodynamic stability  Outcome: Progressing  Flowsheets (Taken 3/12/2024 0800)  Maintains optimal cardiac output and hemodynamic stability: Monitor blood pressure and heart rate  Goal: Absence of cardiac dysrhythmias or at baseline  Outcome: Progressing  Flowsheets (Taken 3/12/2024 0800)  Absence of cardiac dysrhythmias or at baseline: Monitor cardiac rate and rhythm     Problem: Skin/Tissue Integrity - Adult  Goal: Skin integrity remains intact  Outcome: Progressing  Flowsheets (Taken 3/12/2024 0800)  Skin Integrity Remains Intact: Monitor for areas of redness and/or skin breakdown  Goal: Incisions, wounds, or drain sites healing without S/S of infection  Outcome: Progressing  Flowsheets (Taken 3/12/2024 0800)  Incisions, Wounds, or Drain Sites Healing Without Sign and  0800)  Absence of infection during hospitalization: Assess and monitor for signs and symptoms of infection     Problem: Metabolic/Fluid and Electrolytes - Adult  Goal: Electrolytes maintained within normal limits  Outcome: Progressing  Flowsheets (Taken 3/12/2024 0800)  Electrolytes maintained within normal limits: Monitor labs and assess patient for signs and symptoms of electrolyte imbalances  Goal: Hemodynamic stability and optimal renal function maintained  Outcome: Progressing  Flowsheets (Taken 3/12/2024 0800)  Hemodynamic stability and optimal renal function maintained: Monitor labs and assess for signs and symptoms of volume excess or deficit     Problem: Hematologic - Adult  Goal: Maintains hematologic stability  Outcome: Progressing  Flowsheets (Taken 3/12/2024 0800)  Maintains hematologic stability: Assess for signs and symptoms of bleeding or hemorrhage     Problem: ABCDS Injury Assessment  Goal: Absence of physical injury  Outcome: Progressing     Problem: Chronic Conditions and Co-morbidities  Goal: Patient's chronic conditions and co-morbidity symptoms are monitored and maintained or improved  Outcome: Progressing  Flowsheets (Taken 3/12/2024 0800)  Care Plan - Patient's Chronic Conditions and Co-Morbidity Symptoms are Monitored and Maintained or Improved: Monitor and assess patient's chronic conditions and comorbid symptoms for stability, deterioration, or improvement

## 2024-03-12 NOTE — PROGRESS NOTES
Nephrology Progress Note        [address] [facility], [address] [place] [address]  Phone: [phone]  Office Hours: 8:30AM - 4:30PM  Monday - Friday        3/12/2024 7:35 AM  Subjective:   Admit Date: 3/7/2024  PCP: Francisco Barriga MD  Interval History:   Doing ok on NC  Diet: ADULT DIET; Regular; Low Fat/Low Chol/High Fiber/2 gm Na; No Added Salt (3-4 gm); 2000 ml      Data:   Scheduled Meds:   apixaban  2.5 mg Oral BID    aspirin  81 mg Oral Daily    sennosides-docusate sodium  2 tablet Oral Daily    carvedilol  6.25 mg Oral BID    levothyroxine  75 mcg Oral Daily    midodrine  5 mg Oral TID WC    cefTRIAXone (ROCEPHIN) IV  1,000 mg IntraVENous Q24H    azithromycin  500 mg IntraVENous Q24H    EPINEPHrine  1 puff Each Nostril Once    white petrolatum   Topical Once    amiodarone  200 mg Oral Daily    atorvastatin  40 mg Oral Daily    [Held by provider] empagliflozin  10 mg Oral Daily    hydrALAZINE  25 mg Oral 3 times per day    isosorbide mononitrate  30 mg Oral Daily    pantoprazole  40 mg Oral Daily    sodium chloride flush  5-40 mL IntraVENous 2 times per day    budesonide-formoterol  2 puff Inhalation BID     Continuous Infusions:   sodium chloride       PRN Meds:bisacodyl, albuterol sulfate HFA, sodium chloride flush, sodium chloride, ondansetron **OR** ondansetron, polyethylene glycol, acetaminophen **OR** acetaminophen, potassium chloride **OR** potassium alternative oral replacement **OR** potassium chloride, magnesium sulfate  I/O last 3 completed shifts:  In: 1330 [P.O.:1320; I.V.:10]  Out: 950 [Urine:950]  No intake/output data recorded.    Intake/Output Summary (Last 24 hours) at 3/12/2024 0735  Last data filed at 3/12/2024 0727  Gross per 24 hour   Intake 1090 ml   Output 750 ml   Net 340 ml       CBC:   Recent Labs     03/10/24  0314   WBC 7.4   HGB 7.9*          BMP:    Recent Labs     03/10/24  0314 03/11/24  0431 03/12/24  0217    136 134*   K 4.0 4.0 4.2    100  100   CO2 24 24 24   BUN 58* 52* 52*   CREATININE 3.8* 3.5* 3.9*   GLUCOSE 101* 101* 92   CALCIUM 9.0 8.9 8.8     Hepatic:   Recent Labs     03/10/24  0314   *   ALT 93*   BILITOT 0.2   ALKPHOS 87     Troponin: No results for input(s): \"TROPONINI\" in the last 72 hours.  BNP: No results for input(s): \"BNP\" in the last 72 hours.  Lipids: No results for input(s): \"CHOL\", \"HDL\" in the last 72 hours.    Invalid input(s): \"LDLCALCU\"  ABGs:   Lab Results   Component Value Date/Time    PO2ART 47 09/06/2023 12:15 PM    RGW7CDW 52.0 09/06/2023 12:15 PM     INR: No results for input(s): \"INR\" in the last 72 hours.    Objective:   Vitals: BP (!) 123/51   Pulse 60   Temp 99 °F (37.2 °C) (Oral)   Resp 15   Ht 1.57 m (5' 1.81\")   Wt 68.3 kg (150 lb 9.2 oz)   SpO2 99%   BMI 27.71 kg/m²   General appearance: alert and cooperative with exam, in no acute distress  HEENT: normocephalic, atraumatic,   Neck: supple, trachea midline  Lungs:  breathing comfortably on nc  Heart:: regular rate and rhythm,  Extremities: extremities atraumatic, no cyanosis or edema  Neurologic: alert, oriented, follows commands, interactive    MEDICAL DECISION MAKING       -MARK from ATN: cr 3.8 from 3.1 from 2.4 at baseline  -CKD4  -Hypervolemia  -Acute on chronic CHF  -Transaminitis: congestive?  -Hx TAVR  -Anemia     Suggest:  -Cr 3.9 from 3.5 from 3.8,  -Holding lasix for now then  -Limit oral fluid to 1800ml  -Avoid nephrotoxins                Electronically signed by Ta Agarwal DO on 3/12/2024 at 7:35 AM    [facility]  MD JOE MARTINEZ DO  [address] [facility],  [address]  [place] [address]  PHONE: [phone]  FAX: [phone]

## 2024-03-12 NOTE — PROGRESS NOTES
anemia  -Hemoglobin 7.3.  Recommend hemoglobin greater than 8 due to history of CAD  -Consider PRBC transfusion  COPD      Cardiology will sign off, please call with any questions.  Patient to follow-up in clinic     Subjective:  Lynette is a 74 y.o.year old     Patient underwent successful cardioversion yesterday.  Shortness breath has improved, on 3 L of O2 (4 at baseline?)  Not complaining of palpitation at this time.    Objective: Temperature:  Current - Temp: 97.4 °F (36.3 °C); Max - Temp  Av.3 °F (36.8 °C)  Min: 97.4 °F (36.3 °C)  Max: 99 °F (37.2 °C)    Respiratory Rate : Resp  Av.9  Min: 10  Max: 18    Pulse Range: Pulse  Av.1  Min: 60  Max: 67    Blood Presuure Range:  Systolic (24hrs), Av , Min:119 , Max:153   ; Diastolic (24hrs), Av, Min:51, Max:102      Pulse ox Range: SpO2  Av.7 %  Min: 98 %  Max: 100 %    24hr I & O:    Intake/Output Summary (Last 24 hours) at 3/12/2024 1245  Last data filed at 3/12/2024 0727  Gross per 24 hour   Intake 1090 ml   Output 650 ml   Net 440 ml           BP (!) 153/65   Pulse 60   Temp 97.4 °F (36.3 °C)   Resp 15   Ht 1.57 m (5' 1.81\")   Wt 68.3 kg (150 lb 9.2 oz)   SpO2 98%   BMI 27.71 kg/m²         TELEMETRY: Sinus and Atrial flutter      has a past medical history of CAD (coronary artery disease), COPD (chronic obstructive pulmonary disease) ([facility]), GERD (gastroesophageal reflux disease), H/O cardiovascular stress test, H/O echocardiogram, H/O echocardiogram, H/O percutaneous left heart catheterization, H/O transesophageal echocardiography ([facility]) for monitoring, Heart attack ([facility]), History of transesophageal echocardiography ([facility]), Hyperlipidemia, Hypertension, On home oxygen therapy, PAD (peripheral artery disease) ([facility]), S/P right coronary artery (RCA) stent placement, Sleep apnea, T2 vertebral fracture ([facility]), TIA (transient ischemic attack), Tobacco dependence, Wears glasses, and Wears partial dentures.   has a past surgical history that  grossly normal        I agree with the plan, which was planned by myself and discussed with advanced level provider.  My documented MDM is a substantive portion of the supervisory note.    I have seen ,spoken to  and examined this patient personally, independently of the advanced level provider.  I have spent substantiate  portion of this encounter independently myself in examining patient and developing the medical management plan . I have reviewed the hospital care given to date and reviewed all pertinent labs and imaging.The plan was developed mutually at the time of the visit with the patient,  NP /PA  and myself. I have spoken with patient, nursing staff and provided written and verbal instructions .The above note has been reviewed and I agree with the assessment, diagnosis, and treatment plan with changes made by me as follows .    Rocio Carrasquillo MD

## 2024-03-12 NOTE — PROGRESS NOTES
Absolute 0.1 K/CU MM    Nucleated RBC % 0.0 %    Total Nucleated RBC 0.0 K/CU MM    Total Immature Neutrophil 0.01 K/CU MM    Immature Neutrophil % 0.2 0 - 0.43 %       Results       Procedure Component Value Units Date/Time    Respiratory Panel, Molecular, with COVID-19 (Restricted: peds pts or suitable admitted adults) [[id number]]  (Abnormal) Collected: 03/08/24 1258    Order Status: Completed Specimen: Nasopharyngeal Updated: 03/08/24 1415     Adenovirus Detection by PCR NOT DETECTED     Coronavirus 229E PCR NOT DETECTED     Coronavirus HKU1 PCR NOT DETECTED     Coronavirus NL63 PCR NOT DETECTED     Coronavirus OC43 PCR NOT DETECTED     SARS-CoV-2 NOT DETECTED     Comment:         Fact sheet for Healthcare Providers: https://www.fda.gov/media/664161/download  Fact sheet for Patients: https://www.fda.gov/media/923033/download          METHODOLOGY: Multiplex PCR, GABRIELLE NON-PROBE Detection          Human Metapneumovirus PCR NOT DETECTED     Rhinovirus Enterovirus PCR DETECTED BY PCR     Influenza A by PCR NOT DETECTED     Influenza A H1 Pandemic PCR NOT DETECTED     Influenza A H1 (2009) PCR NOT DETECTED     Influenza A H3 PCR NOT DETECTED     Influenza B by PCR NOT DETECTED     Parainfluenza 1 PCR NOT DETECTED     Parainfluenza 2 PCR NOT DETECTED     Parainfluenza 3 PCR NOT DETECTED     Parainfluenza 4 PCR NOT DETECTED     RSV PCR NOT DETECTED     Bordetella parapertussis by PCR NOT DETECTED     B Pertussis by PCR NOT DETECTED     Chlamydophila Pneumonia PCR NOT DETECTED     Mycoplasma pneumo by PCR NOT DETECTED    Legionella antigen, urine [[id number]] Collected: 03/07/24 1536    Order Status: Completed Specimen: Urine Updated: 03/08/24 1212     Legionella Urinary Ag NEGATIVE     Comment: (NOTE)  PRESUMPTIVE NEGATIVE FOR LEGIONELLA PNEUMOPHILIA SEROGROUP 1   ANTIGEN IN URINE SUGGESTING NO RECENT OR CURRENT INFECTION.    INFECTION DUE TO LEGIONELLA CANNOT BE RULED OUT SINCE OTHER   SEROGROUPS AND SPECIES MAY CAUSE  virus that causes COVID-19 in patients   with signs and symptoms of infection who are suspected of COVID-19.  An individual without symptoms of COVID-19 and who is not shedding SARS-CoV-2 virus would   expect to have a negative (not detected) result in this assay.          Fact sheet for Healthcare Providers: https://www.fda.gov/media/566150/download  Fact sheet for Patients: https://www.fda.gov/media/744518/download          METHODOLOGY: Isothermal Nucleic Acid Amplification         Influenza A/B, Molecular [[id number]] Collected: 03/07/24 0732    Order Status: Completed Specimen: Nasal Updated: 03/07/24 0817     Influenza A Antigen NOT DETECTED     Comment: A negative result does not rule out Influenzae A&B.  METHODOLOGY: Isothermal Nucleic Acid Amplification          Influenza B Antigen NOT DETECTED     Comment: A negative result does not rule out Influenzae A&B.  METHODOLOGY: Isothermal Nucleic Acid Amplification                     Electronically signed by Dana Kerns MD on 3/12/2024 at 5:44 PM

## 2024-03-13 LAB
ALBUMIN SERPL-MCNC: 3.4 GM/DL (ref 3.4–5)
ALP BLD-CCNC: 78 IU/L (ref 40–129)
ALT SERPL-CCNC: 82 U/L (ref 10–40)
ANION GAP SERPL CALCULATED.3IONS-SCNC: 10 MMOL/L (ref 7–16)
ANION GAP SERPL CALCULATED.3IONS-SCNC: 12 MMOL/L (ref 7–16)
AST SERPL-CCNC: 64 IU/L (ref 15–37)
BASOPHILS ABSOLUTE: 0.1 K/CU MM
BASOPHILS ABSOLUTE: 0.1 K/CU MM
BASOPHILS RELATIVE PERCENT: 0.7 % (ref 0–1)
BASOPHILS RELATIVE PERCENT: 1 % (ref 0–1)
BILIRUB SERPL-MCNC: 0.5 MG/DL (ref 0–1)
BUN SERPL-MCNC: 44 MG/DL (ref 6–23)
BUN SERPL-MCNC: 45 MG/DL (ref 6–23)
CALCIUM SERPL-MCNC: 9 MG/DL (ref 8.3–10.6)
CALCIUM SERPL-MCNC: 9 MG/DL (ref 8.3–10.6)
CHLORIDE BLD-SCNC: 100 MMOL/L (ref 99–110)
CHLORIDE BLD-SCNC: 99 MMOL/L (ref 99–110)
CO2: 24 MMOL/L (ref 21–32)
CO2: 25 MMOL/L (ref 21–32)
CREAT SERPL-MCNC: 3.4 MG/DL (ref 0.6–1.1)
CREAT SERPL-MCNC: 3.4 MG/DL (ref 0.6–1.1)
DIFFERENTIAL TYPE: ABNORMAL
DIFFERENTIAL TYPE: ABNORMAL
EOSINOPHILS ABSOLUTE: 0.3 K/CU MM
EOSINOPHILS ABSOLUTE: 0.4 K/CU MM
EOSINOPHILS RELATIVE PERCENT: 4.9 % (ref 0–3)
EOSINOPHILS RELATIVE PERCENT: 5.5 % (ref 0–3)
FERRITIN: 206 NG/ML (ref 15–150)
GFR SERPL CREATININE-BSD FRML MDRD: 14 ML/MIN/1.73M2
GFR SERPL CREATININE-BSD FRML MDRD: 14 ML/MIN/1.73M2
GLUCOSE SERPL-MCNC: 119 MG/DL (ref 70–99)
GLUCOSE SERPL-MCNC: 147 MG/DL (ref 70–99)
HCT VFR BLD CALC: 23.2 % (ref 37–47)
HCT VFR BLD CALC: 25.3 % (ref 37–47)
HEMOGLOBIN: 6.8 GM/DL (ref 12.5–16)
HEMOGLOBIN: 7.2 GM/DL (ref 12.5–16)
IMMATURE NEUTROPHIL %: 0.2 % (ref 0–0.43)
IMMATURE NEUTROPHIL %: 0.3 % (ref 0–0.43)
LYMPHOCYTES ABSOLUTE: 0.7 K/CU MM
LYMPHOCYTES ABSOLUTE: 0.7 K/CU MM
LYMPHOCYTES RELATIVE PERCENT: 10.7 % (ref 24–44)
LYMPHOCYTES RELATIVE PERCENT: 9.3 % (ref 24–44)
MAGNESIUM: 1.9 MG/DL (ref 1.8–2.4)
MCH RBC QN AUTO: 27 PG (ref 27–31)
MCH RBC QN AUTO: 27.4 PG (ref 27–31)
MCHC RBC AUTO-ENTMCNC: 28.5 % (ref 32–36)
MCHC RBC AUTO-ENTMCNC: 29.3 % (ref 32–36)
MCV RBC AUTO: 93.5 FL (ref 78–100)
MCV RBC AUTO: 94.8 FL (ref 78–100)
MONOCYTES ABSOLUTE: 0.5 K/CU MM
MONOCYTES ABSOLUTE: 0.6 K/CU MM
MONOCYTES RELATIVE PERCENT: 7.6 % (ref 0–4)
MONOCYTES RELATIVE PERCENT: 8.3 % (ref 0–4)
NUCLEATED RBC %: 0 %
NUCLEATED RBC %: 0 %
PDW BLD-RTO: 16.6 % (ref 11.7–14.9)
PDW BLD-RTO: 16.8 % (ref 11.7–14.9)
PHOSPHORUS: 3.4 MG/DL (ref 2.5–4.9)
PLATELET # BLD: 167 K/CU MM (ref 140–440)
PLATELET # BLD: 186 K/CU MM (ref 140–440)
PMV BLD AUTO: 11.8 FL (ref 7.5–11.1)
PMV BLD AUTO: 12.1 FL (ref 7.5–11.1)
POTASSIUM SERPL-SCNC: 4 MMOL/L (ref 3.5–5.1)
POTASSIUM SERPL-SCNC: 4.4 MMOL/L (ref 3.5–5.1)
PRO-BNP: 7278 PG/ML
RBC # BLD: 2.48 M/CU MM (ref 4.2–5.4)
RBC # BLD: 2.67 M/CU MM (ref 4.2–5.4)
RETICULOCYTE COUNT PCT: 1.7 % (ref 0.2–2.2)
SEGMENTED NEUTROPHILS ABSOLUTE COUNT: 4.7 K/CU MM
SEGMENTED NEUTROPHILS ABSOLUTE COUNT: 5.4 K/CU MM
SEGMENTED NEUTROPHILS RELATIVE PERCENT: 75.6 % (ref 36–66)
SEGMENTED NEUTROPHILS RELATIVE PERCENT: 75.9 % (ref 36–66)
SODIUM BLD-SCNC: 135 MMOL/L (ref 135–145)
SODIUM BLD-SCNC: 135 MMOL/L (ref 135–145)
TOTAL IMMATURE NEUTOROPHIL: 0.01 K/CU MM
TOTAL IMMATURE NEUTOROPHIL: 0.02 K/CU MM
TOTAL NUCLEATED RBC: 0 K/CU MM
TOTAL NUCLEATED RBC: 0 K/CU MM
TOTAL PROTEIN: 6.3 GM/DL (ref 6.4–8.2)
WBC # BLD: 6.2 K/CU MM (ref 4–10.5)
WBC # BLD: 7.1 K/CU MM (ref 4–10.5)

## 2024-03-13 PROCEDURE — 6370000000 HC RX 637 (ALT 250 FOR IP): Performed by: FAMILY MEDICINE

## 2024-03-13 PROCEDURE — 85018 HEMOGLOBIN: CPT

## 2024-03-13 PROCEDURE — 6370000000 HC RX 637 (ALT 250 FOR IP): Performed by: INTERNAL MEDICINE

## 2024-03-13 PROCEDURE — 86901 BLOOD TYPING SEROLOGIC RH(D): CPT

## 2024-03-13 PROCEDURE — 2060000000 HC ICU INTERMEDIATE R&B

## 2024-03-13 PROCEDURE — 83735 ASSAY OF MAGNESIUM: CPT

## 2024-03-13 PROCEDURE — 94761 N-INVAS EAR/PLS OXIMETRY MLT: CPT

## 2024-03-13 PROCEDURE — 6370000000 HC RX 637 (ALT 250 FOR IP)

## 2024-03-13 PROCEDURE — 36430 TRANSFUSION BLD/BLD COMPNT: CPT

## 2024-03-13 PROCEDURE — 2580000003 HC RX 258: Performed by: INTERNAL MEDICINE

## 2024-03-13 PROCEDURE — 6360000002 HC RX W HCPCS: Performed by: INTERNAL MEDICINE

## 2024-03-13 PROCEDURE — 86922 COMPATIBILITY TEST ANTIGLOB: CPT

## 2024-03-13 PROCEDURE — 86900 BLOOD TYPING SEROLOGIC ABO: CPT

## 2024-03-13 PROCEDURE — 99222 1ST HOSP IP/OBS MODERATE 55: CPT | Performed by: INTERNAL MEDICINE

## 2024-03-13 PROCEDURE — 2700000000 HC OXYGEN THERAPY PER DAY

## 2024-03-13 PROCEDURE — 83880 ASSAY OF NATRIURETIC PEPTIDE: CPT

## 2024-03-13 PROCEDURE — 36415 COLL VENOUS BLD VENIPUNCTURE: CPT

## 2024-03-13 PROCEDURE — 85025 COMPLETE CBC W/AUTO DIFF WBC: CPT

## 2024-03-13 PROCEDURE — 82728 ASSAY OF FERRITIN: CPT

## 2024-03-13 PROCEDURE — 30233N1 TRANSFUSION OF NONAUTOLOGOUS RED BLOOD CELLS INTO PERIPHERAL VEIN, PERCUTANEOUS APPROACH: ICD-10-PCS | Performed by: INTERNAL MEDICINE

## 2024-03-13 PROCEDURE — 86850 RBC ANTIBODY SCREEN: CPT

## 2024-03-13 PROCEDURE — APPNB180 APP NON BILLABLE TIME > 60 MINS: Performed by: LICENSED PRACTICAL NURSE

## 2024-03-13 PROCEDURE — 84100 ASSAY OF PHOSPHORUS: CPT

## 2024-03-13 PROCEDURE — 85045 AUTOMATED RETICULOCYTE COUNT: CPT

## 2024-03-13 PROCEDURE — 85014 HEMATOCRIT: CPT

## 2024-03-13 PROCEDURE — 94640 AIRWAY INHALATION TREATMENT: CPT

## 2024-03-13 PROCEDURE — 2580000003 HC RX 258: Performed by: FAMILY MEDICINE

## 2024-03-13 PROCEDURE — 80053 COMPREHEN METABOLIC PANEL: CPT

## 2024-03-13 PROCEDURE — 94660 CPAP INITIATION&MGMT: CPT

## 2024-03-13 PROCEDURE — P9016 RBC LEUKOCYTES REDUCED: HCPCS

## 2024-03-13 PROCEDURE — 80048 BASIC METABOLIC PNL TOTAL CA: CPT

## 2024-03-13 RX ORDER — FUROSEMIDE 40 MG/1
40 TABLET ORAL 2 TIMES DAILY
Status: DISCONTINUED | OUTPATIENT
Start: 2024-03-13 | End: 2024-03-15

## 2024-03-13 RX ORDER — SODIUM CHLORIDE 9 MG/ML
INJECTION, SOLUTION INTRAVENOUS PRN
Status: DISCONTINUED | OUTPATIENT
Start: 2024-03-13 | End: 2024-03-15 | Stop reason: HOSPADM

## 2024-03-13 RX ADMIN — CARVEDILOL 6.25 MG: 6.25 TABLET, FILM COATED ORAL at 20:33

## 2024-03-13 RX ADMIN — BUDESONIDE AND FORMOTEROL FUMARATE DIHYDRATE 2 PUFF: 80; 4.5 AEROSOL RESPIRATORY (INHALATION) at 10:37

## 2024-03-13 RX ADMIN — MIDODRINE HYDROCHLORIDE 5 MG: 5 TABLET ORAL at 10:48

## 2024-03-13 RX ADMIN — CEFTRIAXONE 1000 MG: 1 INJECTION, POWDER, FOR SOLUTION INTRAMUSCULAR; INTRAVENOUS at 15:18

## 2024-03-13 RX ADMIN — AMIODARONE HYDROCHLORIDE 200 MG: 200 TABLET ORAL at 10:48

## 2024-03-13 RX ADMIN — SODIUM CHLORIDE, PRESERVATIVE FREE 10 ML: 5 INJECTION INTRAVENOUS at 10:49

## 2024-03-13 RX ADMIN — HYDRALAZINE HYDROCHLORIDE 25 MG: 25 TABLET ORAL at 15:17

## 2024-03-13 RX ADMIN — APIXABAN 2.5 MG: 2.5 TABLET, FILM COATED ORAL at 10:48

## 2024-03-13 RX ADMIN — DOCUSATE SODIUM AND SENNOSIDES 2 TABLET: 8.6; 5 TABLET, FILM COATED ORAL at 10:45

## 2024-03-13 RX ADMIN — POLYETHYLENE GLYCOL 3350 17 G: 17 POWDER, FOR SOLUTION ORAL at 10:48

## 2024-03-13 RX ADMIN — HYDRALAZINE HYDROCHLORIDE 25 MG: 25 TABLET ORAL at 20:33

## 2024-03-13 RX ADMIN — FUROSEMIDE 40 MG: 40 TABLET ORAL at 18:44

## 2024-03-13 RX ADMIN — ASPIRIN 81 MG: 81 TABLET, CHEWABLE ORAL at 10:48

## 2024-03-13 RX ADMIN — FUROSEMIDE 40 MG: 40 TABLET ORAL at 10:49

## 2024-03-13 RX ADMIN — PANTOPRAZOLE SODIUM 40 MG: 40 TABLET, DELAYED RELEASE ORAL at 10:49

## 2024-03-13 RX ADMIN — ATORVASTATIN CALCIUM 40 MG: 40 TABLET, FILM COATED ORAL at 10:49

## 2024-03-13 RX ADMIN — ISOSORBIDE MONONITRATE 30 MG: 30 TABLET, EXTENDED RELEASE ORAL at 10:48

## 2024-03-13 RX ADMIN — APIXABAN 2.5 MG: 2.5 TABLET, FILM COATED ORAL at 20:33

## 2024-03-13 RX ADMIN — HYDRALAZINE HYDROCHLORIDE 25 MG: 25 TABLET ORAL at 06:37

## 2024-03-13 RX ADMIN — BUDESONIDE AND FORMOTEROL FUMARATE DIHYDRATE 2 PUFF: 80; 4.5 AEROSOL RESPIRATORY (INHALATION) at 20:36

## 2024-03-13 RX ADMIN — SODIUM CHLORIDE, PRESERVATIVE FREE 10 ML: 5 INJECTION INTRAVENOUS at 20:33

## 2024-03-13 RX ADMIN — AZITHROMYCIN MONOHYDRATE 500 MG: 500 INJECTION, POWDER, LYOPHILIZED, FOR SOLUTION INTRAVENOUS at 20:31

## 2024-03-13 RX ADMIN — LEVOTHYROXINE SODIUM 75 MCG: 75 TABLET ORAL at 06:37

## 2024-03-13 RX ADMIN — CARVEDILOL 6.25 MG: 6.25 TABLET, FILM COATED ORAL at 10:45

## 2024-03-13 ASSESSMENT — PAIN SCALES - GENERAL: PAINLEVEL_OUTOF10: 0

## 2024-03-13 ASSESSMENT — ENCOUNTER SYMPTOMS
ABDOMINAL DISTENTION: 0
ABDOMINAL PAIN: 0
CHEST TIGHTNESS: 0
CONSTIPATION: 0
SHORTNESS OF BREATH: 0
CHOKING: 0
COUGH: 0
EYE PAIN: 0
BLOOD IN STOOL: 0
WHEEZING: 0
VOMITING: 0
BACK PAIN: 0
DIARRHEA: 0
NAUSEA: 0
STRIDOR: 0

## 2024-03-13 ASSESSMENT — PAIN SCALES - WONG BAKER
WONGBAKER_NUMERICALRESPONSE: 0
WONGBAKER_NUMERICALRESPONSE: NO HURT

## 2024-03-13 NOTE — PROGRESS NOTES
NOT DETECTED     Parainfluenza 2 PCR NOT DETECTED     Parainfluenza 3 PCR NOT DETECTED     Parainfluenza 4 PCR NOT DETECTED     RSV PCR NOT DETECTED     Bordetella parapertussis by PCR NOT DETECTED     B Pertussis by PCR NOT DETECTED     Chlamydophila Pneumonia PCR NOT DETECTED     Mycoplasma pneumo by PCR NOT DETECTED    Legionella antigen, urine [[id number]] Collected: 03/07/24 1536    Order Status: Completed Specimen: Urine Updated: 03/08/24 1212     Legionella Urinary Ag NEGATIVE     Comment: (NOTE)  PRESUMPTIVE NEGATIVE FOR LEGIONELLA PNEUMOPHILIA SEROGROUP 1   ANTIGEN IN URINE SUGGESTING NO RECENT OR CURRENT INFECTION.    INFECTION DUE TO LEGIONELLA CANNOT BE RULED OUT SINCE OTHER   SEROGROUPS AND SPECIES MAY CAUSE DISEASE. ANTIGEN MAY NOT BE PRESENT   IN URINE IN EARLY INFECTION, AND THE LEVEL OF ANTIGEN PRESENT IN   URINE MAY BE BELOW THE DETECTION LIMIT OF THE TEST.         Strep Pneumoniae Antigen [[id number]] Collected: 03/07/24 1536    Order Status: Completed Specimen: CSF Updated: 03/08/24 1212     Strep pneumo Ag URINE NEGATIVE     Comment: Presumptive negative for   pneumococcal pneumonia,  suggesting no current or recent  pneumococcal infection. Infection  due to S pneumonia cannot be ruled  out if the antigen present in the  sample is below the detection  limit of the test.  (NOTE)  REFERENCE RANGE: NEGATIVE         Culture, Blood 2 [[id number]] Collected: 03/07/24 0757    Order Status: Completed Specimen: Blood Updated: 03/12/24 0738     Specimen BLOOD     Special Requests NONE     Culture NO GROWTH AT 5 DAYS    Narrative:      SETUP DATE/TIME:  03/07/2024 1042    Culture, Blood 1 [[id number]] Collected: 03/07/24 0734    Order Status: Completed Specimen: Blood Updated: 03/12/24 0738     Specimen BLOOD     Special Requests NONE     Culture NO GROWTH AT 5 DAYS    Narrative:      SETUP DATE/TIME:  03/07/2024 0832    COVID-19, Rapid [[id number]] Collected: 03/07/24 0732    Order Status: Completed  Specimen: Nasopharyngeal Updated: 03/07/24 0817     Source NARES     SARS-CoV-2, NAAT NOT DETECTED     Comment:         Rapid NAAT: The specimen is NEGATIVE for SARS-CoV-2, the novel coronavirus associated with   COVID-19.  Negative results should be treated as presumptive and, if inconsistent with clinical signs   and symptoms or necessary for patient management, should be tested with an alternate   molecular assay.  Negative results do not preclude SARS-CoV-2 infection and should not be used as the sole   basis for patient management decisions.          This test has been authorized by the FDA under an Emergency Use Authorization (EUA) for use   by authorized laboratories.  The ID NOW COVID-19 assay is designed to detect the virus that causes COVID-19 in patients   with signs and symptoms of infection who are suspected of COVID-19.  An individual without symptoms of COVID-19 and who is not shedding SARS-CoV-2 virus would   expect to have a negative (not detected) result in this assay.          Fact sheet for Healthcare Providers: https://www.fda.gov/media/647469/download  Fact sheet for Patients: https://www.fda.gov/media/249116/download          METHODOLOGY: Isothermal Nucleic Acid Amplification         Influenza A/B, Molecular [[id number]] Collected: 03/07/24 0732    Order Status: Completed Specimen: Nasal Updated: 03/07/24 0817     Influenza A Antigen NOT DETECTED     Comment: A negative result does not rule out Influenzae A&B.  METHODOLOGY: Isothermal Nucleic Acid Amplification          Influenza B Antigen NOT DETECTED     Comment: A negative result does not rule out Influenzae A&B.  METHODOLOGY: Isothermal Nucleic Acid Amplification                     Electronically signed by Dana Kerns MD on 3/13/2024 at 1:56 PM

## 2024-03-13 NOTE — PLAN OF CARE
bowel function: Assess bowel function  Goal: Maintains adequate nutritional intake  Outcome: Progressing  Flowsheets (Taken 3/12/2024 1535 by Elva Petit RN)  Maintains adequate nutritional intake: Monitor percentage of each meal consumed     Problem: Genitourinary - Adult  Goal: Absence of urinary retention  Outcome: Progressing  Flowsheets (Taken 3/12/2024 1535 by Elva Petit RN)  Absence of urinary retention: Assess patient’s ability to void and empty bladder     Problem: Infection - Adult  Goal: Absence of infection at discharge  Outcome: Progressing  Flowsheets  Taken 3/12/2024 2013 by Pat Manning RN  Absence of infection at discharge:   Assess and monitor for signs and symptoms of infection   Monitor lab/diagnostic results  Taken 3/12/2024 1535 by Elva Petit RN  Absence of infection at discharge: Assess and monitor for signs and symptoms of infection  Goal: Absence of infection during hospitalization  Outcome: Progressing  Flowsheets (Taken 3/12/2024 1535 by Elva Petit RN)  Absence of infection during hospitalization: Assess and monitor for signs and symptoms of infection     Problem: Metabolic/Fluid and Electrolytes - Adult  Goal: Electrolytes maintained within normal limits  Outcome: Progressing  Flowsheets (Taken 3/12/2024 1535 by Elva Petit RN)  Electrolytes maintained within normal limits: Monitor labs and assess patient for signs and symptoms of electrolyte imbalances  Goal: Hemodynamic stability and optimal renal function maintained  Outcome: Progressing  Flowsheets (Taken 3/12/2024 1535 by Elva Petit RN)  Hemodynamic stability and optimal renal function maintained: Monitor labs and assess for signs and symptoms of volume excess or deficit     Problem: Hematologic - Adult  Goal: Maintains hematologic stability  Outcome: Progressing  Flowsheets (Taken 3/12/2024 1535 by Elva Petit RN)  Maintains hematologic stability: Assess for signs and symptoms of bleeding or  hemorrhage     Problem: ABCDS Injury Assessment  Goal: Absence of physical injury  Outcome: Progressing     Problem: Chronic Conditions and Co-morbidities  Goal: Patient's chronic conditions and co-morbidity symptoms are monitored and maintained or improved  Outcome: Progressing  Flowsheets (Taken 3/12/2024 1535 by Elva Petit, RN)  Care Plan - Patient's Chronic Conditions and Co-Morbidity Symptoms are Monitored and Maintained or Improved: Monitor and assess patient's chronic conditions and comorbid symptoms for stability, deterioration, or improvement

## 2024-03-13 NOTE — PLAN OF CARE
Problem: Safety - Adult  Goal: Free from fall injury  3/13/2024 1202 by Sameera Bella RN  Outcome: Progressing  3/13/2024 0522 by Pat Manning RN  Outcome: Progressing     Problem: Discharge Planning  Goal: Discharge to home or other facility with appropriate resources  3/13/2024 1202 by Sameera Bella RN  Outcome: Progressing  Flowsheets (Taken 3/13/2024 0800)  Discharge to home or other facility with appropriate resources:   Identify barriers to discharge with patient and caregiver   Arrange for needed discharge resources and transportation as appropriate   Identify discharge learning needs (meds, wound care, etc)   Refer to discharge planning if patient needs post-hospital services based on physician order or complex needs related to functional status, cognitive ability or social support system  3/13/2024 0522 by Pat Manning RN  Outcome: Progressing  Flowsheets (Taken 3/12/2024 1535 by Elva Petit, RN)  Discharge to home or other facility with appropriate resources: Identify barriers to discharge with patient and caregiver     Problem: Neurosensory - Adult  Goal: Achieves stable or improved neurological status  3/13/2024 1202 by Sameera eBlla RN  Outcome: Progressing  Flowsheets (Taken 3/13/2024 0800)  Achieves stable or improved neurological status:   Assess for and report changes in neurological status   Initiate measures to prevent increased intracranial pressure   Maintain blood pressure and fluid volume within ordered parameters to optimize cerebral perfusion and minimize risk of hemorrhage   Monitor temperature, glucose, and sodium. Initiate appropriate interventions as ordered  3/13/2024 0522 by Pat Manning RN  Outcome: Progressing  Flowsheets (Taken 3/12/2024 1535 by Elva Petit RN)  Achieves stable or improved neurological status: Assess for and report changes in neurological status  Goal: Achieves maximal functionality and self  Sameera Bella RN  Outcome: Progressing  Flowsheets (Taken 3/13/2024 0800)  Absence of infection during hospitalization:   Assess and monitor for signs and symptoms of infection   Monitor lab/diagnostic results   Monitor all insertion sites i.e., indwelling lines, tubes and drains   Monitor endotracheal (as able) and nasal secretions for changes in amount and color   [place] appropriate cooling/warming therapies per order   Administer medications as ordered   Instruct and encourage patient and family to use good hand hygiene technique   Identify and instruct in appropriate isolation precautions for identified infection/condition  3/13/2024 0522 by Pat Manning RN  Outcome: Progressing  Flowsheets (Taken 3/12/2024 1535 by Elva Petit, RN)  Absence of infection during hospitalization: Assess and monitor for signs and symptoms of infection     Problem: Metabolic/Fluid and Electrolytes - Adult  Goal: Electrolytes maintained within normal limits  3/13/2024 1202 by Sameera Bella RN  Outcome: Progressing  Flowsheets (Taken 3/13/2024 0800)  Electrolytes maintained within normal limits:   Monitor labs and assess patient for signs and symptoms of electrolyte imbalances   Administer electrolyte replacement as ordered   Monitor response to electrolyte replacements, including repeat lab results as appropriate   Fluid restriction as ordered   Instruct patient on fluid and nutrition restrictions as appropriate  3/13/2024 0522 by Pat Manning RN  Outcome: Progressing  Flowsheets (Taken 3/12/2024 1535 by Elva Petit, RN)  Electrolytes maintained within normal limits: Monitor labs and assess patient for signs and symptoms of electrolyte imbalances  Goal: Hemodynamic stability and optimal renal function maintained  3/13/2024 1202 by Sameera Bella RN  Outcome: Progressing  Flowsheets (Taken 3/13/2024 0800)  Hemodynamic stability and optimal renal function maintained:   Monitor labs

## 2024-03-13 NOTE — PROGRESS NOTES
Nephrology Progress Note        [address] [facility], [address] [place] [address]  Phone: [phone]  Office Hours: 8:30AM - 4:30PM  Monday - Friday        3/13/2024 7:14 AM  Subjective:   Admit Date: 3/7/2024  PCP: Francisco Barriga MD  Interval History:   On NC    Diet: ADULT DIET; Regular; Low Fat/Low Chol/High Fiber/2 gm Na; No Added Salt (3-4 gm); 2000 ml      Data:   Scheduled Meds:   furosemide  40 mg Oral BID    apixaban  2.5 mg Oral BID    aspirin  81 mg Oral Daily    sennosides-docusate sodium  2 tablet Oral Daily    carvedilol  6.25 mg Oral BID    levothyroxine  75 mcg Oral Daily    midodrine  5 mg Oral TID WC    cefTRIAXone (ROCEPHIN) IV  1,000 mg IntraVENous Q24H    azithromycin  500 mg IntraVENous Q24H    EPINEPHrine  1 puff Each Nostril Once    white petrolatum   Topical Once    amiodarone  200 mg Oral Daily    atorvastatin  40 mg Oral Daily    [Held by provider] empagliflozin  10 mg Oral Daily    hydrALAZINE  25 mg Oral 3 times per day    isosorbide mononitrate  30 mg Oral Daily    pantoprazole  40 mg Oral Daily    sodium chloride flush  5-40 mL IntraVENous 2 times per day    budesonide-formoterol  2 puff Inhalation BID     Continuous Infusions:   sodium chloride       PRN Meds:bisacodyl, albuterol sulfate HFA, sodium chloride flush, sodium chloride, ondansetron **OR** ondansetron, polyethylene glycol, acetaminophen **OR** acetaminophen, potassium chloride **OR** potassium alternative oral replacement **OR** potassium chloride, magnesium sulfate  I/O last 3 completed shifts:  In: 600 [P.O.:600]  Out: 300 [Urine:300]  No intake/output data recorded.    Intake/Output Summary (Last 24 hours) at 3/13/2024 0714  Last data filed at 3/13/2024 0637  Gross per 24 hour   Intake 300 ml   Output --   Net 300 ml       CBC:   Recent Labs     03/12/24  0904   WBC 5.8   HGB 7.3*          BMP:    Recent Labs     03/11/24  0431 03/12/24  0217    134*   K 4.0 4.2    100   CO2 24 24

## 2024-03-13 NOTE — CONSULTS
[facility]             MD Aleta Sewell MD Carol Christensen, APRN-CNP       Elaine Braxton, APRN-CNP             [address] [place] [address]             [phone] fax [phone]      Physician attestation:  I have personally seen and examined the patient independently. I have reviewed the patient's available data,including medical history and recent test results. Reviewed and discussed note as documented by SHELLEY.  I agree with the physical exam findings, assessment and plan.     Briefly   74-year-old female with past medical history of extensive cardiac comorbidities with aortic stenosis status post TAVR, CAD status post PCI with JOSTIN, heart failure, EDITA, stage III CKD, COPD on supplemental O2 who presented to the hospital with complaint of respiratory distress and concern for hypoxic respiratory failure.  On chest x-ray on admission with concern for pulmonary edema and vascular congestion.  We are being consulted due to worsening anemia.  She had a similar course during her last hospitalization and underwent an EGD and colonoscopy with me.  EGD was unremarkable without any bleeding.  Colonoscopy with poor prep and 1 polyp removed.  I did discuss with the patient about further endoscopic evaluation however at this time she refuses    Gen: normal mood, alert  ENT: normal TJ  Cardiovascular: RRR, No M/R/G  Respiratory: On supplemental O2 via nasal cannula  Gastrointestinal: Soft,NT ND  Genitourinary: no suprapubic tenderness  Musculoskeletal: no scars  Skin:moist  Neuro: alert and oriented x3    My assessment and plan reveals   #1 acute on chronic anemia  She had a similar course during her last hospitalization and underwent an EGD and colonoscopy with me.  EGD was unremarkable without any bleeding.  Colonoscopy with poor prep and 1 polyp removed.  I did discuss with the patient about further endoscopic evaluation

## 2024-03-13 NOTE — CONSENT
Informed Consent for Blood Component Transfusion Note    I have discussed with the patient the rationale for blood component transfusion; its benefits in treating or preventing fatigue, organ damage, or death; and its risk which includes mild transfusion reactions, rare risk of blood borne infection, or more serious but rare reactions. I have discussed the alternatives to transfusion, including the risk and consequences of not receiving transfusion. The patient had an opportunity to ask questions and had agreed to proceed with transfusion of blood components.    Electronically signed by Dana Kerns MD on 3/13/24 at 2:51 PM EDT

## 2024-03-13 NOTE — CARE COORDINATION
CM reviewed chart and saw pt at bedside.  Pt lives in one story home w/ spouse.  Spouse is currently in [place] and her daughter is staying with her, assisting w/ some ADL's and driving to medical appointments.  Pt has home O2 and reports it is in good working order.    Lynette Gonsalves was evaluated today and a DME order was entered for a wheeled walker with seat because she requires this to successfully complete daily living tasks of bathing, toileting, personal cares, ambulating, grooming, hygiene, dressing upper body, dressing lower body, meal preparation, and taking own medications.  A wheeled walker is necessary due to the patient's unsteady gait, upper body weakness, and inability to  an ambulation device; and she can ambulate only by pushing a walker instead of a lesser assistive device such as a cane, crutch, or standard walker.  The need for this equipment was discussed with the patient and she understands and is in agreement.    Patient stated plan for d/c is home w/ her daughter.  Denies needs for additional services.  CM will follow

## 2024-03-13 NOTE — PROGRESS NOTES
Attempted X2 to reach phlebotomist to ensure new lab orders drawn.  Unable to reach, will attempt again.     1205: I just got ahold of the phlebotomist for labs, she stated she can't see any labs to be drawn for this pt for today.

## 2024-03-14 ENCOUNTER — HOSPITAL ENCOUNTER (OUTPATIENT)
Dept: PULMONOLOGY | Age: 74
Discharge: HOME | End: 2024-03-14
Attending: PSYCHIATRY & NEUROLOGY

## 2024-03-14 PROBLEM — D64.9 NORMOCYTIC ANEMIA: Status: ACTIVE | Noted: 2024-03-14

## 2024-03-14 LAB
ABO/RH: NORMAL
ANION GAP SERPL CALCULATED.3IONS-SCNC: 11 MMOL/L (ref 7–16)
ANTIBODY SCREEN: NEGATIVE
BASOPHILS ABSOLUTE: 0.1 K/CU MM
BASOPHILS RELATIVE PERCENT: 0.9 % (ref 0–1)
BUN SERPL-MCNC: 41 MG/DL (ref 6–23)
CALCIUM SERPL-MCNC: 9 MG/DL (ref 8.3–10.6)
CHLORIDE BLD-SCNC: 99 MMOL/L (ref 99–110)
CO2: 25 MMOL/L (ref 21–32)
COMPONENT: NORMAL
CREAT SERPL-MCNC: 3.3 MG/DL (ref 0.6–1.1)
CROSSMATCH RESULT: NORMAL
DIFFERENTIAL TYPE: ABNORMAL
ECHO BSA: 1.75 M2
ECHO EST RA PRESSURE: 3 MMHG
ECHO RIGHT VENTRICULAR SYSTOLIC PRESSURE (RVSP): 56 MMHG
ECHO TV REGURGITANT MAX VELOCITY: 3.64 M/S
ECHO TV REGURGITANT PEAK GRADIENT: 53 MMHG
EOSINOPHILS ABSOLUTE: 0.4 K/CU MM
EOSINOPHILS RELATIVE PERCENT: 5.7 % (ref 0–3)
GFR SERPL CREATININE-BSD FRML MDRD: 14 ML/MIN/1.73M2
GLUCOSE SERPL-MCNC: 96 MG/DL (ref 70–99)
HCT VFR BLD CALC: 28 % (ref 37–47)
HEMOGLOBIN: 8.4 GM/DL (ref 12.5–16)
IMMATURE NEUTROPHIL %: 0.3 % (ref 0–0.43)
LYMPHOCYTES ABSOLUTE: 1 K/CU MM
LYMPHOCYTES RELATIVE PERCENT: 14.9 % (ref 24–44)
MCH RBC QN AUTO: 27.7 PG (ref 27–31)
MCHC RBC AUTO-ENTMCNC: 30 % (ref 32–36)
MCV RBC AUTO: 92.4 FL (ref 78–100)
MONOCYTES ABSOLUTE: 0.7 K/CU MM
MONOCYTES RELATIVE PERCENT: 10.2 % (ref 0–4)
NUCLEATED RBC %: 0 %
PDW BLD-RTO: 16.5 % (ref 11.7–14.9)
PLATELET # BLD: 169 K/CU MM (ref 140–440)
PMV BLD AUTO: 11.8 FL (ref 7.5–11.1)
POTASSIUM SERPL-SCNC: 4 MMOL/L (ref 3.5–5.1)
RBC # BLD: 3.03 M/CU MM (ref 4.2–5.4)
SEGMENTED NEUTROPHILS ABSOLUTE COUNT: 4.8 K/CU MM
SEGMENTED NEUTROPHILS RELATIVE PERCENT: 68 % (ref 36–66)
SODIUM BLD-SCNC: 135 MMOL/L (ref 135–145)
STATUS: NORMAL
TOTAL IMMATURE NEUTOROPHIL: 0.02 K/CU MM
TOTAL NUCLEATED RBC: 0 K/CU MM
TRANSFUSION STATUS: NORMAL
UNIT DIVISION: 0
UNIT NUMBER: NORMAL
WBC # BLD: 7 K/CU MM (ref 4–10.5)

## 2024-03-14 PROCEDURE — 6370000000 HC RX 637 (ALT 250 FOR IP): Performed by: FAMILY MEDICINE

## 2024-03-14 PROCEDURE — 6370000000 HC RX 637 (ALT 250 FOR IP)

## 2024-03-14 PROCEDURE — 6370000000 HC RX 637 (ALT 250 FOR IP): Performed by: INTERNAL MEDICINE

## 2024-03-14 PROCEDURE — 80048 BASIC METABOLIC PNL TOTAL CA: CPT

## 2024-03-14 PROCEDURE — 6370000000 HC RX 637 (ALT 250 FOR IP): Performed by: STUDENT IN AN ORGANIZED HEALTH CARE EDUCATION/TRAINING PROGRAM

## 2024-03-14 PROCEDURE — 93312 ECHO TRANSESOPHAGEAL: CPT | Performed by: INTERNAL MEDICINE

## 2024-03-14 PROCEDURE — 94761 N-INVAS EAR/PLS OXIMETRY MLT: CPT

## 2024-03-14 PROCEDURE — APPNB45 APP NON BILLABLE 31-45 MINUTES: Performed by: LICENSED PRACTICAL NURSE

## 2024-03-14 PROCEDURE — 2060000000 HC ICU INTERMEDIATE R&B

## 2024-03-14 PROCEDURE — 36415 COLL VENOUS BLD VENIPUNCTURE: CPT

## 2024-03-14 PROCEDURE — 94640 AIRWAY INHALATION TREATMENT: CPT

## 2024-03-14 PROCEDURE — 2580000003 HC RX 258: Performed by: FAMILY MEDICINE

## 2024-03-14 PROCEDURE — 85025 COMPLETE CBC W/AUTO DIFF WBC: CPT

## 2024-03-14 PROCEDURE — 99232 SBSQ HOSP IP/OBS MODERATE 35: CPT | Performed by: INTERNAL MEDICINE

## 2024-03-14 PROCEDURE — 94660 CPAP INITIATION&MGMT: CPT

## 2024-03-14 RX ORDER — OXYMETAZOLINE HYDROCHLORIDE 0.05 G/100ML
2 SPRAY NASAL ONCE
Status: COMPLETED | OUTPATIENT
Start: 2024-03-14 | End: 2024-03-14

## 2024-03-14 RX ADMIN — SALINE NASAL SPRAY 2 SPRAY: 1.5 SOLUTION NASAL at 13:23

## 2024-03-14 RX ADMIN — SODIUM CHLORIDE, PRESERVATIVE FREE 10 ML: 5 INJECTION INTRAVENOUS at 20:52

## 2024-03-14 RX ADMIN — ASPIRIN 81 MG: 81 TABLET, CHEWABLE ORAL at 08:13

## 2024-03-14 RX ADMIN — MIDODRINE HYDROCHLORIDE 5 MG: 5 TABLET ORAL at 16:53

## 2024-03-14 RX ADMIN — CARVEDILOL 6.25 MG: 6.25 TABLET, FILM COATED ORAL at 08:12

## 2024-03-14 RX ADMIN — CARVEDILOL 6.25 MG: 6.25 TABLET, FILM COATED ORAL at 20:51

## 2024-03-14 RX ADMIN — SODIUM CHLORIDE, PRESERVATIVE FREE 10 ML: 5 INJECTION INTRAVENOUS at 08:13

## 2024-03-14 RX ADMIN — AMIODARONE HYDROCHLORIDE 200 MG: 200 TABLET ORAL at 08:13

## 2024-03-14 RX ADMIN — PANTOPRAZOLE SODIUM 40 MG: 40 TABLET, DELAYED RELEASE ORAL at 08:13

## 2024-03-14 RX ADMIN — FUROSEMIDE 40 MG: 40 TABLET ORAL at 16:53

## 2024-03-14 RX ADMIN — DOCUSATE SODIUM AND SENNOSIDES 2 TABLET: 8.6; 5 TABLET, FILM COATED ORAL at 08:12

## 2024-03-14 RX ADMIN — APIXABAN 2.5 MG: 2.5 TABLET, FILM COATED ORAL at 20:51

## 2024-03-14 RX ADMIN — HYDRALAZINE HYDROCHLORIDE 25 MG: 25 TABLET ORAL at 20:51

## 2024-03-14 RX ADMIN — BUDESONIDE AND FORMOTEROL FUMARATE DIHYDRATE 2 PUFF: 80; 4.5 AEROSOL RESPIRATORY (INHALATION) at 07:55

## 2024-03-14 RX ADMIN — OXYMETAZOLINE HYDROCHLORIDE 2 SPRAY: 0.05 SPRAY NASAL at 11:06

## 2024-03-14 RX ADMIN — FUROSEMIDE 40 MG: 40 TABLET ORAL at 08:12

## 2024-03-14 RX ADMIN — HYDRALAZINE HYDROCHLORIDE 25 MG: 25 TABLET ORAL at 05:44

## 2024-03-14 RX ADMIN — ISOSORBIDE MONONITRATE 30 MG: 30 TABLET, EXTENDED RELEASE ORAL at 08:13

## 2024-03-14 RX ADMIN — BUDESONIDE AND FORMOTEROL FUMARATE DIHYDRATE 2 PUFF: 80; 4.5 AEROSOL RESPIRATORY (INHALATION) at 19:46

## 2024-03-14 RX ADMIN — SALINE NASAL SPRAY 2 SPRAY: 1.5 SOLUTION NASAL at 20:53

## 2024-03-14 RX ADMIN — SALINE NASAL SPRAY 2 SPRAY: 1.5 SOLUTION NASAL at 16:26

## 2024-03-14 RX ADMIN — ATORVASTATIN CALCIUM 40 MG: 40 TABLET, FILM COATED ORAL at 08:12

## 2024-03-14 RX ADMIN — SALINE NASAL SPRAY 2 SPRAY: 1.5 SOLUTION NASAL at 17:53

## 2024-03-14 RX ADMIN — LEVOTHYROXINE SODIUM 75 MCG: 75 TABLET ORAL at 05:44

## 2024-03-14 RX ADMIN — HYDRALAZINE HYDROCHLORIDE 25 MG: 25 TABLET ORAL at 13:22

## 2024-03-14 RX ADMIN — APIXABAN 2.5 MG: 2.5 TABLET, FILM COATED ORAL at 08:12

## 2024-03-14 ASSESSMENT — ENCOUNTER SYMPTOMS
BACK PAIN: 0
SHORTNESS OF BREATH: 0
EYE PAIN: 0
STRIDOR: 0
BLOOD IN STOOL: 0
NAUSEA: 0
ABDOMINAL DISTENTION: 0
CHEST TIGHTNESS: 0
COUGH: 0
CONSTIPATION: 0
CHOKING: 0
DIARRHEA: 0
VOMITING: 0
ABDOMINAL PAIN: 0
WHEEZING: 0

## 2024-03-14 NOTE — PROGRESS NOTES
notified: Pt having nose bleed again, heavy with some clots passing, has used most of a box of tissues.  Thanks

## 2024-03-14 NOTE — PROGRESS NOTES
rhinovirus  -Continue supplemental O2     #3 paroxysmal A-fib on Eliquis currently being held.     #4 heart failure with reduced ejection fraction cardiology following     #5 aortic stenosis status post TAVR    My documented MDM is a substantive portion and Majority of the supervisory visit.      Comment: Please note this report has been produced using speech recognition software and may contain errors related to that system including errors in grammar, punctuation, and spelling, as well as words and phrases that may be inappropriate. If there are any questions or concerns please feel free to contact the dictating provider for clarification.       Interval H/P  Patient received 1 unit of packed red blood cells yesterday.  Hemoglobin is noted to be steady today  at 8.4,  Creatinine is noted to be elevated at 3.3 however it is close to baseline      Patient was examined at bedside. She is noted to be sitting up in bed with an active nose bleed. She was not providing nasal bridge pressure, she also kept blowing her nose. Patient was advised to pinch bridge. Patient did say she has had multiple nose bleeds the past few hours. She denies any overt blood loss per stool, emesis.    CC today:nose bleeds     GI symptoms: denies dysphagia, abdominal pain, nausea, vomiting, diarrhea, constipation, melena and hematochezia.     ROS: Per history of present illness. All other systems were reviewed and were negative.      Physical Exam  Blood pressure (!) 151/64, pulse 61, temperature 98 °F (36.7 °C), temperature source Oral, resp. rate 18, height 1.57 m (5' 1.81\"), weight 71.1 kg (156 lb 12 oz), SpO2 99 %.  Constitutional: Patient is in no distress.   Eyes: Pupils equal, round.  No icterus.  HENT: Oral mucosa is moist.   Pulmonary: oxygen via NC at baseline  Cardiovascular: Heart has a regular rate and rhythm, no JVD.   Gastrointestinal: Bowel sounds are present in all four quadrants. Abdomen is soft, nontender, nondistended.  discussed with the patient. Patient agreed to plan.        Thank you for allowing us to be involved in the management of this patient. We will follow this patient along with you. Please feel free to call with any questions.      SATURNINO Sinha - [place]  Gastroenterology   3/14/2024   6:45 AM       Recent Labs     03/12/24  0904 03/13/24  1346   AST 93* 64*   * 82*   ALKPHOS 81 78   BILITOT 0.4 0.5   BILIDIR 0.2  --

## 2024-03-14 NOTE — PROGRESS NOTES
She is not in acute distress.     Appearance: She is not ill-appearing, toxic-appearing or diaphoretic.   HENT:      Head: Normocephalic and atraumatic.      Right Ear: External ear normal.      Left Ear: External ear normal.      Nose: Nose normal.      Mouth/Throat:      Pharynx: Oropharynx is clear. No oropharyngeal exudate or posterior oropharyngeal erythema.   Eyes:      General: No scleral icterus.        Right eye: No discharge.         Left eye: No discharge.      Conjunctiva/sclera: Conjunctivae normal.   Cardiovascular:      Rate and Rhythm: Normal rate and regular rhythm.      Pulses: Normal pulses.      Heart sounds: Normal heart sounds. No murmur heard.     No friction rub. No gallop.   Pulmonary:      Effort: Pulmonary effort is normal. No tachypnea or respiratory distress.      Breath sounds: Decreased air movement and transmitted upper airway sounds present. No stridor. No decreased breath sounds, wheezing, rhonchi or rales.   Abdominal:      General: Bowel sounds are normal. There is no distension.      Palpations: Abdomen is soft. There is no mass.      Tenderness: There is no abdominal tenderness. There is no guarding or rebound.      Hernia: No hernia is present.   Musculoskeletal:      Right lower leg: No edema.      Left lower leg: No edema.   Skin:     General: Skin is warm.      Capillary Refill: Capillary refill takes less than 2 seconds.   Neurological:      Mental Status: She is alert and oriented to person, place, and time.   Psychiatric:         Mood and Affect: Mood normal.         Current Medications      sodium chloride  2 spray Each Nostril Q2H    furosemide  40 mg Oral BID    [Held by provider] apixaban  2.5 mg Oral BID    aspirin  81 mg Oral Daily    sennosides-docusate sodium  2 tablet Oral Daily    carvedilol  6.25 mg Oral BID    levothyroxine  75 mcg Oral Daily    midodrine  5 mg Oral TID WC    white petrolatum   Topical Once    amiodarone  200 mg Oral Daily    atorvastatin  40       Fact sheet for Healthcare Providers: https://www.fda.gov/media/409942/download  Fact sheet for Patients: https://www.fda.gov/media/596097/download          METHODOLOGY: Isothermal Nucleic Acid Amplification         Influenza A/B, Molecular [[id number]] Collected: 03/07/24 0732    Order Status: Completed Specimen: Nasal Updated: 03/07/24 0817     Influenza A Antigen NOT DETECTED     Comment: A negative result does not rule out Influenzae A&B.  METHODOLOGY: Isothermal Nucleic Acid Amplification          Influenza B Antigen NOT DETECTED     Comment: A negative result does not rule out Influenzae A&B.  METHODOLOGY: Isothermal Nucleic Acid Amplification                     Electronically signed by Dana Kerns MD on 3/14/2024 at 2:06 PM

## 2024-03-14 NOTE — PLAN OF CARE
Problem: Safety - Adult  Goal: Free from fall injury  Outcome: Progressing     Problem: Discharge Planning  Goal: Discharge to home or other facility with appropriate resources  Outcome: Progressing  Flowsheets (Taken 3/14/2024 0800)  Discharge to home or other facility with appropriate resources:   Identify barriers to discharge with patient and caregiver   Arrange for needed discharge resources and transportation as appropriate   Identify discharge learning needs (meds, wound care, etc)   Refer to discharge planning if patient needs post-hospital services based on physician order or complex needs related to functional status, cognitive ability or social support system     Problem: Neurosensory - Adult  Goal: Achieves stable or improved neurological status  Outcome: Progressing  Flowsheets (Taken 3/14/2024 0800)  Achieves stable or improved neurological status:   Assess for and report changes in neurological status   Initiate measures to prevent increased intracranial pressure   Maintain blood pressure and fluid volume within ordered parameters to optimize cerebral perfusion and minimize risk of hemorrhage   Monitor temperature, glucose, and sodium. Initiate appropriate interventions as ordered  Goal: Achieves maximal functionality and self care  Outcome: Progressing  Flowsheets (Taken 3/14/2024 0800)  Achieves maximal functionality and self care:   Monitor swallowing and airway patency with patient fatigue and changes in neurological status   Encourage and assist patient to increase activity and self care with guidance from physical therapy/occupational therapy   Encourage visually impaired, hearing impaired and aphasic patients to use assistive/communication devices     Problem: Respiratory - Adult  Goal: Achieves optimal ventilation and oxygenation  Outcome: Progressing  Flowsheets (Taken 3/14/2024 0800)  Achieves optimal ventilation and oxygenation:   Assess for changes in respiratory status   Assess for

## 2024-03-14 NOTE — PROGRESS NOTES
respiratory failure with hypoxia ([facility]) 07/02/2023    Insomnia , Psychophysiological insomnia 06/23/2023    Chronic right-sided low back pain with right-sided sciatica 06/23/2023    Pacemaker 05/03/2023    Sick sinus syndrome ([facility]) 05/03/2023    Secondary hypercoagulable state ([facility]) 04/28/2023    A-fib, Longstanding persistent atrial fibrillation ([facility]) 03/24/2023    Stage 3b chronic kidney disease ([facility]) 03/24/2023    Atherosclerosis of native coronary artery of native heart with stable angina pectoris ([facility]) 03/24/2023    Chronic systolic (congestive) heart failure 03/24/2023    Chronic obstructive pulmonary disease, unspecified 03/24/2023    Gastroesophageal reflux disease without esophagitis 03/24/2023    Incisional hernia of anterior abdominal wall without obstruction or gangrene 06/25/2021    EDITA (obstructive sleep apnea) 05/29/2020    Hypersomnia 05/29/2020    Osteoporosis, Age-related, without current pathological fracture 07/31/2019    Depression with anxiety 07/10/2019    Urinary incontinence 07/10/2019    On home O2 10/23/2018    S/P MVR (mitral valve replacement) 10/12/2018    Cardiomyopathy, primary ([facility]) 10/12/2018    Rheumatic mitral stenosis 07/26/2016    ST elevation myocardial infarction (STEMI) ([facility])     CHF following cardiac surgery, postop 06/27/2016    Gait disturbance 06/27/2016    S/P right coronary artery (RCA) stent placement     HTN, Primary hypertension 01/11/2014    HLD, Mixed hyperlipidemia 01/11/2014    Hypothyroidism, Acquired  01/11/2014    PAD (peripheral artery disease) ([facility]) 01/11/2014     Cr 3.3  Continue lasix 40mg po bid on dc                  Electronically signed by Ta Agarwal DO on 3/14/2024 at 8:08 AM    [facility]  MD JOE MARTINEZ DO  [address] [facility],  [address] [place] [address]  PHONE: [phone]  FAX: [phone]

## 2024-03-14 NOTE — PROGRESS NOTES
Comprehensive Nutrition Assessment    Type and Reason for Visit:  Initial (length of stay)    Nutrition Recommendations/Plan:   Continue current diet, cardiac with fluid restriction as needed  Encourage consistent meal intake, oral nutrition supplement available as needed  Will continue to follow up during stay      Malnutrition Assessment:  Malnutrition Status:  Insufficient data (03/14/24 1249)    Context:  Acute Illness       Nutrition Assessment:    Admit with hypoxia hx anemia, CAD, TAVR. Has been on cardiac diet during stay with 2000 ml fluid restriction. Meal intake varying but recent meals %. Patient reports improving intake during stay, did not eat well at some meals due to food offered. Varying weight in past year, some loss noted but not significant. Lunch in room on visit, patient waiting to eat later as having nosebleed today. Will follow at low nutrition risk at this time.    Nutrition Related Findings:    up in bed -nosebleed this morning, Hb 8.4 was 6.8 Wound Type: None       Current Nutrition Intake & Therapies:    Average Meal Intake: %  Average Supplements Intake: None Ordered  ADULT DIET; Regular; Low Fat/Low Chol/High Fiber/2 gm Na; No Added Salt (3-4 gm); 2000 ml    Anthropometric Measures:  Height: 157 cm (5' 1.81\")  Ideal Body Weight (IBW): 109 lbs (50 kg)    Admission Body Weight: 66.9 kg (147 lb 7.8 oz)  Current Body Weight: 71.1 kg (156 lb 12 oz), 143.8 % IBW. Weight Source: Bed Scale  Current BMI (kg/m2): 28.8  Usual Body Weight: 80.7 kg (178 lb) (hx March 2023)  % Weight Change (Calculated): -11.9  Weight Adjustment For: No Adjustment                 BMI Categories: Overweight (BMI 25.0-29.9)    Estimated Daily Nutrient Needs:  Energy Requirements Based On: Kcal/kg  Weight Used for Energy Requirements: Current  Energy (kcal/day): 6374-7221 (22-27 jeannine/kg)  Weight Used for Protein Requirements: Ideal  Protein (g/day): 55-65 (1.1-1.3 g/kg)  Method Used for Fluid Requirements: 1

## 2024-03-15 VITALS
DIASTOLIC BLOOD PRESSURE: 54 MMHG | TEMPERATURE: 97.5 F | BODY MASS INDEX: 26.25 KG/M2 | SYSTOLIC BLOOD PRESSURE: 135 MMHG | RESPIRATION RATE: 18 BRPM | HEART RATE: 62 BPM | HEIGHT: 62 IN | OXYGEN SATURATION: 99 % | WEIGHT: 142.64 LBS

## 2024-03-15 LAB
ANION GAP SERPL CALCULATED.3IONS-SCNC: 10 MMOL/L (ref 7–16)
BASOPHILS ABSOLUTE: 0.1 K/CU MM
BASOPHILS RELATIVE PERCENT: 0.9 % (ref 0–1)
BUN SERPL-MCNC: 45 MG/DL (ref 6–23)
CALCIUM SERPL-MCNC: 8.9 MG/DL (ref 8.3–10.6)
CHLORIDE BLD-SCNC: 96 MMOL/L (ref 99–110)
CO2: 28 MMOL/L (ref 21–32)
CREAT SERPL-MCNC: 3.4 MG/DL (ref 0.6–1.1)
CULTURE: NORMAL
DIFFERENTIAL TYPE: ABNORMAL
EOSINOPHILS ABSOLUTE: 0.4 K/CU MM
EOSINOPHILS RELATIVE PERCENT: 5.9 % (ref 0–3)
GFR SERPL CREATININE-BSD FRML MDRD: 14 ML/MIN/1.73M2
GLUCOSE SERPL-MCNC: 149 MG/DL (ref 70–99)
GRAM SMEAR: NORMAL
HCT VFR BLD CALC: 27.9 % (ref 37–47)
HEMOGLOBIN: 8.7 GM/DL (ref 12.5–16)
IMMATURE NEUTROPHIL %: 0.3 % (ref 0–0.43)
LYMPHOCYTES ABSOLUTE: 1 K/CU MM
LYMPHOCYTES RELATIVE PERCENT: 13.9 % (ref 24–44)
Lab: NORMAL
MCH RBC QN AUTO: 28.1 PG (ref 27–31)
MCHC RBC AUTO-ENTMCNC: 31.2 % (ref 32–36)
MCV RBC AUTO: 90 FL (ref 78–100)
MONOCYTES ABSOLUTE: 0.8 K/CU MM
MONOCYTES RELATIVE PERCENT: 10.9 % (ref 0–4)
NUCLEATED RBC %: 0 %
PDW BLD-RTO: 16.5 % (ref 11.7–14.9)
PLATELET # BLD: 186 K/CU MM (ref 140–440)
PMV BLD AUTO: 12.1 FL (ref 7.5–11.1)
POTASSIUM SERPL-SCNC: 3.7 MMOL/L (ref 3.5–5.1)
RBC # BLD: 3.1 M/CU MM (ref 4.2–5.4)
SEGMENTED NEUTROPHILS ABSOLUTE COUNT: 4.7 K/CU MM
SEGMENTED NEUTROPHILS RELATIVE PERCENT: 68.1 % (ref 36–66)
SODIUM BLD-SCNC: 134 MMOL/L (ref 135–145)
SPECIMEN: NORMAL
TOTAL IMMATURE NEUTOROPHIL: 0.02 K/CU MM
TOTAL NUCLEATED RBC: 0 K/CU MM
WBC # BLD: 6.9 K/CU MM (ref 4–10.5)

## 2024-03-15 PROCEDURE — 6370000000 HC RX 637 (ALT 250 FOR IP)

## 2024-03-15 PROCEDURE — 99231 SBSQ HOSP IP/OBS SF/LOW 25: CPT | Performed by: INTERNAL MEDICINE

## 2024-03-15 PROCEDURE — 6370000000 HC RX 637 (ALT 250 FOR IP): Performed by: INTERNAL MEDICINE

## 2024-03-15 PROCEDURE — 94660 CPAP INITIATION&MGMT: CPT

## 2024-03-15 PROCEDURE — 85025 COMPLETE CBC W/AUTO DIFF WBC: CPT

## 2024-03-15 PROCEDURE — 80048 BASIC METABOLIC PNL TOTAL CA: CPT

## 2024-03-15 PROCEDURE — 2580000003 HC RX 258: Performed by: FAMILY MEDICINE

## 2024-03-15 PROCEDURE — 6370000000 HC RX 637 (ALT 250 FOR IP): Performed by: FAMILY MEDICINE

## 2024-03-15 PROCEDURE — 36415 COLL VENOUS BLD VENIPUNCTURE: CPT

## 2024-03-15 RX ORDER — POLYETHYLENE GLYCOL 3350 17 G/17G
17 POWDER, FOR SOLUTION ORAL DAILY
Qty: 30 PACKET | Refills: 0 | Status: SHIPPED | OUTPATIENT
Start: 2024-03-15 | End: 2024-04-14

## 2024-03-15 RX ORDER — FUROSEMIDE 80 MG
80 TABLET ORAL DAILY
Qty: 60 TABLET | Refills: 0 | Status: SHIPPED | OUTPATIENT
Start: 2024-03-16 | End: 2024-03-21 | Stop reason: SDUPTHER

## 2024-03-15 RX ORDER — FUROSEMIDE 40 MG/1
80 TABLET ORAL DAILY
Status: DISCONTINUED | OUTPATIENT
Start: 2024-03-15 | End: 2024-03-15 | Stop reason: HOSPADM

## 2024-03-15 RX ORDER — CARVEDILOL 6.25 MG/1
6.25 TABLET ORAL 2 TIMES DAILY
Qty: 60 TABLET | Refills: 0 | Status: SHIPPED | OUTPATIENT
Start: 2024-03-15 | End: 2024-03-21 | Stop reason: SDUPTHER

## 2024-03-15 RX ADMIN — PANTOPRAZOLE SODIUM 40 MG: 40 TABLET, DELAYED RELEASE ORAL at 08:50

## 2024-03-15 RX ADMIN — ASPIRIN 81 MG: 81 TABLET, CHEWABLE ORAL at 08:50

## 2024-03-15 RX ADMIN — AMIODARONE HYDROCHLORIDE 200 MG: 200 TABLET ORAL at 08:53

## 2024-03-15 RX ADMIN — MIDODRINE HYDROCHLORIDE 5 MG: 5 TABLET ORAL at 08:53

## 2024-03-15 RX ADMIN — CARVEDILOL 6.25 MG: 6.25 TABLET, FILM COATED ORAL at 08:50

## 2024-03-15 RX ADMIN — LEVOTHYROXINE SODIUM 75 MCG: 75 TABLET ORAL at 05:44

## 2024-03-15 RX ADMIN — DOCUSATE SODIUM AND SENNOSIDES 2 TABLET: 8.6; 5 TABLET, FILM COATED ORAL at 08:51

## 2024-03-15 RX ADMIN — APIXABAN 2.5 MG: 2.5 TABLET, FILM COATED ORAL at 08:50

## 2024-03-15 RX ADMIN — ISOSORBIDE MONONITRATE 30 MG: 30 TABLET, EXTENDED RELEASE ORAL at 08:50

## 2024-03-15 RX ADMIN — FUROSEMIDE 80 MG: 40 TABLET ORAL at 08:50

## 2024-03-15 RX ADMIN — SODIUM CHLORIDE, PRESERVATIVE FREE 10 ML: 5 INJECTION INTRAVENOUS at 08:56

## 2024-03-15 RX ADMIN — ATORVASTATIN CALCIUM 40 MG: 40 TABLET, FILM COATED ORAL at 08:50

## 2024-03-15 RX ADMIN — HYDRALAZINE HYDROCHLORIDE 25 MG: 25 TABLET ORAL at 05:44

## 2024-03-15 RX ADMIN — SALINE NASAL SPRAY 2 SPRAY: 1.5 SOLUTION NASAL at 08:51

## 2024-03-15 RX ADMIN — SALINE NASAL SPRAY 2 SPRAY: 1.5 SOLUTION NASAL at 05:46

## 2024-03-15 NOTE — PROGRESS NOTES
Outpatient Pharmacy Progress Note for Meds-to-Beds    Total number of Prescriptions Filled: 5  The following medications were dispensed to the patient during the discharge process:  apixaban  carvedilol  furosemide  polyethylene glycol  Saline nasal spray    Additional Documentation:  Patient picked-up the medication(s) in the OP Pharmacy      Thank you for letting us serve your patients.  [place] [id number]    Phone: [phone]    Fax: [phone]

## 2024-03-15 NOTE — PLAN OF CARE
Problem: Safety - Adult  Goal: Free from fall injury  3/15/2024 1200 by Elaine Figueroa RN  Outcome: Completed  3/15/2024 1034 by Nirmala Stanton  Outcome: Adequate for Discharge     Problem: Discharge Planning  Goal: Discharge to home or other facility with appropriate resources  3/15/2024 1200 by Elaine Figueroa RN  Outcome: Completed  3/15/2024 1034 by Nirmala Stanton  Outcome: Adequate for Discharge     Problem: Neurosensory - Adult  Goal: Achieves stable or improved neurological status  3/15/2024 1200 by Elaine Figueroa RN  Outcome: Completed  3/15/2024 1034 by Nirmala Stanton  Outcome: Adequate for Discharge  Goal: Achieves maximal functionality and self care  3/15/2024 1200 by Elaine Figueroa RN  Outcome: Completed  3/15/2024 1034 by Nirmala Stanton  Outcome: Adequate for Discharge     Problem: Respiratory - Adult  Goal: Achieves optimal ventilation and oxygenation  3/15/2024 1200 by Elaine Figueroa RN  Outcome: Completed  3/15/2024 1034 by Nirmala Stanton  Outcome: Adequate for Discharge     Problem: Cardiovascular - Adult  Goal: Maintains optimal cardiac output and hemodynamic stability  3/15/2024 1200 by Elaine Figueroa RN  Outcome: Completed  3/15/2024 1034 by Nirmala Stanton  Outcome: Adequate for Discharge  Goal: Absence of cardiac dysrhythmias or at baseline  3/15/2024 1200 by Elaine Figueroa RN  Outcome: Completed  3/15/2024 1034 by Nirmala Stanton  Outcome: Adequate for Discharge     Problem: Skin/Tissue Integrity - Adult  Goal: Skin integrity remains intact  3/15/2024 1200 by Elaine Figueroa RN  Outcome: Completed  3/15/2024 1034 by Nirmala Stanton  Outcome: Adequate for Discharge     Problem: Skin/Tissue Integrity - Adult  Goal: Skin integrity remains intact  3/15/2024 1200 by Elaine Figueroa RN  Outcome: Completed  3/15/2024 1034 by Nirmala Stanton  Outcome: Adequate for Discharge  Goal: Incisions, wounds,

## 2024-03-15 NOTE — PLAN OF CARE
Problem: Safety - Adult  Goal: Free from fall injury  Outcome: Adequate for Discharge     Problem: Discharge Planning  Goal: Discharge to home or other facility with appropriate resources  Outcome: Adequate for Discharge     Problem: Neurosensory - Adult  Goal: Achieves stable or improved neurological status  Outcome: Adequate for Discharge  Goal: Achieves maximal functionality and self care  Outcome: Adequate for Discharge     Problem: Respiratory - Adult  Goal: Achieves optimal ventilation and oxygenation  Outcome: Adequate for Discharge     Problem: Cardiovascular - Adult  Goal: Maintains optimal cardiac output and hemodynamic stability  Outcome: Adequate for Discharge  Goal: Absence of cardiac dysrhythmias or at baseline  Outcome: Adequate for Discharge     Problem: Skin/Tissue Integrity - Adult  Goal: Skin integrity remains intact  Outcome: Adequate for Discharge  Goal: Incisions, wounds, or drain sites healing without S/S of infection  Outcome: Adequate for Discharge  Goal: Oral mucous membranes remain intact  Outcome: Adequate for Discharge     Problem: Musculoskeletal - Adult  Goal: Return mobility to safest level of function  Outcome: Adequate for Discharge  Goal: Return ADL status to a safe level of function  Outcome: Adequate for Discharge     Problem: Gastrointestinal - Adult  Goal: Minimal or absence of nausea and vomiting  Outcome: Adequate for Discharge  Goal: Maintains or returns to baseline bowel function  Outcome: Adequate for Discharge  Goal: Maintains adequate nutritional intake  Outcome: Adequate for Discharge     Problem: Genitourinary - Adult  Goal: Absence of urinary retention  Outcome: Adequate for Discharge     Problem: Infection - Adult  Goal: Absence of infection at discharge  Outcome: Adequate for Discharge  Goal: Absence of infection during hospitalization  Outcome: Adequate for Discharge     Problem: Metabolic/Fluid and Electrolytes - Adult  Goal: Electrolytes maintained within

## 2024-03-15 NOTE — PROGRESS NOTES
[facility]             MD Aleta Sewell MD Carol Christensen, APRN-CNP             [address] [place] [address]             [phone] fax [phone]      Gastroenterology Progress note . 3/15/2024  Reason for consult:   Anemia           Interval H/P  Plan for discharge today.  Patient denies any further nosebleeds.  No melena or hematochezia  Daughter at bedside  Hemoglobin uptrending    Physical Exam  Blood pressure (!) 135/54, pulse 62, temperature 97.5 °F (36.4 °C), temperature source Axillary, resp. rate 18, height 1.57 m (5' 1.81\"), weight 64.7 kg (142 lb 10.2 oz), SpO2 99 %.  Constitutional: Patient is in no distress.   Eyes: Pupils equal, round.  No icterus.  HENT: Oral mucosa is moist.   Pulmonary: No accessory muscle use. No localizing pulmonary findings.     Cardiovascular: Heart has a regular rate and rhythm, no JVD.   Gastrointestinal: Bowel sounds are present in all four quadrants. Abdomen is soft, nontender, nondistended. Rectal examination deferred.   Skin: No jaundice, spider angiomas, or palmar erythema. No purpura.  Neuro: Awake,alert, and oriented x3. No gross focal neurologic deficits.       Chart and labs reviewed.  #1 acute on chronic anemia  She had a similar course during her last hospitalization and underwent an EGD and colonoscopy with me.  EGD was unremarkable without any bleeding.  Colonoscopy with poor prep and 1 polyp removed.  I did discuss with the patient about further endoscopic evaluation however at this time she refuses  -Recommend continued monitoring of hemoglobin, transfuse for hemoglobin less than 7  -She does not have any overt melena or hematochezia.  -Continue iron supplementation  - Currently Hb uptrending, however does have intermittent Epistaxis.   - Continue to monitor If anemia worsens then will discuss again and consider endoscopic evaluation inpatient otherwise

## 2024-03-15 NOTE — PROGRESS NOTES
Nephrology Progress Note        [address], [address] [place] [address]  Phone: [phone]  Office Hours: 8:30AM - 4:30PM  Monday - Friday        3/15/2024 7:42 AM  Subjective:   Admit Date: 3/7/2024  PCP: Francisco Barriga MD  Interval History:   On NC  Resting     Diet: ADULT DIET; Regular; Low Fat/Low Chol/High Fiber/2 gm Na; No Added Salt (3-4 gm); 2000 ml      Data:   Scheduled Meds:   furosemide  80 mg Oral Daily    sodium chloride  2 spray Each Nostril Q2H    apixaban  2.5 mg Oral BID    aspirin  81 mg Oral Daily    sennosides-docusate sodium  2 tablet Oral Daily    carvedilol  6.25 mg Oral BID    levothyroxine  75 mcg Oral Daily    midodrine  5 mg Oral TID WC    white petrolatum   Topical Once    amiodarone  200 mg Oral Daily    atorvastatin  40 mg Oral Daily    [Held by provider] empagliflozin  10 mg Oral Daily    hydrALAZINE  25 mg Oral 3 times per day    isosorbide mononitrate  30 mg Oral Daily    pantoprazole  40 mg Oral Daily    sodium chloride flush  5-40 mL IntraVENous 2 times per day    budesonide-formoterol  2 puff Inhalation BID     Continuous Infusions:   sodium chloride      sodium chloride       PRN Meds:sodium chloride, bisacodyl, albuterol sulfate HFA, sodium chloride flush, sodium chloride, ondansetron **OR** ondansetron, polyethylene glycol, acetaminophen **OR** acetaminophen, potassium chloride **OR** potassium alternative oral replacement **OR** potassium chloride, magnesium sulfate  I/O last 3 completed shifts:  In: 784 [P.O.:340; Blood:444]  Out: 1900 [Urine:1900]  No intake/output data recorded.    Intake/Output Summary (Last 24 hours) at 3/15/2024 0742  Last data filed at 3/14/2024 1553  Gross per 24 hour   Intake 340 ml   Output 1100 ml   Net -760 ml       CBC:   Recent Labs     03/13/24  1346 03/14/24  0239 03/15/24  0111   WBC 7.1 7.0 6.9   HGB 6.8* 8.4* 8.7*    169 186       BMP:    Recent Labs     03/13/24  1346 03/14/24  0239 03/15/24  0111

## 2024-03-18 ENCOUNTER — TELEPHONE (OUTPATIENT)
Dept: FAMILY MEDICINE CLINIC | Age: 74
End: 2024-03-18

## 2024-03-18 ENCOUNTER — CARE COORDINATION (OUTPATIENT)
Dept: CASE MANAGEMENT | Age: 74
End: 2024-03-18

## 2024-03-18 NOTE — CARE COORDINATION
Care Transitions Initial Follow Up Call    Call within 2 business days of discharge: Yes    Patient Current Location:  Home: [address] [place], [place] [id number]    Care Transition Nurse contacted the patient by telephone to perform post hospital discharge assessment. Verified name and  with patient as identifiers. Provided introduction to self, and explanation of the Care Transition Nurse role.     Patient: Lynette Gonsalves Patient : 1950   MRN: [id number]  Reason for Admission: Ac CHF, Pna, Rhinovirus, Severe Sepsis, A/C Resp Failure  Discharge Date: 3/15/24 RARS: Readmission Risk Score: 29.9  Facility: [facility]    Last Discharge Facility       Date Complaint Diagnosis Description Type Department Provider    3/7/24 Respiratory Distress Acute on chronic congestive heart failure, unspecified heart failure type (HCC) ... ED to Hosp-Admission (Discharged) (ADMITTED) [facility] Dana Kerns MD; Anu Blake...          Challenges to be reviewed by the provider   Additional needs identified to be addressed with provider:     Reason for Admission: Ac CHF, Pna, Rhinovirus, Severe Sepsis, A/C Resp Failure  Facility: [facility]    Please contact for scheduling of 7 day hospital follow up/TCM appt due by 3/21/24       Method of communication with provider: chart routing to PCP office for appt scheduling.     Patient reports doing well since home, states \"just fine\".  Attempted Pna/CHF education; Patient polite but not well engaged. Denies cough, sob, edema, cp, ac distress. Noted to be speaking in complete, unlabored sentences. Has not checked vs since home--CTN to notify RPM team to resume services. Advised rest, pacing activity. Advised low na diet, MD recommended fluid restriction. Reports taking Lasix, Coreg as directed. Follows at [facility], declines CTN offer to schedule appt as she prefers to do so. Reports appetite, fluid intake good w/ b&b wnl.  Lives w/ Dtr and  who assist her w/ adls prn. Reports  No

## 2024-03-18 NOTE — TELEPHONE ENCOUNTER
Care Transitions Initial Follow Up Call    Outreach made within 2 business days of discharge: Yes    Patient: Lynette Gonsalves Patient : 1950   MRN: [id number]  Reason for Admission: There are no discharge diagnoses documented for the most recent discharge.  Discharge Date: 3/15/24       Spoke with: Patient    Discharge department/facility: [facility] Interactive Patient Contact:  Was patient able to fill all prescriptions: Yes  Was patient instructed to bring all medications to the follow-up visit: Yes  Is patient taking all medications as directed in the discharge summary? Yes  Does patient understand their discharge instructions: Yes  Does patient have questions or concerns that need addressed prior to 7-14 day follow up office visit: yes - 2024    Scheduled appointment with PCP within 7-14 days    Follow Up  Future Appointments   Date Time Provider Department Center   3/21/2024  9:15 AM Francisco Barriga MD [facility] [place]   3/29/2024  9:00 AM Len Crum MD [facility] [place]   3/29/2024 10:00 AM SCHEDULE, [facility] CARDIO SPR [facility] [place]   2024  8:45 AM Luis Eduardo Hernández, APRN - CNP [facility] NEURO Neurology -   2024 10:45 AM Ta Agarwal,  AFL ADL NEPH AFL Kidney &   2024  9:45 AM SCHEDULE, [facility] MED ONC LAB [facility] MED ONC [place]   5/15/2024  9:45 AM Giovanny Lopez MD [facility] CC [place]   5/15/2024 10:00 AM YOJANA, MED ONC NURSE [facility] MED ONC [place]   2024 10:15 AM Gabo Douglas MD [facility] [place]       Misty Henry MA

## 2024-03-19 ENCOUNTER — CARE COORDINATION (OUTPATIENT)
Dept: CASE MANAGEMENT | Age: 74
End: 2024-03-19

## 2024-03-19 ENCOUNTER — CARE COORDINATION (OUTPATIENT)
Dept: CARE COORDINATION | Age: 74
End: 2024-03-19

## 2024-03-19 NOTE — CARE COORDINATION
Remote Patient Monitoring Note      Date/Time:  3/19/2024 4:20 PM  Patient Current Location: [place]  LPN contacted patient by telephone regarding yellow alert received for activity level (no metrics since ). Verified patients name and  as identifiers.    Background: Pt enrolled for CKD and CHF    Clinical Interventions:  Spoke with patient, she was recently discharged to home from the hospital. She states she did receive the new charging cord but was then admitted shortly after. She has not had any readings since . She states she cannot take vitals by herself and needs her  to do them for her and he will not be home until . Will pause pt for now and notify ACM/CCSS but will defer final disposition to ACM as patient has not really entered many vitals, we have 15 days of vitals out of 31 days of monitoring (including her admission days, however) She reports she still is not feeling very well since being home from the hospital but a bit better than she did prior to being admitted.       Plan/Follow Up: Will continue to review, monitor and address alerts with follow up based on severity of symptoms and risk factors.

## 2024-03-20 ENCOUNTER — CARE COORDINATION (OUTPATIENT)
Dept: CASE MANAGEMENT | Age: 74
End: 2024-03-20

## 2024-03-20 NOTE — CARE COORDINATION
Care Transitions Outreach Attempt    Patient: Lynette Gonsalves Patient : 1950 MRN: [id number]   Reason for Admission: Ac CHF, Pna, Rhinovirus, Severe Sepsis, A/C Resp Failure  Discharge Date: 3/15/24       RARS: Readmission Risk Score: 29.9  Facility: [facility]    Last Discharge Facility       Date Complaint Diagnosis Description Type Department Provider    3/7/24 Respiratory Distress Acute on chronic congestive heart failure, unspecified heart failure type (HCC) ... ED to Hosp-Admission (Discharged) (ADMITTED) Dana Woodruff MD; Anu Blake...        Noted following upcoming appointments from discharge chart review:   [facility] follow up appointment(s):   Future Appointments   Date Time Provider Department Center   3/21/2024  9:15 AM Francisco Barriga MD [facility] [place] [facility]   3/25/2024 10:20 AM Elaine Adrian, APRN - CNP [facility] Heart [facility]   3/29/2024  9:00 AM Len Crum MD [facility] PULM [facility]   3/29/2024 10:00 AM SCHEDULE, [facility] CARDIO SPR [facility]   2024  8:45 AM Luis Eduardo Hernández, APRN - CNP [facility] NEURO Neurology -   2024 10:45 AM Ta Agarwal,  AFL ADL NEPH AFL Kidney &   2024  9:45 AM [facility]EDU[facility] [place]   5/15/2024  9:45 AM Giovanny Lopez MD [facility] CC [facility]   5/15/2024 10:00 AM YOJANA, MED ONC NURSE [facility] MED ONC [place]   2024 10:15 AM Gabo Douglas MD [place] [facility]     Attempt to reach for Care Transition follow up call unsuccessful. HIPAA compliant message left requesting call back. UTR letter sent via [employer].

## 2024-03-21 ENCOUNTER — OFFICE VISIT (OUTPATIENT)
Dept: FAMILY MEDICINE CLINIC | Age: 74
End: 2024-03-21
Payer: MEDICARE

## 2024-03-21 ENCOUNTER — CARE COORDINATION (OUTPATIENT)
Dept: CASE MANAGEMENT | Age: 74
End: 2024-03-21

## 2024-03-21 VITALS
HEART RATE: 61 BPM | SYSTOLIC BLOOD PRESSURE: 120 MMHG | BODY MASS INDEX: 27.82 KG/M2 | WEIGHT: 151.2 LBS | OXYGEN SATURATION: 90 % | DIASTOLIC BLOOD PRESSURE: 58 MMHG

## 2024-03-21 DIAGNOSIS — G47.33 OSA (OBSTRUCTIVE SLEEP APNEA): ICD-10-CM

## 2024-03-21 DIAGNOSIS — Z86.73 HISTORY OF STROKE: ICD-10-CM

## 2024-03-21 DIAGNOSIS — I48.11 LONGSTANDING PERSISTENT ATRIAL FIBRILLATION (HCC): Chronic | ICD-10-CM

## 2024-03-21 DIAGNOSIS — J96.21 ACUTE ON CHRONIC RESPIRATORY FAILURE WITH HYPOXIA (HCC): ICD-10-CM

## 2024-03-21 DIAGNOSIS — I10 PRIMARY HYPERTENSION: Chronic | ICD-10-CM

## 2024-03-21 DIAGNOSIS — R26.9 GAIT DISTURBANCE: ICD-10-CM

## 2024-03-21 DIAGNOSIS — Z99.81 ON HOME O2: ICD-10-CM

## 2024-03-21 DIAGNOSIS — I25.118 ATHEROSCLEROSIS OF NATIVE CORONARY ARTERY OF NATIVE HEART WITH STABLE ANGINA PECTORIS (HCC): Chronic | ICD-10-CM

## 2024-03-21 DIAGNOSIS — N18.4 CKD (CHRONIC KIDNEY DISEASE), STAGE IV (HCC): ICD-10-CM

## 2024-03-21 DIAGNOSIS — R09.02 HYPOXIA: ICD-10-CM

## 2024-03-21 DIAGNOSIS — J44.9 CHRONIC OBSTRUCTIVE PULMONARY DISEASE, UNSPECIFIED COPD TYPE (HCC): Chronic | ICD-10-CM

## 2024-03-21 DIAGNOSIS — I50.9 CHRONIC CONGESTIVE HEART FAILURE, UNSPECIFIED HEART FAILURE TYPE (HCC): Primary | ICD-10-CM

## 2024-03-21 DIAGNOSIS — R74.01 TRANSAMINITIS: ICD-10-CM

## 2024-03-21 DIAGNOSIS — I42.9 CARDIOMYOPATHY, PRIMARY (HCC): Chronic | ICD-10-CM

## 2024-03-21 DIAGNOSIS — E03.9 ACQUIRED HYPOTHYROIDISM: Chronic | ICD-10-CM

## 2024-03-21 DIAGNOSIS — Z09 HOSPITAL DISCHARGE FOLLOW-UP: ICD-10-CM

## 2024-03-21 DIAGNOSIS — D64.9 ANEMIA, UNSPECIFIED TYPE: ICD-10-CM

## 2024-03-21 PROBLEM — E11.9 TYPE 2 DIABETES MELLITUS (HCC): Status: RESOLVED | Noted: 2024-01-05 | Resolved: 2024-03-21

## 2024-03-21 PROBLEM — E66.9 CLASS 1 OBESITY: Status: RESOLVED | Noted: 2022-07-11 | Resolved: 2024-03-21

## 2024-03-21 PROBLEM — N18.32 STAGE 3B CHRONIC KIDNEY DISEASE (HCC): Status: RESOLVED | Noted: 2023-03-24 | Resolved: 2024-03-21

## 2024-03-21 PROBLEM — Z87.19 HISTORY OF GI BLEED: Status: ACTIVE | Noted: 2023-12-29

## 2024-03-21 PROCEDURE — 1123F ACP DISCUSS/DSCN MKR DOCD: CPT | Performed by: FAMILY MEDICINE

## 2024-03-21 PROCEDURE — 36415 COLL VENOUS BLD VENIPUNCTURE: CPT | Performed by: FAMILY MEDICINE

## 2024-03-21 PROCEDURE — 99215 OFFICE O/P EST HI 40 MIN: CPT | Performed by: FAMILY MEDICINE

## 2024-03-21 PROCEDURE — 3078F DIAST BP <80 MM HG: CPT | Performed by: FAMILY MEDICINE

## 2024-03-21 PROCEDURE — 1111F DSCHRG MED/CURRENT MED MERGE: CPT | Performed by: FAMILY MEDICINE

## 2024-03-21 PROCEDURE — 3074F SYST BP LT 130 MM HG: CPT | Performed by: FAMILY MEDICINE

## 2024-03-21 RX ORDER — FUROSEMIDE 80 MG
80 TABLET ORAL DAILY
Qty: 90 TABLET | Refills: 0 | Status: SHIPPED | OUTPATIENT
Start: 2024-03-21

## 2024-03-21 RX ORDER — FLUTICASONE FUROATE, UMECLIDINIUM BROMIDE AND VILANTEROL TRIFENATATE 100; 62.5; 25 UG/1; UG/1; UG/1
1 POWDER RESPIRATORY (INHALATION) DAILY
Qty: 3 EACH | Refills: 2 | Status: SHIPPED | OUTPATIENT
Start: 2024-03-21

## 2024-03-21 RX ORDER — AMIODARONE HYDROCHLORIDE 200 MG/1
200 TABLET ORAL DAILY
Qty: 90 TABLET | Refills: 0 | Status: SHIPPED | OUTPATIENT
Start: 2024-03-21

## 2024-03-21 RX ORDER — CARVEDILOL 6.25 MG/1
6.25 TABLET ORAL 2 TIMES DAILY
Qty: 180 TABLET | Refills: 2 | Status: SHIPPED | OUTPATIENT
Start: 2024-03-21

## 2024-03-21 NOTE — ASSESSMENT & PLAN NOTE
She has chronic respiratory failure and is on home oxygen.  She sees a pulmonologist from time to time as well.

## 2024-03-21 NOTE — CARE COORDINATION
Care Transitions Outreach Attempt      Patient: Lynette Gonsalves Patient : 1950 MRN: [id number]  Reason for Admission: Ac CHF, Pna, Rhinovirus, Severe Sepsis, A/C Resp Failure  Discharge Date: 3/15/24       RARS: Readmission Risk Score: 29.9  Facility: [facility]    Last Discharge Facility       Date Complaint Diagnosis Description Type Department Provider    3/7/24 Respiratory Distress Acute on chronic congestive heart failure, unspecified heart failure type (HCC) ... ED to Hosp-Admission (Discharged) (ADMITTED) [facility] Dana Kerns MD; Anu Blake...        Noted following upcoming appointments from discharge chart review:   [facility] follow up appointment(s):   Future Appointments   Date Time Provider Department Center   3/25/2024 10:20 AM Elaine Adrian APRN - CNP [facility] Heart [facility]   3/29/2024  9:00 AM Len Crum MD [facility] PULM [facility]   3/29/2024 10:00 AM SCHEDULE, [facility] CARDIO SPR [facility]   2024  8:45 AM Luis Eduardo Hernández APRN - CNP [facility] NEURO Neurology -   2024 10:45 AM Ta Agarwal,  AFL ADL NEPH AFL Kidney &   2024  9:45 AM SCHEDULE, [facility] MED ONC LAB [facility] MED ONC [place]   5/15/2024  9:45 AM Giovanny Lopez MD [facility] CC [facility]   5/15/2024 10:00 AM [facility] [place]   2024  9:00 AM Francisco Barriga MD [facility] [place] [facility]   2024 10:15 AM Gabo Douglas MD [facility]     2nd unsuccessful attempt to reach for Care Transition follow up call. HIPAA compliant message left requesting call back. CT program closed per protocol, no further CT outreach scheduled. Note routed to Lizbeth W[facility] for collaboration and ongoing monitoring.

## 2024-03-21 NOTE — PROGRESS NOTES
Somehow 2 notes got started and please see the other note with today's date.  
at this time  Outpatient Medications Marked as Taking for the 3/21/24 encounter (Office Visit) with Francisco Barriga MD   Medication Sig Dispense Refill    fluticasone-umeclidin-vilant (TRELEGY ELLIPTA) 100-62.5-25 MCG/ACT AEPB inhaler Inhale 1 puff into the lungs daily 3 each 2    amiodarone (CORDARONE) 200 MG tablet Take 1 tablet by mouth daily 90 tablet 0    furosemide (LASIX) 80 MG tablet Take 1 tablet by mouth daily 90 tablet 0    carvedilol (COREG) 6.25 MG tablet Take 1 tablet by mouth 2 times daily 180 tablet 2    apixaban (ELIQUIS) 2.5 MG TABS tablet Take 1 tablet by mouth 2 times daily for 28 days 56 tablet 0    sodium chloride (OCEAN, BABY AYR) 0.65 % nasal spray 2 sprays by Nasal route every 2 hours for 7 days 33.6 mL 0    polyethylene glycol (GLYCOLAX) 17 g packet Take 1 packet by mouth daily 30 packet 0    atorvastatin (LIPITOR) 40 MG tablet Take 1 tablet by mouth daily 90 tablet 3    levothyroxine (SYNTHROID) 88 MCG tablet Take 1 tablet by mouth daily 90 tablet 0    albuterol sulfate HFA (PROVENTIL;VENTOLIN;PROAIR) 108 (90 Base) MCG/ACT inhaler Inhale 2 puffs into the lungs every 6 hours as needed for Wheezing or Shortness of Breath 18 g 5    SYMBICORT 80-4.5 MCG/ACT AERO Inhale 2 puffs into the lungs 2 times daily 3 each 3    pantoprazole (PROTONIX) 40 MG tablet Take 1 tablet by mouth daily 90 tablet 1    hydrALAZINE (APRESOLINE) 25 MG tablet Take 1 tablet by mouth every 8 hours 270 tablet 1    aspirin 81 MG EC tablet Take 1 tablet by mouth daily 90 tablet 4    tiotropium (SPIRIVA RESPIMAT) 2.5 MCG/ACT AERS inhaler Inhale 2 puffs into the lungs daily 3 each 3    Cyanocobalamin (VITAMIN B 12 PO) Take 1 tablet by mouth daily      OXYGEN Inhale into the lungs      Ascorbic Acid (VITAMIN C CR) 1000 MG TBCR Take by mouth      Calcium Carb-Cholecalciferol 600-800 MG-UNIT TABS Take 1 tablet by mouth daily      Multiple Vitamins-Minerals (THERAPEUTIC MULTIVITAMIN-MINERALS) tablet Take 1 tablet by mouth

## 2024-03-21 NOTE — ASSESSMENT & PLAN NOTE
She has anemia.  She has worked with hematologist.  I will order an iron level and a B12 and folate level today and a CBC to monitor the anemia

## 2024-03-21 NOTE — ASSESSMENT & PLAN NOTE
She is on home BiPAP although she did not understand that it was for obstructive sleep apnea.  I tried to educate her

## 2024-03-21 NOTE — ASSESSMENT & PLAN NOTE
She is working with cardiologist.  She is prescribed isosorbide mononitrate, hydralazine, furosemide, carvedilol, amiodarone

## 2024-03-21 NOTE — ASSESSMENT & PLAN NOTE
Working with cardiologist and prescribed carvedilol and hydralazine and isosorbide mono nitrite and amiodarone

## 2024-03-22 DIAGNOSIS — E03.9 ACQUIRED HYPOTHYROIDISM: ICD-10-CM

## 2024-03-22 LAB
ALBUMIN SERPL-MCNC: 4 G/DL (ref 3.4–5)
ALBUMIN/GLOB SERPL: 1.3 {RATIO} (ref 1.1–2.2)
ALP SERPL-CCNC: 92 U/L (ref 40–129)
ALT SERPL-CCNC: 33 U/L (ref 10–40)
ANION GAP SERPL CALCULATED.3IONS-SCNC: 13 MMOL/L (ref 3–16)
AST SERPL-CCNC: 40 U/L (ref 15–37)
BASOPHILS # BLD: 0.1 K/UL (ref 0–0.2)
BASOPHILS NFR BLD: 1.3 %
BILIRUB SERPL-MCNC: 0.5 MG/DL (ref 0–1)
BUN SERPL-MCNC: 50 MG/DL (ref 7–20)
CALCIUM SERPL-MCNC: 9.4 MG/DL (ref 8.3–10.6)
CHLORIDE SERPL-SCNC: 93 MMOL/L (ref 99–110)
CO2 SERPL-SCNC: 28 MMOL/L (ref 21–32)
CREAT SERPL-MCNC: 3 MG/DL (ref 0.6–1.2)
DEPRECATED RDW RBC AUTO: 18.1 % (ref 12.4–15.4)
EOSINOPHIL # BLD: 0.2 K/UL (ref 0–0.6)
EOSINOPHIL NFR BLD: 2.4 %
FOLATE SERPL-MCNC: >20 NG/ML (ref 4.78–24.2)
GFR SERPLBLD CREATININE-BSD FMLA CKD-EPI: 16 ML/MIN/{1.73_M2}
GLUCOSE SERPL-MCNC: 106 MG/DL (ref 70–99)
HCT VFR BLD AUTO: 28.4 % (ref 36–48)
HGB BLD-MCNC: 9.6 G/DL (ref 12–16)
IRON SATN MFR SERPL: 23 % (ref 15–50)
IRON SERPL-MCNC: 83 UG/DL (ref 37–145)
LYMPHOCYTES # BLD: 1 K/UL (ref 1–5.1)
LYMPHOCYTES NFR BLD: 14.2 %
MCH RBC QN AUTO: 29.5 PG (ref 26–34)
MCHC RBC AUTO-ENTMCNC: 33.8 G/DL (ref 31–36)
MCV RBC AUTO: 87.2 FL (ref 80–100)
MONOCYTES # BLD: 0.7 K/UL (ref 0–1.3)
MONOCYTES NFR BLD: 9.1 %
NEUTROPHILS # BLD: 5.4 K/UL (ref 1.7–7.7)
NEUTROPHILS NFR BLD: 73 %
PLATELET # BLD AUTO: 217 K/UL (ref 135–450)
PMV BLD AUTO: 10.4 FL (ref 5–10.5)
POTASSIUM SERPL-SCNC: 3.9 MMOL/L (ref 3.5–5.1)
PROT SERPL-MCNC: 7.2 G/DL (ref 6.4–8.2)
RBC # BLD AUTO: 3.26 M/UL (ref 4–5.2)
SODIUM SERPL-SCNC: 134 MMOL/L (ref 136–145)
T4 FREE SERPL-MCNC: 1.9 NG/DL (ref 0.9–1.8)
TIBC SERPL-MCNC: 364 UG/DL (ref 260–445)
TSH SERPL DL<=0.005 MIU/L-ACNC: 1.94 UIU/ML (ref 0.27–4.2)
VIT B12 SERPL-MCNC: 1998 PG/ML (ref 211–911)
WBC # BLD AUTO: 7.4 K/UL (ref 4–11)

## 2024-03-22 RX ORDER — LEVOTHYROXINE SODIUM 88 UG/1
88 TABLET ORAL DAILY
Qty: 90 TABLET | Refills: 1 | Status: SHIPPED | OUTPATIENT
Start: 2024-03-22

## 2024-03-26 ENCOUNTER — CARE COORDINATION (OUTPATIENT)
Dept: CARE COORDINATION | Age: 74
End: 2024-03-26

## 2024-03-29 ENCOUNTER — TELEPHONE (OUTPATIENT)
Dept: CARDIOLOGY CLINIC | Age: 74
End: 2024-03-29

## 2024-03-29 ENCOUNTER — OFFICE VISIT (OUTPATIENT)
Dept: CARDIOLOGY CLINIC | Age: 74
End: 2024-03-29
Payer: MEDICARE

## 2024-03-29 VITALS
SYSTOLIC BLOOD PRESSURE: 130 MMHG | BODY MASS INDEX: 28.57 KG/M2 | HEART RATE: 140 BPM | DIASTOLIC BLOOD PRESSURE: 82 MMHG | HEIGHT: 61 IN

## 2024-03-29 DIAGNOSIS — I38 VHD (VALVULAR HEART DISEASE): ICD-10-CM

## 2024-03-29 DIAGNOSIS — I25.118 ATHEROSCLEROSIS OF NATIVE CORONARY ARTERY OF NATIVE HEART WITH STABLE ANGINA PECTORIS (HCC): Chronic | ICD-10-CM

## 2024-03-29 DIAGNOSIS — D68.69 SECONDARY HYPERCOAGULABLE STATE (HCC): ICD-10-CM

## 2024-03-29 DIAGNOSIS — Z95.2 S/P MVR (MITRAL VALVE REPLACEMENT): ICD-10-CM

## 2024-03-29 DIAGNOSIS — I48.11 LONGSTANDING PERSISTENT ATRIAL FIBRILLATION (HCC): Chronic | ICD-10-CM

## 2024-03-29 DIAGNOSIS — I10 PRIMARY HYPERTENSION: Chronic | ICD-10-CM

## 2024-03-29 DIAGNOSIS — I48.11 LONGSTANDING PERSISTENT ATRIAL FIBRILLATION (HCC): Primary | ICD-10-CM

## 2024-03-29 DIAGNOSIS — I48.3 TYPICAL ATRIAL FLUTTER (HCC): ICD-10-CM

## 2024-03-29 DIAGNOSIS — I50.9 CHRONIC CONGESTIVE HEART FAILURE, UNSPECIFIED HEART FAILURE TYPE (HCC): ICD-10-CM

## 2024-03-29 DIAGNOSIS — Z95.2 S/P TAVR (TRANSCATHETER AORTIC VALVE REPLACEMENT): ICD-10-CM

## 2024-03-29 DIAGNOSIS — I50.22 CHRONIC SYSTOLIC (CONGESTIVE) HEART FAILURE (HCC): Chronic | ICD-10-CM

## 2024-03-29 DIAGNOSIS — I42.9 CARDIOMYOPATHY, PRIMARY (HCC): Chronic | ICD-10-CM

## 2024-03-29 DIAGNOSIS — Z95.5 S/P RIGHT CORONARY ARTERY (RCA) STENT PLACEMENT: ICD-10-CM

## 2024-03-29 DIAGNOSIS — D64.9 NORMOCYTIC ANEMIA: ICD-10-CM

## 2024-03-29 PROCEDURE — 1123F ACP DISCUSS/DSCN MKR DOCD: CPT | Performed by: INTERNAL MEDICINE

## 2024-03-29 PROCEDURE — 3075F SYST BP GE 130 - 139MM HG: CPT | Performed by: INTERNAL MEDICINE

## 2024-03-29 PROCEDURE — 99214 OFFICE O/P EST MOD 30 MIN: CPT | Performed by: INTERNAL MEDICINE

## 2024-03-29 PROCEDURE — 93000 ELECTROCARDIOGRAM COMPLETE: CPT | Performed by: INTERNAL MEDICINE

## 2024-03-29 PROCEDURE — 3079F DIAST BP 80-89 MM HG: CPT | Performed by: INTERNAL MEDICINE

## 2024-03-29 RX ORDER — AMIODARONE HYDROCHLORIDE 200 MG/1
200 TABLET ORAL 2 TIMES DAILY
Qty: 90 TABLET | Refills: 0 | Status: SHIPPED | OUTPATIENT
Start: 2024-03-29

## 2024-03-29 RX ORDER — DILTIAZEM HYDROCHLORIDE 180 MG/1
180 CAPSULE, COATED, EXTENDED RELEASE ORAL DAILY
Qty: 30 CAPSULE | Refills: 3 | Status: SHIPPED | OUTPATIENT
Start: 2024-03-29

## 2024-03-29 RX ORDER — METOPROLOL SUCCINATE 50 MG/1
50 TABLET, EXTENDED RELEASE ORAL 2 TIMES DAILY
Qty: 30 TABLET | Refills: 3 | Status: SHIPPED | OUTPATIENT
Start: 2024-03-29

## 2024-03-29 NOTE — PROGRESS NOTES
tenderness, No masses, No gross visceromegaly   :  No costovertebral angle tenderness   Musculoskeletal:  No edema, no tenderness, no deformities. Back- no tenderness  Integument:  Well hydrated, no rash   Lymphatic:  No lymphadenopathy noted   Neurologic:  Alert & oriented x 3, CN 2-12 normal, normal motor function, normal sensory function, no focal deficits noted   Psychiatric:  Speech and behavior appropriate            Medical decision making and Data review:  DATA:  Lab Results   Component Value Date    TROPONINT 0.028 (H) 09/07/2023     BNP:    Lab Results   Component Value Date    PROBNP 7,278 (H) 03/13/2024     PT/INR:  No results found for: \"PTINR\"  Lab Results   Component Value Date    LABA1C 5.3 06/23/2023    LABA1C 5.0 06/18/2016     Lab Results   Component Value Date    CHOL 111 03/08/2024    TRIG 70 03/08/2024    HDL 42 03/08/2024    LDLCALC 55 03/08/2024    LDLDIRECT 64 03/15/2021     Lab Results   Component Value Date    ALT 33 03/21/2024    AST 40 (H) 03/21/2024     TSH:   Lab Results   Component Value Date    TSH 1.94 03/21/2024     Lab Results   Component Value Date    AST 40 (H) 03/21/2024    ALT 33 03/21/2024    BILIDIR 0.2 03/12/2024    BILITOT 0.5 03/21/2024    ALKPHOS 92 03/21/2024     Lab Results   Component Value Date    PROBNP 7,278 (H) 03/13/2024    PROBNP 14,112 (H) 03/10/2024    PROBNP 10,221 (H) 03/07/2024     Lab Results   Component Value Date    LABA1C 5.3 06/23/2023    LABA1C 5.0 06/18/2016     Lab Results   Component Value Date    WBC 7.4 03/21/2024    HGB 9.6 (L) 03/21/2024    HCT 28.4 (L) 03/21/2024     03/21/2024     Echo 3/20/19  Summary   Echocardiogram is s/p CABG and MVR 6/21/2016.   LV systolic function is abnormal.   Visually estimated ejection fraction is 35-40 %.   Grade III diastolic dysfunction.   Mild left atrium enlargement.   Bioprostethic mitral valve is seated well and functioning properly.   No pericardial effusion.    Echo 8/4/2020  Summary   Left

## 2024-03-29 NOTE — TELEPHONE ENCOUNTER
Received notification Watchman procedure will not be done 4/4/24 per Dr. Manzano. Cath lab notified.

## 2024-03-29 NOTE — PATIENT INSTRUCTIONS
We are committed to providing you the best care possible.    If you receive a survey after visiting one of our offices, please take time to share your experience concerning your physician office visit.  These surveys are confidential and no health information about you is shared.    We are eager to improve for you and we are counting on your feedback to help make that happen.      **It is YOUR responsibilty to bring medication bottles and/or updated medication list to EACH APPOINTMENT. This will allow us to better serve you and all your healthcare needs**  Thank you for allowing us to care for you today!   We want to ensure we can follow your treatment plan and we strive to give you the best outcomes and experience possible.   If you ever have a life threatening emergency and call 911 - for an ambulance (EMS)   Our providers can only care for you at:   [facility] or [facility].   Even if you have someone take you or you drive yourself we can only care for you in a [facility] facility. Our providers are not setup at the other healthcare locations!   Please be informed that if you contact our office outside of normal business hours the physician on call cannot help with any scheduling or rescheduling issues, procedure instruction questions or any type of medication issue.    We advise you for any urgent/emergency that you go to the nearest emergency room!    PLEASE CALL OUR OFFICE DURING NORMAL BUSINESS HOURS    Monday - Friday   8 am to 5 pm    [place]: [phone]    [place]: [phone]    [place]:  [phone]

## 2024-04-01 ENCOUNTER — HOSPITAL ENCOUNTER (INPATIENT)
Age: 74
LOS: 1 days | DRG: 291 | End: 2024-04-02
Attending: STUDENT IN AN ORGANIZED HEALTH CARE EDUCATION/TRAINING PROGRAM | Admitting: STUDENT IN AN ORGANIZED HEALTH CARE EDUCATION/TRAINING PROGRAM
Payer: MEDICARE

## 2024-04-01 ENCOUNTER — APPOINTMENT (OUTPATIENT)
Dept: GENERAL RADIOLOGY | Age: 74
DRG: 291 | End: 2024-04-01
Payer: MEDICARE

## 2024-04-01 DIAGNOSIS — R09.02 HYPOXEMIA: ICD-10-CM

## 2024-04-01 DIAGNOSIS — I50.9 ACUTE DECOMPENSATED HEART FAILURE (HCC): ICD-10-CM

## 2024-04-01 DIAGNOSIS — R79.89 ELEVATED BRAIN NATRIURETIC PEPTIDE (BNP) LEVEL: ICD-10-CM

## 2024-04-01 DIAGNOSIS — N17.9 AKI (ACUTE KIDNEY INJURY) (HCC): Primary | ICD-10-CM

## 2024-04-01 DIAGNOSIS — R79.89 ELEVATED TROPONIN: ICD-10-CM

## 2024-04-01 LAB
ALBUMIN SERPL-MCNC: 3.4 GM/DL (ref 3.4–5)
ALP BLD-CCNC: 77 IU/L (ref 40–128)
ALT SERPL-CCNC: 213 U/L (ref 10–40)
ANION GAP SERPL CALCULATED.3IONS-SCNC: 23 MMOL/L (ref 7–16)
ANION GAP SERPL CALCULATED.3IONS-SCNC: 26 MMOL/L (ref 7–16)
ANION GAP SERPL CALCULATED.3IONS-SCNC: 27 MMOL/L (ref 7–16)
AST SERPL-CCNC: 264 IU/L (ref 15–37)
BASE EXCESS: 15 (ref 0–3)
BASE EXCESS: 3 (ref 0–3)
BASE EXCESS: 4 (ref 0–3)
BASE EXCESS: 7 (ref 0–3)
BASOPHILS ABSOLUTE: 0 K/CU MM
BASOPHILS ABSOLUTE: 0 K/CU MM
BASOPHILS RELATIVE PERCENT: 0.1 % (ref 0–1)
BASOPHILS RELATIVE PERCENT: 0.1 % (ref 0–1)
BILIRUB SERPL-MCNC: 2.4 MG/DL (ref 0–1)
BUN SERPL-MCNC: 103 MG/DL (ref 6–23)
BUN SERPL-MCNC: 78 MG/DL (ref 6–23)
BUN SERPL-MCNC: 97 MG/DL (ref 6–23)
CALCIUM IONIZED: 3.64 MG/DL (ref 4.48–5.28)
CALCIUM SERPL-MCNC: 7.6 MG/DL (ref 8.3–10.6)
CALCIUM SERPL-MCNC: 8.4 MG/DL (ref 8.3–10.6)
CALCIUM SERPL-MCNC: 8.6 MG/DL (ref 8.3–10.6)
CARBON MONOXIDE, BLOOD: 1.6 % (ref 0–5)
CHLORIDE BLD-SCNC: 81 MMOL/L (ref 99–110)
CHLORIDE BLD-SCNC: 85 MMOL/L (ref 99–110)
CHLORIDE BLD-SCNC: 93 MMOL/L (ref 99–110)
CO2 CONTENT: 10.2 MMOL/L (ref 21–32)
CO2: 12 MMOL/L (ref 21–32)
CO2: 15 MMOL/L (ref 21–32)
CO2: 17 MMOL/L (ref 21–32)
COMMENT: ABNORMAL
CREAT SERPL-MCNC: 3.8 MG/DL (ref 0.6–1.1)
CREAT SERPL-MCNC: 4.8 MG/DL (ref 0.6–1.1)
CREAT SERPL-MCNC: 5 MG/DL (ref 0.6–1.1)
DIFFERENTIAL TYPE: ABNORMAL
DIFFERENTIAL TYPE: ABNORMAL
EOSINOPHILS ABSOLUTE: 0 K/CU MM
EOSINOPHILS ABSOLUTE: 0 K/CU MM
EOSINOPHILS RELATIVE PERCENT: 0 % (ref 0–3)
EOSINOPHILS RELATIVE PERCENT: 0 % (ref 0–3)
FERRITIN: 420 NG/ML (ref 15–150)
FOLATE SERPL-MCNC: >20 NG/ML (ref 3.1–17.5)
GFR SERPL CREATININE-BSD FRML MDRD: 12 ML/MIN/1.73M2
GFR SERPL CREATININE-BSD FRML MDRD: 9 ML/MIN/1.73M2
GFR SERPL CREATININE-BSD FRML MDRD: 9 ML/MIN/1.73M2
GLUCOSE BLD-MCNC: 237 MG/DL (ref 70–99)
GLUCOSE BLD-MCNC: 43 MG/DL (ref 70–99)
GLUCOSE SERPL-MCNC: 144 MG/DL (ref 70–99)
GLUCOSE SERPL-MCNC: 34 MG/DL (ref 70–99)
GLUCOSE SERPL-MCNC: 92 MG/DL (ref 70–99)
HBV SURFACE AB SERPL IA-ACNC: <3.5 M[IU]/ML
HBV SURFACE AG SERPL QL IA: NON REACTIVE
HCO3 ARTERIAL: 9.6 MMOL/L (ref 21–28)
HCO3 VENOUS: 18 MMOL/L (ref 22–29)
HCO3 VENOUS: 19.8 MMOL/L (ref 22–29)
HCO3 VENOUS: 22.6 MMOL/L (ref 22–29)
HCT VFR BLD CALC: 23 % (ref 37–47)
HCT VFR BLD CALC: 27 % (ref 37–47)
HCT VFR BLD CALC: 29.7 % (ref 37–47)
HEMOGLOBIN: 6.9 GM/DL (ref 12.5–16)
HEMOGLOBIN: 7.9 GM/DL (ref 12.5–16)
HEMOGLOBIN: 9.2 GM/DL (ref 12.5–16)
IMMATURE NEUTROPHIL %: 0.5 % (ref 0–0.43)
IMMATURE NEUTROPHIL %: 0.9 % (ref 0–0.43)
INR BLD: 3 INDEX
IONIZED CA: 0.91 MMOL/L (ref 1.12–1.32)
IRON: 30 UG/DL (ref 37–145)
LACTATE: 12.4 MMOL/L (ref 0.5–1.9)
LACTATE: 13.2 MMOL/L (ref 0.5–1.9)
LACTIC ACID, SEPSIS: 4.7 MMOL/L (ref 0.4–2)
LACTIC ACID, SEPSIS: 6.3 MMOL/L (ref 0.4–2)
LYMPHOCYTES ABSOLUTE: 0.7 K/CU MM
LYMPHOCYTES ABSOLUTE: 1.2 K/CU MM
LYMPHOCYTES RELATIVE PERCENT: 4.7 % (ref 24–44)
LYMPHOCYTES RELATIVE PERCENT: 9.3 % (ref 24–44)
MAGNESIUM: 2.5 MG/DL (ref 1.8–2.4)
MAGNESIUM: 2.8 MG/DL (ref 1.8–2.4)
MCH RBC QN AUTO: 28.9 PG (ref 27–31)
MCH RBC QN AUTO: 29.6 PG (ref 27–31)
MCHC RBC AUTO-ENTMCNC: 29.3 % (ref 32–36)
MCHC RBC AUTO-ENTMCNC: 30 % (ref 32–36)
MCV RBC AUTO: 98.7 FL (ref 78–100)
MCV RBC AUTO: 98.9 FL (ref 78–100)
METHEMOGLOBIN ARTERIAL: 1.6 %
MONOCYTES ABSOLUTE: 1 K/CU MM
MONOCYTES ABSOLUTE: 1 K/CU MM
MONOCYTES RELATIVE PERCENT: 6.9 % (ref 0–4)
MONOCYTES RELATIVE PERCENT: 8.1 % (ref 0–4)
NUCLEATED RBC %: 0 %
NUCLEATED RBC %: 0.2 %
O2 SAT, VEN: 35.5 % (ref 50–70)
O2 SAT, VEN: 60 % (ref 50–70)
O2 SAT, VEN: 68.1 % (ref 50–70)
O2 SATURATION: 95.3 % (ref 94–98)
PCO2 ARTERIAL: 20 MMHG (ref 35–48)
PCO2, VEN: 32 MMHG (ref 41–51)
PCO2, VEN: 35 MMHG (ref 41–51)
PCO2, VEN: 40 MMHG (ref 41–51)
PCT TRANSFERRIN: 10 % (ref 10–44)
PDW BLD-RTO: 20.7 % (ref 11.7–14.9)
PDW BLD-RTO: 20.8 % (ref 11.7–14.9)
PH BLOOD: 7.29 (ref 7.35–7.45)
PH VENOUS: 7.32 (ref 7.32–7.43)
PH VENOUS: 7.36 (ref 7.32–7.43)
PH VENOUS: 7.4 (ref 7.32–7.43)
PHOSPHORUS: 7.1 MG/DL (ref 2.5–4.9)
PLATELET # BLD: 164 K/CU MM (ref 140–440)
PLATELET # BLD: 180 K/CU MM (ref 140–440)
PMV BLD AUTO: 12.7 FL (ref 7.5–11.1)
PMV BLD AUTO: 13 FL (ref 7.5–11.1)
PO2 ARTERIAL: 118 MMHG (ref 83–108)
PO2, VEN: 28 MMHG (ref 28–48)
PO2, VEN: 39 MMHG (ref 28–48)
PO2, VEN: 42 MMHG (ref 28–48)
POTASSIUM SERPL-SCNC: 4.9 MMOL/L (ref 3.5–5.1)
POTASSIUM SERPL-SCNC: 5.2 MMOL/L (ref 3.5–5.1)
POTASSIUM SERPL-SCNC: 5.7 MMOL/L (ref 3.5–5.1)
PRO-BNP: ABNORMAL PG/ML
PROCALCITONIN SERPL-MCNC: 0.37 NG/ML
PROTHROMBIN TIME: 31.7 SECONDS (ref 11.7–14.5)
RBC # BLD: 2.33 M/CU MM (ref 4.2–5.4)
RBC # BLD: 2.73 M/CU MM (ref 4.2–5.4)
RETICULOCYTE COUNT PCT: 4.8 % (ref 0.2–2.2)
SEGMENTED NEUTROPHILS ABSOLUTE COUNT: 10.3 K/CU MM
SEGMENTED NEUTROPHILS ABSOLUTE COUNT: 12.2 K/CU MM
SEGMENTED NEUTROPHILS RELATIVE PERCENT: 82 % (ref 36–66)
SEGMENTED NEUTROPHILS RELATIVE PERCENT: 87.4 % (ref 36–66)
SODIUM BLD-SCNC: 121 MMOL/L (ref 135–145)
SODIUM BLD-SCNC: 126 MMOL/L (ref 135–145)
SODIUM BLD-SCNC: 132 MMOL/L (ref 135–145)
T4 FREE SERPL-MCNC: 2.41 NG/DL (ref 0.9–1.8)
TOTAL IMMATURE NEUTOROPHIL: 0.06 K/CU MM
TOTAL IMMATURE NEUTOROPHIL: 0.13 K/CU MM
TOTAL IRON BINDING CAPACITY: 286 UG/DL (ref 250–450)
TOTAL NUCLEATED RBC: 0 K/CU MM
TOTAL NUCLEATED RBC: 0 K/CU MM
TOTAL PROTEIN: 7.2 GM/DL (ref 6.4–8.2)
TROPONIN, HIGH SENSITIVITY: 52 NG/L (ref 0–14)
TROPONIN, HIGH SENSITIVITY: 56 NG/L (ref 0–14)
TSH SERPL DL<=0.005 MIU/L-ACNC: 4.82 UIU/ML (ref 0.27–4.2)
UNSATURATED IRON BINDING CAPACITY: 256 UG/DL (ref 110–370)
VITAMIN B-12: >2000 PG/ML (ref 211–911)
WBC # BLD: 12.5 K/CU MM (ref 4–10.5)
WBC # BLD: 14 K/CU MM (ref 4–10.5)

## 2024-04-01 PROCEDURE — 51702 INSERT TEMP BLADDER CATH: CPT

## 2024-04-01 PROCEDURE — 6370000000 HC RX 637 (ALT 250 FOR IP): Performed by: STUDENT IN AN ORGANIZED HEALTH CARE EDUCATION/TRAINING PROGRAM

## 2024-04-01 PROCEDURE — 02HV33Z INSERTION OF INFUSION DEVICE INTO SUPERIOR VENA CAVA, PERCUTANEOUS APPROACH: ICD-10-PCS | Performed by: SPECIALIST

## 2024-04-01 PROCEDURE — 87340 HEPATITIS B SURFACE AG IA: CPT

## 2024-04-01 PROCEDURE — 71045 X-RAY EXAM CHEST 1 VIEW: CPT

## 2024-04-01 PROCEDURE — 6360000002 HC RX W HCPCS: Performed by: STUDENT IN AN ORGANIZED HEALTH CARE EDUCATION/TRAINING PROGRAM

## 2024-04-01 PROCEDURE — 83880 ASSAY OF NATRIURETIC PEPTIDE: CPT

## 2024-04-01 PROCEDURE — 93005 ELECTROCARDIOGRAM TRACING: CPT | Performed by: STUDENT IN AN ORGANIZED HEALTH CARE EDUCATION/TRAINING PROGRAM

## 2024-04-01 PROCEDURE — 86901 BLOOD TYPING SEROLOGIC RH(D): CPT

## 2024-04-01 PROCEDURE — 36430 TRANSFUSION BLD/BLD COMPNT: CPT

## 2024-04-01 PROCEDURE — 85018 HEMOGLOBIN: CPT

## 2024-04-01 PROCEDURE — 2580000003 HC RX 258: Performed by: NURSE PRACTITIONER

## 2024-04-01 PROCEDURE — 84100 ASSAY OF PHOSPHORUS: CPT

## 2024-04-01 PROCEDURE — 04HY32Z INSERTION OF MONITORING DEVICE INTO LOWER ARTERY, PERCUTANEOUS APPROACH: ICD-10-PCS | Performed by: SPECIALIST

## 2024-04-01 PROCEDURE — 86706 HEP B SURFACE ANTIBODY: CPT

## 2024-04-01 PROCEDURE — 85025 COMPLETE CBC W/AUTO DIFF WBC: CPT

## 2024-04-01 PROCEDURE — 86704 HEP B CORE ANTIBODY TOTAL: CPT

## 2024-04-01 PROCEDURE — 80053 COMPREHEN METABOLIC PANEL: CPT

## 2024-04-01 PROCEDURE — 36620 INSERTION CATHETER ARTERY: CPT

## 2024-04-01 PROCEDURE — 2500000003 HC RX 250 WO HCPCS

## 2024-04-01 PROCEDURE — 4A133J1 MONITORING OF ARTERIAL PULSE, PERIPHERAL, PERCUTANEOUS APPROACH: ICD-10-PCS | Performed by: SPECIALIST

## 2024-04-01 PROCEDURE — 83540 ASSAY OF IRON: CPT

## 2024-04-01 PROCEDURE — 90945 DIALYSIS ONE EVALUATION: CPT

## 2024-04-01 PROCEDURE — 37799 UNLISTED PX VASCULAR SURGERY: CPT

## 2024-04-01 PROCEDURE — 2700000000 HC OXYGEN THERAPY PER DAY

## 2024-04-01 PROCEDURE — 82805 BLOOD GASES W/O2 SATURATION: CPT

## 2024-04-01 PROCEDURE — 84443 ASSAY THYROID STIM HORMONE: CPT

## 2024-04-01 PROCEDURE — 74018 RADEX ABDOMEN 1 VIEW: CPT

## 2024-04-01 PROCEDURE — 96374 THER/PROPH/DIAG INJ IV PUSH: CPT

## 2024-04-01 PROCEDURE — 2000000000 HC ICU R&B

## 2024-04-01 PROCEDURE — 36415 COLL VENOUS BLD VENIPUNCTURE: CPT

## 2024-04-01 PROCEDURE — 86850 RBC ANTIBODY SCREEN: CPT

## 2024-04-01 PROCEDURE — 2580000003 HC RX 258: Performed by: INTERNAL MEDICINE

## 2024-04-01 PROCEDURE — 3E043XZ INTRODUCTION OF VASOPRESSOR INTO CENTRAL VEIN, PERCUTANEOUS APPROACH: ICD-10-PCS | Performed by: SPECIALIST

## 2024-04-01 PROCEDURE — 84439 ASSAY OF FREE THYROXINE: CPT

## 2024-04-01 PROCEDURE — 82803 BLOOD GASES ANY COMBINATION: CPT

## 2024-04-01 PROCEDURE — 2580000003 HC RX 258: Performed by: STUDENT IN AN ORGANIZED HEALTH CARE EDUCATION/TRAINING PROGRAM

## 2024-04-01 PROCEDURE — 5A09357 ASSISTANCE WITH RESPIRATORY VENTILATION, LESS THAN 24 CONSECUTIVE HOURS, CONTINUOUS POSITIVE AIRWAY PRESSURE: ICD-10-PCS | Performed by: SPECIALIST

## 2024-04-01 PROCEDURE — 84484 ASSAY OF TROPONIN QUANT: CPT

## 2024-04-01 PROCEDURE — 94660 CPAP INITIATION&MGMT: CPT

## 2024-04-01 PROCEDURE — 81003 URINALYSIS AUTO W/O SCOPE: CPT

## 2024-04-01 PROCEDURE — 82330 ASSAY OF CALCIUM: CPT

## 2024-04-01 PROCEDURE — 82962 GLUCOSE BLOOD TEST: CPT

## 2024-04-01 PROCEDURE — 83605 ASSAY OF LACTIC ACID: CPT

## 2024-04-01 PROCEDURE — 87086 URINE CULTURE/COLONY COUNT: CPT

## 2024-04-01 PROCEDURE — 82728 ASSAY OF FERRITIN: CPT

## 2024-04-01 PROCEDURE — 99291 CRITICAL CARE FIRST HOUR: CPT | Performed by: INTERNAL MEDICINE

## 2024-04-01 PROCEDURE — 05HM33Z INSERTION OF INFUSION DEVICE INTO RIGHT INTERNAL JUGULAR VEIN, PERCUTANEOUS APPROACH: ICD-10-PCS | Performed by: SPECIALIST

## 2024-04-01 PROCEDURE — 94761 N-INVAS EAR/PLS OXIMETRY MLT: CPT

## 2024-04-01 PROCEDURE — 87040 BLOOD CULTURE FOR BACTERIA: CPT

## 2024-04-01 PROCEDURE — 2500000003 HC RX 250 WO HCPCS: Performed by: NURSE PRACTITIONER

## 2024-04-01 PROCEDURE — 83550 IRON BINDING TEST: CPT

## 2024-04-01 PROCEDURE — 4A133B1 MONITORING OF ARTERIAL PRESSURE, PERIPHERAL, PERCUTANEOUS APPROACH: ICD-10-PCS | Performed by: SPECIALIST

## 2024-04-01 PROCEDURE — 0DH67UZ INSERTION OF FEEDING DEVICE INTO STOMACH, VIA NATURAL OR ARTIFICIAL OPENING: ICD-10-PCS | Performed by: SPECIALIST

## 2024-04-01 PROCEDURE — 94640 AIRWAY INHALATION TREATMENT: CPT

## 2024-04-01 PROCEDURE — 86922 COMPATIBILITY TEST ANTIGLOB: CPT

## 2024-04-01 PROCEDURE — P9016 RBC LEUKOCYTES REDUCED: HCPCS

## 2024-04-01 PROCEDURE — 85610 PROTHROMBIN TIME: CPT

## 2024-04-01 PROCEDURE — 84145 PROCALCITONIN (PCT): CPT

## 2024-04-01 PROCEDURE — 85014 HEMATOCRIT: CPT

## 2024-04-01 PROCEDURE — 82746 ASSAY OF FOLIC ACID SERUM: CPT

## 2024-04-01 PROCEDURE — 86900 BLOOD TYPING SEROLOGIC ABO: CPT

## 2024-04-01 PROCEDURE — 36556 INSERT NON-TUNNEL CV CATH: CPT

## 2024-04-01 PROCEDURE — 2500000003 HC RX 250 WO HCPCS: Performed by: STUDENT IN AN ORGANIZED HEALTH CARE EDUCATION/TRAINING PROGRAM

## 2024-04-01 PROCEDURE — 5A1D90Z PERFORMANCE OF URINARY FILTRATION, CONTINUOUS, GREATER THAN 18 HOURS PER DAY: ICD-10-PCS | Performed by: SPECIALIST

## 2024-04-01 PROCEDURE — 6360000002 HC RX W HCPCS: Performed by: NURSE PRACTITIONER

## 2024-04-01 PROCEDURE — 82607 VITAMIN B-12: CPT

## 2024-04-01 PROCEDURE — 80048 BASIC METABOLIC PNL TOTAL CA: CPT

## 2024-04-01 PROCEDURE — 85045 AUTOMATED RETICULOCYTE COUNT: CPT

## 2024-04-01 PROCEDURE — 2500000003 HC RX 250 WO HCPCS: Performed by: INTERNAL MEDICINE

## 2024-04-01 PROCEDURE — 6360000002 HC RX W HCPCS

## 2024-04-01 PROCEDURE — 99285 EMERGENCY DEPT VISIT HI MDM: CPT

## 2024-04-01 PROCEDURE — 30233N1 TRANSFUSION OF NONAUTOLOGOUS RED BLOOD CELLS INTO PERIPHERAL VEIN, PERCUTANEOUS APPROACH: ICD-10-PCS | Performed by: SPECIALIST

## 2024-04-01 PROCEDURE — 83735 ASSAY OF MAGNESIUM: CPT

## 2024-04-01 RX ORDER — CALCIUM CHLORIDE, MAGNESIUM CHLORIDE, DEXTROSE MONOHYDRATE, LACTIC ACID, SODIUM CHLORIDE, SODIUM BICARBONATE AND POTASSIUM CHLORIDE 5.15; 2.03; 22; 5.4; 6.46; 3.09; .157 G/L; G/L; G/L; G/L; G/L; G/L; G/L
INJECTION INTRAVENOUS CONTINUOUS
Status: DISCONTINUED | OUTPATIENT
Start: 2024-04-01 | End: 2024-04-02

## 2024-04-01 RX ORDER — POLYETHYLENE GLYCOL 3350 17 G/17G
17 POWDER, FOR SOLUTION ORAL DAILY
Status: DISCONTINUED | OUTPATIENT
Start: 2024-04-01 | End: 2024-04-02

## 2024-04-01 RX ORDER — SODIUM CHLORIDE 0.9 % (FLUSH) 0.9 %
5-40 SYRINGE (ML) INJECTION PRN
Status: DISCONTINUED | OUTPATIENT
Start: 2024-04-01 | End: 2024-04-02 | Stop reason: HOSPADM

## 2024-04-01 RX ORDER — GLUCAGON 1 MG/ML
1 KIT INJECTION PRN
Status: DISCONTINUED | OUTPATIENT
Start: 2024-04-01 | End: 2024-04-02

## 2024-04-01 RX ORDER — LANOLIN ALCOHOL/MO/W.PET/CERES
3 CREAM (GRAM) TOPICAL NIGHTLY PRN
Status: DISCONTINUED | OUTPATIENT
Start: 2024-04-01 | End: 2024-04-02

## 2024-04-01 RX ORDER — CALCIUM GLUCONATE 20 MG/ML
2000 INJECTION, SOLUTION INTRAVENOUS PRN
Status: DISCONTINUED | OUTPATIENT
Start: 2024-04-01 | End: 2024-04-02

## 2024-04-01 RX ORDER — PANTOPRAZOLE SODIUM 40 MG/1
40 TABLET, DELAYED RELEASE ORAL DAILY
Status: DISCONTINUED | OUTPATIENT
Start: 2024-04-01 | End: 2024-04-02

## 2024-04-01 RX ORDER — ONDANSETRON 2 MG/ML
4 INJECTION INTRAMUSCULAR; INTRAVENOUS EVERY 6 HOURS PRN
Status: DISCONTINUED | OUTPATIENT
Start: 2024-04-01 | End: 2024-04-01

## 2024-04-01 RX ORDER — LEVOTHYROXINE SODIUM 88 UG/1
88 TABLET ORAL DAILY
Status: DISCONTINUED | OUTPATIENT
Start: 2024-04-01 | End: 2024-04-02

## 2024-04-01 RX ORDER — DEXTROSE MONOHYDRATE 100 MG/ML
INJECTION, SOLUTION INTRAVENOUS CONTINUOUS PRN
Status: DISCONTINUED | OUTPATIENT
Start: 2024-04-01 | End: 2024-04-02

## 2024-04-01 RX ORDER — ACETAMINOPHEN 325 MG/1
650 TABLET ORAL EVERY 6 HOURS PRN
Status: DISCONTINUED | OUTPATIENT
Start: 2024-04-01 | End: 2024-04-02 | Stop reason: HOSPADM

## 2024-04-01 RX ORDER — SODIUM CHLORIDE 0.9 % (FLUSH) 0.9 %
5-40 SYRINGE (ML) INJECTION EVERY 12 HOURS SCHEDULED
Status: DISCONTINUED | OUTPATIENT
Start: 2024-04-01 | End: 2024-04-02 | Stop reason: HOSPADM

## 2024-04-01 RX ORDER — ONDANSETRON 4 MG/1
4 TABLET, ORALLY DISINTEGRATING ORAL EVERY 8 HOURS PRN
Status: DISCONTINUED | OUTPATIENT
Start: 2024-04-01 | End: 2024-04-01

## 2024-04-01 RX ORDER — DILTIAZEM HYDROCHLORIDE 180 MG/1
180 CAPSULE, COATED, EXTENDED RELEASE ORAL DAILY
Status: DISCONTINUED | OUTPATIENT
Start: 2024-04-01 | End: 2024-04-02

## 2024-04-01 RX ORDER — SODIUM CHLORIDE 9 MG/ML
INJECTION, SOLUTION INTRAVENOUS PRN
Status: DISCONTINUED | OUTPATIENT
Start: 2024-04-01 | End: 2024-04-02 | Stop reason: HOSPADM

## 2024-04-01 RX ORDER — POLYETHYLENE GLYCOL 3350 17 G/17G
17 POWDER, FOR SOLUTION ORAL DAILY PRN
Status: DISCONTINUED | OUTPATIENT
Start: 2024-04-01 | End: 2024-04-02

## 2024-04-01 RX ORDER — ATORVASTATIN CALCIUM 40 MG/1
40 TABLET, FILM COATED ORAL DAILY
Status: DISCONTINUED | OUTPATIENT
Start: 2024-04-01 | End: 2024-04-02

## 2024-04-01 RX ORDER — ASPIRIN 81 MG/1
81 TABLET, CHEWABLE ORAL DAILY
Status: DISCONTINUED | OUTPATIENT
Start: 2024-04-01 | End: 2024-04-02

## 2024-04-01 RX ORDER — ISOSORBIDE MONONITRATE 30 MG/1
30 TABLET, EXTENDED RELEASE ORAL DAILY
Status: DISCONTINUED | OUTPATIENT
Start: 2024-04-01 | End: 2024-04-02

## 2024-04-01 RX ORDER — POTASSIUM CHLORIDE 29.8 MG/ML
20 INJECTION INTRAVENOUS PRN
Status: DISCONTINUED | OUTPATIENT
Start: 2024-04-01 | End: 2024-04-02

## 2024-04-01 RX ORDER — IPRATROPIUM BROMIDE AND ALBUTEROL SULFATE 2.5; .5 MG/3ML; MG/3ML
1 SOLUTION RESPIRATORY (INHALATION) EVERY 4 HOURS PRN
Status: DISCONTINUED | OUTPATIENT
Start: 2024-04-01 | End: 2024-04-02 | Stop reason: HOSPADM

## 2024-04-01 RX ORDER — IPRATROPIUM BROMIDE AND ALBUTEROL SULFATE 2.5; .5 MG/3ML; MG/3ML
1 SOLUTION RESPIRATORY (INHALATION)
Status: COMPLETED | OUTPATIENT
Start: 2024-04-01 | End: 2024-04-01

## 2024-04-01 RX ORDER — HYDRALAZINE HYDROCHLORIDE 25 MG/1
25 TABLET, FILM COATED ORAL EVERY 8 HOURS SCHEDULED
Status: DISCONTINUED | OUTPATIENT
Start: 2024-04-01 | End: 2024-04-02

## 2024-04-01 RX ORDER — AMIODARONE HYDROCHLORIDE 200 MG/1
200 TABLET ORAL 2 TIMES DAILY
Status: DISCONTINUED | OUTPATIENT
Start: 2024-04-01 | End: 2024-04-02

## 2024-04-01 RX ORDER — MAGNESIUM SULFATE 1 G/100ML
1000 INJECTION INTRAVENOUS PRN
Status: DISCONTINUED | OUTPATIENT
Start: 2024-04-01 | End: 2024-04-02

## 2024-04-01 RX ORDER — CALCIUM GLUCONATE 20 MG/ML
1000 INJECTION, SOLUTION INTRAVENOUS PRN
Status: DISCONTINUED | OUTPATIENT
Start: 2024-04-01 | End: 2024-04-02

## 2024-04-01 RX ORDER — METOPROLOL SUCCINATE 50 MG/1
50 TABLET, EXTENDED RELEASE ORAL 2 TIMES DAILY
Status: DISCONTINUED | OUTPATIENT
Start: 2024-04-01 | End: 2024-04-02

## 2024-04-01 RX ORDER — FUROSEMIDE 10 MG/ML
40 INJECTION INTRAMUSCULAR; INTRAVENOUS ONCE
Status: DISCONTINUED | OUTPATIENT
Start: 2024-04-01 | End: 2024-04-01

## 2024-04-01 RX ORDER — DEXTROSE MONOHYDRATE 25 G/50ML
INJECTION, SOLUTION INTRAVENOUS
Status: COMPLETED
Start: 2024-04-01 | End: 2024-04-01

## 2024-04-01 RX ORDER — DEXTROSE MONOHYDRATE 25 G/50ML
25 INJECTION, SOLUTION INTRAVENOUS ONCE
Status: COMPLETED | OUTPATIENT
Start: 2024-04-01 | End: 2024-04-01

## 2024-04-01 RX ORDER — NOREPINEPHRINE BITARTRATE 0.06 MG/ML
1-100 INJECTION, SOLUTION INTRAVENOUS CONTINUOUS
Status: DISCONTINUED | OUTPATIENT
Start: 2024-04-01 | End: 2024-04-01

## 2024-04-01 RX ORDER — FUROSEMIDE 10 MG/ML
80 INJECTION INTRAMUSCULAR; INTRAVENOUS ONCE
Status: COMPLETED | OUTPATIENT
Start: 2024-04-01 | End: 2024-04-01

## 2024-04-01 RX ORDER — BUDESONIDE AND FORMOTEROL FUMARATE DIHYDRATE 80; 4.5 UG/1; UG/1
2 AEROSOL RESPIRATORY (INHALATION) 2 TIMES DAILY
Status: DISCONTINUED | OUTPATIENT
Start: 2024-04-01 | End: 2024-04-02

## 2024-04-01 RX ORDER — DEXMEDETOMIDINE HYDROCHLORIDE 4 UG/ML
.1-.4 INJECTION, SOLUTION INTRAVENOUS CONTINUOUS
Status: DISCONTINUED | OUTPATIENT
Start: 2024-04-01 | End: 2024-04-02

## 2024-04-01 RX ORDER — NOREPINEPHRINE BITARTRATE 0.06 MG/ML
1-100 INJECTION, SOLUTION INTRAVENOUS CONTINUOUS
Status: DISCONTINUED | OUTPATIENT
Start: 2024-04-01 | End: 2024-04-02

## 2024-04-01 RX ORDER — SODIUM CHLORIDE, SODIUM LACTATE, POTASSIUM CHLORIDE, AND CALCIUM CHLORIDE .6; .31; .03; .02 G/100ML; G/100ML; G/100ML; G/100ML
1000 INJECTION, SOLUTION INTRAVENOUS ONCE
Status: DISCONTINUED | OUTPATIENT
Start: 2024-04-01 | End: 2024-04-01

## 2024-04-01 RX ORDER — PHENYLEPHRINE HYDROCHLORIDE 10 MG/ML
INJECTION INTRAVENOUS
Status: COMPLETED
Start: 2024-04-01 | End: 2024-04-01

## 2024-04-01 RX ADMIN — Medication: at 16:29

## 2024-04-01 RX ADMIN — IPRATROPIUM BROMIDE AND ALBUTEROL SULFATE 1 DOSE: 2.5; .5 SOLUTION RESPIRATORY (INHALATION) at 01:15

## 2024-04-01 RX ADMIN — SODIUM CHLORIDE, PRESERVATIVE FREE 10 ML: 5 INJECTION INTRAVENOUS at 07:59

## 2024-04-01 RX ADMIN — Medication: at 16:31

## 2024-04-01 RX ADMIN — SODIUM CHLORIDE, PRESERVATIVE FREE 10 ML: 5 INJECTION INTRAVENOUS at 20:28

## 2024-04-01 RX ADMIN — SODIUM BICARBONATE 100 MEQ: 84 INJECTION, SOLUTION INTRAVENOUS at 17:16

## 2024-04-01 RX ADMIN — DEXMEDETOMIDINE HYDROCHLORIDE 0.2 MCG/KG/HR: 4 INJECTION, SOLUTION INTRAVENOUS at 21:51

## 2024-04-01 RX ADMIN — VANCOMYCIN HYDROCHLORIDE 1500 MG: 1.5 INJECTION, POWDER, LYOPHILIZED, FOR SOLUTION INTRAVENOUS at 05:38

## 2024-04-01 RX ADMIN — CALCIUM CHLORIDE, MAGNESIUM CHLORIDE, DEXTROSE MONOHYDRATE, LACTIC ACID, SODIUM CHLORIDE, SODIUM BICARBONATE AND POTASSIUM CHLORIDE: 5.15; 2.03; 22; 5.4; 6.46; 3.09; .157 INJECTION INTRAVENOUS at 20:57

## 2024-04-01 RX ADMIN — AMIODARONE HYDROCHLORIDE 200 MG: 200 TABLET ORAL at 20:27

## 2024-04-01 RX ADMIN — CALCIUM GLUCONATE 1000 MG: 20 INJECTION, SOLUTION INTRAVENOUS at 20:27

## 2024-04-01 RX ADMIN — SODIUM BICARBONATE 250 MEQ: 84 INJECTION, SOLUTION INTRAVENOUS at 21:14

## 2024-04-01 RX ADMIN — Medication 3 MG: at 21:43

## 2024-04-01 RX ADMIN — NOREPINEPHRINE BITARTRATE 45 MCG/MIN: 0.06 INJECTION, SOLUTION INTRAVENOUS at 21:30

## 2024-04-01 RX ADMIN — VASOPRESSIN 0.03 UNITS/MIN: 0.2 INJECTION INTRAVENOUS at 15:53

## 2024-04-01 RX ADMIN — FUROSEMIDE 10 MG/HR: 10 INJECTION, SOLUTION INTRAMUSCULAR; INTRAVENOUS at 06:36

## 2024-04-01 RX ADMIN — DEXTROSE MONOHYDRATE 25 G: 25 INJECTION, SOLUTION INTRAVENOUS at 20:19

## 2024-04-01 RX ADMIN — Medication: at 19:50

## 2024-04-01 RX ADMIN — PHENYLEPHRINE HYDROCHLORIDE 50 MG: 10 INJECTION INTRAVENOUS at 16:22

## 2024-04-01 RX ADMIN — SODIUM CHLORIDE, POTASSIUM CHLORIDE, SODIUM LACTATE AND CALCIUM CHLORIDE 1000 ML: 600; 310; 30; 20 INJECTION, SOLUTION INTRAVENOUS at 03:26

## 2024-04-01 RX ADMIN — BUDESONIDE AND FORMOTEROL FUMARATE DIHYDRATE 2 PUFF: 80; 4.5 AEROSOL RESPIRATORY (INHALATION) at 12:06

## 2024-04-01 RX ADMIN — Medication 100 MEQ: at 17:16

## 2024-04-01 RX ADMIN — PHENYLEPHRINE HYDROCHLORIDE 30 MCG/MIN: 10 INJECTION INTRAVENOUS at 16:27

## 2024-04-01 RX ADMIN — FUROSEMIDE 80 MG: 10 INJECTION, SOLUTION INTRAMUSCULAR; INTRAVENOUS at 04:19

## 2024-04-01 RX ADMIN — CEFEPIME 1000 MG: 1 INJECTION, POWDER, FOR SOLUTION INTRAMUSCULAR; INTRAVENOUS at 04:25

## 2024-04-01 RX ADMIN — NOREPINEPHRINE BITARTRATE 5 MCG/MIN: 0.06 INJECTION, SOLUTION INTRAVENOUS at 15:38

## 2024-04-01 ASSESSMENT — PAIN SCALES - GENERAL
PAINLEVEL_OUTOF10: 0
PAINLEVEL_OUTOF10: 0

## 2024-04-01 ASSESSMENT — PAIN - FUNCTIONAL ASSESSMENT: PAIN_FUNCTIONAL_ASSESSMENT: NONE - DENIES PAIN

## 2024-04-01 NOTE — ED NOTES
ED TO INPATIENT SBAR HANDOFF    Patient Name: Lynette Gonsalves   :  1950  74 y.o.   Preferred Name  Lynette  Family/Caregiver Present yes   Restraints no   C-SSRS: Risk of Suicide: No Risk  Sitter no   Sepsis Risk Score Sepsis Risk Score: 5.98      Situation  Chief Complaint   Patient presents with    Respiratory Distress     77% on 5L at home     Brief Description of Patient's Condition: Patient presents to the ED via EMS from home with complaints of respiratory distress. Patient was at 77% on 5L at home on EMS arrival. EMS got her up to 88% on NRB with a breathing tx going. Patient family said she has been short of breath all day.   Mental Status: oriented and alert  Arrived from: home    Imaging:   XR CHEST PORTABLE   Final Result      No significant change in diffuse interstitial densities most consistent with pulmonary edema.      Electronically signed by MD Bonilla Delgado        Abnormal labs:   Abnormal Labs Reviewed   CBC WITH AUTO DIFFERENTIAL - Abnormal; Notable for the following components:       Result Value    WBC 12.5 (*)     RBC 2.73 (*)     Hemoglobin 7.9 (*)     Hematocrit 27.0 (*)     MCHC 29.3 (*)     RDW 20.7 (*)     MPV 12.7 (*)     Segs Relative 82.0 (*)     Lymphocytes % 9.3 (*)     Monocytes % 8.1 (*)     Immature Neutrophil % 0.5 (*)     All other components within normal limits   COMPREHENSIVE METABOLIC PANEL - Abnormal; Notable for the following components:    Sodium 121 (*)     Chloride 81 (*)     CO2 17 (*)     Anion Gap 23 (*)     Glucose 144 (*)     BUN 97 (*)     Creatinine 4.8 (*)     Est, Glom Filt Rate 9 (*)     Total Bilirubin 2.4 (*)      (*)      (*)     All other components within normal limits   MAGNESIUM - Abnormal; Notable for the following components:    Magnesium 2.5 (*)     All other components within normal limits   BRAIN NATRIURETIC PEPTIDE - Abnormal; Notable for the following components:    Pro-BNP 34,183 (*)     All other components within normal

## 2024-04-01 NOTE — PROGRESS NOTES
04/01/24 0806   NIV Type   NIV Started/Stopped On   Equipment Type V60   Mode Bilevel   Mask Type Full face mask   Mask Size Small   Assessment   Pulse 84   Respirations 24   SpO2 93 %   Settings/Measurements   PIP Observed 13 cm H20   IPAP 12 cmH20   CPAP/EPAP 5 cmH2O   Vt (Measured) 777 mL   Rate Ordered 12   Insp Rise Time (%) 2 %   FiO2  60 %   I Time/ I Time % 1 s   Minute Volume (L/min) 21 Liters   Mask Leak (lpm) 4 lpm   Patient's Home Machine No

## 2024-04-01 NOTE — PROGRESS NOTES
Messaged on call NP Aki regarding lasix drip order for this patient due to her blood pressures.  Message was forwarded to Dr. Godinez and provider stated to continue with drip.

## 2024-04-01 NOTE — PROGRESS NOTES
4 Eyes Skin Assessment     NAME:  Lynette Gonsalves  YOB: 1950  MEDICAL RECORD NUMBER:  [id number]    The patient is being assessed for  Admission    I agree that at least one RN has performed a thorough Head to Toe Skin Assessment on the patient. ALL assessment sites listed below have been assessed.      Areas assessed by both nurses:    Head, Face, Ears, Shoulders, Back, Chest, Arms, Elbows, Hands, Sacrum. Buttock, Coccyx, Ischium, and Legs. Feet and Heels        Does the Patient have a Wound? No noted wound(s)       Luc Prevention initiated by RN: No  Wound Care Orders initiated by RN: No    Pressure Injury (Stage 3,4, Unstageable, DTI, NWPT, and Complex wounds) if present, place Wound referral order by RN under : No    New Ostomies, if present place, Ostomy referral order under : No     Nurse 1 eSignature: Electronically signed by Linda Krishnan RN on 4/1/24 at 6:19 AM EDT    **SHARE this note so that the co-signing nurse can place an eSignature**    Nurse 2 eSignature: {Esignature:[id number]}

## 2024-04-01 NOTE — PROGRESS NOTES
-Renal function worsening  -Fluid overload  -If the plan is to continue full aggressive medical care then, CRRT will be the next step  -Please keep me updated of goals of care discussion    Thank you

## 2024-04-01 NOTE — ED PROVIDER NOTES
Emergency Department Encounter    Patient: Lynette Gonsalves  MRN: [id number]  : 1950  Date of Evaluation: 2024  ED Provider:  Chris Piña MD    Triage Chief Complaint:   Respiratory Distress (77% on 5L at home)    [place]:  Lynette Gonsalves is a 74 y.o. female with past medical history of chronic respiratory failure on 5 L of oxygen, heart failure, A-fib status post pacer, CKD 4, CAD, COPD, hypertension, that presents with respiratory distress.  According to EMS patient reports that she has been feeling shortness of breath for the whole week, however today she went to the bathroom, was not able to come back due to respiratory distress.  Patient was found to have 77% of oxygenation at home on 5 L of oxygen.  They were able to bring her up to the high 80s on a nonrebreather mask.  Patient denies any chest pain, abdominal pain, nausea, vomiting, lower extremity edema.       ROS - see HPI    Past Medical History:   Diagnosis Date    CAD (coronary artery disease)     COPD (chronic obstructive pulmonary disease) ([facility])     GERD (gastroesophageal reflux disease)     H/O cardiovascular stress test 2021    normal study 63% EF no ischemia    H/O echocardiogram 2017    EF 35-40%    H/O echocardiogram 2020    EF 45-50, Mild TR & OH, Severely dilated left atrium.  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, functionally normally     H/O percutaneous left heart catheterization 2023    RCA is occluded but SVG to RCA is patent,    H/O transesophageal echocardiography (RAMIRO) for monitoring 2023    Moderately dilated left atrium,  Heavily calcified aortic valve with severe aortic stenosis,  Mild mitral regurgitation.    Heart attack ([facility]) 2014    History of transesophageal echocardiography (RAMIRO) 2023    EF 20-25%, S/P MVR with Mod Regurg.    Hyperlipidemia     Hypertension     On home oxygen therapy     nightly    PAD (peripheral artery disease) ([facility])     S/P right

## 2024-04-01 NOTE — H&P
05/15/2020 10:20 PM    YEAST MODERATE 06/18/2016 05:35 AM    BACTERIA RARE 05/15/2020 10:20 PM    CLARITYU CLEAR 03/07/2024 03:36 PM    SPECGRAV 1.015 03/07/2024 03:36 PM    LEUKOCYTESUR NEGATIVE 03/07/2024 03:36 PM    UROBILINOGEN 0.2 03/07/2024 03:36 PM    BILIRUBINUR NEGATIVE 03/07/2024 03:36 PM    BLOODU NEGATIVE 03/07/2024 03:36 PM    GLUCOSEU Negative 12/31/2018 11:29 AM    KETUA NEGATIVE 03/07/2024 03:36 PM     Urine Cultures: No results found for: \"LABURIN\"  Blood Cultures: No results found for: \"BC\"  No results found for: \"BLOODCULT2\"  Organism: No results found for: \"ORG\"    Radiology results:  XR CHEST PORTABLE   Final Result      No significant change in diffuse interstitial densities most consistent with pulmonary edema.      Electronically signed by MD Bonilla Godinez MD  04/01/24 4:34 AM

## 2024-04-01 NOTE — PROCEDURES
PROCEDURE NOTE  Date: 4/1/2024   Name: Lynette Gonsalves  YOB: 1950    Insert Arterial Line    Date/Time: 4/1/2024 4:39 PM    Performed by: Josefina Bianchi APRN - CNP  Authorized by: Josefina Bianchi APRN - CNP  Consent: The procedure was performed in an emergent situation.  Risks and benefits: risks, benefits and alternatives were discussed  Consent given by: patient and spouse  Patient understanding: patient states understanding of the procedure being performed  Patient consent: the patient's understanding of the procedure matches consent given  Procedure consent: procedure consent matches procedure scheduled  Relevant documents: relevant documents present and verified  Test results: test results available and properly labeled  Site marked: the operative site was marked  Imaging studies: imaging studies available  Required items: required blood products, implants, devices, and special equipment available  Patient identity confirmed: hospital-assigned identification number and verbally with patient  Time out: Immediately prior to procedure a \"time out\" was called to verify the correct patient, procedure, equipment, support staff and site/side marked as required.  Preparation: Patient was prepped and draped in the usual sterile fashion.  Indications: hemodynamic monitoring  Location: right femoral    Anesthesia:  Local Anesthetic: lidocaine 1% without epinephrine    Sedation:  Patient sedated: no    Seldinger technique: Seldinger technique used  Number of attempts: 1  Post-procedure: line sutured and dressing applied  Post-procedure CMS: normal  Patient tolerance: patient tolerated the procedure well with no immediate complications  Comments: Bilateral radial ultrasounds attempted to visualize with ultrasound.  Despite vasopressors, patient hypotensive and unable to successfully visualize right or left radial artery, therefore safest option for hemodynamic monitoring was femoral.

## 2024-04-01 NOTE — PROGRESS NOTES
Pt transferred to 2104 with RT Mila assisting at at this time. Pt spouse at bedside and giving all belongings prior to transfer to ICU. Pt on bipap.

## 2024-04-01 NOTE — PROGRESS NOTES
04/01/24 0944   NIV Type   NIV Started/Stopped On   Equipment Type v60   Mode Bilevel   Mask Type Full face mask   Mask Size Small   Settings/Measurements   PIP Observed 13 cm H20   IPAP 12 cmH20   CPAP/EPAP 5 cmH2O   Vt (Measured) 715 mL   Insp Rise Time (%) 2 %   FiO2  60 %   I Time/ I Time % 1 s   Minute Volume (L/min) 19.3 Liters   Mask Leak (lpm) 9 lpm   Patient's Home Machine No

## 2024-04-01 NOTE — PROGRESS NOTES
Josefina, APRN notified of pt hypotension prior to CRRT intiation. APRN en route for line placement and placement of order to treat BP

## 2024-04-01 NOTE — PROGRESS NOTES
RENAL DOSE ADJUSTMENT MADE PER P/T PROTOCOL    PREVIOUS ORDER:  Cefepime 1g given over 30 min q24 hours x 4 doses    Estimated Creatinine Clearance: 9 mL/min (A) (based on SCr of 4.8 mg/dL (H)).  Recent Labs     04/01/24  0112   BUN 97*   CREATININE 4.8*        NEW RENALLY ADJUSTED ORDER:  Cefepime 2g given over 240 min q24 hours x 4 doses    Dilma Rodriguez RPH  4/1/2024 5:04 AM

## 2024-04-01 NOTE — CONSENT
Informed Consent for Blood Component Transfusion Note    I have discussed with the spouse the rationale for blood component transfusion; its benefits in treating or preventing fatigue, organ damage, or death; and its risk which includes mild transfusion reactions, rare risk of blood borne infection, or more serious but rare reactions. I have discussed the alternatives to transfusion, including the risk and consequences of not receiving transfusion. The spouse had an opportunity to ask questions and had agreed to proceed with transfusion of blood components.    Electronically signed by SATURNINO Patel CNP on 4/1/24 at 5:04 PM EDT

## 2024-04-01 NOTE — PROGRESS NOTES
4 Eyes Skin Assessment     NAME:  Lynette Gonsalves  YOB: 1950  MEDICAL RECORD NUMBER:  [id number]    The patient is being assessed for  Transfer to New Unit    I agree that at least one RN has performed a thorough Head to Toe Skin Assessment on the patient. ALL assessment sites listed below have been assessed.      Areas assessed by both nurses:    Head, Face, Ears, Shoulders, Back, Chest, Arms, Elbows, Hands, Sacrum. Buttock, Coccyx, Ischium, Legs. Feet and Heels, and Under Medical Devices         Does the Patient have a Wound? No noted wound(s)       Luc Prevention initiated by RN: Yes  Wound Care Orders initiated by RN: No    Pressure Injury (Stage 3,4, Unstageable, DTI, NWPT, and Complex wounds) if present, place Wound referral order by RN under : No    New Ostomies, if present place, Ostomy referral order under : No     Nurse 1 eSignature: Electronically signed by Ortiz Peace RN on 4/1/24 at 1:03 PM EDT    **SHARE this note so that the co-signing nurse can place an eSignature**    Nurse 2 eSignature: Electronically signed by Fariba Montiel RN on 4/1/24 at 4:56 PM EDT

## 2024-04-01 NOTE — PROGRESS NOTES
Attempted to trial patient off of bipap and placed on 5L nasal canula, which is pts baseline. Pt was unable to tolerate and verbalized she was unable to breath. Pt placed back on bipap and PO meds held at this time.      Maryann Mcmillan RN

## 2024-04-01 NOTE — PROGRESS NOTES
04/01/24 1209   NIV Type   NIV Started/Stopped On   Equipment Type V60   Mode Bilevel   Mask Type Full face mask   Mask Size Small   Assessment   Pulse 76   Respirations 10   SpO2 (!) 83 %   Settings/Measurements   PIP Observed 15 cm H20   IPAP 12 cmH20   CPAP/EPAP 5 cmH2O   Vt (Measured) 792 mL   Rate Ordered 12   Insp Rise Time (%) 2 %   FiO2  60 %   I Time/ I Time % 1 s   Minute Volume (L/min) 26 Liters   Mask Leak (lpm) 1 lpm   Patient's Home Machine No

## 2024-04-01 NOTE — ED TRIAGE NOTES
Patient presents to the ED via EMS from home with complaints of respiratory distress. Patient was at 77% on 5L at home on EMS arrival. EMS got her up to 88% on NRB with a breathing tx going. Patient family said she has been short of breath all day.

## 2024-04-01 NOTE — PROCEDURES
Central venous catheter insertion, temporary HD catheter  Need for renal replacement therapy  Consent obtained from patient's spouse  ASA score 3  Timeout performed at 1:28 PM    Sterile maximal barrier technique sterile ultrasound guidance utilized throughout the procedure.  Right internal jugular area prepared with chlorhexidine.  5 cc of 1% lidocaine instilled into the soft tissues over the right neck region.  Using sterile ultrasound guidance, the right internal jugular vein was cannulated first attempt, guidewire placed, dilation performed x 2 subsequently a 15 cm triple-lumen trialysis catheter inserted without difficulty.  All ports were flushed, the device was sutured into position, sterile occlusive dressing applied.    No untoward events, a follow-up chest radiograph is pending.    E [facility]  [phone]

## 2024-04-02 VITALS
SYSTOLIC BLOOD PRESSURE: 101 MMHG | HEIGHT: 61 IN | DIASTOLIC BLOOD PRESSURE: 47 MMHG | OXYGEN SATURATION: 77 % | BODY MASS INDEX: 30.39 KG/M2 | WEIGHT: 160.94 LBS | TEMPERATURE: 84.9 F

## 2024-04-02 LAB
ABO/RH: NORMAL
ALBUMIN SERPL-MCNC: 3.4 GM/DL (ref 3.4–5)
ALP BLD-CCNC: 86 IU/L (ref 40–129)
ALT SERPL-CCNC: 4289 U/L (ref 10–40)
ANION GAP SERPL CALCULATED.3IONS-SCNC: 25 MMOL/L (ref 7–16)
ANTIBODY SCREEN: NEGATIVE
AST SERPL-CCNC: 5 IU/L (ref 15–37)
BASE EXCESS: 10 (ref 0–3)
BILIRUB SERPL-MCNC: 4.2 MG/DL (ref 0–1)
BILIRUBIN DIRECT: 2.2 MG/DL (ref 0–0.3)
BILIRUBIN, INDIRECT: 2 MG/DL (ref 0–0.7)
BUN SERPL-MCNC: 39 MG/DL (ref 6–23)
CALCIUM IONIZED: 4.52 MG/DL (ref 4.48–5.28)
CALCIUM SERPL-MCNC: 8.7 MG/DL (ref 8.3–10.6)
CARBON MONOXIDE, BLOOD: 2.5 % (ref 0–5)
CHLORIDE BLD-SCNC: 96 MMOL/L (ref 99–110)
CO2 CONTENT: 17.9 MMOL/L (ref 21–32)
CO2: 16 MMOL/L (ref 21–32)
COMMENT: ABNORMAL
COMPONENT: NORMAL
CREAT SERPL-MCNC: 2.1 MG/DL (ref 0.6–1.1)
CROSSMATCH RESULT: NORMAL
CULTURE: NORMAL
GFR SERPL CREATININE-BSD FRML MDRD: 24 ML/MIN/1.73M2
GLUCOSE BLD-MCNC: 125 MG/DL (ref 70–99)
GLUCOSE BLD-MCNC: 148 MG/DL (ref 70–99)
GLUCOSE BLD-MCNC: 164 MG/DL (ref 70–99)
GLUCOSE BLD-MCNC: 175 MG/DL (ref 70–99)
GLUCOSE BLD-MCNC: 45 MG/DL (ref 70–99)
GLUCOSE BLD-MCNC: 49 MG/DL (ref 70–99)
GLUCOSE SERPL-MCNC: 57 MG/DL (ref 70–99)
HCO3 ARTERIAL: 16.7 MMOL/L (ref 21–28)
IONIZED CA: 1.13 MMOL/L (ref 1.12–1.32)
LACTATE: 14.6 MMOL/L (ref 0.5–1.9)
LACTATE: 14.7 MMOL/L (ref 0.5–1.9)
Lab: NORMAL
MAGNESIUM: 2.4 MG/DL (ref 1.8–2.4)
METHEMOGLOBIN ARTERIAL: 0.7 %
O2 SATURATION: 92 % (ref 94–98)
PCO2 ARTERIAL: 39 MMHG (ref 35–48)
PH BLOOD: 7.24 (ref 7.35–7.45)
PHOSPHORUS: 5.3 MG/DL (ref 2.5–4.9)
PO2 ARTERIAL: 75 MMHG (ref 83–108)
POTASSIUM SERPL-SCNC: 4.3 MMOL/L (ref 3.5–5.1)
SODIUM BLD-SCNC: 137 MMOL/L (ref 135–145)
SPECIMEN: NORMAL
STATUS: NORMAL
TOTAL PROTEIN: 5.8 GM/DL (ref 6.4–8.2)
TRANSFUSION STATUS: NORMAL
UNIT DIVISION: 0
UNIT NUMBER: NORMAL

## 2024-04-02 PROCEDURE — 2580000003 HC RX 258: Performed by: NURSE PRACTITIONER

## 2024-04-02 PROCEDURE — 80048 BASIC METABOLIC PNL TOTAL CA: CPT

## 2024-04-02 PROCEDURE — 82330 ASSAY OF CALCIUM: CPT

## 2024-04-02 PROCEDURE — 83735 ASSAY OF MAGNESIUM: CPT

## 2024-04-02 PROCEDURE — 2580000003 HC RX 258: Performed by: INTERNAL MEDICINE

## 2024-04-02 PROCEDURE — 90945 DIALYSIS ONE EVALUATION: CPT

## 2024-04-02 PROCEDURE — 37799 UNLISTED PX VASCULAR SURGERY: CPT

## 2024-04-02 PROCEDURE — 80076 HEPATIC FUNCTION PANEL: CPT

## 2024-04-02 PROCEDURE — 2700000000 HC OXYGEN THERAPY PER DAY

## 2024-04-02 PROCEDURE — 2500000003 HC RX 250 WO HCPCS: Performed by: NURSE PRACTITIONER

## 2024-04-02 PROCEDURE — 82803 BLOOD GASES ANY COMBINATION: CPT

## 2024-04-02 PROCEDURE — 6360000002 HC RX W HCPCS: Performed by: NURSE PRACTITIONER

## 2024-04-02 PROCEDURE — P9047 ALBUMIN (HUMAN), 25%, 50ML: HCPCS | Performed by: STUDENT IN AN ORGANIZED HEALTH CARE EDUCATION/TRAINING PROGRAM

## 2024-04-02 PROCEDURE — 83605 ASSAY OF LACTIC ACID: CPT

## 2024-04-02 PROCEDURE — 2580000003 HC RX 258: Performed by: STUDENT IN AN ORGANIZED HEALTH CARE EDUCATION/TRAINING PROGRAM

## 2024-04-02 PROCEDURE — 82962 GLUCOSE BLOOD TEST: CPT

## 2024-04-02 PROCEDURE — 6360000002 HC RX W HCPCS: Performed by: STUDENT IN AN ORGANIZED HEALTH CARE EDUCATION/TRAINING PROGRAM

## 2024-04-02 PROCEDURE — 94660 CPAP INITIATION&MGMT: CPT

## 2024-04-02 PROCEDURE — 6370000000 HC RX 637 (ALT 250 FOR IP): Performed by: STUDENT IN AN ORGANIZED HEALTH CARE EDUCATION/TRAINING PROGRAM

## 2024-04-02 PROCEDURE — 84100 ASSAY OF PHOSPHORUS: CPT

## 2024-04-02 PROCEDURE — 6360000002 HC RX W HCPCS: Performed by: SPECIALIST

## 2024-04-02 PROCEDURE — 94761 N-INVAS EAR/PLS OXIMETRY MLT: CPT

## 2024-04-02 RX ORDER — ALBUMIN (HUMAN) 12.5 G/50ML
25 SOLUTION INTRAVENOUS ONCE
Status: COMPLETED | OUTPATIENT
Start: 2024-04-02 | End: 2024-04-02

## 2024-04-02 RX ORDER — MIDAZOLAM HYDROCHLORIDE 2 MG/2ML
1 INJECTION, SOLUTION INTRAMUSCULAR; INTRAVENOUS
Status: DISCONTINUED | OUTPATIENT
Start: 2024-04-02 | End: 2024-04-02 | Stop reason: HOSPADM

## 2024-04-02 RX ORDER — HALOPERIDOL 5 MG/ML
2 INJECTION INTRAMUSCULAR EVERY 6 HOURS PRN
Status: DISCONTINUED | OUTPATIENT
Start: 2024-04-02 | End: 2024-04-02 | Stop reason: HOSPADM

## 2024-04-02 RX ORDER — GLYCOPYRROLATE 0.2 MG/ML
0.2 INJECTION INTRAMUSCULAR; INTRAVENOUS EVERY 4 HOURS PRN
Status: DISCONTINUED | OUTPATIENT
Start: 2024-04-02 | End: 2024-04-02 | Stop reason: HOSPADM

## 2024-04-02 RX ORDER — ONDANSETRON 2 MG/ML
4 INJECTION INTRAMUSCULAR; INTRAVENOUS EVERY 6 HOURS PRN
Status: DISCONTINUED | OUTPATIENT
Start: 2024-04-02 | End: 2024-04-02 | Stop reason: HOSPADM

## 2024-04-02 RX ADMIN — HYDROCORTISONE SODIUM SUCCINATE 50 MG: 100 INJECTION, POWDER, FOR SOLUTION INTRAMUSCULAR; INTRAVENOUS at 06:33

## 2024-04-02 RX ADMIN — CALCIUM CHLORIDE, MAGNESIUM CHLORIDE, DEXTROSE MONOHYDRATE, LACTIC ACID, SODIUM CHLORIDE, SODIUM BICARBONATE AND POTASSIUM CHLORIDE: 5.15; 2.03; 22; 5.4; 6.46; 3.09; .157 INJECTION INTRAVENOUS at 07:09

## 2024-04-02 RX ADMIN — PHENYLEPHRINE HYDROCHLORIDE 80 MCG/MIN: 10 INJECTION INTRAVENOUS at 06:55

## 2024-04-02 RX ADMIN — LEVOTHYROXINE SODIUM 88 MCG: 0.09 TABLET ORAL at 06:33

## 2024-04-02 RX ADMIN — DEXTROSE MONOHYDRATE 125 ML: 100 INJECTION, SOLUTION INTRAVENOUS at 01:21

## 2024-04-02 RX ADMIN — NOREPINEPHRINE BITARTRATE 40 MCG/MIN: 0.06 INJECTION, SOLUTION INTRAVENOUS at 04:47

## 2024-04-02 RX ADMIN — DEXTROSE MONOHYDRATE 125 ML: 100 INJECTION, SOLUTION INTRAVENOUS at 04:52

## 2024-04-02 RX ADMIN — VASOPRESSIN 0.03 UNITS/MIN: 0.2 INJECTION INTRAVENOUS at 00:10

## 2024-04-02 RX ADMIN — DEXTROSE MONOHYDRATE: 100 INJECTION, SOLUTION INTRAVENOUS at 05:02

## 2024-04-02 RX ADMIN — CALCIUM CHLORIDE, MAGNESIUM CHLORIDE, DEXTROSE MONOHYDRATE, LACTIC ACID, SODIUM CHLORIDE, SODIUM BICARBONATE AND POTASSIUM CHLORIDE: 5.15; 2.03; 22; 5.4; 6.46; 3.09; .157 INJECTION INTRAVENOUS at 07:19

## 2024-04-02 RX ADMIN — CALCIUM CHLORIDE, MAGNESIUM CHLORIDE, DEXTROSE MONOHYDRATE, LACTIC ACID, SODIUM CHLORIDE, SODIUM BICARBONATE AND POTASSIUM CHLORIDE: 5.15; 2.03; 22; 5.4; 6.46; 3.09; .157 INJECTION INTRAVENOUS at 03:47

## 2024-04-02 RX ADMIN — CALCIUM CHLORIDE, MAGNESIUM CHLORIDE, DEXTROSE MONOHYDRATE, LACTIC ACID, SODIUM CHLORIDE, SODIUM BICARBONATE AND POTASSIUM CHLORIDE: 5.15; 2.03; 22; 5.4; 6.46; 3.09; .157 INJECTION INTRAVENOUS at 00:28

## 2024-04-02 RX ADMIN — ALBUMIN (HUMAN) 25 G: 0.25 INJECTION, SOLUTION INTRAVENOUS at 02:29

## 2024-04-02 NOTE — PROGRESS NOTES
I evaluated the patient, unresponsive, pulseless, no perceptible blood pressure, apneic.  Pronounced dead at 0915 hrs.    E Cordasco  [phone]

## 2024-04-02 NOTE — PROGRESS NOTES
Inpatient critical care progress note 4/2/2024        Lynette Gonsalves  1950  [id number]      Assessment/Plan:    Lynette Gonsalves is a 74 y.o. female      Acute respiratory failure with hypoxemia  Acute pulmonary edema, cardiogenic  Cardiorenal syndrome, underlying CKD stage IV  Acute on chronic systolic heart failure, EF 45%  Aortic stenosis prior TAVR  Moderate severe mitral regurgitation  Pulmonary hypertension right heart failure cardiogenic in etiology  High anion gap metabolic acidosis due to hypoperfusion secondary to cardiogenic etiology  Coronary artery disease prior CABG (2016)  Paroxysmal atrial fibrillation  Transaminitis, congestive hepatopathy  Hypothyroidism        Continue noninvasive ventilatory support  Continue hemodynamic support multiple pressors, CRRT per discretion of nephrology service    The patient is obviously not favorably responded to aggressive care, discussed with the  at bedside and I have requested that other family members visit with the patient.  Under the current circumstances since she has not responded to aggressive medical care, the  has agreed to transition medical measures to comfort care only and wishes to withdraw noninvasive ventilatory support, pressors and dialytic support upon arrival of other family members this morning.        Complex decisions required for evaluation and management reviewed during critical care rounds.    40 minutes of critical care time required for today's evaluation and management including time required for discussion of end-of-life measures.     E Cordasco  [phone]        Subjective:    Patient remains on noninvasive ventilatory support.  Progressive decline of hemodynamic status following initiation of CRRT overnight currently requires 3 pressor agents to maintain somewhat acceptable mean blood pressure.    The patient is not responsive (note low-dose Precedex infusion).    Anuric    Review of Systems     Unable to

## 2024-04-02 NOTE — DISCHARGE SUMMARY
Discharge/expiration summary    Patient pronounced dead by myself at 0915 hrs. 4/2/2024    74-year-old female admitted to the hospital on 4/1/2024 with progressive breathlessness, fatigue.  Patient has well-established systolic heart failure, developed progressive cardiorenal syndrome in [place] with worsening hypoxemia due to pulmonary edema.  The patient at the request of the primary service was transferred on 4/1/2024 to the critical care unit for a trial of renal replacement therapy.  The patient was maintained on noninvasive ventilatory support, appropriate for central line placed for initiation of CRRT per nephrology service.  Over the evening hours of 4 1 into the early morning hours of 4/2/2024, the patient required escalating pressor requirement (3 agents).  Decline of mental status was noted over the ensuing early morning hours as well.  Following discussion with family members particularly the spouse, he wished that all aggressive measures to be discontinued following arrival to family.  I subsequently reevaluated the patient and pronounced her dead as noted above.    AIDE Ramsay  [phone]

## 2024-04-02 NOTE — PROGRESS NOTES
04/02/24 1018   Encounter Summary   Encounter Overview/Reason  Follow-up   Service Provided For: Family   Referral/Consult From: Nurse   Support System Spouse;Children;Family members   Last Encounter  04/02/24  (Referral visit. Family wanted to discuss options and what they needs to do before leaving the hospital. This  answered their questions and provided guidance.)   Complexity of Encounter Low   Begin Time 1000   End Time  1020   Total Time Calculated 20 min   Crisis   Type Family Care   Grief, Loss, and Adjustments   Type Life Adjustments

## 2024-04-02 NOTE — CONSULTS
Consult Note 24        NAME: Lynette Gonsalves  : 1950  MRN: [id number]      Assessment/Plan:  Lynette Gonsalves is a 74 y.o. female     Acute respiratory failure with hypoxemia  Acute pulmonary edema, cardiogenic  Cardiorenal syndrome, underlying CKD stage IV  Acute on chronic systolic heart failure, EF 45%  Aortic stenosis prior TAVR  Moderate severe mitral regurgitation  Pulmonary hypertension right heart failure cardiogenic in etiology  High anion gap metabolic acidosis due to hypoperfusion secondary to cardiogenic etiology  Coronary artery disease prior CABG ()  Paroxysmal atrial fibrillation  Transaminitis, congestive hepatopathy  Hypothyroidism      Patient has failed trial of diuretic therapy and hence per discretion of nephrology service will be initiated on continuous renal replacement therapy.  Continuation of noninvasive ventilatory support  Hemodynamic support as necessary pressor agent  Ulcer, DVT prophylaxis      Per discussion with the patient as well as the spouse, CODE STATUS remains DNR CCA DNI.  The patient and the spouse both wish a trial of renal replacement therapy.    I note that the patient was just recently discharged this month following hospitalization for treatment of hypoxic respiratory failure secondary to congestive heart failure and a concurrent RSV respiratory infection.      Complex decisions required for evaluation and management reviewed during critical care rounds.    E [facility]  [phone]    Chief Complaint / Reason for Consult:    As above    History of Present Illness:    Transferred from monitored floor following admission earlier today for evaluation and treatment of hypoxemic respiratory failure.  Given perceived need for dialytic support/ultrafiltration (continuous renal replacement therapy in light of the patient's compromised hemodynamics), the patient was transferred to ICU setting.    ROS:    Review of Systems     Unable to obtain in light of the 
24 hour central line rounding on Trialysis  completed. No dressing change at this time due to CRRT. Dressing site is clean & dry. Patient continues to meet the following criteria for central vascular access: Hemodialysis    Consult the Vascular Access Team for questions, concerns, or change in patient's condition.   
Nephrology Service Consultation      [address], [address]  [place] [address]  Phone: [phone]  Office Hours: 8:30AM - 4:30PM  Monday - Friday        MEDICAL DECISION MAKING and Recommendations   -MARK from ATN likely due to cardiorenal state: cr 5 from 3 at the recent dc  -Hyponatremia from fluid overload and renal failure  -Anion gap metabolic acidosis  -Lactic acidosis  -Hyperkalemia  -Acute anemia  -Acute on chronic CHF  -Acute hypoxic resp failure    Suggest:  -CRRT initiation with fluid removal goal of up to net negative 150ml/hr if hemodynamic status allows  -Discussed with  Hiar, at bedside  -Critically ill/very guarded prognosis    Thank you        Patient Active Problem List    Diagnosis Date Noted    Former smoker 06/12/2016    Acute decompensated heart failure (HCC) 04/01/2024    Normocytic anemia 03/14/2024    Acute respiratory failure with hypoxia ([facility]) 03/07/2024    Overweight (BMI 25.0-29.9) 01/08/2024    CKD (chronic kidney disease), stage IV (HCC) 01/05/2024    Transaminitis 01/05/2024    Anemia 12/30/2023    History of GI bleed 12/29/2023    Chronic congestive heart failure (HCC) 12/26/2023    Idiopathic hypersomnia 10/31/2023    Hypoxia 09/19/2023    History of stroke 09/01/2023    Loss of vision 08/04/2023    S/P TAVR (transcatheter aortic valve replacement) 07/27/2023    Nonrheumatic aortic (valve) stenosis 07/26/2023    VHD (valvular heart disease) 07/19/2023    Acute on chronic respiratory failure with hypoxia ([facility]) 07/02/2023    Insomnia , Psychophysiological insomnia 06/23/2023    Chronic right-sided low back pain with right-sided sciatica 06/23/2023    Pacemaker 05/03/2023    Sick sinus syndrome ([facility]) 05/03/2023    Secondary hypercoagulable state ([facility]) 04/28/2023    A-fib, Longstanding persistent atrial fibrillation (HCC) 03/24/2023    Atherosclerosis of native coronary artery of native heart with stable angina pectoris ([facility]) 03/24/2023    Chronic systolic 
PHARMACY RENAL DOSING MONITORING SERVICE FOR CONTINUOUS RENAL REPLACEMENT THERAPY  Pharmacy consulted by Dr. Agarwal for monitoring and adjustment.    Patient is currently managed on CRRT:  CVVHD-F    BFR: 200 ml/min  Dialysate: 1500 ml/hr  Replacement fluid PRE-filter: 500 mL/hr  Replacement fluid POST- filter: 500 mL/hr  Fluid removal: net neg to 150ml/hr as BP allows  Effluent rate: <3L/hr    Pertinent Laboratory Values:   Recent Labs     04/01/24  0112 04/01/24  1018   K 4.9 5.2*   BUN 97* 103*   CREATININE 4.8* 5.0*     No intake/output data recorded.      Medication requiring adjustment for CRRT:  Cefepime adjusted to 2gm q12h    Medications requiring monitoring while on CRRT  Electrolytes  Lasix infusion ordered per  but held by Dr. Monsivais, reaching out to nephro to clarify.    Thank you for the consult.  Elizabeth Muñiz RPH  4/1/2024 1:49 PM   
Labs     04/01/24  0112   *   K 4.9   CL 81*   CO2 17*   BUN 97*   CREATININE 4.8*     Recent Labs     04/01/24 0112   *   *   BILITOT 2.4*   ALKPHOS 77     No results for input(s): \"TROPONINI\" in the last 72 hours.  Lab Results   Component Value Date     (H) 01/11/2014     (H) 01/10/2014     Lab Results   Component Value Date    INR 1.2 03/07/2024    PROTIME 15.3 (H) 03/07/2024         EKG:AFIB    Chest Xray: Chronic interstitial lung disease with pulm edema    ECHO: LVEF  Labs, echo, meds reviewed  Assessment: 74 y.o.year old with PMH of  has a past medical history of CAD (coronary artery disease), COPD (chronic obstructive pulmonary disease) ([facility]), GERD (gastroesophageal reflux disease), H/O cardiovascular stress test, H/O echocardiogram, H/O echocardiogram, H/O percutaneous left heart catheterization, H/O transesophageal echocardiography ([facility]) for monitoring, Heart attack ([facility]), History of transesophageal echocardiography ([facility]), Hyperlipidemia, Hypertension, On home oxygen therapy, PAD (peripheral artery disease) ([facility]), S/P right coronary artery (RCA) stent placement, Sleep apnea, T2 vertebral fracture ([facility]), TIA (transient ischemic attack), Tobacco dependence, Wears glasses, and Wears partial dentures.      Recommendations:    Severe respiratory distress, multifactorial, baseline her lung conditions are not healthy she is on home oxygen chest x-ray, is suggestive of possible advanced lung disease possible pulmonary fibrosis also from recurrent CHF, if he is on BiPAP she is not tolerating it, Lasix is not effective because of acute renal failure on top of CKD, case discussed with Dr. Payne recommended to have CRRT, she also have hyponatremia, patient is close recommended to transfer to ICU for CRRT she may need pressors before we start CRRT  CAD: Continue aspirin   PPM, stable  Paroxysmal afib: Continue amiodarone Eliquis  Dyslipidemia: continue statins  Hypotension she may need

## 2024-04-02 NOTE — PROGRESS NOTES
Multiple attempts made to educate pt spouse and family on transition from Bipap to NC for comfort care. Spouse insistent that bipap mask should be kept on. Wishes honored by this RN. Cande attempts at reducation will be made

## 2024-04-02 NOTE — PROGRESS NOTES
Dr. Agarwal at bedside - updated no longer able to pull fluid off - she will update orders as necessary

## 2024-04-02 NOTE — PROGRESS NOTES
Nephrology  Dialysis Note        [address], [address]  [place] [id number]  Phone: [phone]  Office Hours: 8:30AM - 4:30PM  Monday - Friday          PROCEDURE:  Patient seen during CVVHDF      PHYSICIAN:  MUNA      INDICATION:  Acute tubular necrosis      RX:  See dialysis flowsheet for specifics on access, blood flow rate, dialysate baths, duration of dialysis, anticoagulation and other technical information.      COMMENTS:    -SEEN ON CRRT  -UNABLE TO TAKE FLUID OFF DUE TO HEMODYNAMIC INSTABILITY  -LACTATE LEVEL RISING DESPITE BEING ON CRRT WHICH IS A BAD PROGNOSTIC FACTOR  -CONTINUE CRRT FOR NOW    Electronically signed by Ta Agarwal DO on 4/2/2024 at 7:17 AM    [facility]  MD JOE MARTINEZ DO  [address],  [address] [place] [address]  PHONE: [phone]  FAX: [phone]

## 2024-04-02 NOTE — PROGRESS NOTES
04/02/24 1000   Encounter Summary   Encounter Overview/Reason  Crisis   Service Provided For: Family   Referral/Consult From: Nurse   Support System Spouse;Family members   Last Encounter  04/02/24  (Spouse and 12 family members at bedside.  Spouse states, we have been  53 yers.  Meditation and prayer said at bedside with family members gathered. Spoke with RN  and informed family at bedside and will need some time and to call  if need)   Complexity of Encounter Moderate   Begin Time 0930   End Time  1000   Total Time Calculated 30 min   Crisis   Type Family Care  (Death)   Grief, Loss, and Adjustments   Type Life Adjustments;Death   Assessment/Intervention/Outcome   Assessment Anxious;Peaceful;Sad;Questioning meaning and purpose;Tearful;Interrupted family processes;Complicated grieving  (Family did not anticipate death. Twin sister and baby sister present.)   Intervention Active listening;Confronted/Challenged;Discussed belief system/Taoist practices/[place];Discussed death, afterlife;Discussed relationship with God;Explored/Affirmed feelings, thoughts, concerns;Explored Coping Skills/Resources;Grief Care;Nurtured Hope;Prayer (assurance of)/[place];Sustaining Presence/Ministry of presence   Outcome Comfort;Coping;Connection/Belonging;Engaged in conversation;Expressed feelings, needs, and concerns;Expressed Gratitude;Grieving;Peace;Receptive   Plan and Referrals   Plan/Referrals Continue Support (comment)  ( Lorena available as needed)

## 2024-04-02 NOTE — PROGRESS NOTES
Family has arrived at the bedside, initiating withdraw of medical measures as previously discussed with the spouse.  End-of-life orders placed in chart.    E [facility]  [phone]

## 2024-04-02 NOTE — PROGRESS NOTES
Physician Progress Note      PATIENT:               NAT SOLIS  CSN #:                  [id number]  :                       1950  ADMIT DATE:       2024 1:08 AM  DISCH DATE:        2024 1:46 PM  RESPONDING  PROVIDER #:        Boone Ramsay DO          QUERY TEXT:    Pt admitted with Acute on chronic Systolic heart failure. Pt noted to have 3   pressor agents given. If possible, please document in the progress notes and   discharge summary if you are evaluating and/or treating any of the following:    The medical record reflects the following:  Risk Factors: Acute on chronic Systolic heart failure  Clinical Indicators: Progressive decline of hemodynamic status following   initiation of CRRT overnight currently requires 3 pressor agents to maintain   somewhat acceptable mean blood pressure.  Treatment: Levophed, Vasopressin, Ortiz, Albumin    Thank you, Luz Nguyen RN, [facility] [phone]  Options provided:  -- Cardiogenic Shock  -- Septic Shock  -- Hypovolemic Shock  -- Other - I will add my own diagnosis  -- Disagree - Not applicable / Not valid  -- Disagree - Clinically unable to determine / Unknown  -- Refer to Clinical Documentation Reviewer    PROVIDER RESPONSE TEXT:    This patient is in cardiogenic shock.    Query created by: Luz Nguyen on 2024 1:51 PM      Electronically signed by:  Boone Ramsay DO 2024 2:29 PM

## 2024-04-02 NOTE — PLAN OF CARE
Problem: Discharge Planning  Goal: Discharge to home or other facility with appropriate resources  4/1/2024 2154 by Georgia Rodrigez RN  Outcome: Progressing  4/1/2024 1846 by Fariba Montiel RN  Outcome: Progressing     Problem: Safety - Adult  Goal: Free from fall injury  4/1/2024 2154 by Georgia Rodrigez RN  Outcome: Progressing  4/1/2024 1846 by Fariba Montiel RN  Outcome: Progressing     Problem: Pain  Goal: Verbalizes/displays adequate comfort level or baseline comfort level  4/1/2024 2154 by Georgia Rodrigez RN  Outcome: Progressing  4/1/2024 1846 by Fariba Montiel RN  Outcome: Progressing

## 2024-04-02 NOTE — PROGRESS NOTES
Dr. Ramsay at bedside to discuss prognosis and plan of care with family. Decision made to withdraw care at this time.

## 2024-04-02 NOTE — PROGRESS NOTES
04/02/24 0954   Encounter Summary   Encounter Overview/Reason  Crisis;Grief, Loss, and Adjustments   Service Provided For: Family   Last Encounter  04/02/24  (Referral visit. This  provided bereavement pastoral care to some family in the 2nd floor conference room while Rev. Ratliff was comforting family in the room.)   Begin Time 0945   End Time  1000   Total Time Calculated 15 min   Crisis   Type Family Care   Grief, Loss, and Adjustments   Type Death;Bereavement Care   Plan and Referrals   Plan/Referrals Continue Support (comment)

## 2024-04-03 LAB
EKG ATRIAL RATE: 72 BPM
EKG DIAGNOSIS: NORMAL
EKG Q-T INTERVAL: 550 MS
EKG QRS DURATION: 162 MS
EKG QTC CALCULATION (BAZETT): 597 MS
EKG R AXIS: -42 DEGREES
EKG T AXIS: 94 DEGREES
EKG VENTRICULAR RATE: 71 BPM
HBV CORE AB SERPL QL IA: NEGATIVE

## 2024-04-03 PROCEDURE — 93010 ELECTROCARDIOGRAM REPORT: CPT | Performed by: INTERNAL MEDICINE

## 2024-04-04 ENCOUNTER — CARE COORDINATION (OUTPATIENT)
Dept: CARE COORDINATION | Age: 74
End: 2024-04-04

## 2024-04-04 ENCOUNTER — CARE COORDINATION (OUTPATIENT)
Dept: PRIMARY CARE CLINIC | Age: 74
End: 2024-04-04

## 2024-04-04 DIAGNOSIS — I50.9 CHRONIC CONGESTIVE HEART FAILURE, UNSPECIFIED HEART FAILURE TYPE (HCC): Primary | ICD-10-CM

## 2024-04-04 DIAGNOSIS — N18.4 CKD (CHRONIC KIDNEY DISEASE), STAGE IV (HCC): ICD-10-CM

## 2024-04-04 NOTE — CARE COORDINATION
Patient Lynette Gonsalves  04/04/24     Care Coordination  placed call to patient to arrange RPM kit  through [employer]. Left HIPAA Compliant Message     provided return and how to pack equipment in original packing via the patients voicemail if available and provided call back number should patient have questions.    Patient made aware [employer] will  equipment in 2-4 days.

## 2024-04-04 NOTE — CARE COORDINATION
Received notification from RPM staff that patient is . ACM will resolve Care Management episode at this time and place DC order for RPM.

## 2024-04-04 NOTE — PROGRESS NOTES
Remote Patient Order Discontinued    Received request from Georgia Tate RN   to discontinue order for remote patient monitoring of Kidney Disease  and CHF and order completed.

## 2024-04-06 LAB
CULTURE: NORMAL
CULTURE: NORMAL
Lab: NORMAL
Lab: NORMAL
SPECIMEN: NORMAL
SPECIMEN: NORMAL

## 2024-04-17 NOTE — ED NOTES
Pt denies urge to void. Offered ambualtion, bedpan, and Muna wick.       Tonya Wyatt RN  06/28/21 2129 4 = No assist / stand by assistance

## 2024-05-23 NOTE — PROGRESS NOTES
[facility] Medicine Services  Discharge Summary    Date of Service: 2024  Patient Name: Almas Joseph  : 1943  MRN: [id number]    Date of Admission: 2024  Discharge Diagnosis: Presyncope, urinary tract infection, bacteremia, sepsis  Date of Discharge: 2024  Primary Care Physician: Provider, No Known      Presenting Problem:   Pre-syncope [R55]  Urinary tract infection with hematuria, site unspecified [N39.0, R31.9]  Syncope, unspecified syncope type [R55]  Sepsis, due to unspecified organism, unspecified whether acute organ dysfunction present [A41.9]    Active and Resolved Hospital Problems:  Active Hospital Problems    Diagnosis POA    **Pre-syncope [R55] Yes      Resolved Hospital Problems   No resolved problems to display.         Hospital Course       HPI:  Per the H&P     Hospital Course:  Patient was originally admitted after arriving to the ED with complaints of syncopal episode status post fall.  Patient was found to have a urinary tract infection in the ED, urinalysis was obtained and urine culture was sent.  Blood cultures were obtained.  Patient had significant leukocytosis in the ED.  Patient was started on IV antibiotics, IV fluids and monitored.  Patient had significant back pain status post fall, x-rays were obtained which were negative for acute findings.  Blood cultures returned positive for Klebsiella pneumoniae, infectious disease was consulted.  Urine culture was positive for Klebsiella pneumonia as well.  Patient leukocytosis improved over time and at this time is normal.  CT of the abdomen was ordered which showed mild subsegmental atelectasis in both lungs, no dense lung consolidation.  Trace bibasilar pleural fluid.  Due to patient's extensive history regarding recurrent urinary tract infections, urology was consulted.  Patient is to continue on Flomax which has been sent to his pharmacy and follow-up with urology outpatient.  Throughout hospital  Progress Note( Dr. Scarlet Madison)  6/30/2021  Subjective:   Admit Date: 6/28/2021  PCP: No primary care provider on file. Admitted For [de-identified]Altered mental state     Consulted For: Hypothyroidism thyroxine dose    Interval History: Feels okay    Denies any chest pains,   Denies SOB . Denies nausea or vomiting. No new bowel or bladder symptoms.        Intake/Output Summary (Last 24 hours) at 6/30/2021 0543  Last data filed at 6/29/2021 1904  Gross per 24 hour   Intake --   Output 900 ml   Net -900 ml       DATA    CBC:   Recent Labs     06/28/21  0400 06/29/21  0343   WBC 12.6* 10.6*   HGB 13.2 11.8*    149    CMP:  Recent Labs     06/28/21  0400 06/29/21  0343   * 138   K 4.6 3.7   CL 97* 99   CO2 28 32   BUN 24* 28*   CREATININE 1.0 1.3*   CALCIUM 9.9 9.2   PROT 7.0  --    LABALBU 3.7  --    BILITOT 1.1*  --    ALKPHOS 71  --    AST 36  --    ALT 18  --      Lipids:   Lab Results   Component Value Date    CHOL 121 03/15/2021    HDL 45 03/15/2021    TRIG 86 03/15/2021     Glucose:  Recent Labs     06/28/21  0346   POCGLU 165*     StnyytexmsQ4A:  Lab Results   Component Value Date    LABA1C 5.0 06/18/2016     High Sensitivity TSH:   Lab Results   Component Value Date    TSHHS 0.052 06/28/2021     Free T3: No results found for: FT3  Free T4:  Lab Results   Component Value Date    T4FREE 1.93 06/29/2021       Echocardiogram complete 2D with doppler with color    Result Date: 6/29/2021  Transthoracic Echocardiography Report (TTE)  Demographics   Patient Name       Norma Ball  Date of Study       06/29/2021   Date of Birth      1950        Gender              Female   Age                70 year(s)        Race                   Patient Number     [id number]        Room Number         2027   Visit Number       [id number]   Corporate ID       [id number]   Accession Number   [id number]        [id number]         HBIERSTQ VSYGA                                                           GXZF, RVT Ordering Physician Aries Sanchez MD Interpreting        Mapleton Rothman                                       Physician           Sammi Antunez MD  Procedure Type of Study   TTE procedure:ECHOCARDIOGRAM COMPLETE 2D W DOPPLER W COLOR. Procedure Date Date: 2021 Start: 12:11 PM Study Location: Portable Technical Quality: Fair visualization Indications:Congestive heart failure. Patient Status: Routine Height: 63 inches Weight: 189 pounds BSA: 1.89 m2 BMI: 33.48 kg/m2 HR: 82 bpm BP: 156/73 mmHg  Conclusions   Summary  Left ventricular systolic function is hyperdynamic. Ejection fraction is visually estimated at 60%. Grade III diastolic dysfunction. Moderately dilated left atrium. Moderate aortic stenosis; mean gradient: 24 mmHg, JENNA: 1.2 cm sq. 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 No evidence of any pericardial effusion. Signature   ------------------------------------------------------------------  Electronically signed by Chano Chaudhry MD  (Interpreting physician) on 2021 at 02:48 PM  ------------------------------------------------------------------   Findings   Left Ventricle  Left ventricular systolic function is hyperdynamic. Ejection fraction is visually estimated at 60%. Grade III diastolic dysfunction. Left Atrium  Moderately dilated left atrium. Right Atrium  Essentially normal right atrium. Right Ventricle  Essentially normal right ventricle. Aortic Valve  Moderate aortic stenosis; mean gradient: 24 mmHg, JENNA: 1.2 cm sq. 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 Tricuspid Valve  Tricuspid valve is structurally normal.   Pulmonic Valve  The pulmonic valve was not well visualized. Pericardial Effusion  No evidence of any pericardial effusion. Pleural Effusion  No evidence of pleural effusion.   M-Mode/2D Measurements & Calculations   LV Diastolic Dimension: stay patient did develop sepsis, lactic acid was monitored and has trended to normal.  Patient's vital signs have stabilized and has been afebrile x 24 hours.  PT/OT have evaluated patient and are recommending patient go home with home health however family is declining at this time.  Case discussed with infectious disease nurse practitioner and patient has been transitioned to oral levofloxacin for discharge.      Patient is medically stable for discharge at this time with outpatient follow-up.        DISCHARGE Follow Up Recommendations for labs and diagnostics: PCP in 1 to 2 weeks, urology Dr. Romero in 1 week      Reasons For Change In Medications and Indications for New Medications:  Levaquin for Klebsiella pneumoniae urinary tract infection/bacteremia      Day of Discharge     Vital Signs:  Temp:  [97.2 °F (36.2 °C)-98.8 °F (37.1 °C)] 97.8 °F (36.6 °C)  Heart Rate:  [80-91] 81  Resp:  [12-18] 16  BP: (136-155)/(54-80) 151/75    Physical Exam:  Physical Exam  Constitutional:       Appearance: Normal appearance. He is not diaphoretic.   HENT:      Head: Normocephalic and atraumatic.      Nose: Nose normal.      Mouth/Throat:      Mouth: Mucous membranes are moist.      Pharynx: Oropharynx is clear.   Eyes:      Extraocular Movements: Extraocular movements intact.      Conjunctiva/sclera: Conjunctivae normal.      Pupils: Pupils are equal, round, and reactive to light.   Cardiovascular:      Rate and Rhythm: Normal rate and regular rhythm.      Pulses: Normal pulses.      Heart sounds: Normal heart sounds.   Pulmonary:      Effort: Pulmonary effort is normal.      Breath sounds: Normal breath sounds.   Abdominal:      General: Abdomen is flat.      Palpations: Abdomen is soft.   Musculoskeletal:         General: Normal range of motion.      Cervical back: Normal range of motion and neck supple.   Skin:     General: Skin is warm and dry.   Neurological:      General: No focal deficit present.      Mental Status:  He is alert and oriented to person, place, and time. Mental status is at baseline.   Psychiatric:         Mood and Affect: Mood normal.         Behavior: Behavior normal.         Thought Content: Thought content normal.         Judgment: Judgment normal.            Pertinent  and/or Most Recent Results     LAB RESULTS:      Lab 05/23/24  0125 05/22/24  1226 05/22/24  0310 05/21/24  1817 05/21/24  1512 05/21/24  0424 05/20/24  1631 05/20/24  1233 05/20/24  1033   WBC 9.99  --  12.96*  --   --  19.09*  --   --  19.60*   HEMOGLOBIN 11.7*  --  11.1*  --   --  12.4*  --   --  13.4   HEMATOCRIT 36.6*  --  35.6*  --   --  38.7  --   --  42.5   PLATELETS 164  --  145  --   --  164  --   --  191   NEUTROS ABS 6.75  --   --   --   --  15.56*  --   --  16.32*   IMMATURE GRANS (ABS) 0.06*  --   --   --   --  0.17*  --   --  0.20*   LYMPHS ABS 2.22  --   --   --   --  1.90  --   --  1.55   MONOS ABS 0.89  --   --   --   --  1.42*  --   --  1.47*   EOS ABS 0.04  --   --   --   --  0.00  --   --  0.01   MCV 84.7  --  85.0  --   --  83.9  --   --  86.2   LACTATE  --  1.7  --  1.4 2.4*  --  1.3 1.4  --          Lab 05/23/24  0125 05/22/24  0310 05/21/24  1512 05/21/24  0424 05/20/24  1033   SODIUM 136 136  --  138 138   POTASSIUM 3.5 3.4* 4.1 3.5 4.1   CHLORIDE 103 102  --  103 101   CO2 22.9 22.0  --  23.0 24.0   ANION GAP 10.1 12.0  --  12.0 13.0   BUN 10 13  --  13 16   CREATININE 0.82 0.82  --  0.89 0.98   EGFR 88.3 88.3  --  86.1 77.5   GLUCOSE 139* 173*  --  169* 180*   CALCIUM 8.5* 8.1*  --  8.7 9.4   HEMOGLOBIN A1C  --  6.69*  --   --   --          Lab 05/20/24  1033   TOTAL PROTEIN 7.8   ALBUMIN 4.3   GLOBULIN 3.5   ALT (SGPT) 20   AST (SGOT) 23   BILIRUBIN 0.7   ALK PHOS 86         Lab 05/20/24  1033   HSTROP T 11                 Brief Urine Lab Results  (Last result in the past 365 days)        Color   Clarity   Blood   Leuk Est   Nitrite   Protein   CREAT   Urine HCG        05/20/24 1139 Yellow   Hazy   Moderate (2+)    LV Systolic Dimension:  LA Dimension: 5.3 cmAO Root  4.62 cm                  2.44 cm                 Dimension: 3.1 cmLA Area:  LV FS:47.2 %             LV Volume Diastolic: 85 11.0 cm2  LV PW Diastolic: 3.63 cm ml  LV PW Systolic: 6.04 cm  LV Volume Systolic: 27  Septum Diastolic: 2.50   ml  cm                       LV EDV/LV EDV Index: 85 RV Diastolic Dimension:  Septum Systolic: 3.04 cm XV/36 C9GJ ESV/LV ESV   2.52 cm  CO: 6.6 l/min            Index: 27 ml/14 m2  CI: 3.49 l/m*m2          EF Calculated (A4C):    LA/Aorta: 1.71                           68.2 %  LV Area Diastolic: 38.4  EF Calculated (2D):     LA volume/Index: 74 ml  cm2                      78.6 %                  /55K9  LV Area Systolic: 67.6  cm2                      LV Length: 8.48 cm                            LVOT: 1.9 cm  Doppler Measurements & Calculations   MV Peak E-Wave: 169 cm/s  AV Peak Velocity: 306 cm/s   LVOT Peak Velocity:  MV Peak A-Wave: 85.7 cm/s AV Peak Gradient: 37.45 mmHg 97.1 cm/s  MV E/A Ratio: 1.97        AV Mean Velocity: 234 cm/s   LVOT Mean Velocity:  MV Peak Gradient: 11.42   AV Mean Gradient: 24 mmHg    66.2 cm/s  mmHg                      AV VTI: 66.8 cm              LVOT Peak Gradient: 9  MV Mean Gradient: 6 mmHg  AV Area (Continuity):1.2 cm2 mmHgLVOT Mean  MV Mean Velocity: 115                                  Gradient: 2 mmHg  cm/s                      LVOT VTI: 28.4 cm  MV P1/2t: 110 msec  MVA by PHT:2 cm2  MV Area (continuity):  1.56 cm2  MV E' Septal Velocity:  5.37 cm/s  MV E' Lateral Velocity:  6.03 cm/s  MV E/E' septal: 31.47  MV E/E' lateral: 28.03      CT Head WO Contrast    Result Date: 6/28/2021  EXAMINATION: CT OF THE HEAD WITHOUT CONTRAST  6/28/2021 4:36 am TECHNIQUE: CT of the head was performed without the administration of intravenous contrast. Dose modulation, iterative reconstruction, and/or weight based adjustment of the mA/kV was utilized to reduce the radiation dose to as low as reasonably Moderate (2+)   Positive   30 mg/dL (1+)                 Microbiology Results (last 10 days)       Procedure Component Value - Date/Time    Blood Culture - Blood, Arm, Right [[id number]]  (Abnormal)  (Susceptibility) Collected: 05/20/24 1249    Lab Status: Final result Specimen: Blood from Arm, Right Updated: 05/23/24 0642     Blood Culture Klebsiella pneumoniae ssp pneumoniae     Isolated from Aerobic Bottle     Gram Stain Aerobic Bottle Gram negative bacilli    Narrative:      Less than seven (7) mL's of blood was collected.  Insufficient quantity may yield false negative results.    Susceptibility        Klebsiella pneumoniae ssp pneumoniae      SUKHWINDER      Amoxicillin + Clavulanate Susceptible      Ampicillin Resistant      Ampicillin + Sulbactam Susceptible      Cefepime Susceptible      Ceftazidime Susceptible      Ceftriaxone Susceptible      Gentamicin Susceptible      Levofloxacin Susceptible      Piperacillin + Tazobactam Susceptible      Trimethoprim + Sulfamethoxazole Susceptible                       Susceptibility Comments       Klebsiella pneumoniae ssp pneumoniae    Cefazolin sensitivity will not be reported for Enterobacteriaceae in non-urine isolates. If cefazolin is preferred, please call the microbiology lab to request an E-test.  With the exception of urinary-sourced infections, aminoglycosides should not be used as monotherapy.               Blood Culture ID, PCR - Blood, Arm, Right [[id number]]  (Abnormal) Collected: 05/20/24 1249    Lab Status: Final result Specimen: Blood from Arm, Right Updated: 05/21/24 0808     BCID, PCR Klebsiella pneumoniae group. Identification by BCID2 PCR.     BOTTLE TYPE Aerobic Bottle    Narrative:      No resistance genes detected.    Blood Culture - Blood, Arm, Left [[id number]]  (Abnormal) Collected: 05/20/24 1228    Lab Status: Final result Specimen: Blood from Arm, Left Updated: 05/23/24 0642     Blood Culture Klebsiella pneumoniae ssp pneumoniae     Isolated from  achievable. COMPARISON: CT of the head dated May 16, 2020. HISTORY: ORDERING SYSTEM PROVIDED HISTORY: AMS TECHNOLOGIST PROVIDED HISTORY: Reason for exam:->AMS Has a \"code stroke\" or \"stroke alert\" been called? ->No Decision Support Exception - unselect if not a suspected or confirmed emergency medical condition->Emergency Medical Condition (MA) Reason for Exam: ams FINDINGS: BRAIN/VENTRICLES: There is no acute intracranial hemorrhage, mass effect or midline shift. No abnormal extra-axial fluid collection. The gray-white differentiation is maintained without evidence of an acute infarct. There is no evidence of hydrocephalus. Generalized atrophy is noted with chronic microvascular ischemic changes. Remote lacunar infarcts are seen within the bilateral basal ganglia. ORBITS: The visualized portion of the orbits demonstrate no acute abnormality. SINUSES: Minimal right mastoid effusion is noted. SOFT TISSUES/SKULL:  No acute abnormality of the visualized skull or soft tissues. No acute intracranial abnormality. Minimal right mastoid effusion. No significant change. US HEAD NECK SOFT TISSUE THYROID    Heterogeneous echogenicity of the thyroid gland without evidence of a discrete nodule. CT ABDOMEN PELVIS W IV CONTRAST Additional Contrast? None    Result Date: 6/28/2021  EXAMINATION: CT OF THE ABDOMEN AND PELVIS WITH CONTRAST 6/28/2021 4:35 am TECHNIQUE: CT of the abdomen and pelvis was performed with the administration of intravenous contrast. Multiplanar reformatted images are provided for review. Dose modulation, iterative reconstruction, and/or weight based adjustment of the mA/kV was utilized to reduce the radiation dose to as low as reasonably achievable.  COMPARISON: None HISTORY: ORDERING SYSTEM PROVIDED HISTORY: AMS, Recent Surgery TECHNOLOGIST PROVIDED HISTORY: Additional Contrast?->None Reason for exam:->AMS, Recent Surgery Decision Support Exception - unselect if not a suspected or confirmed Aerobic Bottle     Gram Stain Aerobic Bottle Gram negative bacilli    Narrative:      Refer to previous blood culture collected on 05/20/2024 1249 for MICs.    Urine Culture - Urine, Urine, Clean Catch [[id number]]  (Abnormal)  (Susceptibility) Collected: 05/20/24 1139    Lab Status: Final result Specimen: Urine, Clean Catch Updated: 05/22/24 0935     Urine Culture >100,000 CFU/mL Klebsiella pneumoniae ssp pneumoniae    Narrative:      Colonization of the urinary tract without infection is common. Treatment is discouraged unless the patient is symptomatic, pregnant, or undergoing an invasive urologic procedure.    Susceptibility        Klebsiella pneumoniae ssp pneumoniae      SUKHWINDER      Amoxicillin + Clavulanate Susceptible      Ampicillin Resistant      Ampicillin + Sulbactam Susceptible      Cefazolin Susceptible      Cefepime Susceptible      Ceftazidime Susceptible      Ceftriaxone Susceptible      Gentamicin Susceptible      Levofloxacin Susceptible      Nitrofurantoin Intermediate      Piperacillin + Tazobactam Susceptible      Trimethoprim + Sulfamethoxazole Susceptible                                   CT Abdomen Pelvis Without Contrast    Result Date: 5/21/2024  Impression: Impression: 1. Mild subsegmental atelectasis is present in both lungs. No dense lung consolidation. Trace bibasilar pleural fluid. 2. Coronary artery calcifications. Correlate with cardiac history. 3. No acute osseous abnormality. Electronically Signed: Lita Watters MD  5/21/2024 12:41 PM EDT  Workstation ID: [id number]    CT Chest Without Contrast Diagnostic    Result Date: 5/21/2024  Impression: Impression: 1. Mild subsegmental atelectasis is present in both lungs. No dense lung consolidation. Trace bibasilar pleural fluid. 2. Coronary artery calcifications. Correlate with cardiac history. 3. No acute osseous abnormality. Electronically Signed: Lita Watters MD  5/21/2024 12:41 PM EDT  Workstation ID: [id number]    XR Spine Thoracic 3  emergency medical condition->Emergency Medical Condition (MA) Reason for Exam: recent surgery/ams/sob FINDINGS: Lower Chest: Small bilateral pleural effusions with bibasilar atelectasis. Organs: The liver, gallbladder, spleen, pancreas, adrenal glands, and kidneys are without acute findings. Both kidneys are scarred and atrophic. There is no hydronephrosis. GI/Bowel: There is no evidence of bowel obstruction. Colonic diverticulosis is seen. Pelvis: A tiny focal collection of gas is seen within the bladder lumen, likely related to recent catheter placement. There is no free fluid within the pelvis. Peritoneum/Retroperitoneum: Postsurgical changes are seen from recent ventral hernia repair. There has been interval development of a 5.7 x 9.0 cm well-defined fluid collection within the subcutaneous tissues of the anterior abdominal wall at the surgical site extending to the midline with collections of gas. Findings could be related to a postoperative seroma or chronic hematoma. Infected fluid collection cannot be excluded. There is no evidence of free air. Postsurgical changes are seen from aorto bi-iliac artery bypass graft. Bones/Soft Tissues: Minimal foci of gas are seen within the lower abdominal wall within the subcutaneous tissues and along the left side of the chest, likely related to recent surgery. Postsurgical changes are seen from recent ventral hernia repair. There has been interval development of a 5.7 x 9.0 cm well-defined fluid collection within the subcutaneous tissues of the anterior abdominal wall at the surgical site extending to the midline with collections of gas. Findings could be related to a postoperative seroma or chronic hematoma. Infected fluid collection cannot be excluded.      CTA PULMONARY W CONTRAST    Result Date: 6/28/2021  EXAMINATION: CTA OF THE CHEST 6/28/2021 4:36 am TECHNIQUE: CTA of the chest was performed after the administration of intravenous contrast.  Multiplanar View    Result Date: 5/20/2024  Impression: Impression: 1. No acute findings within the thoracic or lumbar spine. 2. Advanced L4-5 and moderate L5-S1 diminished disc space height, along with anterior and posterior osteophyte formation. 3. Moderate anterolateral bridging osteophyte formation is demonstrated within the mid to lower thoracic spine. Electronically Signed: Lita Watters MD  5/20/2024 11:09 AM EDT  Workstation ID: [id number]    XR Spine Lumbar Complete 4+VW    Result Date: 5/20/2024  Impression: Impression: 1. No acute findings within the thoracic or lumbar spine. 2. Advanced L4-5 and moderate L5-S1 diminished disc space height, along with anterior and posterior osteophyte formation. 3. Moderate anterolateral bridging osteophyte formation is demonstrated within the mid to lower thoracic spine. Electronically Signed: Lita Watters MD  5/20/2024 11:09 AM EDT  Workstation ID: [id number]                 Labs Pending at Discharge:      Procedures Performed           Consults:   Consults       Date and Time Order Name Status Description    5/21/2024 11:59 AM Inpatient Urology Consult Completed     5/21/2024  8:34 AM Inpatient Infectious Diseases Consult Completed     5/20/2024  1:21 PM Hospitalist (on-call MD unless specified)                Discharge Details        Discharge Medications        New Medications        Instructions Start Date   levoFLOXacin 750 MG tablet  Commonly known as: LEVAQUIN   750 mg, Oral, Every 24 Hours   Start Date: May 24, 2024            Continue These Medications        Instructions Start Date   amLODIPine 5 MG tablet  Commonly known as: NORVASC   2.5 mg, Oral, Nightly      atorvastatin 20 MG tablet  Commonly known as: LIPITOR   20 mg, Oral, Nightly      cholecalciferol 25 MCG (1000 UT) tablet  Commonly known as: VITAMIN D3   1,000 Units, Oral, Nightly      K2 PO   1 tablet, Oral, Nightly      losartan 50 MG tablet  Commonly known as: COZAAR   25 mg, Oral, Nightly      magnesium oxide  reformatted images are provided for review. MIP images are provided for review. Dose modulation, iterative reconstruction, and/or weight based adjustment of the mA/kV was utilized to reduce the radiation dose to as low as reasonably achievable. COMPARISON: None HISTORY: ORDERING SYSTEM PROVIDED HISTORY: AMS TECHNOLOGIST PROVIDED HISTORY: Reason for exam:->AMS Decision Support Exception - unselect if not a suspected or confirmed emergency medical condition->Emergency Medical Condition (MA) Reason for Exam: sob FINDINGS: Pulmonary Arteries: Pulmonary arteries are adequately opacified for evaluation. No evidence of intraluminal filling defect to suggest pulmonary embolism. Main pulmonary artery is normal in caliber. Mediastinum: No evidence of mediastinal lymphadenopathy. The heart and pericardium demonstrate no acute abnormality. There is no acute abnormality of the thoracic aorta. Median sternotomy wires are noted. Cardiomegaly is seen. Lungs/pleura: Evaluation of the lung parenchyma is limited due to motion artifact. Bilateral lower lobe consolidations are seen which could be related to atelectasis. Small bilateral pleural effusions are seen. Diffuse septal thickening is seen which could be related to pulmonary vascular congestion. There is no evidence of a pneumothorax. Upper Abdomen: Please see the CT abdomen and pelvis report from the same date for detailed evaluation of the upper abdomen. Soft Tissues/Bones: Minimal subcutaneous emphysematous changes are seen along the left chest wall. 1. Evaluation is limited due to motion artifact. No large or central pulmonary emboli are identified. Smaller more peripheral pulmonary emboli cannot be entirely excluded due to motion artifact. 2. Cardiomegaly with diffuse septal thickening and small bilateral pleural effusions with bibasilar atelectasis. Findings are likely related to mild interstitial pulmonary edema.  3. Subcutaneous emphysematous changes are 400 MG tablet  Commonly known as: MAG-OX   400 mg, Oral, Nightly      tamsulosin 0.4 MG capsule 24 hr capsule  Commonly known as: FLOMAX   0.4 mg, Oral, Daily               Allergies   Allergen Reactions    Bactrim [Sulfamethoxazole-Trimethoprim] Hallucinations    Ciprofloxacin Myalgia    Nsaids Other (See Comments)     Had blood pressure issues when taking         Discharge Disposition:   Home or Self Care    Diet:  Hospital:  Diet Order   Procedures    Diet: Cardiac; Healthy Heart (2-3 Na+); Fluid Consistency: Thin (IDDSI 0)         Discharge Activity:         CODE STATUS:  Code Status and Medical Interventions:   Ordered at: 05/20/24 1346     Level Of Support Discussed With:    Patient     Code Status (Patient has no pulse and is not breathing):    CPR (Attempt to Resuscitate)     Medical Interventions (Patient has pulse or is breathing):    Full Support         No future appointments.    Additional Instructions for the Follow-ups that You Need to Schedule       Discharge Follow-up with PCP   As directed       Currently Documented PCP:    Provider, No Known    PCP Phone Number:    None     Follow Up Details: 1-2 weeks        Discharge Follow-up with Specified Provider: Dr. Romero; 1 Week   As directed      To: Dr. Romero   Follow Up: 1 Week                Time spent on Discharge including face to face service:  >30 minutes    Signature: Electronically signed by MICHELE Rivers, 05/23/24, 13:01 EDT.  [facility]    seen along the left chest wall. Scheduled Medicines   Medications:    furosemide  10 mg Oral Daily    sodium chloride flush  5-40 mL Intravenous BID    [Held by provider] apixaban  5 mg Oral BID    atorvastatin  40 mg Oral Nightly    budesonide-formoterol  2 puff Inhalation BID    calcium-cholecalciferol  1 tablet Oral Daily    carvedilol  25 mg Oral BID    [Held by provider] cilostazol  50 mg Oral BID AC    escitalopram  20 mg Oral Daily    [Held by provider] levothyroxine  100 mcg Oral Daily    lisinopril  40 mg Oral Daily    therapeutic multivitamin-minerals  1 tablet Oral Daily    pantoprazole  40 mg Oral Daily    senna  1 tablet Oral BID    sodium chloride flush  5-40 mL Intravenous 2 times per day    nicotine  1 patch Transdermal Daily    amLODIPine  10 mg Oral Daily      Infusions:    sodium chloride           Objective:   Vitals: BP (!) 147/71   Pulse 80   Temp 98.3 °F (36.8 °C) (Oral)   Resp 16   Ht 5' 3\" (1.6 m)   Wt 189 lb 14.4 oz (86.1 kg)   SpO2 92%   BMI 33.64 kg/m²   General appearance: alert and cooperative with exam  Neck: no JVD or bruit  Thyroid : Normal lobes   Lungs: Has Vesicular Breath sounds   Heart:  regular rate and rhythm  Abdomen: soft, non-tender; bowel sounds normal; no masses,  no organomegaly  Musculoskeletal: Normal  Extremities: extremities normal, , no edema  Neurologic:  Awake, alert, oriented to name, place and time. Cranial nerves II-XII are grossly intact. Motor is  intact. Sensory is intact. ,  and gait is normal.    Assessment:     Patient Active Problem List:     ASCVD (arteriosclerotic cardiovascular disease)     Hypertension     Hyperlipidemia     Hypothyroid     PAD (peripheral artery disease) ([facility])     S/P right coronary artery (RCA) stent placement     Acute respiratory failure with hypoxia and hypercapnia ([facility])     Tobacco abuse     MI (myocardial infarction) ([facility] [address]     CHF following cardiac surgery, postop     Gait disturbance     ST elevation myocardial infarction (STEMI) ([facility])     Rheumatic mitral stenosis     S/P mitral valve replacement     Cardiomyopathy, primary ([facility] [address]     Anticoagulated on Coumadin     On home O2     Depression with anxiety     Urinary incontinence     Age-related osteoporosis without current pathological fracture     EDITA (obstructive sleep apnea)     Hypersomnia     SOB (shortness of breath) on exertion     Obesity (BMI 30-39. 9)     Chronic respiratory failure (HCC)     Idiopathic hypersomnia     Hernia of anterior abdominal wall     Incisional hernia of anterior abdominal wall without obstruction or gangrene     Altered mental status      Plan:     1. Reviewed POC blood glucose . Labs and X ray results   2. Reviewed Current Medicines   3. Will still hold her Synthroid  4. Will restart on the lower dose in the next day or 2  5. Will follow     .      Nusrat Blackburn MD, MD

## (undated) DEVICE — PACK SURG LAP CHOLE

## (undated) DEVICE — NEEDLE HYPO 20GA L1.5IN YEL POLYPR HUB S STL REG BVL STR

## (undated) DEVICE — SUTURE PERMAHAND SZ 2-0 L17X18IN NONABSORBABLE BLK SILK SA65H

## (undated) DEVICE — TOTAL TRAY, DB, 100% SILI FOLEY, 16FR 10: Brand: MEDLINE

## (undated) DEVICE — POSITIONER,HEAD,RING CUSHION,9IN,32CS: Brand: MEDLINE

## (undated) DEVICE — BLADELESS OBTURATOR: Brand: WECK VISTA

## (undated) DEVICE — SPONGE LAP W18XL18IN WHT COT 4 PLY FLD STRUNG RADPQ DISP ST 2 PER PACK

## (undated) DEVICE — TUBING FLTR PLUME AWAY EVAC W/ SUCT DEV DISP PUREVIEW

## (undated) DEVICE — BLADE CLIPPER GEN PURP NS

## (undated) DEVICE — SYRINGE IRRIG 60ML SFT PLIABLE BLB EZ TO GRP 1 HND USE W/

## (undated) DEVICE — SUTURE VCRL SZ 4-0 L18IN ABSRB UD L19MM PS-2 3/8 CIR PRIM J496H

## (undated) DEVICE — SNARE VASC L240CM LOOP W10MM SHTH DIA2.4MM RND STIFF CLD

## (undated) DEVICE — YANKAUER,BULB TIP,W/O VENT,RIGID,STERILE: Brand: MEDLINE

## (undated) DEVICE — INTENDED FOR TISSUE SEPARATION, AND OTHER PROCEDURES THAT REQUIRE A SHARP SURGICAL BLADE TO PUNCTURE OR CUT.: Brand: BARD-PARKER ® STAINLESS STEEL BLADES

## (undated) DEVICE — TUBING, SUCTION, 9/32" X 10', STRAIGHT: Brand: MEDLINE

## (undated) DEVICE — SUTURE VCRL SZ 3-0 L36IN ABSRB UD L36MM CT-1 1/2 CIR J944H

## (undated) DEVICE — SUTURE VCRL SZ 0 L27IN ABSRB VLT L36MM CT-1 1/2 CIR J340H

## (undated) DEVICE — COLUMN DRAPE

## (undated) DEVICE — SUTURE STRATAFIX SYMMETRIC SZ 1 L18IN ABSRB VLT CT1 L36CM SXPP1A404

## (undated) DEVICE — PROTECTOR EYE PT SELF ADH NS OPT GRD LF

## (undated) DEVICE — CLIP SM RED INTERN HMOCLP TITAN LIGATING

## (undated) DEVICE — TUBING SUCT 9 11FR L475IN RIG SHFT MINI SUC TIP DLP

## (undated) DEVICE — CONTAINER,SPECIMEN,OR STERILE,4OZ: Brand: MEDLINE

## (undated) DEVICE — ELECTRODE ES L2.75IN S STL INSUL BLDE W/ SL EDGE

## (undated) DEVICE — SUTURE VCRL SZ 3-0 L27IN ABSRB VLT L26MM SH 1/2 CIR J316H

## (undated) DEVICE — RETRACTOR SURG INSRT SUT HLD OCTOBASE

## (undated) DEVICE — SEAL

## (undated) DEVICE — SET PERF L15IN BLU CLMP MULT FEM LUER ON SGL INLET LEG DLP

## (undated) DEVICE — DRAPE SHEET ULTRAGARD: Brand: MEDLINE

## (undated) DEVICE — POSITIONER HD AD W4.5XH8XL9IN HIGHLY RESILIENT FOAM CMFRT

## (undated) DEVICE — FOGARTY - HYDRAGRIP SURGICAL - CLAMP INSERTS: Brand: FOGARTY SOFTJAW

## (undated) DEVICE — SUTURE STRATAFIX SPRL SZ 2-0 L9IN ABSRB VLT MH L36MM 1/2 SXPD2B408

## (undated) DEVICE — BINDER ABD UNISX 9IN 62IN L AND XL UNIV

## (undated) DEVICE — CLIP LIG M BLU TI HRT SHP WIRE HORZ 600 PER BX

## (undated) DEVICE — SYRINGE MED 30ML STD CLR PLAS LUERLOCK TIP N CTRL DISP

## (undated) DEVICE — GLOVE ORANGE PI 8   MSG9080

## (undated) DEVICE — SUTURE PROL SZ 6-0 L30IN NONABSORBABLE BLU L13MM RB-2 1/2 8711H

## (undated) DEVICE — AGENT HEMSTAT W2XL14IN OXIDIZED REGENERATED CELOS ABSRB FOR

## (undated) DEVICE — MARKER SURG SKIN UTIL REGULAR/FINE 2 TIP W/ RUL AND 9 LBL

## (undated) DEVICE — GLOVE SURG SZ 8 L12IN THK75MIL DK GRN LTX FREE

## (undated) DEVICE — 3M™ STERI-DRAPE™ INSTRUMENT POUCH 1018: Brand: STERI-DRAPE™

## (undated) DEVICE — CANNULA SEAL

## (undated) DEVICE — SYRINGE MED 20ML STD CLR PLAS LUERLOCK TIP N CTRL DISP

## (undated) DEVICE — 3M™ IOBAN™ 2 ANTIMICROBIAL INCISE DRAPE 6648EZ: Brand: IOBAN™ 2

## (undated) DEVICE — SUTURE VCRL SZ 1 L27IN ABSRB VLT L26MM CT-2 1/2 CIR J335H

## (undated) DEVICE — SUTURE ABSORBABLE BRAIDED 2-0 CT-1 27 IN UD VICRYL J259H

## (undated) DEVICE — CLEANER,CAUTERY TIP,2X2",STERILE: Brand: MEDLINE

## (undated) DEVICE — ELECTRODE ES AD CRDLSS PT RET REM POLYHESIVE

## (undated) DEVICE — ARM DRAPE

## (undated) DEVICE — TROCAR: Brand: KII FIOS FIRST ENTRY

## (undated) DEVICE — SUTURE NONABSORBABLE MONOFILAMENT 5-0 C-1 1X24 IN PROLENE 8725H

## (undated) DEVICE — PENCIL ES CRD L10FT HND SWCHING ROCK SWCH W/ EDGE COAT BLDE

## (undated) DEVICE — ADHESIVE SKIN CLSR 0.7ML TOP DERMBND ADV

## (undated) DEVICE — PACK,BASIC,SIRUS,V: Brand: MEDLINE

## (undated) DEVICE — COUNTER NDL 30 COUNT FOAM STRP SGL MAG

## (undated) DEVICE — APPLICATOR PREP 26ML 0.7% IOD POVACRYLEX 74% ISO ALC ST

## (undated) DEVICE — SUTURE SZ 0 27IN 5/8 CIR UR-6  TAPER PT VIOLET ABSRB VICRYL J603H

## (undated) DEVICE — TIP COVER ACCESSORY

## (undated) DEVICE — GOWN,ECLIPSE,POLYRNF,BRTHSLV,XL,30/CS: Brand: MEDLINE

## (undated) DEVICE — GLOVE ORANGE PI 7 1/2   MSG9075

## (undated) DEVICE — ENDOSCOPY KIT: Brand: MEDLINE INDUSTRIES, INC.

## (undated) DEVICE — TOWEL,OR,DSP,ST,WHITE,DLX,XR,4/PK,20PK/C: Brand: MEDLINE

## (undated) DEVICE — DRAPE,UTILITY,XL,4/PK,STERILE: Brand: MEDLINE

## (undated) DEVICE — DRAPE,SPLIT,CVMAX ,CLEAR: Brand: MEDLINE

## (undated) DEVICE — SOLUTION IV IRRIG WATER 1000ML POUR BRL 2F7114

## (undated) DEVICE — 3M™ IOBAN™ 2 ANTIMICROBIAL INCISE DRAPE 6650EZ: Brand: IOBAN™ 2

## (undated) DEVICE — Device

## (undated) DEVICE — SUTURE VCRL SZ 0 L27IN ABSRB UD L36MM CT-1 1/2 CIR J260H

## (undated) DEVICE — TOWEL,OR,DSP,ST,BLUE,STD,6/PK,12PK/CS: Brand: MEDLINE

## (undated) DEVICE — GOWN,SLEEVE,STERILE,W/CSR WRAP,1/P: Brand: MEDLINE

## (undated) DEVICE — EXCEL 10FT (3.05 M) INSUFFLATION TUBING SET WITH 0.1 MICRON FILTER: Brand: EXCEL

## (undated) DEVICE — SUTURE BOOT ASSORTED COLORS XRAY DETECTABLE

## (undated) DEVICE — SUTURE 1 STRATAFIX SYMMETRIC PDS + 30CM CT-1 SXPP1A435

## (undated) DEVICE — REDUCER: Brand: ENDOWRIST

## (undated) DEVICE — SUTURE VCRL SZ 0 L27IN ABSRB UD L26MM CT-2 1/2 CIR J270H

## (undated) DEVICE — SUTURE VCRL SZ 4-0 L27IN ABSRB UD L19MM FS-2 3/8 CIR REV J422H

## (undated) DEVICE — APPLICATOR MEDICATED 26 CC SOLUTION HI LT ORNG CHLORAPREP

## (undated) DEVICE — VESSEL LOOPS X-RAY DETECTABLE: Brand: DEROYAL

## (undated) DEVICE — LINER,SEMI-RIGID,3000CC,50EA/CS: Brand: MEDLINE

## (undated) DEVICE — GLOVE SURG SZ 7 CRM LTX FREE POLYISOPRENE POLYMER BEAD ANTI